# Patient Record
Sex: FEMALE | Race: WHITE | NOT HISPANIC OR LATINO | Employment: OTHER | URBAN - METROPOLITAN AREA
[De-identification: names, ages, dates, MRNs, and addresses within clinical notes are randomized per-mention and may not be internally consistent; named-entity substitution may affect disease eponyms.]

---

## 2017-02-09 ENCOUNTER — OFFICE VISIT (OUTPATIENT)
Dept: INTERNAL MEDICINE | Facility: CLINIC | Age: 62
End: 2017-02-09

## 2017-02-09 VITALS
WEIGHT: 123 LBS | HEART RATE: 92 BPM | HEIGHT: 61 IN | OXYGEN SATURATION: 98 % | BODY MASS INDEX: 23.22 KG/M2 | SYSTOLIC BLOOD PRESSURE: 112 MMHG | RESPIRATION RATE: 16 BRPM | DIASTOLIC BLOOD PRESSURE: 78 MMHG | TEMPERATURE: 98 F

## 2017-02-09 DIAGNOSIS — G43.909 MIGRAINE WITHOUT STATUS MIGRAINOSUS, NOT INTRACTABLE, UNSPECIFIED MIGRAINE TYPE: ICD-10-CM

## 2017-02-09 DIAGNOSIS — F51.01 PRIMARY INSOMNIA: ICD-10-CM

## 2017-02-09 DIAGNOSIS — K21.9 GASTROESOPHAGEAL REFLUX DISEASE WITHOUT ESOPHAGITIS: ICD-10-CM

## 2017-02-09 DIAGNOSIS — F41.8 DEPRESSION WITH ANXIETY: Primary | ICD-10-CM

## 2017-02-09 LAB
ALBUMIN SERPL-MCNC: 4.4 G/DL (ref 3.5–5.2)
ALBUMIN/GLOB SERPL: 1.7 G/DL
ALP SERPL-CCNC: 103 U/L (ref 40–129)
ALT SERPL-CCNC: 10 U/L (ref 5–33)
AST SERPL-CCNC: 19 U/L (ref 5–32)
BASOPHILS # BLD AUTO: 0.04 10*3/MM3 (ref 0–0.2)
BASOPHILS NFR BLD AUTO: 0.6 % (ref 0–2)
BILIRUB SERPL-MCNC: 0.3 MG/DL (ref 0.2–1.2)
BUN SERPL-MCNC: 12 MG/DL (ref 8–23)
BUN/CREAT SERPL: 11.2 (ref 7–25)
CALCIUM SERPL-MCNC: 9.4 MG/DL (ref 8.8–10.5)
CHLORIDE SERPL-SCNC: 100 MMOL/L (ref 98–107)
CO2 SERPL-SCNC: 26 MMOL/L (ref 22–29)
CREAT SERPL-MCNC: 1.07 MG/DL (ref 0.57–1)
EOSINOPHIL # BLD AUTO: 0.03 10*3/MM3 (ref 0.1–0.3)
EOSINOPHIL NFR BLD AUTO: 0.5 % (ref 0–4)
ERYTHROCYTE [DISTWIDTH] IN BLOOD BY AUTOMATED COUNT: 12.1 % (ref 11.5–14.5)
GLOBULIN SER CALC-MCNC: 2.6 GM/DL
GLUCOSE SERPL-MCNC: 89 MG/DL (ref 65–99)
HCT VFR BLD AUTO: 39.6 % (ref 37–47)
HGB BLD-MCNC: 12.9 G/DL (ref 12–16)
IMM GRANULOCYTES # BLD: 0.01 10*3/MM3 (ref 0–0.03)
IMM GRANULOCYTES NFR BLD: 0.2 % (ref 0–0.5)
LYMPHOCYTES # BLD AUTO: 1.29 10*3/MM3 (ref 0.6–4.8)
LYMPHOCYTES NFR BLD AUTO: 20.5 % (ref 20–45)
MCH RBC QN AUTO: 30.5 PG (ref 27–31)
MCHC RBC AUTO-ENTMCNC: 32.6 G/DL (ref 31–37)
MCV RBC AUTO: 93.6 FL (ref 81–99)
MONOCYTES # BLD AUTO: 0.36 10*3/MM3 (ref 0–1)
MONOCYTES NFR BLD AUTO: 5.7 % (ref 3–8)
NEUTROPHILS # BLD AUTO: 4.57 10*3/MM3 (ref 1.5–8.3)
NEUTROPHILS NFR BLD AUTO: 72.5 % (ref 45–70)
NRBC BLD AUTO-RTO: 0 /100 WBC (ref 0–0)
PLATELET # BLD AUTO: 263 10*3/MM3 (ref 140–500)
POTASSIUM SERPL-SCNC: 4 MMOL/L (ref 3.5–5.2)
PROT SERPL-MCNC: 7 G/DL (ref 6–8.5)
RBC # BLD AUTO: 4.23 10*6/MM3 (ref 4.2–5.4)
SODIUM SERPL-SCNC: 137 MMOL/L (ref 136–145)
TSH SERPL DL<=0.005 MIU/L-ACNC: 3.03 MIU/ML (ref 0.27–4.2)
WBC # BLD AUTO: 6.3 10*3/MM3 (ref 4.8–10.8)

## 2017-02-09 PROCEDURE — 99214 OFFICE O/P EST MOD 30 MIN: CPT | Performed by: FAMILY MEDICINE

## 2017-02-09 NOTE — PROGRESS NOTES
Subjective     Martina Hardwick is a 61 y.o. female, who presents with a chief complaint of   Chief Complaint   Patient presents with   • Anxiety   • Depression   • Headache   • Insomnia       HPI     1. Depression.  Pt reports her symptoms are controlled.  Denies SI.    2. Migraines.  Topiramate has decreased the frequency.  Sumatriptan effective for abortive treatment.    3. GERD.  Pt reports symptoms are controlled with ranitidine.    The following portions of the patient's history were reviewed and updated as appropriate: allergies, current medications, past family history, past medical history, past social history, past surgical history and problem list.    Allergies: Sulfa antibiotics    Review of Systems   Constitutional: Negative.    Eyes: Negative.    Respiratory: Negative.    Cardiovascular: Negative.    Gastrointestinal: Negative.    Endocrine: Negative.    Genitourinary: Negative.    Musculoskeletal: Negative.    Skin: Negative.    Allergic/Immunologic: Negative.    Neurological: Positive for headaches.   Hematological: Negative.    Psychiatric/Behavioral: Negative.        Objective     Wt Readings from Last 3 Encounters:   02/09/17 123 lb (55.8 kg)   11/07/16 117 lb 12.8 oz (53.4 kg)   09/26/16 120 lb 3.2 oz (54.5 kg)     Temp Readings from Last 3 Encounters:   02/09/17 98 °F (36.7 °C) (Oral)   05/13/16 97.8 °F (36.6 °C) (Oral)   03/30/16 98 °F (36.7 °C)     BP Readings from Last 3 Encounters:   02/09/17 112/78   11/07/16 127/90   09/26/16 120/78     Pulse Readings from Last 3 Encounters:   02/09/17 92   11/07/16 78   09/26/16 81     Body mass index is 23.24 kg/(m^2).  SpO2 Readings from Last 3 Encounters:   02/09/17 98%   11/07/16 99%   09/26/16 97%       Physical Exam   Constitutional: She is oriented to person, place, and time. She appears well-developed and well-nourished.   HENT:   Head: Normocephalic.   Mouth/Throat: Oropharynx is clear and moist.   Eyes: Conjunctivae and EOM are normal. Pupils are  equal, round, and reactive to light.   Neck: Normal range of motion. Neck supple. No thyromegaly present.   Cardiovascular: Normal rate, regular rhythm and normal heart sounds.    Pulmonary/Chest: Effort normal and breath sounds normal.   Abdominal: Soft. Bowel sounds are normal. There is no hepatosplenomegaly.   Musculoskeletal: Normal range of motion. She exhibits no edema.   Lymphadenopathy:     She has no cervical adenopathy.   Neurological: She is alert and oriented to person, place, and time.   Skin: Skin is warm and dry. No rash noted.   Psychiatric: She has a normal mood and affect. Her behavior is normal.   Vitals reviewed.      Results for orders placed or performed in visit on 09/26/16   Comprehensive Metabolic Panel   Result Value Ref Range    Glucose 71 65 - 99 mg/dL    BUN 9 8 - 23 mg/dL    Creatinine 0.88 0.57 - 1.00 mg/dL    Sodium 132 (L) 136 - 145 mmol/L    Potassium 4.7 3.5 - 5.2 mmol/L    Chloride 95 (L) 98 - 107 mmol/L    CO2 25.0 22.0 - 29.0 mmol/L    Calcium 9.5 8.8 - 10.5 mg/dL    Total Protein 7.1 6.0 - 8.5 g/dL    Albumin 4.20 3.50 - 5.20 g/dL    ALT (SGPT) 12 5 - 33 U/L    AST (SGOT) 17 5 - 32 U/L    Alkaline Phosphatase 111 40 - 129 U/L    Total Bilirubin 0.5 0.2 - 1.2 mg/dL    eGFR Non African Amer 66 >60 mL/min/1.73    Globulin 2.9 gm/dL    A/G Ratio 1.4 g/dL    BUN/Creatinine Ratio 10.2 7.0 - 25.0    Anion Gap 12.0 mmol/L   TSH   Result Value Ref Range    TSH 2.590 0.270 - 4.200 mIU/mL   Vitamin B12   Result Value Ref Range    Vitamin B-12 1046 (H) 211 - 946 pg/mL   T4, Free   Result Value Ref Range    Free T4 1.19 0.93 - 1.70 ng/dL   CBC Auto Differential   Result Value Ref Range    WBC 6.30 4.80 - 10.80 10*3/mm3    RBC 4.27 4.20 - 5.40 10*6/mm3    Hemoglobin 13.2 12.0 - 16.0 g/dL    Hematocrit 38.8 37.0 - 47.0 %    MCV 90.9 81.0 - 99.0 fL    MCH 30.9 27.0 - 31.0 pg    MCHC 34.0 31.0 - 37.0 g/dL    RDW 12.4 11.5 - 14.5 %    RDW-SD 41.0 37.0 - 54.0 fl    MPV 9.0 7.4 - 10.4 fL     Platelets 244 140 - 500 10*3/mm3    Neutrophil % 77.3 (H) 45.0 - 70.0 %    Lymphocyte % 15.4 (L) 20.0 - 45.0 %    Monocyte % 6.2 3.0 - 8.0 %    Eosinophil % 0.5 0.0 - 4.0 %    Basophil % 0.3 0.0 - 2.0 %    Immature Grans % 0.3 0.0 - 0.5 %    Neutrophils, Absolute 4.87 1.50 - 8.30 10*3/mm3    Lymphocytes, Absolute 0.97 0.60 - 4.80 10*3/mm3    Monocytes, Absolute 0.39 0.00 - 1.00 10*3/mm3    Eosinophils, Absolute 0.03 (L) 0.10 - 0.30 10*3/mm3    Basophils, Absolute 0.02 0.00 - 0.20 10*3/mm3    Immature Grans, Absolute 0.02 0.00 - 0.03 10*3/mm3    nRBC 0.0 0.0 - 0.0 /100 WBC       Assessment/Plan   Martina was seen today for anxiety, depression, headache and insomnia.    Diagnoses and all orders for this visit:    Depression with anxiety  -     CBC & Differential  -     TSH    Primary insomnia    Migraine without status migrainosus, not intractable, unspecified migraine type  -     Ambulatory Referral to Neurology  -     Comprehensive Metabolic Panel    Gastroesophageal reflux disease without esophagitis    1. Depression with anxiety.  Controlled.  Continue same.    2. Insomnia.  Zolpidem helping her get several hours of sleep.  Med management agreement and Joby UTD.    3. Migraines.  Check labs.  Continue topiramate and prn sumatriptan.  Neurology consult.    4. GERD.  Controlled with H2 blocker.      Outpatient Medications Prior to Visit   Medication Sig Dispense Refill   • ALPRAZolam (XANAX) 0.5 MG tablet Take 0.5 mg by mouth 3 (three) times a day as needed for anxiety.     • Calcium-Vitamin D (CALTRATE 600 PLUS-VIT D PO) Take  by mouth.     • fluticasone (FLONASE) 50 MCG/ACT nasal spray 2 sprays into each nostril daily.     • loratadine (CLARITIN) 10 MG tablet Take 10 mg by mouth daily.     • Multiple Vitamins-Minerals (EYE VITAMINS) capsule Take  by mouth.     • Multiple Vitamins-Minerals (MULTI ADULT GUMMIES PO) Take  by mouth.     • omeprazole (PriLOSEC) 40 MG capsule Take 40 mg by mouth Daily. Pt takes 1/3 per  day      • PARoxetine (PAXIL) 30 MG tablet Take 1 tablet by mouth every morning. 90 tablet 3   • polyethylene glycol (MIRALAX) packet Take 17 g by mouth daily.     • Probiotic Product (PROBIOTIC DAILY) capsule Once daily     • promethazine (PHENERGAN) 12.5 MG tablet Take 1 tablet by mouth every 6 (six) hours as needed for nausea or vomiting. 30 tablet 1   • ranitidine (ZANTAC) 300 MG tablet Take 300 mg by mouth Daily.     • SUMAtriptan (IMITREX) 100 MG tablet Take 1 tablet by mouth 1 (One) Time As Needed for migraine for up to 1 dose. 27 tablet 3   • topiramate (TOPAMAX) 50 MG tablet Take 1 tablet by mouth 2 (Two) Times a Day. 180 tablet 3   • zolpidem (AMBIEN) 10 MG tablet TAKE ONE TABLET BY MOUTH AT BEDTIME AS NEEDED 30 tablet 5     Facility-Administered Medications Prior to Visit   Medication Dose Route Frequency Provider Last Rate Last Dose   • promethazine (PHENERGAN) injection 12.5 mg  12.5 mg Intramuscular Once Juan Ramon Fishman MD         No orders of the defined types were placed in this encounter.    [unfilled]  There are no discontinued medications.      Return in about 6 months (around 8/9/2017).

## 2017-02-13 ENCOUNTER — TELEPHONE (OUTPATIENT)
Dept: INTERNAL MEDICINE | Facility: CLINIC | Age: 62
End: 2017-02-13

## 2017-02-13 DIAGNOSIS — G43.909 MIGRAINE WITHOUT STATUS MIGRAINOSUS, NOT INTRACTABLE, UNSPECIFIED MIGRAINE TYPE: Primary | ICD-10-CM

## 2017-02-13 NOTE — TELEPHONE ENCOUNTER
----- Message from Ildefonso Dubois MD sent at 2/13/2017  7:55 AM EST -----  Please call the patient regarding her result.  Labs look okay.  Needs to drink plenty of water and limit NSAIDs (ibuprofen, Aleve) to keep her kidneys filtering/functioning optimally.  Thanks.

## 2017-02-14 ENCOUNTER — TELEPHONE (OUTPATIENT)
Dept: INTERNAL MEDICINE | Facility: CLINIC | Age: 62
End: 2017-02-14

## 2017-02-14 NOTE — TELEPHONE ENCOUNTER
Advised patient as per below and scheduled repeat lab on 4/11/2016.  ----- Message from Stephanie Tee MA sent at 2/13/2017  6:52 PM EST -----  Regarding: FW: Non-Urgent Medical Question  Contact: [phone]      ----- Message -----     From: Ildefonso Dubois MD     Sent: 2/13/2017   6:01 PM       To: lavelle Garcia [facility] [place]  Subject: RE: Non-Urgent Medical Question                  I'm not too concerned about the lab after looking at her previous labs.  It's not uncommon to have an occasional drop in eGFR.  I have ordered another blood test for her to have in 2-3 months to monitor this.  Thanks.    ----- Message -----     From: Radha Mayorga MA     Sent: 2/13/2017   4:22 PM       To: Ildefonso Dubois MD  Subject: FW: Non-Urgent Medical Question                      ----- Message -----     From: Martina Hardwick     Sent: 2/13/2017   3:48 PM       To: lavelle Garcia [facility] [place]  Subject: Non-Urgent Medical Question                      Hi Dr. Dubois,    I am a little concerned about the high creatitine reading that your office called me about today.  I don't regularly take nsaids.  I took some back in Dec/Jan after having a root canal but that was about 1 month ago.  I drink about 8-10 glasses of water a day as it is so that is why I am concerned if there is another reason it might be climbing.  I don't recall what it was when I had my labs done in Nov.  I am not scheduled to come back till August.  Do I need to come back for labs before that time?  I have read that both Omeprazole and Rantidine can raise creatitine.  I would appreciate your feedback on this.  I know your really busy.  Thanks so much.    Martina Hardwick

## 2017-02-18 ENCOUNTER — RESULTS ENCOUNTER (OUTPATIENT)
Dept: INTERNAL MEDICINE | Facility: CLINIC | Age: 62
End: 2017-02-18

## 2017-02-18 DIAGNOSIS — G43.909 MIGRAINE WITHOUT STATUS MIGRAINOSUS, NOT INTRACTABLE, UNSPECIFIED MIGRAINE TYPE: ICD-10-CM

## 2017-02-20 ENCOUNTER — TELEPHONE (OUTPATIENT)
Dept: INTERNAL MEDICINE | Facility: CLINIC | Age: 62
End: 2017-02-20

## 2017-02-20 NOTE — TELEPHONE ENCOUNTER
----- Message from Stephanie Tee MA sent at 2/20/2017  3:49 PM EST -----  Rita Avalos, manager @ [facility], office to contact patient personally with neurology appt.

## 2017-03-20 RX ORDER — ZOLPIDEM TARTRATE 10 MG/1
TABLET ORAL
Qty: 30 TABLET | Refills: 2 | OUTPATIENT
Start: 2017-03-20 | End: 2017-09-17 | Stop reason: SDUPTHER

## 2017-04-10 DIAGNOSIS — Z00.00 HEALTH CARE MAINTENANCE: Primary | ICD-10-CM

## 2017-04-11 ENCOUNTER — LAB (OUTPATIENT)
Dept: INTERNAL MEDICINE | Facility: CLINIC | Age: 62
End: 2017-04-11

## 2017-04-11 DIAGNOSIS — Z00.00 HEALTH CARE MAINTENANCE: ICD-10-CM

## 2017-04-11 LAB
ALBUMIN SERPL-MCNC: 4.3 G/DL (ref 3.5–5.2)
ALBUMIN/GLOB SERPL: 1.7 G/DL
ALP SERPL-CCNC: 95 U/L (ref 40–129)
ALT SERPL-CCNC: 10 U/L (ref 5–33)
AST SERPL-CCNC: 17 U/L (ref 5–32)
BILIRUB SERPL-MCNC: 0.4 MG/DL (ref 0.2–1.2)
BUN SERPL-MCNC: 9 MG/DL (ref 8–23)
BUN/CREAT SERPL: 9.7 (ref 7–25)
CALCIUM SERPL-MCNC: 9.6 MG/DL (ref 8.8–10.5)
CHLORIDE SERPL-SCNC: 97 MMOL/L (ref 98–107)
CO2 SERPL-SCNC: 24.8 MMOL/L (ref 22–29)
CREAT SERPL-MCNC: 0.93 MG/DL (ref 0.57–1)
GLOBULIN SER CALC-MCNC: 2.6 GM/DL
GLUCOSE SERPL-MCNC: 85 MG/DL (ref 65–99)
POTASSIUM SERPL-SCNC: 4.6 MMOL/L (ref 3.5–5.2)
PROT SERPL-MCNC: 6.9 G/DL (ref 6–8.5)
SODIUM SERPL-SCNC: 135 MMOL/L (ref 136–145)

## 2017-04-18 ENCOUNTER — OFFICE VISIT (OUTPATIENT)
Dept: NEUROLOGY | Facility: CLINIC | Age: 62
End: 2017-04-18

## 2017-04-18 VITALS
HEIGHT: 61 IN | DIASTOLIC BLOOD PRESSURE: 74 MMHG | WEIGHT: 117 LBS | OXYGEN SATURATION: 98 % | HEART RATE: 90 BPM | SYSTOLIC BLOOD PRESSURE: 126 MMHG | BODY MASS INDEX: 22.09 KG/M2

## 2017-04-18 DIAGNOSIS — G44.86 CERVICOGENIC HEADACHE: ICD-10-CM

## 2017-04-18 DIAGNOSIS — G43.019 INTRACTABLE MIGRAINE WITHOUT AURA AND WITHOUT STATUS MIGRAINOSUS: ICD-10-CM

## 2017-04-18 PROCEDURE — 99204 OFFICE O/P NEW MOD 45 MIN: CPT | Performed by: PSYCHIATRY & NEUROLOGY

## 2017-04-18 RX ORDER — PROPRANOLOL HYDROCHLORIDE 40 MG/1
40 TABLET ORAL DAILY
Qty: 30 TABLET | Refills: 5 | Status: SHIPPED | OUTPATIENT
Start: 2017-04-18 | End: 2017-05-18

## 2017-04-18 NOTE — PROGRESS NOTES
Subjective   Martina Hardwick is a 61 y.o. female with history of depression and anxiety issues came for evaluation of migraine headaches.    History of Present Illness   She was accompanied by her  and according to them, she has migraine headaches for the past 20 years and worsening recently and headaches are mainly on the left side, throbbing nature and sometimes constant pain, severity 8-10/10, associated with nausea, photophobia, phonophobia and osmophobia and can last for 1-2 days and having 15-20 headaches a month and complaint with the medication Sumatriptan and topiramate and denies any side effects.  Denies any aura prior to the headaches.  Tried Excedrin and amitriptyline in the past with not much improvement in her symptoms.  Complaining of neck pain issues and sometimes pain on the back of the head but denies any nerve vision, double vision, weakness, tingling numbness, dizziness or balance problems while walking.  Denies any falls, passing out spells, seizures, strokes or mini strokes in the past.    The following portions of the patient's history were reviewed and updated as appropriate: allergies, current medications, past family history, past medical history, past social history, past surgical history and problem list.    Review of Systems   Constitutional: Negative for chills, fatigue and fever.   HENT: Positive for dental problem, postnasal drip, sinus pressure and tinnitus.    Eyes: Negative for pain, redness and itching.   Respiratory: Negative for cough, shortness of breath and wheezing.    Cardiovascular: Negative for chest pain, palpitations and leg swelling.   Gastrointestinal: Positive for constipation. Negative for diarrhea, nausea and vomiting.   Endocrine: Negative for cold intolerance and heat intolerance.   Genitourinary: Negative for decreased urine volume, difficulty urinating and urgency.   Musculoskeletal: Positive for neck pain. Negative for back pain and neck stiffness.    Skin: Negative for rash and wound.   Allergic/Immunologic: Positive for environmental allergies. Negative for food allergies.   Neurological: Positive for headaches. Negative for dizziness, weakness and numbness.   Hematological: Negative for adenopathy. Does not bruise/bleed easily.   Psychiatric/Behavioral: Negative for confusion and sleep disturbance. The patient is nervous/anxious.        Objective   Physical Exam   Vitals reviewed.    GENERAL EXAMINATION : Patient is well-developed, well-nourished, well-groomed, alert and approriate in no apparent distress.  HEENT: Normocephalic, normal funduscopic exam, no visible oral lesions.  NECK : Normal range of motion, no tenderness, no malalignment.  CARDIAC : Regular rate and rhythm, no murmurs.  CHEST : Clear to auscultation, bilateral.   No wheezing .  ABDOMEN : Soft, non tender and non distended. EXTREMITIES: No edema. SKIN : No rashes or lesions visible. MUSCULOSKELETAL : No muscle weakness or muscle pain. No joint pains. PSYCHIATRIC : Awake and follwoing verbal commands well.     NEUROLOGICAL EXAMINATION  :  Higher integrative functions : No aphasia or dysarthria.  CRANIAL NERVES : Cranial nerve II: Normal visual acuity and visual fields.  On funduscopic exam, discs are flat with sharp margins cranial nerves III, IV, VI: Extraocular movements are full without nystagmus; pupils are equal, round and reactive to light.  Cranial nerve V: Normal facial sensation and strength of muscles of mastication.  Cranial nerve: VII: Facial movements are symmetric.  No weakness.  Cranial nerve VIII: Auditory acuity is normal.  Cranial nerve IX, X: Symmetric palate movement.  Cranial nerve XI sternocleidomastoid and trapezius are normal.  Cranial nerve XII: Tongue in the midline with no atrophy or fasciculations.      MOTOR : Normal muscle strength and tone.  SENSATION : Normal to light touch, pinprick, vibration sensations intact and Romberg is negative.  MUSCLE STRETCH  REFLEXES : Normal and symmetric in the upper extremities and lower extremities and the plantar responses are flexor bilaterally. COORDINATION : Finger to nose test showed no dysmetria.  Rapid alternating movements are normal.   GAIT : Walks on heels, toes, except for mild difficulty with tandem walk.    Assessment/Plan   Martina was seen today for migraine.    Diagnoses and all orders for this visit:    Intractable migraine without aura and without status migrainosus  -     Nerve Block    Cervicogenic headache    Other orders  -     propranolol (INDERAL) 40 MG tablet; Take 1 tablet by mouth Daily for 30 days.    61-year-old right-handed white female with history of depression and anxiety issues and migraine headaches came for worsening of the migraine headaches.  On exam, no focal deficits were seen except for mild difficulty with tandem walk.  Reviewed PCPs office notes and also reviewed MRI brain dated September 2016 and showed mild small vessel ischemic changes but no acute abnormality seen and discussed with the patient and her  regarding her signs and symptoms and told her to continue Sumatriptan 100 mg one tablet by mouth at onset of headaches, can repeat one more tablet in 2 hours but not more than 2 per day and for per week and also gave samples of Zembrace injections as needed for migraine headaches and discussed about side effects.  Will wean topiramate as it start helping her migraine headaches much and will start propranolol 40 mg one tablet by mouth daily for migraine prophylaxis and discussed about side effects.  Will schedule the patient for supraorbital and supratrochlear nerve blocks in future to help with these headaches.  Discussed about migraine triggers and medication or use headaches.  Will follow-up in 2 weeks for nerve blocks.    Thank you for allowing me to participate in the care of the patient.  Please feel free to call for any questions at your convenience.    Yours  sincerely,    Fransisca Camargo M.D

## 2017-04-19 RX ORDER — PAROXETINE 30 MG/1
TABLET, FILM COATED ORAL
Qty: 90 TABLET | Refills: 2 | Status: SHIPPED | OUTPATIENT
Start: 2017-04-19 | End: 2017-08-10

## 2017-05-03 ENCOUNTER — PROCEDURE VISIT (OUTPATIENT)
Dept: NEUROLOGY | Facility: CLINIC | Age: 62
End: 2017-05-03

## 2017-05-03 DIAGNOSIS — G43.719 INTRACTABLE CHRONIC MIGRAINE WITHOUT AURA AND WITHOUT STATUS MIGRAINOSUS: ICD-10-CM

## 2017-05-03 PROCEDURE — S0020 INJECTION, BUPIVICAINE HYDRO: HCPCS | Performed by: PSYCHIATRY & NEUROLOGY

## 2017-05-03 PROCEDURE — 64400 NJX AA&/STRD TRIGEMINAL NRV: CPT | Performed by: PSYCHIATRY & NEUROLOGY

## 2017-05-03 PROCEDURE — 64450 NJX AA&/STRD OTHER PN/BRANCH: CPT | Performed by: PSYCHIATRY & NEUROLOGY

## 2017-05-03 RX ORDER — BUPIVACAINE HYDROCHLORIDE 5 MG/ML
5 INJECTION, SOLUTION PERINEURAL ONCE
Status: COMPLETED | OUTPATIENT
Start: 2017-05-03 | End: 2017-05-03

## 2017-05-03 RX ADMIN — BUPIVACAINE HYDROCHLORIDE 5 ML: 5 INJECTION, SOLUTION PERINEURAL at 13:35

## 2017-06-19 RX ORDER — ZOLPIDEM TARTRATE 5 MG/1
TABLET ORAL
Qty: 30 TABLET | Refills: 2 | OUTPATIENT
Start: 2017-06-19 | End: 2017-08-10

## 2017-07-03 ENCOUNTER — PROCEDURE VISIT (OUTPATIENT)
Dept: NEUROLOGY | Facility: CLINIC | Age: 62
End: 2017-07-03

## 2017-07-03 DIAGNOSIS — G43.719 INTRACTABLE CHRONIC MIGRAINE WITHOUT AURA AND WITHOUT STATUS MIGRAINOSUS: ICD-10-CM

## 2017-07-03 PROCEDURE — 99213 OFFICE O/P EST LOW 20 MIN: CPT | Performed by: PSYCHIATRY & NEUROLOGY

## 2017-07-03 RX ORDER — BUPIVACAINE HYDROCHLORIDE 5 MG/ML
5 INJECTION, SOLUTION PERINEURAL ONCE
Status: COMPLETED | OUTPATIENT
Start: 2017-07-03 | End: 2017-07-03

## 2017-07-03 RX ORDER — VERAPAMIL HYDROCHLORIDE 40 MG/1
40 TABLET ORAL 2 TIMES DAILY
Qty: 60 TABLET | Refills: 5 | Status: SHIPPED | OUTPATIENT
Start: 2017-07-03 | End: 2017-08-02

## 2017-07-03 RX ADMIN — BUPIVACAINE HYDROCHLORIDE 5 ML: 5 INJECTION, SOLUTION PERINEURAL at 13:38

## 2017-07-03 NOTE — PROGRESS NOTES
Subjective   Martina Hardwick is a 61 y.o. female with history of chronic migraines, intractable and depression and anxiety issues came for follow-up appointment.    History of Present Illness   She was accompanied by her  and according to them, she is still having 15-18 headaches a month, pain is mainly on the left side and also on the back of the head, throbbing nature, severity 7/10, associated with nausea, photophobia and phonophobia and complaint with the medication Sumatriptan, Zembrace injections and propranolol and denies any side effects except for low heart rate and also having high blood pressure issues.  Got occipital nerve blocks and also supratrochlear and supraorbital nerve blocks with not much improvement in her headaches.  Denies any blurred vision, double vision, weakness, tingling numbness, dizziness or balance problems when walking.  Denies any falls, passing out spells or recent hospitalizations since last visit.  Denies any worsening of depression or anxiety issues.     The following portions of the patient's history were reviewed and updated as appropriate: allergies, current medications, past family history, past medical history, past social history, past surgical history and problem list.    Review of Systems   Constitutional: Negative for chills, fatigue and fever.   HENT: Negative for hearing loss, mouth sores, tinnitus and trouble swallowing.    Eyes: Negative for pain, redness and itching.   Respiratory: Negative for cough, shortness of breath and wheezing.    Cardiovascular: Negative for chest pain, palpitations and leg swelling.   Gastrointestinal: Negative for constipation, diarrhea, nausea and vomiting.   Endocrine: Negative for cold intolerance and heat intolerance.   Genitourinary: Negative for decreased urine volume, difficulty urinating and urgency.   Musculoskeletal: Negative for back pain, neck pain and neck stiffness.   Skin: Negative for rash and wound.    Allergic/Immunologic: Negative for environmental allergies and food allergies.   Neurological: Positive for headaches. Negative for dizziness, weakness, light-headedness and numbness.   Hematological: Negative for adenopathy. Does not bruise/bleed easily.   Psychiatric/Behavioral: Negative for confusion and sleep disturbance. The patient is not nervous/anxious.        Objective   Physical Exam   Vitals reviewed.    GENERAL EXAMINATION : Patient is well-developed, well-nourished, well-groomed, alert and approriate in no apparent distress.  HEENT: Normocephalic, normal funduscopic exam, no visible oral lesions.  NECK : Normal range of motion, no tenderness, no malalignment.  CARDIAC : Regular rate and rhythm, no murmurs.  CHEST : Clear to auscultation, bilateral.   No wheezing .  ABDOMEN : Soft, non tender and non distended. EXTREMITIES: No edema. SKIN : No rashes or lesions visible. MUSCULOSKELETAL : No muscle weakness or muscle pain. No joint pains. PSYCHIATRIC : Awake and follwoing verbal commands well.     NEUROLOGICAL EXAMINATION  :  Higher integrative functions : No aphasia or dysarthria.  CRANIAL NERVES : Cranial nerve II: Normal visual acuity and visual fields.  On funduscopic exam, discs are flat with sharp margins cranial nerves III, IV, VI: Extraocular movements are full without nystagmus; pupils are equal, round and reactive to light.  Cranial nerve V: Normal facial sensation and strength of muscles of mastication.  Cranial nerve: VII: Facial movements are symmetric.  No weakness.  Cranial nerve VIII: Auditory acuity is normal.  Cranial nerve IX, X: Symmetric palate movement.  Cranial nerve XI sternocleidomastoid and trapezius are normal.  Cranial nerve XII: Tongue in the midline with no atrophy or fasciculations.      MOTOR : Normal muscle strength and tone.  SENSATION : Normal to light touch, pinprick, vibration sensations intact and Romberg is negative.  MUSCLE STRETCH REFLEXES : Normal and symmetric  in the upper extremities and lower extremities and the plantar responses are flexor bilaterally. COORDINATION : Finger to nose test showed no dysmetria.  Rapid alternating movements are normal.   GAIT : Walks on heels, toes, and tandem walk without difficulty.    Assessment/Plan   Diagnoses and all orders for this visit:    Intractable chronic migraine without aura and without status migrainosus  -     bupivacaine (MARCAINE) injection 5 mL; Inject 5 mL as directed 1 (One) Time.  -     OnabotulinumtoxinA 200 Units; Inject 200 Units into the shoulder, thigh, or buttocks 1 (One) Time.    Other orders  -     verapamil (CALAN) 40 MG tablet; Take 1 tablet by mouth 2 (Two) Times a Day for 30 days.    61-year-old right-handed white female with history of depression and anxiety issues and chronic migraines, intractable came for follow-up appointment.  On exam, no new focal deficits were seen.  Discussed with the patient and her  regarding her signs and symptoms and patient wants to hold on further occipital nerve blocks and the supratrochlear and supraorbital nerve blocks as it did not help her headaches much and ordered for Botox injections as she was having more than 15 headaches a month and tried multiple medications in the past.  Told her to continue Sumatriptan 100 mg one tablet by mouth at onset of headaches, can repeat one more tablet in 2 hours but not more than 2 per day and for per week and continue Zembrace injections as needed for severe migraines.  Will discontinue propranolol as having side effects and will start verapamil 40 mg by mouth twice a day for migraine prophylaxis and discussed about side effects.  Discussed about migraine triggers and medication or use headaches.  Will follow-up in 3 months or earlier if problems arise.      Thank you for allowing me to participate in the care of the patient.  Please feel free to call for any questions at your convenience.    Yours sincerely,    Fransisca  Mary Jo APONTE

## 2017-07-25 DIAGNOSIS — G43.719 INTRACTABLE CHRONIC MIGRAINE WITHOUT AURA AND WITHOUT STATUS MIGRAINOSUS: Primary | ICD-10-CM

## 2017-08-10 ENCOUNTER — OFFICE VISIT (OUTPATIENT)
Dept: INTERNAL MEDICINE | Facility: CLINIC | Age: 62
End: 2017-08-10

## 2017-08-10 VITALS
DIASTOLIC BLOOD PRESSURE: 106 MMHG | BODY MASS INDEX: 23.22 KG/M2 | SYSTOLIC BLOOD PRESSURE: 164 MMHG | HEIGHT: 61 IN | WEIGHT: 123 LBS | TEMPERATURE: 97.6 F | HEART RATE: 79 BPM | OXYGEN SATURATION: 99 %

## 2017-08-10 DIAGNOSIS — G43.019 INTRACTABLE MIGRAINE WITHOUT AURA AND WITHOUT STATUS MIGRAINOSUS: ICD-10-CM

## 2017-08-10 DIAGNOSIS — F41.8 DEPRESSION WITH ANXIETY: Primary | ICD-10-CM

## 2017-08-10 DIAGNOSIS — Z00.00 ROUTINE HEALTH MAINTENANCE: ICD-10-CM

## 2017-08-10 DIAGNOSIS — F51.01 PRIMARY INSOMNIA: ICD-10-CM

## 2017-08-10 DIAGNOSIS — K21.9 GASTROESOPHAGEAL REFLUX DISEASE WITHOUT ESOPHAGITIS: ICD-10-CM

## 2017-08-10 DIAGNOSIS — I10 ESSENTIAL HYPERTENSION: ICD-10-CM

## 2017-08-10 PROCEDURE — 99214 OFFICE O/P EST MOD 30 MIN: CPT | Performed by: FAMILY MEDICINE

## 2017-08-10 RX ORDER — LISINOPRIL 10 MG/1
10 TABLET ORAL DAILY
Qty: 30 TABLET | Refills: 5 | Status: SHIPPED | OUTPATIENT
Start: 2017-08-10 | End: 2018-02-05 | Stop reason: SDUPTHER

## 2017-08-10 RX ORDER — ALPRAZOLAM 0.5 MG/1
0.5 TABLET ORAL 3 TIMES DAILY PRN
Qty: 30 TABLET | Refills: 2 | Status: SHIPPED | OUTPATIENT
Start: 2017-08-10 | End: 2018-01-15 | Stop reason: SDUPTHER

## 2017-08-10 NOTE — PROGRESS NOTES
Subjective     Martina Hardwick is a 61 y.o. female, who presents with a chief complaint of   Chief Complaint   Patient presents with   • Depression   • Migraine   • Hypertension   • Anxiety       Depression   Migraine      Hypertension   Associated symptoms include anxiety and headaches.   Anxiety       Her past medical history is significant for depression.        1. Depression with anxiety.  Pt reports her symptoms are controlled.  Denies SI.    2. Migraines.  She is now seeing neurology and is taking verapamil and sumatriptan.  Looking at Botox but she is not sure if she will able to afford it due to her high deductive.    3. GERD.  Pt reports she is doing well and is decreasing her doses of omeprazole and ranitidine.  She sees Dr. Ruff.    4. Insomnia.  Pt reports zolpidem is helping her fall asleep and stay asleep for several hours.  Denies side effects.    5. Elevated blood pressure.  She has been monitoring home blood pressures for several months and home blood pressures are ranging 140s-160s/.  She has a strong family history of hypertension.  She feels hot when it gets high and her tinnitus increases.  She is taking verapamil for her migraines.    The following portions of the patient's history were reviewed and updated as appropriate: allergies, current medications, past family history, past medical history, past social history, past surgical history and problem list.    Allergies: Sulfa antibiotics    Review of Systems   Constitutional: Negative.    Eyes: Negative.    Respiratory: Negative.    Cardiovascular: Negative.    Gastrointestinal: Negative.    Endocrine: Negative.    Genitourinary: Negative.    Musculoskeletal: Negative.    Skin: Negative.    Allergic/Immunologic: Negative.    Neurological: Positive for headaches.   Hematological: Negative.    Psychiatric/Behavioral: Negative.        Objective     Wt Readings from Last 3 Encounters:   08/10/17 123 lb (55.8 kg)   04/18/17 117 lb (53.1  kg)   02/09/17 123 lb (55.8 kg)     Temp Readings from Last 3 Encounters:   08/10/17 97.6 °F (36.4 °C)   02/09/17 98 °F (36.7 °C) (Oral)   05/13/16 97.8 °F (36.6 °C) (Oral)     BP Readings from Last 3 Encounters:   08/10/17 (!) 164/106   04/18/17 126/74   02/09/17 112/78     Pulse Readings from Last 3 Encounters:   08/10/17 79   04/18/17 90   02/09/17 92     Body mass index is 23.24 kg/(m^2).  SpO2 Readings from Last 3 Encounters:   08/10/17 99%   04/18/17 98%   02/09/17 98%       Physical Exam   Constitutional: She is oriented to person, place, and time. She appears well-developed and well-nourished.   HENT:   Head: Normocephalic.   Mouth/Throat: Oropharynx is clear and moist.   Eyes: Conjunctivae and EOM are normal. Pupils are equal, round, and reactive to light.   Neck: Normal range of motion. Neck supple. No thyromegaly present.   Cardiovascular: Normal rate, regular rhythm and normal heart sounds.    Pulmonary/Chest: Effort normal and breath sounds normal.   Abdominal: Soft. Bowel sounds are normal. There is no hepatosplenomegaly.   Musculoskeletal: Normal range of motion. She exhibits no edema.   Lymphadenopathy:     She has no cervical adenopathy.   Neurological: She is alert and oriented to person, place, and time.   Skin: Skin is warm and dry. No rash noted.   Psychiatric: She has a normal mood and affect. Her behavior is normal.   Vitals reviewed.      Results for orders placed or performed in visit on 04/11/17   Comprehensive metabolic panel   Result Value Ref Range    Glucose 85 65 - 99 mg/dL    BUN 9 8 - 23 mg/dL    Creatinine 0.93 0.57 - 1.00 mg/dL    eGFR Non African Am 61 >60 mL/min/1.73    eGFR African Am 74 >60 mL/min/1.73    BUN/Creatinine Ratio 9.7 7.0 - 25.0    Sodium 135 (L) 136 - 145 mmol/L    Potassium 4.6 3.5 - 5.2 mmol/L    Chloride 97 (L) 98 - 107 mmol/L    Total CO2 24.8 22.0 - 29.0 mmol/L    Calcium 9.6 8.8 - 10.5 mg/dL    Total Protein 6.9 6.0 - 8.5 g/dL    Albumin 4.30 3.50 - 5.20 g/dL     Globulin 2.6 gm/dL    A/G Ratio 1.7 g/dL    Total Bilirubin 0.4 0.2 - 1.2 mg/dL    Alkaline Phosphatase 95 40 - 129 U/L    AST (SGOT) 17 5 - 32 U/L    ALT (SGPT) 10 5 - 33 U/L       Assessment/Plan   Martina was seen today for depression, migraine, hypertension and anxiety.    Diagnoses and all orders for this visit:    Depression with anxiety  -     TSH; Future  -     ALPRAZolam (XANAX) 0.5 MG tablet; Take 1 tablet by mouth 3 (Three) Times a Day As Needed for Anxiety.    Primary insomnia    Intractable migraine without aura and without status migrainosus  -     Comprehensive Metabolic Panel; Future    Gastroesophageal reflux disease without esophagitis  -     CBC & Differential; Future    Routine health maintenance  -     Lipid Panel With / Chol / HDL Ratio; Future  -     Vitamin D 25 Hydroxy; Future    Essential hypertension  -     lisinopril (PRINIVIL,ZESTRIL) 10 MG tablet; Take 1 tablet by mouth Daily.    1. Depression with anxiety.  Controlled.  Continue same.  Med management and Joby UTD.    2. Insomnia.  Zolpidem helping her get several hours of sleep.  Med management agreement and Joby UTD.    3. Migraines. Managed by neurology.    4. GERD.  Controlled.  Pt trying to wean off her antacids.    5. HTN.  New diagnosis.  Labs ordered.  Add lisinopril 10 mg daily.  She doesn't think she is tolerating the verapamil so will wean off this.  May need higher dose of lisinopril, therefore.      Outpatient Medications Prior to Visit   Medication Sig Dispense Refill   • Calcium-Vitamin D (CALTRATE 600 PLUS-VIT D PO) Take  by mouth.     • fluticasone (FLONASE) 50 MCG/ACT nasal spray 2 sprays into each nostril daily.     • loratadine (CLARITIN) 10 MG tablet Take 10 mg by mouth daily.     • Multiple Vitamins-Minerals (EYE VITAMINS) capsule Take  by mouth.     • Multiple Vitamins-Minerals (MULTI ADULT GUMMIES PO) Take  by mouth.     • omeprazole (PriLOSEC) 40 MG capsule Take 40 mg by mouth Daily. Pt takes 1/3 per day       • PARoxetine (PAXIL) 30 MG tablet Take 1 tablet by mouth every morning. 90 tablet 3   • polyethylene glycol (MIRALAX) packet Take 17 g by mouth daily.     • Probiotic Product (PROBIOTIC DAILY) capsule Once daily     • ranitidine (ZANTAC) 300 MG tablet Take 150 mg by mouth Daily.     • SUMAtriptan (IMITREX) 100 MG tablet Take 1 tablet by mouth 1 (One) Time As Needed for migraine for up to 1 dose. 27 tablet 3   • zolpidem (AMBIEN) 10 MG tablet TAKE ONE TABLET BY MOUTH AT BEDTIME AS NEEDED 30 tablet 2   • ALPRAZolam (XANAX) 0.5 MG tablet Take 0.5 mg by mouth 3 (three) times a day as needed for anxiety.     • PARoxetine (PAXIL) 30 MG tablet TAKE 1 TABLET EVERY MORNING 90 tablet 2   • zolpidem (AMBIEN) 5 MG tablet TAKE ONE TABLET BY MOUTH AT BEDTIME AS NEEDED 30 tablet 2     Facility-Administered Medications Prior to Visit   Medication Dose Route Frequency Provider Last Rate Last Dose   • OnabotulinumtoxinA 200 Units  200 Units Intramuscular Once Fransisca Camargo MD       • promethazine (PHENERGAN) injection 12.5 mg  12.5 mg Intramuscular Once Juan Ramon Fishman MD         New Medications Ordered This Visit   Medications   • lisinopril (PRINIVIL,ZESTRIL) 10 MG tablet     Sig: Take 1 tablet by mouth Daily.     Dispense:  30 tablet     Refill:  5   • ALPRAZolam (XANAX) 0.5 MG tablet     Sig: Take 1 tablet by mouth 3 (Three) Times a Day As Needed for Anxiety.     Dispense:  30 tablet     Refill:  2     [unfilled]  Medications Discontinued During This Encounter   Medication Reason   • PARoxetine (PAXIL) 30 MG tablet Therapy completed   • promethazine (PHENERGAN) injection 12.5 mg Therapy completed   • zolpidem (AMBIEN) 5 MG tablet Therapy completed   • ALPRAZolam (XANAX) 0.5 MG tablet Reorder         Return in about 2 months (around 10/10/2017).

## 2017-08-15 ENCOUNTER — RESULTS ENCOUNTER (OUTPATIENT)
Dept: INTERNAL MEDICINE | Facility: CLINIC | Age: 62
End: 2017-08-15

## 2017-08-15 DIAGNOSIS — Z00.00 ROUTINE HEALTH MAINTENANCE: ICD-10-CM

## 2017-08-15 DIAGNOSIS — G43.019 INTRACTABLE MIGRAINE WITHOUT AURA AND WITHOUT STATUS MIGRAINOSUS: ICD-10-CM

## 2017-08-15 DIAGNOSIS — F41.8 DEPRESSION WITH ANXIETY: ICD-10-CM

## 2017-08-15 DIAGNOSIS — K21.9 GASTROESOPHAGEAL REFLUX DISEASE WITHOUT ESOPHAGITIS: ICD-10-CM

## 2017-09-07 DIAGNOSIS — G43.719 INTRACTABLE CHRONIC MIGRAINE WITHOUT AURA AND WITHOUT STATUS MIGRAINOSUS: Primary | ICD-10-CM

## 2017-09-18 RX ORDER — ZOLPIDEM TARTRATE 10 MG/1
TABLET ORAL
Qty: 30 TABLET | Refills: 2 | OUTPATIENT
Start: 2017-09-18 | End: 2017-12-14 | Stop reason: SDUPTHER

## 2017-10-03 ENCOUNTER — TRANSCRIBE ORDERS (OUTPATIENT)
Dept: INTERNAL MEDICINE | Facility: CLINIC | Age: 62
End: 2017-10-03

## 2017-10-03 DIAGNOSIS — Z12.39 SCREENING BREAST EXAMINATION: Primary | ICD-10-CM

## 2017-10-05 LAB
25(OH)D3+25(OH)D2 SERPL-MCNC: 49.8 NG/ML
ALBUMIN SERPL-MCNC: 4 G/DL (ref 3.5–5.2)
ALBUMIN/GLOB SERPL: 1.5 G/DL
ALP SERPL-CCNC: 105 U/L (ref 40–129)
ALT SERPL-CCNC: 15 U/L (ref 5–33)
AST SERPL-CCNC: 23 U/L (ref 5–32)
BASOPHILS # BLD AUTO: 0.04 10*3/MM3 (ref 0–0.2)
BASOPHILS NFR BLD AUTO: 0.9 % (ref 0–2)
BILIRUB SERPL-MCNC: 0.5 MG/DL (ref 0.2–1.2)
BUN SERPL-MCNC: 12 MG/DL (ref 8–23)
BUN/CREAT SERPL: 13.8 (ref 7–25)
CALCIUM SERPL-MCNC: 9.5 MG/DL (ref 8.8–10.5)
CHLORIDE SERPL-SCNC: 96 MMOL/L (ref 98–107)
CHOLEST SERPL-MCNC: 189 MG/DL (ref 0–200)
CHOLEST/HDLC SERPL: 2.7 {RATIO}
CO2 SERPL-SCNC: 26.4 MMOL/L (ref 22–29)
CREAT SERPL-MCNC: 0.87 MG/DL (ref 0.57–1)
EOSINOPHIL # BLD AUTO: 0.03 10*3/MM3 (ref 0.1–0.3)
EOSINOPHIL NFR BLD AUTO: 0.7 % (ref 0–4)
ERYTHROCYTE [DISTWIDTH] IN BLOOD BY AUTOMATED COUNT: 12.2 % (ref 11.5–14.5)
GLOBULIN SER CALC-MCNC: 2.7 GM/DL
GLUCOSE SERPL-MCNC: 82 MG/DL (ref 65–99)
HCT VFR BLD AUTO: 39.1 % (ref 37–47)
HDLC SERPL-MCNC: 70 MG/DL (ref 40–60)
HGB BLD-MCNC: 13.1 G/DL (ref 12–16)
IMM GRANULOCYTES # BLD: 0.01 10*3/MM3 (ref 0–0.03)
IMM GRANULOCYTES NFR BLD: 0.2 % (ref 0–0.5)
LDLC SERPL CALC-MCNC: 106 MG/DL (ref 0–100)
LYMPHOCYTES # BLD AUTO: 0.91 10*3/MM3 (ref 0.6–4.8)
LYMPHOCYTES NFR BLD AUTO: 20.3 % (ref 20–45)
MCH RBC QN AUTO: 31.3 PG (ref 27–31)
MCHC RBC AUTO-ENTMCNC: 33.5 G/DL (ref 31–37)
MCV RBC AUTO: 93.3 FL (ref 81–99)
MONOCYTES # BLD AUTO: 0.34 10*3/MM3 (ref 0–1)
MONOCYTES NFR BLD AUTO: 7.6 % (ref 3–8)
NEUTROPHILS # BLD AUTO: 3.15 10*3/MM3 (ref 1.5–8.3)
NEUTROPHILS NFR BLD AUTO: 70.3 % (ref 45–70)
NRBC BLD AUTO-RTO: 0 /100 WBC (ref 0–0)
PLATELET # BLD AUTO: 232 10*3/MM3 (ref 140–500)
POTASSIUM SERPL-SCNC: 4.5 MMOL/L (ref 3.5–5.2)
PROT SERPL-MCNC: 6.7 G/DL (ref 6–8.5)
RBC # BLD AUTO: 4.19 10*6/MM3 (ref 4.2–5.4)
SODIUM SERPL-SCNC: 135 MMOL/L (ref 136–145)
TRIGL SERPL-MCNC: 65 MG/DL (ref 0–150)
TSH SERPL DL<=0.005 MIU/L-ACNC: 3.02 MIU/ML (ref 0.27–4.2)
VLDLC SERPL CALC-MCNC: 13 MG/DL (ref 7–27)
WBC # BLD AUTO: 4.48 10*3/MM3 (ref 4.8–10.8)

## 2017-10-09 RX ORDER — SUMATRIPTAN 100 MG/1
TABLET, FILM COATED ORAL
Qty: 27 TABLET | Refills: 3 | Status: SHIPPED | OUTPATIENT
Start: 2017-10-09 | End: 2017-10-12 | Stop reason: SDUPTHER

## 2017-10-12 ENCOUNTER — OFFICE VISIT (OUTPATIENT)
Dept: INTERNAL MEDICINE | Facility: CLINIC | Age: 62
End: 2017-10-12

## 2017-10-12 VITALS
HEART RATE: 84 BPM | BODY MASS INDEX: 23.45 KG/M2 | SYSTOLIC BLOOD PRESSURE: 124 MMHG | DIASTOLIC BLOOD PRESSURE: 80 MMHG | HEIGHT: 61 IN | OXYGEN SATURATION: 98 % | WEIGHT: 124.2 LBS | TEMPERATURE: 98.4 F

## 2017-10-12 DIAGNOSIS — I10 ESSENTIAL HYPERTENSION: Primary | ICD-10-CM

## 2017-10-12 DIAGNOSIS — Z00.00 ROUTINE HEALTH MAINTENANCE: ICD-10-CM

## 2017-10-12 DIAGNOSIS — G43.919 INTRACTABLE MIGRAINE WITHOUT STATUS MIGRAINOSUS, UNSPECIFIED MIGRAINE TYPE: ICD-10-CM

## 2017-10-12 PROCEDURE — 99213 OFFICE O/P EST LOW 20 MIN: CPT | Performed by: FAMILY MEDICINE

## 2017-10-12 RX ORDER — AMITRIPTYLINE HYDROCHLORIDE 25 MG/1
25 TABLET, FILM COATED ORAL NIGHTLY
Qty: 30 TABLET | Refills: 6 | Status: SHIPPED | OUTPATIENT
Start: 2017-10-12 | End: 2018-01-15

## 2017-10-12 RX ORDER — SUMATRIPTAN 100 MG/1
100 TABLET, FILM COATED ORAL ONCE AS NEEDED
Qty: 9 TABLET | Refills: 11 | Status: SHIPPED | OUTPATIENT
Start: 2017-10-12 | End: 2018-01-15 | Stop reason: SDUPTHER

## 2017-10-12 NOTE — PROGRESS NOTES
Subjective     Martina Hardwick is a 61 y.o. female, who presents with a chief complaint of   Chief Complaint   Patient presents with   • Hypertension       HPI     Pt here to f/u on hypertension.  This was diagnosed 2 months ago and pt started lisinopril.  She also weaned off the verapamil which she was taking for migraine prophylaxis and was ineffective.  She is tolerating the lisinopril well.  Home blood pressures running 104-150s/70s-100.  The higher numbers are typically when she has a headache.  Tolerating lisinopril.  Denies dizziness and chest pain.    The following portions of the patient's history were reviewed and updated as appropriate: allergies, current medications, past family history, past medical history, past social history, past surgical history and problem list.    Allergies: Sulfa antibiotics    Review of Systems   Constitutional: Negative.    Eyes: Negative.    Respiratory: Negative.    Cardiovascular: Negative.    Gastrointestinal: Negative.    Endocrine: Negative.    Genitourinary: Negative.    Musculoskeletal: Negative.    Skin: Negative.    Allergic/Immunologic: Negative.    Neurological: Positive for headaches.   Hematological: Negative.    Psychiatric/Behavioral: Negative.        Objective     Wt Readings from Last 3 Encounters:   10/12/17 124 lb 3.2 oz (56.3 kg)   08/10/17 123 lb (55.8 kg)   04/18/17 117 lb (53.1 kg)     Temp Readings from Last 3 Encounters:   10/12/17 98.4 °F (36.9 °C)   08/10/17 97.6 °F (36.4 °C)   02/09/17 98 °F (36.7 °C) (Oral)     BP Readings from Last 3 Encounters:   10/12/17 124/80   08/10/17 (!) 164/106   04/18/17 126/74     Pulse Readings from Last 3 Encounters:   10/12/17 84   08/10/17 79   04/18/17 90     Body mass index is 23.47 kg/(m^2).  SpO2 Readings from Last 3 Encounters:   10/12/17 98%   08/10/17 99%   04/18/17 98%       Physical Exam   Constitutional: She is oriented to person, place, and time. She appears well-developed and well-nourished.   HENT:    Head: Normocephalic.   Mouth/Throat: Oropharynx is clear and moist.   Eyes: Conjunctivae and EOM are normal. Pupils are equal, round, and reactive to light.   Neck: Normal range of motion. Neck supple. No thyromegaly present.   Cardiovascular: Normal rate, regular rhythm and normal heart sounds.    Pulmonary/Chest: Effort normal and breath sounds normal.   Abdominal: Soft. Bowel sounds are normal. There is no hepatosplenomegaly.   Musculoskeletal: Normal range of motion. She exhibits no edema.   Lymphadenopathy:     She has no cervical adenopathy.   Neurological: She is alert and oriented to person, place, and time.   Skin: Skin is warm and dry. No rash noted.   Psychiatric: She has a normal mood and affect. Her behavior is normal.   Vitals reviewed.      Results for orders placed or performed in visit on 08/15/17   Comprehensive Metabolic Panel   Result Value Ref Range    Glucose 82 65 - 99 mg/dL    BUN 12 8 - 23 mg/dL    Creatinine 0.87 0.57 - 1.00 mg/dL    eGFR Non African Am 66 >60 mL/min/1.73    eGFR African Am 80 >60 mL/min/1.73    BUN/Creatinine Ratio 13.8 7.0 - 25.0    Sodium 135 (L) 136 - 145 mmol/L    Potassium 4.5 3.5 - 5.2 mmol/L    Chloride 96 (L) 98 - 107 mmol/L    Total CO2 26.4 22.0 - 29.0 mmol/L    Calcium 9.5 8.8 - 10.5 mg/dL    Total Protein 6.7 6.0 - 8.5 g/dL    Albumin 4.00 3.50 - 5.20 g/dL    Globulin 2.7 gm/dL    A/G Ratio 1.5 g/dL    Total Bilirubin 0.5 0.2 - 1.2 mg/dL    Alkaline Phosphatase 105 40 - 129 U/L    AST (SGOT) 23 5 - 32 U/L    ALT (SGPT) 15 5 - 33 U/L   Lipid Panel With / Chol / HDL Ratio   Result Value Ref Range    Total Cholesterol 189 0 - 200 mg/dL    Triglycerides 65 0 - 150 mg/dL    HDL Cholesterol 70 (H) 40 - 60 mg/dL    VLDL Cholesterol 13 7 - 27 mg/dL    LDL Cholesterol  106 (H) 0 - 100 mg/dL    Chol/HDL Ratio 2.70    TSH   Result Value Ref Range    TSH 3.020 0.270 - 4.200 mIU/mL   Vitamin D 25 Hydroxy   Result Value Ref Range    25 Hydroxy, Vitamin D 49.8 ng/mL   CBC &  Differential   Result Value Ref Range    WBC 4.48 (L) 4.80 - 10.80 10*3/mm3    RBC 4.19 (L) 4.20 - 5.40 10*6/mm3    Hemoglobin 13.1 12.0 - 16.0 g/dL    Hematocrit 39.1 37.0 - 47.0 %    MCV 93.3 81.0 - 99.0 fL    MCH 31.3 (H) 27.0 - 31.0 pg    MCHC 33.5 31.0 - 37.0 g/dL    RDW 12.2 11.5 - 14.5 %    Platelets 232 140 - 500 10*3/mm3    Neutrophil Rel % 70.3 (H) 45.0 - 70.0 %    Lymphocyte Rel % 20.3 20.0 - 45.0 %    Monocyte Rel % 7.6 3.0 - 8.0 %    Eosinophil Rel % 0.7 0.0 - 4.0 %    Basophil Rel % 0.9 0.0 - 2.0 %    Neutrophils Absolute 3.15 1.50 - 8.30 10*3/mm3    Lymphocytes Absolute 0.91 0.60 - 4.80 10*3/mm3    Monocytes Absolute 0.34 0.00 - 1.00 10*3/mm3    Eosinophils Absolute 0.03 (L) 0.10 - 0.30 10*3/mm3    Basophils Absolute 0.04 0.00 - 0.20 10*3/mm3    Immature Granulocyte Rel % 0.2 0.0 - 0.5 %    Immature Grans Absolute 0.01 0.00 - 0.03 10*3/mm3    nRBC 0.0 0.0 - 0.0 /100 WBC       Assessment/Plan   Martina was seen today for hypertension.    Diagnoses and all orders for this visit:    Essential hypertension  -     Comprehensive Metabolic Panel; Future  -     TSH; Future    Routine health maintenance  -     CBC & Differential; Future  -     Vitamin B12; Future  -     Vitamin D 25 Hydroxy; Future    Intractable migraine without status migrainosus, unspecified migraine type  -     amitriptyline (ELAVIL) 25 MG tablet; Take 1 tablet by mouth Every Night.    Other orders  -     SUMAtriptan (IMITREX) 100 MG tablet; Take 1 tablet by mouth 1 (One) Time As Needed for Migraine for up to 1 dose.    1. HTN.  Controlled.  Labs reviewed.  Continue same.  F/U 3 months.    2. Routine health maint.  Mammogram due next month and she has this scheduled.  Colonoscopy due next year (Dr. Ruff).  Hysterectomy done for non-cancerous reasons.  Considering flu vaccine (her uncle  of Guillan Biggs after a flu vaccine.  She has not previously had a flu vaccine.    3. Migraines.  Her neurologist has left town.  She has tried  several migraine prophylactic medications.  Trial of amitriptyline.    Outpatient Medications Prior to Visit   Medication Sig Dispense Refill   • ALPRAZolam (XANAX) 0.5 MG tablet Take 1 tablet by mouth 3 (Three) Times a Day As Needed for Anxiety. 30 tablet 2   • Calcium-Vitamin D (CALTRATE 600 PLUS-VIT D PO) Take  by mouth.     • fluticasone (FLONASE) 50 MCG/ACT nasal spray 2 sprays into each nostril daily.     • lisinopril (PRINIVIL,ZESTRIL) 10 MG tablet Take 1 tablet by mouth Daily. 30 tablet 5   • loratadine (CLARITIN) 10 MG tablet Take 10 mg by mouth daily.     • Multiple Vitamins-Minerals (EYE VITAMINS) capsule Take  by mouth.     • Multiple Vitamins-Minerals (MULTI ADULT GUMMIES PO) Take  by mouth.     • omeprazole (PriLOSEC) 40 MG capsule Take 40 mg by mouth Daily. Pt takes 1/3 per day      • PARoxetine (PAXIL) 30 MG tablet Take 1 tablet by mouth every morning. 90 tablet 3   • polyethylene glycol (MIRALAX) packet Take 17 g by mouth daily.     • Probiotic Product (PROBIOTIC DAILY) capsule Once daily     • ranitidine (ZANTAC) 300 MG tablet Take 150 mg by mouth Daily.     • zolpidem (AMBIEN) 10 MG tablet TAKE ONE TABLET BY MOUTH AT BEDTIME AS NEEDED 30 tablet 2   • SUMAtriptan (IMITREX) 100 MG tablet Take 1 tablet by mouth 1 (One) Time As Needed for migraine for up to 1 dose. 27 tablet 3   • SUMAtriptan (IMITREX) 100 MG tablet TAKE 1 TABLET ONE TIME AS NEEDED FOR MIGRAINE FOR UP TO ONE DOSE (REFILL 11/19/2016) 27 tablet 3   • VERAPAMIL HCL PO Take 40 mg by mouth 2 (Two) Times a Day.       Facility-Administered Medications Prior to Visit   Medication Dose Route Frequency Provider Last Rate Last Dose   • OnabotulinumtoxinA 200 Units  200 Units Intramuscular Once Fransisca Camargo MD         New Medications Ordered This Visit   Medications   • SUMAtriptan (IMITREX) 100 MG tablet     Sig: Take 1 tablet by mouth 1 (One) Time As Needed for Migraine for up to 1 dose.     Dispense:  9 tablet     Refill:  11   •  amitriptyline (ELAVIL) 25 MG tablet     Sig: Take 1 tablet by mouth Every Night.     Dispense:  30 tablet     Refill:  6     [unfilled]  Medications Discontinued During This Encounter   Medication Reason   • SUMAtriptan (IMITREX) 100 MG tablet Therapy completed   • VERAPAMIL HCL PO Therapy completed   • SUMAtriptan (IMITREX) 100 MG tablet Reorder         Return in about 3 months (around 1/12/2018).

## 2017-10-17 ENCOUNTER — RESULTS ENCOUNTER (OUTPATIENT)
Dept: INTERNAL MEDICINE | Facility: CLINIC | Age: 62
End: 2017-10-17

## 2017-10-17 DIAGNOSIS — Z00.00 ROUTINE HEALTH MAINTENANCE: ICD-10-CM

## 2017-10-17 DIAGNOSIS — I10 ESSENTIAL HYPERTENSION: ICD-10-CM

## 2017-11-13 ENCOUNTER — HOSPITAL ENCOUNTER (OUTPATIENT)
Dept: MAMMOGRAPHY | Facility: HOSPITAL | Age: 62
Discharge: HOME | End: 2017-11-13
Attending: FAMILY MEDICINE | Admitting: FAMILY MEDICINE

## 2017-11-13 DIAGNOSIS — Z12.39 SCREENING BREAST EXAMINATION: ICD-10-CM

## 2017-11-13 PROCEDURE — G0202 SCR MAMMO BI INCL CAD: HCPCS

## 2017-11-13 PROCEDURE — 77063 BREAST TOMOSYNTHESIS BI: CPT

## 2017-12-14 RX ORDER — ZOLPIDEM TARTRATE 10 MG/1
TABLET ORAL
Qty: 30 TABLET | Refills: 2 | OUTPATIENT
Start: 2017-12-14 | End: 2018-01-15 | Stop reason: SDUPTHER

## 2018-01-15 ENCOUNTER — OFFICE VISIT (OUTPATIENT)
Dept: INTERNAL MEDICINE | Facility: CLINIC | Age: 63
End: 2018-01-15

## 2018-01-15 VITALS
BODY MASS INDEX: 23.22 KG/M2 | DIASTOLIC BLOOD PRESSURE: 82 MMHG | SYSTOLIC BLOOD PRESSURE: 130 MMHG | OXYGEN SATURATION: 96 % | WEIGHT: 123 LBS | HEART RATE: 66 BPM | TEMPERATURE: 98.4 F | HEIGHT: 61 IN

## 2018-01-15 DIAGNOSIS — I10 ESSENTIAL HYPERTENSION: Primary | ICD-10-CM

## 2018-01-15 DIAGNOSIS — Z00.00 HEALTHCARE MAINTENANCE: ICD-10-CM

## 2018-01-15 DIAGNOSIS — K21.9 GASTROESOPHAGEAL REFLUX DISEASE WITHOUT ESOPHAGITIS: ICD-10-CM

## 2018-01-15 DIAGNOSIS — G43.019 INTRACTABLE MIGRAINE WITHOUT AURA AND WITHOUT STATUS MIGRAINOSUS: ICD-10-CM

## 2018-01-15 DIAGNOSIS — F41.8 DEPRESSION WITH ANXIETY: ICD-10-CM

## 2018-01-15 DIAGNOSIS — G44.86 CERVICOGENIC HEADACHE: ICD-10-CM

## 2018-01-15 DIAGNOSIS — Z00.00 ROUTINE HEALTH MAINTENANCE: ICD-10-CM

## 2018-01-15 DIAGNOSIS — F51.01 PRIMARY INSOMNIA: ICD-10-CM

## 2018-01-15 PROCEDURE — 99214 OFFICE O/P EST MOD 30 MIN: CPT | Performed by: FAMILY MEDICINE

## 2018-01-15 RX ORDER — PAROXETINE 30 MG/1
30 TABLET, FILM COATED ORAL EVERY MORNING
Qty: 90 TABLET | Refills: 3 | Status: SHIPPED | OUTPATIENT
Start: 2018-01-15 | End: 2019-01-05 | Stop reason: SDUPTHER

## 2018-01-15 RX ORDER — PAROXETINE 30 MG/1
TABLET, FILM COATED ORAL
Qty: 90 TABLET | Refills: 2 | Status: SHIPPED | OUTPATIENT
Start: 2018-01-15 | End: 2018-01-15 | Stop reason: SDUPTHER

## 2018-01-15 RX ORDER — ZOLPIDEM TARTRATE 10 MG/1
10 TABLET ORAL NIGHTLY
Qty: 90 TABLET | Refills: 1 | Status: SHIPPED | OUTPATIENT
Start: 2018-01-15 | End: 2018-07-15 | Stop reason: SDUPTHER

## 2018-01-15 RX ORDER — ALPRAZOLAM 0.5 MG/1
TABLET ORAL
Qty: 30 TABLET | Refills: 2 | OUTPATIENT
Start: 2018-01-15 | End: 2018-04-16 | Stop reason: SDUPTHER

## 2018-01-15 RX ORDER — SUMATRIPTAN 100 MG/1
100 TABLET, FILM COATED ORAL ONCE AS NEEDED
Qty: 45 TABLET | Refills: 3 | Status: SHIPPED | OUTPATIENT
Start: 2018-01-15 | End: 2018-11-19

## 2018-01-15 NOTE — PROGRESS NOTES
Subjective     Martina Hardwick is a 62 y.o. female, who presents with a chief complaint of   Chief Complaint   Patient presents with   • Anxiety   • Hypertension     Anxiety         Hypertension   Associated symptoms include anxiety and headaches.      1. HTN.  Tolerating lisinopril.  Home blood pressures running 110s/60s-70s. She says it goes up if she has stress or a headache, sometimes up to 140 systolic. Denies dizziness and chest pain.    2. Depression and anxiety.  Pt reports her symptoms are controlled.  She has had some life stress.  Denies SI.    3. Headaches. The amitriptyline didn't help.  Still taking sumatriptan for abortive treatment, which helps.    The following portions of the patient's history were reviewed and updated as appropriate: allergies, current medications, past family history, past medical history, past social history, past surgical history and problem list.    Allergies: Sulfa antibiotics    Review of Systems   Constitutional: Negative.    Eyes: Negative.    Respiratory: Negative.    Cardiovascular: Negative.    Gastrointestinal: Negative.    Endocrine: Negative.    Genitourinary: Negative.    Musculoskeletal: Negative.    Skin: Negative.    Allergic/Immunologic: Negative.    Neurological: Positive for headaches.   Hematological: Negative.    Psychiatric/Behavioral: Negative.      Objective     Wt Readings from Last 3 Encounters:   01/15/18 55.8 kg (123 lb)   10/12/17 56.3 kg (124 lb 3.2 oz)   08/10/17 55.8 kg (123 lb)     Temp Readings from Last 3 Encounters:   01/15/18 98.4 °F (36.9 °C)   10/12/17 98.4 °F (36.9 °C)   08/10/17 97.6 °F (36.4 °C)     BP Readings from Last 3 Encounters:   01/15/18 130/82   10/12/17 124/80   08/10/17 (!) 164/106     Pulse Readings from Last 3 Encounters:   01/15/18 66   10/12/17 84   08/10/17 79     Body mass index is 23.25 kg/(m^2).  SpO2 Readings from Last 3 Encounters:   01/15/18 96%   10/12/17 98%   08/10/17 99%     Physical Exam   Constitutional: She is  oriented to person, place, and time. She appears well-developed and well-nourished.   HENT:   Head: Normocephalic.   Mouth/Throat: Oropharynx is clear and moist.   Eyes: Conjunctivae and EOM are normal. Pupils are equal, round, and reactive to light.   Neck: Normal range of motion. Neck supple. No thyromegaly present.   Cardiovascular: Normal rate, regular rhythm and normal heart sounds.    Pulmonary/Chest: Effort normal and breath sounds normal.   Abdominal: Soft. Bowel sounds are normal. There is no hepatosplenomegaly.   Musculoskeletal: Normal range of motion. She exhibits no edema.   Lymphadenopathy:     She has no cervical adenopathy.   Neurological: She is alert and oriented to person, place, and time.   Skin: Skin is warm and dry. No rash noted.   Psychiatric: She has a normal mood and affect. Her behavior is normal.   Vitals reviewed.      Results for orders placed or performed in visit on 08/15/17   Comprehensive Metabolic Panel   Result Value Ref Range    Glucose 82 65 - 99 mg/dL    BUN 12 8 - 23 mg/dL    Creatinine 0.87 0.57 - 1.00 mg/dL    eGFR Non African Am 66 >60 mL/min/1.73    eGFR African Am 80 >60 mL/min/1.73    BUN/Creatinine Ratio 13.8 7.0 - 25.0    Sodium 135 (L) 136 - 145 mmol/L    Potassium 4.5 3.5 - 5.2 mmol/L    Chloride 96 (L) 98 - 107 mmol/L    Total CO2 26.4 22.0 - 29.0 mmol/L    Calcium 9.5 8.8 - 10.5 mg/dL    Total Protein 6.7 6.0 - 8.5 g/dL    Albumin 4.00 3.50 - 5.20 g/dL    Globulin 2.7 gm/dL    A/G Ratio 1.5 g/dL    Total Bilirubin 0.5 0.2 - 1.2 mg/dL    Alkaline Phosphatase 105 40 - 129 U/L    AST (SGOT) 23 5 - 32 U/L    ALT (SGPT) 15 5 - 33 U/L   Lipid Panel With / Chol / HDL Ratio   Result Value Ref Range    Total Cholesterol 189 0 - 200 mg/dL    Triglycerides 65 0 - 150 mg/dL    HDL Cholesterol 70 (H) 40 - 60 mg/dL    VLDL Cholesterol 13 7 - 27 mg/dL    LDL Cholesterol  106 (H) 0 - 100 mg/dL    Chol/HDL Ratio 2.70    TSH   Result Value Ref Range    TSH 3.020 0.270 - 4.200 mIU/mL    Vitamin D 25 Hydroxy   Result Value Ref Range    25 Hydroxy, Vitamin D 49.8 ng/mL   CBC & Differential   Result Value Ref Range    WBC 4.48 (L) 4.80 - 10.80 10*3/mm3    RBC 4.19 (L) 4.20 - 5.40 10*6/mm3    Hemoglobin 13.1 12.0 - 16.0 g/dL    Hematocrit 39.1 37.0 - 47.0 %    MCV 93.3 81.0 - 99.0 fL    MCH 31.3 (H) 27.0 - 31.0 pg    MCHC 33.5 31.0 - 37.0 g/dL    RDW 12.2 11.5 - 14.5 %    Platelets 232 140 - 500 10*3/mm3    Neutrophil Rel % 70.3 (H) 45.0 - 70.0 %    Lymphocyte Rel % 20.3 20.0 - 45.0 %    Monocyte Rel % 7.6 3.0 - 8.0 %    Eosinophil Rel % 0.7 0.0 - 4.0 %    Basophil Rel % 0.9 0.0 - 2.0 %    Neutrophils Absolute 3.15 1.50 - 8.30 10*3/mm3    Lymphocytes Absolute 0.91 0.60 - 4.80 10*3/mm3    Monocytes Absolute 0.34 0.00 - 1.00 10*3/mm3    Eosinophils Absolute 0.03 (L) 0.10 - 0.30 10*3/mm3    Basophils Absolute 0.04 0.00 - 0.20 10*3/mm3    Immature Granulocyte Rel % 0.2 0.0 - 0.5 %    Immature Grans Absolute 0.01 0.00 - 0.03 10*3/mm3    nRBC 0.0 0.0 - 0.0 /100 WBC     Assessment/Plan   Martina was seen today for anxiety and hypertension.    Diagnoses and all orders for this visit:    Essential hypertension    Routine health maintenance    Intractable migraine without aura and without status migrainosus  -     Ambulatory Referral to Neurology  -     SUMAtriptan (IMITREX) 100 MG tablet; Take 1 tablet by mouth 1 (One) Time As Needed for Migraine for up to 1 dose.    Gastroesophageal reflux disease without esophagitis    Primary insomnia  -     zolpidem (AMBIEN) 10 MG tablet; Take 1 tablet by mouth Every Night.    Depression with anxiety  -     PARoxetine (PAXIL) 30 MG tablet; Take 1 tablet by mouth Every Morning.    1. HTN.  Controlled.  Labs next.  Continue same.  F/U 3 months.    2. Routine health maint.  Mammogram UTD.  Colonoscopy due this year in September (Dr. Ruff).  Hysterectomy done for non-cancerous reasons.     3. Migraines.  Her neurologist has left town.  She has tried several migraine  prophylactic medications.  Amitriptyline wasn't effective.  Neurologist referral.    4. GERD. Controlled. Continue same.    5. Depression with anxiety.  Controlled.  Continue same.  [facility]    6. Insomnia.  Continue same.  Med management agreement and [facility].    Outpatient Medications Prior to Visit   Medication Sig Dispense Refill   • ALPRAZolam (XANAX) 0.5 MG tablet TAKE ONE TABLET BY MOUTH THREE TIMES DAILY AS NEEDED FOR ANXIETY 30 tablet 2   • Calcium-Vitamin D (CALTRATE 600 PLUS-VIT D PO) Take  by mouth.     • fluticasone (FLONASE) 50 MCG/ACT nasal spray 2 sprays into each nostril daily.     • lisinopril (PRINIVIL,ZESTRIL) 10 MG tablet Take 1 tablet by mouth Daily. 30 tablet 5   • loratadine (CLARITIN) 10 MG tablet Take 10 mg by mouth daily.     • Multiple Vitamins-Minerals (EYE VITAMINS) capsule Take  by mouth.     • Multiple Vitamins-Minerals (MULTI ADULT GUMMIES PO) Take  by mouth.     • omeprazole (PriLOSEC) 40 MG capsule Take 40 mg by mouth Daily. Pt takes 1/3 per day      • Probiotic Product (PROBIOTIC DAILY) capsule Once daily     • ranitidine (ZANTAC) 300 MG tablet Take 150 mg by mouth Daily.     • amitriptyline (ELAVIL) 25 MG tablet Take 1 tablet by mouth Every Night. 30 tablet 6   • PARoxetine (PAXIL) 30 MG tablet TAKE 1 TABLET EVERY MORNING 90 tablet 2   • SUMAtriptan (IMITREX) 100 MG tablet Take 1 tablet by mouth 1 (One) Time As Needed for Migraine for up to 1 dose. 9 tablet 11   • zolpidem (AMBIEN) 10 MG tablet TAKE ONE TABLET BY MOUTH ONCE DAILY AT BEDTIME AS NEEDED 30 tablet 2   • polyethylene glycol (MIRALAX) packet Take 17 g by mouth daily.     • ALPRAZolam (XANAX) 0.5 MG tablet Take 1 tablet by mouth 3 (Three) Times a Day As Needed for Anxiety. 30 tablet 2     Facility-Administered Medications Prior to Visit   Medication Dose Route Frequency Provider Last Rate Last Dose   • OnabotulinumtoxinA 200 Units  200 Units Intramuscular Once Fransisca Camargo MD          New Medications Ordered This Visit   Medications   • zolpidem (AMBIEN) 10 MG tablet     Sig: Take 1 tablet by mouth Every Night.     Dispense:  90 tablet     Refill:  1   • PARoxetine (PAXIL) 30 MG tablet     Sig: Take 1 tablet by mouth Every Morning.     Dispense:  90 tablet     Refill:  3   • SUMAtriptan (IMITREX) 100 MG tablet     Sig: Take 1 tablet by mouth 1 (One) Time As Needed for Migraine for up to 1 dose.     Dispense:  45 tablet     Refill:  3     [unfilled]  Medications Discontinued During This Encounter   Medication Reason   • amitriptyline (ELAVIL) 25 MG tablet Therapy completed   • zolpidem (AMBIEN) 10 MG tablet Reorder   • PARoxetine (PAXIL) 30 MG tablet Reorder   • SUMAtriptan (IMITREX) 100 MG tablet Reorder     Return in about 3 months (around 4/15/2018).

## 2018-02-05 DIAGNOSIS — I10 ESSENTIAL HYPERTENSION: ICD-10-CM

## 2018-02-05 RX ORDER — LISINOPRIL 10 MG/1
TABLET ORAL
Qty: 30 TABLET | Refills: 5 | Status: SHIPPED | OUTPATIENT
Start: 2018-02-05 | End: 2018-02-09 | Stop reason: SDUPTHER

## 2018-02-09 DIAGNOSIS — I10 ESSENTIAL HYPERTENSION: ICD-10-CM

## 2018-02-09 RX ORDER — LISINOPRIL 10 MG/1
TABLET ORAL
Qty: 90 TABLET | Refills: 3 | Status: SHIPPED | OUTPATIENT
Start: 2018-02-09 | End: 2018-08-23 | Stop reason: SDUPTHER

## 2018-02-09 RX ORDER — LISINOPRIL 10 MG/1
TABLET ORAL
Qty: 90 TABLET | Refills: 1 | Status: SHIPPED | OUTPATIENT
Start: 2018-02-09 | End: 2018-02-09 | Stop reason: SDUPTHER

## 2018-04-16 ENCOUNTER — OFFICE VISIT (OUTPATIENT)
Dept: INTERNAL MEDICINE | Facility: CLINIC | Age: 63
End: 2018-04-16

## 2018-04-16 VITALS
WEIGHT: 123.2 LBS | TEMPERATURE: 98.2 F | DIASTOLIC BLOOD PRESSURE: 80 MMHG | SYSTOLIC BLOOD PRESSURE: 138 MMHG | BODY MASS INDEX: 23.26 KG/M2 | HEART RATE: 83 BPM | OXYGEN SATURATION: 98 % | HEIGHT: 61 IN | RESPIRATION RATE: 16 BRPM

## 2018-04-16 DIAGNOSIS — J30.89 CHRONIC NON-SEASONAL ALLERGIC RHINITIS, UNSPECIFIED TRIGGER: ICD-10-CM

## 2018-04-16 DIAGNOSIS — F51.01 PRIMARY INSOMNIA: ICD-10-CM

## 2018-04-16 DIAGNOSIS — G43.019 INTRACTABLE MIGRAINE WITHOUT AURA AND WITHOUT STATUS MIGRAINOSUS: ICD-10-CM

## 2018-04-16 DIAGNOSIS — F41.8 DEPRESSION WITH ANXIETY: ICD-10-CM

## 2018-04-16 DIAGNOSIS — Z00.00 ROUTINE HEALTH MAINTENANCE: ICD-10-CM

## 2018-04-16 DIAGNOSIS — K59.09 CHRONIC CONSTIPATION: ICD-10-CM

## 2018-04-16 DIAGNOSIS — K21.9 GASTROESOPHAGEAL REFLUX DISEASE WITHOUT ESOPHAGITIS: ICD-10-CM

## 2018-04-16 DIAGNOSIS — I10 ESSENTIAL HYPERTENSION: Primary | ICD-10-CM

## 2018-04-16 PROCEDURE — 99214 OFFICE O/P EST MOD 30 MIN: CPT | Performed by: FAMILY MEDICINE

## 2018-04-16 RX ORDER — ALPRAZOLAM 0.5 MG/1
TABLET ORAL
Qty: 30 TABLET | Refills: 2 | OUTPATIENT
Start: 2018-04-16 | End: 2018-04-16 | Stop reason: SDUPTHER

## 2018-04-16 RX ORDER — GABAPENTIN 400 MG/1
400 CAPSULE ORAL 3 TIMES DAILY
COMMUNITY
End: 2018-07-19

## 2018-04-16 RX ORDER — ALPRAZOLAM 0.5 MG/1
0.5 TABLET ORAL 2 TIMES DAILY
Qty: 30 TABLET | Refills: 2 | Status: SHIPPED | OUTPATIENT
Start: 2018-04-16 | End: 2018-08-16 | Stop reason: SDUPTHER

## 2018-04-16 NOTE — PROGRESS NOTES
Subjective     Martina Hardwick is a 62 y.o. female, who presents with a chief complaint of   Chief Complaint   Patient presents with   • Hypertension     Anxiety         Hypertension   Associated symptoms include anxiety and headaches.      1. HTN.  Tolerating lisinopril.  Home blood pressures running 110s-150/60s-80s; most < 140 systolic.  She says it goes up if she has stress or a headache, sometimes up to 140 systolic. Denies dizziness and chest pain.    2. Depression and anxiety.  Pt reports her symptoms are controlled.  Denies SI.    3. Headaches.  She is seeing Dr. Morales, neurologist, and is taking gabapentin.    The following portions of the patient's history were reviewed and updated as appropriate: allergies, current medications, past family history, past medical history, past social history, past surgical history and problem list.    Allergies: Sulfa antibiotics    Review of Systems   Constitutional: Negative.    Eyes: Negative.    Respiratory: Negative.    Cardiovascular: Negative.    Gastrointestinal: Negative.    Endocrine: Negative.    Genitourinary: Negative.    Musculoskeletal: Positive for arthralgias.   Skin: Negative.    Allergic/Immunologic: Negative.    Neurological: Positive for headaches.   Hematological: Negative.    Psychiatric/Behavioral: Negative.      Objective     Wt Readings from Last 3 Encounters:   04/16/18 55.9 kg (123 lb 3.2 oz)   01/15/18 55.8 kg (123 lb)   10/12/17 56.3 kg (124 lb 3.2 oz)     Temp Readings from Last 3 Encounters:   04/16/18 98.2 °F (36.8 °C)   01/15/18 98.4 °F (36.9 °C)   10/12/17 98.4 °F (36.9 °C)     BP Readings from Last 3 Encounters:   04/16/18 138/80   01/15/18 130/82   10/12/17 124/80     Pulse Readings from Last 3 Encounters:   04/16/18 83   01/15/18 66   10/12/17 84     Body mass index is 23.29 kg/m².  SpO2 Readings from Last 3 Encounters:   01/15/18 96%   10/12/17 98%   08/10/17 99%     Physical Exam   Constitutional: She is oriented to person, place, and  time. She appears well-developed and well-nourished.   HENT:   Head: Normocephalic and atraumatic.   Mouth/Throat: Oropharynx is clear and moist.   Eyes: Conjunctivae and EOM are normal.   Neck: Neck supple. No thyromegaly present.   Cardiovascular: Normal rate, regular rhythm and normal heart sounds.    Pulmonary/Chest: Effort normal and breath sounds normal.   Abdominal: Soft. Bowel sounds are normal. There is no hepatosplenomegaly.   Musculoskeletal: Normal range of motion. She exhibits no edema.   Lymphadenopathy:     She has cervical adenopathy.   Neurological: She is alert and oriented to person, place, and time.   Skin: Skin is warm and dry. No rash noted.   Psychiatric: She has a normal mood and affect. Her behavior is normal.   Nursing note and vitals reviewed.      Results for orders placed or performed in visit on 08/15/17   Comprehensive Metabolic Panel   Result Value Ref Range    Glucose 82 65 - 99 mg/dL    BUN 12 8 - 23 mg/dL    Creatinine 0.87 0.57 - 1.00 mg/dL    eGFR Non African Am 66 >60 mL/min/1.73    eGFR African Am 80 >60 mL/min/1.73    BUN/Creatinine Ratio 13.8 7.0 - 25.0    Sodium 135 (L) 136 - 145 mmol/L    Potassium 4.5 3.5 - 5.2 mmol/L    Chloride 96 (L) 98 - 107 mmol/L    Total CO2 26.4 22.0 - 29.0 mmol/L    Calcium 9.5 8.8 - 10.5 mg/dL    Total Protein 6.7 6.0 - 8.5 g/dL    Albumin 4.00 3.50 - 5.20 g/dL    Globulin 2.7 gm/dL    A/G Ratio 1.5 g/dL    Total Bilirubin 0.5 0.2 - 1.2 mg/dL    Alkaline Phosphatase 105 40 - 129 U/L    AST (SGOT) 23 5 - 32 U/L    ALT (SGPT) 15 5 - 33 U/L   Lipid Panel With / Chol / HDL Ratio   Result Value Ref Range    Total Cholesterol 189 0 - 200 mg/dL    Triglycerides 65 0 - 150 mg/dL    HDL Cholesterol 70 (H) 40 - 60 mg/dL    VLDL Cholesterol 13 7 - 27 mg/dL    LDL Cholesterol  106 (H) 0 - 100 mg/dL    Chol/HDL Ratio 2.70    TSH   Result Value Ref Range    TSH 3.020 0.270 - 4.200 mIU/mL   Vitamin D 25 Hydroxy   Result Value Ref Range    25 Hydroxy, Vitamin D  49.8 ng/mL   CBC & Differential   Result Value Ref Range    WBC 4.48 (L) 4.80 - 10.80 10*3/mm3    RBC 4.19 (L) 4.20 - 5.40 10*6/mm3    Hemoglobin 13.1 12.0 - 16.0 g/dL    Hematocrit 39.1 37.0 - 47.0 %    MCV 93.3 81.0 - 99.0 fL    MCH 31.3 (H) 27.0 - 31.0 pg    MCHC 33.5 31.0 - 37.0 g/dL    RDW 12.2 11.5 - 14.5 %    Platelets 232 140 - 500 10*3/mm3    Neutrophil Rel % 70.3 (H) 45.0 - 70.0 %    Lymphocyte Rel % 20.3 20.0 - 45.0 %    Monocyte Rel % 7.6 3.0 - 8.0 %    Eosinophil Rel % 0.7 0.0 - 4.0 %    Basophil Rel % 0.9 0.0 - 2.0 %    Neutrophils Absolute 3.15 1.50 - 8.30 10*3/mm3    Lymphocytes Absolute 0.91 0.60 - 4.80 10*3/mm3    Monocytes Absolute 0.34 0.00 - 1.00 10*3/mm3    Eosinophils Absolute 0.03 (L) 0.10 - 0.30 10*3/mm3    Basophils Absolute 0.04 0.00 - 0.20 10*3/mm3    Immature Granulocyte Rel % 0.2 0.0 - 0.5 %    Immature Grans Absolute 0.01 0.00 - 0.03 10*3/mm3    nRBC 0.0 0.0 - 0.0 /100 WBC     Assessment/Plan   Martina was seen today for hypertension.    Diagnoses and all orders for this visit:    Essential hypertension  -     Comprehensive Metabolic Panel; Future  -     TSH; Future    Routine health maintenance  -     Lipid Panel With / Chol / HDL Ratio; Future  -     Vitamin B12; Future  -     Vitamin D 25 Hydroxy; Future    Intractable migraine without aura and without status migrainosus    Gastroesophageal reflux disease without esophagitis  -     CBC & Differential; Future    Depression with anxiety  -     ALPRAZolam (XANAX) 0.5 MG tablet; Take 1 tablet by mouth 2 (Two) Times a Day.    Primary insomnia    Chronic constipation    Chronic non-seasonal allergic rhinitis, unspecified trigger    1. HTN.  Controlled.  Labs reviewed.  Continue same.  F/U 3 months.    2. Routine health maint.  Mammogram UTD.  Colonoscopy due this year in September (Dr. Ruff).  Hysterectomy done for non-cancerous reasons.     3. Migraines.  Continue per Dr. Morales.    4. GERD. Controlled. Continue same.    5. Depression  with anxiety.  Controlled.  Continue same.  [facility]    6. Insomnia.  Continue same.  [facility]    7. Chronic constipation.  Controlled with daily Miralax.    8. LORRIE. Controlled.  Continue same.    Outpatient Medications Prior to Visit   Medication Sig Dispense Refill   • Calcium-Vitamin D (CALTRATE 600 PLUS-VIT D PO) Take  by mouth.     • fluticasone (FLONASE) 50 MCG/ACT nasal spray 2 sprays into each nostril daily.     • lisinopril (PRINIVIL,ZESTRIL) 10 MG tablet TAKE ON PO  DAILY 90 tablet 3   • loratadine (CLARITIN) 10 MG tablet Take 10 mg by mouth daily.     • Multiple Vitamins-Minerals (EYE VITAMINS) capsule Take  by mouth.     • Multiple Vitamins-Minerals (MULTI ADULT GUMMIES PO) Take  by mouth.     • omeprazole (PriLOSEC) 40 MG capsule Take 20 mg by mouth Daily. Pt takes 1/3 per day     • PARoxetine (PAXIL) 30 MG tablet Take 1 tablet by mouth Every Morning. 90 tablet 3   • polyethylene glycol (MIRALAX) packet Take 17 g by mouth daily.     • Probiotic Product (PROBIOTIC DAILY) capsule Once daily     • ranitidine (ZANTAC) 300 MG tablet Take 150 mg by mouth Daily.     • SUMAtriptan (IMITREX) 100 MG tablet Take 1 tablet by mouth 1 (One) Time As Needed for Migraine for up to 1 dose. 45 tablet 3   • zolpidem (AMBIEN) 10 MG tablet Take 1 tablet by mouth Every Night. 90 tablet 1   • ALPRAZolam (XANAX) 0.5 MG tablet TAKE ONE TABLET BY MOUTH THREE TIMES DAILY AS NEEDED 30 tablet 2     Facility-Administered Medications Prior to Visit   Medication Dose Route Frequency Provider Last Rate Last Dose   • OnabotulinumtoxinA 200 Units  200 Units Intramuscular Once Fransisca Camargo MD         New Medications Ordered This Visit   Medications   • ALPRAZolam (XANAX) 0.5 MG tablet     Sig: Take 1 tablet by mouth 2 (Two) Times a Day.     Dispense:  30 tablet     Refill:  2     [unfilled]  Medications Discontinued During This Encounter   Medication Reason   •  ALPRAZolam (XANAX) 0.5 MG tablet Reorder     Return in about 3 months (around 7/16/2018).

## 2018-07-15 DIAGNOSIS — F51.01 PRIMARY INSOMNIA: ICD-10-CM

## 2018-07-16 RX ORDER — ZOLPIDEM TARTRATE 10 MG/1
TABLET ORAL
Qty: 90 TABLET | Refills: 0 | Status: SHIPPED | OUTPATIENT
Start: 2018-07-16 | End: 2018-10-17 | Stop reason: SDUPTHER

## 2018-07-19 ENCOUNTER — OFFICE VISIT (OUTPATIENT)
Dept: INTERNAL MEDICINE | Facility: CLINIC | Age: 63
End: 2018-07-19

## 2018-07-19 VITALS
HEIGHT: 61 IN | DIASTOLIC BLOOD PRESSURE: 94 MMHG | BODY MASS INDEX: 23.41 KG/M2 | WEIGHT: 124 LBS | SYSTOLIC BLOOD PRESSURE: 132 MMHG | OXYGEN SATURATION: 98 % | RESPIRATION RATE: 16 BRPM | HEART RATE: 84 BPM | TEMPERATURE: 98.3 F

## 2018-07-19 DIAGNOSIS — K59.09 CHRONIC CONSTIPATION: ICD-10-CM

## 2018-07-19 DIAGNOSIS — G43.019 INTRACTABLE MIGRAINE WITHOUT AURA AND WITHOUT STATUS MIGRAINOSUS: ICD-10-CM

## 2018-07-19 DIAGNOSIS — Z00.00 ROUTINE HEALTH MAINTENANCE: ICD-10-CM

## 2018-07-19 DIAGNOSIS — R10.13 EPIGASTRIC PAIN: ICD-10-CM

## 2018-07-19 DIAGNOSIS — K21.9 GASTROESOPHAGEAL REFLUX DISEASE WITHOUT ESOPHAGITIS: ICD-10-CM

## 2018-07-19 DIAGNOSIS — F51.01 PRIMARY INSOMNIA: ICD-10-CM

## 2018-07-19 DIAGNOSIS — I10 ESSENTIAL HYPERTENSION: Primary | ICD-10-CM

## 2018-07-19 DIAGNOSIS — F41.8 DEPRESSION WITH ANXIETY: ICD-10-CM

## 2018-07-19 DIAGNOSIS — J30.89 CHRONIC NON-SEASONAL ALLERGIC RHINITIS, UNSPECIFIED TRIGGER: ICD-10-CM

## 2018-07-19 PROCEDURE — 99214 OFFICE O/P EST MOD 30 MIN: CPT | Performed by: FAMILY MEDICINE

## 2018-07-19 NOTE — PROGRESS NOTES
Subjective     Martina Hardwick is a 62 y.o. female, who presents with a chief complaint of   Chief Complaint   Patient presents with   • Hypertension   • Constipation     or she has diarrhea. Can't find a middle ground     Hypertension   Associated symptoms include anxiety and headaches.   Anxiety         Constipation   Associated symptoms include abdominal pain.      1. HTN.  Still tolerating lisinopril.  Home blood pressures running 110s/70s in the morning and 150-160/90s in the evenings.  She takes the lisinopril in the afternoon.  She thinks anxiety and migraines or gas pains increase her blood pressure in the evenings.  Denies dizziness and chest pain.    2. Depression and anxiety.  Pt reports her symptoms are adequately controlled.  She takes paroxetine and prn alprazolam.  Denies SI.    3. Headaches.  She is seeing Dr. Morales, neurologist, and is taking gabapentin.    4. Chronic constipation.  She takes Miralax prn but then has loose stool.    5. Epigastric pain.  3 episodes X 20 minutes during the night.  Once radiated to right chest and neck/shoulder.    The following portions of the patient's history were reviewed and updated as appropriate: allergies, current medications, past family history, past medical history, past social history, past surgical history and problem list.    Allergies: Sulfa antibiotics    Review of Systems   Constitutional: Negative.    Eyes: Negative.    Respiratory: Negative.    Cardiovascular: Negative.    Gastrointestinal: Positive for abdominal pain and constipation.   Endocrine: Negative.    Genitourinary: Negative.    Musculoskeletal: Positive for arthralgias.   Skin: Negative.    Allergic/Immunologic: Positive for environmental allergies.   Neurological: Positive for headaches.   Hematological: Negative.    Psychiatric/Behavioral: Negative.      Objective     Wt Readings from Last 3 Encounters:   07/19/18 56.2 kg (124 lb)   04/16/18 55.9 kg (123 lb 3.2 oz)   01/15/18 55.8 kg (123  lb)     Temp Readings from Last 3 Encounters:   07/19/18 98.3 °F (36.8 °C)   04/16/18 98.2 °F (36.8 °C)   01/15/18 98.4 °F (36.9 °C)     BP Readings from Last 3 Encounters:   07/19/18 132/94   04/16/18 138/80   01/15/18 130/82     Pulse Readings from Last 3 Encounters:   07/19/18 84   04/16/18 83   01/15/18 66     Body mass index is 23.44 kg/m².  SpO2 Readings from Last 3 Encounters:   01/15/18 96%   10/12/17 98%   08/10/17 99%     Physical Exam   Constitutional: She is oriented to person, place, and time. She appears well-developed and well-nourished.   HENT:   Head: Normocephalic and atraumatic.   Mouth/Throat: Oropharynx is clear and moist.   Eyes: Conjunctivae and EOM are normal.   Neck: Neck supple. No thyromegaly present.   Cardiovascular: Normal rate, regular rhythm and normal heart sounds.    Pulmonary/Chest: Effort normal and breath sounds normal.   Abdominal: Soft. Bowel sounds are normal. There is no hepatosplenomegaly.   Musculoskeletal: Normal range of motion. She exhibits no edema.   Neurological: She is alert and oriented to person, place, and time.   Skin: Skin is warm and dry. No rash noted.   Psychiatric: She has a normal mood and affect. Her behavior is normal.   Nursing note and vitals reviewed.      Assessment/Plan   Martina was seen today for hypertension and constipation.    Diagnoses and all orders for this visit:    Essential hypertension  -     metoprolol tartrate (LOPRESSOR) 25 MG tablet; Take 1 tablet by mouth Daily.  -     Comprehensive Metabolic Panel; Future  -     TSH; Future    Intractable migraine without aura and without status migrainosus    Gastroesophageal reflux disease without esophagitis  -     CBC & Differential; Future    Depression with anxiety    Primary insomnia    Chronic constipation    Chronic non-seasonal allergic rhinitis, unspecified trigger    Routine health maintenance  -     Lipid Panel With / Chol / HDL Ratio; Future  -     Vitamin D 25 Hydroxy; Future  -      Vitamin B12; Future    Epigastric pain  -     US Gallbladder; Future    1. HTN.  Elevated in a.m.  Continue lisinopril in the a.m.  Add metoprolol 25 mg each afternoon.    2. Routine health maint.  Mammogram UTD.  Colonoscopy due this year in September (Dr. Ruff).  Hysterectomy done for non-cancerous reasons.     3. Migraines.  Continue per Dr. Morales.    4. GERD. Controlled. Continue omeprazole.  She has been taking this intermittently.    5. Depression with anxiety.  Controlled.  Continue paroxetine.  On prn alprazolam.  Med management agreement and [facility].    6. Insomnia.  Zolpidem is effective.  Med management agreement and [facility].    7. Chronic constipation.  Controlled with daily Miralax.    8. LORRIE. Controlled with fluticasone nasal spray and loratadine.    9. Epigastric pain.  Episodic.  Check gallbladder u/s.  Increase omeprazole to daily.    Outpatient Medications Prior to Visit   Medication Sig Dispense Refill   • ALPRAZolam (XANAX) 0.5 MG tablet Take 1 tablet by mouth 2 (Two) Times a Day. 30 tablet 2   • Calcium-Vitamin D (CALTRATE 600 PLUS-VIT D PO) Take  by mouth.     • fluticasone (FLONASE) 50 MCG/ACT nasal spray 2 sprays into each nostril daily.     • lisinopril (PRINIVIL,ZESTRIL) 10 MG tablet TAKE ON PO  DAILY 90 tablet 3   • loratadine (CLARITIN) 10 MG tablet Take 10 mg by mouth daily.     • Multiple Vitamins-Minerals (EYE VITAMINS) capsule Take  by mouth.     • Multiple Vitamins-Minerals (MULTI ADULT GUMMIES PO) Take  by mouth.     • omeprazole (PriLOSEC) 40 MG capsule Take 20 mg by mouth Daily. Pt takes 1/3 per day     • PARoxetine (PAXIL) 30 MG tablet Take 1 tablet by mouth Every Morning. 90 tablet 3   • polyethylene glycol (MIRALAX) packet Take 17 g by mouth daily.     • Probiotic Product (PROBIOTIC DAILY) capsule Once daily     • ranitidine (ZANTAC) 300 MG tablet Take 150 mg by mouth Daily.     • SUMAtriptan (IMITREX) 100 MG tablet Take 1 tablet by mouth 1 (One) Time As Needed for  Migraine for up to 1 dose. 45 tablet 3   • zolpidem (AMBIEN) 10 MG tablet TAKE ONE TABLET BY MOUTH ONCE NIGHTLY 90 tablet 0   • gabapentin (NEURONTIN) 400 MG capsule Take 400 mg by mouth 3 (Three) Times a Day.       Facility-Administered Medications Prior to Visit   Medication Dose Route Frequency Provider Last Rate Last Dose   • OnabotulinumtoxinA 200 Units  200 Units Intramuscular Once Fransisca Camargo MD         New Medications Ordered This Visit   Medications   • metoprolol tartrate (LOPRESSOR) 25 MG tablet     Sig: Take 1 tablet by mouth Daily.     Dispense:  30 tablet     Refill:  5     [unfilled]  Medications Discontinued During This Encounter   Medication Reason   • gabapentin (NEURONTIN) 400 MG capsule *Therapy completed     Return in about 4 months (around 11/19/2018).

## 2018-07-24 ENCOUNTER — APPOINTMENT (OUTPATIENT)
Dept: ULTRASOUND IMAGING | Facility: HOSPITAL | Age: 63
End: 2018-07-24
Attending: FAMILY MEDICINE

## 2018-07-26 ENCOUNTER — HOSPITAL ENCOUNTER (OUTPATIENT)
Dept: ULTRASOUND IMAGING | Facility: HOSPITAL | Age: 63
Discharge: HOME | End: 2018-07-26
Attending: FAMILY MEDICINE | Admitting: FAMILY MEDICINE

## 2018-07-26 DIAGNOSIS — K80.20 CALCULUS OF GALLBLADDER WITHOUT CHOLECYSTITIS WITHOUT OBSTRUCTION: Primary | ICD-10-CM

## 2018-07-26 PROCEDURE — 76705 ECHO EXAM OF ABDOMEN: CPT

## 2018-07-31 ENCOUNTER — OFFICE VISIT (OUTPATIENT)
Dept: SURGERY | Facility: CLINIC | Age: 63
End: 2018-07-31

## 2018-07-31 VITALS
DIASTOLIC BLOOD PRESSURE: 88 MMHG | SYSTOLIC BLOOD PRESSURE: 128 MMHG | BODY MASS INDEX: 23.41 KG/M2 | HEIGHT: 61 IN | WEIGHT: 124 LBS

## 2018-07-31 DIAGNOSIS — K80.20 CALCULUS OF GALLBLADDER WITHOUT CHOLECYSTITIS WITHOUT OBSTRUCTION: Primary | ICD-10-CM

## 2018-07-31 PROCEDURE — 99203 OFFICE O/P NEW LOW 30 MIN: CPT | Performed by: SURGERY

## 2018-07-31 RX ORDER — GABAPENTIN 100 MG/1
CAPSULE ORAL
Status: ON HOLD | COMMUNITY
Start: 2018-04-27 | End: 2018-08-17

## 2018-07-31 RX ORDER — LISINOPRIL 5 MG/1
10 TABLET ORAL
COMMUNITY
Start: 2018-07-25 | End: 2018-08-20 | Stop reason: HOSPADM

## 2018-07-31 NOTE — PROGRESS NOTES
PATIENT INFORMATION  Martina Hardwick  GB CONS, US DONE, EPIGASTRIC PAIN, NAUSEA     - 1955    CHIEF COMPLAINT  Chief Complaint   Patient presents with   • Cholelithiasis       HISTORY OF PRESENT ILLNESS  HPI she complains of epigastric right upper quadrant abdominal pain for several years.  She says sometimes this is made worse with eating and sometimes has the pain in between meals.  She says nothing really makes it better.  Some nausea.  She was originally diagnosed with gastritis via an EGD per Dr. Ruff but she says those medications are no longer controlling her symptoms eyes any jaundice symptoms.        REVIEW OF SYSTEMS  Review of Systems migraines, psoriasis otherwise negative      ACTIVE PROBLEMS  Patient Active Problem List    Diagnosis   • Routine health maintenance [Z00.00]   • Essential hypertension [I10]   • Cervicogenic headache [R51]   • GERD (gastroesophageal reflux disease) [K21.9]     Overview Note:     Dr. Ruff.     • Allergic rhinitis [J30.9]   • Primary insomnia [F51.01]   • Chronic constipation [K59.09]   • Intractable migraine without aura and without status migrainosus [G43.019]   • Depression with anxiety [F41.8]         PAST MEDICAL HISTORY  Past Medical History:   Diagnosis Date   • Abdominal pain    • Anxiety    • Anxiety    • Arthritis    • Constipation    • Depressed    • Depression    • Endometriosis    • Gastritis    • GERD (gastroesophageal reflux disease)    • Headache    • Headache, tension-type    • Insomnia    • Migraine    • OP (osteoporosis)    • Poor sleep    • Seasonal allergies          SURGICAL HISTORY  Past Surgical History:   Procedure Laterality Date   • APPENDECTOMY     • COLON SURGERY     • COLONOSCOPY     • DIAGNOSTIC LAPAROSCOPY     • DILATATION AND CURETTAGE     • ENDOSCOPY N/A 2016    Procedure: ESOPHAGOGASTRODUODENOSCOPY ;  Surgeon: Darien Ruff MD;  Location: [facility];  Service:    • HYSTERECTOMY     • SMALL  INTESTINE SURGERY           FAMILY HISTORY  Family History   Problem Relation Age of Onset   • Hyperlipidemia Mother    • Macular degeneration Mother    • Heart disease Father    • Hyperlipidemia Father    • Cancer Father    • Depression Father    • Depression Sister    • Hyperlipidemia Brother    • Depression Brother    • Breast cancer Maternal Aunt    • Breast cancer Cousin          SOCIAL HISTORY  Social History     Occupational History   • Not on file.     Social History Main Topics   • Smoking status: Never Smoker   • Smokeless tobacco: Not on file   • Alcohol use Yes      Comment: occ   • Drug use: No   • Sexual activity: Defer         CURRENT MEDICATIONS    Current Outpatient Prescriptions:   •  lisinopril (PRINIVIL,ZESTRIL) 5 MG tablet, , Disp: , Rfl:   •  ALPRAZolam (XANAX) 0.5 MG tablet, Take 1 tablet by mouth 2 (Two) Times a Day., Disp: 30 tablet, Rfl: 2  •  Calcium-Vitamin D (CALTRATE 600 PLUS-VIT D PO), Take  by mouth., Disp: , Rfl:   •  fluticasone (FLONASE) 50 MCG/ACT nasal spray, 2 sprays into each nostril daily., Disp: , Rfl:   •  gabapentin (NEURONTIN) 100 MG capsule, , Disp: , Rfl:   •  lisinopril (PRINIVIL,ZESTRIL) 10 MG tablet, TAKE ON PO  DAILY, Disp: 90 tablet, Rfl: 3  •  loratadine (CLARITIN) 10 MG tablet, Take 10 mg by mouth daily., Disp: , Rfl:   •  Multiple Vitamins-Minerals (EYE VITAMINS) capsule, Take  by mouth., Disp: , Rfl:   •  Multiple Vitamins-Minerals (MULTI ADULT GUMMIES PO), Take  by mouth., Disp: , Rfl:   •  omeprazole (PriLOSEC) 40 MG capsule, Take 20 mg by mouth Daily. Pt takes 1/3 per day, Disp: , Rfl:   •  PARoxetine (PAXIL) 30 MG tablet, Take 1 tablet by mouth Every Morning., Disp: 90 tablet, Rfl: 3  •  polyethylene glycol (MIRALAX) packet, Take 17 g by mouth daily., Disp: , Rfl:   •  Probiotic Product (PROBIOTIC DAILY) capsule, Once daily, Disp: , Rfl:   •  ranitidine (ZANTAC) 300 MG tablet, Take 150 mg by mouth Daily., Disp: , Rfl:   •  SUMAtriptan (IMITREX) 100 MG  tablet, Take 1 tablet by mouth 1 (One) Time As Needed for Migraine for up to 1 dose., Disp: 45 tablet, Rfl: 3  •  zolpidem (AMBIEN) 10 MG tablet, TAKE ONE TABLET BY MOUTH ONCE NIGHTLY, Disp: 90 tablet, Rfl: 0    Current Facility-Administered Medications:   •  OnabotulinumtoxinA 200 Units, 200 Units, Intramuscular, Once, Fransisca Camargo MD    ALLERGIES  Sulfa antibiotics    VITALS  There were no vitals filed for this visit.    LAST RESULTS   Results Encounter on 08/15/2017   Component Date Value Ref Range Status   • WBC 10/05/2017 4.48* 4.80 - 10.80 10*3/mm3 Final   • RBC 10/05/2017 4.19* 4.20 - 5.40 10*6/mm3 Final   • Hemoglobin 10/05/2017 13.1  12.0 - 16.0 g/dL Final   • Hematocrit 10/05/2017 39.1  37.0 - 47.0 % Final   • MCV 10/05/2017 93.3  81.0 - 99.0 fL Final   • MCH 10/05/2017 31.3* 27.0 - 31.0 pg Final   • MCHC 10/05/2017 33.5  31.0 - 37.0 g/dL Final   • RDW 10/05/2017 12.2  11.5 - 14.5 % Final   • Platelets 10/05/2017 232  140 - 500 10*3/mm3 Final   • Neutrophil Rel % 10/05/2017 70.3* 45.0 - 70.0 % Final   • Lymphocyte Rel % 10/05/2017 20.3  20.0 - 45.0 % Final   • Monocyte Rel % 10/05/2017 7.6  3.0 - 8.0 % Final   • Eosinophil Rel % 10/05/2017 0.7  0.0 - 4.0 % Final   • Basophil Rel % 10/05/2017 0.9  0.0 - 2.0 % Final   • Neutrophils Absolute 10/05/2017 3.15  1.50 - 8.30 10*3/mm3 Final   • Lymphocytes Absolute 10/05/2017 0.91  0.60 - 4.80 10*3/mm3 Final   • Monocytes Absolute 10/05/2017 0.34  0.00 - 1.00 10*3/mm3 Final   • Eosinophils Absolute 10/05/2017 0.03* 0.10 - 0.30 10*3/mm3 Final   • Basophils Absolute 10/05/2017 0.04  0.00 - 0.20 10*3/mm3 Final   • Immature Granulocyte Rel % 10/05/2017 0.2  0.0 - 0.5 % Final   • Immature Grans Absolute 10/05/2017 0.01  0.00 - 0.03 10*3/mm3 Final   • nRBC 10/05/2017 0.0  0.0 - 0.0 /100 WBC Final   • Glucose 10/05/2017 82  65 - 99 mg/dL Final   • BUN 10/05/2017 12  8 - 23 mg/dL Final   • Creatinine 10/05/2017 0.87  0.57 - 1.00 mg/dL Final   • eGFR Non African Am  10/05/2017 66  >60 mL/min/1.73 Final   • eGFR African Am 10/05/2017 80  >60 mL/min/1.73 Final   • BUN/Creatinine Ratio 10/05/2017 13.8  7.0 - 25.0 Final   • Sodium 10/05/2017 135* 136 - 145 mmol/L Final   • Potassium 10/05/2017 4.5  3.5 - 5.2 mmol/L Final   • Chloride 10/05/2017 96* 98 - 107 mmol/L Final   • Total CO2 10/05/2017 26.4  22.0 - 29.0 mmol/L Final   • Calcium 10/05/2017 9.5  8.8 - 10.5 mg/dL Final   • Total Protein 10/05/2017 6.7  6.0 - 8.5 g/dL Final   • Albumin 10/05/2017 4.00  3.50 - 5.20 g/dL Final   • Globulin 10/05/2017 2.7  gm/dL Final   • A/G Ratio 10/05/2017 1.5  g/dL Final   • Total Bilirubin 10/05/2017 0.5  0.2 - 1.2 mg/dL Final   • Alkaline Phosphatase 10/05/2017 105  40 - 129 U/L Final   • AST (SGOT) 10/05/2017 23  5 - 32 U/L Final   • ALT (SGPT) 10/05/2017 15  5 - 33 U/L Final   • Total Cholesterol 10/05/2017 189  0 - 200 mg/dL Final   • Triglycerides 10/05/2017 65  0 - 150 mg/dL Final   • HDL Cholesterol 10/05/2017 70* 40 - 60 mg/dL Final   • VLDL Cholesterol 10/05/2017 13  7 - 27 mg/dL Final   • LDL Cholesterol  10/05/2017 106* 0 - 100 mg/dL Final   • Chol/HDL Ratio 10/05/2017 2.70   Final   • TSH 10/05/2017 3.020  0.270 - 4.200 mIU/mL Final   • 25 Hydroxy, Vitamin D 10/05/2017 49.8  ng/mL Final    Comment: Reference Range for Total Vitamin D 25(OH)  Deficiency    <20.0 ng/mL  Insufficiency 21-29 ng/mL  Sufficiency   30-74 ng/mL       Us Gallbladder    Result Date: 7/26/2018  Narrative: ULTRASOUND ABDOMEN, LIMITED, 7/26/2018  HISTORY: 62-year-old female referred for history of epigastric pain. The patient describes two month history of intermittent pain.  TECHNIQUE: Grayscale ultrasound imaging of the right upper abdomen.  FINDINGS: There is a single large gallstone within the gallbladder measuring about 2.4 cm in diameter. The gallbladder is nondistended. There is no gallbladder wall thickening or gallbladder tenderness. No intrahepatic or extrahepatic bile duct dilatation (CBD 2 mm).   Liver and pancreas are normal in size and appearance. No free fluid in the right upper abdomen. Right kidney is negative with no hydronephrosis.      Impression: 1. Cholelithiasis. Single large gallstone within otherwise normal-appearing gallbladder. 2. No bile duct dilatation.  This report was finalized on 7/26/2018 7:53 AM by Dr. Steve Rodriguez MD.        PHYSICAL EXAM  Physical Exam alert white female in no active distress she has no clinical jaundice.  Her heart shows regular rate and rhythm her lungs are clear and equal.  She has a lower midline abdominal scar with subjective epigastric and right upper quadrant tenderness without mass.  There is no flank tenderness.  Ultrasound of her gallbladder was reviewed by me and shows a 2.4 cm gallstone.    ASSESSMENT  cholelithiasis      PLAN  The risks benefits and options were discussed with her in detail.  We'll proceed with a laparoscopic cholecystectomy at [facility] on August 17.

## 2018-07-31 NOTE — H&P
PATIENT INFORMATION  Martina Hardwick  GB CONS, US DONE, EPIGASTRIC PAIN, NAUSEA     - 1955    CHIEF COMPLAINT  Chief Complaint   Patient presents with   • Cholelithiasis       HISTORY OF PRESENT ILLNESS  HPI she complains of epigastric right upper quadrant abdominal pain for several years.  She says sometimes this is made worse with eating and sometimes has the pain in between meals.  She says nothing really makes it better.  Some nausea.  She was originally diagnosed with gastritis via an EGD per Dr. Ruff but she says those medications are no longer controlling her symptoms eyes any jaundice symptoms.        REVIEW OF SYSTEMS  Review of Systems migraines, psoriasis otherwise negative      ACTIVE PROBLEMS  Patient Active Problem List    Diagnosis   • Routine health maintenance [Z00.00]   • Essential hypertension [I10]   • Cervicogenic headache [R51]   • GERD (gastroesophageal reflux disease) [K21.9]     Overview Note:     Dr. Ruff.     • Allergic rhinitis [J30.9]   • Primary insomnia [F51.01]   • Chronic constipation [K59.09]   • Intractable migraine without aura and without status migrainosus [G43.019]   • Depression with anxiety [F41.8]         PAST MEDICAL HISTORY  Past Medical History:   Diagnosis Date   • Abdominal pain    • Anxiety    • Anxiety    • Arthritis    • Constipation    • Depressed    • Depression    • Endometriosis    • Gastritis    • GERD (gastroesophageal reflux disease)    • Headache    • Headache, tension-type    • Insomnia    • Migraine    • OP (osteoporosis)    • Poor sleep    • Seasonal allergies          SURGICAL HISTORY  Past Surgical History:   Procedure Laterality Date   • APPENDECTOMY     • COLON SURGERY     • COLONOSCOPY     • DIAGNOSTIC LAPAROSCOPY     • DILATATION AND CURETTAGE     • ENDOSCOPY N/A 2016    Procedure: ESOPHAGOGASTRODUODENOSCOPY ;  Surgeon: Darien Ruff MD;  Location: [facility];  Service:    • HYSTERECTOMY     • SMALL  INTESTINE SURGERY           FAMILY HISTORY  Family History   Problem Relation Age of Onset   • Hyperlipidemia Mother    • Macular degeneration Mother    • Heart disease Father    • Hyperlipidemia Father    • Cancer Father    • Depression Father    • Depression Sister    • Hyperlipidemia Brother    • Depression Brother    • Breast cancer Maternal Aunt    • Breast cancer Cousin          SOCIAL HISTORY  Social History     Occupational History   • Not on file.     Social History Main Topics   • Smoking status: Never Smoker   • Smokeless tobacco: Not on file   • Alcohol use Yes      Comment: occ   • Drug use: No   • Sexual activity: Defer         CURRENT MEDICATIONS    Current Outpatient Prescriptions:   •  lisinopril (PRINIVIL,ZESTRIL) 5 MG tablet, , Disp: , Rfl:   •  ALPRAZolam (XANAX) 0.5 MG tablet, Take 1 tablet by mouth 2 (Two) Times a Day., Disp: 30 tablet, Rfl: 2  •  Calcium-Vitamin D (CALTRATE 600 PLUS-VIT D PO), Take  by mouth., Disp: , Rfl:   •  fluticasone (FLONASE) 50 MCG/ACT nasal spray, 2 sprays into each nostril daily., Disp: , Rfl:   •  gabapentin (NEURONTIN) 100 MG capsule, , Disp: , Rfl:   •  lisinopril (PRINIVIL,ZESTRIL) 10 MG tablet, TAKE ON PO  DAILY, Disp: 90 tablet, Rfl: 3  •  loratadine (CLARITIN) 10 MG tablet, Take 10 mg by mouth daily., Disp: , Rfl:   •  Multiple Vitamins-Minerals (EYE VITAMINS) capsule, Take  by mouth., Disp: , Rfl:   •  Multiple Vitamins-Minerals (MULTI ADULT GUMMIES PO), Take  by mouth., Disp: , Rfl:   •  omeprazole (PriLOSEC) 40 MG capsule, Take 20 mg by mouth Daily. Pt takes 1/3 per day, Disp: , Rfl:   •  PARoxetine (PAXIL) 30 MG tablet, Take 1 tablet by mouth Every Morning., Disp: 90 tablet, Rfl: 3  •  polyethylene glycol (MIRALAX) packet, Take 17 g by mouth daily., Disp: , Rfl:   •  Probiotic Product (PROBIOTIC DAILY) capsule, Once daily, Disp: , Rfl:   •  ranitidine (ZANTAC) 300 MG tablet, Take 150 mg by mouth Daily., Disp: , Rfl:   •  SUMAtriptan (IMITREX) 100 MG  tablet, Take 1 tablet by mouth 1 (One) Time As Needed for Migraine for up to 1 dose., Disp: 45 tablet, Rfl: 3  •  zolpidem (AMBIEN) 10 MG tablet, TAKE ONE TABLET BY MOUTH ONCE NIGHTLY, Disp: 90 tablet, Rfl: 0    Current Facility-Administered Medications:   •  OnabotulinumtoxinA 200 Units, 200 Units, Intramuscular, Once, Fransisca Camargo MD    ALLERGIES  Sulfa antibiotics    VITALS  There were no vitals filed for this visit.    LAST RESULTS   Results Encounter on 08/15/2017   Component Date Value Ref Range Status   • WBC 10/05/2017 4.48* 4.80 - 10.80 10*3/mm3 Final   • RBC 10/05/2017 4.19* 4.20 - 5.40 10*6/mm3 Final   • Hemoglobin 10/05/2017 13.1  12.0 - 16.0 g/dL Final   • Hematocrit 10/05/2017 39.1  37.0 - 47.0 % Final   • MCV 10/05/2017 93.3  81.0 - 99.0 fL Final   • MCH 10/05/2017 31.3* 27.0 - 31.0 pg Final   • MCHC 10/05/2017 33.5  31.0 - 37.0 g/dL Final   • RDW 10/05/2017 12.2  11.5 - 14.5 % Final   • Platelets 10/05/2017 232  140 - 500 10*3/mm3 Final   • Neutrophil Rel % 10/05/2017 70.3* 45.0 - 70.0 % Final   • Lymphocyte Rel % 10/05/2017 20.3  20.0 - 45.0 % Final   • Monocyte Rel % 10/05/2017 7.6  3.0 - 8.0 % Final   • Eosinophil Rel % 10/05/2017 0.7  0.0 - 4.0 % Final   • Basophil Rel % 10/05/2017 0.9  0.0 - 2.0 % Final   • Neutrophils Absolute 10/05/2017 3.15  1.50 - 8.30 10*3/mm3 Final   • Lymphocytes Absolute 10/05/2017 0.91  0.60 - 4.80 10*3/mm3 Final   • Monocytes Absolute 10/05/2017 0.34  0.00 - 1.00 10*3/mm3 Final   • Eosinophils Absolute 10/05/2017 0.03* 0.10 - 0.30 10*3/mm3 Final   • Basophils Absolute 10/05/2017 0.04  0.00 - 0.20 10*3/mm3 Final   • Immature Granulocyte Rel % 10/05/2017 0.2  0.0 - 0.5 % Final   • Immature Grans Absolute 10/05/2017 0.01  0.00 - 0.03 10*3/mm3 Final   • nRBC 10/05/2017 0.0  0.0 - 0.0 /100 WBC Final   • Glucose 10/05/2017 82  65 - 99 mg/dL Final   • BUN 10/05/2017 12  8 - 23 mg/dL Final   • Creatinine 10/05/2017 0.87  0.57 - 1.00 mg/dL Final   • eGFR Non African Am  10/05/2017 66  >60 mL/min/1.73 Final   • eGFR African Am 10/05/2017 80  >60 mL/min/1.73 Final   • BUN/Creatinine Ratio 10/05/2017 13.8  7.0 - 25.0 Final   • Sodium 10/05/2017 135* 136 - 145 mmol/L Final   • Potassium 10/05/2017 4.5  3.5 - 5.2 mmol/L Final   • Chloride 10/05/2017 96* 98 - 107 mmol/L Final   • Total CO2 10/05/2017 26.4  22.0 - 29.0 mmol/L Final   • Calcium 10/05/2017 9.5  8.8 - 10.5 mg/dL Final   • Total Protein 10/05/2017 6.7  6.0 - 8.5 g/dL Final   • Albumin 10/05/2017 4.00  3.50 - 5.20 g/dL Final   • Globulin 10/05/2017 2.7  gm/dL Final   • A/G Ratio 10/05/2017 1.5  g/dL Final   • Total Bilirubin 10/05/2017 0.5  0.2 - 1.2 mg/dL Final   • Alkaline Phosphatase 10/05/2017 105  40 - 129 U/L Final   • AST (SGOT) 10/05/2017 23  5 - 32 U/L Final   • ALT (SGPT) 10/05/2017 15  5 - 33 U/L Final   • Total Cholesterol 10/05/2017 189  0 - 200 mg/dL Final   • Triglycerides 10/05/2017 65  0 - 150 mg/dL Final   • HDL Cholesterol 10/05/2017 70* 40 - 60 mg/dL Final   • VLDL Cholesterol 10/05/2017 13  7 - 27 mg/dL Final   • LDL Cholesterol  10/05/2017 106* 0 - 100 mg/dL Final   • Chol/HDL Ratio 10/05/2017 2.70   Final   • TSH 10/05/2017 3.020  0.270 - 4.200 mIU/mL Final   • 25 Hydroxy, Vitamin D 10/05/2017 49.8  ng/mL Final    Comment: Reference Range for Total Vitamin D 25(OH)  Deficiency    <20.0 ng/mL  Insufficiency 21-29 ng/mL  Sufficiency   30-74 ng/mL       Us Gallbladder    Result Date: 7/26/2018  Narrative: ULTRASOUND ABDOMEN, LIMITED, 7/26/2018  HISTORY: 62-year-old female referred for history of epigastric pain. The patient describes two month history of intermittent pain.  TECHNIQUE: Grayscale ultrasound imaging of the right upper abdomen.  FINDINGS: There is a single large gallstone within the gallbladder measuring about 2.4 cm in diameter. The gallbladder is nondistended. There is no gallbladder wall thickening or gallbladder tenderness. No intrahepatic or extrahepatic bile duct dilatation (CBD 2 mm).   Liver and pancreas are normal in size and appearance. No free fluid in the right upper abdomen. Right kidney is negative with no hydronephrosis.      Impression: 1. Cholelithiasis. Single large gallstone within otherwise normal-appearing gallbladder. 2. No bile duct dilatation.  This report was finalized on 7/26/2018 7:53 AM by Dr. Steve Rodriguez MD.        PHYSICAL EXAM  Physical Exam alert white female in no active distress she has no clinical jaundice.  Her heart shows regular rate and rhythm her lungs are clear and equal.  She has a lower midline abdominal scar with subjective epigastric and right upper quadrant tenderness without mass.  There is no flank tenderness.  Ultrasound of her gallbladder was reviewed by me and shows a 2.4 cm gallstone.    ASSESSMENT  cholelithiasis      PLAN  The risks benefits and options were discussed with her in detail.  We'll proceed with a laparoscopic cholecystectomy at [facility] on August 17.

## 2018-08-09 ENCOUNTER — APPOINTMENT (OUTPATIENT)
Dept: PREADMISSION TESTING | Facility: HOSPITAL | Age: 63
End: 2018-08-09

## 2018-08-09 VITALS
SYSTOLIC BLOOD PRESSURE: 132 MMHG | BODY MASS INDEX: 23.22 KG/M2 | OXYGEN SATURATION: 99 % | RESPIRATION RATE: 16 BRPM | HEIGHT: 61 IN | DIASTOLIC BLOOD PRESSURE: 80 MMHG | WEIGHT: 123 LBS

## 2018-08-09 DIAGNOSIS — K80.20 CALCULUS OF GALLBLADDER WITHOUT CHOLECYSTITIS WITHOUT OBSTRUCTION: ICD-10-CM

## 2018-08-09 LAB
ALBUMIN SERPL-MCNC: 4 G/DL (ref 3.5–5.2)
ALBUMIN/GLOB SERPL: 1.5 G/DL
ALP SERPL-CCNC: 108 U/L (ref 40–129)
ALT SERPL W P-5'-P-CCNC: 11 U/L (ref 5–33)
ANION GAP SERPL CALCULATED.3IONS-SCNC: 7.6 MMOL/L
AST SERPL-CCNC: 18 U/L (ref 5–32)
BILIRUB SERPL-MCNC: 0.5 MG/DL (ref 0.2–1.2)
BUN BLD-MCNC: 12 MG/DL (ref 8–23)
BUN/CREAT SERPL: 13.6 (ref 7–25)
CALCIUM SPEC-SCNC: 9.5 MG/DL (ref 8.8–10.5)
CHLORIDE SERPL-SCNC: 94 MMOL/L (ref 98–107)
CO2 SERPL-SCNC: 29.4 MMOL/L (ref 22–29)
CREAT BLD-MCNC: 0.88 MG/DL (ref 0.57–1)
DEPRECATED RDW RBC AUTO: 41.2 FL (ref 37–54)
ERYTHROCYTE [DISTWIDTH] IN BLOOD BY AUTOMATED COUNT: 11.9 % (ref 11.5–14.5)
GFR SERPL CREATININE-BSD FRML MDRD: 65 ML/MIN/1.73
GLOBULIN UR ELPH-MCNC: 2.7 GM/DL
GLUCOSE BLD-MCNC: 87 MG/DL (ref 65–99)
HCT VFR BLD AUTO: 38.6 % (ref 37–47)
HGB BLD-MCNC: 12.8 G/DL (ref 12–16)
MCH RBC QN AUTO: 31.2 PG (ref 27–31)
MCHC RBC AUTO-ENTMCNC: 33.2 G/DL (ref 31–37)
MCV RBC AUTO: 94.1 FL (ref 81–99)
PLATELET # BLD AUTO: 223 10*3/MM3 (ref 140–500)
PMV BLD AUTO: 9.4 FL (ref 7.4–10.4)
POTASSIUM BLD-SCNC: 4.6 MMOL/L (ref 3.5–5.2)
PROT SERPL-MCNC: 6.7 G/DL (ref 6–8.5)
RBC # BLD AUTO: 4.1 10*6/MM3 (ref 4.2–5.4)
SODIUM BLD-SCNC: 131 MMOL/L (ref 136–145)
WBC NRBC COR # BLD: 6.06 10*3/MM3 (ref 4.8–10.8)

## 2018-08-09 PROCEDURE — 36415 COLL VENOUS BLD VENIPUNCTURE: CPT

## 2018-08-09 PROCEDURE — 93010 ELECTROCARDIOGRAM REPORT: CPT | Performed by: INTERNAL MEDICINE

## 2018-08-09 PROCEDURE — 93005 ELECTROCARDIOGRAM TRACING: CPT

## 2018-08-09 PROCEDURE — 85027 COMPLETE CBC AUTOMATED: CPT | Performed by: SURGERY

## 2018-08-09 PROCEDURE — 80053 COMPREHEN METABOLIC PANEL: CPT | Performed by: SURGERY

## 2018-08-09 NOTE — PAT
Here for PAT. All labs and testing complete. Chg soap given and instructed on use. To stop clears at 0430 for 0630 arrival. All questions answered, pt voiced understanding

## 2018-08-09 NOTE — DISCHARGE INSTRUCTIONS
PRE-ADMISSION TESTING INSTRUCTIONS FOR ADULTS    Take these medications the morning of surgery with a small sip of water: alprazolam, Flonase, Loratadine, Omeprazole, Paroxetine, Zantac      No aspirin, advil, aleve, ibuprofen, naproxen, diet pills, decongestants, or herbal/vitamins for a week prior to surgery.    General Instructions:    • Do not eat solid food after midnight the night before surgery.  No gum, mints, or hard candy after midnight the night before surgery.  • You may drink clear liquids the day of surgery up until 2 hours before your arrival time.  • Clear liquids are liquids you can see through. Nothing RED in color.    Plain water    Sports drinks  Sodas     Gelatin (Jell-O)  Fruit juices without pulp such as white grape juice and apple juice  Popsicles that contain no fruit or yogurt  Tea or coffee (no cream or milk added)    • It is beneficial for you to have a clear drink that contains carbohydrates just before you leave your house and before your fasting time begins.  We suggest a 20 ounce bottle of Gatorade or Powerade for non-diabetic patients or a 20 ounce bottle of G2 or Powerade Zero for diabetic patients.     • Patients who avoid smoking, chewing tobacco and alcohol for 4 weeks prior to surgery have a reduced risk of post-operative complications.  If at all possible, quit smoking as many days before surgery as you can.    • Do not smoke, use chewing tobacco or drink alcohol the day of surgery    • Bring your C-PAP/ BI-PAP machine if you use one.  • Wear clean comfortable clothes and socks.  • Do not wear contact lenses, lotion, deodorant, or make-up.  Bring a case for your glasses if applicable. You may brush your teeth the morning of surgery.  • You may wear dentures/partials, do not put adhesive/glue on them.  • Bring crutches or walker if applicable.  • Leave all other jewelry and valuables at home.      Preventing a Surgical Site Infection:    • Shower the night before and on the  morning of surgery using the chlorhexidine soap you were given.  Use a clean washcloth with the soap.  Place clean sheets on your bed after showering the night before surgery. Do not use the CHG soap on your hair, face, or private areas. Wash your body gently for five (5) minutes. Do not scrub your skin.  Dry with a clean towel and dress in clean clothing.    • Do not shave the surgical area for 10 days-2 weeks prior to surgery  because the razor can irritate skin and make it easier to develop an infection.  • Make sure you, your family, and all healthcare providers clean their hands with soap and water or an alcohol based hand  before caring for you or your wound.      Day of surgery:    Your surgeon’s office will advise you of your arrival time for the day of surgery.    Upon arrival, a Pre-op nurse and Anesthesia provider will review your health history, obtain vital signs, and answer questions you may have.  The only belongings needed at this time will be your home medications and if applicable your C-PAP/BI-PAP machine.  If you are staying overnight your family can leave the rest of your belongings in the car and bring them to your room later.  A Pre-op nurse will start an IV and you may receive medication in preparation for surgery, including something to help you relax.  Your family will be able to see you in the Pre-op area.  While you are in surgery your family should notify the waiting room  if they leave the waiting room area and provide a contact phone number.    IF you have any questions, you can call the Pre-Admission Department at [phone] or your surgeon's office.  Notify your surgeon if  you become sick, have a fever, productive cough, or cannot be here the day of surgery    Please be aware that surgery does come with discomfort.  We want to make every effort to control your discomfort so please discuss any uncontrolled symptoms with your nurse.   Your doctor will most  likely have prescribed pain medications.      If you are going home after surgery, you will receive individualized written care instructions before being discharged.  A responsible adult (over the age of 18) must drive you to and from the hospital on the day of your surgery and stay with you for 24 hours after anesthesia.    If you are staying overnight following surgery, you will be transported to your hospital room following the recovery period.  [facility] has all private rooms.    Deductibles and co-payments are collected on the day of service. Please be prepared to pay the required co-pay, deductible or deposit on the day of service as defined by your plan.

## 2018-08-16 ENCOUNTER — ANESTHESIA EVENT (OUTPATIENT)
Dept: PERIOP | Facility: HOSPITAL | Age: 63
End: 2018-08-16

## 2018-08-16 DIAGNOSIS — F41.8 DEPRESSION WITH ANXIETY: ICD-10-CM

## 2018-08-16 RX ORDER — ALPRAZOLAM 0.5 MG/1
0.5 TABLET ORAL 2 TIMES DAILY
Qty: 30 TABLET | Refills: 2 | Status: SHIPPED | OUTPATIENT
Start: 2018-08-16 | End: 2018-11-25 | Stop reason: SDUPTHER

## 2018-08-17 ENCOUNTER — HOSPITAL ENCOUNTER (INPATIENT)
Facility: HOSPITAL | Age: 63
LOS: 3 days | Discharge: HOME | End: 2018-08-20
Attending: SURGERY | Admitting: SURGERY

## 2018-08-17 ENCOUNTER — ANESTHESIA (OUTPATIENT)
Dept: PERIOP | Facility: HOSPITAL | Age: 63
End: 2018-08-17

## 2018-08-17 DIAGNOSIS — K80.20 CALCULUS OF GALLBLADDER WITHOUT CHOLECYSTITIS WITHOUT OBSTRUCTION: ICD-10-CM

## 2018-08-17 PROCEDURE — 25010000002 FENTANYL CITRATE (PF) 100 MCG/2ML SOLUTION: Performed by: NURSE ANESTHETIST, CERTIFIED REGISTERED

## 2018-08-17 PROCEDURE — 25010000002 SUCCINYLCHOLINE PER 20 MG: Performed by: NURSE ANESTHETIST, CERTIFIED REGISTERED

## 2018-08-17 PROCEDURE — 0FT40ZZ RESECTION OF GALLBLADDER, OPEN APPROACH: ICD-10-PCS | Performed by: SURGERY

## 2018-08-17 PROCEDURE — 25010000002 NEOSTIGMINE PER 0.5 MG: Performed by: NURSE ANESTHETIST, CERTIFIED REGISTERED

## 2018-08-17 PROCEDURE — 25010000003 CEFAZOLIN PER 500 MG: Performed by: NURSE ANESTHETIST, CERTIFIED REGISTERED

## 2018-08-17 PROCEDURE — 25010000002 HYDROMORPHONE PER 4 MG: Performed by: NURSE ANESTHETIST, CERTIFIED REGISTERED

## 2018-08-17 PROCEDURE — 25010000002 HYDROMORPHONE PER 4 MG

## 2018-08-17 PROCEDURE — 0DNE0ZZ RELEASE LARGE INTESTINE, OPEN APPROACH: ICD-10-PCS | Performed by: SURGERY

## 2018-08-17 PROCEDURE — 25010000002 HYDROMORPHONE PER 4 MG: Performed by: SURGERY

## 2018-08-17 PROCEDURE — 25010000002 DEXAMETHASONE PER 1 MG: Performed by: NURSE ANESTHETIST, CERTIFIED REGISTERED

## 2018-08-17 PROCEDURE — 0DN80ZZ RELEASE SMALL INTESTINE, OPEN APPROACH: ICD-10-PCS | Performed by: SURGERY

## 2018-08-17 PROCEDURE — 0FJ44ZZ INSPECTION OF GALLBLADDER, PERCUTANEOUS ENDOSCOPIC APPROACH: ICD-10-PCS | Performed by: SURGERY

## 2018-08-17 PROCEDURE — 25010000002 ONDANSETRON PER 1 MG: Performed by: NURSE ANESTHETIST, CERTIFIED REGISTERED

## 2018-08-17 PROCEDURE — 47600 CHOLECYSTECTOMY: CPT | Performed by: SURGERY

## 2018-08-17 PROCEDURE — 25010000002 MIDAZOLAM PER 1 MG: Performed by: NURSE ANESTHETIST, CERTIFIED REGISTERED

## 2018-08-17 PROCEDURE — 25010000002 PROPOFOL 10 MG/ML EMULSION: Performed by: NURSE ANESTHETIST, CERTIFIED REGISTERED

## 2018-08-17 PROCEDURE — 25010000002 MORPHINE PER 10 MG: Performed by: SURGERY

## 2018-08-17 PROCEDURE — 99024 POSTOP FOLLOW-UP VISIT: CPT | Performed by: SURGERY

## 2018-08-17 PROCEDURE — 99252 IP/OBS CONSLTJ NEW/EST SF 35: CPT | Performed by: FAMILY MEDICINE

## 2018-08-17 PROCEDURE — 94799 UNLISTED PULMONARY SVC/PX: CPT

## 2018-08-17 RX ORDER — SODIUM CHLORIDE 9 MG/ML
40 INJECTION, SOLUTION INTRAVENOUS AS NEEDED
Status: DISCONTINUED | OUTPATIENT
Start: 2018-08-17 | End: 2018-08-17 | Stop reason: HOSPADM

## 2018-08-17 RX ORDER — HYDROMORPHONE HYDROCHLORIDE 1 MG/ML
0.5 INJECTION, SOLUTION INTRAMUSCULAR; INTRAVENOUS; SUBCUTANEOUS
Status: DISCONTINUED | OUTPATIENT
Start: 2018-08-17 | End: 2018-08-17 | Stop reason: HOSPADM

## 2018-08-17 RX ORDER — ONDANSETRON 2 MG/ML
4 INJECTION INTRAMUSCULAR; INTRAVENOUS ONCE AS NEEDED
Status: COMPLETED | OUTPATIENT
Start: 2018-08-17 | End: 2018-08-17

## 2018-08-17 RX ORDER — MORPHINE SULFATE 10 MG/ML
4 INJECTION INTRAMUSCULAR; INTRAVENOUS; SUBCUTANEOUS
Status: DISCONTINUED | OUTPATIENT
Start: 2018-08-17 | End: 2018-08-17

## 2018-08-17 RX ORDER — MORPHINE SULFATE 2 MG/ML
2 INJECTION, SOLUTION INTRAMUSCULAR; INTRAVENOUS
Status: DISCONTINUED | OUTPATIENT
Start: 2018-08-17 | End: 2018-08-17

## 2018-08-17 RX ORDER — MEPERIDINE HYDROCHLORIDE 25 MG/ML
12.5 INJECTION INTRAMUSCULAR; INTRAVENOUS; SUBCUTANEOUS
Status: DISCONTINUED | OUTPATIENT
Start: 2018-08-17 | End: 2018-08-17 | Stop reason: HOSPADM

## 2018-08-17 RX ORDER — SODIUM CHLORIDE, SODIUM LACTATE, POTASSIUM CHLORIDE, CALCIUM CHLORIDE 600; 310; 30; 20 MG/100ML; MG/100ML; MG/100ML; MG/100ML
9 INJECTION, SOLUTION INTRAVENOUS CONTINUOUS PRN
Status: DISCONTINUED | OUTPATIENT
Start: 2018-08-17 | End: 2018-08-17 | Stop reason: HOSPADM

## 2018-08-17 RX ORDER — HYDROMORPHONE HYDROCHLORIDE 2 MG/1
2 TABLET ORAL EVERY 4 HOURS PRN
Status: DISCONTINUED | OUTPATIENT
Start: 2018-08-17 | End: 2018-08-19

## 2018-08-17 RX ORDER — SODIUM CHLORIDE 0.9 % (FLUSH) 0.9 %
1-10 SYRINGE (ML) INJECTION AS NEEDED
Status: DISCONTINUED | OUTPATIENT
Start: 2018-08-17 | End: 2018-08-17 | Stop reason: HOSPADM

## 2018-08-17 RX ORDER — PANTOPRAZOLE SODIUM 20 MG/1
40 TABLET, DELAYED RELEASE ORAL EVERY MORNING
Status: DISCONTINUED | OUTPATIENT
Start: 2018-08-18 | End: 2018-08-20 | Stop reason: HOSPADM

## 2018-08-17 RX ORDER — FENTANYL CITRATE 50 UG/ML
INJECTION, SOLUTION INTRAMUSCULAR; INTRAVENOUS AS NEEDED
Status: DISCONTINUED | OUTPATIENT
Start: 2018-08-17 | End: 2018-08-17 | Stop reason: SURG

## 2018-08-17 RX ORDER — MIDAZOLAM HYDROCHLORIDE 1 MG/ML
2 INJECTION INTRAMUSCULAR; INTRAVENOUS
Status: DISCONTINUED | OUTPATIENT
Start: 2018-08-17 | End: 2018-08-17 | Stop reason: HOSPADM

## 2018-08-17 RX ORDER — ONDANSETRON 2 MG/ML
4 INJECTION INTRAMUSCULAR; INTRAVENOUS EVERY 4 HOURS PRN
Status: DISCONTINUED | OUTPATIENT
Start: 2018-08-17 | End: 2018-08-20 | Stop reason: HOSPADM

## 2018-08-17 RX ORDER — ONDANSETRON 2 MG/ML
4 INJECTION INTRAMUSCULAR; INTRAVENOUS ONCE AS NEEDED
Status: DISCONTINUED | OUTPATIENT
Start: 2018-08-17 | End: 2018-08-17 | Stop reason: HOSPADM

## 2018-08-17 RX ORDER — SUMATRIPTAN 25 MG/1
100 TABLET, FILM COATED ORAL ONCE AS NEEDED
Status: DISCONTINUED | OUTPATIENT
Start: 2018-08-17 | End: 2018-08-20 | Stop reason: HOSPADM

## 2018-08-17 RX ORDER — BUPIVACAINE HYDROCHLORIDE AND EPINEPHRINE 5; 5 MG/ML; UG/ML
INJECTION, SOLUTION EPIDURAL; INTRACAUDAL; PERINEURAL AS NEEDED
Status: DISCONTINUED | OUTPATIENT
Start: 2018-08-17 | End: 2018-08-17 | Stop reason: HOSPADM

## 2018-08-17 RX ORDER — ROCURONIUM BROMIDE 10 MG/ML
INJECTION, SOLUTION INTRAVENOUS AS NEEDED
Status: DISCONTINUED | OUTPATIENT
Start: 2018-08-17 | End: 2018-08-17 | Stop reason: SURG

## 2018-08-17 RX ORDER — SUCCINYLCHOLINE CHLORIDE 20 MG/ML
INJECTION INTRAMUSCULAR; INTRAVENOUS AS NEEDED
Status: DISCONTINUED | OUTPATIENT
Start: 2018-08-17 | End: 2018-08-17 | Stop reason: SURG

## 2018-08-17 RX ORDER — GLYCOPYRROLATE 0.2 MG/ML
0.1 INJECTION INTRAMUSCULAR; INTRAVENOUS
Status: DISCONTINUED | OUTPATIENT
Start: 2018-08-17 | End: 2018-08-17 | Stop reason: HOSPADM

## 2018-08-17 RX ORDER — ALPRAZOLAM 0.25 MG/1
0.5 TABLET ORAL 2 TIMES DAILY
Status: DISCONTINUED | OUTPATIENT
Start: 2018-08-17 | End: 2018-08-20 | Stop reason: HOSPADM

## 2018-08-17 RX ORDER — LIDOCAINE HYDROCHLORIDE 20 MG/ML
INJECTION, SOLUTION INFILTRATION; PERINEURAL AS NEEDED
Status: DISCONTINUED | OUTPATIENT
Start: 2018-08-17 | End: 2018-08-17 | Stop reason: SURG

## 2018-08-17 RX ORDER — FENTANYL CITRATE 50 UG/ML
25 INJECTION, SOLUTION INTRAMUSCULAR; INTRAVENOUS
Status: DISCONTINUED | OUTPATIENT
Start: 2018-08-17 | End: 2018-08-17 | Stop reason: HOSPADM

## 2018-08-17 RX ORDER — PROPOFOL 10 MG/ML
VIAL (ML) INTRAVENOUS AS NEEDED
Status: DISCONTINUED | OUTPATIENT
Start: 2018-08-17 | End: 2018-08-17 | Stop reason: SURG

## 2018-08-17 RX ORDER — DEXAMETHASONE SODIUM PHOSPHATE 4 MG/ML
8 INJECTION, SOLUTION INTRA-ARTICULAR; INTRALESIONAL; INTRAMUSCULAR; INTRAVENOUS; SOFT TISSUE ONCE AS NEEDED
Status: COMPLETED | OUTPATIENT
Start: 2018-08-17 | End: 2018-08-17

## 2018-08-17 RX ORDER — MIDAZOLAM HYDROCHLORIDE 1 MG/ML
1 INJECTION INTRAMUSCULAR; INTRAVENOUS
Status: DISCONTINUED | OUTPATIENT
Start: 2018-08-17 | End: 2018-08-17 | Stop reason: HOSPADM

## 2018-08-17 RX ORDER — LIDOCAINE HYDROCHLORIDE 10 MG/ML
0.5 INJECTION, SOLUTION EPIDURAL; INFILTRATION; INTRACAUDAL; PERINEURAL ONCE AS NEEDED
Status: COMPLETED | OUTPATIENT
Start: 2018-08-17 | End: 2018-08-17

## 2018-08-17 RX ORDER — LISINOPRIL 10 MG/1
10 TABLET ORAL
Status: DISCONTINUED | OUTPATIENT
Start: 2018-08-17 | End: 2018-08-20 | Stop reason: HOSPADM

## 2018-08-17 RX ORDER — SCOLOPAMINE TRANSDERMAL SYSTEM 1 MG/1
1 PATCH, EXTENDED RELEASE TRANSDERMAL ONCE
Status: DISCONTINUED | OUTPATIENT
Start: 2018-08-17 | End: 2018-08-18

## 2018-08-17 RX ORDER — SODIUM CHLORIDE, SODIUM LACTATE, POTASSIUM CHLORIDE, CALCIUM CHLORIDE 600; 310; 30; 20 MG/100ML; MG/100ML; MG/100ML; MG/100ML
100 INJECTION, SOLUTION INTRAVENOUS CONTINUOUS
Status: DISCONTINUED | OUTPATIENT
Start: 2018-08-17 | End: 2018-08-17 | Stop reason: SDUPTHER

## 2018-08-17 RX ORDER — SODIUM CHLORIDE, SODIUM LACTATE, POTASSIUM CHLORIDE, CALCIUM CHLORIDE 600; 310; 30; 20 MG/100ML; MG/100ML; MG/100ML; MG/100ML
75 INJECTION, SOLUTION INTRAVENOUS CONTINUOUS
Status: DISCONTINUED | OUTPATIENT
Start: 2018-08-17 | End: 2018-08-20 | Stop reason: HOSPADM

## 2018-08-17 RX ORDER — MAGNESIUM HYDROXIDE 1200 MG/15ML
LIQUID ORAL AS NEEDED
Status: DISCONTINUED | OUTPATIENT
Start: 2018-08-17 | End: 2018-08-17 | Stop reason: HOSPADM

## 2018-08-17 RX ORDER — FLUTICASONE PROPIONATE 50 MCG
2 SPRAY, SUSPENSION (ML) NASAL DAILY
Status: DISCONTINUED | OUTPATIENT
Start: 2018-08-17 | End: 2018-08-20 | Stop reason: HOSPADM

## 2018-08-17 RX ADMIN — MIDAZOLAM HYDROCHLORIDE 1 MG: 1 INJECTION, SOLUTION INTRAMUSCULAR; INTRAVENOUS at 07:49

## 2018-08-17 RX ADMIN — HYDROMORPHONE HYDROCHLORIDE 1 MG: 1 INJECTION, SOLUTION INTRAMUSCULAR; INTRAVENOUS; SUBCUTANEOUS at 10:13

## 2018-08-17 RX ADMIN — FENTANYL CITRATE 50 MCG: 50 INJECTION, SOLUTION INTRAMUSCULAR; INTRAVENOUS at 07:56

## 2018-08-17 RX ADMIN — CEFAZOLIN SODIUM 2 G: 2 SOLUTION INTRAVENOUS at 07:55

## 2018-08-17 RX ADMIN — HYDROMORPHONE HYDROCHLORIDE 1 MG: 1 INJECTION, SOLUTION INTRAMUSCULAR; INTRAVENOUS; SUBCUTANEOUS at 21:01

## 2018-08-17 RX ADMIN — MIDAZOLAM HYDROCHLORIDE 1 MG: 1 INJECTION, SOLUTION INTRAMUSCULAR; INTRAVENOUS at 07:42

## 2018-08-17 RX ADMIN — FENTANYL CITRATE 50 MCG: 50 INJECTION, SOLUTION INTRAMUSCULAR; INTRAVENOUS at 08:19

## 2018-08-17 RX ADMIN — SUCCINYLCHOLINE CHLORIDE 100 MG: 20 INJECTION, SOLUTION INTRAMUSCULAR; INTRAVENOUS at 07:59

## 2018-08-17 RX ADMIN — SODIUM CHLORIDE, POTASSIUM CHLORIDE, SODIUM LACTATE AND CALCIUM CHLORIDE 9 ML/HR: 600; 310; 30; 20 INJECTION, SOLUTION INTRAVENOUS at 07:10

## 2018-08-17 RX ADMIN — NEOSTIGMINE METHYLSULFATE 2 MG: 1 INJECTION, SOLUTION INTRAMUSCULAR; INTRAVENOUS; SUBCUTANEOUS at 09:07

## 2018-08-17 RX ADMIN — HYDROMORPHONE HYDROCHLORIDE 1 MG: 1 INJECTION, SOLUTION INTRAMUSCULAR; INTRAVENOUS; SUBCUTANEOUS at 10:03

## 2018-08-17 RX ADMIN — LIDOCAINE HYDROCHLORIDE 80 MG: 20 INJECTION, SOLUTION INFILTRATION; PERINEURAL at 07:58

## 2018-08-17 RX ADMIN — ROCURONIUM BROMIDE 20 MG: 10 INJECTION INTRAVENOUS at 08:08

## 2018-08-17 RX ADMIN — PROPOFOL 150 MG: 10 INJECTION, EMULSION INTRAVENOUS at 07:58

## 2018-08-17 RX ADMIN — SCOPALAMINE 1 PATCH: 1 PATCH, EXTENDED RELEASE TRANSDERMAL at 07:34

## 2018-08-17 RX ADMIN — FENTANYL CITRATE 50 MCG: 50 INJECTION, SOLUTION INTRAMUSCULAR; INTRAVENOUS at 08:40

## 2018-08-17 RX ADMIN — GLYCOPYRROLATE 0.4 MG: 0.2 INJECTION INTRAMUSCULAR; INTRAVENOUS at 09:07

## 2018-08-17 RX ADMIN — MORPHINE SULFATE 2 MG: 2 INJECTION, SOLUTION INTRAMUSCULAR; INTRAVENOUS at 14:00

## 2018-08-17 RX ADMIN — ROCURONIUM BROMIDE 5 MG: 10 INJECTION INTRAVENOUS at 07:56

## 2018-08-17 RX ADMIN — ROCURONIUM BROMIDE 15 MG: 10 INJECTION INTRAVENOUS at 08:25

## 2018-08-17 RX ADMIN — DEXAMETHASONE SODIUM PHOSPHATE 8 MG: 4 INJECTION, SOLUTION INTRAMUSCULAR; INTRAVENOUS at 07:36

## 2018-08-17 RX ADMIN — LIDOCAINE HYDROCHLORIDE 0.1 ML: 10 INJECTION, SOLUTION EPIDURAL; INFILTRATION; INTRACAUDAL; PERINEURAL at 07:10

## 2018-08-17 RX ADMIN — FENTANYL CITRATE 50 MCG: 50 INJECTION, SOLUTION INTRAMUSCULAR; INTRAVENOUS at 08:25

## 2018-08-17 RX ADMIN — HYDROMORPHONE HYDROCHLORIDE 0.5 MG: 1 INJECTION, SOLUTION INTRAMUSCULAR; INTRAVENOUS; SUBCUTANEOUS at 18:50

## 2018-08-17 RX ADMIN — GLYCOPYRROLATE 0.1 MG: 0.2 INJECTION INTRAMUSCULAR; INTRAVENOUS at 07:36

## 2018-08-17 RX ADMIN — HYDROMORPHONE HYDROCHLORIDE 0.5 MG: 1 INJECTION, SOLUTION INTRAMUSCULAR; INTRAVENOUS; SUBCUTANEOUS at 16:50

## 2018-08-17 RX ADMIN — ONDANSETRON 4 MG: 2 INJECTION INTRAMUSCULAR; INTRAVENOUS at 07:36

## 2018-08-17 NOTE — OP NOTE
pReoperative diagnosis cholelithiasis     Postoperative diagnosis cholelithiasis extensive adhesions  Procedure laparoscopic converted to an open cholecystectomy   Complications none  Drains none  Specimen gallbladder to pathology  Estimated blood loss 10 cc  Anesthesia Gen. via endotracheal tube  Surgeon Dr. Hinds  Assistant Odette Newman  Findings extensive adhesions, large gallstones  Procedure after satisfactory induction general anesthesia via endotracheal tube the patient's abdomen was prepped and draped sterile fashion.  Because of her long midline scar was elected to get in her abdomen via Visiport in the right upper quadrant.  Site was locally infiltrated with half percent Marcaine with epinephrine and Visiport was placed under direct vision.  Abdomen was insufflated to 14 mmHg with use CO2.  There was extensive adhesions in the midline and the right lower quadrant.  5 mm ports ×2 were additionally placed in the right upper quadrant and the epigastrium under direct vision.  Adhesions were taken down sharply and bluntly where appropriate hemoclips were applied where appropriate.  Colon and small bowel was plastered up to the anterior abdominal wall.  There was no evidence of an enterotomy.  After working for 25 minutes taking the adhesions down we still did not have access for appropriate port placement for a laparoscopic cholecystectomy.  It was elected to convert to an open procedure.  The ports were sequentially pulled back and a right subcostal skin incision was made carried down through the skin and subcutaneous tissue.  This incorporated 2 port site incisions.  Subcutaneous tissue was divided hemostasis was assured anterior sheath was divided rectus muscle was divided with cautery and peritoneum was elevated and divided medially and laterally.  There was no evidence of small bowel or large bowel injury from the prior adhesion takedown and hemostasis appeared to be good.  The bowel was packed off gallbladder  was grasped gallbladder sequentially taken out of the liver bed with use of  cautery and between hemoclips where appropriate.  Cystic artery was dissected out and clamped and divided and tied with use of 2-0 silk free tie.  Cystic duct was dissected out was milked back into the gallbladder was clamped divided and tied with use 2-0 silk free tie.  Specimen was passed off.  Hemostasis was assured in liver bed all counts WERE correct right upper quadrant was liberally irrigated 10 Pashto flat Randal-Gates drain was brought out a separate inferolateral stab wounds placed adjacent to the cystic duct stump.  All counts were correct posterior sheath was closed running simple fashion with use of #1 Prolene starting in each corner and tying in the center wound.  Hemostasis was assured in the muscle , anterior sheath was closed in likewise fashion.  Skin was closed with sterile skin clips.  Randal-Gates drain was hooked to self bulb suction was sutured to skin with use of 2-0 silk.  The patient tolerated the procedure well was transferred to the recovery room in stable condition she'll be admitted for further care.

## 2018-08-17 NOTE — PROGRESS NOTES
She complains of pain postoperatively.  She says her IV morphine has not been effective for her in the past.  Her vital signs are stable.  I will adjust her pain regimen.  Her surgery was discussed.

## 2018-08-17 NOTE — ANESTHESIA PROCEDURE NOTES
Airway  Urgency: elective    Date/Time: 8/17/2018 8:00 AM  Airway not difficult    General Information and Staff    Patient location during procedure: OR    Indications and Patient Condition  Indications for airway management: airway protection    Preoxygenated: yes  MILS maintained throughout  Mask difficulty assessment: 1 - vent by mask    Final Airway Details  Final airway type: endotracheal airway      Successful airway: ETT  Cuffed: yes   Successful intubation technique: direct laryngoscopy  Facilitating devices/methods: intubating stylet  Endotracheal tube insertion site: oral  Blade: Taveras  Blade size: #3  ETT size: 7.0 mm  Cormack-Lehane Classification: grade IIa - partial view of glottis  Placement verified by: chest auscultation and capnometry   Measured from: lips  ETT to lips (cm): 21  Number of attempts at approach: 1

## 2018-08-17 NOTE — CONSULTS
Patient Name: Martina Hardwick  :1955  62 y.o.    Date of Hospital Visit: 2018  6:38 AM  6:35 PM    Encounter Provider: Tuan Odom MD    Place of Service: [facility].  [facility] MEDICAL [facility] Hospitalist group.    Patient Care Team:  Ildefonso Dubois MD as PCP - General (Family Medicine)  Hernan Hinds MD as Surgeon (General Surgery)      Chief complaint:  Post op HTN , medicine management   Requested by DR PAULINA Hinds.    History of Present Illness:  63 yo WF s/p open GB removal and ROXI today  Had months of RUQ abdominal post prandial pain.  Has well controlled HTN, anxiety, headaches at home  No recent fever , CP, SOA  No h/o DVT      Past Medical History:   Diagnosis Date   • Abdominal pain    • Anxiety    • Anxiety    • Arthritis    • Constipation    • Depressed    • Depression    • Endometriosis    • Gastritis    • GERD (gastroesophageal reflux disease)    • Headache    • Headache, tension-type    • Hypertension    • Insomnia    • Migraine    • OP (osteoporosis)    • Poor sleep    • Psoriasis    • Seasonal allergies        Past Surgical History:   Procedure Laterality Date   • APPENDECTOMY     • COLON SURGERY     • COLONOSCOPY     • DIAGNOSTIC LAPAROSCOPY     • DILATATION AND CURETTAGE     • ENDOSCOPY N/A 2016    Procedure: ESOPHAGOGASTRODUODENOSCOPY ;  Surgeon: Darien Ruff MD;  Location: [facility];  Service:    • HYSTERECTOMY     • REVISION / TAKEDOWN COLOSTOMY           Prior to Admission medications    Medication Sig Start Date End Date Taking? Authorizing Provider   ALPRAZolam (XANAX) 0.5 MG tablet Take 1 tablet by mouth 2 (Two) Times a Day. 18   Ildefonso Dubois MD   Calcium-Vitamin D (CALTRATE 600 PLUS-VIT D PO) Take  by mouth.    Provider, MD Julius   fluticasone (FLONASE) 50 MCG/ACT nasal spray 2 sprays into each nostril daily.    ProviderJulius MD   lisinopril (PRINIVIL,ZESTRIL) 10 MG tablet TAKE  ON PO  DAILY 2/9/18   Ildefonso Dubois MD   lisinopril (PRINIVIL,ZESTRIL) 5 MG tablet 10 mg. 7/25/18   Julius Cortes MD   loratadine (CLARITIN) 10 MG tablet Take 10 mg by mouth daily.    Julius Cortes MD   Multiple Vitamins-Minerals (EYE VITAMINS) capsule Take  by mouth.    Julius Cortes MD   Multiple Vitamins-Minerals (MULTI ADULT GUMMIES PO) Take  by mouth.    Julius Cortes MD   omeprazole (PriLOSEC) 40 MG capsule Take 20 mg by mouth Daily. Pt takes 1/3 per day    Julius Cortes MD   PARoxetine (PAXIL) 30 MG tablet Take 1 tablet by mouth Every Morning. 1/15/18   Ildefonso Dubois MD   polyethylene glycol (MIRALAX) packet Take 17 g by mouth daily.    Julius Cortes MD   Probiotic Product (PROBIOTIC DAILY) capsule Once daily 9/28/16   Ildefonso Dubois MD   ranitidine (ZANTAC) 300 MG tablet Take 150 mg by mouth Daily. 9/14/16   Julius Cortes MD   SUMAtriptan (IMITREX) 100 MG tablet Take 1 tablet by mouth 1 (One) Time As Needed for Migraine for up to 1 dose. 1/15/18   Ildefonso Dubois MD   zolpidem (AMBIEN) 10 MG tablet TAKE ONE TABLET BY MOUTH ONCE NIGHTLY 7/16/18   Ildefonso Dubois MD   gabapentin (NEURONTIN) 100 MG capsule  4/27/18 8/17/18  Julius Cortes MD       Allergies   Allergen Reactions   • Hydrocodone Nausea And Vomiting   • Oxycodone Nausea And Vomiting   • Sulfa Antibiotics Nausea And Vomiting       Social History     Social History   • Marital status:      Social History Main Topics   • Smoking status: Never Smoker   • Smokeless tobacco: Never Used   • Alcohol use Yes      Comment: occ   • Drug use: No   • Sexual activity: Defer     Other Topics Concern   • Not on file       Family History   Problem Relation Age of Onset   • Hyperlipidemia Mother    • Macular degeneration Mother    • Heart disease Father    • Hyperlipidemia Father    • Cancer Father    • Depression Father    • Depression Sister    •  Hyperlipidemia Brother    • Depression Brother    • Breast cancer Maternal Aunt    • Breast cancer Cousin        REVIEW OF SYSTEMS:   All other systems reviewed and negative.        Objective:     Vitals:    08/17/18 1316 08/17/18 1345 08/17/18 1415 08/17/18 1515   BP:  109/55 109/59 130/79   BP Location:  Left arm Left arm Right arm   Patient Position:  Lying Lying Lying   Pulse: 80 79 76 73   Resp: 10 16 16 20   Temp:    97.3 °F (36.3 °C)   TempSrc:    Oral   SpO2: 100% 100% 99% 100%   Weight:         Body mass index is 23.28 kg/m².    Constitutional: Patient is oriented to person, place, and time. Patient appears well-developed. Does not appear in pain holding abdomen.   HENT:   Head: Normocephalic and atraumatic. Head is without contusion.   Right and Left Ear: Hearing normal to conversational tones. No visible drainage.    Nose: No epistaxis. External nose normal.   Eyes: Lids are normal. Pupils are equal, round, and reactive to light. Right and left eye exhibit no exudate. Conjunctiva has no hemorrhage.   Neck: No JVD present. No carotid bruit. No tracheal deviation present. No thyroid mass and no thyromegaly.  Cardiovascular: Normal rate, regular rhythm and normal heart sounds.    Pulses:present in radial and DP  Pulmonary/Chest: Effort normal and breath sounds normal.   Abdominal: Soft. . There is  tenderness. lg surgical bandage  Musculoskeletal: Normal range of motion.   Neurological: Patient is alert and oriented to person, place, and time.  Normal strength.   Skin: Skin is warm, dry and intact. No rash noted.   Psychiatric:Patient has a normal mood and affect. Patient behavior is normal. Thought content normal.       Lab Review:   Lab Results   Component Value Date    WBC 6.06 08/09/2018    HGB 12.8 08/09/2018    HCT 38.6 08/09/2018    MCV 94.1 08/09/2018     08/09/2018        No results found for: CKTOTAL, CKMB, CKMBINDEX, TROPONINI, TROPONINT     Lab Results   Component Value Date    GLUCOSE 87  08/09/2018    BUN 12 08/09/2018    CREATININE 0.88 08/09/2018    EGFRIFNONA 65 08/09/2018    EGFRIFAFRI 80 10/05/2017    BCR 13.6 08/09/2018    CO2 29.4 (H) 08/09/2018    CALCIUM 9.5 08/09/2018    PROTENTOTREF 6.7 10/05/2017    ALBUMIN 4.00 08/09/2018    LABIL2 1.5 10/05/2017    AST 18 08/09/2018    ALT 11 08/09/2018       I  reviewed all pertinent data in our EMR.     Imaging Results (last 24 hours)     ** No results found for the last 24 hours. **            Assessment and Plan:       1.s/p open cholecystomy for gallstone.  Extensive ROXI.  Plan, post op care, IVF, iv pain meds    2.  Well controlled HTN on lisinopril    3. Anxiety , resume home Zoloft.      Tuan Odom MD  08/17/18  6:35 PM

## 2018-08-17 NOTE — INTERVAL H&P NOTE
H&P updated. The patient was examined and the following changes are noted:  vs  /98 (BP Location: Left arm, Patient Position: Lying)   Pulse 66   Temp 98.1 °F (36.7 °C) (Oral)   Resp 12   Wt 55.9 kg (123 lb 3.2 oz)   SpO2 100%   BMI 23.28 kg/m²

## 2018-08-17 NOTE — ANESTHESIA PREPROCEDURE EVALUATION
Anesthesia Evaluation     Patient summary reviewed and Nursing notes reviewed   no history of anesthetic complications:  NPO Solid Status: > 8 hours  NPO Liquid Status: < 2 hours           Airway   Mallampati: II  TM distance: <3 FB  Neck ROM: full  Possible difficult intubation and Small opening  Dental - normal exam     Pulmonary - negative pulmonary ROS and normal exam    breath sounds clear to auscultation  Cardiovascular - normal exam  Exercise tolerance: good (4-7 METS)    ECG reviewed  Rhythm: regular  Rate: normal    (+) hypertension less than 2 medications,       Neuro/Psych  (+) headaches (migraines), psychiatric history Anxiety,     GI/Hepatic/Renal/Endo    (+)  GERD poorly controlled,      Musculoskeletal     (+) neck pain,   Abdominal  - normal exam   Substance History - negative use     OB/GYN          Other   (+) arthritis                     Anesthesia Plan    ASA 2     general     intravenous induction   Anesthetic plan and risks discussed with patient.  Use of blood products discussed with patient  Consented to blood products.

## 2018-08-17 NOTE — H&P (VIEW-ONLY)
PATIENT INFORMATION  Martina Hardwick  GB CONS, US DONE, EPIGASTRIC PAIN, NAUSEA     - 1955    CHIEF COMPLAINT  Chief Complaint   Patient presents with   • Cholelithiasis       HISTORY OF PRESENT ILLNESS  HPI she complains of epigastric right upper quadrant abdominal pain for several years.  She says sometimes this is made worse with eating and sometimes has the pain in between meals.  She says nothing really makes it better.  Some nausea.  She was originally diagnosed with gastritis via an EGD per Dr. Ruff but she says those medications are no longer controlling her symptoms eyes any jaundice symptoms.        REVIEW OF SYSTEMS  Review of Systems migraines, psoriasis otherwise negative      ACTIVE PROBLEMS  Patient Active Problem List    Diagnosis   • Routine health maintenance [Z00.00]   • Essential hypertension [I10]   • Cervicogenic headache [R51]   • GERD (gastroesophageal reflux disease) [K21.9]     Overview Note:     Dr. Ruff.     • Allergic rhinitis [J30.9]   • Primary insomnia [F51.01]   • Chronic constipation [K59.09]   • Intractable migraine without aura and without status migrainosus [G43.019]   • Depression with anxiety [F41.8]         PAST MEDICAL HISTORY  Past Medical History:   Diagnosis Date   • Abdominal pain    • Anxiety    • Anxiety    • Arthritis    • Constipation    • Depressed    • Depression    • Endometriosis    • Gastritis    • GERD (gastroesophageal reflux disease)    • Headache    • Headache, tension-type    • Insomnia    • Migraine    • OP (osteoporosis)    • Poor sleep    • Seasonal allergies          SURGICAL HISTORY  Past Surgical History:   Procedure Laterality Date   • APPENDECTOMY     • COLON SURGERY     • COLONOSCOPY     • DIAGNOSTIC LAPAROSCOPY     • DILATATION AND CURETTAGE     • ENDOSCOPY N/A 2016    Procedure: ESOPHAGOGASTRODUODENOSCOPY ;  Surgeon: Darien Ruff MD;  Location: [facility];  Service:    • HYSTERECTOMY     • SMALL  INTESTINE SURGERY           FAMILY HISTORY  Family History   Problem Relation Age of Onset   • Hyperlipidemia Mother    • Macular degeneration Mother    • Heart disease Father    • Hyperlipidemia Father    • Cancer Father    • Depression Father    • Depression Sister    • Hyperlipidemia Brother    • Depression Brother    • Breast cancer Maternal Aunt    • Breast cancer Cousin          SOCIAL HISTORY  Social History     Occupational History   • Not on file.     Social History Main Topics   • Smoking status: Never Smoker   • Smokeless tobacco: Not on file   • Alcohol use Yes      Comment: occ   • Drug use: No   • Sexual activity: Defer         CURRENT MEDICATIONS    Current Outpatient Prescriptions:   •  lisinopril (PRINIVIL,ZESTRIL) 5 MG tablet, , Disp: , Rfl:   •  ALPRAZolam (XANAX) 0.5 MG tablet, Take 1 tablet by mouth 2 (Two) Times a Day., Disp: 30 tablet, Rfl: 2  •  Calcium-Vitamin D (CALTRATE 600 PLUS-VIT D PO), Take  by mouth., Disp: , Rfl:   •  fluticasone (FLONASE) 50 MCG/ACT nasal spray, 2 sprays into each nostril daily., Disp: , Rfl:   •  gabapentin (NEURONTIN) 100 MG capsule, , Disp: , Rfl:   •  lisinopril (PRINIVIL,ZESTRIL) 10 MG tablet, TAKE ON PO  DAILY, Disp: 90 tablet, Rfl: 3  •  loratadine (CLARITIN) 10 MG tablet, Take 10 mg by mouth daily., Disp: , Rfl:   •  Multiple Vitamins-Minerals (EYE VITAMINS) capsule, Take  by mouth., Disp: , Rfl:   •  Multiple Vitamins-Minerals (MULTI ADULT GUMMIES PO), Take  by mouth., Disp: , Rfl:   •  omeprazole (PriLOSEC) 40 MG capsule, Take 20 mg by mouth Daily. Pt takes 1/3 per day, Disp: , Rfl:   •  PARoxetine (PAXIL) 30 MG tablet, Take 1 tablet by mouth Every Morning., Disp: 90 tablet, Rfl: 3  •  polyethylene glycol (MIRALAX) packet, Take 17 g by mouth daily., Disp: , Rfl:   •  Probiotic Product (PROBIOTIC DAILY) capsule, Once daily, Disp: , Rfl:   •  ranitidine (ZANTAC) 300 MG tablet, Take 150 mg by mouth Daily., Disp: , Rfl:   •  SUMAtriptan (IMITREX) 100 MG  tablet, Take 1 tablet by mouth 1 (One) Time As Needed for Migraine for up to 1 dose., Disp: 45 tablet, Rfl: 3  •  zolpidem (AMBIEN) 10 MG tablet, TAKE ONE TABLET BY MOUTH ONCE NIGHTLY, Disp: 90 tablet, Rfl: 0    Current Facility-Administered Medications:   •  OnabotulinumtoxinA 200 Units, 200 Units, Intramuscular, Once, Fransisca Camargo MD    ALLERGIES  Sulfa antibiotics    VITALS  There were no vitals filed for this visit.    LAST RESULTS   Results Encounter on 08/15/2017   Component Date Value Ref Range Status   • WBC 10/05/2017 4.48* 4.80 - 10.80 10*3/mm3 Final   • RBC 10/05/2017 4.19* 4.20 - 5.40 10*6/mm3 Final   • Hemoglobin 10/05/2017 13.1  12.0 - 16.0 g/dL Final   • Hematocrit 10/05/2017 39.1  37.0 - 47.0 % Final   • MCV 10/05/2017 93.3  81.0 - 99.0 fL Final   • MCH 10/05/2017 31.3* 27.0 - 31.0 pg Final   • MCHC 10/05/2017 33.5  31.0 - 37.0 g/dL Final   • RDW 10/05/2017 12.2  11.5 - 14.5 % Final   • Platelets 10/05/2017 232  140 - 500 10*3/mm3 Final   • Neutrophil Rel % 10/05/2017 70.3* 45.0 - 70.0 % Final   • Lymphocyte Rel % 10/05/2017 20.3  20.0 - 45.0 % Final   • Monocyte Rel % 10/05/2017 7.6  3.0 - 8.0 % Final   • Eosinophil Rel % 10/05/2017 0.7  0.0 - 4.0 % Final   • Basophil Rel % 10/05/2017 0.9  0.0 - 2.0 % Final   • Neutrophils Absolute 10/05/2017 3.15  1.50 - 8.30 10*3/mm3 Final   • Lymphocytes Absolute 10/05/2017 0.91  0.60 - 4.80 10*3/mm3 Final   • Monocytes Absolute 10/05/2017 0.34  0.00 - 1.00 10*3/mm3 Final   • Eosinophils Absolute 10/05/2017 0.03* 0.10 - 0.30 10*3/mm3 Final   • Basophils Absolute 10/05/2017 0.04  0.00 - 0.20 10*3/mm3 Final   • Immature Granulocyte Rel % 10/05/2017 0.2  0.0 - 0.5 % Final   • Immature Grans Absolute 10/05/2017 0.01  0.00 - 0.03 10*3/mm3 Final   • nRBC 10/05/2017 0.0  0.0 - 0.0 /100 WBC Final   • Glucose 10/05/2017 82  65 - 99 mg/dL Final   • BUN 10/05/2017 12  8 - 23 mg/dL Final   • Creatinine 10/05/2017 0.87  0.57 - 1.00 mg/dL Final   • eGFR Non African Am  10/05/2017 66  >60 mL/min/1.73 Final   • eGFR African Am 10/05/2017 80  >60 mL/min/1.73 Final   • BUN/Creatinine Ratio 10/05/2017 13.8  7.0 - 25.0 Final   • Sodium 10/05/2017 135* 136 - 145 mmol/L Final   • Potassium 10/05/2017 4.5  3.5 - 5.2 mmol/L Final   • Chloride 10/05/2017 96* 98 - 107 mmol/L Final   • Total CO2 10/05/2017 26.4  22.0 - 29.0 mmol/L Final   • Calcium 10/05/2017 9.5  8.8 - 10.5 mg/dL Final   • Total Protein 10/05/2017 6.7  6.0 - 8.5 g/dL Final   • Albumin 10/05/2017 4.00  3.50 - 5.20 g/dL Final   • Globulin 10/05/2017 2.7  gm/dL Final   • A/G Ratio 10/05/2017 1.5  g/dL Final   • Total Bilirubin 10/05/2017 0.5  0.2 - 1.2 mg/dL Final   • Alkaline Phosphatase 10/05/2017 105  40 - 129 U/L Final   • AST (SGOT) 10/05/2017 23  5 - 32 U/L Final   • ALT (SGPT) 10/05/2017 15  5 - 33 U/L Final   • Total Cholesterol 10/05/2017 189  0 - 200 mg/dL Final   • Triglycerides 10/05/2017 65  0 - 150 mg/dL Final   • HDL Cholesterol 10/05/2017 70* 40 - 60 mg/dL Final   • VLDL Cholesterol 10/05/2017 13  7 - 27 mg/dL Final   • LDL Cholesterol  10/05/2017 106* 0 - 100 mg/dL Final   • Chol/HDL Ratio 10/05/2017 2.70   Final   • TSH 10/05/2017 3.020  0.270 - 4.200 mIU/mL Final   • 25 Hydroxy, Vitamin D 10/05/2017 49.8  ng/mL Final    Comment: Reference Range for Total Vitamin D 25(OH)  Deficiency    <20.0 ng/mL  Insufficiency 21-29 ng/mL  Sufficiency   30-74 ng/mL       Us Gallbladder    Result Date: 7/26/2018  Narrative: ULTRASOUND ABDOMEN, LIMITED, 7/26/2018  HISTORY: 62-year-old female referred for history of epigastric pain. The patient describes two month history of intermittent pain.  TECHNIQUE: Grayscale ultrasound imaging of the right upper abdomen.  FINDINGS: There is a single large gallstone within the gallbladder measuring about 2.4 cm in diameter. The gallbladder is nondistended. There is no gallbladder wall thickening or gallbladder tenderness. No intrahepatic or extrahepatic bile duct dilatation (CBD 2 mm).   Liver and pancreas are normal in size and appearance. No free fluid in the right upper abdomen. Right kidney is negative with no hydronephrosis.      Impression: 1. Cholelithiasis. Single large gallstone within otherwise normal-appearing gallbladder. 2. No bile duct dilatation.  This report was finalized on 7/26/2018 7:53 AM by Dr. Steve Rodriguez MD.        PHYSICAL EXAM  Physical Exam alert white female in no active distress she has no clinical jaundice.  Her heart shows regular rate and rhythm her lungs are clear and equal.  She has a lower midline abdominal scar with subjective epigastric and right upper quadrant tenderness without mass.  There is no flank tenderness.  Ultrasound of her gallbladder was reviewed by me and shows a 2.4 cm gallstone.    ASSESSMENT  cholelithiasis      PLAN  The risks benefits and options were discussed with her in detail.  We'll proceed with a laparoscopic cholecystectomy at [facility] on August 17.

## 2018-08-18 LAB
ANION GAP SERPL CALCULATED.3IONS-SCNC: 11 MMOL/L
BASOPHILS # BLD AUTO: 0.02 10*3/MM3 (ref 0–0.2)
BASOPHILS NFR BLD AUTO: 0.2 % (ref 0–2)
BUN BLD-MCNC: 8 MG/DL (ref 8–23)
BUN/CREAT SERPL: 11.8 (ref 7–25)
CALCIUM SPEC-SCNC: 9 MG/DL (ref 8.8–10.5)
CHLORIDE SERPL-SCNC: 97 MMOL/L (ref 98–107)
CO2 SERPL-SCNC: 27 MMOL/L (ref 22–29)
CREAT BLD-MCNC: 0.68 MG/DL (ref 0.57–1)
DEPRECATED RDW RBC AUTO: 41.7 FL (ref 37–54)
EOSINOPHIL # BLD AUTO: 0 10*3/MM3 (ref 0.1–0.3)
EOSINOPHIL NFR BLD AUTO: 0 % (ref 0–4)
ERYTHROCYTE [DISTWIDTH] IN BLOOD BY AUTOMATED COUNT: 12.1 % (ref 11.5–14.5)
GFR SERPL CREATININE-BSD FRML MDRD: 88 ML/MIN/1.73
GLUCOSE BLD-MCNC: 124 MG/DL (ref 65–99)
HCT VFR BLD AUTO: 35.5 % (ref 37–47)
HGB BLD-MCNC: 11.5 G/DL (ref 12–16)
IMM GRANULOCYTES # BLD: 0.03 10*3/MM3 (ref 0–0.03)
IMM GRANULOCYTES NFR BLD: 0.3 % (ref 0–0.5)
LYMPHOCYTES # BLD AUTO: 0.67 10*3/MM3 (ref 0.6–4.8)
LYMPHOCYTES NFR BLD AUTO: 5.9 % (ref 20–45)
MCH RBC QN AUTO: 30.8 PG (ref 27–31)
MCHC RBC AUTO-ENTMCNC: 32.4 G/DL (ref 31–37)
MCV RBC AUTO: 95.2 FL (ref 81–99)
MONOCYTES # BLD AUTO: 0.58 10*3/MM3 (ref 0–1)
MONOCYTES NFR BLD AUTO: 5.1 % (ref 3–8)
NEUTROPHILS # BLD AUTO: 10.12 10*3/MM3 (ref 1.5–8.3)
NEUTROPHILS NFR BLD AUTO: 88.5 % (ref 45–70)
NRBC BLD MANUAL-RTO: 0 /100 WBC (ref 0–0)
PLATELET # BLD AUTO: 224 10*3/MM3 (ref 140–500)
PMV BLD AUTO: 10 FL (ref 7.4–10.4)
POTASSIUM BLD-SCNC: 4.1 MMOL/L (ref 3.5–5.2)
RBC # BLD AUTO: 3.73 10*6/MM3 (ref 4.2–5.4)
SODIUM BLD-SCNC: 135 MMOL/L (ref 136–145)
WBC NRBC COR # BLD: 11.42 10*3/MM3 (ref 4.8–10.8)

## 2018-08-18 PROCEDURE — 85025 COMPLETE CBC W/AUTO DIFF WBC: CPT | Performed by: SURGERY

## 2018-08-18 PROCEDURE — 25010000002 ENOXAPARIN PER 10 MG: Performed by: SURGERY

## 2018-08-18 PROCEDURE — 25010000002 HYDROMORPHONE PER 4 MG: Performed by: FAMILY MEDICINE

## 2018-08-18 PROCEDURE — 99232 SBSQ HOSP IP/OBS MODERATE 35: CPT | Performed by: INTERNAL MEDICINE

## 2018-08-18 PROCEDURE — 94799 UNLISTED PULMONARY SVC/PX: CPT

## 2018-08-18 PROCEDURE — 25010000002 PROMETHAZINE PER 50 MG: Performed by: HOSPITALIST

## 2018-08-18 PROCEDURE — 87070 CULTURE OTHR SPECIMN AEROBIC: CPT | Performed by: SURGERY

## 2018-08-18 PROCEDURE — 87205 SMEAR GRAM STAIN: CPT | Performed by: SURGERY

## 2018-08-18 PROCEDURE — 87075 CULTR BACTERIA EXCEPT BLOOD: CPT | Performed by: SURGERY

## 2018-08-18 PROCEDURE — 80048 BASIC METABOLIC PNL TOTAL CA: CPT | Performed by: SURGERY

## 2018-08-18 PROCEDURE — 99024 POSTOP FOLLOW-UP VISIT: CPT | Performed by: SURGERY

## 2018-08-18 PROCEDURE — 25010000002 ONDANSETRON PER 1 MG: Performed by: SURGERY

## 2018-08-18 RX ORDER — CHOLECALCIFEROL (VITAMIN D3) 125 MCG
5 CAPSULE ORAL NIGHTLY PRN
Status: DISCONTINUED | OUTPATIENT
Start: 2018-08-18 | End: 2018-08-20 | Stop reason: HOSPADM

## 2018-08-18 RX ORDER — PROMETHAZINE HYDROCHLORIDE 25 MG/ML
INJECTION, SOLUTION INTRAMUSCULAR; INTRAVENOUS
Status: DISPENSED
Start: 2018-08-18 | End: 2018-08-19

## 2018-08-18 RX ADMIN — ONDANSETRON 4 MG: 2 INJECTION INTRAMUSCULAR; INTRAVENOUS at 07:34

## 2018-08-18 RX ADMIN — SODIUM CHLORIDE, POTASSIUM CHLORIDE, SODIUM LACTATE AND CALCIUM CHLORIDE 75 ML/HR: 600; 310; 30; 20 INJECTION, SOLUTION INTRAVENOUS at 00:46

## 2018-08-18 RX ADMIN — SODIUM CHLORIDE, PRESERVATIVE FREE 12.5 MG: 5 INJECTION INTRAVENOUS at 23:54

## 2018-08-18 RX ADMIN — Medication 5 MG: at 23:38

## 2018-08-18 RX ADMIN — LISINOPRIL 10 MG: 10 TABLET ORAL at 09:28

## 2018-08-18 RX ADMIN — ALPRAZOLAM 0.5 MG: 0.25 TABLET ORAL at 09:28

## 2018-08-18 RX ADMIN — FLUTICASONE PROPIONATE 2 SPRAY: 50 SPRAY, METERED NASAL at 09:29

## 2018-08-18 RX ADMIN — ONDANSETRON 4 MG: 2 INJECTION INTRAMUSCULAR; INTRAVENOUS at 02:38

## 2018-08-18 RX ADMIN — HYDROMORPHONE HYDROCHLORIDE 1 MG: 1 INJECTION, SOLUTION INTRAMUSCULAR; INTRAVENOUS; SUBCUTANEOUS at 13:51

## 2018-08-18 RX ADMIN — HYDROMORPHONE HYDROCHLORIDE 1 MG: 1 INJECTION, SOLUTION INTRAMUSCULAR; INTRAVENOUS; SUBCUTANEOUS at 10:33

## 2018-08-18 RX ADMIN — PAROXETINE HYDROCHLORIDE 30 MG: 20 TABLET, FILM COATED ORAL at 06:49

## 2018-08-18 RX ADMIN — HYDROMORPHONE HYDROCHLORIDE 1 MG: 1 INJECTION, SOLUTION INTRAMUSCULAR; INTRAVENOUS; SUBCUTANEOUS at 07:34

## 2018-08-18 RX ADMIN — HYDROMORPHONE HYDROCHLORIDE 1 MG: 1 INJECTION, SOLUTION INTRAMUSCULAR; INTRAVENOUS; SUBCUTANEOUS at 18:26

## 2018-08-18 RX ADMIN — ONDANSETRON 4 MG: 2 INJECTION INTRAMUSCULAR; INTRAVENOUS at 18:26

## 2018-08-18 RX ADMIN — ENOXAPARIN SODIUM 40 MG: 40 INJECTION SUBCUTANEOUS at 09:28

## 2018-08-18 RX ADMIN — ALPRAZOLAM 0.5 MG: 0.25 TABLET ORAL at 23:38

## 2018-08-18 RX ADMIN — SODIUM CHLORIDE, POTASSIUM CHLORIDE, SODIUM LACTATE AND CALCIUM CHLORIDE 75 ML/HR: 600; 310; 30; 20 INJECTION, SOLUTION INTRAVENOUS at 16:13

## 2018-08-18 RX ADMIN — PANTOPRAZOLE SODIUM 40 MG: 20 TABLET, DELAYED RELEASE ORAL at 06:48

## 2018-08-18 RX ADMIN — ONDANSETRON 4 MG: 2 INJECTION INTRAMUSCULAR; INTRAVENOUS at 23:32

## 2018-08-18 RX ADMIN — HYDROMORPHONE HYDROCHLORIDE 1 MG: 1 INJECTION, SOLUTION INTRAMUSCULAR; INTRAVENOUS; SUBCUTANEOUS at 02:47

## 2018-08-18 NOTE — PROGRESS NOTES
SERVICE: [facility] HOSPITALIST    CONSULTANTS:  Gen Surgery - primary    CHIEF COMPLAINT: Post Op HTN    SUBJECTIVE:    Pain not well controlled, but past due for pain medication, reminded pt that pain medication needs to be asked for.    denies cp, soa    OBJECTIVE:    /53 (BP Location: Right arm, Patient Position: Sitting)   Pulse 84   Temp 98 °F (36.7 °C) (Oral)   Resp 18   Wt 55.9 kg (123 lb 3.2 oz)   SpO2 95%   BMI 23.28 kg/m²     MEDS/LABS REVIEWED AND ORDERED      ALPRAZolam 0.5 mg Oral BID   enoxaparin 40 mg Subcutaneous Daily   fluticasone 2 spray Nasal Daily   lisinopril 10 mg Oral Q24H   pantoprazole 40 mg Oral QAM   PARoxetine 30 mg Oral QAM       Physical Exam   Constitutional:   In acute pain   Eyes: Pupils are equal, round, and reactive to light.   Neck: No tracheal deviation present.   Cardiovascular: Normal rate, regular rhythm and normal heart sounds.    No murmur heard.  Pulmonary/Chest: Breath sounds normal. No stridor. No respiratory distress. She has no wheezes. She has no rales.   Swallow breathing   Abdominal:   Post surgical distension, and tenderness  Not an acute abdomen   Musculoskeletal: She exhibits no edema.   Neurological: She is alert.   Skin: Skin is warm and dry. No rash noted. She is not diaphoretic. No erythema.   Nursing note and vitals reviewed.      LAB/DIAGNOSTICS:    Lab Results (last 24 hours)     Procedure Component Value Units Date/Time    Wound Culture - Wound, Abdominal Wall [[id number]] Collected:  08/18/18 1027    Specimen:  Wound from Abdominal Wall Updated:  08/18/18 1030    Anaerobic Culture - Swab, Abdominal Wall [[id number]] Collected:  08/18/18 1027    Specimen:  Swab from Abdominal Wall Updated:  08/18/18 1030    Basic Metabolic Panel [[id number]]  (Abnormal) Collected:  08/18/18 0341    Specimen:  Blood Updated:  08/18/18 0504     Glucose 124 (H) mg/dL      BUN 8 mg/dL      Creatinine 0.68 mg/dL      Sodium 135 (L) mmol/L       Potassium 4.1 mmol/L      Chloride 97 (L) mmol/L      CO2 27.0 mmol/L      Calcium 9.0 mg/dL      eGFR Non African Amer 88 mL/min/1.73      BUN/Creatinine Ratio 11.8     Anion Gap 11.0 mmol/L     Narrative:       GFR Normal >60  Chronic Kidney Disease <60  Kidney Failure <15    CBC & Differential [[id number]] Collected:  08/18/18 0341    Specimen:  Blood Updated:  08/18/18 0442    Narrative:       The following orders were created for panel order CBC & Differential.  Procedure                               Abnormality         Status                     ---------                               -----------         ------                     CBC Auto Differential[[id number]]        Abnormal            Final result                 Please view results for these tests on the individual orders.    CBC Auto Differential [[id number]]  (Abnormal) Collected:  08/18/18 0341    Specimen:  Blood Updated:  08/18/18 0442     WBC 11.42 (H) 10*3/mm3      RBC 3.73 (L) 10*6/mm3      Hemoglobin 11.5 (L) g/dL      Hematocrit 35.5 (L) %      MCV 95.2 fL      MCH 30.8 pg      MCHC 32.4 g/dL      RDW 12.1 %      RDW-SD 41.7 fl      MPV 10.0 fL      Platelets 224 10*3/mm3      Neutrophil % 88.5 (H) %      Lymphocyte % 5.9 (L) %      Monocyte % 5.1 %      Eosinophil % 0.0 %      Basophil % 0.2 %      Immature Grans % 0.3 %      Neutrophils, Absolute 10.12 (H) 10*3/mm3      Lymphocytes, Absolute 0.67 10*3/mm3      Monocytes, Absolute 0.58 10*3/mm3      Eosinophils, Absolute 0.00 (L) 10*3/mm3      Basophils, Absolute 0.02 10*3/mm3      Immature Grans, Absolute 0.03 10*3/mm3      nRBC 0.0 /100 WBC            No radiology results for the last day      ASSESSMENT/PLAN:    Post Op hypertension  - 's-110's currently  - ON home 10mg lisinoprl  - Hold for SBP <=95    Hypoxemia  - Never smoker  - Continue to wean o2 as tolerated for stats 92 - 96%    Insomnia  - may be difficult for pt to sleep without ambien due to substance dependence effects  -  added 5mg melatonin

## 2018-08-18 NOTE — PROGRESS NOTES
Patient: Martina Hardwick  Procedure(s) with comments:  CHOLECYSTECTOMY LAPAROSCOPIC converted to open procedure - LAPAROSCOPIC CHOLECYSTECTOMY  Anesthesia type: [unfilled]    Patient location: [facility] Surgical Floor  Vitals:    08/17/18 1922 08/17/18 2343 08/18/18 0300 08/18/18 0654   BP: 97/56 90/53 108/65 133/74   BP Location: Left arm Left arm Left arm Right arm   Patient Position: Lying Lying Lying Lying   Pulse: 91 75 77 83   Resp: 18 18 18 18   Temp: 97.4 °F (36.3 °C) 97.6 °F (36.4 °C) 97 °F (36.1 °C) 97.4 °F (36.3 °C)   TempSrc: Oral Oral Oral Oral   SpO2: 96% 94% 96% 98%   Weight:         Level of consciousness: awake, alert and oriented    Post-anesthesia pain: adequate analgesia  Airway patency: patent  Respiratory: unassisted  Cardiovascular: stable and blood pressure at baseline  Hydration: euvolemic    Anesthetic complications: no

## 2018-08-18 NOTE — PROGRESS NOTES
POD 1 lap elena converted to open elena.  She had some nausea but that is now gone.  She has passed flatus.  Her dressings were saturated with serous fluid.  Her wounds looked good but she did have purulent drainage in her umbilicus.  She has psoriasis around the umbilicus as well.  Cultures were obtained.  Her abdomen is soft with good bowel sounds.  We will advance her diet.

## 2018-08-18 NOTE — PLAN OF CARE
Problem: Patient Care Overview  Goal: Plan of Care Review  Outcome: Ongoing (interventions implemented as appropriate)   08/18/18 7850   Coping/Psychosocial   Plan of Care Reviewed With patient   Plan of Care Review   Progress improving   OTHER   Outcome Summary VSS. Pain controlled with prn medications. On 1 L on O2 due to drop sats. IS encouraged. AMbulation encouraged. tolerating full liquids.        Problem: Skin Injury Risk (Adult)  Goal: Skin Health and Integrity  Outcome: Ongoing (interventions implemented as appropriate)

## 2018-08-18 NOTE — PLAN OF CARE
Problem: Patient Care Overview  Goal: Plan of Care Review  Outcome: Ongoing (interventions implemented as appropriate)   08/18/18 0527   Coping/Psychosocial   Plan of Care Reviewed With patient   Plan of Care Review   Progress improving      08/18/18 0527   Coping/Psychosocial   Plan of Care Reviewed With patient   Plan of Care Review   Progress improving   OTHER   Outcome Summary pt c/o of pain and N/V; relieved by medication; pt concerened that pain wouldnt go away; reassured pt and explained process of pain     Goal: Individualization and Mutuality  Outcome: Ongoing (interventions implemented as appropriate)    Goal: Discharge Needs Assessment  Outcome: Ongoing (interventions implemented as appropriate)      Problem: Skin Injury Risk (Adult)  Goal: Identify Related Risk Factors and Signs and Symptoms  Outcome: Ongoing (interventions implemented as appropriate)    Goal: Skin Health and Integrity  Outcome: Ongoing (interventions implemented as appropriate)      Problem: Cholecystectomy (Adult)  Goal: Signs and Symptoms of Listed Potential Problems Will be Absent, Minimized or Managed (Cholecystectomy)  Outcome: Ongoing (interventions implemented as appropriate)    Goal: Anesthesia/Sedation Recovery  Outcome: Ongoing (interventions implemented as appropriate)

## 2018-08-19 PROCEDURE — 94799 UNLISTED PULMONARY SVC/PX: CPT

## 2018-08-19 PROCEDURE — 99024 POSTOP FOLLOW-UP VISIT: CPT | Performed by: SURGERY

## 2018-08-19 PROCEDURE — 25010000002 HYDROMORPHONE PER 4 MG: Performed by: FAMILY MEDICINE

## 2018-08-19 PROCEDURE — 25010000002 ENOXAPARIN PER 10 MG: Performed by: SURGERY

## 2018-08-19 PROCEDURE — 25010000002 PROMETHAZINE PER 50 MG: Performed by: HOSPITALIST

## 2018-08-19 PROCEDURE — 25010000002 ONDANSETRON PER 1 MG: Performed by: SURGERY

## 2018-08-19 PROCEDURE — 25010000002 KETOROLAC TROMETHAMINE PER 15 MG: Performed by: SURGERY

## 2018-08-19 PROCEDURE — 99232 SBSQ HOSP IP/OBS MODERATE 35: CPT | Performed by: INTERNAL MEDICINE

## 2018-08-19 RX ORDER — KETOROLAC TROMETHAMINE 30 MG/ML
30 INJECTION, SOLUTION INTRAMUSCULAR; INTRAVENOUS EVERY 6 HOURS PRN
Status: DISCONTINUED | OUTPATIENT
Start: 2018-08-19 | End: 2018-08-20 | Stop reason: HOSPADM

## 2018-08-19 RX ORDER — ZOLPIDEM TARTRATE 5 MG/1
10 TABLET ORAL NIGHTLY PRN
Status: DISCONTINUED | OUTPATIENT
Start: 2018-08-19 | End: 2018-08-20 | Stop reason: HOSPADM

## 2018-08-19 RX ORDER — OXYCODONE HYDROCHLORIDE AND ACETAMINOPHEN 5; 325 MG/1; MG/1
1 TABLET ORAL EVERY 4 HOURS PRN
Status: DISCONTINUED | OUTPATIENT
Start: 2018-08-19 | End: 2018-08-20 | Stop reason: HOSPADM

## 2018-08-19 RX ORDER — PROMETHAZINE HYDROCHLORIDE 25 MG/ML
INJECTION, SOLUTION INTRAMUSCULAR; INTRAVENOUS
Status: DISPENSED
Start: 2018-08-19 | End: 2018-08-20

## 2018-08-19 RX ORDER — SODIUM CHLORIDE 9 MG/ML
INJECTION, SOLUTION INTRAVENOUS
Status: COMPLETED
Start: 2018-08-19 | End: 2018-08-19

## 2018-08-19 RX ADMIN — SODIUM CHLORIDE, PRESERVATIVE FREE 12.5 MG: 5 INJECTION INTRAVENOUS at 19:59

## 2018-08-19 RX ADMIN — ONDANSETRON 4 MG: 2 INJECTION INTRAMUSCULAR; INTRAVENOUS at 13:21

## 2018-08-19 RX ADMIN — POLYETHYLENE GLYCOL 3350 17 G: 17 POWDER, FOR SOLUTION ORAL at 11:58

## 2018-08-19 RX ADMIN — FLUTICASONE PROPIONATE 2 SPRAY: 50 SPRAY, METERED NASAL at 08:40

## 2018-08-19 RX ADMIN — ENOXAPARIN SODIUM 40 MG: 40 INJECTION SUBCUTANEOUS at 08:44

## 2018-08-19 RX ADMIN — ONDANSETRON 4 MG: 2 INJECTION INTRAMUSCULAR; INTRAVENOUS at 04:29

## 2018-08-19 RX ADMIN — ALPRAZOLAM 0.5 MG: 0.25 TABLET ORAL at 20:09

## 2018-08-19 RX ADMIN — HYDROMORPHONE HYDROCHLORIDE 1 MG: 1 INJECTION, SOLUTION INTRAMUSCULAR; INTRAVENOUS; SUBCUTANEOUS at 04:30

## 2018-08-19 RX ADMIN — LISINOPRIL 10 MG: 10 TABLET ORAL at 08:44

## 2018-08-19 RX ADMIN — SODIUM CHLORIDE 50 ML: 9 INJECTION, SOLUTION INTRAVENOUS at 20:00

## 2018-08-19 RX ADMIN — KETOROLAC TROMETHAMINE 30 MG: 30 INJECTION, SOLUTION INTRAMUSCULAR at 21:53

## 2018-08-19 RX ADMIN — SODIUM CHLORIDE, POTASSIUM CHLORIDE, SODIUM LACTATE AND CALCIUM CHLORIDE 75 ML/HR: 600; 310; 30; 20 INJECTION, SOLUTION INTRAVENOUS at 06:45

## 2018-08-19 RX ADMIN — PANTOPRAZOLE SODIUM 40 MG: 20 TABLET, DELAYED RELEASE ORAL at 08:43

## 2018-08-19 RX ADMIN — SODIUM CHLORIDE, POTASSIUM CHLORIDE, SODIUM LACTATE AND CALCIUM CHLORIDE 75 ML/HR: 600; 310; 30; 20 INJECTION, SOLUTION INTRAVENOUS at 20:37

## 2018-08-19 RX ADMIN — PAROXETINE HYDROCHLORIDE 30 MG: 20 TABLET, FILM COATED ORAL at 08:45

## 2018-08-19 RX ADMIN — ALPRAZOLAM 0.5 MG: 0.25 TABLET ORAL at 08:43

## 2018-08-19 NOTE — PLAN OF CARE
Problem: Patient Care Overview  Goal: Plan of Care Review  Outcome: Ongoing (interventions implemented as appropriate)   08/19/18 6422   Coping/Psychosocial   Plan of Care Reviewed With patient   OTHER   Outcome Summary Pt up to chair, ambulated in reyes with , on ra. tolerates fine. Intermittened nausea, zofran iv with positive results., cornelio drain with serous sangineous drainage to bulb suction. Abdominal dressing changed, pt tolerated well.     Goal: Individualization and Mutuality  Outcome: Ongoing (interventions implemented as appropriate)    Goal: Discharge Needs Assessment  Outcome: Ongoing (interventions implemented as appropriate)    Goal: Interprofessional Rounds/Family Conf  Outcome: Ongoing (interventions implemented as appropriate)      Problem: Skin Injury Risk (Adult)  Goal: Identify Related Risk Factors and Signs and Symptoms  Outcome: Outcome(s) achieved Date Met: 08/19/18    Goal: Skin Health and Integrity  Outcome: Ongoing (interventions implemented as appropriate)      Problem: Cholecystectomy (Adult)  Goal: Signs and Symptoms of Listed Potential Problems Will be Absent, Minimized or Managed (Cholecystectomy)  Outcome: Ongoing (interventions implemented as appropriate)    Goal: Anesthesia/Sedation Recovery  Outcome: Ongoing (interventions implemented as appropriate)

## 2018-08-19 NOTE — PROGRESS NOTES
POD 2.  Martina was sitting up in bed eating pudding but said she had been throwing up all night and was not hungry.  She said the vomit was dark.  The nursing staff said she's spitting up not throwing up.  Martina is worried she will have to have another colostomy.  She also said she is not getting her Miralax which she needs.  She says she has taken Percocet for pain in the past.  Her abdomen is soft with good bowel sounds.  Her drain sponge is saturated with serous fluid.  At this point we will add Miralax and stop the Dilaudid.  I have encourage ambulation.

## 2018-08-19 NOTE — PLAN OF CARE
Problem: Patient Care Overview  Goal: Plan of Care Review  Outcome: Ongoing (interventions implemented as appropriate)   08/19/18 0630   Coping/Psychosocial   Plan of Care Reviewed With patient   Plan of Care Review   Progress improving   OTHER   Outcome Summary Pf pain controlled with medication; continued on 1 L of O2 throough out night; ambulation encouraged but refused d/t nausea; ecieved order for phenergan 12.5 from Dr. Mendez which contolled nausea geater than zofran.     Goal: Individualization and Mutuality  Outcome: Ongoing (interventions implemented as appropriate)    Goal: Discharge Needs Assessment  Outcome: Ongoing (interventions implemented as appropriate)      Problem: Skin Injury Risk (Adult)  Goal: Identify Related Risk Factors and Signs and Symptoms  Outcome: Ongoing (interventions implemented as appropriate)    Goal: Skin Health and Integrity  Outcome: Ongoing (interventions implemented as appropriate)      Problem: Cholecystectomy (Adult)  Goal: Signs and Symptoms of Listed Potential Problems Will be Absent, Minimized or Managed (Cholecystectomy)  Outcome: Ongoing (interventions implemented as appropriate)    Goal: Anesthesia/Sedation Recovery  Outcome: Ongoing (interventions implemented as appropriate)

## 2018-08-19 NOTE — PROGRESS NOTES
SERVICE: [facility] HOSPITALIST    CONSULTANTS:  Gen Surg - Primary    CHIEF COMPLAINT: HTN, Hypoxia    SUBJECTIVE:    Pt complains of nausea, and overall feeling crummy. However was on room air, and denied soa, or orthopnea.    Later was up walking halls with  w/o o2.    OBJECTIVE:    /76 (BP Location: Right arm, Patient Position: Lying)   Pulse 90   Temp 97.7 °F (36.5 °C) (Oral)   Resp 18   Wt 55.9 kg (123 lb 3.2 oz)   SpO2 91%   BMI 23.28 kg/m²     MEDS/LABS REVIEWED AND ORDERED      ALPRAZolam 0.5 mg Oral BID   enoxaparin 40 mg Subcutaneous Daily   fluticasone 2 spray Nasal Daily   lisinopril 10 mg Oral Q24H   pantoprazole 40 mg Oral QAM   PARoxetine 30 mg Oral QAM   polyethylene glycol 17 g Oral Daily       Physical Exam   Constitutional: No distress.   Eyes: Pupils are equal, round, and reactive to light.   Cardiovascular: Normal rate, regular rhythm and normal heart sounds.    No murmur heard.  Pulmonary/Chest: Effort normal and breath sounds normal. No respiratory distress. She has no wheezes.   Abdominal: Soft. Bowel sounds are normal.   Musculoskeletal: She exhibits no edema.   Neurological: She is alert.   Skin: Skin is warm and dry. She is not diaphoretic.   Psychiatric:   Flat restricted affect, depressed anxious mood       LAB/DIAGNOSTICS:    Lab Results (last 24 hours)     Procedure Component Value Units Date/Time    Wound Culture - Wound, Abdominal Wall [[id number]]  (Normal) Collected:  08/18/18 1027    Specimen:  Wound from Abdominal Wall Updated:  08/19/18 0810     Wound Culture No growth           No radiology results for the last day      ASSESSMENT/PLAN:  Post Op hypertension  - 's-110's currently  - ON home 10mg lisinoprl  - Hold for SBP <=95    Hypoxia  - Had been requiring 2L to maintain sats 92-94% w/ no history of smoking or lung issues  - Sating 95% on Room air, ambulated without oxygen      Somatic Complaints w/ flat affect  - Already on Paxil,  will leave possible switch to duloxetine for somatic complaints to PCP  - Restarted Ambien, given chemical dependence, likely pt not sleeping, which can worsen somatic complaints

## 2018-08-20 VITALS
HEART RATE: 95 BPM | TEMPERATURE: 97.9 F | DIASTOLIC BLOOD PRESSURE: 100 MMHG | RESPIRATION RATE: 16 BRPM | BODY MASS INDEX: 23.28 KG/M2 | WEIGHT: 123.2 LBS | SYSTOLIC BLOOD PRESSURE: 153 MMHG | OXYGEN SATURATION: 96 %

## 2018-08-20 PROCEDURE — 99024 POSTOP FOLLOW-UP VISIT: CPT | Performed by: SURGERY

## 2018-08-20 PROCEDURE — 25010000002 ENOXAPARIN PER 10 MG: Performed by: SURGERY

## 2018-08-20 PROCEDURE — 94799 UNLISTED PULMONARY SVC/PX: CPT

## 2018-08-20 RX ORDER — OXYCODONE HYDROCHLORIDE AND ACETAMINOPHEN 5; 325 MG/1; MG/1
1 TABLET ORAL EVERY 4 HOURS PRN
Qty: 30 TABLET | Refills: 0 | Status: SHIPPED | OUTPATIENT
Start: 2018-08-20 | End: 2018-11-19

## 2018-08-20 RX ADMIN — LISINOPRIL 10 MG: 10 TABLET ORAL at 09:38

## 2018-08-20 RX ADMIN — ENOXAPARIN SODIUM 40 MG: 40 INJECTION SUBCUTANEOUS at 09:36

## 2018-08-20 RX ADMIN — POLYETHYLENE GLYCOL 3350 17 G: 17 POWDER, FOR SOLUTION ORAL at 09:37

## 2018-08-20 RX ADMIN — FLUTICASONE PROPIONATE 2 SPRAY: 50 SPRAY, METERED NASAL at 09:38

## 2018-08-20 RX ADMIN — PANTOPRAZOLE SODIUM 40 MG: 20 TABLET, DELAYED RELEASE ORAL at 06:14

## 2018-08-20 RX ADMIN — ALPRAZOLAM 0.5 MG: 0.25 TABLET ORAL at 09:40

## 2018-08-20 RX ADMIN — PAROXETINE HYDROCHLORIDE 30 MG: 20 TABLET, FILM COATED ORAL at 06:14

## 2018-08-20 NOTE — PROGRESS NOTES
Without complaints.  She is tolerating by mouth and is passing flatus.  Her Randal-Gates drain is putting out serous returns and was removed.  Her incision is without signs of infection.  Her pain is controlled.  She is off oxygen.  She will be discharged home today to follow-up in my office on Monday

## 2018-08-20 NOTE — PAYOR COMM NOTE
"Martina Smith (62 y.o. Female)     ATTN: MORALES SNEED  MEMBER ID:[id number]  FACILITY NPI:[id number]  FACILITY TAX ID:[id number]  FAXING CLINICALS FOR INPT REVIEW. PLEASE REPLY TO SUKI MCPHERSON AT FAX#[phone] OR PHONE#[phone]. THANK YOU.       Date of Birth Social Security Number Address Home Phone MRN    1955  [id number] [facility] [phone] [id number]    Hindu Marital Status          Yazdanism        Admission Date Admission Type Admitting Provider Attending Provider Department, Room/Bed    8/17/18 Elective Hernan Hinds MD  [facility] SURG, 1413/1    Discharge Date Discharge Disposition Discharge Destination        8/20/2018 Home or Self Care              Attending Provider:  (none)   Allergies:  Hydrocodone, Sulfa Antibiotics    Isolation:  None   Infection:  None   Code Status:  CPR    Ht:  154.9 cm (61\")   Wt:  55.9 kg (123 lb 3.2 oz)    Admission Cmt:  None   Principal Problem:  Calculus of gallbladder without cholecystitis without obstruction [K80.20] More...                 Active Insurance as of 8/17/2018     Primary Coverage     Payor Plan Insurance Group Employer/Plan Group    [employer] [facility] HIX[employer]     Payor Plan Address Payor Plan Phone Number Effective From Effective To    PO   1/15/2018     [facility] [id number]       Subscriber Name Subscriber Birth Date Member ID       MARTINA SMITH 1955 [id number]                 Emergency Contacts      (Rel.) Home Phone Work Phone Mobile Phone    López Smith (Spouse) -- -- [phone]    SirialavelleRuben (Brother) -- -- --               History & Physical      Hernan Hinds MD at 8/17/2018  7:46 AM          H&P updated. The patient was examined and the following changes are noted:  vs  /98 (BP Location: Left arm, Patient Position: Lying)   Pulse 66   Temp 98.1 °F (36.7 °C) (Oral)   Resp 12   Wt 55.9 kg (123 lb 3.2 oz)   SpO2 100%   BMI 23.28 kg/m²  "         Electronically signed by Hernan Hinds MD at 2018  7:47 AM   Source Note                 PATIENT INFORMATION  Martina Hardwick  GB CONS, US DONE, EPIGASTRIC PAIN, NAUSEA     - 1955    CHIEF COMPLAINT  Chief Complaint   Patient presents with   • Cholelithiasis       HISTORY OF PRESENT ILLNESS  HPI she complains of epigastric right upper quadrant abdominal pain for several years.  She says sometimes this is made worse with eating and sometimes has the pain in between meals.  She says nothing really makes it better.  Some nausea.  She was originally diagnosed with gastritis via an EGD per Dr. Ruff but she says those medications are no longer controlling her symptoms eyes any jaundice symptoms.        REVIEW OF SYSTEMS  Review of Systems migraines, psoriasis otherwise negative      ACTIVE PROBLEMS  Patient Active Problem List    Diagnosis   • Routine health maintenance [Z00.00]   • Essential hypertension [I10]   • Cervicogenic headache [R51]   • GERD (gastroesophageal reflux disease) [K21.9]     Overview Note:     Dr. Ruff.     • Allergic rhinitis [J30.9]   • Primary insomnia [F51.01]   • Chronic constipation [K59.09]   • Intractable migraine without aura and without status migrainosus [G43.019]   • Depression with anxiety [F41.8]         PAST MEDICAL HISTORY  Past Medical History:   Diagnosis Date   • Abdominal pain    • Anxiety    • Anxiety    • Arthritis    • Constipation    • Depressed    • Depression    • Endometriosis    • Gastritis    • GERD (gastroesophageal reflux disease)    • Headache    • Headache, tension-type    • Insomnia    • Migraine    • OP (osteoporosis)    • Poor sleep    • Seasonal allergies          SURGICAL HISTORY  Past Surgical History:   Procedure Laterality Date   • APPENDECTOMY     • COLON SURGERY     • COLONOSCOPY     • DIAGNOSTIC LAPAROSCOPY     • DILATATION AND CURETTAGE     • ENDOSCOPY N/A 2016    Procedure: ESOPHAGOGASTRODUODENOSCOPY ;   Surgeon: Darien Ruff MD;  Location: [facility];  Service:    • HYSTERECTOMY     • SMALL INTESTINE SURGERY           FAMILY HISTORY  Family History   Problem Relation Age of Onset   • Hyperlipidemia Mother    • Macular degeneration Mother    • Heart disease Father    • Hyperlipidemia Father    • Cancer Father    • Depression Father    • Depression Sister    • Hyperlipidemia Brother    • Depression Brother    • Breast cancer Maternal Aunt    • Breast cancer Cousin          SOCIAL HISTORY  Social History     Occupational History   • Not on file.     Social History Main Topics   • Smoking status: Never Smoker   • Smokeless tobacco: Not on file   • Alcohol use Yes      Comment: occ   • Drug use: No   • Sexual activity: Defer         CURRENT MEDICATIONS    Current Outpatient Prescriptions:   •  lisinopril (PRINIVIL,ZESTRIL) 5 MG tablet, , Disp: , Rfl:   •  ALPRAZolam (XANAX) 0.5 MG tablet, Take 1 tablet by mouth 2 (Two) Times a Day., Disp: 30 tablet, Rfl: 2  •  Calcium-Vitamin D (CALTRATE 600 PLUS-VIT D PO), Take  by mouth., Disp: , Rfl:   •  fluticasone (FLONASE) 50 MCG/ACT nasal spray, 2 sprays into each nostril daily., Disp: , Rfl:   •  gabapentin (NEURONTIN) 100 MG capsule, , Disp: , Rfl:   •  lisinopril (PRINIVIL,ZESTRIL) 10 MG tablet, TAKE ON PO  DAILY, Disp: 90 tablet, Rfl: 3  •  loratadine (CLARITIN) 10 MG tablet, Take 10 mg by mouth daily., Disp: , Rfl:   •  Multiple Vitamins-Minerals (EYE VITAMINS) capsule, Take  by mouth., Disp: , Rfl:   •  Multiple Vitamins-Minerals (MULTI ADULT GUMMIES PO), Take  by mouth., Disp: , Rfl:   •  omeprazole (PriLOSEC) 40 MG capsule, Take 20 mg by mouth Daily. Pt takes 1/3 per day, Disp: , Rfl:   •  PARoxetine (PAXIL) 30 MG tablet, Take 1 tablet by mouth Every Morning., Disp: 90 tablet, Rfl: 3  •  polyethylene glycol (MIRALAX) packet, Take 17 g by mouth daily., Disp: , Rfl:   •  Probiotic Product (PROBIOTIC DAILY) capsule, Once daily, Disp: , Rfl:   •  ranitidine  (ZANTAC) 300 MG tablet, Take 150 mg by mouth Daily., Disp: , Rfl:   •  SUMAtriptan (IMITREX) 100 MG tablet, Take 1 tablet by mouth 1 (One) Time As Needed for Migraine for up to 1 dose., Disp: 45 tablet, Rfl: 3  •  zolpidem (AMBIEN) 10 MG tablet, TAKE ONE TABLET BY MOUTH ONCE NIGHTLY, Disp: 90 tablet, Rfl: 0    Current Facility-Administered Medications:   •  OnabotulinumtoxinA 200 Units, 200 Units, Intramuscular, Once, Fransisca Camargo MD    ALLERGIES  Sulfa antibiotics    VITALS  There were no vitals filed for this visit.    LAST RESULTS   Results Encounter on 08/15/2017   Component Date Value Ref Range Status   • WBC 10/05/2017 4.48* 4.80 - 10.80 10*3/mm3 Final   • RBC 10/05/2017 4.19* 4.20 - 5.40 10*6/mm3 Final   • Hemoglobin 10/05/2017 13.1  12.0 - 16.0 g/dL Final   • Hematocrit 10/05/2017 39.1  37.0 - 47.0 % Final   • MCV 10/05/2017 93.3  81.0 - 99.0 fL Final   • MCH 10/05/2017 31.3* 27.0 - 31.0 pg Final   • MCHC 10/05/2017 33.5  31.0 - 37.0 g/dL Final   • RDW 10/05/2017 12.2  11.5 - 14.5 % Final   • Platelets 10/05/2017 232  140 - 500 10*3/mm3 Final   • Neutrophil Rel % 10/05/2017 70.3* 45.0 - 70.0 % Final   • Lymphocyte Rel % 10/05/2017 20.3  20.0 - 45.0 % Final   • Monocyte Rel % 10/05/2017 7.6  3.0 - 8.0 % Final   • Eosinophil Rel % 10/05/2017 0.7  0.0 - 4.0 % Final   • Basophil Rel % 10/05/2017 0.9  0.0 - 2.0 % Final   • Neutrophils Absolute 10/05/2017 3.15  1.50 - 8.30 10*3/mm3 Final   • Lymphocytes Absolute 10/05/2017 0.91  0.60 - 4.80 10*3/mm3 Final   • Monocytes Absolute 10/05/2017 0.34  0.00 - 1.00 10*3/mm3 Final   • Eosinophils Absolute 10/05/2017 0.03* 0.10 - 0.30 10*3/mm3 Final   • Basophils Absolute 10/05/2017 0.04  0.00 - 0.20 10*3/mm3 Final   • Immature Granulocyte Rel % 10/05/2017 0.2  0.0 - 0.5 % Final   • Immature Grans Absolute 10/05/2017 0.01  0.00 - 0.03 10*3/mm3 Final   • nRBC 10/05/2017 0.0  0.0 - 0.0 /100 WBC Final   • Glucose 10/05/2017 82  65 - 99 mg/dL Final   • BUN 10/05/2017 12   8 - 23 mg/dL Final   • Creatinine 10/05/2017 0.87  0.57 - 1.00 mg/dL Final   • eGFR Non  Am 10/05/2017 66  >60 mL/min/1.73 Final   • eGFR African Am 10/05/2017 80  >60 mL/min/1.73 Final   • BUN/Creatinine Ratio 10/05/2017 13.8  7.0 - 25.0 Final   • Sodium 10/05/2017 135* 136 - 145 mmol/L Final   • Potassium 10/05/2017 4.5  3.5 - 5.2 mmol/L Final   • Chloride 10/05/2017 96* 98 - 107 mmol/L Final   • Total CO2 10/05/2017 26.4  22.0 - 29.0 mmol/L Final   • Calcium 10/05/2017 9.5  8.8 - 10.5 mg/dL Final   • Total Protein 10/05/2017 6.7  6.0 - 8.5 g/dL Final   • Albumin 10/05/2017 4.00  3.50 - 5.20 g/dL Final   • Globulin 10/05/2017 2.7  gm/dL Final   • A/G Ratio 10/05/2017 1.5  g/dL Final   • Total Bilirubin 10/05/2017 0.5  0.2 - 1.2 mg/dL Final   • Alkaline Phosphatase 10/05/2017 105  40 - 129 U/L Final   • AST (SGOT) 10/05/2017 23  5 - 32 U/L Final   • ALT (SGPT) 10/05/2017 15  5 - 33 U/L Final   • Total Cholesterol 10/05/2017 189  0 - 200 mg/dL Final   • Triglycerides 10/05/2017 65  0 - 150 mg/dL Final   • HDL Cholesterol 10/05/2017 70* 40 - 60 mg/dL Final   • VLDL Cholesterol 10/05/2017 13  7 - 27 mg/dL Final   • LDL Cholesterol  10/05/2017 106* 0 - 100 mg/dL Final   • Chol/HDL Ratio 10/05/2017 2.70   Final   • TSH 10/05/2017 3.020  0.270 - 4.200 mIU/mL Final   • 25 Hydroxy, Vitamin D 10/05/2017 49.8  ng/mL Final    Comment: Reference Range for Total Vitamin D 25(OH)  Deficiency    <20.0 ng/mL  Insufficiency 21-29 ng/mL  Sufficiency   30-74 ng/mL       Us Gallbladder    Result Date: 7/26/2018  Narrative: ULTRASOUND ABDOMEN, LIMITED, 7/26/2018  HISTORY: 62-year-old female referred for history of epigastric pain. The patient describes two month history of intermittent pain.  TECHNIQUE: Grayscale ultrasound imaging of the right upper abdomen.  FINDINGS: There is a single large gallstone within the gallbladder measuring about 2.4 cm in diameter. The gallbladder is nondistended. There is no gallbladder wall  thickening or gallbladder tenderness. No intrahepatic or extrahepatic bile duct dilatation (CBD 2 mm).  Liver and pancreas are normal in size and appearance. No free fluid in the right upper abdomen. Right kidney is negative with no hydronephrosis.      Impression: 1. Cholelithiasis. Single large gallstone within otherwise normal-appearing gallbladder. 2. No bile duct dilatation.  This report was finalized on 2018 7:53 AM by Dr. Steve Rodriguez MD.        PHYSICAL EXAM  Physical Exam alert white female in no active distress she has no clinical jaundice.  Her heart shows regular rate and rhythm her lungs are clear and equal.  She has a lower midline abdominal scar with subjective epigastric and right upper quadrant tenderness without mass.  There is no flank tenderness.  Ultrasound of her gallbladder was reviewed by me and shows a 2.4 cm gallstone.    ASSESSMENT  cholelithiasis      PLAN  The risks benefits and options were discussed with her in detail.  We'll proceed with a laparoscopic cholecystectomy at [facility] on .                               Electronically signed by Hernan Hinds MD at 2018 12:03 PM             Hernan Hinds MD at 2018 11:10 AM              PATIENT INFORMATION  Martina GALO Ronen  GB CONS, US DONE, EPIGASTRIC PAIN, NAUSEA     - 1955    CHIEF COMPLAINT  Chief Complaint   Patient presents with   • Cholelithiasis       HISTORY OF PRESENT ILLNESS  HPI she complains of epigastric right upper quadrant abdominal pain for several years.  She says sometimes this is made worse with eating and sometimes has the pain in between meals.  She says nothing really makes it better.  Some nausea.  She was originally diagnosed with gastritis via an EGD per Dr. Ruff but she says those medications are no longer controlling her symptoms eyes any jaundice symptoms.        REVIEW OF SYSTEMS  Review of Systems migraines, psoriasis otherwise negative      ACTIVE  PROBLEMS  Patient Active Problem List    Diagnosis   • Routine health maintenance [Z00.00]   • Essential hypertension [I10]   • Cervicogenic headache [R51]   • GERD (gastroesophageal reflux disease) [K21.9]     Overview Note:     Dr. Ruff.     • Allergic rhinitis [J30.9]   • Primary insomnia [F51.01]   • Chronic constipation [K59.09]   • Intractable migraine without aura and without status migrainosus [G43.019]   • Depression with anxiety [F41.8]         PAST MEDICAL HISTORY  Past Medical History:   Diagnosis Date   • Abdominal pain    • Anxiety    • Anxiety    • Arthritis    • Constipation    • Depressed    • Depression    • Endometriosis    • Gastritis    • GERD (gastroesophageal reflux disease)    • Headache    • Headache, tension-type    • Insomnia    • Migraine    • OP (osteoporosis)    • Poor sleep    • Seasonal allergies          SURGICAL HISTORY  Past Surgical History:   Procedure Laterality Date   • APPENDECTOMY     • COLON SURGERY     • COLONOSCOPY     • DIAGNOSTIC LAPAROSCOPY  1990   • DILATATION AND CURETTAGE  1985   • ENDOSCOPY N/A 5/13/2016    Procedure: ESOPHAGOGASTRODUODENOSCOPY ;  Surgeon: Darien Ruff MD;  Location: [facility];  Service:    • HYSTERECTOMY     • SMALL INTESTINE SURGERY           FAMILY HISTORY  Family History   Problem Relation Age of Onset   • Hyperlipidemia Mother    • Macular degeneration Mother    • Heart disease Father    • Hyperlipidemia Father    • Cancer Father    • Depression Father    • Depression Sister    • Hyperlipidemia Brother    • Depression Brother    • Breast cancer Maternal Aunt    • Breast cancer Cousin          SOCIAL HISTORY  Social History     Occupational History   • Not on file.     Social History Main Topics   • Smoking status: Never Smoker   • Smokeless tobacco: Not on file   • Alcohol use Yes      Comment: occ   • Drug use: No   • Sexual activity: Defer         CURRENT MEDICATIONS    Current Outpatient Prescriptions:   •  lisinopril  (PRINIVIL,ZESTRIL) 5 MG tablet, , Disp: , Rfl:   •  ALPRAZolam (XANAX) 0.5 MG tablet, Take 1 tablet by mouth 2 (Two) Times a Day., Disp: 30 tablet, Rfl: 2  •  Calcium-Vitamin D (CALTRATE 600 PLUS-VIT D PO), Take  by mouth., Disp: , Rfl:   •  fluticasone (FLONASE) 50 MCG/ACT nasal spray, 2 sprays into each nostril daily., Disp: , Rfl:   •  gabapentin (NEURONTIN) 100 MG capsule, , Disp: , Rfl:   •  lisinopril (PRINIVIL,ZESTRIL) 10 MG tablet, TAKE ON PO  DAILY, Disp: 90 tablet, Rfl: 3  •  loratadine (CLARITIN) 10 MG tablet, Take 10 mg by mouth daily., Disp: , Rfl:   •  Multiple Vitamins-Minerals (EYE VITAMINS) capsule, Take  by mouth., Disp: , Rfl:   •  Multiple Vitamins-Minerals (MULTI ADULT GUMMIES PO), Take  by mouth., Disp: , Rfl:   •  omeprazole (PriLOSEC) 40 MG capsule, Take 20 mg by mouth Daily. Pt takes 1/3 per day, Disp: , Rfl:   •  PARoxetine (PAXIL) 30 MG tablet, Take 1 tablet by mouth Every Morning., Disp: 90 tablet, Rfl: 3  •  polyethylene glycol (MIRALAX) packet, Take 17 g by mouth daily., Disp: , Rfl:   •  Probiotic Product (PROBIOTIC DAILY) capsule, Once daily, Disp: , Rfl:   •  ranitidine (ZANTAC) 300 MG tablet, Take 150 mg by mouth Daily., Disp: , Rfl:   •  SUMAtriptan (IMITREX) 100 MG tablet, Take 1 tablet by mouth 1 (One) Time As Needed for Migraine for up to 1 dose., Disp: 45 tablet, Rfl: 3  •  zolpidem (AMBIEN) 10 MG tablet, TAKE ONE TABLET BY MOUTH ONCE NIGHTLY, Disp: 90 tablet, Rfl: 0    Current Facility-Administered Medications:   •  OnabotulinumtoxinA 200 Units, 200 Units, Intramuscular, Once, Fransisca Camargo MD    ALLERGIES  Sulfa antibiotics    VITALS  There were no vitals filed for this visit.    LAST RESULTS   Results Encounter on 08/15/2017   Component Date Value Ref Range Status   • WBC 10/05/2017 4.48* 4.80 - 10.80 10*3/mm3 Final   • RBC 10/05/2017 4.19* 4.20 - 5.40 10*6/mm3 Final   • Hemoglobin 10/05/2017 13.1  12.0 - 16.0 g/dL Final   • Hematocrit 10/05/2017 39.1  37.0 - 47.0 %  Final   • MCV 10/05/2017 93.3  81.0 - 99.0 fL Final   • MCH 10/05/2017 31.3* 27.0 - 31.0 pg Final   • MCHC 10/05/2017 33.5  31.0 - 37.0 g/dL Final   • RDW 10/05/2017 12.2  11.5 - 14.5 % Final   • Platelets 10/05/2017 232  140 - 500 10*3/mm3 Final   • Neutrophil Rel % 10/05/2017 70.3* 45.0 - 70.0 % Final   • Lymphocyte Rel % 10/05/2017 20.3  20.0 - 45.0 % Final   • Monocyte Rel % 10/05/2017 7.6  3.0 - 8.0 % Final   • Eosinophil Rel % 10/05/2017 0.7  0.0 - 4.0 % Final   • Basophil Rel % 10/05/2017 0.9  0.0 - 2.0 % Final   • Neutrophils Absolute 10/05/2017 3.15  1.50 - 8.30 10*3/mm3 Final   • Lymphocytes Absolute 10/05/2017 0.91  0.60 - 4.80 10*3/mm3 Final   • Monocytes Absolute 10/05/2017 0.34  0.00 - 1.00 10*3/mm3 Final   • Eosinophils Absolute 10/05/2017 0.03* 0.10 - 0.30 10*3/mm3 Final   • Basophils Absolute 10/05/2017 0.04  0.00 - 0.20 10*3/mm3 Final   • Immature Granulocyte Rel % 10/05/2017 0.2  0.0 - 0.5 % Final   • Immature Grans Absolute 10/05/2017 0.01  0.00 - 0.03 10*3/mm3 Final   • nRBC 10/05/2017 0.0  0.0 - 0.0 /100 WBC Final   • Glucose 10/05/2017 82  65 - 99 mg/dL Final   • BUN 10/05/2017 12  8 - 23 mg/dL Final   • Creatinine 10/05/2017 0.87  0.57 - 1.00 mg/dL Final   • eGFR Non  Am 10/05/2017 66  >60 mL/min/1.73 Final   • eGFR African Am 10/05/2017 80  >60 mL/min/1.73 Final   • BUN/Creatinine Ratio 10/05/2017 13.8  7.0 - 25.0 Final   • Sodium 10/05/2017 135* 136 - 145 mmol/L Final   • Potassium 10/05/2017 4.5  3.5 - 5.2 mmol/L Final   • Chloride 10/05/2017 96* 98 - 107 mmol/L Final   • Total CO2 10/05/2017 26.4  22.0 - 29.0 mmol/L Final   • Calcium 10/05/2017 9.5  8.8 - 10.5 mg/dL Final   • Total Protein 10/05/2017 6.7  6.0 - 8.5 g/dL Final   • Albumin 10/05/2017 4.00  3.50 - 5.20 g/dL Final   • Globulin 10/05/2017 2.7  gm/dL Final   • A/G Ratio 10/05/2017 1.5  g/dL Final   • Total Bilirubin 10/05/2017 0.5  0.2 - 1.2 mg/dL Final   • Alkaline Phosphatase 10/05/2017 105  40 - 129 U/L Final   • AST  (SGOT) 10/05/2017 23  5 - 32 U/L Final   • ALT (SGPT) 10/05/2017 15  5 - 33 U/L Final   • Total Cholesterol 10/05/2017 189  0 - 200 mg/dL Final   • Triglycerides 10/05/2017 65  0 - 150 mg/dL Final   • HDL Cholesterol 10/05/2017 70* 40 - 60 mg/dL Final   • VLDL Cholesterol 10/05/2017 13  7 - 27 mg/dL Final   • LDL Cholesterol  10/05/2017 106* 0 - 100 mg/dL Final   • Chol/HDL Ratio 10/05/2017 2.70   Final   • TSH 10/05/2017 3.020  0.270 - 4.200 mIU/mL Final   • 25 Hydroxy, Vitamin D 10/05/2017 49.8  ng/mL Final    Comment: Reference Range for Total Vitamin D 25(OH)  Deficiency    <20.0 ng/mL  Insufficiency 21-29 ng/mL  Sufficiency   30-74 ng/mL       Us Gallbladder    Result Date: 7/26/2018  Narrative: ULTRASOUND ABDOMEN, LIMITED, 7/26/2018  HISTORY: 62-year-old female referred for history of epigastric pain. The patient describes two month history of intermittent pain.  TECHNIQUE: Grayscale ultrasound imaging of the right upper abdomen.  FINDINGS: There is a single large gallstone within the gallbladder measuring about 2.4 cm in diameter. The gallbladder is nondistended. There is no gallbladder wall thickening or gallbladder tenderness. No intrahepatic or extrahepatic bile duct dilatation (CBD 2 mm).  Liver and pancreas are normal in size and appearance. No free fluid in the right upper abdomen. Right kidney is negative with no hydronephrosis.      Impression: 1. Cholelithiasis. Single large gallstone within otherwise normal-appearing gallbladder. 2. No bile duct dilatation.  This report was finalized on 7/26/2018 7:53 AM by Dr. Steve Rodriguez MD.        PHYSICAL EXAM  Physical Exam alert white female in no active distress she has no clinical jaundice.  Her heart shows regular rate and rhythm her lungs are clear and equal.  She has a lower midline abdominal scar with subjective epigastric and right upper quadrant tenderness without mass.  There is no flank tenderness.  Ultrasound of her gallbladder was reviewed by  me and shows a 2.4 cm gallstone.    ASSESSMENT  cholelithiasis      PLAN  The risks benefits and options were discussed with her in detail.  We'll proceed with a laparoscopic [facility]olecystectomy at [facility] on August 17.                              Electronically signed by Hernan Hinds MD at 7/31/2018 12:03 PM             ICU Vital Signs     Row Name 08/20/18 1116 08/20/18 1031 08/20/18 0610 08/20/18 0414 08/19/18 2359       Vitals    Temp 97.9 °F (36.6 °C)  -- 97.4 °F (36.3 °C) 97.5 °F (36.4 °C) 98 °F (36.7 °C)    Temp src Oral  -- Oral Oral Oral    Pulse 95 97 105 103 84    Heart Rate Source Monitor Monitor Monitor Monitor Monitor    Resp 16  -- 18 18 17    Resp Rate Source Visual  -- Visual Visual Visual    /100  -- 172/99 158/95 160/97    BP Location Right arm  --  --  --  --    BP Method Automatic  --  --  --  --    Patient Position Sitting  --  --  --  --       Oxygen Therapy    SpO2 96 % 97 % 98 % 96 % 98 %    Pulse Oximetry Type Intermittent Continuous  --  --  --    Device (Oxygen Therapy) room air room air  --  --  --    Row Name 08/19/18 2319 08/19/18 2153 08/19/18 2113 08/19/18 1940 08/19/18 1500       Vitals    Temp  --  --  -- 97.8 °F (36.6 °C) 97.9 °F (36.6 °C)    Temp src  --  --  -- Oral Oral    Pulse 88  --  -- 104 101    Heart Rate Source Monitor  --  -- Monitor Monitor    Resp  --  --  -- 17 18    Resp Rate Source  --  --  -- Visual Visual    /96  -- (!)  184/87   rn notified 161/98 180/93    BP Location Right arm  -- Right arm  -- Right arm    BP Method Automatic  -- Automatic  -- Automatic    Patient Position Lying  -- Lying  -- Lying       Oxygen Therapy    SpO2  --  --  -- 96 % 94 %    Device (Oxygen Therapy)  -- room air  --  --  --    Row Name 08/19/18 1100 08/19/18 0847 08/19/18 0623 08/19/18 0326 08/18/18 2345       Vitals    Temp 97.7 °F (36.5 °C)  -- 97.9 °F (36.6 °C) 97.8 °F (36.6 °C) 97 °F (36.1 °C)    Temp src Oral  -- Oral Oral Axillary    Pulse 90  -- 90  95 91    Heart Rate Source Monitor  -- Apical Monitor Monitor    Resp 18  -- 18 18 18    Resp Rate Source Visual  -- Monitor Visual Visual    /76  -- 103/66 168/92 150/86    BP Location Right arm  -- Right arm Right arm Right arm    BP Method Automatic  -- Automatic Automatic Automatic    Patient Position Lying  -- Lying Lying Lying       Oxygen Therapy    SpO2 91 %  -- 90 % 97 % 92 %    Pulse Oximetry Type  --  -- Continuous Continuous Continuous    Device (Oxygen Therapy)  -- nasal cannula nasal cannula nasal cannula nasal cannula    Flow (L/min)  -- 1 1 1 1    Row Name 08/18/18 2033 08/18/18 1918 08/18/18 1616 08/18/18 1613 08/18/18 1500       Vitals    Temp  -- 98 °F (36.7 °C)  --  -- 98.1 °F (36.7 °C)    Temp src  -- Oral  --  -- Oral    Pulse  -- 84  --  -- 87    Heart Rate Source  -- Monitor  --  -- Monitor    Resp  -- 18  --  -- 18    Resp Rate Source  -- Visual  --  -- Visual    BP  -- 101/53  --  -- 103/55    BP Location  -- Right arm  --  -- Right arm    BP Method  -- Automatic  --  -- Automatic    Patient Position  -- Sitting  --  -- Lying       Oxygen Therapy    SpO2  -- 95 % 94 % (!)  84 % 99 %    Pulse Oximetry Type  -- Continuous Continuous Continuous  --    Device (Oxygen Therapy) nasal cannula nasal cannula nasal cannula room air room air    Flow (L/min) 1 1 1  --  --    Row Name 08/18/18 1241 08/18/18 1118 08/18/18 1115 08/18/18 0734 08/18/18 0654       Vitals    Temp  -- 97.2 °F (36.2 °C)  --  -- 97.4 °F (36.3 °C)    Temp src  -- Oral  --  -- Oral    Pulse  -- 76  --  -- 83    Heart Rate Source  -- Monitor  --  -- Monitor    Resp  -- 18  --  -- 18    Resp Rate Source  -- Visual  --  -- Visual    BP  -- 95/55  --  -- 133/74    BP Location  -- Right arm  --  -- Right arm    BP Method  -- Automatic  --  -- Automatic    Patient Position  -- Lying  --  -- Lying       Oxygen Therapy    SpO2 96 % 94 % (!)  85 %  -- 98 %    Pulse Oximetry Type Continuous  -- Continuous  -- Continuous    Device  (Oxygen Therapy) room air nasal cannula room air   placed pt back on nasal cannula and RN notified room air room air    Flow (L/min)  -- 1  --  --  --    Row Name 08/18/18 0300 08/17/18 2343 08/17/18 2049 08/17/18 1922 08/17/18 1515       Vitals    Temp 97 °F (36.1 °C) 97.6 °F (36.4 °C)  -- 97.4 °F (36.3 °C) 97.3 °F (36.3 °C)    Temp src Oral Oral  -- Oral Oral    Pulse 77 75  -- 91 73    Heart Rate Source Monitor Monitor  -- Monitor Monitor    Resp 18 18  -- 18 20    Resp Rate Source Visual Visual  -- Visual Visual    /65 90/53  -- 97/56 130/79    BP Location Left arm Left arm  -- Left arm Right arm    BP Method Automatic Automatic  -- Automatic Automatic    Patient Position Lying Lying  -- Lying Lying       Oxygen Therapy    SpO2 96 % 94 %  -- 96 % 100 %    Pulse Oximetry Type Continuous Continuous  -- Continuous Continuous    Device (Oxygen Therapy) room air room air room air room air room air    Row Name 08/17/18 1434 08/17/18 1415 08/17/18 1345 08/17/18 1316 08/17/18 1315       Vitals    Pulse 75 76 79 80 79    Heart Rate Source Monitor Monitor Monitor Monitor  --    Resp 11 16 16 10 12    Resp Rate Source Monitor Visual Visual Monitor  --    BP  -- 109/59 109/55  -- 107/62    Noninvasive MAP (mmHg)  -- 79 91  -- 80    BP Location  -- Left arm Left arm  --  --    BP Method  -- Automatic Automatic  --  --    Patient Position  -- Lying Lying  --  --       Oxygen Therapy    SpO2 98 % 99 % 100 % 100 % 99 %    Pulse Oximetry Type Continuous Continuous Continuous Continuous  --    Device (Oxygen Therapy) room air room air room air nasal cannula  --    Flow (L/min)  --  --  -- 1.5   decreased to room air  --    ETCO2 (mmHg) 44 mmHg  --  -- 59 mmHg  --        Lines, Drains & Airways    Active LDAs     None         Inactive LDAs     Name:   Placement date:   Placement time:   Removal date:   Removal time:   Site:   Days:    [REMOVED] Peripheral IV 08/17/18 0710 Right Hand  08/17/18    0710    08/19/18    0432     Hand    1    [REMOVED] Peripheral IV 08/19/18 0433 Left Hand  08/19/18    0433    08/20/18    1114    Hand    1    [REMOVED] Closed/Suction Drain 1 Right Abdomen Bulb 10 Fr.  08/17/18    0904    08/20/18    1119    Abdomen    3    [REMOVED] NG/OG Tube Orogastric 18 Fr Center mouth  08/17/18    0802    08/17/18    0933    Center mouth    less than 1    [REMOVED] NG/OG Tube  08/17/18    0908    08/17/18    0908        less than 1    [REMOVED] ETT   08/17/18    0800 created via procedure documentation  08/17/18    0935      less than 1                Hospital Medications (all)       Dose Frequency Start End    ALPRAZolam (XANAX) tablet 0.5 mg 0.5 mg 2 Times Daily 8/17/2018     Sig - Route: Take 2 tablets by mouth 2 (Two) Times a Day. - Oral    dexamethasone (DECADRON) injection 8 mg 8 mg Once As Needed 8/17/2018 8/17/2018    Sig - Route: Infuse 2 mL into a venous catheter 1 (One) Time As Needed for Nausea or Vomiting. - Intravenous    enoxaparin (LOVENOX) syringe 40 mg 40 mg Daily 8/18/2018     Sig - Route: Inject 0.4 mL under the skin into the appropriate area as directed Daily. - Subcutaneous    fluticasone (FLONASE) 50 MCG/ACT nasal spray 2 spray 2 spray Daily 8/17/2018     Sig - Route: 2 sprays into the nostril(s) as directed by provider Daily. - Nasal    ketorolac (TORADOL) injection 30 mg 30 mg Every 6 Hours PRN 8/19/2018 8/24/2018    Sig - Route: Infuse 30 mg into a venous catheter Every 6 (Six) Hours As Needed for Moderate Pain . - Intravenous    lactated ringers infusion 75 mL/hr Continuous 8/17/2018     Sig - Route: Infuse 75 mL/hr into a venous catheter Continuous. - Intravenous    lidocaine PF 1% (XYLOCAINE) injection 0.5 mL 0.5 mL Once As Needed 8/17/2018 8/17/2018    Sig - Route: Inject 0.5 mL as directed 1 (One) Time As Needed (IV Start). - Injection    lisinopril (PRINIVIL,ZESTRIL) tablet 10 mg 10 mg Every 24 Hours Scheduled 8/17/2018     Sig - Route: Take 1 tablet by mouth Daily. - Oral    melatonin 5  MG sublingual tablet  - ADS Override Pull   8/18/2018 8/19/2018    Notes to Pharmacy: Created by cabinet override    melatonin tablet 5 mg 5 mg Nightly PRN 8/18/2018     Sig - Route: Take 1 tablet by mouth At Night As Needed (sleep). - Oral    ondansetron (ZOFRAN) injection 4 mg 4 mg Once As Needed 8/17/2018 8/17/2018    Sig - Route: Infuse 2 mL into a venous catheter 1 (One) Time As Needed for Nausea or Vomiting. - Intravenous    ondansetron (ZOFRAN) injection 4 mg 4 mg Every 4 Hours PRN 8/17/2018     Sig - Route: Infuse 2 mL into a venous catheter Every 4 (Four) Hours As Needed for Nausea or Vomiting. - Intravenous    oxyCODONE-acetaminophen (PERCOCET) 5-325 MG per tablet 1 tablet 1 tablet Every 4 Hours PRN 8/19/2018 8/29/2018    Sig - Route: Take 1 tablet by mouth Every 4 (Four) Hours As Needed for Moderate Pain  or Severe Pain . - Oral    pantoprazole (PROTONIX) EC tablet 40 mg 40 mg Every Morning 8/18/2018     Sig - Route: Take 2 tablets by mouth Every Morning. - Oral    PARoxetine (PAXIL) tablet 30 mg 30 mg Every Morning 8/18/2018     Sig - Route: Take 30 mg by mouth Every Morning. - Oral    polyethylene glycol 3350 powder (packet) 17 g Daily 8/19/2018     Sig - Route: Take 17 g by mouth Daily. - Oral    promethazine (PHENERGAN) 25 MG/ML injection  - ADS Override Pull   8/18/2018 8/19/2018    Notes to Pharmacy: Created by cabinet override    promethazine (PHENERGAN) 25 MG/ML injection  - ADS Override Pull   8/19/2018 8/20/2018    Notes to Pharmacy: Created by cabinet override    promethazine (PHENERGAN) IV syringe 12.5 mg 12.5 mg Every 6 Hours PRN 8/18/2018     Sig - Route: Infuse 12.5 mg into a venous catheter Every 6 (Six) Hours As Needed for Nausea or Vomiting. - Intravenous    sodium chloride 0.9 % infusion  - ADS Override Pull   8/19/2018 8/19/2018    Notes to Pharmacy: Created by cabinet override    SUMAtriptan (IMITREX) tablet 100 mg 100 mg Once As Needed 8/17/2018     Sig - Route: Take 4 tablets by  mouth 1 (One) Time As Needed for Migraine. - Oral    zolpidem (AMBIEN) tablet 10 mg 10 mg Nightly PRN 8/19/2018 8/29/2018    Sig - Route: Take 2 tablets by mouth At Night As Needed for Sleep. - Oral    bupivacaine-EPINEPHrine PF (MARCAINE w/EPI) 0.5% -1:993739 injection (Discontinued)  As Needed 8/17/2018 8/17/2018    Sig: As Needed.    Reason for Discontinue: Patient Discharge    ceFAZolin (ANCEF) IVPB (duplex) 2 g (Discontinued) 2 g Once 8/17/2018 8/17/2018    Sig - Route: Infuse 2,000 mg into a venous catheter 1 (One) Time. - Intravenous    Reason for Discontinue: Patient Transfer    fentaNYL citrate (PF) (SUBLIMAZE) injection 25 mcg (Discontinued) 25 mcg Every 5 Minutes PRN 8/17/2018 8/17/2018    Sig - Route: Infuse 0.5 mL into a venous catheter Every 5 (Five) Minutes As Needed for Moderate Pain  (Use for breakthrough pain, for 4 doses. Maximum total dose of fentanyl is 100 mcg.). - Intravenous    Reason for Discontinue: Patient Transfer    glycopyrrolate (ROBINUL) injection 0.1 mg (Discontinued) 0.1 mg 60 Minutes Pre-Op 8/17/2018 8/17/2018    Sig - Route: Infuse 0.5 mL into a venous catheter 60 Minutes Prior to Surgery. - Intravenous    Reason for Discontinue: Patient Transfer    HYDROmorphone (DILAUDID) injection 0.5 mg (Discontinued) 0.5 mg Every 10 Minutes PRN 8/17/2018 8/17/2018    Sig - Route: Infuse 0.5 mL into a venous catheter Every 10 (Ten) Minutes As Needed for Moderate Pain . - Intravenous    Reason for Discontinue: Patient Transfer    HYDROmorphone (DILAUDID) injection 0.5 mg (Discontinued) 0.5 mg Every 2 Hours PRN 8/17/2018 8/17/2018    Sig - Route: Infuse 0.5 mL into a venous catheter Every 2 (Two) Hours As Needed (pain). - Intravenous    HYDROmorphone (DILAUDID) injection 1 mg (Discontinued) 1 mg Every 10 Minutes PRN 8/17/2018 8/17/2018    Sig - Route: Infuse 1 mL into a venous catheter Every 10 (Ten) Minutes As Needed for Severe Pain . - Intravenous    Reason for Discontinue: Patient Transfer     "HYDROmorphone (DILAUDID) injection 1 mg (Discontinued) 1 mg Every 2 Hours PRN 8/17/2018 8/19/2018    Sig - Route: Infuse 1 mL into a venous catheter Every 2 (Two) Hours As Needed (pain). - Intravenous    HYDROmorphone (DILAUDID) tablet 2 mg (Discontinued) 2 mg Every 4 Hours PRN 8/17/2018 8/19/2018    Sig - Route: Take 1 tablet by mouth Every 4 (Four) Hours As Needed (Pain). - Oral    lactated ringers infusion (Discontinued) 9 mL/hr Continuous PRN 8/17/2018 8/17/2018    Sig - Route: Infuse 9 mL/hr into a venous catheter Continuous As Needed (Start Prior to Surgery). - Intravenous    Reason for Discontinue: Patient Transfer    lactated ringers infusion (Discontinued) 100 mL/hr Continuous 8/17/2018 8/17/2018    Sig - Route: Infuse 100 mL/hr into a venous catheter Continuous. - Intravenous    Reason for Discontinue: Duplicate order    meperidine (DEMEROL) injection 12.5 mg (Discontinued) 12.5 mg Every 5 Minutes PRN 8/17/2018 8/17/2018    Sig - Route: Infuse 0.5 mL into a venous catheter Every 5 (Five) Minutes As Needed for Shivering (May repeat x 1). - Intravenous    Reason for Discontinue: Patient Transfer    midazolam (VERSED) injection 1 mg (Discontinued) 1 mg Every 5 Minutes PRN 8/17/2018 8/17/2018    Sig - Route: Infuse 1 mL into a venous catheter Every 5 (Five) Minutes As Needed for Anxiety (Max 4mg midazolam TOTAL). - Intravenous    Reason for Discontinue: Patient Transfer    Linked Group 1:  \"Or\" Linked Group Details        midazolam (VERSED) injection 2 mg (Discontinued) 2 mg Every 5 Minutes PRN 8/17/2018 8/17/2018    Sig - Route: Infuse 2 mL into a venous catheter Every 5 (Five) Minutes As Needed for Anxiety (Max 4mg midazolam TOTAL). - Intravenous    Reason for Discontinue: Patient Transfer    Linked Group 1:  \"Or\" Linked Group Details        morphine injection 2 mg (Discontinued) 2 mg Every 2 Hours PRN 8/17/2018 8/17/2018    Sig - Route: Infuse 1 mL into a venous catheter Every 2 (Two) Hours As Needed for " "Moderate Pain . - Intravenous    Linked Group 2:  \"Or\" Linked Group Details        Morphine injection 4 mg (Discontinued) 4 mg Every 2 Hours PRN 8/17/2018 8/17/2018    Sig - Route: Infuse 0.4 mL into a venous catheter Every 2 (Two) Hours As Needed for Severe Pain . - Intravenous    Linked Group 2:  \"Or\" Linked Group Details        ondansetron (ZOFRAN) injection 4 mg (Discontinued) 4 mg Once As Needed 8/17/2018 8/17/2018    Sig - Route: Infuse 2 mL into a venous catheter 1 (One) Time As Needed for Nausea or Vomiting. - Intravenous    Reason for Discontinue: Patient Transfer    Scopolamine (TRANSDERM-SCOP) 1.5 MG/3DAYS patch 1 patch (Discontinued) 1 patch Once 8/17/2018 8/18/2018    Sig - Route: Place 1 patch on the skin as directed by provider 1 (One) Time. - Transdermal    sodium chloride 0.9 % flush 1-10 mL (Discontinued) 1-10 mL As Needed 8/17/2018 8/17/2018    Sig - Route: Infuse 1-10 mL into a venous catheter As Needed for Line Care. - Intravenous    Reason for Discontinue: Patient Transfer    sodium chloride 0.9 % flush 1-10 mL (Discontinued) 1-10 mL As Needed 8/17/2018 8/17/2018    Sig - Route: Infuse 1-10 mL into a venous catheter As Needed for Line Care. - Intravenous    Reason for Discontinue: Patient Transfer    sodium chloride 0.9 % infusion 40 mL (Discontinued) 40 mL As Needed 8/17/2018 8/17/2018    Sig - Route: Infuse 40 mL into a venous catheter As Needed for Line Care. - Intravenous    Reason for Discontinue: Patient Transfer    sodium chloride 0.9 % infusion 40 mL (Discontinued) 40 mL As Needed 8/17/2018 8/17/2018    Sig - Route: Infuse 40 mL into a venous catheter As Needed for Line Care. - Intravenous    Reason for Discontinue: Patient Transfer    sterile water irrigation solution (Discontinued)  As Needed 8/17/2018 8/17/2018    Sig: As Needed.    Reason for Discontinue: Patient Discharge            Lab Results (last 24 hours)     Procedure Component Value Units Date/Time    Wound Culture - Wound, " Abdominal Wall [[id number]]  (Normal) Collected:  08/18/18 1027    Specimen:  Wound from Abdominal Wall Updated:  08/20/18 0938     Wound Culture No growth at 2 days     Gram Stain Result No WBCs or organisms seen             Physician Progress Notes (last 72 hours) (Notes from 8/17/2018 12:47 PM through 8/20/2018 12:47 PM)      Hernan Hinds MD at 8/20/2018 10:47 AM        Without complaints.  She is tolerating by mouth and is passing flatus.  Her Randal-Gates drain is putting out serous returns and was removed.  Her incision is without signs of infection.  Her pain is controlled.  She is off oxygen.  She will be discharged home today to follow-up in my office on Monday    Electronically signed by Hernan Hinds MD at 8/20/2018 10:47 AM     Ana Lance MD at 8/19/2018  1:21 PM          SERVICE: [facility] HOSPITALIST    CONSULTANTS:  Gen Surg - Primary    CHIEF COMPLAINT: HTN, Hypoxia    SUBJECTIVE:    Pt complains of nausea, and overall feeling crummy. However was on room air, and denied soa, or orthopnea.    Later was up walking halls with  w/o o2.    OBJECTIVE:    /76 (BP Location: Right arm, Patient Position: Lying)   Pulse 90   Temp 97.7 °F (36.5 °C) (Oral)   Resp 18   Wt 55.9 kg (123 lb 3.2 oz)   SpO2 91%   BMI 23.28 kg/m²      MEDS/LABS REVIEWED AND ORDERED      ALPRAZolam 0.5 mg Oral BID   enoxaparin 40 mg Subcutaneous Daily   fluticasone 2 spray Nasal Daily   lisinopril 10 mg Oral Q24H   pantoprazole 40 mg Oral QAM   PARoxetine 30 mg Oral QAM   polyethylene glycol 17 g Oral Daily       Physical Exam   Constitutional: No distress.   Eyes: Pupils are equal, round, and reactive to light.   Cardiovascular: Normal rate, regular rhythm and normal heart sounds.    No murmur heard.  Pulmonary/Chest: Effort normal and breath sounds normal. No respiratory distress. She has no wheezes.   Abdominal: Soft. Bowel sounds are normal.   Musculoskeletal: She exhibits no  edema.   Neurological: She is alert.   Skin: Skin is warm and dry. She is not diaphoretic.   Psychiatric:   Flat restricted affect, depressed anxious mood       LAB/DIAGNOSTICS:    Lab Results (last 24 hours)     Procedure Component Value Units Date/Time    Wound Culture - Wound, Abdominal Wall [[id number]]  (Normal) Collected:  08/18/18 1027    Specimen:  Wound from Abdominal Wall Updated:  08/19/18 0810     Wound Culture No growth           No radiology results for the last day      ASSESSMENT/PLAN:  Post Op hypertension  - 's-110's currently  - ON home 10mg lisinoprl  - Hold for SBP <=95    Hypoxia  - Had been requiring 2L to maintain sats 92-94% w/ no history of smoking or lung issues  - Sating 95% on Room air, ambulated without oxygen      Somatic Complaints w/ flat affect  - Already on Paxil, will leave possible switch to duloxetine for somatic complaints to PCP  - Restarted Ambien, given chemical dependence, likely pt not sleeping, which can worsen somatic complaints    Electronically signed by Ana Lance MD at 8/19/2018  5:26 PM     Bebe Thomas DO at 8/19/2018  8:35 AM        POD 2.  Martina was sitting up in bed eating pudding but said she had been throwing up all night and was not hungry.  She said the vomit was dark.  The nursing staff said she's spitting up not throwing up.  Martina is worried she will have to have another colostomy.  She also said she is not getting her Miralax which she needs.  She says she has taken Percocet for pain in the past.  Her abdomen is soft with good bowel sounds.  Her drain sponge is saturated with serous fluid.  At this point we will add Miralax and stop the Dilaudid.  I have encourage ambulation.    Electronically signed by Bebe Thomas DO at 8/19/2018  8:37 AM     Ana Lance MD at 8/18/2018  7:27 PM          SERVICE: [facility] HOSPITALIST    CONSULTANTS:  Gen Surgery - primary    CHIEF COMPLAINT: Post Op  HTN    SUBJECTIVE:    Pain not well controlled, but past due for pain medication, reminded pt that pain medication needs to be asked for.    denies cp, soa    OBJECTIVE:    /53 (BP Location: Right arm, Patient Position: Sitting)   Pulse 84   Temp 98 °F (36.7 °C) (Oral)   Resp 18   Wt 55.9 kg (123 lb 3.2 oz)   SpO2 95%   BMI 23.28 kg/m²      MEDS/LABS REVIEWED AND ORDERED      ALPRAZolam 0.5 mg Oral BID   enoxaparin 40 mg Subcutaneous Daily   fluticasone 2 spray Nasal Daily   lisinopril 10 mg Oral Q24H   pantoprazole 40 mg Oral QAM   PARoxetine 30 mg Oral QAM       Physical Exam   Constitutional:   In acute pain   Eyes: Pupils are equal, round, and reactive to light.   Neck: No tracheal deviation present.   Cardiovascular: Normal rate, regular rhythm and normal heart sounds.    No murmur heard.  Pulmonary/Chest: Breath sounds normal. No stridor. No respiratory distress. She has no wheezes. She has no rales.   Swallow breathing   Abdominal:   Post surgical distension, and tenderness  Not an acute abdomen   Musculoskeletal: She exhibits no edema.   Neurological: She is alert.   Skin: Skin is warm and dry. No rash noted. She is not diaphoretic. No erythema.   Nursing note and vitals reviewed.      LAB/DIAGNOSTICS:    Lab Results (last 24 hours)     Procedure Component Value Units Date/Time    Wound Culture - Wound, Abdominal Wall [[id number]] Collected:  08/18/18 1027    Specimen:  Wound from Abdominal Wall Updated:  08/18/18 1030    Anaerobic Culture - Swab, Abdominal Wall [[id number]] Collected:  08/18/18 1027    Specimen:  Swab from Abdominal Wall Updated:  08/18/18 1030    Basic Metabolic Panel [[id number]]  (Abnormal) Collected:  08/18/18 0341    Specimen:  Blood Updated:  08/18/18 0504     Glucose 124 (H) mg/dL      BUN 8 mg/dL      Creatinine 0.68 mg/dL      Sodium 135 (L) mmol/L      Potassium 4.1 mmol/L      Chloride 97 (L) mmol/L      CO2 27.0 mmol/L      Calcium 9.0 mg/dL      eGFR Non African Amer  88 mL/min/1.73      BUN/Creatinine Ratio 11.8     Anion Gap 11.0 mmol/L     Narrative:       GFR Normal >60  Chronic Kidney Disease <60  Kidney Failure <15    CBC & Differential [[id number]] Collected:  08/18/18 0341    Specimen:  Blood Updated:  08/18/18 0442    Narrative:       The following orders were created for panel order CBC & Differential.  Procedure                               Abnormality         Status                     ---------                               -----------         ------                     CBC Auto Differential[[id number]]        Abnormal            Final result                 Please view results for these tests on the individual orders.    CBC Auto Differential [[id number]]  (Abnormal) Collected:  08/18/18 0341    Specimen:  Blood Updated:  08/18/18 0442     WBC 11.42 (H) 10*3/mm3      RBC 3.73 (L) 10*6/mm3      Hemoglobin 11.5 (L) g/dL      Hematocrit 35.5 (L) %      MCV 95.2 fL      MCH 30.8 pg      MCHC 32.4 g/dL      RDW 12.1 %      RDW-SD 41.7 fl      MPV 10.0 fL      Platelets 224 10*3/mm3      Neutrophil % 88.5 (H) %      Lymphocyte % 5.9 (L) %      Monocyte % 5.1 %      Eosinophil % 0.0 %      Basophil % 0.2 %      Immature Grans % 0.3 %      Neutrophils, Absolute 10.12 (H) 10*3/mm3      Lymphocytes, Absolute 0.67 10*3/mm3      Monocytes, Absolute 0.58 10*3/mm3      Eosinophils, Absolute 0.00 (L) 10*3/mm3      Basophils, Absolute 0.02 10*3/mm3      Immature Grans, Absolute 0.03 10*3/mm3      nRBC 0.0 /100 WBC            No radiology results for the last day      ASSESSMENT/PLAN:    Post Op hypertension  - 's-110's currently  - ON home 10mg lisinoprl  - Hold for SBP <=95    Hypoxemia  - Never smoker  - Continue to wean o2 as tolerated for stats 92 - 96%    Insomnia  - may be difficult for pt to sleep without ambien due to substance dependence effects  - added 5mg melatonin      Electronically signed by Ana Lance MD at 8/18/2018 10:39 PM     Bebe Thomas  DO at 2018 10:00 AM        POD 1 lap elena converted to open elena.  She had some nausea but that is now gone.  She has passed flatus.  Her dressings were saturated with serous fluid.  Her wounds looked good but she did have purulent drainage in her umbilicus.  She has psoriasis around the umbilicus as well.  Cultures were obtained.  Her abdomen is soft with good bowel sounds.  We will advance her diet.      Electronically signed by Bebe Thomas DO at 2018 10:03 AM     Hernan Hinds MD at 2018  4:13 PM        She complains of pain postoperatively.  She says her IV morphine has not been effective for her in the past.  Her vital signs are stable.  I will adjust her pain regimen.  Her surgery was discussed.    Electronically signed by Hernan Hinds MD at 2018  4:14 PM          Consult Notes (last 72 hours) (Notes from 2018 12:47 PM through 2018 12:47 PM)      Tuan Odom MD at 2018  6:35 PM      Consult Orders:    1. Inpatient Internal Medicine Consult [[id number]] ordered by Hernan Hinds MD at 18 0941                Patient Name: Martina Hardwick  :1955  62 y.o.    Date of Hospital Visit: 2018  6:38 AM  6:35 PM    Encounter Provider: Tuan Odom MD    Place of Service: [facility].  [facility] Hospitalist group.    Patient Care Team:  Ildefonso Dubois MD as PCP - General (Family Medicine)  Hernan Hinds MD as Surgeon (General Surgery)      Chief complaint:  Post op HTN , medicine management   Requested by DR PAULINA Hinds.    History of Present Illness:  63 yo WF s/p open GB removal and ROXI today  Had months of RUQ abdominal post prandial pain.  Has well controlled HTN, anxiety, headaches at home  No recent fever , CP, SOA  No h/o DVT      Past Medical History:   Diagnosis Date   • Abdominal pain    • Anxiety    • Anxiety    • Arthritis    • Constipation    • Depressed    • Depression     • Endometriosis    • Gastritis    • GERD (gastroesophageal reflux disease)    • Headache    • Headache, tension-type    • Hypertension    • Insomnia    • Migraine    • OP (osteoporosis)    • Poor sleep    • Psoriasis    • Seasonal allergies        Past Surgical History:   Procedure Laterality Date   • APPENDECTOMY     • COLON SURGERY     • COLONOSCOPY     • DIAGNOSTIC LAPAROSCOPY  1990   • DILATATION AND CURETTAGE  1985   • ENDOSCOPY N/A 5/13/2016    Procedure: ESOPHAGOGASTRODUODENOSCOPY ;  Surgeon: Darien uRff MD;  Location: [facility];  Service:    • HYSTERECTOMY     • REVISION / TAKEDOWN COLOSTOMY           Prior to Admission medications    Medication Sig Start Date End Date Taking? Authorizing Provider   ALPRAZolam (XANAX) 0.5 MG tablet Take 1 tablet by mouth 2 (Two) Times a Day. 8/16/18   Ildefonso Dubois MD   Calcium-Vitamin D (CALTRATE 600 PLUS-VIT D PO) Take  by mouth.    Julius Cortes MD   fluticasone (FLONASE) 50 MCG/ACT nasal spray 2 sprays into each nostril daily.    Julius Cortes MD   lisinopril (PRINIVIL,ZESTRIL) 10 MG tablet TAKE ON PO  DAILY 2/9/18   Ildefonso Dubois MD   lisinopril (PRINIVIL,ZESTRIL) 5 MG tablet 10 mg. 7/25/18   Julius Cortes MD   loratadine (CLARITIN) 10 MG tablet Take 10 mg by mouth daily.    Julius Cortes MD   Multiple Vitamins-Minerals (EYE VITAMINS) capsule Take  by mouth.    Julius Cortes MD   Multiple Vitamins-Minerals (MULTI ADULT GUMMIES PO) Take  by mouth.    Julius Cortes MD   omeprazole (PriLOSEC) 40 MG capsule Take 20 mg by mouth Daily. Pt takes 1/3 per day    Julius Cortes MD   PARoxetine (PAXIL) 30 MG tablet Take 1 tablet by mouth Every Morning. 1/15/18   Ildefonso Dubois MD   polyethylene glycol (MIRALAX) packet Take 17 g by mouth daily.    Julius Cortes MD   Probiotic Product (PROBIOTIC DAILY) capsule Once daily 9/28/16   Ildefonso Dubois MD   ranitidine (ZANTAC) 300  MG tablet Take 150 mg by mouth Daily. 9/14/16   ProviderJulius MD   SUMAtriptan (IMITREX) 100 MG tablet Take 1 tablet by mouth 1 (One) Time As Needed for Migraine for up to 1 dose. 1/15/18   Ildefonso Dubois MD   zolpidem (AMBIEN) 10 MG tablet TAKE ONE TABLET BY MOUTH ONCE NIGHTLY 7/16/18   Ildefonso Dubois MD   gabapentin (NEURONTIN) 100 MG capsule  4/27/18 8/17/18  ProviderJulius MD       Allergies   Allergen Reactions   • Hydrocodone Nausea And Vomiting   • Oxycodone Nausea And Vomiting   • Sulfa Antibiotics Nausea And Vomiting       Social History     Social History   • Marital status:      Social History Main Topics   • Smoking status: Never Smoker   • Smokeless tobacco: Never Used   • Alcohol use Yes      Comment: occ   • Drug use: No   • Sexual activity: Defer     Other Topics Concern   • Not on file       Family History   Problem Relation Age of Onset   • Hyperlipidemia Mother    • Macular degeneration Mother    • Heart disease Father    • Hyperlipidemia Father    • Cancer Father    • Depression Father    • Depression Sister    • Hyperlipidemia Brother    • Depression Brother    • Breast cancer Maternal Aunt    • Breast cancer Cousin        REVIEW OF SYSTEMS:   All other systems reviewed and negative.        Objective:     Vitals:    08/17/18 1316 08/17/18 1345 08/17/18 1415 08/17/18 1515   BP:  109/55 109/59 130/79   BP Location:  Left arm Left arm Right arm   Patient Position:  Lying Lying Lying   Pulse: 80 79 76 73   Resp: 10 16 16 20   Temp:    97.3 °F (36.3 °C)   TempSrc:    Oral   SpO2: 100% 100% 99% 100%   Weight:         Body mass index is 23.28 kg/m².    Constitutional: Patient is oriented to person, place, and time. Patient appears well-developed. Does not appear in pain holding abdomen.   HENT:   Head: Normocephalic and atraumatic. Head is without contusion.   Right and Left Ear: Hearing normal to conversational tones. No visible drainage.    Nose: No epistaxis.  External nose normal.   Eyes: Lids are normal. Pupils are equal, round, and reactive to light. Right and left eye exhibit no exudate. Conjunctiva has no hemorrhage.   Neck: No JVD present. No carotid bruit. No tracheal deviation present. No thyroid mass and no thyromegaly.  Cardiovascular: Normal rate, regular rhythm and normal heart sounds.    Pulses:present in radial and DP  Pulmonary/Chest: Effort normal and breath sounds normal.   Abdominal: Soft. . There is  tenderness. lg surgical bandage  Musculoskeletal: Normal range of motion.   Neurological: Patient is alert and oriented to person, place, and time.  Normal strength.   Skin: Skin is warm, dry and intact. No rash noted.   Psychiatric:Patient has a normal mood and affect. Patient behavior is normal. Thought content normal.       Lab Review:   Lab Results   Component Value Date    WBC 6.06 08/09/2018    HGB 12.8 08/09/2018    HCT 38.6 08/09/2018    MCV 94.1 08/09/2018     08/09/2018        No results found for: CKTOTAL, CKMB, CKMBINDEX, TROPONINI, TROPONINT     Lab Results   Component Value Date    GLUCOSE 87 08/09/2018    BUN 12 08/09/2018    CREATININE 0.88 08/09/2018    EGFRIFNONA 65 08/09/2018    EGFRIFAFRI 80 10/05/2017    BCR 13.6 08/09/2018    CO2 29.4 (H) 08/09/2018    CALCIUM 9.5 08/09/2018    PROTENTOTREF 6.7 10/05/2017    ALBUMIN 4.00 08/09/2018    LABIL2 1.5 10/05/2017    AST 18 08/09/2018    ALT 11 08/09/2018       I  reviewed all pertinent data in our EMR.     Imaging Results (last 24 hours)     ** No results found for the last 24 hours. **            Assessment and Plan:       1.s/p open cholecystomy for gallstone.  Extensive ROXI.  Plan, post op care, IVF, iv pain meds    2.  Well controlled HTN on lisinopril    3. Anxiety , resume home Zoloft.      Tuan Odom MD  08/17/18  6:35 PM              Electronically signed by Tuan Odom MD at 8/17/2018  6:47 PM

## 2018-08-20 NOTE — PLAN OF CARE
Problem: Skin Injury Risk (Adult)  Goal: Skin Health and Integrity  Outcome: Ongoing (interventions implemented as appropriate)   08/20/18 0538   Skin Injury Risk (Adult)   Skin Health and Integrity making progress toward outcome       Problem: Cholecystectomy (Adult)  Goal: Signs and Symptoms of Listed Potential Problems Will be Absent, Minimized or Managed (Cholecystectomy)  Outcome: Ongoing (interventions implemented as appropriate)   08/20/18 0538   Goal/Outcome Evaluation   Problems Assessed (Cholecystectomy) all   Problems Present (Cholecystectomy) none

## 2018-08-20 NOTE — DISCHARGE SUMMARY
Admission date 8/17/18  Discharge date 8/20/18  Admission diagnosis cholelithiasis status post laparoscopic converted to an open cholecystectomy, extensive adhesions  Discharge diagnosis is same pathology pending  Procedure laparoscopic converted to an open cholecystectomy 8/17/18  Prognosis fair  Disposition fair  Diet as tolerated  Activity she may shower in the a.m. no lifting more than 5 pounds ×6 weeks, she should change dry gauze on stressing as needed over her drain site  Discharge medications as per prehospitalization plus Percocet 5/325 one by mouth every 4 hours when necessary pain 30 these were dispensed without refills  Follow-up in my office in one week  Hospital course patient was admitted after a laparoscopic cholecystectomy was converted to an open cholecystectomy secondary to extensive intra-abdominal adhesions.  She was seen by the hospitalist in consultation.  Initially her O2 sats were low and supplemental oxygen was used.  Her oxygenation improved and her O2 was discontinued.  She had some somatic complaints and was felt it was possibly due to her not getting her Ambien so that was restarted.  Her Randal-Gates drain is putting out serous returns and was removed today.  She's tolerating by mouth passing flatus or incisions are without signs of infection and Percocet is controlling her pain.  Of note she listed oxycodone as an allergy but she's tolerating Percocet in the hospital.  She will be discharged home today to follow-up in my office in one week she should call for problems.

## 2018-08-20 NOTE — NURSING NOTE
Case Management Discharge Note    Final Note: dc home to self care    Destination     No service has been selected for the patient.      Durable Medical Equipment     No service has been selected for the patient.      Dialysis/Infusion     No service has been selected for the patient.      Home Medical Care     No service has been selected for the patient.      Social Care     No service has been selected for the patient.             Final Discharge Disposition Code: 01 - home or self-care

## 2018-08-20 NOTE — PLAN OF CARE
Problem: Patient Care Overview  Goal: Plan of Care Review  Outcome: Ongoing (interventions implemented as appropriate)   08/20/18 5413   Coping/Psychosocial   Plan of Care Reviewed With patient   Plan of Care Review   Progress improving

## 2018-08-21 ENCOUNTER — READMISSION MANAGEMENT (OUTPATIENT)
Dept: CALL CENTER | Facility: HOSPITAL | Age: 63
End: 2018-08-21

## 2018-08-21 LAB
BACTERIA SPEC AEROBE CULT: NORMAL
GRAM STN SPEC: NORMAL
LAB AP CASE REPORT: NORMAL
LAB AP CLINICAL INFORMATION: NORMAL
PATH REPORT.FINAL DX SPEC: NORMAL

## 2018-08-21 NOTE — OUTREACH NOTE
Prep Survey      Responses   Facility patient discharged from?  [facility]   Is LACE score < 7 ?  Yes   Is patient eligible?  Yes   Discharge diagnosis  sp lap elena   Does the patient have one of the following disease processes/diagnoses(primary or secondary)?  Other   Does the patient have Home health ordered?  No   Is there a DME ordered?  No   General alerts for this patient  Surgical pt, lace <7   Prep survey completed?  Yes          Irish Adair RN

## 2018-08-21 NOTE — PAYOR COMM NOTE
"Martina Smith (62 y.o. Female)     ATTN: MORALES SNEED  MEMBER ID:[id number]  FAXING DISCHARGE INFO ON PENDING INPT REVIEW. PLEASE REPLY TO SUKI MCPHERSON AT FAX#[phone] OR PHONE#[phone]. THANK YOU.       Date of Birth Social Security Number Address Home Phone MRN    1955  [address] [facility] [address] [phone] [id number]    [employer]eterian Marital Status          Rastafarian        Admission Date Admission Type Admitting Provider Attending Provider Department, Room/Bed    8/17/18 Elective Hernan Hinds MD  [facility] SURG, 1413/1    Discharge Date Discharge Disposition Discharge Destination        8/20/2018 Home or Self Care              Attending Provider:  (none)   Allergies:  Hydrocodone, Sulfa Antibiotics    Isolation:  None   Infection:  None   Code Status:  Prior    Ht:  154.9 cm (61\")   Wt:  55.9 kg (123 lb 3.2 oz)    Admission Cmt:  None   Principal Problem:  Calculus of gallbladder without cholecystitis without obstruction [K80.20] More...                 Active Insurance as of 8/17/2018     Primary Coverage     Payor Plan Insurance Group Employer/Plan Group    [employer] [facility] [id number]     Payor Plan Address Payor Plan Phone Number Effective From Effective To    PO   1/15/2018     [facility] [id number]       Subscriber Name Subscriber Birth Date Member ID       MARTINA SMITH 1955 [id number]                 Emergency Contacts      (Rel.) Home Phone Work Phone Mobile Phone    López Smith (Spouse) -- -- [phone]    Ruben Zhou (Brother) -- -- --               Discharge Summary      Hernan Hinds MD at 8/20/2018 10:52 AM        Admission date 8/17/18  Discharge date 8/20/18  Admission diagnosis cholelithiasis status post laparoscopic converted to an open cholecystectomy, extensive adhesions  Discharge diagnosis is same pathology pending  Procedure laparoscopic converted to an open cholecystectomy 8/17/18  Prognosis " fair  Disposition fair  Diet as tolerated  Activity she may shower in the a.m. no lifting more than 5 pounds ×6 weeks, she should change dry gauze on stressing as needed over her drain site  Discharge medications as per prehospitalization plus Percocet 5/325 one by mouth every 4 hours when necessary pain 30 these were dispensed without refills  Follow-up in my office in one week  Hospital course patient was admitted after a laparoscopic cholecystectomy was converted to an open cholecystectomy secondary to extensive intra-abdominal adhesions.  She was seen by the hospitalist in consultation.  Initially her O2 sats were low and supplemental oxygen was used.  Her oxygenation improved and her O2 was discontinued.  She had some somatic complaints and was felt it was possibly due to her not getting her Ambien so that was restarted.  Her Randal-Gates drain is putting out serous returns and was removed today.  She's tolerating by mouth passing flatus or incisions are without signs of infection and Percocet is controlling her pain.  Of note she listed oxycodone as an allergy but she's tolerating Percocet in the hospital.  She will be discharged home today to follow-up in my office in one week she should call for problems.    Electronically signed by Hernan Hinds MD at 8/20/2018 10:56 AM       Discharge Order     Start     Ordered    08/20/18 1051  Discharge patient  Once     Expected Discharge Date:  08/20/18    Discharge Disposition:  Home or Self Care    Physician of Record for Attribution - Please select from Treatment Team:  HERNAN HINDS [[id number]]    Review needed by CMO to determine Physician of Record:  No       Question Answer Comment   Physician of Record for Attribution - Please select from Treatment Team HERNAN HINDS    Review needed by CMO to determine Physician of Record No        08/20/18 1052

## 2018-08-23 ENCOUNTER — READMISSION MANAGEMENT (OUTPATIENT)
Dept: CALL CENTER | Facility: HOSPITAL | Age: 63
End: 2018-08-23

## 2018-08-23 DIAGNOSIS — I10 ESSENTIAL HYPERTENSION: ICD-10-CM

## 2018-08-23 LAB — BACTERIA SPEC ANAEROBE CULT: NORMAL

## 2018-08-23 RX ORDER — LISINOPRIL 10 MG/1
TABLET ORAL
Qty: 90 TABLET | Refills: 3 | Status: SHIPPED | OUTPATIENT
Start: 2018-08-23 | End: 2018-09-20 | Stop reason: DRUGHIGH

## 2018-08-23 NOTE — OUTREACH NOTE
LAG < 7 Survey      Responses   Facility patient discharged from?  [facility]   Does the patient have one of the following disease processes/diagnoses(primary or secondary)?  Other   Is there a successful TCM telephone encounter documented?  No   [facility] <7 Attempt successful?  No   Unsuccessful attempts  Attempt 1          Laurent Cooper RN

## 2018-08-27 ENCOUNTER — OFFICE VISIT (OUTPATIENT)
Dept: SURGERY | Facility: CLINIC | Age: 63
End: 2018-08-27

## 2018-08-27 ENCOUNTER — READMISSION MANAGEMENT (OUTPATIENT)
Dept: CALL CENTER | Facility: HOSPITAL | Age: 63
End: 2018-08-27

## 2018-08-27 DIAGNOSIS — Z09 SURGICAL FOLLOW-UP CARE: Primary | ICD-10-CM

## 2018-08-27 PROCEDURE — 99024 POSTOP FOLLOW-UP VISIT: CPT | Performed by: SURGERY

## 2018-08-27 NOTE — PROGRESS NOTES
10 days S/P Lap Adela.    She is without complaints.  Her incisions are healing well.  There is no impulse.  Staples were removed Steri-Strips were applied.  She does not have any clinical jaundice.  Wound care was discussed and I will see her back in 1 month

## 2018-08-27 NOTE — OUTREACH NOTE
LAG < 7 Survey      Responses   Facility patient discharged from?  [facility]   Does the patient have one of the following disease processes/diagnoses(primary or secondary)?  Other   Is there a successful TCM telephone encounter documented?  No   [facility] <7 Attempt successful?  Yes   Call start time  1001   Call end time  1006   General alerts for this patient  Surgical pt, lace <7   Discharge diagnosis  sp lap elena   Is patient permission given to speak with other caregiver?  Yes   List who call center can speak with  López -     Person spoke with today (if not patient) and relationship   - López   Meds reviewed with patient/caregiver?  Yes   Is the patient having any side effects they believe may be caused by any medication additions or changes?  No   Does the patient have all medications ordered at discharge?  Yes   Is the patient taking all medications as directed (includes completed medication regime)?  Yes   Does the patient have a primary care provider?   Yes   Does the patient have an appointment with their PCP within 7 days of discharge?  Yes   Comments regarding PCP  Post op with Dr. Hinds on 08/27/2018 at 0230pm   Has the patient kept scheduled appointments due by today?  Yes   What is the Home health agency?   n/a   Has home health visited the patient within 72 hours of discharge?  N/A   Has all DME been delivered?  No   Psychosocial issues?  No   Psychosocial comments  n/a    Did the patient receive a copy of their discharge instructions?  Yes   Nursing interventions  Reviewed instructions with patient, Educated on MyChart   What is the patient's perception of their health status since discharge?  Improving   Is the patient/caregiver able to teach back signs and symptoms related to disease process for when to call PCP?  Yes   Is the patient/caregiver able to teach back signs and symptoms related to disease process for when to call 911?  Yes   Is the patient/caregiver able to teach back the  hierarchy of who to call/visit for symptoms/problems? PCP, Specialist, Home health nurse, Urgent Care, ED, 911  Yes   Additional teach back comments  does not smoke   Graduated  Yes          Lottie Goff RN

## 2018-08-29 ENCOUNTER — TELEPHONE (OUTPATIENT)
Dept: GASTROENTEROLOGY | Facility: CLINIC | Age: 63
End: 2018-08-29

## 2018-09-10 ENCOUNTER — PREP FOR SURGERY (OUTPATIENT)
Dept: OTHER | Facility: HOSPITAL | Age: 63
End: 2018-09-10

## 2018-09-10 DIAGNOSIS — Z83.71 FAMILY HISTORY OF COLONIC POLYPS: Primary | ICD-10-CM

## 2018-09-11 NOTE — TELEPHONE ENCOUNTER
SHE HAD GALLBLADDER SURGERY 3 WEEKS AGO.  WAS TOLD NEED TO WAIT 6 WEEKS OUT.  SHE HAS APPT WITH DR. BENEDICT ON 09/24/2018 AND SHE WILL CHECK WITH HIM THEM.  SHE WILL CALL US BACK TO SCHEDULE.

## 2018-09-20 DIAGNOSIS — I10 ESSENTIAL HYPERTENSION: ICD-10-CM

## 2018-09-20 RX ORDER — LISINOPRIL 20 MG/1
20 TABLET ORAL DAILY
Qty: 90 TABLET | Refills: 3 | Status: SHIPPED | OUTPATIENT
Start: 2018-09-20 | End: 2018-11-19 | Stop reason: SDUPTHER

## 2018-09-24 ENCOUNTER — OFFICE VISIT (OUTPATIENT)
Dept: SURGERY | Facility: CLINIC | Age: 63
End: 2018-09-24

## 2018-09-24 DIAGNOSIS — Z09 SURGICAL FOLLOW-UP CARE: Primary | ICD-10-CM

## 2018-09-24 PROCEDURE — 99024 POSTOP FOLLOW-UP VISIT: CPT | Performed by: SURGERY

## 2018-09-24 NOTE — PROGRESS NOTES
Pain and swelling at incision x couple weeks.  She complains of mild discomfort and swelling in her right subcostal incision.  She is not having clinical jaundice or incisions are healing well.  There is no impulse what she's feeling is normal postoperative wound.  She can liberalize her activities after Friday if she continues to have problems she should call otherwise I will see her back when necessary.

## 2018-10-08 ENCOUNTER — TRANSCRIBE ORDERS (OUTPATIENT)
Dept: ADMINISTRATIVE | Facility: HOSPITAL | Age: 63
End: 2018-10-08

## 2018-10-08 DIAGNOSIS — Z12.39 SCREENING BREAST EXAMINATION: Primary | ICD-10-CM

## 2018-10-09 ENCOUNTER — PREP FOR SURGERY (OUTPATIENT)
Dept: OTHER | Facility: HOSPITAL | Age: 63
End: 2018-10-09

## 2018-10-09 DIAGNOSIS — Z83.71 FAMILY HISTORY OF COLONIC POLYPS: Primary | ICD-10-CM

## 2018-10-10 PROBLEM — Z83.719 FAMILY HISTORY OF COLONIC POLYPS: Status: ACTIVE | Noted: 2018-10-10

## 2018-10-10 PROBLEM — Z83.71 FAMILY HISTORY OF COLONIC POLYPS: Status: ACTIVE | Noted: 2018-10-10

## 2018-10-17 DIAGNOSIS — F51.01 PRIMARY INSOMNIA: ICD-10-CM

## 2018-10-18 RX ORDER — ZOLPIDEM TARTRATE 10 MG/1
TABLET ORAL
Qty: 90 TABLET | Refills: 0 | Status: SHIPPED | OUTPATIENT
Start: 2018-10-18 | End: 2019-01-16 | Stop reason: SDUPTHER

## 2018-11-07 RX ORDER — SUMATRIPTAN 100 MG/1
TABLET, FILM COATED ORAL
Qty: 9 TABLET | Refills: 10 | Status: SHIPPED | OUTPATIENT
Start: 2018-11-07 | End: 2019-01-14

## 2018-11-14 ENCOUNTER — HOSPITAL ENCOUNTER (OUTPATIENT)
Dept: MAMMOGRAPHY | Facility: HOSPITAL | Age: 63
Discharge: HOME | End: 2018-11-14
Attending: FAMILY MEDICINE | Admitting: FAMILY MEDICINE

## 2018-11-14 DIAGNOSIS — Z12.39 SCREENING BREAST EXAMINATION: ICD-10-CM

## 2018-11-14 PROCEDURE — 77067 SCR MAMMO BI INCL CAD: CPT

## 2018-11-14 PROCEDURE — 77063 BREAST TOMOSYNTHESIS BI: CPT

## 2018-11-19 ENCOUNTER — OFFICE VISIT (OUTPATIENT)
Dept: INTERNAL MEDICINE | Facility: CLINIC | Age: 63
End: 2018-11-19

## 2018-11-19 VITALS
SYSTOLIC BLOOD PRESSURE: 120 MMHG | TEMPERATURE: 98.7 F | DIASTOLIC BLOOD PRESSURE: 90 MMHG | HEIGHT: 61 IN | RESPIRATION RATE: 16 BRPM | HEART RATE: 79 BPM | BODY MASS INDEX: 22.09 KG/M2 | WEIGHT: 117 LBS | OXYGEN SATURATION: 99 %

## 2018-11-19 DIAGNOSIS — Z23 NEED FOR INFLUENZA VACCINATION: ICD-10-CM

## 2018-11-19 DIAGNOSIS — I10 ESSENTIAL HYPERTENSION: Primary | ICD-10-CM

## 2018-11-19 DIAGNOSIS — K59.09 CHRONIC CONSTIPATION: ICD-10-CM

## 2018-11-19 DIAGNOSIS — G43.019 INTRACTABLE MIGRAINE WITHOUT AURA AND WITHOUT STATUS MIGRAINOSUS: ICD-10-CM

## 2018-11-19 DIAGNOSIS — Z83.71 FAMILY HISTORY OF COLONIC POLYPS: ICD-10-CM

## 2018-11-19 DIAGNOSIS — J30.89 NON-SEASONAL ALLERGIC RHINITIS, UNSPECIFIED TRIGGER: ICD-10-CM

## 2018-11-19 DIAGNOSIS — K21.9 GASTROESOPHAGEAL REFLUX DISEASE WITHOUT ESOPHAGITIS: ICD-10-CM

## 2018-11-19 DIAGNOSIS — Z00.00 ROUTINE HEALTH MAINTENANCE: ICD-10-CM

## 2018-11-19 DIAGNOSIS — F41.8 DEPRESSION WITH ANXIETY: ICD-10-CM

## 2018-11-19 DIAGNOSIS — F51.01 PRIMARY INSOMNIA: ICD-10-CM

## 2018-11-19 PROCEDURE — 90471 IMMUNIZATION ADMIN: CPT | Performed by: FAMILY MEDICINE

## 2018-11-19 PROCEDURE — 99214 OFFICE O/P EST MOD 30 MIN: CPT | Performed by: FAMILY MEDICINE

## 2018-11-19 PROCEDURE — 90674 CCIIV4 VAC NO PRSV 0.5 ML IM: CPT | Performed by: FAMILY MEDICINE

## 2018-11-19 RX ORDER — LISINOPRIL 30 MG/1
30 TABLET ORAL DAILY
Qty: 90 TABLET | Refills: 1 | Status: SHIPPED | OUTPATIENT
Start: 2018-11-19 | End: 2019-02-11 | Stop reason: SDUPTHER

## 2018-11-19 NOTE — PROGRESS NOTES
Subjective     Martina Hardwick is a 62 y.o. female, who presents with a chief complaint of   Chief Complaint   Patient presents with   • Hypertension     Hypertension   Associated symptoms include anxiety and headaches.   Constipation     Anxiety            1. HTN.  Still tolerating lisinopril.  Home blood pressures running 130s-140s systolic.  Denies dizziness and chest pain.    2. Depression and anxiety.  Pt reports her symptoms are adequately controlled.  She takes paroxetine and prn alprazolam.  Denies SI.    3. Headaches.  She is seeing Dr. Morales, neurologist, and is taking gabapentin.  Dr. Morales is retiring and she is going to be getting a new neurologist.    4. Chronic constipation.  She takes Miralax.    5. Epigastric pain.  Resolved after recent cholecystectomy.    The following portions of the patient's history were reviewed and updated as appropriate: allergies, current medications, past family history, past medical history, past social history, past surgical history and problem list.    Allergies: Hydrocodone and Sulfa antibiotics    Review of Systems   Constitutional: Negative.    Eyes: Negative.    Respiratory: Negative.    Cardiovascular: Negative.    Gastrointestinal: Positive for constipation.   Endocrine: Negative.    Genitourinary: Negative.    Musculoskeletal: Positive for arthralgias.   Skin: Negative.    Allergic/Immunologic: Positive for environmental allergies.   Neurological: Positive for headaches.   Hematological: Negative.    Psychiatric/Behavioral: Negative.      Objective     Wt Readings from Last 3 Encounters:   11/19/18 53.1 kg (117 lb)   08/17/18 55.9 kg (123 lb 3.2 oz)   08/09/18 55.8 kg (123 lb)     Temp Readings from Last 3 Encounters:   11/19/18 98.7 °F (37.1 °C)   08/20/18 97.9 °F (36.6 °C) (Oral)   07/19/18 98.3 °F (36.8 °C)     BP Readings from Last 3 Encounters:   11/19/18 120/90   08/20/18 153/100   08/09/18 132/80     Pulse Readings from Last 3 Encounters:   11/19/18 79    08/20/18 95   07/19/18 84     Body mass index is 22.11 kg/m².  SpO2 Readings from Last 3 Encounters:   01/15/18 96%   10/12/17 98%   08/10/17 99%     Physical Exam   Constitutional: She is oriented to person, place, and time. She appears well-developed and well-nourished.   HENT:   Head: Normocephalic and atraumatic.   Mouth/Throat: Oropharynx is clear and moist.   Eyes: Conjunctivae and EOM are normal.   Neck: Neck supple. No thyromegaly present.   Cardiovascular: Normal rate, regular rhythm and normal heart sounds.   Pulmonary/Chest: Effort normal and breath sounds normal.   Abdominal: Soft. Bowel sounds are normal. There is no hepatosplenomegaly.   Musculoskeletal: Normal range of motion. She exhibits no edema.   Neurological: She is alert and oriented to person, place, and time.   Skin: Skin is warm and dry. No rash noted.   Psychiatric: She has a normal mood and affect. Her behavior is normal.   Nursing note and vitals reviewed.      Assessment/Plan   Martina was seen today for hypertension.    Diagnoses and all orders for this visit:    Essential hypertension  -     Comprehensive Metabolic Panel; Future  -     Lipid Panel With / Chol / HDL Ratio; Future  -     TSH; Future  -     lisinopril (PRINIVIL,ZESTRIL) 30 MG tablet; Take 1 tablet by mouth Daily.    Routine health maintenance  -     Vitamin D 25 Hydroxy; Future  -     Vitamin B12; Future    Intractable migraine without aura and without status migrainosus    Gastroesophageal reflux disease without esophagitis  -     CBC & Differential; Future    Depression with anxiety    Primary insomnia    Chronic constipation    Non-seasonal allergic rhinitis, unspecified trigger    Family history of colonic polyps    Other orders  -     SCANNED - LABS  -     SCANNED - LABS  -     SCANNED - LABS    1. HTN.  Not optimally controlled.  Increase lisinopril from 20 mg to 30 mg daily.    2. Routine health maint.  Mammogram UTD.  Colonoscopy pending next month (  [facility]).  Hysterectomy done for non-cancerous reasons.     3. Migraines.  Continue per Dr. Morales.    4. GERD. Controlled. Continue omeprazole and /or Zantac prn.  She has been taking this intermittently.    5. Depression with anxiety.  Controlled.  Continue paroxetine.  On prn alprazolam.  Med management agreement and Joby UTD.    6. Insomnia.  Zolpidem is effective.  Med management agreement and Joby UTD.    7. Chronic constipation.  Controlled with daily Miralax.    8. LORRIE. Controlled with fluticasone nasal spray and loratadine.    9. Epigastric pain.  Episodic.  Check gallbladder u/s.  Increase omeprazole to daily.    Outpatient Medications Prior to Visit   Medication Sig Dispense Refill   • ALPRAZolam (XANAX) 0.5 MG tablet Take 1 tablet by mouth 2 (Two) Times a Day. 30 tablet 2   • Calcium-Vitamin D (CALTRATE 600 PLUS-VIT D PO) Take  by mouth.     • fluticasone (FLONASE) 50 MCG/ACT nasal spray 2 sprays into each nostril daily.     • loratadine (CLARITIN) 10 MG tablet Take 10 mg by mouth daily.     • Multiple Vitamins-Minerals (EYE VITAMINS) capsule Take  by mouth.     • omeprazole (PriLOSEC) 40 MG capsule Take 20 mg by mouth Daily. Pt takes 1/3 per day     • PARoxetine (PAXIL) 30 MG tablet Take 1 tablet by mouth Every Morning. 90 tablet 3   • polyethylene glycol (MIRALAX) packet Take 17 g by mouth daily.     • Probiotic Product (PROBIOTIC DAILY) capsule Once daily     • ranitidine (ZANTAC) 300 MG tablet Take 150 mg by mouth Daily.     • SUMAtriptan (IMITREX) 100 MG tablet TAKE ONE TABLET BY MOUTH ONE TIME AS NEEDED FOR MIGRAINE FOR UP TO ONE DOSE 9 tablet 10   • zolpidem (AMBIEN) 10 MG tablet TAKE ONE TABLET BY MOUTH ONCE NIGHTLY 90 tablet 0   • lisinopril (PRINIVIL,ZESTRIL) 20 MG tablet Take 1 tablet by mouth Daily. 90 tablet 3   • SUMAtriptan (IMITREX) 100 MG tablet Take 1 tablet by mouth 1 (One) Time As Needed for Migraine for up to 1 dose. 45 tablet 3   • oxyCODONE-acetaminophen (PERCOCET) 5-325 MG per  tablet Take 1 tablet by mouth Every 4 (Four) Hours As Needed for Moderate Pain  or Severe Pain  for up to 30 doses. 30 tablet 0     No facility-administered medications prior to visit.      New Medications Ordered This Visit   Medications   • lisinopril (PRINIVIL,ZESTRIL) 30 MG tablet     Sig: Take 1 tablet by mouth Daily.     Dispense:  90 tablet     Refill:  1     [unfilled]  Medications Discontinued During This Encounter   Medication Reason   • oxyCODONE-acetaminophen (PERCOCET) 5-325 MG per tablet *Therapy completed   • SUMAtriptan (IMITREX) 100 MG tablet *Therapy completed   • lisinopril (PRINIVIL,ZESTRIL) 20 MG tablet Reorder     Return in about 6 months (around 5/19/2019).

## 2018-11-24 ENCOUNTER — RESULTS ENCOUNTER (OUTPATIENT)
Dept: INTERNAL MEDICINE | Facility: CLINIC | Age: 63
End: 2018-11-24

## 2018-11-24 DIAGNOSIS — Z00.00 ROUTINE HEALTH MAINTENANCE: ICD-10-CM

## 2018-11-24 DIAGNOSIS — I10 ESSENTIAL HYPERTENSION: ICD-10-CM

## 2018-11-24 DIAGNOSIS — K21.9 GASTROESOPHAGEAL REFLUX DISEASE WITHOUT ESOPHAGITIS: ICD-10-CM

## 2018-11-25 DIAGNOSIS — F41.8 DEPRESSION WITH ANXIETY: ICD-10-CM

## 2018-11-26 RX ORDER — ALPRAZOLAM 0.5 MG/1
TABLET ORAL
Qty: 30 TABLET | Refills: 2 | Status: SHIPPED | OUTPATIENT
Start: 2018-11-26 | End: 2019-05-23 | Stop reason: SDUPTHER

## 2018-12-11 ENCOUNTER — ANESTHESIA EVENT (OUTPATIENT)
Dept: PERIOP | Facility: HOSPITAL | Age: 63
End: 2018-12-11

## 2018-12-12 ENCOUNTER — HOSPITAL ENCOUNTER (OUTPATIENT)
Facility: HOSPITAL | Age: 63
Setting detail: HOSPITAL OUTPATIENT SURGERY
Discharge: HOME | End: 2018-12-12
Attending: INTERNAL MEDICINE | Admitting: INTERNAL MEDICINE

## 2018-12-12 ENCOUNTER — ANESTHESIA (OUTPATIENT)
Dept: PERIOP | Facility: HOSPITAL | Age: 63
End: 2018-12-12

## 2018-12-12 VITALS
SYSTOLIC BLOOD PRESSURE: 152 MMHG | DIASTOLIC BLOOD PRESSURE: 79 MMHG | BODY MASS INDEX: 21.05 KG/M2 | RESPIRATION RATE: 16 BRPM | OXYGEN SATURATION: 98 % | HEART RATE: 66 BPM | TEMPERATURE: 97.8 F | WEIGHT: 111.4 LBS

## 2018-12-12 PROCEDURE — 25010000002 PROPOFOL 10 MG/ML EMULSION: Performed by: NURSE ANESTHETIST, CERTIFIED REGISTERED

## 2018-12-12 PROCEDURE — 45378 DIAGNOSTIC COLONOSCOPY: CPT | Performed by: INTERNAL MEDICINE

## 2018-12-12 RX ORDER — ONDANSETRON 2 MG/ML
4 INJECTION INTRAMUSCULAR; INTRAVENOUS ONCE AS NEEDED
Status: DISCONTINUED | OUTPATIENT
Start: 2018-12-12 | End: 2018-12-12 | Stop reason: HOSPADM

## 2018-12-12 RX ORDER — MEPERIDINE HYDROCHLORIDE 25 MG/ML
12.5 INJECTION INTRAMUSCULAR; INTRAVENOUS; SUBCUTANEOUS
Status: DISCONTINUED | OUTPATIENT
Start: 2018-12-12 | End: 2018-12-12 | Stop reason: HOSPADM

## 2018-12-12 RX ORDER — SODIUM CHLORIDE 9 MG/ML
40 INJECTION, SOLUTION INTRAVENOUS AS NEEDED
Status: DISCONTINUED | OUTPATIENT
Start: 2018-12-12 | End: 2018-12-12 | Stop reason: HOSPADM

## 2018-12-12 RX ORDER — SODIUM CHLORIDE 0.9 % (FLUSH) 0.9 %
1-10 SYRINGE (ML) INJECTION AS NEEDED
Status: DISCONTINUED | OUTPATIENT
Start: 2018-12-12 | End: 2018-12-12 | Stop reason: HOSPADM

## 2018-12-12 RX ORDER — PROPOFOL 10 MG/ML
VIAL (ML) INTRAVENOUS AS NEEDED
Status: DISCONTINUED | OUTPATIENT
Start: 2018-12-12 | End: 2018-12-12 | Stop reason: SURG

## 2018-12-12 RX ORDER — LIDOCAINE HYDROCHLORIDE 10 MG/ML
0.5 INJECTION, SOLUTION EPIDURAL; INFILTRATION; INTRACAUDAL; PERINEURAL ONCE AS NEEDED
Status: DISCONTINUED | OUTPATIENT
Start: 2018-12-12 | End: 2018-12-12 | Stop reason: HOSPADM

## 2018-12-12 RX ORDER — SODIUM CHLORIDE 0.9 % (FLUSH) 0.9 %
3 SYRINGE (ML) INJECTION EVERY 12 HOURS SCHEDULED
Status: DISCONTINUED | OUTPATIENT
Start: 2018-12-12 | End: 2018-12-12 | Stop reason: HOSPADM

## 2018-12-12 RX ORDER — SODIUM CHLORIDE, SODIUM LACTATE, POTASSIUM CHLORIDE, CALCIUM CHLORIDE 600; 310; 30; 20 MG/100ML; MG/100ML; MG/100ML; MG/100ML
9 INJECTION, SOLUTION INTRAVENOUS CONTINUOUS
Status: DISCONTINUED | OUTPATIENT
Start: 2018-12-12 | End: 2018-12-12 | Stop reason: HOSPADM

## 2018-12-12 RX ORDER — GLYCOPYRROLATE 0.2 MG/ML
INJECTION INTRAMUSCULAR; INTRAVENOUS AS NEEDED
Status: DISCONTINUED | OUTPATIENT
Start: 2018-12-12 | End: 2018-12-12 | Stop reason: SURG

## 2018-12-12 RX ORDER — SODIUM CHLORIDE, SODIUM LACTATE, POTASSIUM CHLORIDE, CALCIUM CHLORIDE 600; 310; 30; 20 MG/100ML; MG/100ML; MG/100ML; MG/100ML
100 INJECTION, SOLUTION INTRAVENOUS CONTINUOUS
Status: DISCONTINUED | OUTPATIENT
Start: 2018-12-12 | End: 2018-12-12 | Stop reason: HOSPADM

## 2018-12-12 RX ORDER — LIDOCAINE HYDROCHLORIDE 20 MG/ML
INJECTION, SOLUTION INFILTRATION; PERINEURAL AS NEEDED
Status: DISCONTINUED | OUTPATIENT
Start: 2018-12-12 | End: 2018-12-12 | Stop reason: SURG

## 2018-12-12 RX ADMIN — PROPOFOL 50 MG: 10 INJECTION, EMULSION INTRAVENOUS at 11:59

## 2018-12-12 RX ADMIN — SODIUM CHLORIDE, POTASSIUM CHLORIDE, SODIUM LACTATE AND CALCIUM CHLORIDE: 600; 310; 30; 20 INJECTION, SOLUTION INTRAVENOUS at 10:01

## 2018-12-12 RX ADMIN — PROPOFOL 50 MG: 10 INJECTION, EMULSION INTRAVENOUS at 11:51

## 2018-12-12 RX ADMIN — LIDOCAINE HYDROCHLORIDE 100 MG: 20 INJECTION, SOLUTION INFILTRATION; PERINEURAL at 11:46

## 2018-12-12 RX ADMIN — GLYCOPYRROLATE 0.1 MG: 0.2 INJECTION INTRAMUSCULAR; INTRAVENOUS at 11:45

## 2018-12-12 RX ADMIN — PROPOFOL 50 MG: 10 INJECTION, EMULSION INTRAVENOUS at 11:55

## 2018-12-12 RX ADMIN — PROPOFOL 100 MG: 10 INJECTION, EMULSION INTRAVENOUS at 11:47

## 2018-12-12 RX ADMIN — PROPOFOL 50 MG: 10 INJECTION, EMULSION INTRAVENOUS at 12:03

## 2018-12-12 NOTE — BRIEF OP NOTE
COLONOSCOPY  Progress Note    Martina GALO Ronen  12/12/2018    Pre-op Diagnosis:   Family history of colonic polyps [Z83.71]       Post-Op Diagnosis Codes:     * Family history of colonic polyps [Z83.71]     * S/P left colectomy [Z90.49]    Procedure/CPT® Codes:      Procedure(s):  COLONOSCOPY    Surgeon(s):  Darien Ruff MD    Anesthesia: Monitor Anesthesia Care    Staff:   Circulator: Bonny Gauthier RN  Scrub Person: Soni Messer    Estimated Blood Loss: none    Urine Voided: * No values recorded between 12/12/2018 11:42 AM and 12/12/2018 12:06 PM *    Specimens:                None      Drains:      Findings: Colon to Cecum good Prep  Sigmoid Colectomy    Complications: None      Darien Ruff MD     Date: 12/12/2018  Time: 12:08 PM

## 2018-12-12 NOTE — OP NOTE
COLONOSCOPY  Procedure Report    Patient Name:  Martina Hardwick  YOB: 1955    Date of Surgery:  12/12/2018     Indications:  Family Histroy of Colon Polyps    Pre-op Diagnosis:   Family history of colonic polyps [Z83.71]    Post-Op Diagnosis Codes:     * Family history of colonic polyps [Z83.71]     * S/P left colectomy [Z90.49]         Procedure/CPT® Codes:      Procedure(s):  COLONOSCOPY    Staff:  Surgeon(s):  Darien Ruff MD         Anesthesia: Monitor Anesthesia Care    Estimated Blood Loss: none    Specimens: None    Implants:    Nothing was implanted during the procedure      Description of Procedure: After having signed informed consent, she was brought to the endoscopy suite, placed in the left lateral decubitus position and given her IV sedation. Rectal exam revealed no external lesions, normal anal tone and no rectal mass. The scope was introduced into the rectum and advanced under direct visualization with ease from the rectum to her ileocolonic anastomosis at near 20 cm. The blind pouch at the rectum was examined and normal. The scope was withdrawn back into the rectum and passed across the anastomosis to the afferent limb of an end-to-side anastomosis. The scope was advanced easily through the descending colon, to and around the splenic flexure, reduced, advanced to the transverse colon with ease, to and around the hepatic flexure, reduced in the ascending colon and then advanced into the cecum. The cecum was fairly well prepped, as was the remainder of the colon. There were no mucosal lesions noted within the cecum. The ileocecal valve was identified but not significantly intubated. The scope was withdrawn examining the ascending colon in circumferential fashion.  There were no mucosal lesions noted throughout the entire exam. No residual diverticular openings were encountered. The scope was withdrawn back into the distal rectum and retroflexed revealing an intact dentate  line and no mucosal lesions. The scope was deretroflexed and withdrawn. The patient tolerated the procedure very well.             Findings: Colon to Cecum good Prep  Sigmoid Colectomy      Complications: None    Recommendations: Repeat in 5 years      Darien Ruff MD     Date: 12/12/2018  Time: 12:09 PM

## 2018-12-12 NOTE — ANESTHESIA POSTPROCEDURE EVALUATION
Patient: Martina Hardwick    Procedure Summary     Date:  12/12/18 Room / Location:  [facility] ENDOSCOPY 1 /  [facility] OR    Anesthesia Start:  1142 Anesthesia Stop:  1211    Procedure:  COLONOSCOPY (N/A ) Diagnosis:       Family history of colonic polyps      S/P left colectomy      (Family history of colonic polyps [Z83.71])    Surgeon:  Darien Ruff MD Provider:  Luis Amanda CRNA    Anesthesia Type:  MAC ASA Status:  2          Anesthesia Type: MAC  Last vitals  BP   152/79 (12/12/18 1250)   Temp   97.8 °F (36.6 °C) (12/12/18 1213)   Pulse   66 (12/12/18 1250)   Resp   16 (12/12/18 1250)     SpO2   98 % (12/12/18 1250)     Post Anesthesia Care and Evaluation    Patient location during evaluation: PHASE II  Patient participation: complete - patient participated  Level of consciousness: awake and alert  Pain score: 0  Pain management: satisfactory to patient  Airway patency: patent  Anesthetic complications: No anesthetic complications  PONV Status: none  Cardiovascular status: acceptable  Respiratory status: acceptable  Hydration status: acceptable

## 2018-12-12 NOTE — ANESTHESIA PREPROCEDURE EVALUATION
Anesthesia Evaluation     Patient summary reviewed and Nursing notes reviewed   no history of anesthetic complications:  NPO Solid Status: > 8 hours  NPO Liquid Status: > 8 hours           Airway   Mallampati: I  TM distance: >3 FB  Neck ROM: full  Small opening  Dental      Comment: Caps in front    Pulmonary - negative pulmonary ROS and normal exam    breath sounds clear to auscultation  Cardiovascular - normal exam    ECG reviewed  Rhythm: regular  Rate: normal    (+) hypertension (med this am) poorly controlled less than 2 medications,     ROS comment: Narrative     RR Interval= 769 ms  AL Interval= 164 ms  QRSD Interval= 74 ms  QT Interval= 364 ms  QTc Interval= 415 ms  Heart Rate= 78 ms  P Axis= 78 deg  QRS Axis= 39 deg  T Wave Axis= 68 deg  I: 40 Axis= 55 deg  T: 40 Axis= -43 deg  ST Axis= 48 deg  SINUS RHYTHM  NO PRIOR TRACING AVAILABLE FOR COMPARISON  Electronically Signed by:  Mahin Hernandez ([facility]) 09-Aug-2018 13:07:51  Date and Time of Study: 2018-08-09 11:23:40        Neuro/Psych  (+) headaches (migraines, sumatripan this am), psychiatric history Anxiety,     GI/Hepatic/Renal/Endo    (+)  GERD well controlled,      Musculoskeletal     (+) neck pain,   Abdominal  - normal exam   Substance History   (-) drug useAlcohol use: rare.     OB/GYN negative ob/gyn ROS         Other        ROS/Med Hx Other: Sip H20 with meds last at 0830                Anesthesia Plan    ASA 2     MAC     intravenous induction   Anesthetic plan, all risks, benefits, and alternatives have been provided, discussed and informed consent has been obtained with: patient and spouse/significant other.

## 2018-12-12 NOTE — H&P
Patient Care Team:  Ildefonso Dubois MD as PCP - General (Family Medicine)  Hernan Hinds MD as Surgeon (General Surgery)    CHIEF COMPLAINT: Family history of colon polyps    HISTORY OF PRESENT ILLNESS:    Last exam was 2013      Past Medical History:   Diagnosis Date   • Abdominal pain    • Anxiety    • Anxiety    • Arthritis    • Constipation    • Depressed    • Depression    • Endometriosis    • Gastritis    • GERD (gastroesophageal reflux disease)    • Headache    • Headache, tension-type    • Hypertension    • Insomnia    • Migraine    • OP (osteoporosis)    • Poor sleep    • Psoriasis    • Seasonal allergies      Past Surgical History:   Procedure Laterality Date   • APPENDECTOMY     • COLON SURGERY     • COLONOSCOPY     • DIAGNOSTIC LAPAROSCOPY  1990   • DILATATION AND CURETTAGE  1985   • HYSTERECTOMY     • REVISION / TAKEDOWN COLOSTOMY       Family History   Problem Relation Age of Onset   • Hyperlipidemia Mother    • Macular degeneration Mother    • Heart disease Father    • Hyperlipidemia Father    • Cancer Father    • Depression Father    • Depression Sister    • Hyperlipidemia Brother    • Depression Brother    • Breast cancer Maternal Aunt    • Breast cancer Cousin      Social History     Tobacco Use   • Smoking status: Never Smoker   • Smokeless tobacco: Never Used   Substance Use Topics   • Alcohol use: Yes     Comment: rare   • Drug use: No     Medications Prior to Admission   Medication Sig Dispense Refill Last Dose   • ALPRAZolam (XANAX) 0.5 MG tablet TAKE 1 TABLET BY MOUTH TWICE DAILY 30 tablet 2 12/11/2018 at 2100   • lisinopril (PRINIVIL,ZESTRIL) 30 MG tablet Take 1 tablet by mouth Daily. 90 tablet 1 12/12/2018 at 0800   • PARoxetine (PAXIL) 30 MG tablet Take 1 tablet by mouth Every Morning. 90 tablet 3 12/12/2018 at 0800   • polyethylene glycol (MIRALAX) packet Take 17 g by mouth daily.   12/11/2018 at Unknown time   • ranitidine (ZANTAC) 300 MG tablet Take 150 mg by mouth Daily.    12/11/2018 at 2100   • SUMAtriptan (IMITREX) 100 MG tablet TAKE ONE TABLET BY MOUTH ONE TIME AS NEEDED FOR MIGRAINE FOR UP TO ONE DOSE 9 tablet 10 12/12/2018 at 0600   • zolpidem (AMBIEN) 10 MG tablet TAKE ONE TABLET BY MOUTH ONCE NIGHTLY 90 tablet 0 12/11/2018 at 2100   • Calcium-Vitamin D (CALTRATE 600 PLUS-VIT D PO) Take  by mouth.   12/10/2018   • fluticasone (FLONASE) 50 MCG/ACT nasal spray 2 sprays into each nostril daily.   12/10/2018   • loratadine (CLARITIN) 10 MG tablet Take 10 mg by mouth daily.   12/10/2018   • Multiple Vitamins-Minerals (EYE VITAMINS) capsule Take  by mouth.   12/10/2018   • omeprazole (PriLOSEC) 40 MG capsule Take 20 mg by mouth Daily. Pt takes 1/3 per day   12/11/2018 at 2100   • Probiotic Product (PROBIOTIC DAILY) capsule Once daily   12/10/2018     Allergies:  Hydrocodone and Sulfa antibiotics    REVIEW OF SYSTEMS:  Please see the above history of present illness for pertinent positives and negatives.  The remainder of the patient's systems have been reviewed and are negative.     Vital Signs  Temp:  [97.8 °F (36.6 °C)] 97.8 °F (36.6 °C)  Heart Rate:  [68] 68  Resp:  [16] 16  BP: (142)/(92) 142/92    Flowsheet Rows      First Filed Value   Admission Height  No data   Admission Weight  50.5 kg (111 lb 6.4 oz) Documented at 12/12/2018 1007           Physical Exam:  Physical Exam   Constitutional: Patient appears well-developed and well-nourished and in no acute distress   HEENT:   Head: Normocephalic and atraumatic.   Eyes:  Pupils are equal, round, and reactive to light. EOM are intact. Sclerae are anicteric and non-injected.  Mouth and Throat: Patient has moist mucous membranes. Oropharynx is clear of any erythema or exudate.     Neck: Neck supple. No JVD present. No thyromegaly present. No lymphadenopathy present.  Cardiovascular: Regular rate, regular rhythm, S1 normal and S2 normal.  Exam reveals no gallop and no friction rub.  No murmur heard.  Pulmonary/Chest: Lungs are clear  to auscultation bilaterally. No respiratory distress. No wheezes. No rhonchi. No rales.   Abdominal: Soft. Bowel sounds are normal. No distension and no mass. There is no hepatosplenomegaly. There is no tenderness.   Musculoskeletal: Normal Muscle tone  Extremities: No edema. Pulses are palpable in all 4 extremities.  Neurological: Patient is alert and oriented to person, place, and time. Cranial nerves II-XII are grossly intact with no focal deficits.  Skin: Skin is warm. No rash noted. Nails show no clubbing.  No cyanosis or erythema.    Debilities/Disabilities Identified: None  Emotional Behavior: Appropriate     Results Review:    I reviewed the patient's new clinical results.  Lab Results (most recent)     None          Imaging Results (most recent)     None        reviewed    ECG/EMG Results (most recent)     None        reviewed    Assessment/Plan     Family history of colon polyps/ Colnoscopy    I discussed the patients findings and my recommendations with patient.     Darien Ruff MD  12/12/18  11:30 AM    Time: 10 min prior to procedure.

## 2019-01-05 DIAGNOSIS — F41.8 DEPRESSION WITH ANXIETY: ICD-10-CM

## 2019-01-07 RX ORDER — PAROXETINE 30 MG/1
TABLET, FILM COATED ORAL
Qty: 90 TABLET | Refills: 3 | Status: SHIPPED | OUTPATIENT
Start: 2019-01-07 | End: 2019-12-23

## 2019-01-14 RX ORDER — SUMATRIPTAN 100 MG/1
TABLET, FILM COATED ORAL
Qty: 9 TABLET | Refills: 10 | OUTPATIENT
Start: 2019-01-14

## 2019-01-14 RX ORDER — SUMATRIPTAN 100 MG/1
TABLET, FILM COATED ORAL
Qty: 9 TABLET | Refills: 10 | Status: CANCELLED | OUTPATIENT
Start: 2019-01-14

## 2019-01-15 ENCOUNTER — RESULTS ENCOUNTER (OUTPATIENT)
Dept: INTERNAL MEDICINE | Facility: CLINIC | Age: 64
End: 2019-01-15

## 2019-01-15 DIAGNOSIS — G44.86 CERVICOGENIC HEADACHE: ICD-10-CM

## 2019-01-15 DIAGNOSIS — F41.8 DEPRESSION WITH ANXIETY: ICD-10-CM

## 2019-01-15 DIAGNOSIS — G43.019 INTRACTABLE MIGRAINE WITHOUT AURA AND WITHOUT STATUS MIGRAINOSUS: ICD-10-CM

## 2019-01-15 DIAGNOSIS — Z00.00 HEALTHCARE MAINTENANCE: ICD-10-CM

## 2019-01-15 DIAGNOSIS — I10 ESSENTIAL HYPERTENSION: ICD-10-CM

## 2019-01-15 DIAGNOSIS — K21.9 GASTROESOPHAGEAL REFLUX DISEASE WITHOUT ESOPHAGITIS: ICD-10-CM

## 2019-01-16 DIAGNOSIS — F51.01 PRIMARY INSOMNIA: ICD-10-CM

## 2019-01-16 RX ORDER — ZOLPIDEM TARTRATE 10 MG/1
TABLET ORAL
Qty: 90 TABLET | Refills: 0 | Status: SHIPPED | OUTPATIENT
Start: 2019-01-16 | End: 2019-04-14 | Stop reason: SDUPTHER

## 2019-01-18 RX ORDER — SUMATRIPTAN 100 MG/1
TABLET, FILM COATED ORAL
Status: CANCELLED | OUTPATIENT
Start: 2019-01-18

## 2019-02-10 DIAGNOSIS — I10 ESSENTIAL HYPERTENSION: ICD-10-CM

## 2019-02-11 RX ORDER — LISINOPRIL 10 MG/1
TABLET ORAL
Qty: 90 TABLET | Refills: 3 | Status: SHIPPED | OUTPATIENT
Start: 2019-02-11 | End: 2019-05-20

## 2019-04-12 ENCOUNTER — RESULTS ENCOUNTER (OUTPATIENT)
Dept: INTERNAL MEDICINE | Facility: CLINIC | Age: 64
End: 2019-04-12

## 2019-04-12 DIAGNOSIS — Z79.899 HIGH RISK MEDICATION USE: ICD-10-CM

## 2019-04-12 DIAGNOSIS — E78.5 HYPERLIPIDEMIA, UNSPECIFIED HYPERLIPIDEMIA TYPE: ICD-10-CM

## 2019-04-12 DIAGNOSIS — I10 HYPERTENSION, ESSENTIAL: ICD-10-CM

## 2019-04-14 DIAGNOSIS — F51.01 PRIMARY INSOMNIA: ICD-10-CM

## 2019-04-15 RX ORDER — ZOLPIDEM TARTRATE 10 MG/1
TABLET ORAL
Qty: 90 TABLET | Refills: 0 | Status: SHIPPED | OUTPATIENT
Start: 2019-04-15 | End: 2019-07-15 | Stop reason: SDUPTHER

## 2019-04-16 ENCOUNTER — RESULTS ENCOUNTER (OUTPATIENT)
Dept: INTERNAL MEDICINE | Facility: CLINIC | Age: 64
End: 2019-04-16

## 2019-04-16 DIAGNOSIS — Z00.00 ROUTINE HEALTH MAINTENANCE: ICD-10-CM

## 2019-05-07 DIAGNOSIS — I10 ESSENTIAL HYPERTENSION: ICD-10-CM

## 2019-05-07 RX ORDER — LISINOPRIL 30 MG/1
TABLET ORAL
Qty: 90 TABLET | Refills: 1 | Status: SHIPPED | OUTPATIENT
Start: 2019-05-07 | End: 2019-05-20 | Stop reason: SDUPTHER

## 2019-05-10 LAB
25(OH)D3+25(OH)D2 SERPL-MCNC: 52.3 NG/ML (ref 30–100)
ALBUMIN SERPL-MCNC: 4.7 G/DL (ref 3.5–5.2)
ALBUMIN/GLOB SERPL: 2.2 G/DL
ALP SERPL-CCNC: 109 U/L (ref 39–117)
ALT SERPL-CCNC: 15 U/L (ref 1–33)
AST SERPL-CCNC: 24 U/L (ref 1–32)
BASOPHILS # BLD AUTO: 0.04 10*3/MM3 (ref 0–0.2)
BASOPHILS NFR BLD AUTO: 0.8 % (ref 0–1.5)
BILIRUB SERPL-MCNC: 0.6 MG/DL (ref 0.2–1.2)
BUN SERPL-MCNC: 9 MG/DL (ref 8–23)
BUN/CREAT SERPL: 10.3 (ref 7–25)
CALCIUM SERPL-MCNC: 9.8 MG/DL (ref 8.6–10.5)
CHLORIDE SERPL-SCNC: 93 MMOL/L (ref 98–107)
CHOLEST SERPL-MCNC: 194 MG/DL (ref 0–200)
CHOLEST/HDLC SERPL: 3.13 {RATIO}
CO2 SERPL-SCNC: 27.7 MMOL/L (ref 22–29)
CREAT SERPL-MCNC: 0.87 MG/DL (ref 0.57–1)
EOSINOPHIL # BLD AUTO: 0.04 10*3/MM3 (ref 0–0.4)
EOSINOPHIL NFR BLD AUTO: 0.8 % (ref 0.3–6.2)
ERYTHROCYTE [DISTWIDTH] IN BLOOD BY AUTOMATED COUNT: 12.2 % (ref 12.3–15.4)
GLOBULIN SER CALC-MCNC: 2.1 GM/DL
GLUCOSE SERPL-MCNC: 82 MG/DL (ref 65–99)
HCT VFR BLD AUTO: 39.2 % (ref 34–46.6)
HDLC SERPL-MCNC: 62 MG/DL (ref 40–60)
HGB BLD-MCNC: 12.4 G/DL (ref 12–15.9)
IMM GRANULOCYTES # BLD AUTO: 0.02 10*3/MM3 (ref 0–0.05)
IMM GRANULOCYTES NFR BLD AUTO: 0.4 % (ref 0–0.5)
LDLC SERPL CALC-MCNC: 115 MG/DL (ref 0–100)
LYMPHOCYTES # BLD AUTO: 0.91 10*3/MM3 (ref 0.7–3.1)
LYMPHOCYTES NFR BLD AUTO: 17.1 % (ref 19.6–45.3)
MCH RBC QN AUTO: 29.5 PG (ref 26.6–33)
MCHC RBC AUTO-ENTMCNC: 31.6 G/DL (ref 31.5–35.7)
MCV RBC AUTO: 93.1 FL (ref 79–97)
MONOCYTES # BLD AUTO: 0.36 10*3/MM3 (ref 0.1–0.9)
MONOCYTES NFR BLD AUTO: 6.8 % (ref 5–12)
NEUTROPHILS # BLD AUTO: 3.96 10*3/MM3 (ref 1.7–7)
NEUTROPHILS NFR BLD AUTO: 74.1 % (ref 42.7–76)
NRBC BLD AUTO-RTO: 0 /100 WBC (ref 0–0.2)
PLATELET # BLD AUTO: 259 10*3/MM3 (ref 140–450)
POTASSIUM SERPL-SCNC: 4.4 MMOL/L (ref 3.5–5.2)
PROT SERPL-MCNC: 6.8 G/DL (ref 6–8.5)
RBC # BLD AUTO: 4.21 10*6/MM3 (ref 3.77–5.28)
SODIUM SERPL-SCNC: 132 MMOL/L (ref 136–145)
TRIGL SERPL-MCNC: 85 MG/DL (ref 0–150)
TSH SERPL DL<=0.005 MIU/L-ACNC: 3.48 MIU/ML (ref 0.27–4.2)
VIT B12 SERPL-MCNC: 407 PG/ML (ref 211–946)
VLDLC SERPL CALC-MCNC: 17 MG/DL
WBC # BLD AUTO: 5.33 10*3/MM3 (ref 3.4–10.8)

## 2019-05-20 ENCOUNTER — OFFICE VISIT (OUTPATIENT)
Dept: INTERNAL MEDICINE | Facility: CLINIC | Age: 64
End: 2019-05-20

## 2019-05-20 VITALS
OXYGEN SATURATION: 99 % | WEIGHT: 114 LBS | BODY MASS INDEX: 21.52 KG/M2 | SYSTOLIC BLOOD PRESSURE: 118 MMHG | HEART RATE: 92 BPM | HEIGHT: 61 IN | DIASTOLIC BLOOD PRESSURE: 88 MMHG | RESPIRATION RATE: 16 BRPM | TEMPERATURE: 97.9 F

## 2019-05-20 DIAGNOSIS — E78.5 HYPERLIPIDEMIA, UNSPECIFIED HYPERLIPIDEMIA TYPE: ICD-10-CM

## 2019-05-20 DIAGNOSIS — F41.8 DEPRESSION WITH ANXIETY: ICD-10-CM

## 2019-05-20 DIAGNOSIS — F51.01 PRIMARY INSOMNIA: ICD-10-CM

## 2019-05-20 DIAGNOSIS — Z79.899 HIGH RISK MEDICATION USE: Primary | ICD-10-CM

## 2019-05-20 DIAGNOSIS — Z83.71 FAMILY HISTORY OF COLONIC POLYPS: ICD-10-CM

## 2019-05-20 DIAGNOSIS — Z00.00 ROUTINE HEALTH MAINTENANCE: ICD-10-CM

## 2019-05-20 DIAGNOSIS — G43.909 MIGRAINE WITHOUT STATUS MIGRAINOSUS, NOT INTRACTABLE, UNSPECIFIED MIGRAINE TYPE: ICD-10-CM

## 2019-05-20 DIAGNOSIS — K59.09 CHRONIC CONSTIPATION: ICD-10-CM

## 2019-05-20 DIAGNOSIS — Z78.0 POSTMENOPAUSAL: ICD-10-CM

## 2019-05-20 DIAGNOSIS — I10 ESSENTIAL HYPERTENSION: ICD-10-CM

## 2019-05-20 DIAGNOSIS — J30.89 NON-SEASONAL ALLERGIC RHINITIS, UNSPECIFIED TRIGGER: ICD-10-CM

## 2019-05-20 DIAGNOSIS — I10 HYPERTENSION, ESSENTIAL: ICD-10-CM

## 2019-05-20 DIAGNOSIS — K21.9 GASTROESOPHAGEAL REFLUX DISEASE WITHOUT ESOPHAGITIS: ICD-10-CM

## 2019-05-20 PROBLEM — K80.20 CALCULUS OF GALLBLADDER WITHOUT CHOLECYSTITIS WITHOUT OBSTRUCTION: Status: RESOLVED | Noted: 2018-07-31 | Resolved: 2019-05-20

## 2019-05-20 PROCEDURE — 99214 OFFICE O/P EST MOD 30 MIN: CPT | Performed by: FAMILY MEDICINE

## 2019-05-20 RX ORDER — OMEPRAZOLE 20 MG/1
20 CAPSULE, DELAYED RELEASE ORAL DAILY
Start: 2019-05-20 | End: 2020-02-24 | Stop reason: SDUPTHER

## 2019-05-20 RX ORDER — LISINOPRIL 20 MG/1
20 TABLET ORAL 2 TIMES DAILY
Qty: 60 TABLET | Refills: 5 | Status: SHIPPED | OUTPATIENT
Start: 2019-05-20 | End: 2019-06-17 | Stop reason: SDUPTHER

## 2019-05-20 NOTE — PROGRESS NOTES
Subjective     Martina Hardwick is a 63 y.o. female, who presents with a chief complaint of   Chief Complaint   Patient presents with   • Hypertension   • Insomnia   • Heartburn     Hypertension   Associated symptoms include anxiety and headaches.   Constipation     Anxiety   Symptoms include insomnia.       Insomnia   Associated symptoms include arthralgias and headaches.   Heartburn        1. HTN.  Still tolerating lisinopril.  Sometimes takes an extra 10-20 mg in the afternoons if BP running high. Home blood pressures running 130s-150s systolic 60s-90s diastolic.  Denies dizziness and chest pain.    2. Depression and anxiety.  Pt reports her symptoms are adequately controlled.  She takes paroxetine and prn alprazolam.  Denies SI.    3. Headaches.  No longer seeing Dr. Morales, who retired.  Has been doing better the past month.    4. Chronic constipation.  She takes Miralax.    The following portions of the patient's history were reviewed and updated as appropriate: allergies, current medications, past family history, past medical history, past social history, past surgical history and problem list.    Allergies: Hydrocodone and Sulfa antibiotics    Review of Systems   Constitutional: Negative.    Eyes: Negative.    Respiratory: Negative.    Cardiovascular: Negative.    Gastrointestinal: Positive for constipation.   Endocrine: Negative.    Genitourinary: Negative.    Musculoskeletal: Positive for arthralgias.   Skin: Negative.    Allergic/Immunologic: Positive for environmental allergies.   Neurological: Positive for headaches.   Hematological: Negative.    Psychiatric/Behavioral: The patient has insomnia.      Objective     Wt Readings from Last 3 Encounters:   05/20/19 51.7 kg (114 lb)   12/12/18 50.5 kg (111 lb 6.4 oz)   11/19/18 53.1 kg (117 lb)     Temp Readings from Last 3 Encounters:   05/20/19 97.9 °F (36.6 °C) (Oral)   12/12/18 97.8 °F (36.6 °C) (Oral)   11/19/18 98.7 °F (37.1 °C)     BP Readings from Last 3  Encounters:   05/20/19 118/88   12/12/18 152/79   11/19/18 120/90     Pulse Readings from Last 3 Encounters:   05/20/19 92   12/12/18 66   11/19/18 79     Body mass index is 21.54 kg/m².  SpO2 Readings from Last 3 Encounters:   01/15/18 96%   10/12/17 98%   08/10/17 99%     Physical Exam   Constitutional: She is oriented to person, place, and time. She appears well-developed and well-nourished.   HENT:   Head: Normocephalic and atraumatic.   Mouth/Throat: Oropharynx is clear and moist.   Eyes: Conjunctivae and EOM are normal.   Neck: Neck supple. No thyromegaly present.   Cardiovascular: Normal rate, regular rhythm and normal heart sounds.   Pulmonary/Chest: Effort normal and breath sounds normal.   Abdominal: Soft. Bowel sounds are normal. There is no hepatosplenomegaly.   Musculoskeletal: Normal range of motion. She exhibits no edema.   Neurological: She is alert and oriented to person, place, and time.   Skin: Skin is warm and dry. No rash noted.   Psychiatric: She has a normal mood and affect. Her behavior is normal.   Nursing note and vitals reviewed.      Assessment/Plan   Martina was seen today for hypertension, insomnia and heartburn.    Diagnoses and all orders for this visit:      Essential hypertension  -     lisinopril (PRINIVIL,ZESTRIL) 20 MG tablet; Take 1 tablet by mouth 2 (Two) Times a Day.    Routine health maintenance    Migraine without status migrainosus, not intractable, unspecified migraine type    Gastroesophageal reflux disease without esophagitis    Depression with anxiety    Primary insomnia    Chronic constipation    Family history of colonic polyps    Non-seasonal allergic rhinitis, unspecified trigger    Postmenopausal  -     DEXA Bone Density Axial; Future    Other orders  -     omeprazole (PRILOSEC) 20 MG capsule; Take 1 capsule by mouth Daily.    1. HTN.  Not optimally controlled.  Change lisinopril from 30 mg daily to 20 mg BID.    2. Routine health maint.  Mammogram UTD.  Colonoscopy  UTD.  Hysterectomy done for non-cancerous reasons.     3. Migraines.  Continue same.    4. GERD. Controlled. Continue omeprazole and /or Zantac prn.  She has been taking this intermittently.    5. Depression with anxiety.  Controlled.  Continue paroxetine.  On prn alprazolam.  Med management agreement and [facility] UTD.    6. Insomnia.  Zolpidem is effective.  Med management agreement and [facility] UTD.    7. Chronic constipation.  Controlled with daily Miralax.    8. LORRIE. Controlled with fluticasone nasal spray and loratadine.    9. Postmenopausal.  Oophorectomy 1994.  Check dexa scan.    Outpatient Medications Prior to Visit   Medication Sig Dispense Refill   • ALPRAZolam (XANAX) 0.5 MG tablet TAKE 1 TABLET BY MOUTH TWICE DAILY 30 tablet 2   • Calcium-Vitamin D (CALTRATE 600 PLUS-VIT D PO) Take  by mouth.     • fluticasone (FLONASE) 50 MCG/ACT nasal spray 2 sprays into each nostril daily.     • loratadine (CLARITIN) 10 MG tablet Take 10 mg by mouth daily.     • Multiple Vitamins-Minerals (EYE VITAMINS) capsule Take  by mouth.     • PARoxetine (PAXIL) 30 MG tablet TAKE 1 TABLET EVERY MORNING 90 tablet 3   • polyethylene glycol (MIRALAX) packet Take 17 g by mouth daily.     • Probiotic Product (PROBIOTIC DAILY) capsule Once daily     • ranitidine (ZANTAC) 300 MG tablet Take 150 mg by mouth Daily.     • zolpidem (AMBIEN) 10 MG tablet TAKE ONE TABLET BY MOUTH ONCE NIGHTLY 90 tablet 0   • lisinopril (PRINIVIL,ZESTRIL) 10 MG tablet TAKE 1 TABLET DAILY 90 tablet 3   • lisinopril (PRINIVIL,ZESTRIL) 30 MG tablet TAKE 1 TABLET DAILY 90 tablet 1   • omeprazole (PriLOSEC) 40 MG capsule Take 20 mg by mouth Daily. Pt takes 1/3 per day       No facility-administered medications prior to visit.      New Medications Ordered This Visit   Medications   • omeprazole (PRILOSEC) 20 MG capsule     Sig: Take 1 capsule by mouth Daily.   • lisinopril (PRINIVIL,ZESTRIL) 20 MG tablet     Sig: Take 1 tablet by mouth 2 (Two) Times a Day.      Dispense:  60 tablet     Refill:  5     [unfilled]  Medications Discontinued During This Encounter   Medication Reason   • lisinopril (PRINIVIL,ZESTRIL) 10 MG tablet *Error   • omeprazole (PriLOSEC) 40 MG capsule *Error   • lisinopril (PRINIVIL,ZESTRIL) 30 MG tablet Reorder     Return in about 4 weeks (around 6/17/2019).

## 2019-05-23 DIAGNOSIS — F41.8 DEPRESSION WITH ANXIETY: ICD-10-CM

## 2019-05-23 RX ORDER — ALPRAZOLAM 0.5 MG/1
TABLET ORAL
Qty: 30 TABLET | Refills: 2 | Status: SHIPPED | OUTPATIENT
Start: 2019-05-23 | End: 2019-09-16 | Stop reason: SDUPTHER

## 2019-06-17 ENCOUNTER — OFFICE VISIT (OUTPATIENT)
Dept: INTERNAL MEDICINE | Facility: CLINIC | Age: 64
End: 2019-06-17

## 2019-06-17 VITALS
OXYGEN SATURATION: 98 % | HEART RATE: 78 BPM | SYSTOLIC BLOOD PRESSURE: 122 MMHG | BODY MASS INDEX: 21.34 KG/M2 | TEMPERATURE: 97.9 F | RESPIRATION RATE: 16 BRPM | WEIGHT: 113 LBS | HEIGHT: 61 IN | DIASTOLIC BLOOD PRESSURE: 84 MMHG

## 2019-06-17 DIAGNOSIS — L40.9 PSORIASIS: ICD-10-CM

## 2019-06-17 DIAGNOSIS — I10 ESSENTIAL HYPERTENSION: Primary | ICD-10-CM

## 2019-06-17 PROCEDURE — 99213 OFFICE O/P EST LOW 20 MIN: CPT | Performed by: FAMILY MEDICINE

## 2019-06-17 RX ORDER — LISINOPRIL 40 MG/1
20 TABLET ORAL 2 TIMES DAILY
Qty: 90 TABLET | Refills: 1 | Status: SHIPPED | OUTPATIENT
Start: 2019-06-17 | End: 2019-11-22 | Stop reason: DRUGHIGH

## 2019-06-17 NOTE — PROGRESS NOTES
Martina Hardwick is a 63 y.o. female, who presents with a chief complaint of   Chief Complaint   Patient presents with   • Hypertension   • Psoriasis     persistent-no help from OTC topicals       HPI     1. Pt here to f/u on hypertension.  Last month we changed from lisinopril 30 mg daily to 20 mg BID due to elevated BP in the evenings.  She brought in her home blood pressure log and most readings were 110s-120s/70s-80s.  A few evening readings were elevated.    2. Psoriasis.  She has plaques on her knees, elbow, and navel area.  OTC cortisone isn't effective for her.    The following portions of the patient's history were reviewed and updated as appropriate: allergies, current medications, past family history, past medical history, past social history, past surgical history and problem list.    Allergies: Hydrocodone and Sulfa antibiotics    Review of Systems   Constitutional: Negative.    Cardiovascular: Negative for chest pain and leg swelling.   Skin: Positive for rash.   Allergic/Immunologic: Positive for environmental allergies.   Neurological: Positive for headaches.             Wt Readings from Last 3 Encounters:   06/17/19 51.3 kg (113 lb)   05/20/19 51.7 kg (114 lb)   12/12/18 50.5 kg (111 lb 6.4 oz)     Temp Readings from Last 3 Encounters:   06/17/19 97.9 °F (36.6 °C) (Oral)   05/20/19 97.9 °F (36.6 °C) (Oral)   12/12/18 97.8 °F (36.6 °C) (Oral)     BP Readings from Last 3 Encounters:   06/17/19 122/84   05/20/19 118/88   12/12/18 152/79     Pulse Readings from Last 3 Encounters:   06/17/19 78   05/20/19 92   12/12/18 66     Body mass index is 21.35 kg/m².  @LASTSAO2(3)@    Physical Exam   Constitutional: She is oriented to person, place, and time. She appears well-developed and well-nourished.   HENT:   Head: Normocephalic and atraumatic.   Eyes: Conjunctivae and EOM are normal.   Neck: Neck supple. No thyromegaly present.   Cardiovascular: Normal rate, regular rhythm and normal heart sounds.    Pulmonary/Chest: Effort normal and breath sounds normal. No respiratory distress.   Musculoskeletal: Normal range of motion. She exhibits no edema.   Neurological: She is alert and oriented to person, place, and time.   Skin: Skin is warm and dry.   Psychiatric: She has a normal mood and affect. Her behavior is normal.   Nursing note and vitals reviewed.      Results for orders placed or performed in visit on 11/24/18   Comprehensive Metabolic Panel   Result Value Ref Range    Glucose 82 65 - 99 mg/dL    BUN 9 8 - 23 mg/dL    Creatinine 0.87 0.57 - 1.00 mg/dL    eGFR Non African Am 66 >60 mL/min/1.73    eGFR African Am 80 >60 mL/min/1.73    BUN/Creatinine Ratio 10.3 7.0 - 25.0    Sodium 132 (L) 136 - 145 mmol/L    Potassium 4.4 3.5 - 5.2 mmol/L    Chloride 93 (L) 98 - 107 mmol/L    Total CO2 27.7 22.0 - 29.0 mmol/L    Calcium 9.8 8.6 - 10.5 mg/dL    Total Protein 6.8 6.0 - 8.5 g/dL    Albumin 4.70 3.50 - 5.20 g/dL    Globulin 2.1 gm/dL    A/G Ratio 2.2 g/dL    Total Bilirubin 0.6 0.2 - 1.2 mg/dL    Alkaline Phosphatase 109 39 - 117 U/L    AST (SGOT) 24 1 - 32 U/L    ALT (SGPT) 15 1 - 33 U/L   Lipid Panel With / Chol / HDL Ratio   Result Value Ref Range    Total Cholesterol 194 0 - 200 mg/dL    Triglycerides 85 0 - 150 mg/dL    HDL Cholesterol 62 (H) 40 - 60 mg/dL    VLDL Cholesterol 17 mg/dL    LDL Cholesterol  115 (H) 0 - 100 mg/dL    Chol/HDL Ratio 3.13    TSH   Result Value Ref Range    TSH 3.480 0.270 - 4.200 mIU/mL   Vitamin D 25 Hydroxy   Result Value Ref Range    25 Hydroxy, Vitamin D 52.3 30.0 - 100.0 ng/ml   Vitamin B12   Result Value Ref Range    Vitamin B-12 407 211 - 946 pg/mL   CBC & Differential   Result Value Ref Range    WBC 5.33 3.40 - 10.80 10*3/mm3    RBC 4.21 3.77 - 5.28 10*6/mm3    Hemoglobin 12.4 12.0 - 15.9 g/dL    Hematocrit 39.2 34.0 - 46.6 %    MCV 93.1 79.0 - 97.0 fL    MCH 29.5 26.6 - 33.0 pg    MCHC 31.6 31.5 - 35.7 g/dL    RDW 12.2 (L) 12.3 - 15.4 %    Platelets 259 140 - 450 10*3/mm3     Neutrophil Rel % 74.1 42.7 - 76.0 %    Lymphocyte Rel % 17.1 (L) 19.6 - 45.3 %    Monocyte Rel % 6.8 5.0 - 12.0 %    Eosinophil Rel % 0.8 0.3 - 6.2 %    Basophil Rel % 0.8 0.0 - 1.5 %    Neutrophils Absolute 3.96 1.70 - 7.00 10*3/mm3    Lymphocytes Absolute 0.91 0.70 - 3.10 10*3/mm3    Monocytes Absolute 0.36 0.10 - 0.90 10*3/mm3    Eosinophils Absolute 0.04 0.00 - 0.40 10*3/mm3    Basophils Absolute 0.04 0.00 - 0.20 10*3/mm3    Immature Granulocyte Rel % 0.4 0.0 - 0.5 %    Immature Grans Absolute 0.02 0.00 - 0.05 10*3/mm3    nRBC 0.0 0.0 - 0.2 /100 WBC           Martina was seen today for hypertension and psoriasis.    Diagnoses and all orders for this visit:    Essential hypertension  -     lisinopril (PRINIVIL,ZESTRIL) 40 MG tablet; Take 0.5 tablets by mouth 2 (Two) Times a Day.  -     Comprehensive Metabolic Panel; Future  -     TSH; Future  -     Lipid Panel With / Chol / HDL Ratio; Future  -     Vitamin D 25 Hydroxy; Future    Psoriasis  -     Discontinue: betamethasone valerate (VALISONE) 0.1 % ointment; Apply  topically to the appropriate area as directed 2 (Two) Times a Day.  -     betamethasone valerate (VALISONE) 0.1 % ointment; Apply  topically to the appropriate area as directed 2 (Two) Times a Day.  -     CBC & Differential; Future      1. HTN.  Improved.  Continue lisinopril 20 mg BID.  Monitor home blood pressures.    2. Psoriasis.  Add higher potency topical steroid ointment for intermittent use.    Outpatient Medications Prior to Visit   Medication Sig Dispense Refill   • ALPRAZolam (XANAX) 0.5 MG tablet TAKE 1 TABLET BY MOUTH TWICE DAILY 30 tablet 2   • Calcium-Vitamin D (CALTRATE 600 PLUS-VIT D PO) Take  by mouth.     • fluticasone (FLONASE) 50 MCG/ACT nasal spray 2 sprays into each nostril daily.     • loratadine (CLARITIN) 10 MG tablet Take 10 mg by mouth daily.     • Multiple Vitamins-Minerals (EYE VITAMINS) capsule Take  by mouth.     • omeprazole (PRILOSEC) 20 MG capsule Take 1 capsule by  mouth Daily.     • PARoxetine (PAXIL) 30 MG tablet TAKE 1 TABLET EVERY MORNING 90 tablet 3   • polyethylene glycol (MIRALAX) packet Take 17 g by mouth daily.     • Probiotic Product (PROBIOTIC DAILY) capsule Once daily     • ranitidine (ZANTAC) 300 MG tablet Take 150 mg by mouth Daily.     • zolpidem (AMBIEN) 10 MG tablet TAKE ONE TABLET BY MOUTH ONCE NIGHTLY 90 tablet 0   • lisinopril (PRINIVIL,ZESTRIL) 20 MG tablet Take 1 tablet by mouth 2 (Two) Times a Day. 60 tablet 5     No facility-administered medications prior to visit.      New Medications Ordered This Visit   Medications   • lisinopril (PRINIVIL,ZESTRIL) 40 MG tablet     Sig: Take 0.5 tablets by mouth 2 (Two) Times a Day.     Dispense:  90 tablet     Refill:  1   • betamethasone valerate (VALISONE) 0.1 % ointment     Sig: Apply  topically to the appropriate area as directed 2 (Two) Times a Day.     Dispense:  30 g     Refill:  1     [unfilled]  Medications Discontinued During This Encounter   Medication Reason   • lisinopril (PRINIVIL,ZESTRIL) 20 MG tablet Reorder   • betamethasone valerate (VALISONE) 0.1 % ointment Reorder         Return in about 6 months (around 12/17/2019).

## 2019-07-08 ENCOUNTER — APPOINTMENT (OUTPATIENT)
Dept: BONE DENSITY | Facility: HOSPITAL | Age: 64
End: 2019-07-08

## 2019-07-08 DIAGNOSIS — Z78.0 POSTMENOPAUSAL: ICD-10-CM

## 2019-07-08 PROCEDURE — 77080 DXA BONE DENSITY AXIAL: CPT

## 2019-07-15 DIAGNOSIS — F51.01 PRIMARY INSOMNIA: ICD-10-CM

## 2019-07-15 RX ORDER — ZOLPIDEM TARTRATE 10 MG/1
TABLET ORAL
Qty: 90 TABLET | Refills: 1 | Status: SHIPPED | OUTPATIENT
Start: 2019-07-15 | End: 2020-01-13

## 2019-09-16 DIAGNOSIS — F41.8 DEPRESSION WITH ANXIETY: ICD-10-CM

## 2019-09-16 RX ORDER — ALPRAZOLAM 0.5 MG/1
TABLET ORAL
Qty: 30 TABLET | Refills: 2 | Status: SHIPPED | OUTPATIENT
Start: 2019-09-16 | End: 2020-03-25

## 2019-10-28 ENCOUNTER — TRANSCRIBE ORDERS (OUTPATIENT)
Dept: ADMINISTRATIVE | Facility: HOSPITAL | Age: 64
End: 2019-10-28

## 2019-10-28 DIAGNOSIS — Z12.31 VISIT FOR SCREENING MAMMOGRAM: Primary | ICD-10-CM

## 2019-11-11 ENCOUNTER — LAB (OUTPATIENT)
Dept: INTERNAL MEDICINE | Facility: CLINIC | Age: 64
End: 2019-11-11

## 2019-11-11 DIAGNOSIS — L40.9 PSORIASIS: ICD-10-CM

## 2019-11-11 DIAGNOSIS — Z00.00 ROUTINE HEALTH MAINTENANCE: ICD-10-CM

## 2019-11-11 DIAGNOSIS — I10 ESSENTIAL HYPERTENSION: ICD-10-CM

## 2019-11-11 DIAGNOSIS — E78.5 HYPERLIPIDEMIA, UNSPECIFIED HYPERLIPIDEMIA TYPE: ICD-10-CM

## 2019-11-11 DIAGNOSIS — I10 HYPERTENSION, ESSENTIAL: ICD-10-CM

## 2019-11-11 DIAGNOSIS — K21.9 GASTROESOPHAGEAL REFLUX DISEASE WITHOUT ESOPHAGITIS: ICD-10-CM

## 2019-11-11 LAB
25(OH)D3+25(OH)D2 SERPL-MCNC: 48.2 NG/ML (ref 30–100)
ALBUMIN SERPL-MCNC: 4.5 G/DL (ref 3.5–5.2)
ALBUMIN/GLOB SERPL: 1.7 G/DL
ALP SERPL-CCNC: 101 U/L (ref 39–117)
ALT SERPL-CCNC: 11 U/L (ref 1–33)
AST SERPL-CCNC: 21 U/L (ref 1–32)
BASOPHILS # BLD AUTO: 0.04 10*3/MM3 (ref 0–0.2)
BASOPHILS NFR BLD AUTO: 1 % (ref 0–1.5)
BILIRUB SERPL-MCNC: 0.6 MG/DL (ref 0.2–1.2)
BUN SERPL-MCNC: 12 MG/DL (ref 8–23)
BUN/CREAT SERPL: 12.8 (ref 7–25)
CALCIUM SERPL-MCNC: 9.6 MG/DL (ref 8.6–10.5)
CHLORIDE SERPL-SCNC: 94 MMOL/L (ref 98–107)
CHOLEST SERPL-MCNC: 193 MG/DL (ref 0–200)
CHOLEST/HDLC SERPL: 3.06 {RATIO}
CO2 SERPL-SCNC: 28.7 MMOL/L (ref 22–29)
CREAT SERPL-MCNC: 0.94 MG/DL (ref 0.57–1)
EOSINOPHIL # BLD AUTO: 0.01 10*3/MM3 (ref 0–0.4)
EOSINOPHIL NFR BLD AUTO: 0.2 % (ref 0.3–6.2)
ERYTHROCYTE [DISTWIDTH] IN BLOOD BY AUTOMATED COUNT: 12 % (ref 12.3–15.4)
GLOBULIN SER CALC-MCNC: 2.7 GM/DL
GLUCOSE SERPL-MCNC: 72 MG/DL (ref 65–99)
HBA1C MFR BLD: 5.2 % (ref 4.8–5.6)
HCT VFR BLD AUTO: 38 % (ref 34–46.6)
HDLC SERPL-MCNC: 63 MG/DL (ref 40–60)
HGB BLD-MCNC: 13.2 G/DL (ref 12–15.9)
IMM GRANULOCYTES # BLD AUTO: 0.01 10*3/MM3 (ref 0–0.05)
IMM GRANULOCYTES NFR BLD AUTO: 0.2 % (ref 0–0.5)
LDLC SERPL CALC-MCNC: 117 MG/DL (ref 0–100)
LYMPHOCYTES # BLD AUTO: 0.86 10*3/MM3 (ref 0.7–3.1)
LYMPHOCYTES NFR BLD AUTO: 21.4 % (ref 19.6–45.3)
MCH RBC QN AUTO: 31.3 PG (ref 26.6–33)
MCHC RBC AUTO-ENTMCNC: 34.7 G/DL (ref 31.5–35.7)
MCV RBC AUTO: 90 FL (ref 79–97)
MONOCYTES # BLD AUTO: 0.31 10*3/MM3 (ref 0.1–0.9)
MONOCYTES NFR BLD AUTO: 7.7 % (ref 5–12)
NEUTROPHILS # BLD AUTO: 2.79 10*3/MM3 (ref 1.7–7)
NEUTROPHILS NFR BLD AUTO: 69.5 % (ref 42.7–76)
NRBC BLD AUTO-RTO: 0 /100 WBC (ref 0–0.2)
PLATELET # BLD AUTO: 265 10*3/MM3 (ref 140–450)
POTASSIUM SERPL-SCNC: 4.6 MMOL/L (ref 3.5–5.2)
PROT SERPL-MCNC: 7.2 G/DL (ref 6–8.5)
RBC # BLD AUTO: 4.22 10*6/MM3 (ref 3.77–5.28)
SODIUM SERPL-SCNC: 134 MMOL/L (ref 136–145)
TRIGL SERPL-MCNC: 63 MG/DL (ref 0–150)
TSH SERPL DL<=0.005 MIU/L-ACNC: 2.64 UIU/ML (ref 0.27–4.2)
VIT B12 SERPL-MCNC: 349 PG/ML (ref 211–946)
VLDLC SERPL CALC-MCNC: 12.6 MG/DL
WBC # BLD AUTO: 4.02 10*3/MM3 (ref 3.4–10.8)

## 2019-11-18 ENCOUNTER — OFFICE VISIT (OUTPATIENT)
Dept: INTERNAL MEDICINE | Facility: CLINIC | Age: 64
End: 2019-11-18

## 2019-11-18 VITALS
OXYGEN SATURATION: 98 % | SYSTOLIC BLOOD PRESSURE: 148 MMHG | WEIGHT: 114 LBS | DIASTOLIC BLOOD PRESSURE: 90 MMHG | BODY MASS INDEX: 21.52 KG/M2 | HEIGHT: 61 IN | TEMPERATURE: 98 F | RESPIRATION RATE: 16 BRPM | HEART RATE: 96 BPM

## 2019-11-18 DIAGNOSIS — Z23 NEED FOR INFLUENZA VACCINATION: Primary | ICD-10-CM

## 2019-11-18 DIAGNOSIS — L40.9 PSORIASIS: ICD-10-CM

## 2019-11-18 DIAGNOSIS — G43.909 MIGRAINE WITHOUT STATUS MIGRAINOSUS, NOT INTRACTABLE, UNSPECIFIED MIGRAINE TYPE: ICD-10-CM

## 2019-11-18 DIAGNOSIS — I10 ESSENTIAL HYPERTENSION: ICD-10-CM

## 2019-11-18 DIAGNOSIS — K21.9 GASTROESOPHAGEAL REFLUX DISEASE WITHOUT ESOPHAGITIS: ICD-10-CM

## 2019-11-18 DIAGNOSIS — Z00.00 ROUTINE HEALTH MAINTENANCE: ICD-10-CM

## 2019-11-18 DIAGNOSIS — Z83.71 FAMILY HISTORY OF COLONIC POLYPS: ICD-10-CM

## 2019-11-18 DIAGNOSIS — J30.89 NON-SEASONAL ALLERGIC RHINITIS, UNSPECIFIED TRIGGER: ICD-10-CM

## 2019-11-18 DIAGNOSIS — M81.0 AGE-RELATED OSTEOPOROSIS WITHOUT CURRENT PATHOLOGICAL FRACTURE: Primary | ICD-10-CM

## 2019-11-18 DIAGNOSIS — F51.01 PRIMARY INSOMNIA: ICD-10-CM

## 2019-11-18 DIAGNOSIS — M81.0 AGE-RELATED OSTEOPOROSIS WITHOUT CURRENT PATHOLOGICAL FRACTURE: ICD-10-CM

## 2019-11-18 DIAGNOSIS — K59.09 CHRONIC CONSTIPATION: ICD-10-CM

## 2019-11-18 DIAGNOSIS — F41.8 DEPRESSION WITH ANXIETY: ICD-10-CM

## 2019-11-18 PROCEDURE — 99214 OFFICE O/P EST MOD 30 MIN: CPT | Performed by: FAMILY MEDICINE

## 2019-11-18 PROCEDURE — 90471 IMMUNIZATION ADMIN: CPT | Performed by: FAMILY MEDICINE

## 2019-11-18 PROCEDURE — 90674 CCIIV4 VAC NO PRSV 0.5 ML IM: CPT | Performed by: FAMILY MEDICINE

## 2019-11-18 RX ORDER — AMLODIPINE BESYLATE 2.5 MG/1
2.5 TABLET ORAL DAILY
Qty: 30 TABLET | Refills: 5 | Status: SHIPPED | OUTPATIENT
Start: 2019-11-18 | End: 2020-02-24 | Stop reason: SDUPTHER

## 2019-11-18 NOTE — PROGRESS NOTES
Subjective     Martina Hardwick is a 63 y.o. female, who presents with a chief complaint of   Chief Complaint   Patient presents with   • Hypertension   • Heartburn   • Anxiety   • Insomnia     Hypertension   Associated symptoms include anxiety and headaches.   Insomnia   Associated symptoms include arthralgias, headaches and a rash.   Heartburn     Constipation     Anxiety   Symptoms include insomnia.          1. HTN.  Still tolerating lisinopril; takes 30 mg BID.  Home blood pressures running 110s-130 in the morning and 140-160 in the evening.  Denies dizziness and chest pain.    2. Depression and anxiety.  Pt reports her symptoms are adequately controlled.  She takes paroxetine and prn alprazolam.  Denies SI.    3. Headaches.  No longer seeing Dr. Morales, who retired.  She takes sumatriptan for abortive treatment.    4. Chronic constipation.  She takes Miralax.    The following portions of the patient's history were reviewed and updated as appropriate: allergies, current medications, past family history, past medical history, past social history, past surgical history and problem list.    Allergies: Hydrocodone and Sulfa antibiotics    Review of Systems   Constitutional: Negative.    Eyes: Negative.    Respiratory: Negative.    Cardiovascular: Negative.    Gastrointestinal: Positive for constipation.   Endocrine: Negative.    Genitourinary: Negative.    Musculoskeletal: Positive for arthralgias.   Skin: Positive for rash.   Allergic/Immunologic: Positive for environmental allergies.   Neurological: Positive for headaches.   Hematological: Negative.    Psychiatric/Behavioral: The patient has insomnia.      Objective     Wt Readings from Last 3 Encounters:   11/18/19 51.7 kg (114 lb)   06/17/19 51.3 kg (113 lb)   05/20/19 51.7 kg (114 lb)     Temp Readings from Last 3 Encounters:   11/18/19 98 °F (36.7 °C) (Oral)   06/17/19 97.9 °F (36.6 °C) (Oral)   05/20/19 97.9 °F (36.6 °C) (Oral)     BP Readings from Last 3  Encounters:   11/18/19 148/90   06/17/19 122/84   05/20/19 118/88     Pulse Readings from Last 3 Encounters:   11/18/19 96   06/17/19 78   05/20/19 92     Body mass index is 21.54 kg/m².  SpO2 Readings from Last 3 Encounters:   01/15/18 96%   10/12/17 98%   08/10/17 99%     Physical Exam   Constitutional: She is oriented to person, place, and time. She appears well-developed and well-nourished.   HENT:   Head: Normocephalic and atraumatic.   Mouth/Throat: Oropharynx is clear and moist.   Eyes: Conjunctivae and EOM are normal.   Neck: Neck supple. No thyromegaly present.   Cardiovascular: Normal rate, regular rhythm and normal heart sounds.   Pulmonary/Chest: Effort normal and breath sounds normal.   Abdominal: Soft. Bowel sounds are normal. There is no hepatosplenomegaly.   Musculoskeletal: Normal range of motion. She exhibits no edema.   Neurological: She is alert and oriented to person, place, and time.   Skin: Skin is warm and dry. No rash noted.   Psychiatric: She has a normal mood and affect. Her behavior is normal.   Nursing note and vitals reviewed.      Assessment/Plan   Martina was seen today for hypertension, insomnia and heartburn.    Diagnoses and all orders for this visit:      Essential hypertension  -     lisinopril (PRINIVIL,ZESTRIL) 20 MG tablet; Take 1 tablet by mouth 2 (Two) Times a Day.    Routine health maintenance    Migraine without status migrainosus, not intractable, unspecified migraine type    Gastroesophageal reflux disease without esophagitis    Depression with anxiety    Primary insomnia    Chronic constipation    Family history of colonic polyps    Non-seasonal allergic rhinitis, unspecified trigger    Osteoporosis    Other orders  -     omeprazole (PRILOSEC) 20 MG capsule; Take 1 capsule by mouth Daily.    1. HTN.  Not optimally controlled in the evenings.  Continue lisinopril 30 mg BID.  Add amlodipine 2.5 mg daily.    2. Routine health maint.  Mammogram pending tomorrow.  Colonoscopy  UTD.  Hysterectomy done for non-cancerous reasons.  Flu vaccine today.  Shingrix at pharmacy.    3. Migraines.  Continue same.    4. GERD. Controlled. Continue omeprazole.  She had been taking ranitidine daily but stopped.    5. Depression with anxiety.  Controlled.  Continue paroxetine.  On prn alprazolam.  Med management agreement and [facility] UTD.    6. Insomnia.  Zolpidem is effective.  Med management agreement and [facility] UTD.    7. Chronic constipation.  Controlled with daily Miralax.    8. LORRIE. Controlled with fluticasone nasal spray and loratadine.    9. Osteoporosis.  Didn't tolerate bisphosphonates previously (Dr. Ghotra).  Start Prolia.  Recheck dexa scan in 2 years.    Outpatient Medications Prior to Visit   Medication Sig Dispense Refill   • ALPRAZolam (XANAX) 0.5 MG tablet TAKE 1 TABLET BY MOUTH TWICE DAILY 30 tablet 2   • betamethasone valerate (VALISONE) 0.1 % ointment Apply  topically to the appropriate area as directed 2 (Two) Times a Day. 30 g 1   • Calcium-Vitamin D (CALTRATE 600 PLUS-VIT D PO) Take  by mouth.     • fluticasone (FLONASE) 50 MCG/ACT nasal spray 2 sprays into each nostril daily.     • lisinopril (PRINIVIL,ZESTRIL) 40 MG tablet Take 0.5 tablets by mouth 2 (Two) Times a Day. (Patient taking differently: Take 30 mg by mouth 2 (Two) Times a Day.) 90 tablet 1   • loratadine (CLARITIN) 10 MG tablet Take 10 mg by mouth daily.     • Multiple Vitamins-Minerals (EYE VITAMINS) capsule Take  by mouth.     • omeprazole (PRILOSEC) 20 MG capsule Take 1 capsule by mouth Daily.     • PARoxetine (PAXIL) 30 MG tablet TAKE 1 TABLET EVERY MORNING 90 tablet 3   • polyethylene glycol (MIRALAX) packet Take 17 g by mouth daily.     • Probiotic Product (PROBIOTIC DAILY) capsule Once daily     • zolpidem (AMBIEN) 10 MG tablet TAKE ONE TABLET BY MOUTH ONCE NIGHTLY 90 tablet 1   • ranitidine (ZANTAC) 300 MG tablet Take 150 mg by mouth Daily.       No facility-administered medications prior to visit.      New  Medications Ordered This Visit   Medications   • amLODIPine (NORVASC) 2.5 MG tablet     Sig: Take 1 tablet by mouth Daily.     Dispense:  30 tablet     Refill:  5     [unfilled]  There are no discontinued medications.  Return in about 3 months (around 2/18/2020).

## 2019-11-19 ENCOUNTER — HOSPITAL ENCOUNTER (OUTPATIENT)
Dept: MAMMOGRAPHY | Facility: HOSPITAL | Age: 64
Discharge: HOME | End: 2019-11-19
Admitting: FAMILY MEDICINE

## 2019-11-19 DIAGNOSIS — Z12.31 VISIT FOR SCREENING MAMMOGRAM: ICD-10-CM

## 2019-11-19 PROCEDURE — 77063 BREAST TOMOSYNTHESIS BI: CPT

## 2019-11-19 PROCEDURE — 77067 SCR MAMMO BI INCL CAD: CPT

## 2019-11-22 RX ORDER — LISINOPRIL 30 MG/1
30 TABLET ORAL 2 TIMES DAILY
Qty: 60 TABLET | Refills: 0 | Status: SHIPPED | OUTPATIENT
Start: 2019-11-22 | End: 2019-12-23

## 2019-12-06 ENCOUNTER — HOSPITAL ENCOUNTER (OUTPATIENT)
Dept: INFUSION THERAPY | Facility: HOSPITAL | Age: 64
Discharge: HOME | End: 2019-12-06

## 2019-12-06 DIAGNOSIS — M81.8 OTHER OSTEOPOROSIS WITHOUT CURRENT PATHOLOGICAL FRACTURE: Primary | ICD-10-CM

## 2019-12-11 RX ORDER — SUMATRIPTAN 100 MG/1
TABLET, FILM COATED ORAL
Qty: 9 TABLET | Refills: 9 | Status: SHIPPED | OUTPATIENT
Start: 2019-12-11 | End: 2019-12-23

## 2019-12-18 DIAGNOSIS — M81.0 OSTEOPOROSIS WITHOUT CURRENT PATHOLOGICAL FRACTURE, UNSPECIFIED OSTEOPOROSIS TYPE: Primary | ICD-10-CM

## 2019-12-20 ENCOUNTER — TELEPHONE (OUTPATIENT)
Dept: INTERNAL MEDICINE | Facility: CLINIC | Age: 64
End: 2019-12-20

## 2019-12-20 NOTE — TELEPHONE ENCOUNTER
Spoke with patient today.  She states that her insurance absolutely will not reimburse Prolia.  I have sent a message to Dr. Dubois to inquire if Reclast infusion is an option for patient, since she does not tolerate oral therapy.  Patient advised I would be returning her call after the holidays.    ----- Message from Stacy Medley sent at 12/5/2019  3:30 PM EST -----  PT LM for me about her Prolia and her coverage. i'm not sure how this Prolia works. If you can call pt an advise pt. thanks

## 2019-12-20 NOTE — TELEPHONE ENCOUNTER
Patient requesting to speak with Medical Assistant regarding Prolia to be scheduled.  Her insurance will not cover.  She says she will have to wait until she turns 65.

## 2019-12-20 NOTE — TELEPHONE ENCOUNTER
Patient does not tolerate oral meds.  Is Reclast an option for her?  I can contact insurance company to try to get approval.  Patient did state that she has increased her Ca+ and Vit D daily.

## 2019-12-23 DIAGNOSIS — M81.0 AGE-RELATED OSTEOPOROSIS WITHOUT CURRENT PATHOLOGICAL FRACTURE: Primary | ICD-10-CM

## 2019-12-23 DIAGNOSIS — F41.8 DEPRESSION WITH ANXIETY: ICD-10-CM

## 2019-12-23 RX ORDER — SUMATRIPTAN 100 MG/1
TABLET, FILM COATED ORAL
Qty: 27 TABLET | Refills: 3 | Status: SHIPPED | OUTPATIENT
Start: 2019-12-23 | End: 2020-02-24 | Stop reason: SDUPTHER

## 2019-12-23 RX ORDER — PAROXETINE 30 MG/1
TABLET, FILM COATED ORAL
Qty: 90 TABLET | Refills: 3 | Status: SHIPPED | OUTPATIENT
Start: 2019-12-23 | End: 2020-02-24 | Stop reason: SDUPTHER

## 2019-12-23 RX ORDER — SODIUM CHLORIDE 9 MG/ML
250 INJECTION, SOLUTION INTRAVENOUS ONCE
OUTPATIENT
Start: 2019-12-23

## 2019-12-23 RX ORDER — LISINOPRIL 30 MG/1
TABLET ORAL
Qty: 60 TABLET | Refills: 0 | Status: SHIPPED | OUTPATIENT
Start: 2019-12-23 | End: 2020-01-21

## 2019-12-23 NOTE — TELEPHONE ENCOUNTER
Order entered for [facility].  [facility] will PA.  Patient advised.

## 2020-01-13 DIAGNOSIS — F51.01 PRIMARY INSOMNIA: ICD-10-CM

## 2020-01-13 RX ORDER — ZOLPIDEM TARTRATE 10 MG/1
TABLET ORAL
Qty: 90 TABLET | Refills: 0 | Status: SHIPPED | OUTPATIENT
Start: 2020-01-13 | End: 2020-04-13

## 2020-01-21 RX ORDER — LISINOPRIL 30 MG/1
TABLET ORAL
Qty: 60 TABLET | Refills: 0 | Status: SHIPPED | OUTPATIENT
Start: 2020-01-21 | End: 2020-02-24 | Stop reason: SDUPTHER

## 2020-02-10 ENCOUNTER — LAB (OUTPATIENT)
Dept: INTERNAL MEDICINE | Facility: CLINIC | Age: 65
End: 2020-02-10

## 2020-02-10 DIAGNOSIS — I10 ESSENTIAL HYPERTENSION: ICD-10-CM

## 2020-02-10 DIAGNOSIS — Z00.00 ROUTINE HEALTH MAINTENANCE: ICD-10-CM

## 2020-02-10 DIAGNOSIS — K21.9 GASTROESOPHAGEAL REFLUX DISEASE WITHOUT ESOPHAGITIS: ICD-10-CM

## 2020-02-10 LAB
25(OH)D3+25(OH)D2 SERPL-MCNC: 51 NG/ML (ref 30–100)
ALBUMIN SERPL-MCNC: 4.2 G/DL (ref 3.5–5.2)
ALBUMIN/GLOB SERPL: 1.7 G/DL
ALP SERPL-CCNC: 105 U/L (ref 39–117)
ALT SERPL-CCNC: 13 U/L (ref 1–33)
AST SERPL-CCNC: 22 U/L (ref 1–32)
BASOPHILS # BLD AUTO: 0.04 10*3/MM3 (ref 0–0.2)
BASOPHILS NFR BLD AUTO: 0.9 % (ref 0–1.5)
BILIRUB SERPL-MCNC: 0.5 MG/DL (ref 0.2–1.2)
BUN SERPL-MCNC: 10 MG/DL (ref 8–23)
BUN/CREAT SERPL: 10.8 (ref 7–25)
CALCIUM SERPL-MCNC: 9.5 MG/DL (ref 8.6–10.5)
CHLORIDE SERPL-SCNC: 94 MMOL/L (ref 98–107)
CHOLEST SERPL-MCNC: 201 MG/DL (ref 0–200)
CHOLEST/HDLC SERPL: 3.14 {RATIO}
CO2 SERPL-SCNC: 27.8 MMOL/L (ref 22–29)
CREAT SERPL-MCNC: 0.93 MG/DL (ref 0.57–1)
EOSINOPHIL # BLD AUTO: 0.03 10*3/MM3 (ref 0–0.4)
EOSINOPHIL NFR BLD AUTO: 0.7 % (ref 0.3–6.2)
ERYTHROCYTE [DISTWIDTH] IN BLOOD BY AUTOMATED COUNT: 12 % (ref 12.3–15.4)
GLOBULIN SER CALC-MCNC: 2.5 GM/DL
GLUCOSE SERPL-MCNC: 66 MG/DL (ref 65–99)
HCT VFR BLD AUTO: 36.5 % (ref 34–46.6)
HDLC SERPL-MCNC: 64 MG/DL (ref 40–60)
HGB BLD-MCNC: 12.2 G/DL (ref 12–15.9)
IMM GRANULOCYTES # BLD AUTO: 0.01 10*3/MM3 (ref 0–0.05)
IMM GRANULOCYTES NFR BLD AUTO: 0.2 % (ref 0–0.5)
LDLC SERPL CALC-MCNC: 115 MG/DL (ref 0–100)
LYMPHOCYTES # BLD AUTO: 0.88 10*3/MM3 (ref 0.7–3.1)
LYMPHOCYTES NFR BLD AUTO: 20.7 % (ref 19.6–45.3)
MCH RBC QN AUTO: 31 PG (ref 26.6–33)
MCHC RBC AUTO-ENTMCNC: 33.4 G/DL (ref 31.5–35.7)
MCV RBC AUTO: 92.6 FL (ref 79–97)
MONOCYTES # BLD AUTO: 0.32 10*3/MM3 (ref 0.1–0.9)
MONOCYTES NFR BLD AUTO: 7.5 % (ref 5–12)
NEUTROPHILS # BLD AUTO: 2.97 10*3/MM3 (ref 1.7–7)
NEUTROPHILS NFR BLD AUTO: 70 % (ref 42.7–76)
NRBC BLD AUTO-RTO: 0 /100 WBC (ref 0–0.2)
PLATELET # BLD AUTO: 250 10*3/MM3 (ref 140–450)
POTASSIUM SERPL-SCNC: 4.6 MMOL/L (ref 3.5–5.2)
PROT SERPL-MCNC: 6.7 G/DL (ref 6–8.5)
RBC # BLD AUTO: 3.94 10*6/MM3 (ref 3.77–5.28)
SODIUM SERPL-SCNC: 133 MMOL/L (ref 136–145)
TRIGL SERPL-MCNC: 111 MG/DL (ref 0–150)
TSH SERPL DL<=0.005 MIU/L-ACNC: 3.54 UIU/ML (ref 0.27–4.2)
VIT B12 SERPL-MCNC: 742 PG/ML (ref 211–946)
VLDLC SERPL CALC-MCNC: 22.2 MG/DL
WBC # BLD AUTO: 4.25 10*3/MM3 (ref 3.4–10.8)

## 2020-02-24 ENCOUNTER — OFFICE VISIT (OUTPATIENT)
Dept: INTERNAL MEDICINE | Facility: CLINIC | Age: 65
End: 2020-02-24

## 2020-02-24 VITALS
BODY MASS INDEX: 21.9 KG/M2 | OXYGEN SATURATION: 99 % | HEART RATE: 91 BPM | DIASTOLIC BLOOD PRESSURE: 80 MMHG | RESPIRATION RATE: 16 BRPM | HEIGHT: 61 IN | WEIGHT: 116 LBS | TEMPERATURE: 98 F | SYSTOLIC BLOOD PRESSURE: 120 MMHG

## 2020-02-24 DIAGNOSIS — J30.89 NON-SEASONAL ALLERGIC RHINITIS, UNSPECIFIED TRIGGER: ICD-10-CM

## 2020-02-24 DIAGNOSIS — F41.8 DEPRESSION WITH ANXIETY: ICD-10-CM

## 2020-02-24 DIAGNOSIS — K21.9 GASTROESOPHAGEAL REFLUX DISEASE WITHOUT ESOPHAGITIS: ICD-10-CM

## 2020-02-24 DIAGNOSIS — L40.9 PSORIASIS: ICD-10-CM

## 2020-02-24 DIAGNOSIS — K59.09 CHRONIC CONSTIPATION: ICD-10-CM

## 2020-02-24 DIAGNOSIS — M81.0 AGE-RELATED OSTEOPOROSIS WITHOUT CURRENT PATHOLOGICAL FRACTURE: ICD-10-CM

## 2020-02-24 DIAGNOSIS — I10 ESSENTIAL HYPERTENSION: Primary | ICD-10-CM

## 2020-02-24 DIAGNOSIS — Z00.00 ROUTINE HEALTH MAINTENANCE: ICD-10-CM

## 2020-02-24 DIAGNOSIS — F51.01 PRIMARY INSOMNIA: ICD-10-CM

## 2020-02-24 DIAGNOSIS — G43.909 MIGRAINE WITHOUT STATUS MIGRAINOSUS, NOT INTRACTABLE, UNSPECIFIED MIGRAINE TYPE: ICD-10-CM

## 2020-02-24 DIAGNOSIS — Z83.71 FAMILY HISTORY OF COLONIC POLYPS: ICD-10-CM

## 2020-02-24 PROCEDURE — 99214 OFFICE O/P EST MOD 30 MIN: CPT | Performed by: FAMILY MEDICINE

## 2020-02-24 RX ORDER — FAMOTIDINE 10 MG
10 TABLET ORAL 2 TIMES DAILY
COMMUNITY
End: 2021-03-15

## 2020-02-24 RX ORDER — PAROXETINE 30 MG/1
30 TABLET, FILM COATED ORAL EVERY MORNING
Qty: 90 TABLET | Refills: 3 | Status: SHIPPED | OUTPATIENT
Start: 2020-02-24 | End: 2021-02-26

## 2020-02-24 RX ORDER — THIAMINE HCL 100 MG
2500 TABLET ORAL DAILY
COMMUNITY

## 2020-02-24 RX ORDER — OMEPRAZOLE 20 MG/1
20 CAPSULE, DELAYED RELEASE ORAL DAILY
Qty: 90 CAPSULE | Refills: 3 | Status: SHIPPED | OUTPATIENT
Start: 2020-02-24 | End: 2020-12-29

## 2020-02-24 RX ORDER — LISINOPRIL 30 MG/1
30 TABLET ORAL 2 TIMES DAILY
Qty: 180 TABLET | Refills: 3 | Status: SHIPPED | OUTPATIENT
Start: 2020-02-24 | End: 2020-02-24 | Stop reason: SDUPTHER

## 2020-02-24 RX ORDER — AMLODIPINE BESYLATE 2.5 MG/1
2.5 TABLET ORAL DAILY
Qty: 90 TABLET | Refills: 3 | Status: SHIPPED | OUTPATIENT
Start: 2020-02-24 | End: 2021-02-16

## 2020-02-24 RX ORDER — LISINOPRIL 30 MG/1
30 TABLET ORAL 2 TIMES DAILY
Qty: 60 TABLET | Refills: 0 | Status: SHIPPED | OUTPATIENT
Start: 2020-02-24 | End: 2020-08-24 | Stop reason: SDUPTHER

## 2020-02-24 RX ORDER — SUMATRIPTAN 100 MG/1
100 TABLET, FILM COATED ORAL ONCE AS NEEDED
Qty: 27 TABLET | Refills: 3 | Status: SHIPPED | OUTPATIENT
Start: 2020-02-24

## 2020-02-24 NOTE — PROGRESS NOTES
Subjective     Martina Hardwick is a 64 y.o. female, who presents with a chief complaint of   Chief Complaint   Patient presents with   • Hypertension   • Headache   • Heartburn   • Depression   • Insomnia     Hypertension   Associated symptoms include anxiety and headaches.   Heartburn     Anxiety   Symptoms include insomnia.     Her past medical history is significant for depression.   Insomnia   Associated symptoms include arthralgias, headaches and a rash.   Constipation     Headache    Associated symptoms include insomnia.   Depression Patient presents with the following symptoms: insomnia.    1. HTN.  Tolerates lisinopril; takes 30 mg BID.  Added amlodipine 2.5 mg daily last time and she is tolerating.  Home blood pressures running 105-135/60s-70s.   Denies dizziness and chest pain.    2. Depression and anxiety.  Pt reports her symptoms are adequately controlled.  She takes paroxetine and prn alprazolam.  Denies SI.    3. Headaches. She takes sumatriptan for abortive treatment. Her migraine frequency has decreased since her last visit.    4. Chronic constipation.  She takes Miralax and this is effective for her.    The following portions of the patient's history were reviewed and updated as appropriate: allergies, current medications, past family history, past medical history, past social history, past surgical history and problem list.    Allergies: Hydrocodone and Sulfa antibiotics    Review of Systems   Constitutional: Negative.    Eyes: Negative.    Respiratory: Negative.    Cardiovascular: Negative.    Gastrointestinal: Positive for constipation.   Endocrine: Negative.    Genitourinary: Negative.    Musculoskeletal: Positive for arthralgias.   Skin: Positive for rash.   Allergic/Immunologic: Positive for environmental allergies.   Neurological: Positive for headaches.   Hematological: Negative.    Psychiatric/Behavioral: The patient has insomnia.      Objective     Wt Readings from Last 3 Encounters:    02/24/20 52.6 kg (116 lb)   11/18/19 51.7 kg (114 lb)   06/17/19 51.3 kg (113 lb)     Temp Readings from Last 3 Encounters:   02/24/20 98 °F (36.7 °C) (Oral)   11/18/19 98 °F (36.7 °C) (Oral)   06/17/19 97.9 °F (36.6 °C) (Oral)     BP Readings from Last 3 Encounters:   02/24/20 120/80   11/18/19 148/90   06/17/19 122/84     Pulse Readings from Last 3 Encounters:   02/24/20 91   11/18/19 96   06/17/19 78     Body mass index is 21.92 kg/m².  SpO2 Readings from Last 3 Encounters:   01/15/18 96%   10/12/17 98%   08/10/17 99%     Physical Exam   Constitutional: She is oriented to person, place, and time. She appears well-developed and well-nourished.   HENT:   Head: Normocephalic and atraumatic.   Mouth/Throat: Oropharynx is clear and moist.   Eyes: Conjunctivae and EOM are normal.   Neck: Neck supple. No thyromegaly present.   Cardiovascular: Normal rate, regular rhythm and normal heart sounds.   Pulmonary/Chest: Effort normal and breath sounds normal.   Abdominal: Soft. Bowel sounds are normal. There is no hepatosplenomegaly.   Musculoskeletal: Normal range of motion. She exhibits no edema.   Neurological: She is alert and oriented to person, place, and time.   Skin: Skin is warm and dry. No rash noted.   Psychiatric: She has a normal mood and affect. Her behavior is normal.   Nursing note and vitals reviewed.      Assessment/Plan   Martina was seen today for hypertension, insomnia and heartburn.    Diagnoses and all orders for this visit:      Essential hypertension  -     lisinopril (PRINIVIL,ZESTRIL) 20 MG tablet; Take 1 tablet by mouth 2 (Two) Times a Day.    Routine health maintenance    Migraine without status migrainosus, not intractable, unspecified migraine type    Gastroesophageal reflux disease without esophagitis    Depression with anxiety    Primary insomnia    Chronic constipation    Family history of colonic polyps    Non-seasonal allergic rhinitis, unspecified trigger    Osteoporosis    Other orders  -      omeprazole (PRILOSEC) 20 MG capsule; Take 1 capsule by mouth Daily.    1. HTN.  Controlled.  Continue lisinopril 30 mg BID and amlodipine 2.5 mg daily.  Labs reviewed.    2. Routine health maint.  Mammogram UTD.  Colonoscopy UTD.  Hysterectomy done for non-cancerous reasons.  Flu vaccine UTD.  Shingrix at pharmacy.    3. Migraines.  Continue same.    4. GERD. Controlled. Continue omeprazole and lifestyle measures.    5. Depression with anxiety.  Controlled.  Continue paroxetine.  On prn alprazolam.  Med management agreement and [facility] UTD.    6. Insomnia.  Zolpidem is effective.  Med management agreement and [facility] UTD.    7. Chronic constipation.  Controlled with daily Miralax.    8. LORRIE. Controlled with fluticasone nasal spray and loratadine.    9. Osteoporosis.  Didn't tolerate bisphosphonates previously (Dr. Ghotra).  Insurance didn't cover Prolia or Reclast.  She wants to wait until she turns 65 to further pursue medication.  Continue calcium, vitamin D and weight-bearing exercise.  Recheck dexa scan in 2 years.    Outpatient Medications Prior to Visit   Medication Sig Dispense Refill   • ALPRAZolam (XANAX) 0.5 MG tablet TAKE 1 TABLET BY MOUTH TWICE DAILY 30 tablet 2   • Calcium-Vitamin D (CALTRATE 600 PLUS-VIT D PO) Take  by mouth.     • famotidine (PEPCID) 10 MG tablet Take 10 mg by mouth 2 (Two) Times a Day.     • fluticasone (FLONASE) 50 MCG/ACT nasal spray 2 sprays into each nostril daily.     • loratadine (CLARITIN) 10 MG tablet Take 10 mg by mouth daily.     • Multiple Vitamins-Minerals (EYE VITAMINS) capsule Take  by mouth.     • polyethylene glycol (MIRALAX) packet Take 17 g by mouth daily.     • Probiotic Product (PROBIOTIC DAILY) capsule Once daily     • vitamin B-12 (CYANOCOBALAMIN) 2500 MCG sublingual tablet tablet Place 2,500 mcg under the tongue Daily.     • zolpidem (AMBIEN) 10 MG tablet TAKE ONE TABLET BY MOUTH ONCE NIGHTLY 90 tablet 0   • amLODIPine (NORVASC) 2.5 MG tablet Take 1 tablet by  mouth Daily. 30 tablet 5   • betamethasone valerate (VALISONE) 0.1 % ointment Apply  topically to the appropriate area as directed 2 (Two) Times a Day. 30 g 1   • lisinopril (PRINIVIL,ZESTRIL) 30 MG tablet TAKE 1 TABLET BY MOUTH TWICE DAILY 60 tablet 0   • omeprazole (PRILOSEC) 20 MG capsule Take 1 capsule by mouth Daily.     • PARoxetine (PAXIL) 30 MG tablet TAKE 1 TABLET EVERY MORNING 90 tablet 3   • SUMAtriptan (IMITREX) 100 MG tablet TAKE 1 TABLET AT ONSET OF  HEADACHE. MAY REPEAT 1 DOSEIN 2 HOURS 27 tablet 3   • ranitidine (ZANTAC) 300 MG tablet Take 150 mg by mouth Daily.       No facility-administered medications prior to visit.      New Medications Ordered This Visit   Medications   • PARoxetine (PAXIL) 30 MG tablet     Sig: Take 1 tablet by mouth Every Morning.     Dispense:  90 tablet     Refill:  3   • SUMAtriptan (IMITREX) 100 MG tablet     Sig: Take 1 tablet by mouth 1 (One) Time As Needed for Migraine for up to 1 dose. Take one tablet at onset of headache. May repeat dose one time in 2 hours.     Dispense:  27 tablet     Refill:  3   • amLODIPine (NORVASC) 2.5 MG tablet     Sig: Take 1 tablet by mouth Daily.     Dispense:  90 tablet     Refill:  3   • omeprazole (PrilOSEC) 20 MG capsule     Sig: Take 1 capsule by mouth Daily.     Dispense:  90 capsule     Refill:  3   • betamethasone valerate (VALISONE) 0.1 % ointment     Sig: Apply  topically to the appropriate area as directed 2 (Two) Times a Day.     Dispense:  30 g     Refill:  1   • lisinopril (PRINIVIL,ZESTRIL) 30 MG tablet     Sig: Take 1 tablet by mouth 2 (Two) Times a Day.     Dispense:  60 tablet     Refill:  0     [unfilled]  Medications Discontinued During This Encounter   Medication Reason   • ranitidine (ZANTAC) 300 MG tablet    • PARoxetine (PAXIL) 30 MG tablet Reorder   • SUMAtriptan (IMITREX) 100 MG tablet Reorder   • lisinopril (PRINIVIL,ZESTRIL) 30 MG tablet Reorder   • amLODIPine (NORVASC) 2.5 MG tablet Reorder   • omeprazole  (PRILOSEC) 20 MG capsule Reorder   • betamethasone valerate (VALISONE) 0.1 % ointment Reorder   • lisinopril (PRINIVIL,ZESTRIL) 30 MG tablet Reorder     Return in about 6 months (around 8/24/2020).

## 2020-02-25 RX ORDER — LISINOPRIL 30 MG/1
TABLET ORAL
Qty: 60 TABLET | Refills: 0 | Status: SHIPPED | OUTPATIENT
Start: 2020-02-25 | End: 2021-02-26

## 2020-03-23 DIAGNOSIS — F41.8 DEPRESSION WITH ANXIETY: ICD-10-CM

## 2020-03-25 DIAGNOSIS — F41.8 DEPRESSION WITH ANXIETY: ICD-10-CM

## 2020-03-25 RX ORDER — ALPRAZOLAM 0.5 MG/1
0.5 TABLET ORAL 2 TIMES DAILY
Qty: 30 TABLET | Refills: 2 | Status: CANCELLED | OUTPATIENT
Start: 2020-03-25

## 2020-03-25 RX ORDER — ALPRAZOLAM 0.5 MG/1
TABLET ORAL
Qty: 30 TABLET | Refills: 0 | Status: SHIPPED | OUTPATIENT
Start: 2020-03-25 | End: 2020-05-06

## 2020-03-25 NOTE — TELEPHONE ENCOUNTER
Advised [place] that our transmission of patient's Alprazolam refill had confirmed on our end, but patient had called stating that [facility] had not yet received it.  Laurent @ [facility] assures me that they, in fact, have received the transmission, and they are filling it now. Patient advised.

## 2020-04-13 DIAGNOSIS — F51.01 PRIMARY INSOMNIA: ICD-10-CM

## 2020-04-13 RX ORDER — ZOLPIDEM TARTRATE 10 MG/1
TABLET ORAL
Qty: 90 TABLET | Refills: 1 | Status: SHIPPED | OUTPATIENT
Start: 2020-04-13 | End: 2020-10-13 | Stop reason: SDUPTHER

## 2020-05-06 DIAGNOSIS — F41.8 DEPRESSION WITH ANXIETY: ICD-10-CM

## 2020-05-06 RX ORDER — ALPRAZOLAM 0.5 MG/1
TABLET ORAL
Qty: 30 TABLET | Refills: 2 | Status: SHIPPED | OUTPATIENT
Start: 2020-05-06 | End: 2020-11-10

## 2020-08-17 ENCOUNTER — LAB (OUTPATIENT)
Dept: INTERNAL MEDICINE | Facility: CLINIC | Age: 65
End: 2020-08-17

## 2020-08-17 DIAGNOSIS — K21.9 GASTROESOPHAGEAL REFLUX DISEASE WITHOUT ESOPHAGITIS: ICD-10-CM

## 2020-08-17 DIAGNOSIS — I10 ESSENTIAL HYPERTENSION: ICD-10-CM

## 2020-08-17 DIAGNOSIS — Z00.00 ROUTINE HEALTH MAINTENANCE: ICD-10-CM

## 2020-08-18 LAB
25(OH)D3+25(OH)D2 SERPL-MCNC: 49.9 NG/ML (ref 30–100)
ALBUMIN SERPL-MCNC: 4.5 G/DL (ref 3.5–5.2)
ALBUMIN/GLOB SERPL: 1.9 G/DL
ALP SERPL-CCNC: 96 U/L (ref 39–117)
ALT SERPL-CCNC: 10 U/L (ref 1–33)
AST SERPL-CCNC: 19 U/L (ref 1–32)
BASOPHILS # BLD AUTO: 0.04 10*3/MM3 (ref 0–0.2)
BASOPHILS NFR BLD AUTO: 0.9 % (ref 0–1.5)
BILIRUB SERPL-MCNC: 0.6 MG/DL (ref 0–1.2)
BUN SERPL-MCNC: 13 MG/DL (ref 8–23)
BUN/CREAT SERPL: 13 (ref 7–25)
CALCIUM SERPL-MCNC: 9.6 MG/DL (ref 8.6–10.5)
CHLORIDE SERPL-SCNC: 95 MMOL/L (ref 98–107)
CHOLEST SERPL-MCNC: 204 MG/DL (ref 0–200)
CHOLEST/HDLC SERPL: 3.24 {RATIO}
CO2 SERPL-SCNC: 28.5 MMOL/L (ref 22–29)
CREAT SERPL-MCNC: 1 MG/DL (ref 0.57–1)
EOSINOPHIL # BLD AUTO: 0.02 10*3/MM3 (ref 0–0.4)
EOSINOPHIL NFR BLD AUTO: 0.5 % (ref 0.3–6.2)
ERYTHROCYTE [DISTWIDTH] IN BLOOD BY AUTOMATED COUNT: 11.8 % (ref 12.3–15.4)
GLOBULIN SER CALC-MCNC: 2.4 GM/DL
GLUCOSE SERPL-MCNC: 84 MG/DL (ref 65–99)
HCT VFR BLD AUTO: 37.2 % (ref 34–46.6)
HDLC SERPL-MCNC: 63 MG/DL (ref 40–60)
HGB BLD-MCNC: 12.4 G/DL (ref 12–15.9)
IMM GRANULOCYTES # BLD AUTO: 0.01 10*3/MM3 (ref 0–0.05)
IMM GRANULOCYTES NFR BLD AUTO: 0.2 % (ref 0–0.5)
LDLC SERPL CALC-MCNC: 116 MG/DL (ref 0–100)
LYMPHOCYTES # BLD AUTO: 0.99 10*3/MM3 (ref 0.7–3.1)
LYMPHOCYTES NFR BLD AUTO: 22.4 % (ref 19.6–45.3)
MCH RBC QN AUTO: 30.9 PG (ref 26.6–33)
MCHC RBC AUTO-ENTMCNC: 33.3 G/DL (ref 31.5–35.7)
MCV RBC AUTO: 92.8 FL (ref 79–97)
MONOCYTES # BLD AUTO: 0.39 10*3/MM3 (ref 0.1–0.9)
MONOCYTES NFR BLD AUTO: 8.8 % (ref 5–12)
NEUTROPHILS # BLD AUTO: 2.97 10*3/MM3 (ref 1.7–7)
NEUTROPHILS NFR BLD AUTO: 67.2 % (ref 42.7–76)
NRBC BLD AUTO-RTO: 0 /100 WBC (ref 0–0.2)
PLATELET # BLD AUTO: 270 10*3/MM3 (ref 140–450)
POTASSIUM SERPL-SCNC: 4.7 MMOL/L (ref 3.5–5.2)
PROT SERPL-MCNC: 6.9 G/DL (ref 6–8.5)
RBC # BLD AUTO: 4.01 10*6/MM3 (ref 3.77–5.28)
SODIUM SERPL-SCNC: 133 MMOL/L (ref 136–145)
TRIGL SERPL-MCNC: 124 MG/DL (ref 0–150)
TSH SERPL DL<=0.005 MIU/L-ACNC: 2.47 UIU/ML (ref 0.27–4.2)
VIT B12 SERPL-MCNC: >2000 PG/ML (ref 211–946)
VLDLC SERPL CALC-MCNC: 24.8 MG/DL (ref 5–40)
WBC # BLD AUTO: 4.42 10*3/MM3 (ref 3.4–10.8)

## 2020-08-24 ENCOUNTER — OFFICE VISIT (OUTPATIENT)
Dept: INTERNAL MEDICINE | Facility: CLINIC | Age: 65
End: 2020-08-24

## 2020-08-24 VITALS
BODY MASS INDEX: 21.52 KG/M2 | HEIGHT: 61 IN | TEMPERATURE: 97.5 F | DIASTOLIC BLOOD PRESSURE: 78 MMHG | HEART RATE: 82 BPM | OXYGEN SATURATION: 99 % | SYSTOLIC BLOOD PRESSURE: 110 MMHG | WEIGHT: 114 LBS | RESPIRATION RATE: 16 BRPM

## 2020-08-24 DIAGNOSIS — K59.09 CHRONIC CONSTIPATION: ICD-10-CM

## 2020-08-24 DIAGNOSIS — J30.89 NON-SEASONAL ALLERGIC RHINITIS, UNSPECIFIED TRIGGER: ICD-10-CM

## 2020-08-24 DIAGNOSIS — F41.8 DEPRESSION WITH ANXIETY: ICD-10-CM

## 2020-08-24 DIAGNOSIS — F51.01 PRIMARY INSOMNIA: ICD-10-CM

## 2020-08-24 DIAGNOSIS — I10 ESSENTIAL HYPERTENSION: Primary | ICD-10-CM

## 2020-08-24 DIAGNOSIS — M81.0 AGE-RELATED OSTEOPOROSIS WITHOUT CURRENT PATHOLOGICAL FRACTURE: ICD-10-CM

## 2020-08-24 DIAGNOSIS — K21.9 GASTROESOPHAGEAL REFLUX DISEASE WITHOUT ESOPHAGITIS: ICD-10-CM

## 2020-08-24 DIAGNOSIS — G43.909 MIGRAINE WITHOUT STATUS MIGRAINOSUS, NOT INTRACTABLE, UNSPECIFIED MIGRAINE TYPE: ICD-10-CM

## 2020-08-24 DIAGNOSIS — Z00.00 ROUTINE HEALTH MAINTENANCE: ICD-10-CM

## 2020-08-24 PROCEDURE — 99214 OFFICE O/P EST MOD 30 MIN: CPT | Performed by: FAMILY MEDICINE

## 2020-08-24 NOTE — PROGRESS NOTES
Subjective     Martina Hardwick is a 64 y.o. female, who presents with a chief complaint of   Chief Complaint   Patient presents with   • Follow-up     6 months   • Hypertension     Hypertension   Associated symptoms include headaches.   Headache      Depression   Insomnia   Associated symptoms include arthralgias, headaches and a rash.     1. HTN.  Tolerates medication.  Takes lisinopril 30 mg BID and amlodipine 2.5 mg daily.  Home blood pressures running 105-135/60s-70s.   Denies dizziness and chest pain.    2. Depression and anxiety.  Pt reports an increase in her anxiety due to life stress.  She takes paroxetine and prn alprazolam.  Denies SI.    3. Headaches. She takes sumatriptan for abortive treatment and this is effective.    4. Chronic constipation.  She takes Miralax and this is effective for her.    The following portions of the patient's history were reviewed and updated as appropriate: allergies, current medications, past family history, past medical history, past social history, past surgical history and problem list.    Allergies: Hydrocodone and Sulfa antibiotics    Review of Systems   Constitutional: Negative.    Eyes: Negative.    Respiratory: Negative.    Cardiovascular: Negative.    Endocrine: Negative.    Genitourinary: Negative.    Musculoskeletal: Positive for arthralgias.   Skin: Positive for rash.   Allergic/Immunologic: Positive for environmental allergies.   Neurological: Positive for headaches.   Hematological: Negative.      Objective     Wt Readings from Last 3 Encounters:   08/24/20 51.7 kg (114 lb)   02/24/20 52.6 kg (116 lb)   11/18/19 51.7 kg (114 lb)     Temp Readings from Last 3 Encounters:   08/24/20 97.5 °F (36.4 °C)   02/24/20 98 °F (36.7 °C) (Oral)   11/18/19 98 °F (36.7 °C) (Oral)     BP Readings from Last 3 Encounters:   08/24/20 110/78   02/24/20 120/80   11/18/19 148/90     Pulse Readings from Last 3 Encounters:   08/24/20 82   02/24/20 91   11/18/19 96     Body mass index is  21.54 kg/m².  SpO2 Readings from Last 3 Encounters:   01/15/18 96%   10/12/17 98%   08/10/17 99%     Physical Exam   Constitutional: She is oriented to person, place, and time. She appears well-developed and well-nourished.   HENT:   Head: Normocephalic and atraumatic.   Mouth/Throat: Oropharynx is clear and moist.   Eyes: Conjunctivae and EOM are normal.   Neck: Neck supple. No thyromegaly present.   Cardiovascular: Normal rate, regular rhythm and normal heart sounds.   Pulmonary/Chest: Effort normal and breath sounds normal.   Abdominal: Soft. Bowel sounds are normal. There is no hepatosplenomegaly.   Musculoskeletal: Normal range of motion. She exhibits no edema.   Neurological: She is alert and oriented to person, place, and time.   Skin: Skin is warm and dry. No rash noted.   Psychiatric: She has a normal mood and affect. Her behavior is normal.   Nursing note and vitals reviewed.      Assessment/Plan   Martina was seen today for hypertension, insomnia and heartburn.    Diagnoses and all orders for this visit:      Essential hypertension  -     lisinopril (PRINIVIL,ZESTRIL) 20 MG tablet; Take 1 tablet by mouth 2 (Two) Times a Day.    Routine health maintenance    Migraine without status migrainosus, not intractable, unspecified migraine type    Gastroesophageal reflux disease without esophagitis    Depression with anxiety    Primary insomnia    Chronic constipation    Family history of colonic polyps    Non-seasonal allergic rhinitis, unspecified trigger    Osteoporosis    1. HTN.  Controlled.  Continue lisinopril 30 mg BID and amlodipine 2.5 mg daily.  Labs reviewed.    2. Routine health maint.  Mammogram UTD.  Colonoscopy UTD.  Hysterectomy done for non-cancerous reasons.  Shingrix: first dose done at pharmacy.    3. Migraines.  Continue sumatriptan for abortive treatment.    4. GERD. Controlled. Continue omeprazole and lifestyle measures.    5. Depression with anxiety. Continue paroxetine.  On prn alprazolam.   Med management agreement and [facility] UTD.    6. Insomnia.  Zolpidem is effective.  Med management agreement and [facility] UTD.    7. Chronic constipation.  Controlled with daily Miralax.    8. LORRIE.  Controlled with fluticasone nasal spray and loratadine.    9. Osteoporosis.  Didn't tolerate bisphosphonates previously (Dr. Ghotra).  Insurance didn't cover Prolia or Reclast.  She wants to wait until she turns 65 to further pursue medication.  Continue calcium, vitamin D and weight-bearing exercise.  Recheck dexa scan in 2 years.    Outpatient Medications Prior to Visit   Medication Sig Dispense Refill   • ALPRAZolam (XANAX) 0.5 MG tablet Take 1 tablet by mouth twice daily 30 tablet 2   • amLODIPine (NORVASC) 2.5 MG tablet Take 1 tablet by mouth Daily. 90 tablet 3   • betamethasone valerate (VALISONE) 0.1 % ointment Apply  topically to the appropriate area as directed 2 (Two) Times a Day. 30 g 1   • Calcium-Vitamin D (CALTRATE 600 PLUS-VIT D PO) Take  by mouth.     • Calcium-Vitamin D-Vitamin K (VIACTIV CALCIUM PLUS D PO) Take  by mouth.     • famotidine (PEPCID) 10 MG tablet Take 10 mg by mouth 2 (Two) Times a Day.     • fluticasone (FLONASE) 50 MCG/ACT nasal spray 2 sprays into each nostril daily.     • lisinopril (PRINIVIL,ZESTRIL) 30 MG tablet TAKE 1 TABLET BY MOUTH TWICE DAILY 60 tablet 0   • loratadine (CLARITIN) 10 MG tablet Take 10 mg by mouth daily.     • omeprazole (PrilOSEC) 20 MG capsule Take 1 capsule by mouth Daily. 90 capsule 3   • PARoxetine (PAXIL) 30 MG tablet Take 1 tablet by mouth Every Morning. 90 tablet 3   • polyethylene glycol (MIRALAX) packet Take 17 g by mouth daily.     • Probiotic Product (PROBIOTIC DAILY) capsule Once daily     • SUMAtriptan (IMITREX) 100 MG tablet Take 1 tablet by mouth 1 (One) Time As Needed for Migraine for up to 1 dose. Take one tablet at onset of headache. May repeat dose one time in 2 hours. 27 tablet 3   • vitamin B-12 (CYANOCOBALAMIN) 2500 MCG sublingual tablet tablet  Place 2,500 mcg under the tongue Daily.     • zolpidem (AMBIEN) 10 MG tablet TAKE ONE TABLET BY MOUTH ONCE NIGHTLY 90 tablet 1   • Multiple Vitamins-Minerals (EYE VITAMINS) capsule Take  by mouth.     • lisinopril (PRINIVIL,ZESTRIL) 30 MG tablet Take 1 tablet by mouth 2 (Two) Times a Day. 60 tablet 0     No facility-administered medications prior to visit.      No orders of the defined types were placed in this encounter.    [unfilled]  Medications Discontinued During This Encounter   Medication Reason   • lisinopril (PRINIVIL,ZESTRIL) 30 MG tablet Duplicate order     Return in about 6 months (around 2/24/2021).

## 2020-10-13 DIAGNOSIS — F51.01 PRIMARY INSOMNIA: ICD-10-CM

## 2020-10-13 NOTE — TELEPHONE ENCOUNTER
Caller: Martina Hardwick    Relationship: Self    Best call back number: [phone]      Medication needed:   zolpidem (AMBIEN) 10 MG tablet    When do you need the refill by: ASAP     What details did the patient provide when requesting the medication:PATIENT TOOK HER LAST TODAY. PATIENT WILL NEED IT SENT IN TODAY     Does the patient have less than a 3 day supply:  [x] Yes  [] No    What is the patient's preferred pharmacy:    [facility] [address] [facility] [address] - [phone]  - [phone]   [phone]

## 2020-10-13 NOTE — TELEPHONE ENCOUNTER
Rx sent to Dr. Dubois to transmit.  Patient advised this may be tomorrow, as Dr. Dubois is out of the office today.  Patient voiced understanding.

## 2020-10-14 RX ORDER — ZOLPIDEM TARTRATE 10 MG/1
10 TABLET ORAL NIGHTLY
Qty: 90 TABLET | Refills: 1 | Status: SHIPPED | OUTPATIENT
Start: 2020-10-14 | End: 2021-04-12

## 2020-10-14 NOTE — TELEPHONE ENCOUNTER
MS. LUIS SAYS THAT PHARMACY HAS NOT RECEIVED THE REFILL, SHE SAYS SHE TOOK HER LAST PILL YESTERDAY.     zolpidem (AMBIEN) 10 MG tablet   1 ordered  EditCancel Reorder       Summary: TAKE ONE TABLET BY MOUTH ONCE NIGHTLY, Normal            [phone] Martina Hardwick [address] [facility] [address] - [phone]  - [phone]   [phone]

## 2020-11-09 DIAGNOSIS — F41.8 DEPRESSION WITH ANXIETY: ICD-10-CM

## 2020-11-10 RX ORDER — ALPRAZOLAM 0.5 MG/1
TABLET ORAL
Qty: 30 TABLET | Refills: 0 | Status: SHIPPED | OUTPATIENT
Start: 2020-11-10 | End: 2020-12-16

## 2020-12-14 ENCOUNTER — OFFICE VISIT (OUTPATIENT)
Dept: GASTROENTEROLOGY | Facility: CLINIC | Age: 65
End: 2020-12-14

## 2020-12-14 ENCOUNTER — LAB (OUTPATIENT)
Dept: LAB | Facility: HOSPITAL | Age: 65
End: 2020-12-14

## 2020-12-14 VITALS — HEIGHT: 61 IN | WEIGHT: 114.2 LBS | BODY MASS INDEX: 21.56 KG/M2 | TEMPERATURE: 98.3 F

## 2020-12-14 DIAGNOSIS — K21.9 GASTROESOPHAGEAL REFLUX DISEASE WITHOUT ESOPHAGITIS: ICD-10-CM

## 2020-12-14 DIAGNOSIS — K59.09 CHRONIC CONSTIPATION: ICD-10-CM

## 2020-12-14 DIAGNOSIS — K63.89 BACTERIAL OVERGROWTH SYNDROME: ICD-10-CM

## 2020-12-14 DIAGNOSIS — I10 ESSENTIAL HYPERTENSION: ICD-10-CM

## 2020-12-14 DIAGNOSIS — Z00.00 ROUTINE HEALTH MAINTENANCE: ICD-10-CM

## 2020-12-14 DIAGNOSIS — K63.89 BACTERIAL OVERGROWTH SYNDROME: Primary | ICD-10-CM

## 2020-12-14 PROCEDURE — 86677 HELICOBACTER PYLORI ANTIBODY: CPT

## 2020-12-14 PROCEDURE — 36415 COLL VENOUS BLD VENIPUNCTURE: CPT

## 2020-12-14 PROCEDURE — 99214 OFFICE O/P EST MOD 30 MIN: CPT | Performed by: INTERNAL MEDICINE

## 2020-12-14 NOTE — PROGRESS NOTES
PATIENT INFORMATION  Martina Hardwick       - 1955    CHIEF COMPLAINT  Chief Complaint   Patient presents with   • Abdominal Pain     RUQ pain       HISTORY OF PRESENT ILLNESS  Earlier this year with RUQ pain and has adhesions an had a difficult elena in 2018 but prior ORs that lead to that.    Bloating and hard abd and unbelievably bad gas as well has been pushing simethicaon gasX and anything she can get her hands on.    No Abx for Years.     Is on miralax dailyu and mostly move 5/7 days a week      REVIEWED PERTINENT RESULTS/ LABS  Lab Results   Component Value Date    CASEREPORT  2018     Surgical Pathology Report                         Case: [id number]                                  Authorizing Provider:  Hernan Hinds MD        Collected:           2018 09:17 AM          Ordering Location:     [facility]   Received:            2018 10:06 AM                                 OR                                                                           Pathologist:           Marquez Pierre MD                                                     Specimen:    Gallbladder, GALLBLADDER                                                                   FINALDX  2018     1. Gallbladder, CHOLECYSTECTOMY LAPAROSCOPIC:  Testing performed at outside laboratory. See scanned report.             Lab Results   Component Value Date    HGB 12.4 2020    MCV 92.8 2020     2020    ALT 10 2020    AST 19 2020    HGBA1C 5.20 2019    TRIG 124 2020      No results found.    REVIEW OF SYSTEMS  Review of Systems   All other systems reviewed and are negative.        ACTIVE PROBLEMS  Patient Active Problem List    Diagnosis   • Age-related osteoporosis without current pathological fracture [M81.0]   • Psoriasis [L40.9]   • Family history of colonic polyps [Z83.71]   • Routine health maintenance [Z00.00]   • Essential hypertension [I10]    • GERD (gastroesophageal reflux disease) [K21.9]   • Allergic rhinitis [J30.9]   • Primary insomnia [F51.01]   • Chronic constipation [K59.09]   • Migraines [G43.909]   • Depression with anxiety [F41.8]         PAST MEDICAL HISTORY  Past Medical History:   Diagnosis Date   • Abdominal pain    • Anxiety    • Anxiety    • Arthritis    • Constipation    • Depressed    • Depression    • Endometriosis    • Gastritis    • GERD (gastroesophageal reflux disease)    • Headache    • Headache, tension-type    • Hypertension    • Insomnia    • Migraine    • OP (osteoporosis)    • Poor sleep    • Psoriasis    • Seasonal allergies          SURGICAL HISTORY  Past Surgical History:   Procedure Laterality Date   • APPENDECTOMY     • CHOLECYSTECTOMY N/A 8/17/2018    Procedure: CHOLECYSTECTOMY LAPAROSCOPIC converted to open procedure;  Surgeon: Hernan Hinds MD;  Location: [facility] OR;  Service: General   • COLON SURGERY     • COLONOSCOPY     • COLONOSCOPY N/A 12/12/2018    Procedure: COLONOSCOPY;  Surgeon: Darien Ruff MD;  Location: [facility] OR;  Service: Gastroenterology   • DIAGNOSTIC LAPAROSCOPY  1990   • DILATATION AND CURETTAGE  1985   • ENDOSCOPY N/A 5/13/2016    Procedure: ESOPHAGOGASTRODUODENOSCOPY ;  Surgeon: Darien Ruff MD;  Location: [facility] OR;  Service:    • HYSTERECTOMY     • REVISION / TAKEDOWN COLOSTOMY           FAMILY HISTORY  Family History   Problem Relation Age of Onset   • Hyperlipidemia Mother    • Macular degeneration Mother    • Heart disease Father    • Hyperlipidemia Father    • Cancer Father    • Depression Father    • Depression Sister    • Hyperlipidemia Brother    • Depression Brother    • Breast cancer Maternal Aunt    • Breast cancer Cousin    • Breast cancer Cousin    • Colon cancer Neg Hx    • Colon polyps Neg Hx          SOCIAL HISTORY  Social History     Occupational History   • Not on file   Tobacco Use   • Smoking status: Never Smoker   • Smokeless tobacco: Never  Used   Substance and Sexual Activity   • Alcohol use: Yes     Comment: rare   • Drug use: No   • Sexual activity: Defer         CURRENT MEDICATIONS    Current Outpatient Medications:   •  ALPRAZolam (XANAX) 0.5 MG tablet, Take 1 tablet by mouth twice daily, Disp: 30 tablet, Rfl: 0  •  amLODIPine (NORVASC) 2.5 MG tablet, Take 1 tablet by mouth Daily., Disp: 90 tablet, Rfl: 3  •  betamethasone valerate (VALISONE) 0.1 % ointment, Apply  topically to the appropriate area as directed 2 (Two) Times a Day., Disp: 30 g, Rfl: 1  •  Calcium-Vitamin D (CALTRATE 600 PLUS-VIT D PO), Take  by mouth., Disp: , Rfl:   •  Calcium-Vitamin D-Vitamin K (VIACTIV CALCIUM PLUS D PO), Take  by mouth., Disp: , Rfl:   •  famotidine (PEPCID) 10 MG tablet, Take 10 mg by mouth 2 (Two) Times a Day., Disp: , Rfl:   •  fluticasone (FLONASE) 50 MCG/ACT nasal spray, 2 sprays into each nostril daily., Disp: , Rfl:   •  lisinopril (PRINIVIL,ZESTRIL) 30 MG tablet, TAKE 1 TABLET BY MOUTH TWICE DAILY, Disp: 60 tablet, Rfl: 0  •  loratadine (CLARITIN) 10 MG tablet, Take 10 mg by mouth daily., Disp: , Rfl:   •  Multiple Vitamins-Minerals (EYE VITAMINS) capsule, Take  by mouth., Disp: , Rfl:   •  omeprazole (PrilOSEC) 20 MG capsule, Take 1 capsule by mouth Daily., Disp: 90 capsule, Rfl: 3  •  PARoxetine (PAXIL) 30 MG tablet, Take 1 tablet by mouth Every Morning., Disp: 90 tablet, Rfl: 3  •  polyethylene glycol (MIRALAX) packet, Take 17 g by mouth daily., Disp: , Rfl:   •  Probiotic Product (PROBIOTIC DAILY) capsule, Once daily, Disp: , Rfl:   •  riFAXIMin (XIFAXAN) 550 MG tablet, Take 1 tablet by mouth Every 8 (Eight) Hours., Disp: 42 tablet, Rfl: 3  •  SUMAtriptan (IMITREX) 100 MG tablet, Take 1 tablet by mouth 1 (One) Time As Needed for Migraine for up to 1 dose. Take one tablet at onset of headache. May repeat dose one time in 2 hours., Disp: 27 tablet, Rfl: 3  •  vitamin B-12 (CYANOCOBALAMIN) 2500 MCG sublingual tablet tablet, Place 2,500 mcg under the  "tongue Daily., Disp: , Rfl:   •  zolpidem (AMBIEN) 10 MG tablet, Take 1 tablet by mouth Every Night., Disp: 90 tablet, Rfl: 1    ALLERGIES  Hydrocodone and Sulfa antibiotics    VITALS  Vitals:    12/14/20 1520   Temp: 98.3 °F (36.8 °C)   TempSrc: Temporal   Weight: 51.8 kg (114 lb 3.2 oz)   Height: 154.9 cm (60.98\")       PHYSICAL EXAM  Debilities/Disabilities Identified: None  Emotional Behavior: Appropriate  Wt Readings from Last 3 Encounters:   12/14/20 51.8 kg (114 lb 3.2 oz)   08/24/20 51.7 kg (114 lb)   02/24/20 52.6 kg (116 lb)     Ht Readings from Last 1 Encounters:   12/14/20 154.9 cm (60.98\")     Body mass index is 21.59 kg/m².  Physical Exam  Constitutional:       Appearance: Normal appearance. She is well-developed and normal weight.   HENT:      Head: Normocephalic and atraumatic.   Eyes:      General: No scleral icterus.     Pupils: Pupils are equal, round, and reactive to light.   Neck:      Musculoskeletal: Normal range of motion.      Thyroid: No thyromegaly.   Cardiovascular:      Rate and Rhythm: Normal rate and regular rhythm.      Heart sounds: Normal heart sounds. No murmur. No gallop.    Pulmonary:      Effort: Pulmonary effort is normal.      Breath sounds: Normal breath sounds. No wheezing or rales.   Abdominal:      General: Bowel sounds are normal. There is no distension or abdominal bruit.      Palpations: Abdomen is soft. There is no shifting dullness, fluid wave, hepatomegaly or splenomegaly.      Tenderness: There is abdominal tenderness in the right upper quadrant, right lower quadrant, epigastric area, periumbilical area and left upper quadrant. There is no rebound. Negative signs include Collins's sign.      Hernia: No hernia is present. There is no hernia in the ventral area.   Musculoskeletal: Normal range of motion.   Lymphadenopathy:      Cervical: No cervical adenopathy.   Skin:     General: Skin is warm and dry.      Findings: No erythema.   Neurological:      Mental Status: " She is alert and oriented to person, place, and time.   Psychiatric:         Mood and Affect: Mood normal.         Behavior: Behavior normal.         CLINICAL DATA REVIEWED   reviewed previous lab results and integrated with today's visit, reviewed notes from other physicians and/or last GI encounter, reviewed previous endoscopy results and available photos, reviewed surgical pathology results from previous biopsies    ASSESSMENT  Diagnoses and all orders for this visit:    Bacterial overgrowth syndrome  -     Helicobacter Pylori, IgA IgG IgM; Future    Gastroesophageal reflux disease without esophagitis    Chronic constipation    Other orders  -     Discontinue: riFAXIMin (XIFAXAN) 550 MG tablet; Take 1 tablet by mouth Every 8 (Eight) Hours.  -     riFAXIMin (XIFAXAN) 550 MG tablet; Take 1 tablet by mouth Every 8 (Eight) Hours.          PLAN  Check HP, treat for SIBO    Return in about 2 months (around 2/14/2021).    I have discussed the above plan with the patient.  They verbalize understanding and are in agreement with the plan.  They have been advised to contact the office for any questions, concerns, or changes related to their health.

## 2020-12-15 ENCOUNTER — TELEPHONE (OUTPATIENT)
Dept: SURGERY | Facility: CLINIC | Age: 65
End: 2020-12-15

## 2020-12-15 DIAGNOSIS — F41.8 DEPRESSION WITH ANXIETY: ICD-10-CM

## 2020-12-15 NOTE — TELEPHONE ENCOUNTER
PT WENT TO [place] TO  XIFAXIN BUT IT WAS $755.  SHE CANNOT PAY THAT.  ALTERNATIVES?  CALL PT AT # [phone]

## 2020-12-16 RX ORDER — ALPRAZOLAM 0.5 MG/1
TABLET ORAL
Qty: 30 TABLET | Refills: 0 | Status: SHIPPED | OUTPATIENT
Start: 2020-12-16 | End: 2021-01-26 | Stop reason: SDUPTHER

## 2020-12-17 LAB
H PYLORI IGA SER-ACNC: <9 UNITS (ref 0–8.9)
H PYLORI IGG SER IA-ACNC: 0.56 INDEX VALUE (ref 0–0.79)
H PYLORI IGM SER-ACNC: <9 UNITS (ref 0–8.9)

## 2020-12-21 ENCOUNTER — TRANSCRIBE ORDERS (OUTPATIENT)
Dept: ADMINISTRATIVE | Facility: HOSPITAL | Age: 65
End: 2020-12-21

## 2020-12-21 DIAGNOSIS — Z12.31 SCREENING MAMMOGRAM, ENCOUNTER FOR: Primary | ICD-10-CM

## 2020-12-22 ENCOUNTER — TELEPHONE (OUTPATIENT)
Dept: GASTROENTEROLOGY | Facility: CLINIC | Age: 65
End: 2020-12-22

## 2020-12-22 NOTE — TELEPHONE ENCOUNTER
Saw Dr. Ruff last Monday, 12/14/2020.  Was given Xifaxan.  She takes Miralax daily.  Yesterday started with bloating, abdomen feels hard and very gassy.  She doubled up on Miralax  yesterday.  Did give her some relief this morning.  Should she continue with Xifaxan?  Or does she need to change course?  Please advise.

## 2020-12-29 ENCOUNTER — TELEMEDICINE (OUTPATIENT)
Dept: GASTROENTEROLOGY | Facility: CLINIC | Age: 65
End: 2020-12-29

## 2020-12-29 DIAGNOSIS — K21.00 GASTROESOPHAGEAL REFLUX DISEASE WITH ESOPHAGITIS WITHOUT HEMORRHAGE: Primary | ICD-10-CM

## 2020-12-29 DIAGNOSIS — K66.0 ADHESION OF ABDOMINAL WALL: ICD-10-CM

## 2020-12-29 DIAGNOSIS — K21.9 GASTROESOPHAGEAL REFLUX DISEASE WITHOUT ESOPHAGITIS: ICD-10-CM

## 2020-12-29 DIAGNOSIS — K59.09 CHRONIC CONSTIPATION: ICD-10-CM

## 2020-12-29 PROCEDURE — 99214 OFFICE O/P EST MOD 30 MIN: CPT | Performed by: NURSE PRACTITIONER

## 2020-12-29 RX ORDER — OMEPRAZOLE 40 MG/1
40 CAPSULE, DELAYED RELEASE ORAL DAILY
Qty: 90 CAPSULE | Refills: 3 | Status: SHIPPED | OUTPATIENT
Start: 2020-12-29 | End: 2021-04-21

## 2020-12-29 NOTE — PROGRESS NOTES
Tele visit:  Unable to complete visit using a video connection to the patient. A phone visit was used to complete this visits. You have chosen to receive care through a telephone visit. Do you consent to use a telephone visit for your medical care today? Yes    PATIENT INFORMATION  Martina Hardwick     - 1955    CHIEF COMPLAINT  Chief Complaint   Patient presents with   • Abdominal Pain       HISTORY OF PRESENT ILLNESS  Saw Dr. Ruff  with abd pain, constipation and bloating.  Taking miralax and started on xifaxin 2 weeks ago.   This made her more constipated and did not make her feel better, and upped her miralax to bid.  Yesterday took 2 enemas and at first had liquid results and then had some formed stool and took 2 doses of miralax.  This morning had a good bm.  Also taking a daily probiotic for the last four years.     10 in from colon removed with prior surgeries and elena.  EGD 2016 gastritis and esophagitis, hp neg.  hx severe endometriosis and early hyst partial and had intestinal blockage with oopheretctomy and appy and colon resection and renarrowed and did balloon inflation of that scarring constricting colon.       REVIEWED PERTINENT RESULTS/ LABS  Lab Results   Component Value Date    CASEREPORT  2018     Surgical Pathology Report                         Case: [id number]                                  Authorizing Provider:  Hernan Hinds MD        Collected:           2018 09:17 AM          Ordering Location[facility]     [facility]   Received:            2018 10:06 AM                                 OR                                                                           Pathologist:           Marquez Pierre MD                                                     Specimen:    Gallbladder, GALLBLADDER                                                                   FINALDX  2018     1. Gallbladder, CHOLECYSTECTOMY LAPAROSCOPIC:  Testing  performed at outside laboratory. See scanned report.             Lab Results   Component Value Date    HGB 12.4 08/17/2020    MCV 92.8 08/17/2020     08/17/2020    ALT 10 08/17/2020    AST 19 08/17/2020    HGBA1C 5.20 11/11/2019    TRIG 124 08/17/2020      No results found.    CURRENT MEDICATIONS    Current Outpatient Medications:   •  ALPRAZolam (XANAX) 0.5 MG tablet, Take 1 tablet by mouth twice daily, Disp: 30 tablet, Rfl: 0  •  amLODIPine (NORVASC) 2.5 MG tablet, Take 1 tablet by mouth Daily., Disp: 90 tablet, Rfl: 3  •  betamethasone valerate (VALISONE) 0.1 % ointment, Apply  topically to the appropriate area as directed 2 (Two) Times a Day., Disp: 30 g, Rfl: 1  •  Calcium-Vitamin D (CALTRATE 600 PLUS-VIT D PO), Take  by mouth., Disp: , Rfl:   •  Calcium-Vitamin D-Vitamin K (VIACTIV CALCIUM PLUS D PO), Take  by mouth., Disp: , Rfl:   •  famotidine (PEPCID) 10 MG tablet, Take 10 mg by mouth 2 (Two) Times a Day., Disp: , Rfl:   •  fluticasone (FLONASE) 50 MCG/ACT nasal spray, 2 sprays into each nostril daily., Disp: , Rfl:   •  lisinopril (PRINIVIL,ZESTRIL) 30 MG tablet, TAKE 1 TABLET BY MOUTH TWICE DAILY, Disp: 60 tablet, Rfl: 0  •  loratadine (CLARITIN) 10 MG tablet, Take 10 mg by mouth daily., Disp: , Rfl:   •  Multiple Vitamins-Minerals (EYE VITAMINS) capsule, Take  by mouth., Disp: , Rfl:   •  omeprazole (priLOSEC) 40 MG capsule, Take 1 capsule by mouth Daily., Disp: 90 capsule, Rfl: 3  •  PARoxetine (PAXIL) 30 MG tablet, Take 1 tablet by mouth Every Morning., Disp: 90 tablet, Rfl: 3  •  polyethylene glycol (MIRALAX) packet, Take 17 g by mouth daily., Disp: , Rfl:   •  Probiotic Product (PROBIOTIC DAILY) capsule, Once daily, Disp: , Rfl:   •  riFAXIMin (XIFAXAN) 550 MG tablet, Take 1 tablet by mouth Every 8 (Eight) Hours., Disp: 42 tablet, Rfl: 3  •  SUMAtriptan (IMITREX) 100 MG tablet, Take 1 tablet by mouth 1 (One) Time As Needed for Migraine for up to 1 dose. Take one tablet at onset of headache.  May repeat dose one time in 2 hours., Disp: 27 tablet, Rfl: 3  •  vitamin B-12 (CYANOCOBALAMIN) 2500 MCG sublingual tablet tablet, Place 2,500 mcg under the tongue Daily., Disp: , Rfl:   •  zolpidem (AMBIEN) 10 MG tablet, Take 1 tablet by mouth Every Night., Disp: 90 tablet, Rfl: 1    ALLERGIES  Hydrocodone and Sulfa antibiotics    REVIEW OF SYSTEMS  ROS: 14 point review of systems was performed and all other systems were reviewed and are negative except for documented findings in HPI and today's encounter.   Review of Systems      History     Last Reviewed by Michelle Law APRN on 12/29/2020 at 12:19 PM    Sections Reviewed    Tobacco, Family, Medical, Surgical      Problem list reviewed by Michelle Law APRN on 12/29/2020 at 12:19 PM  Medicines reviewed by Michelle Law APRN on 12/29/2020 at 12:19 PM  Allergies reviewed by Michelle Law APRN on 12/29/2020 at 12:19 PM      ACTIVE PROBLEMS  Patient Active Problem List    Diagnosis   • Adhesion of abdominal wall [K66.0]   • Age-related osteoporosis without current pathological fracture [M81.0]   • Psoriasis [L40.9]   • Family history of colonic polyps [Z83.71]   • Routine health maintenance [Z00.00]   • Essential hypertension [I10]   • GERD (gastroesophageal reflux disease) [K21.9]   • Allergic rhinitis [J30.9]   • Primary insomnia [F51.01]   • Chronic constipation [K59.09]   • Migraines [G43.909]   • Depression with anxiety [F41.8]         PAST MEDICAL HISTORY  Past Medical History:   Diagnosis Date   • Abdominal pain    • Anxiety    • Anxiety    • Arthritis    • Constipation    • Depressed    • Depression    • Endometriosis    • Gastritis    • GERD (gastroesophageal reflux disease)    • Headache    • Headache, tension-type    • Hypertension    • Insomnia    • Migraine    • OP (osteoporosis)    • Poor sleep    • Psoriasis    • Seasonal allergies          SURGICAL HISTORY  Past Surgical History:   Procedure Laterality Date   • APPENDECTOMY     •  "CHOLECYSTECTOMY N/A 8/17/2018    Procedure: CHOLECYSTECTOMY LAPAROSCOPIC converted to open procedure;  Surgeon: Hernan Hinds MD;  Location: [facility] OR;  Service: General   • COLON SURGERY     • COLONOSCOPY     • COLONOSCOPY N/A 12/12/2018    Procedure: COLONOSCOPY;  Surgeon: Darien Ruff MD;  Location: [facility] OR;  Service: Gastroenterology   • DIAGNOSTIC LAPAROSCOPY  1990   • DILATATION AND CURETTAGE  1985   • ENDOSCOPY N/A 5/13/2016    Procedure: ESOPHAGOGASTRODUODENOSCOPY ;  Surgeon: Darien Ruff MD;  Location: [facility] OR;  Service:    • HYSTERECTOMY     • REVISION / TAKEDOWN COLOSTOMY           FAMILY HISTORY  Family History   Problem Relation Age of Onset   • Hyperlipidemia Mother    • Macular degeneration Mother    • Heart disease Father    • Hyperlipidemia Father    • Cancer Father    • Depression Father    • Depression Sister    • Hyperlipidemia Brother    • Depression Brother    • Breast cancer Maternal Aunt    • Breast cancer Cousin    • Breast cancer Cousin    • Colon cancer Neg Hx    • Colon polyps Neg Hx          SOCIAL HISTORY  Social History     Occupational History   • Not on file   Tobacco Use   • Smoking status: Never Smoker   • Smokeless tobacco: Never Used   Substance and Sexual Activity   • Alcohol use: Yes     Comment: rare   • Drug use: No   • Sexual activity: Defer         PHYSICAL EXAM  Debilities/Disabilities Identified: None  Emotional Behavior: Appropriate  65 y.o. female  Wt Readings from Last 3 Encounters:   12/14/20 51.8 kg (114 lb 3.2 oz)   08/24/20 51.7 kg (114 lb)   02/24/20 52.6 kg (116 lb)     Ht Readings from Last 1 Encounters:   12/14/20 154.9 cm (60.98\")     There is no height or weight on file to calculate BMI.  There were no vitals filed for this visit.  Vital signs reviewed.          Physical Exam  As reported by patient above d/t telemedicine visit.    CLINICAL DATA REVIEWED   reviewed previous lab results and integrated with today's visit, " reviewed notes from other physicians and/or last GI encounter, reviewed previous endoscopy results and available photos, reviewed surgical pathology results from previous biopsies      ASSESSMENT  Diagnoses and all orders for this visit:    Gastroesophageal reflux disease with esophagitis without hemorrhage    Gastroesophageal reflux disease without esophagitis  -     omeprazole (priLOSEC) 40 MG capsule; Take 1 capsule by mouth Daily.    Chronic constipation    Adhesion of abdominal wall          PLAN  Return in about 4 weeks (around 1/26/2021).     Increase omeprazole to 40mg daily f/u in one month to see if we need to increase it more.   Increase the fiber in your diet to min 30gm of fiber daily.  Increase probiotic to 2 a day.  Adding magnesium supplement daily, if you tolerate you can go up to 2 magnesium pills a day.     Don't eat late, don't eat fried and don't eat too much at one time. Avoid tomato based products like pizza, lasagna, spagetti.  If you want to have these take an over the counter Pepcid immediately before you eat them.  Do not eat within 3-4 hrs of bedtime. Limit caffeine and carbonated beverages, if you must have them do not have later in the day.  If reflux is severe elevate the head of the bed. You may also use TUMS, maalox, or mylanta for breakthrough heartburn.    Take 2 daily probiotics (something with a billion and more different strains is better like probiotic 10 or probiotic gummies) for your gut as well.  AVOID taking NSAIDS (like ibuprofen, Aleve, Motrin, naproxen, meloxicam, etc) as much as possible and use acetaminophen (Tylenol) instead.    Drink lots of water, eat a high fiber diet with 25-30gm a day(check the fiber content in the foods you eat.  Protein bars with 15gm of fiber in each bar are a great supplement daily), and get regular exercise. Use over the counter simethicone xtra strength gas x (125-180mg) 2 twice a day as needed for gas.     High Fiber Food  suggestions:    Beans, nuts, vegetables, and whole grains are high in fiber.    Lopez Protein bars from [employer] = 15gm of fiber in each bar (also gluten free)  Quest Protein bars from grocery stores= 15gm of fiber each (also gluten free)  Fiber One bars from grocery stores = 5-6gm of fiber each  Kelloggs Bran Buds cereal 1/2 cup = 17gm of fiber  K[employer]ger brand low sugar Craisins = 13gm of fiber per serving         I have discussed the above plan with the patient.  They verbalize understanding and are in agreement with the plan.  They have been advised to contact the office for any questions, concerns, or changes related to their health.    45 min spent with patient today >50% spent counseling about natural history and expected course of assessed complaint and reviewed treatment options that have been tried and not tried and those currently available. Questions answered.

## 2020-12-29 NOTE — PATIENT INSTRUCTIONS
Increase omeprazole to 40mg daily f/u in one month to see if we need to increase it more.   Increase the fiber in your diet to min 30gm of fiber daily.  Increase probiotic to 2 a day.  Adding magnesium supplement daily, if you tolerate you can go up to 2 magnesium pills a day.     Don't eat late, don't eat fried and don't eat too much at one time. Avoid tomato based products like pizza, lasagna, spagetti.  If you want to have these take an over the counter Pepcid immediately before you eat them.  Do not eat within 3-4 hrs of bedtime. Limit caffeine and carbonated beverages, if you must have them do not have later in the day.  If reflux is severe elevate the head of the bed. You may also use TUMS, maalox, or mylanta for breakthrough heartburn.    Take 2 daily probiotics (something with a billion and more different strains is better like probiotic 10 or probiotic gummies) for your gut as well.  AVOID taking NSAIDS (like ibuprofen, Aleve, Motrin, naproxen, meloxicam, etc) as much as possible and use acetaminophen (Tylenol) instead.    Drink lots of water, eat a high fiber diet with 25-30gm a day(check the fiber content in the foods you eat.  Protein bars with 15gm of fiber in each bar are a great supplement daily), and get regular exercise. Use over the counter simethicone xtra strength gas x (125-180mg) 2 twice a day as needed for gas.     High Fiber Food suggestions:    Beans, nuts, vegetables, and whole grains are high in fiber.    John Protein bars from [employer] = 15gm of fiber in each bar (also gluten free)  Quest Protein bars from grocery stores= 15gm of fiber each (also gluten free)  Fiber One bars from grocery stores = 5-6gm of fiber each  Kelloggs Bran Buds cereal 1/2 cup = 17gm of fiber  Kroger brand low sugar Craisins = 13gm of fiber per serving

## 2021-01-11 ENCOUNTER — HOSPITAL ENCOUNTER (OUTPATIENT)
Dept: MAMMOGRAPHY | Facility: HOSPITAL | Age: 66
Discharge: HOME | End: 2021-01-11
Admitting: FAMILY MEDICINE

## 2021-01-11 DIAGNOSIS — Z12.31 SCREENING MAMMOGRAM, ENCOUNTER FOR: ICD-10-CM

## 2021-01-11 PROCEDURE — 77067 SCR MAMMO BI INCL CAD: CPT

## 2021-01-11 PROCEDURE — 77063 BREAST TOMOSYNTHESIS BI: CPT

## 2021-01-26 DIAGNOSIS — F41.8 DEPRESSION WITH ANXIETY: ICD-10-CM

## 2021-01-26 RX ORDER — ALPRAZOLAM 0.5 MG/1
0.5 TABLET ORAL 2 TIMES DAILY
Qty: 30 TABLET | Refills: 1 | Status: SHIPPED | OUTPATIENT
Start: 2021-01-26 | End: 2021-04-26

## 2021-02-01 RX ORDER — OMEPRAZOLE 20 MG/1
CAPSULE, DELAYED RELEASE ORAL
Qty: 90 CAPSULE | Refills: 3 | Status: SHIPPED | OUTPATIENT
Start: 2021-02-01 | End: 2021-03-15

## 2021-02-14 DIAGNOSIS — I10 ESSENTIAL HYPERTENSION: ICD-10-CM

## 2021-02-16 RX ORDER — AMLODIPINE BESYLATE 2.5 MG/1
TABLET ORAL
Qty: 90 TABLET | Refills: 3 | Status: SHIPPED | OUTPATIENT
Start: 2021-02-16 | End: 2021-02-23 | Stop reason: SDUPTHER

## 2021-02-23 DIAGNOSIS — I10 ESSENTIAL HYPERTENSION: ICD-10-CM

## 2021-02-24 RX ORDER — AMLODIPINE BESYLATE 2.5 MG/1
2.5 TABLET ORAL DAILY
Qty: 90 TABLET | Refills: 3 | Status: SHIPPED | OUTPATIENT
Start: 2021-02-24 | End: 2022-03-02

## 2021-02-26 DIAGNOSIS — F41.8 DEPRESSION WITH ANXIETY: ICD-10-CM

## 2021-02-26 RX ORDER — PAROXETINE 30 MG/1
TABLET, FILM COATED ORAL
Qty: 90 TABLET | Refills: 3 | Status: SHIPPED | OUTPATIENT
Start: 2021-02-26 | End: 2021-06-14 | Stop reason: SDUPTHER

## 2021-02-26 RX ORDER — LISINOPRIL 30 MG/1
TABLET ORAL
Qty: 180 TABLET | Refills: 3 | Status: SHIPPED | OUTPATIENT
Start: 2021-02-26 | End: 2021-03-17 | Stop reason: SDUPTHER

## 2021-03-08 ENCOUNTER — OFFICE VISIT (OUTPATIENT)
Dept: GASTROENTEROLOGY | Facility: CLINIC | Age: 66
End: 2021-03-08

## 2021-03-08 VITALS — BODY MASS INDEX: 21.71 KG/M2 | HEIGHT: 61 IN | WEIGHT: 115 LBS | TEMPERATURE: 98.4 F

## 2021-03-08 DIAGNOSIS — R10.30 LOWER ABDOMINAL PAIN: Primary | ICD-10-CM

## 2021-03-08 DIAGNOSIS — K59.09 CHRONIC CONSTIPATION: ICD-10-CM

## 2021-03-08 PROCEDURE — 99214 OFFICE O/P EST MOD 30 MIN: CPT | Performed by: INTERNAL MEDICINE

## 2021-03-08 RX ORDER — DICYCLOMINE HCL 20 MG
20 TABLET ORAL
Qty: 120 TABLET | Refills: 5 | Status: SHIPPED | OUTPATIENT
Start: 2021-03-08 | End: 2021-04-07

## 2021-03-08 NOTE — PROGRESS NOTES
PATIENT INFORMATION  Martina Hardwick       - 1955    CHIEF COMPLAINT  Chief Complaint   Patient presents with   • Follow-up     2 mo follow up on Gerd; Constipation       HISTORY OF PRESENT ILLNESS  Moving bowels daily but increased pain BLQ and can in the Am can move from 1-5 a day but still with pains and not clearly fluctuating with BMs    Multiple abd surgeries including endometriosis so will start with a CT and proceed from there.    Reviewed her constipation and not straining to help her abd pain and she is amenab le to Antispasmoticna d a CT and has Seen Dr Hinds in the past for both ehr Fely and her Endometrioma    Weight is unchanged for onver 6 onths      REVIEWED PERTINENT RESULTS/ LABS  Lab Results   Component Value Date    CASEREPORT  2018     Surgical Pathology Report                         Case: [id number]                                  Authorizing Provider:  Hernan Hinds MD        Collected:           2018 09:17 AM          Ordering Location:     [facility]   Received:            2018 10:06 AM                                 OR                                                                           Pathologist:           Marquez Pierre MD                                                     Specimen:    Gallbladder, GALLBLADDER                                                                   FINALDX  2018     1. Gallbladder, CHOLECYSTECTOMY LAPAROSCOPIC:  Testing performed at outside laboratory. See scanned report.             Lab Results   Component Value Date    HGB 12.7 2021    MCV 91.4 2021     2021    ALT 14 2021    AST 24 2021    HGBA1C 5.20 2019    TRIG 95 2021      CT Abdomen Pelvis With Contrast    Result Date: 3/12/2021  Narrative: ABDOMEN AND PELVIS CT WITH CONTRAST 2021  HISTORY: 65-year-old female with constipation and bilateral lower abdominal pain several months.  HISTORY of cholecystectomy hysterectomy and appendectomy.  TECHNIQUE: Contrast enhanced CT of the abdomen and pelvis was performed. No comparisons. Radiation dose reduction techniques included automated exposure control or exposure modulation based on body size. Radiation audit for CT and nuclear cardiology exams in the last 12 months: .  ABDOMEN FINDINGS: Included lung bases clear. Pectus excavatum deformity and mass effect upon the heart. Small amount of pericardial fluid at the cardiac apex. Normal caliber aorta. Spleen and adrenal glands are negative and the pancreas is unremarkable. Surgical absence of the gallbladder and the liver is negative. The kidneys are unremarkable. There is no adenopathy.  PELVIS FINDINGS: Probable mild prolapse of the bladder into the deep pelvis. No drainable fluid collection. Status post sigmoid colon surgery and reanastomosis. There is a moderate to moderately large spine of stool in the colon. No focal inflammatory change. There is redundancy of the ascending colon and cecum in the pelvis with a posterior insertion of the ileocecal valve, however, no complicating feature on CT identified at this time. No bowel obstruction or distinct evidence of volvulus or focal inflammatory change. Appendix surgically absent by history. The inguinal canals are negative. Subtle sclerotic appearance of T12 and to a lesser extent T11. Etiology unclear. Similar findings at L5, although this may relate to degenerative change.      Impression: 1. No clearly acute process in the abdomen or pelvis. Large stool volume in the colon indicative of constipation. 2. Surgical absence of the gallbladder uterus and appendix. 3. Probable prolapse of the bladder into the deep pelvis. 4. Subtle diffusely sclerotic appearance of the T11 and T12 vertebral bodies, nonspecific. No reported history of malignancy to suggest metastatic disease but this should be correlated clinically.  This report was finalized on  3/12/2021 4:37 PM by Dr. Ruperto Jacobson MD.        REVIEW OF SYSTEMS  Review of Systems   Gastrointestinal: Positive for abdominal pain.   All other systems reviewed and are negative.        ACTIVE PROBLEMS  Patient Active Problem List    Diagnosis    • Adhesion of abdominal wall [K66.0]    • Age-related osteoporosis without current pathological fracture [M81.0]    • Psoriasis [L40.9]    • Family history of colonic polyps [Z83.71]    • Routine health maintenance [Z00.00]    • Essential hypertension [I10]    • GERD (gastroesophageal reflux disease) [K21.9]    • Allergic rhinitis [J30.9]    • Primary insomnia [F51.01]    • Chronic constipation [K59.09]    • Migraines [G43.909]    • Depression with anxiety [F41.8]          PAST MEDICAL HISTORY  Past Medical History:   Diagnosis Date   • Abdominal pain    • Anxiety    • Anxiety    • Arthritis    • Constipation    • Depressed    • Depression    • Endometriosis    • Gastritis    • GERD (gastroesophageal reflux disease)    • Headache    • Headache, tension-type    • Hypertension    • Insomnia    • Migraine    • OP (osteoporosis)    • Poor sleep    • Psoriasis    • Seasonal allergies          SURGICAL HISTORY  Past Surgical History:   Procedure Laterality Date   • APPENDECTOMY     • CHOLECYSTECTOMY N/A 8/17/2018    Procedure: CHOLECYSTECTOMY LAPAROSCOPIC converted to open procedure;  Surgeon: Hernan Hinds MD;  Location: [facility] OR;  Service: General   • COLON SURGERY     • COLONOSCOPY     • COLONOSCOPY N/A 12/12/2018    Procedure: COLONOSCOPY;  Surgeon: Darien Ruff MD;  Location: [facility] OR;  Service: Gastroenterology   • DIAGNOSTIC LAPAROSCOPY  1990   • DILATATION AND CURETTAGE  1985   • ENDOSCOPY N/A 5/13/2016    Procedure: ESOPHAGOGASTRODUODENOSCOPY ;  Surgeon: Darien Ruff MD;  Location: [facility] OR;  Service:    • HYSTERECTOMY     • REVISION / TAKEDOWN COLOSTOMY           FAMILY HISTORY  Family History   Problem Relation Age of Onset   •  Hyperlipidemia Mother    • Macular degeneration Mother    • Heart disease Father    • Hyperlipidemia Father    • Cancer Father    • Depression Father    • Depression Sister    • Hyperlipidemia Brother    • Depression Brother    • Breast cancer Maternal Aunt    • Breast cancer Cousin    • Breast cancer Cousin    • Colon cancer Neg Hx    • Colon polyps Neg Hx          SOCIAL HISTORY  Social History     Occupational History   • Not on file   Tobacco Use   • Smoking status: Never Smoker   • Smokeless tobacco: Never Used   Substance and Sexual Activity   • Alcohol use: Yes     Comment: rare   • Drug use: No   • Sexual activity: Defer         CURRENT MEDICATIONS    Current Outpatient Medications:   •  ALPRAZolam (XANAX) 0.5 MG tablet, Take 1 tablet by mouth 2 (Two) Times a Day., Disp: 30 tablet, Rfl: 1  •  amLODIPine (NORVASC) 2.5 MG tablet, Take 1 tablet by mouth Daily., Disp: 90 tablet, Rfl: 3  •  betamethasone valerate (VALISONE) 0.1 % ointment, Apply  topically to the appropriate area as directed 2 (Two) Times a Day., Disp: 30 g, Rfl: 1  •  Calcium-Vitamin D (CALTRATE 600 PLUS-VIT D PO), Take  by mouth., Disp: , Rfl:   •  Calcium-Vitamin D-Vitamin K (VIACTIV CALCIUM PLUS D PO), Take  by mouth., Disp: , Rfl:   •  dicyclomine (BENTYL) 20 MG tablet, Take 1 tablet by mouth 4 (Four) Times a Day Before Meals & at Bedtime As Needed (cramping) for up to 30 days., Disp: 120 tablet, Rfl: 5  •  famotidine (PEPCID) 10 MG tablet, Take 10 mg by mouth 2 (Two) Times a Day., Disp: , Rfl:   •  fluticasone (FLONASE) 50 MCG/ACT nasal spray, 2 sprays into each nostril daily., Disp: , Rfl:   •  lisinopril (PRINIVIL,ZESTRIL) 30 MG tablet, TAKE 1 TABLET TWICE A DAY, Disp: 180 tablet, Rfl: 3  •  loratadine (CLARITIN) 10 MG tablet, Take 10 mg by mouth daily., Disp: , Rfl:   •  Multiple Vitamins-Minerals (EYE VITAMINS) capsule, Take  by mouth., Disp: , Rfl:   •  omeprazole (priLOSEC) 20 MG capsule, TAKE 1 CAPSULE DAILY, Disp: 90 capsule, Rfl:  "3  •  omeprazole (priLOSEC) 40 MG capsule, Take 1 capsule by mouth Daily., Disp: 90 capsule, Rfl: 3  •  PARoxetine (PAXIL) 30 MG tablet, TAKE 1 TABLET EVERY MORNING, Disp: 90 tablet, Rfl: 3  •  polyethylene glycol (MIRALAX) packet, Take 17 g by mouth daily., Disp: , Rfl:   •  Probiotic Product (PROBIOTIC DAILY) capsule, Once daily, Disp: , Rfl:   •  riFAXIMin (XIFAXAN) 550 MG tablet, Take 1 tablet by mouth Every 8 (Eight) Hours., Disp: 42 tablet, Rfl: 3  •  SUMAtriptan (IMITREX) 100 MG tablet, Take 1 tablet by mouth 1 (One) Time As Needed for Migraine for up to 1 dose. Take one tablet at onset of headache. May repeat dose one time in 2 hours., Disp: 27 tablet, Rfl: 3  •  vitamin B-12 (CYANOCOBALAMIN) 2500 MCG sublingual tablet tablet, Place 2,500 mcg under the tongue Daily., Disp: , Rfl:   •  zolpidem (AMBIEN) 10 MG tablet, Take 1 tablet by mouth Every Night., Disp: 90 tablet, Rfl: 1  No current facility-administered medications for this visit.    ALLERGIES  Hydrocodone and Sulfa antibiotics    VITALS  Vitals:    03/08/21 1306   Temp: 98.4 °F (36.9 °C)   TempSrc: Temporal   Weight: 52.2 kg (115 lb)   Height: 154.9 cm (60.98\")       PHYSICAL EXAM  Debilities/Disabilities Identified: None  Emotional Behavior: Appropriate  Wt Readings from Last 3 Encounters:   03/08/21 52.2 kg (115 lb)   12/14/20 51.8 kg (114 lb 3.2 oz)   08/24/20 51.7 kg (114 lb)     Ht Readings from Last 1 Encounters:   03/08/21 154.9 cm (60.98\")     Body mass index is 21.74 kg/m².  Physical Exam  Constitutional:       Appearance: She is well-developed. She is not diaphoretic.      Comments: Low weight    HENT:      Head: Normocephalic and atraumatic.   Eyes:      General: No scleral icterus.     Conjunctiva/sclera: Conjunctivae normal.      Pupils: Pupils are equal, round, and reactive to light.   Neck:      Thyroid: No thyromegaly.   Cardiovascular:      Rate and Rhythm: Normal rate and regular rhythm.      Heart sounds: Normal heart sounds. No " murmur. No gallop.    Pulmonary:      Effort: Pulmonary effort is normal.      Breath sounds: Normal breath sounds. No wheezing or rales.   Abdominal:      General: Bowel sounds are normal. There is no distension or abdominal bruit.      Palpations: Abdomen is soft. There is no shifting dullness, fluid wave or mass.      Tenderness: There is abdominal tenderness in the right lower quadrant, periumbilical area, suprapubic area and left lower quadrant. There is no guarding. Negative signs include Collins's sign.      Hernia: There is no hernia in the ventral area.   Musculoskeletal:         General: Normal range of motion.      Cervical back: Normal range of motion and neck supple.   Lymphadenopathy:      Cervical: No cervical adenopathy.   Skin:     General: Skin is warm and dry.      Findings: No erythema or rash.   Neurological:      Mental Status: She is alert and oriented to person, place, and time.   Psychiatric:         Mood and Affect: Mood normal.         Behavior: Behavior normal.         CLINICAL DATA REVIEWED   reviewed previous lab results and integrated with today's visit, reviewed notes from other physicians and/or last GI encounter, reviewed previous endoscopy results and available photos, reviewed surgical pathology results from previous biopsies    ASSESSMENT  Diagnoses and all orders for this visit:    Lower abdominal pain  -     CT Abdomen Pelvis With Contrast; Future    Chronic constipation    Other orders  -     dicyclomine (BENTYL) 20 MG tablet; Take 1 tablet by mouth 4 (Four) Times a Day Before Meals & at Bedtime As Needed (cramping) for up to 30 days.          PLAN  No follow-ups on file.    I have discussed the above plan with the patient.  They verbalize understanding and are in agreement with the plan.  They have been advised to contact the office for any questions, concerns, or changes related to their health.

## 2021-03-10 LAB
25(OH)D3+25(OH)D2 SERPL-MCNC: 57.2 NG/ML (ref 30–100)
ALBUMIN SERPL-MCNC: 4.2 G/DL (ref 3.5–5.2)
ALBUMIN/GLOB SERPL: 1.6 G/DL
ALP SERPL-CCNC: 124 U/L (ref 39–117)
ALT SERPL-CCNC: 14 U/L (ref 1–33)
AST SERPL-CCNC: 24 U/L (ref 1–32)
BASOPHILS # BLD AUTO: 0.03 10*3/MM3 (ref 0–0.2)
BASOPHILS NFR BLD AUTO: 0.8 % (ref 0–1.5)
BILIRUB SERPL-MCNC: 0.6 MG/DL (ref 0–1.2)
BUN SERPL-MCNC: 10 MG/DL (ref 8–23)
BUN/CREAT SERPL: 12 (ref 7–25)
CALCIUM SERPL-MCNC: 9.6 MG/DL (ref 8.6–10.5)
CHLORIDE SERPL-SCNC: 97 MMOL/L (ref 98–107)
CHOLEST SERPL-MCNC: 186 MG/DL (ref 0–200)
CHOLEST/HDLC SERPL: 2.86 {RATIO}
CO2 SERPL-SCNC: 28.6 MMOL/L (ref 22–29)
CREAT SERPL-MCNC: 0.83 MG/DL (ref 0.57–1)
EOSINOPHIL # BLD AUTO: 0.02 10*3/MM3 (ref 0–0.4)
EOSINOPHIL NFR BLD AUTO: 0.6 % (ref 0.3–6.2)
ERYTHROCYTE [DISTWIDTH] IN BLOOD BY AUTOMATED COUNT: 11.9 % (ref 12.3–15.4)
GLOBULIN SER CALC-MCNC: 2.6 GM/DL
GLUCOSE SERPL-MCNC: 75 MG/DL (ref 65–99)
HCT VFR BLD AUTO: 38.2 % (ref 34–46.6)
HDLC SERPL-MCNC: 65 MG/DL (ref 40–60)
HGB BLD-MCNC: 12.7 G/DL (ref 12–15.9)
IMM GRANULOCYTES # BLD AUTO: 0 10*3/MM3 (ref 0–0.05)
IMM GRANULOCYTES NFR BLD AUTO: 0 % (ref 0–0.5)
LDLC SERPL CALC-MCNC: 104 MG/DL (ref 0–100)
LYMPHOCYTES # BLD AUTO: 0.72 10*3/MM3 (ref 0.7–3.1)
LYMPHOCYTES NFR BLD AUTO: 20.2 % (ref 19.6–45.3)
MCH RBC QN AUTO: 30.4 PG (ref 26.6–33)
MCHC RBC AUTO-ENTMCNC: 33.2 G/DL (ref 31.5–35.7)
MCV RBC AUTO: 91.4 FL (ref 79–97)
MONOCYTES # BLD AUTO: 0.29 10*3/MM3 (ref 0.1–0.9)
MONOCYTES NFR BLD AUTO: 8.1 % (ref 5–12)
NEUTROPHILS # BLD AUTO: 2.51 10*3/MM3 (ref 1.7–7)
NEUTROPHILS NFR BLD AUTO: 70.3 % (ref 42.7–76)
NRBC BLD AUTO-RTO: 0 /100 WBC (ref 0–0.2)
PLATELET # BLD AUTO: 285 10*3/MM3 (ref 140–450)
POTASSIUM SERPL-SCNC: 4.9 MMOL/L (ref 3.5–5.2)
PROT SERPL-MCNC: 6.8 G/DL (ref 6–8.5)
RBC # BLD AUTO: 4.18 10*6/MM3 (ref 3.77–5.28)
SODIUM SERPL-SCNC: 135 MMOL/L (ref 136–145)
TRIGL SERPL-MCNC: 95 MG/DL (ref 0–150)
TSH SERPL DL<=0.005 MIU/L-ACNC: 2.77 UIU/ML (ref 0.27–4.2)
VIT B12 SERPL-MCNC: 898 PG/ML (ref 211–946)
VLDLC SERPL CALC-MCNC: 17 MG/DL (ref 5–40)
WBC # BLD AUTO: 3.57 10*3/MM3 (ref 3.4–10.8)

## 2021-03-12 ENCOUNTER — APPOINTMENT (OUTPATIENT)
Dept: CT IMAGING | Facility: HOSPITAL | Age: 66
End: 2021-03-12

## 2021-03-12 ENCOUNTER — HOSPITAL ENCOUNTER (OUTPATIENT)
Dept: CT IMAGING | Facility: HOSPITAL | Age: 66
Discharge: HOME | End: 2021-03-12
Admitting: INTERNAL MEDICINE

## 2021-03-12 DIAGNOSIS — R10.30 LOWER ABDOMINAL PAIN: ICD-10-CM

## 2021-03-12 PROCEDURE — 74177 CT ABD & PELVIS W/CONTRAST: CPT

## 2021-03-12 PROCEDURE — 25010000002 IOPAMIDOL 61 % SOLUTION: Performed by: INTERNAL MEDICINE

## 2021-03-12 RX ADMIN — IOPAMIDOL 100 ML: 612 INJECTION, SOLUTION INTRAVENOUS at 15:42

## 2021-03-15 ENCOUNTER — OFFICE VISIT (OUTPATIENT)
Dept: INTERNAL MEDICINE | Facility: CLINIC | Age: 66
End: 2021-03-15

## 2021-03-15 VITALS
TEMPERATURE: 96.5 F | HEIGHT: 61 IN | HEART RATE: 66 BPM | OXYGEN SATURATION: 99 % | WEIGHT: 113 LBS | BODY MASS INDEX: 21.34 KG/M2 | DIASTOLIC BLOOD PRESSURE: 82 MMHG | RESPIRATION RATE: 16 BRPM | SYSTOLIC BLOOD PRESSURE: 122 MMHG

## 2021-03-15 DIAGNOSIS — K59.09 CHRONIC CONSTIPATION: ICD-10-CM

## 2021-03-15 DIAGNOSIS — M81.0 AGE-RELATED OSTEOPOROSIS WITHOUT CURRENT PATHOLOGICAL FRACTURE: ICD-10-CM

## 2021-03-15 DIAGNOSIS — K21.9 GASTROESOPHAGEAL REFLUX DISEASE WITHOUT ESOPHAGITIS: ICD-10-CM

## 2021-03-15 DIAGNOSIS — I10 ESSENTIAL HYPERTENSION: Primary | ICD-10-CM

## 2021-03-15 DIAGNOSIS — Z00.00 ROUTINE HEALTH MAINTENANCE: ICD-10-CM

## 2021-03-15 DIAGNOSIS — F51.01 PRIMARY INSOMNIA: ICD-10-CM

## 2021-03-15 DIAGNOSIS — G43.909 MIGRAINE WITHOUT STATUS MIGRAINOSUS, NOT INTRACTABLE, UNSPECIFIED MIGRAINE TYPE: ICD-10-CM

## 2021-03-15 DIAGNOSIS — F41.8 DEPRESSION WITH ANXIETY: ICD-10-CM

## 2021-03-15 DIAGNOSIS — J30.89 NON-SEASONAL ALLERGIC RHINITIS, UNSPECIFIED TRIGGER: ICD-10-CM

## 2021-03-15 PROCEDURE — 99214 OFFICE O/P EST MOD 30 MIN: CPT | Performed by: FAMILY MEDICINE

## 2021-03-15 NOTE — PROGRESS NOTES
Subjective     Martina Hardwick is a 65 y.o. female, who presents with a chief complaint of   Chief Complaint   Patient presents with   • Hypertension   • Heartburn   • Anxiety   • Depression   • Osteoporosis   • Headache     Hypertension  Associated symptoms include anxiety and headaches.   Headache   Associated symptoms include abdominal pain and insomnia.   DepressionPatient presents with the following symptoms: insomnia.    Insomnia  Associated symptoms include abdominal pain, arthralgias and headaches.   Heartburn  She complains of abdominal pain.   Anxiety  Symptoms include insomnia.     Her past medical history is significant for depression.   Osteoporosis  Associated symptoms include abdominal pain, arthralgias and headaches.     1. HTN.  Tolerates medication.  Takes lisinopril 30 mg BID and amlodipine 2.5 mg daily.  Home blood pressures running 105-135/60s-70s.   Denies dizziness and chest pain.    2. Depression and anxiety.  Pt reports an increase in her anxiety due to life stress.  She takes paroxetine and prn alprazolam.  Denies SI.    3. Headaches.  She takes sumatriptan for abortive treatment and this is effective.    4. Chronic constipation.  She takes Miralax and fiber and is still constipated.  Dr. Ruff recently prescribed dicyclomine for pain and she hasn't tried this yet.    The following portions of the patient's history were reviewed and updated as appropriate: allergies, current medications, past family history, past medical history, past social history, past surgical history and problem list.    Allergies: Hydrocodone and Sulfa antibiotics    Review of Systems   Constitutional: Negative.    Eyes: Negative.    Respiratory: Negative.    Cardiovascular: Negative.    Gastrointestinal: Positive for abdominal pain and constipation.   Endocrine: Negative.    Genitourinary: Negative.    Musculoskeletal: Positive for arthralgias.   Allergic/Immunologic: Positive for environmental allergies.    Neurological: Positive for headaches.   Hematological: Negative.    Psychiatric/Behavioral: The patient has insomnia.      Objective     Wt Readings from Last 3 Encounters:   03/15/21 51.3 kg (113 lb)   03/08/21 52.2 kg (115 lb)   12/14/20 51.8 kg (114 lb 3.2 oz)     Temp Readings from Last 3 Encounters:   03/15/21 96.5 °F (35.8 °C) (Temporal)   03/08/21 98.4 °F (36.9 °C) (Temporal)   12/14/20 98.3 °F (36.8 °C) (Temporal)     BP Readings from Last 3 Encounters:   03/15/21 122/82   08/24/20 110/78   02/24/20 120/80     Pulse Readings from Last 3 Encounters:   03/15/21 66   08/24/20 82   02/24/20 91     Body mass index is 21.35 kg/m².  SpO2 Readings from Last 3 Encounters:   01/15/18 96%   10/12/17 98%   08/10/17 99%     Physical Exam   Constitutional: She is oriented to person, place, and time. She appears well-developed.   HENT:   Head: Normocephalic and atraumatic.   Mouth/Throat: Mucous membranes are moist.   Eyes: Conjunctivae are normal.   Neck: No thyromegaly present.   Cardiovascular: Normal rate, regular rhythm and normal heart sounds.   Pulmonary/Chest: Effort normal and breath sounds normal.   Abdominal: Soft. Normal appearance and bowel sounds are normal.   Musculoskeletal: Normal range of motion.      Right lower leg: No edema.      Left lower leg: No edema.   Neurological: She is alert and oriented to person, place, and time.   Skin: Skin is warm and dry. No rash noted.   Psychiatric: Her behavior is normal. Mood normal.   Nursing note and vitals reviewed.      Assessment/Plan   Martina was seen today for hypertension, insomnia and heartburn.    Diagnoses and all orders for this visit:      Essential hypertension    Routine health maintenance    Migraine without status migrainosus, not intractable, unspecified migraine type    Gastroesophageal reflux disease without esophagitis    Depression with anxiety    Primary insomnia    Chronic constipation    Family history of colonic polyps    Non-seasonal  allergic rhinitis, unspecified trigger    Osteoporosis    1. HTN.  Controlled.  Continue lisinopril 30 mg BID and amlodipine 2.5 mg daily.  Labs reviewed.    2. Routine health maint.  Mammogram UTD.  Colonoscopy UTD.  Hysterectomy done for non-cancerous reasons.  On waiting list for Covid-19 vaccine.  Pneumovax later this year.  Shingrix later this year at pharmacy.    3. Migraines.  Doing well with these.  Continue sumatriptan for abortive treatment.    4. GERD. Controlled. Continue omeprazole and lifestyle measures.    5. Depression with anxiety. Continue paroxetine.  On prn alprazolam.  Med management agreement and Joby UTD.    6. Insomnia.  Zolpidem is effective.  Med management agreement and Joby UTD.    7. Chronic constipation.  On daily Miralax and probiotic.  Start fiber supplement.  Working with Dr. Ruff on this.    8. LORRIE.  Controlled with fluticasone nasal spray and loratadine.    9. Osteoporosis.  Didn't tolerate bisphosphonates previously (Dr. Ghotra).  Insurance didn't cover Prolia or Reclast.  She wanted to wait until she turns 65 to further pursue medication.  Continue calcium, vitamin D and weight-bearing exercise.  Recheck dexa scan this July and consider Prolia at that time.    Outpatient Medications Prior to Visit   Medication Sig Dispense Refill   • ALPRAZolam (XANAX) 0.5 MG tablet Take 1 tablet by mouth 2 (Two) Times a Day. 30 tablet 1   • amLODIPine (NORVASC) 2.5 MG tablet Take 1 tablet by mouth Daily. 90 tablet 3   • betamethasone valerate (VALISONE) 0.1 % ointment Apply  topically to the appropriate area as directed 2 (Two) Times a Day. 30 g 1   • Calcium-Vitamin D-Vitamin K (VIACTIV CALCIUM PLUS D PO) Take  by mouth.     • dicyclomine (BENTYL) 20 MG tablet Take 1 tablet by mouth 4 (Four) Times a Day Before Meals & at Bedtime As Needed (cramping) for up to 30 days. 120 tablet 5   • fluticasone (FLONASE) 50 MCG/ACT nasal spray 2 sprays into each nostril daily.     • lisinopril  (PRINIVIL,ZESTRIL) 30 MG tablet TAKE 1 TABLET TWICE A  tablet 3   • loratadine (CLARITIN) 10 MG tablet Take 10 mg by mouth daily.     • Multiple Vitamins-Minerals (EYE VITAMINS) capsule Take  by mouth.     • omeprazole (priLOSEC) 40 MG capsule Take 1 capsule by mouth Daily. 90 capsule 3   • PARoxetine (PAXIL) 30 MG tablet TAKE 1 TABLET EVERY MORNING 90 tablet 3   • polyethylene glycol (MIRALAX) packet Take 17 g by mouth daily.     • Probiotic Product (PROBIOTIC DAILY) capsule Once daily     • SUMAtriptan (IMITREX) 100 MG tablet Take 1 tablet by mouth 1 (One) Time As Needed for Migraine for up to 1 dose. Take one tablet at onset of headache. May repeat dose one time in 2 hours. 27 tablet 3   • vitamin B-12 (CYANOCOBALAMIN) 2500 MCG sublingual tablet tablet Place 2,500 mcg under the tongue Daily.     • zolpidem (AMBIEN) 10 MG tablet Take 1 tablet by mouth Every Night. 90 tablet 1   • Calcium-Vitamin D (CALTRATE 600 PLUS-VIT D PO) Take  by mouth.     • famotidine (PEPCID) 10 MG tablet Take 10 mg by mouth 2 (Two) Times a Day.     • omeprazole (priLOSEC) 20 MG capsule TAKE 1 CAPSULE DAILY 90 capsule 3   • riFAXIMin (XIFAXAN) 550 MG tablet Take 1 tablet by mouth Every 8 (Eight) Hours. 42 tablet 3     No facility-administered medications prior to visit.     No orders of the defined types were placed in this encounter.    [unfilled]  Medications Discontinued During This Encounter   Medication Reason   • Calcium-Vitamin D (CALTRATE 600 PLUS-VIT D PO)    • famotidine (PEPCID) 10 MG tablet    • riFAXIMin (XIFAXAN) 550 MG tablet    • omeprazole (priLOSEC) 20 MG capsule      Return in about 3 months (around 6/15/2021) for Medicare Wellness.

## 2021-03-16 ENCOUNTER — TELEPHONE (OUTPATIENT)
Dept: GASTROENTEROLOGY | Facility: CLINIC | Age: 66
End: 2021-03-16

## 2021-03-17 ENCOUNTER — TELEPHONE (OUTPATIENT)
Dept: GASTROENTEROLOGY | Facility: CLINIC | Age: 66
End: 2021-03-17

## 2021-03-17 DIAGNOSIS — K59.09 CHRONIC CONSTIPATION: Primary | ICD-10-CM

## 2021-03-17 DIAGNOSIS — R93.89 ABNORMAL FINDING ON CT SCAN: ICD-10-CM

## 2021-03-17 RX ORDER — LISINOPRIL 30 MG/1
30 TABLET ORAL 2 TIMES DAILY
Qty: 180 TABLET | Refills: 3 | Status: SHIPPED | OUTPATIENT
Start: 2021-03-17 | End: 2022-03-14

## 2021-03-17 NOTE — TELEPHONE ENCOUNTER
I called her back  And spoke to her about constipation.     Yesterday a more solid bm yesterday ok amount.  But has only had liquid today.  Has done ducolax and one scoop of miralax a day.      Has done miralax with successs in the past but one scoop a day does not work.  Does not tolerate mag citrate.  Fleets enemas today 2-3 and then take 4 scoops of miralax in 32oz of gatorade.      Then increase your daily dose of miralax to 2 scoops a day.

## 2021-03-17 NOTE — TELEPHONE ENCOUNTER
Call from patient.  Had CT Abdomen//Pelvis on Friday, 03/12/2021 and no one has called and she upset.  She wants call back today about result and want to do.  Please call her ASAP.

## 2021-03-18 ENCOUNTER — HOSPITAL ENCOUNTER (OUTPATIENT)
Dept: GENERAL RADIOLOGY | Facility: HOSPITAL | Age: 66
Discharge: HOME | End: 2021-03-18
Admitting: NURSE PRACTITIONER

## 2021-03-18 DIAGNOSIS — R93.89 ABNORMAL FINDING ON CT SCAN: ICD-10-CM

## 2021-03-18 DIAGNOSIS — K59.09 CHRONIC CONSTIPATION: ICD-10-CM

## 2021-03-18 PROCEDURE — 74018 RADEX ABDOMEN 1 VIEW: CPT

## 2021-03-18 NOTE — TELEPHONE ENCOUNTER
Celi, Please see if you can fit her in with me next week for a f/u appt. I should have a spot tues morning.     Dr. Ruff I ordered an xray for her to check stool burden. Can you check her results tomorrow.

## 2021-03-18 NOTE — TELEPHONE ENCOUNTER
Patient called and left message on my voice mail.  Was told to call this morning to give results.  She did not explain on the phone.  Needs to speak with you.  Please call.

## 2021-03-19 ENCOUNTER — DOCUMENTATION (OUTPATIENT)
Dept: GASTROENTEROLOGY | Facility: CLINIC | Age: 66
End: 2021-03-19

## 2021-03-19 NOTE — PROGRESS NOTES
Is eating a high fiber diet.  Is doing fiber one, wheat bread, high fiber low sugar craisins.      She had nausea from magnesium in the past but will try this again.      Severe IBS-c with adhesions, gerd.     Discussed her xr result from yesterday a lot of gas in her colon but improved stool burden compared to CT so will take simethicone today.     Continue with high fiber diet. 2 probiotics a day.  Gerd medication.   Will plan on f/u appt with me in mid April instead of appt in May (with Dr. OTERO).

## 2021-03-22 ENCOUNTER — PATIENT MESSAGE (OUTPATIENT)
Dept: GASTROENTEROLOGY | Facility: CLINIC | Age: 66
End: 2021-03-22

## 2021-03-22 ENCOUNTER — TELEPHONE (OUTPATIENT)
Dept: GASTROENTEROLOGY | Facility: CLINIC | Age: 66
End: 2021-03-22

## 2021-03-22 NOTE — TELEPHONE ENCOUNTER
Return call from patient.  Cancelled 03/24/2021 with Michelle Law NP and 05/10/2021 with Dr. Ruff.  Made appointment for 04/21/2021 with Michelle Law NP.

## 2021-03-22 NOTE — TELEPHONE ENCOUNTER
----- Message from BROOKS Vazquez sent at 3/19/2021 11:37 AM EDT -----  Regarding: Please make her a fu appt in mid April with me   Will plan on f/u appt with me in mid April instead of appt in May (with Dr. OTERO).  Thanks.

## 2021-04-11 DIAGNOSIS — F51.01 PRIMARY INSOMNIA: ICD-10-CM

## 2021-04-12 RX ORDER — ZOLPIDEM TARTRATE 10 MG/1
TABLET ORAL
Qty: 90 TABLET | Refills: 1 | Status: SHIPPED | OUTPATIENT
Start: 2021-04-12 | End: 2021-10-08 | Stop reason: SDUPTHER

## 2021-04-12 NOTE — TELEPHONE ENCOUNTER
PATIENT STATED SHE TOOK HER LAST zolpidem (AMBIEN) 10 MG tablet LAST NIGHT AND JUST WANTS TO BE SURE IT WILL BE APPROVED TODAY BC SHE IS COMPLETELY OUT.

## 2021-04-13 NOTE — ANESTHESIA POSTPROCEDURE EVALUATION
Patient: Martina Hardwick    Procedure Summary     Date:  08/17/18 Room / Location:  [facility] OR 1 /  [facility] OR    Anesthesia Start:  0753 Anesthesia Stop:  0939    Procedure:  CHOLECYSTECTOMY LAPAROSCOPIC converted to open procedure (N/A Abdomen) Diagnosis:       Calculus of gallbladder without cholecystitis without obstruction      (Calculus of gallbladder without cholecystitis without obstruction [K80.20])    Surgeon:  Hernan Hinds MD Provider:  Luis Amanda CRNA    Anesthesia Type:  general ASA Status:  2          Anesthesia Type: general  Last vitals  BP   126/56 (08/17/18 1145)   Temp   97.1 °F (36.2 °C) (08/17/18 1145)   Pulse   71 (08/17/18 1145)   Resp   16 (08/17/18 1145)     SpO2   100 % (08/17/18 1145)     Post Anesthesia Care and Evaluation    Patient location during evaluation: PACU  Patient participation: complete - patient participated  Level of consciousness: awake  Pain management: adequate  Airway patency: patent  Anesthetic complications: No anesthetic complications  PONV Status: none  Cardiovascular status: acceptable  Respiratory status: acceptable  Hydration status: acceptable       Cardiac

## 2021-04-21 ENCOUNTER — OFFICE VISIT (OUTPATIENT)
Dept: GASTROENTEROLOGY | Facility: CLINIC | Age: 66
End: 2021-04-21

## 2021-04-21 VITALS — TEMPERATURE: 97.8 F | BODY MASS INDEX: 20.88 KG/M2 | WEIGHT: 110.6 LBS | HEIGHT: 61 IN

## 2021-04-21 DIAGNOSIS — K21.00 GASTROESOPHAGEAL REFLUX DISEASE WITH ESOPHAGITIS WITHOUT HEMORRHAGE: ICD-10-CM

## 2021-04-21 DIAGNOSIS — R14.0 ABDOMINAL BLOATING: Primary | ICD-10-CM

## 2021-04-21 DIAGNOSIS — K58.1 IRRITABLE BOWEL SYNDROME WITH CONSTIPATION: ICD-10-CM

## 2021-04-21 DIAGNOSIS — R14.1 GAS PAIN: ICD-10-CM

## 2021-04-21 PROCEDURE — 99214 OFFICE O/P EST MOD 30 MIN: CPT | Performed by: NURSE PRACTITIONER

## 2021-04-21 RX ORDER — AMITRIPTYLINE HYDROCHLORIDE 10 MG/1
10 TABLET, FILM COATED ORAL NIGHTLY
Qty: 30 TABLET | Refills: 3 | Status: SHIPPED | OUTPATIENT
Start: 2021-04-21 | End: 2021-05-24

## 2021-04-21 RX ORDER — OMEPRAZOLE 40 MG/1
40 CAPSULE, DELAYED RELEASE ORAL 2 TIMES DAILY
Qty: 180 CAPSULE | Refills: 3 | Status: SHIPPED | OUTPATIENT
Start: 2021-04-21 | End: 2022-10-03 | Stop reason: ALTCHOICE

## 2021-04-21 NOTE — PATIENT INSTRUCTIONS
"Abdominal bloating  -     Simethicone 250 MG capsule; Take 1 capsule by mouth 4 (Four) Times a Day As Needed (gas and bloating).  -     pancrelipase, Lip-Prot-Amyl, (CREON) 6000 units capsule delayed-release particles capsule; Take 1 capsule by mouth 3 (Three) Times a Day With Meals.     Irritable bowel syndrome with constipation  -     amitriptyline (ELAVIL) 10 MG tablet; Take 1 tablet by mouth Every Night. ( hold off for 2 weeks while trying the creon).     For your GERD:  omeprazole (priLOSEC) 40 MG capsule; Take 1 capsule by mouth 2 (two) times a day.        Don't eat late, don't eat fried or spicy, and don't eat too much at one time. Avoid tomato based products like pizza, lasagna, spaghetti.  If you want to have these take an over the counter Pepcid or TUMS immediately before you eat them.  Do not eat within 3-4 hrs of bedtime. Limit caffeine and carbonated beverages, if you must have them do not have later in the day.  If reflux is severe elevate the head of the bed. You may also use TUMS, maalox, or mylanta for breakthrough heartburn.     Take a daily probiotic (something with a billion cultures and more different strains is better like \"Probiotic 10\" or probiotic gummies) for your gut as well.  AVOID taking NSAIDS (like ibuprofen, Aleve, Motrin, naproxen, meloxicam, etc) as much as possible and use acetaminophen (Tylenol) instead.     Drink lots of water, eat a high fiber diet with 25-30gm a day(check the fiber content in the foods you eat.  Protein bars with 15gm of fiber in each bar are a great supplement daily), and get regular exercise. Use over the counter simethicone xtra strength gas x (125-180mg) 2 twice a day as needed for gas.      High Fiber Food suggestions:     Beans, nuts, vegetables, whole grains, flax seed and kip seeds (which can be stirred into other food) are high in fiber.     John Protein bars from [employer] = 10gm of fiber in each bar (also gluten free)  Quest Protein bars from " grocery stores [employer], [employer], [employer]= 15gm of fiber each (also gluten free)  Fiber One bars from grocery stores = 5-6gm of fiber each  Calvin Bran Buds cereal 1/2 cup = 17gm of fiber  P[employer] brand low sugar Craisins = 13gm of fiber per serving  Melalueca Fiberwise powder=15gm fiber per scoop

## 2021-04-21 NOTE — PROGRESS NOTES
PATIENT INFORMATION  Martina Hardwick       - 1955    CHIEF COMPLAINT  Chief Complaint   Patient presents with   • Follow-up     follow up on Constipation; Abd pain       HISTORY OF PRESENT ILLNESS  Severe constipation issues for life but things changed last year.      EGD 2016 gastritis and esophagitis, hp neg.  hx severe endometriosis and early hyst partial and had intestinal blockage with oopheretctomy and appy and colon resection and renarrowed and did balloon inflation of that scarring constricting colon.     3/19/21 phone note: Is eating a high fiber diet.  Is doing fiber one, wheat bread, high fiber low sugar craisins.       She had nausea from magnesium in the past but will try this again.       Severe IBS-c with adhesions, gerd.      Discussed her xr result from yesterday a lot of gas in her colon but improved stool burden compared to CT so will take simethicone today.      Continue with high fiber diet. 2 probiotics a day.  Gerd medication.   Will plan on f/u appt with me in mid April instead of appt in May (with Dr. OTERO).       Chronic constipation.  She takes Miralax and fiber and is still constipated.  Dr. Ruff recently prescribed dicyclomine for pain and she hasn't tried this yet was 1 mo rx is now  did she try this?     Increase omeprazole to 40mg daily f/u in one month to see if we need to increase it more.   Increase the fiber in your diet to min 30gm of fiber daily.  Increase probiotic to 2 a day.  Adding magnesium supplement daily, if you tolerate you can go up to 2 magnesium pills a day.     Miralax, omepr 40, probiotic 2 daily     Today:  some relief with simethicone 250mg qid, bentyl helped but made her constipated, fiber is helping and stools are small.  Occasional hb or globus but is infrequent and is on her omepr 40.        REVIEWED PERTINENT RESULTS/ LABS  Lab Results   Component Value Date    CASEREPORT  2018     Surgical Pathology Report                          Case: [id number]                                  Authorizing Provider:  Hernan Hinds MD        Collected:           08/17/2018 09:17 AM          Ordering Location:     [facility]   Received:            08/17/2018 10:06 AM                                 OR                                                                           Pathologist:           Marquez Pierre MD                                                     Specimen:    Gallbladder, GALLBLADDER                                                                   FINALDX  08/17/2018     1. Gallbladder, CHOLECYSTECTOMY LAPAROSCOPIC:  Testing performed at outside laboratory. See scanned report.             Lab Results   Component Value Date    HGB 12.7 03/09/2021    MCV 91.4 03/09/2021     03/09/2021    ALT 14 03/09/2021    AST 24 03/09/2021    HGBA1C 5.20 11/11/2019    TRIG 95 03/09/2021      No results found.    REVIEW OF SYSTEMS  Review of Systems   Gastrointestinal: Positive for abdominal pain.   All other systems reviewed and are negative.        ACTIVE PROBLEMS  Patient Active Problem List    Diagnosis    • Adhesion of abdominal wall [K66.0]    • Age-related osteoporosis without current pathological fracture [M81.0]    • Psoriasis [L40.9]    • Family history of colonic polyps [Z83.71]    • Routine health maintenance [Z00.00]    • Essential hypertension [I10]    • GERD (gastroesophageal reflux disease) [K21.9]    • Allergic rhinitis [J30.9]    • Primary insomnia [F51.01]    • Chronic constipation [K59.09]    • Migraines [G43.909]    • Depression with anxiety [F41.8]          PAST MEDICAL HISTORY  Past Medical History:   Diagnosis Date   • Abdominal pain    • Anxiety    • Anxiety    • Arthritis    • Constipation    • Depressed    • Depression    • Endometriosis    • Gastritis    • GERD (gastroesophageal reflux disease)    • Headache    • Headache, tension-type    • Hypertension    • Insomnia    • Migraine    • OP  (osteoporosis)    • Poor sleep    • Psoriasis    • Seasonal allergies          SURGICAL HISTORY  Past Surgical History:   Procedure Laterality Date   • APPENDECTOMY     • CHOLECYSTECTOMY N/A 8/17/2018    Procedure: CHOLECYSTECTOMY LAPAROSCOPIC converted to open procedure;  Surgeon: Hernan Hinds MD;  Location: [facility] OR;  Service: General   • COLON SURGERY     • COLONOSCOPY     • COLONOSCOPY N/A 12/12/2018    Procedure: COLONOSCOPY;  Surgeon: Darien Ruff MD;  Location: [facility] OR;  Service: Gastroenterology   • DIAGNOSTIC LAPAROSCOPY  1990   • DILATATION AND CURETTAGE  1985   • ENDOSCOPY N/A 5/13/2016    Procedure: ESOPHAGOGASTRODUODENOSCOPY ;  Surgeon: Darien Ruff MD;  Location: [facility] OR;  Service:    • HYSTERECTOMY     • REVISION / TAKEDOWN COLOSTOMY           FAMILY HISTORY  Family History   Problem Relation Age of Onset   • Hyperlipidemia Mother    • Macular degeneration Mother    • Heart disease Father    • Hyperlipidemia Father    • Cancer Father    • Depression Father    • Depression Sister    • Hyperlipidemia Brother    • Depression Brother    • Breast cancer Maternal Aunt    • Breast cancer Cousin    • Breast cancer Cousin    • Colon cancer Neg Hx    • Colon polyps Neg Hx          SOCIAL HISTORY  Social History     Occupational History   • Not on file   Tobacco Use   • Smoking status: Never Smoker   • Smokeless tobacco: Never Used   Vaping Use   • Vaping Use: Never used   Substance and Sexual Activity   • Alcohol use: Yes     Comment: rare   • Drug use: No   • Sexual activity: Defer         CURRENT MEDICATIONS    Current Outpatient Medications:   •  ALPRAZolam (XANAX) 0.5 MG tablet, Take 1 tablet by mouth 2 (Two) Times a Day., Disp: 30 tablet, Rfl: 1  •  amitriptyline (ELAVIL) 10 MG tablet, Take 1 tablet by mouth Every Night., Disp: 30 tablet, Rfl: 3  •  amLODIPine (NORVASC) 2.5 MG tablet, Take 1 tablet by mouth Daily., Disp: 90 tablet, Rfl: 3  •  betamethasone valerate  "(VALISONE) 0.1 % ointment, Apply  topically to the appropriate area as directed 2 (Two) Times a Day., Disp: 30 g, Rfl: 1  •  Calcium-Vitamin D-Vitamin K (VIACTIV CALCIUM PLUS D PO), Take  by mouth., Disp: , Rfl:   •  fluticasone (FLONASE) 50 MCG/ACT nasal spray, 2 sprays into each nostril daily., Disp: , Rfl:   •  lisinopril (PRINIVIL,ZESTRIL) 30 MG tablet, Take 1 tablet by mouth 2 (Two) Times a Day., Disp: 180 tablet, Rfl: 3  •  loratadine (CLARITIN) 10 MG tablet, Take 10 mg by mouth daily., Disp: , Rfl:   •  Multiple Vitamins-Minerals (EYE VITAMINS) capsule, Take  by mouth., Disp: , Rfl:   •  omeprazole (priLOSEC) 40 MG capsule, Take 1 capsule by mouth 2 (two) times a day., Disp: 180 capsule, Rfl: 3  •  pancrelipase, Lip-Prot-Amyl, (CREON) 6000 units capsule delayed-release particles capsule, Take 1 capsule by mouth 3 (Three) Times a Day With Meals., Disp: 90 capsule, Rfl: 3  •  PARoxetine (PAXIL) 30 MG tablet, TAKE 1 TABLET EVERY MORNING, Disp: 90 tablet, Rfl: 3  •  polyethylene glycol (MIRALAX) packet, Take 17 g by mouth daily., Disp: , Rfl:   •  Probiotic Product (PROBIOTIC DAILY) capsule, Once daily, Disp: , Rfl:   •  Simethicone 250 MG capsule, Take 1 capsule by mouth 4 (Four) Times a Day As Needed (gas and bloating)., Disp: 360 capsule, Rfl: 3  •  SUMAtriptan (IMITREX) 100 MG tablet, Take 1 tablet by mouth 1 (One) Time As Needed for Migraine for up to 1 dose. Take one tablet at onset of headache. May repeat dose one time in 2 hours., Disp: 27 tablet, Rfl: 3  •  vitamin B-12 (CYANOCOBALAMIN) 2500 MCG sublingual tablet tablet, Place 2,500 mcg under the tongue Daily., Disp: , Rfl:   •  zolpidem (AMBIEN) 10 MG tablet, TAKE ONE TABLET BY MOUTH ONCE NIGHTLY, Disp: 90 tablet, Rfl: 1    ALLERGIES  Hydrocodone and Sulfa antibiotics    VITALS  Vitals:    04/21/21 1433   Temp: 97.8 °F (36.6 °C)   TempSrc: Temporal   Weight: 50.2 kg (110 lb 9.6 oz)   Height: 154.9 cm (60.98\")       PHYSICAL EXAM  Debilities/Disabilities " "Identified: None  Emotional Behavior: Appropriate  Wt Readings from Last 3 Encounters:   04/21/21 50.2 kg (110 lb 9.6 oz)   03/15/21 51.3 kg (113 lb)   03/08/21 52.2 kg (115 lb)     Ht Readings from Last 1 Encounters:   04/21/21 154.9 cm (60.98\")     Body mass index is 20.91 kg/m².  Physical Exam    CLINICAL DATA REVIEWED   reviewed previous lab results and integrated with today's visit, reviewed notes from other physicians and/or last GI encounter, reviewed previous endoscopy results and available photos, reviewed surgical pathology results from previous biopsies    ASSESSMENT  Diagnoses and all orders for this visit:    Abdominal bloating  -     Simethicone 250 MG capsule; Take 1 capsule by mouth 4 (Four) Times a Day As Needed (gas and bloating).  -     pancrelipase, Lip-Prot-Amyl, (CREON) 6000 units capsule delayed-release particles capsule; Take 1 capsule by mouth 3 (Three) Times a Day With Meals.    Gas pain  -     Simethicone 250 MG capsule; Take 1 capsule by mouth 4 (Four) Times a Day As Needed (gas and bloating).  -     pancrelipase, Lip-Prot-Amyl, (CREON) 6000 units capsule delayed-release particles capsule; Take 1 capsule by mouth 3 (Three) Times a Day With Meals.    Irritable bowel syndrome with constipation  -     amitriptyline (ELAVIL) 10 MG tablet; Take 1 tablet by mouth Every Night.    Gastroesophageal reflux disease with esophagitis without hemorrhage  -     omeprazole (priLOSEC) 40 MG capsule; Take 1 capsule by mouth 2 (two) times a day.          PLAN  Return in about 4 weeks (around 5/19/2021).   Abdominal bloating  -     Simethicone 250 MG capsule; Take 1 capsule by mouth 4 (Four) Times a Day As Needed (gas and bloating).  -     pancrelipase, Lip-Prot-Amyl, (CREON) 6000 units capsule delayed-release particles capsule; Take 1 capsule by mouth 3 (Three) Times a Day With Meals.    Irritable bowel syndrome with constipation  -     amitriptyline (ELAVIL) 10 MG tablet; Take 1 tablet by mouth Every " "Night. ( hold off for 2 weeks while trying the creon).     For your GERD:  omeprazole (priLOSEC) 40 MG capsule; Take 1 capsule by mouth 2 (two) times a day.      Don't eat late, don't eat fried or spicy, and don't eat too much at one time. Avoid tomato based products like pizza, lasagna, spaghetti.  If you want to have these take an over the counter Pepcid or TUMS immediately before you eat them.  Do not eat within 3-4 hrs of bedtime. Limit caffeine and carbonated beverages, if you must have them do not have later in the day.  If reflux is severe elevate the head of the bed. You may also use TUMS, maalox, or mylanta for breakthrough heartburn.    Take a daily probiotic (something with a billion cultures and more different strains is better like \"Probiotic 10\" or probiotic gummies) for your gut as well.  AVOID taking NSAIDS (like ibuprofen, Aleve, Motrin, naproxen, meloxicam, etc) as much as possible and use acetaminophen (Tylenol) instead.    Drink lots of water, eat a high fiber diet with 25-30gm a day(check the fiber content in the foods you eat.  Protein bars with 15gm of fiber in each bar are a great supplement daily), and get regular exercise. Use over the counter simethicone xtra strength gas x (125-180mg) 2 twice a day as needed for gas.     High Fiber Food suggestions:    Beans, nuts, vegetables, whole grains, flax seed and kip seeds (which can be stirred into other food) are high in fiber.    Lopez Protein bars from [employer] = 10gm of fiber in each bar (also gluten free)  Quest Protein bars from grocery stores [employer], [employer], [employer]= 15gm of fiber each (also gluten free)  Fiber One bars from grocery stores = 5-6gm of fiber each  K[employer]gs Bran Buds cereal 1/2 cup = 17gm of fiber  [employer] brand low sugar Craisins = 13gm of fiber per serving  Melalueca Fiberwise powder=15gm fiber per scoop      I have discussed the above plan with the patient.  They verbalize understanding and are in agreement with the plan.  " They have been advised to contact the office for any questions, concerns, or changes related to their health.    52 min spent with patient today >50% spent counseling about natural history and expected course of assessed complaint and reviewed treatment options that have been tried and not tried and those currently available. Questions answered.

## 2021-04-23 ENCOUNTER — PATIENT MESSAGE (OUTPATIENT)
Dept: GASTROENTEROLOGY | Facility: CLINIC | Age: 66
End: 2021-04-23

## 2021-04-26 DIAGNOSIS — F41.8 DEPRESSION WITH ANXIETY: ICD-10-CM

## 2021-04-26 RX ORDER — ALPRAZOLAM 0.5 MG/1
TABLET ORAL
Qty: 30 TABLET | Refills: 0 | Status: SHIPPED | OUTPATIENT
Start: 2021-04-26 | End: 2021-05-24

## 2021-05-23 DIAGNOSIS — F41.8 DEPRESSION WITH ANXIETY: ICD-10-CM

## 2021-05-24 ENCOUNTER — OFFICE VISIT (OUTPATIENT)
Dept: GASTROENTEROLOGY | Facility: CLINIC | Age: 66
End: 2021-05-24

## 2021-05-24 VITALS
WEIGHT: 107.8 LBS | HEIGHT: 61 IN | SYSTOLIC BLOOD PRESSURE: 146 MMHG | DIASTOLIC BLOOD PRESSURE: 88 MMHG | BODY MASS INDEX: 20.35 KG/M2

## 2021-05-24 DIAGNOSIS — R11.2 NAUSEA AND VOMITING, INTRACTABILITY OF VOMITING NOT SPECIFIED, UNSPECIFIED VOMITING TYPE: ICD-10-CM

## 2021-05-24 DIAGNOSIS — R14.0 ABDOMINAL BLOATING: ICD-10-CM

## 2021-05-24 DIAGNOSIS — K66.0 ADHESION OF ABDOMINAL WALL: ICD-10-CM

## 2021-05-24 DIAGNOSIS — K21.00 GASTROESOPHAGEAL REFLUX DISEASE WITH ESOPHAGITIS WITHOUT HEMORRHAGE: Primary | ICD-10-CM

## 2021-05-24 DIAGNOSIS — R14.1 GAS PAIN: ICD-10-CM

## 2021-05-24 DIAGNOSIS — K58.1 IRRITABLE BOWEL SYNDROME WITH CONSTIPATION: ICD-10-CM

## 2021-05-24 PROCEDURE — 99214 OFFICE O/P EST MOD 30 MIN: CPT | Performed by: NURSE PRACTITIONER

## 2021-05-24 RX ORDER — FAMOTIDINE 40 MG/1
40 TABLET, FILM COATED ORAL 2 TIMES DAILY
Qty: 180 TABLET | Refills: 3 | Status: SHIPPED | OUTPATIENT
Start: 2021-05-24 | End: 2023-03-27 | Stop reason: SDUPTHER

## 2021-05-24 RX ORDER — ALPRAZOLAM 0.5 MG/1
TABLET ORAL
Qty: 30 TABLET | Refills: 0 | Status: SHIPPED | OUTPATIENT
Start: 2021-05-24 | End: 2021-06-28

## 2021-05-24 NOTE — PATIENT INSTRUCTIONS
"Use fiber and miralax 1-4 scoops a day to balance your constipation.     Discussed the importance of taking Omeprazole/Prilosec, 40mg twice daily before breakfast and dinner permanently due to known risk with long term acid reflux of Goodwin's esophagus/esophageal cancer., In addition, take famotidine (Pepcid) 40mg twice a day. would be glad to switch to zantac if it comes back out.     You may use mylanta for breakthrough HB.      Low Acid Diet Instructions:   Don't eat late, don't eat fried or spicy, and don't eat too much at one time. Avoid alcohol and  tomato based products like pizza, lasagna, spaghetti.  If you want to have these take an over the counter Pepcid or TUMS immediately before you eat them.  Do not eat within 3-4 hrs of bedtime. Limit caffeine and carbonated beverages, if you must have them do not have later in the day.  If reflux is severe elevate the head of the bed. You may also use TUMS, maalox, or mylanta for breakthrough heartburn.    Take a daily probiotic (something with a billion cultures and more different strains is better, examples like \"Probiotic 10\" or probiotic gummies) for your gut as well.  AVOID taking NSAIDS (like ibuprofen, Aleve, Motrin, naproxen, meloxicam, etc) as much as possible and use acetaminophen (Tylenol) instead.    Drink lots of water, eat a high fiber diet with 20gm a day(check the fiber content in the foods you eat.  Protein bars with 15gm of fiber in each bar are a great supplement daily), and get regular exercise. Use over the counter simethicone xtra strength gas x (125-180mg) 2 twice a day as needed for gas.     High Fiber Food suggestions:  Beans, nuts, vegetables, whole grains, flax seed and kip seeds (which can be stirred into other food) are high in fiber.    Lopez Protein bars from [employer] = 10gm of fiber in each bar (also gluten free)  Quest Protein bars from grocery stores [employer], [employer], [employer]= 15gm of fiber each (also gluten free)  Fiber One bars " from grocery stores = 5-6gm of fiber each  Kell[employer]gs Bran Buds cereal 1/2 cup = 17gm of fiber  [employer]ger brand low sugar Craisins = 13gm of fiber per serving  Melalueca Fiberwise powder=15gm fiber per serving (2 scoops)  Car[employer] tortillas= 15gm of fiber each  Natural Ovens Keto-Friendly Bread found at [facility]=12gm fiber per serving  No Jad vegan bar at [facility]=15gm fiber per serving

## 2021-05-24 NOTE — PROGRESS NOTES
"PATIENT INFORMATION  Martina Hardwick     - 1955    CHIEF COMPLAINT  Chief Complaint   Patient presents with   • Bloated       HISTORY OF PRESENT ILLNESS  HX:    Severe constipation issues for life but things changed last year.       EGD 2016 gastritis and esophagitis, hp neg.  hx severe endometriosis and early hyst partial and had intestinal blockage with oopheretctomy and appy and colon resection and renarrowed and did balloon inflation of that scarring constricting colon.      Severe IBS-c with adhesions, gerd.      Last visit:   some relief with simethicone 250mg qid, bentyl helped but made her constipated, fiber is helping and stools are small.  Occasional hb or globus but is infrequent and is on her omepr 40.      Today     Pt is unbalanced and stumbling to step on scale and room per MA.  bp is 140/80 and pt reports that she is well hydrated.  She is alert and oriented and not sedated.  She reports that she has been having dizzy spells lately and has a f/u appt with Dr. Dubois soon.  Dizziness started while lying on bed pulling her legs to her chest to try to relieve \"gas pain\" prior to coming in for her appt today.  Denies nausea just dizziness.     Wed night felt she came down with gastritis and sharp pain th evening and went to bed and woke up throwing up for 5 hrs Friday morning.  Hs lost 3 lbs and her stomach is sore.  Today was first solid bm.      Prior to this Gastroenteritis she tried some digestive enzymes through melaluca. And this has not helped.  Amitriptyline did not help makes her too sleepy.  Last colon was .  CT in March showed constipation.      REVIEWED PERTINENT RESULTS/ LABS  Lab Results   Component Value Date    CASEREPORT  2018     Surgical Pathology Report                         Case: [id number]                                  Authorizing Provider:  Hernan Hinds MD        Collected:           2018 09:17 AM          Ordering Location:     [facility]" [facility]   Received:            08/17/2018 10:06 AM                                 OR                                                                           Pathologist:           Marquez Pierre MD                                                     Specimen:    Gallbladder, GALLBLADDER                                                                   FINALDX  08/17/2018     1. Gallbladder, CHOLECYSTECTOMY LAPAROSCOPIC:  Testing performed at outside laboratory. See scanned report.             Lab Results   Component Value Date    HGB 12.7 03/09/2021    MCV 91.4 03/09/2021     03/09/2021    ALT 14 03/09/2021    AST 24 03/09/2021    HGBA1C 5.20 11/11/2019    TRIG 95 03/09/2021      No results found.    CURRENT MEDICATIONS    Current Outpatient Medications:   •  ALPRAZolam (XANAX) 0.5 MG tablet, Take 1 tablet by mouth twice daily, Disp: 30 tablet, Rfl: 0  •  amLODIPine (NORVASC) 2.5 MG tablet, Take 1 tablet by mouth Daily., Disp: 90 tablet, Rfl: 3  •  betamethasone valerate (VALISONE) 0.1 % ointment, Apply  topically to the appropriate area as directed 2 (Two) Times a Day., Disp: 30 g, Rfl: 1  •  Calcium-Vitamin D-Vitamin K (VIACTIV CALCIUM PLUS D PO), Take  by mouth., Disp: , Rfl:   •  fluticasone (FLONASE) 50 MCG/ACT nasal spray, 2 sprays into each nostril daily., Disp: , Rfl:   •  lisinopril (PRINIVIL,ZESTRIL) 30 MG tablet, Take 1 tablet by mouth 2 (Two) Times a Day., Disp: 180 tablet, Rfl: 3  •  loratadine (CLARITIN) 10 MG tablet, Take 10 mg by mouth daily., Disp: , Rfl:   •  Multiple Vitamins-Minerals (EYE VITAMINS) capsule, Take  by mouth., Disp: , Rfl:   •  omeprazole (priLOSEC) 40 MG capsule, Take 1 capsule by mouth 2 (two) times a day., Disp: 180 capsule, Rfl: 3  •  pancrelipase, Lip-Prot-Amyl, (CREON) 6000 units capsule delayed-release particles capsule, Take 1 capsule by mouth 3 (Three) Times a Day With Meals., Disp: 90 capsule, Rfl: 3  •  PARoxetine (PAXIL) 30 MG tablet, TAKE 1  TABLET EVERY MORNING, Disp: 90 tablet, Rfl: 3  •  polyethylene glycol (MIRALAX) packet, Take 17 g by mouth daily., Disp: , Rfl:   •  Probiotic Product (PROBIOTIC DAILY) capsule, Once daily, Disp: , Rfl:   •  Simethicone 250 MG capsule, Take 1 capsule by mouth 4 (Four) Times a Day As Needed (gas and bloating)., Disp: 360 capsule, Rfl: 3  •  SUMAtriptan (IMITREX) 100 MG tablet, Take 1 tablet by mouth 1 (One) Time As Needed for Migraine for up to 1 dose. Take one tablet at onset of headache. May repeat dose one time in 2 hours., Disp: 27 tablet, Rfl: 3  •  vitamin B-12 (CYANOCOBALAMIN) 2500 MCG sublingual tablet tablet, Place 2,500 mcg under the tongue Daily., Disp: , Rfl:   •  zolpidem (AMBIEN) 10 MG tablet, TAKE ONE TABLET BY MOUTH ONCE NIGHTLY, Disp: 90 tablet, Rfl: 1  •  famotidine (PEPCID) 40 MG tablet, Take 1 tablet by mouth 2 (Two) Times a Day. With lunch and at bedtime, Disp: 180 tablet, Rfl: 3  •  Phenergan 25 MG suppository, Insert 1 suppository into the rectum Every 6 (Six) Hours As Needed for Nausea or Vomiting., Disp: 6 suppository, Rfl: 3    ALLERGIES  Hydrocodone and Sulfa antibiotics    REVIEW OF SYSTEMS  ROS: 14 point review of systems was performed and all other systems were reviewed and are negative except for documented findings in HPI and today's encounter.   Review of Systems      History     Last Reviewed by Michelle Law APRN on 5/24/2021 at  3:41 PM    Sections Reviewed    Tobacco, Family, Medical, Surgical      Problem list reviewed by Michelle Law APRN on 5/24/2021 at  3:41 PM  Medicines reviewed by Michelle Law APRN on 5/24/2021 at  3:41 PM  Allergies reviewed by Michelle Law APRN on 5/24/2021 at  3:41 PM      ACTIVE PROBLEMS  Patient Active Problem List    Diagnosis    • Adhesion of abdominal wall [K66.0]    • Age-related osteoporosis without current pathological fracture [M81.0]    • Psoriasis [L40.9]    • Family history of colonic polyps [Z83.71]    • Routine health maintenance  [Z00.00]    • Essential hypertension [I10]    • GERD (gastroesophageal reflux disease) [K21.9]    • Allergic rhinitis [J30.9]    • Primary insomnia [F51.01]    • Chronic constipation [K59.09]    • Migraines [G43.909]    • Depression with anxiety [F41.8]          PAST MEDICAL HISTORY  Past Medical History:   Diagnosis Date   • Abdominal pain    • Anxiety    • Anxiety    • Arthritis    • Constipation    • Depressed    • Depression    • Endometriosis    • Gastritis    • GERD (gastroesophageal reflux disease)    • Headache    • Headache, tension-type    • Hypertension    • Insomnia    • Migraine    • OP (osteoporosis)    • Poor sleep    • Psoriasis    • Seasonal allergies          SURGICAL HISTORY  Past Surgical History:   Procedure Laterality Date   • APPENDECTOMY     • CHOLECYSTECTOMY N/A 8/17/2018    Procedure: CHOLECYSTECTOMY LAPAROSCOPIC converted to open procedure;  Surgeon: Hernan Hinds MD;  Location: [facility] OR;  Service: General   • COLON SURGERY     • COLONOSCOPY     • COLONOSCOPY N/A 12/12/2018    Procedure: COLONOSCOPY;  Surgeon: Darien Ruff MD;  Location: [facility] OR;  Service: Gastroenterology   • DIAGNOSTIC LAPAROSCOPY  1990   • DILATATION AND CURETTAGE  1985   • ENDOSCOPY N/A 5/13/2016    Procedure: ESOPHAGOGASTRODUODENOSCOPY ;  Surgeon: Darien Ruff MD;  Location: [facility] OR;  Service:    • HYSTERECTOMY     • REVISION / TAKEDOWN COLOSTOMY           FAMILY HISTORY  Family History   Problem Relation Age of Onset   • Hyperlipidemia Mother    • Macular degeneration Mother    • Heart disease Father    • Hyperlipidemia Father    • Cancer Father    • Depression Father    • Depression Sister    • Hyperlipidemia Brother    • Depression Brother    • Breast cancer Maternal Aunt    • Breast cancer Cousin    • Breast cancer Cousin    • Colon cancer Neg Hx    • Colon polyps Neg Hx          SOCIAL HISTORY  Social History     Occupational History   • Not on file   Tobacco Use   • Smoking  status: Never Smoker   • Smokeless tobacco: Never Used   Vaping Use   • Vaping Use: Never used   Substance and Sexual Activity   • Alcohol use: Yes     Comment: rare   • Drug use: No   • Sexual activity: Defer         LAST RESULTS  See also labs reviewed above.   Lab on 12/14/2020   Component Date Value Ref Range Status   • H. pylori IgG 12/14/2020 0.56  0.00 - 0.79 Index Value Final                                 Negative           <0.80                               Equivocal    0.80 - 0.89                               Positive           >0.89   • H. pylori, IgA ABS 12/14/2020 <9.0  0.0 - 8.9 units Final                                    Negative          <9.0                                  Equivocal   9.0 - 11.0                                  Positive         >11.0   • H. Pylori, IgM 12/14/2020 <9.0  0.0 - 8.9 units Final                                    Negative          <9.0                                  Equivocal   9.0 - 11.0                                  Positive         >11.0  This test was developed and its performance characteristics  determined by [facility]. It has not been cleared or approved  by the Food and Drug Administration.   • WBC 03/09/2021 3.57  3.40 - 10.80 10*3/mm3 Final   • RBC 03/09/2021 4.18  3.77 - 5.28 10*6/mm3 Final   • Hemoglobin 03/09/2021 12.7  12.0 - 15.9 g/dL Final   • Hematocrit 03/09/2021 38.2  34.0 - 46.6 % Final   • MCV 03/09/2021 91.4  79.0 - 97.0 fL Final   • MCH 03/09/2021 30.4  26.6 - 33.0 pg Final   • MCHC 03/09/2021 33.2  31.5 - 35.7 g/dL Final   • RDW 03/09/2021 11.9* 12.3 - 15.4 % Final   • Platelets 03/09/2021 285  140 - 450 10*3/mm3 Final   • Neutrophil Rel % 03/09/2021 70.3  42.7 - 76.0 % Final   • Lymphocyte Rel % 03/09/2021 20.2  19.6 - 45.3 % Final   • Monocyte Rel % 03/09/2021 8.1  5.0 - 12.0 % Final   • Eosinophil Rel % 03/09/2021 0.6  0.3 - 6.2 % Final   • Basophil Rel % 03/09/2021 0.8  0.0 - 1.5 % Final   • Neutrophils Absolute 03/09/2021 2.51   1.70 - 7.00 10*3/mm3 Final   • Lymphocytes Absolute 03/09/2021 0.72  0.70 - 3.10 10*3/mm3 Final   • Monocytes Absolute 03/09/2021 0.29  0.10 - 0.90 10*3/mm3 Final   • Eosinophils Absolute 03/09/2021 0.02  0.00 - 0.40 10*3/mm3 Final   • Basophils Absolute 03/09/2021 0.03  0.00 - 0.20 10*3/mm3 Final   • Immature Granulocyte Rel % 03/09/2021 0.0  0.0 - 0.5 % Final   • Immature Grans Absolute 03/09/2021 0.00  0.00 - 0.05 10*3/mm3 Final   • nRBC 03/09/2021 0.0  0.0 - 0.2 /100 WBC Final   • Glucose 03/09/2021 75  65 - 99 mg/dL Final   • BUN 03/09/2021 10  8 - 23 mg/dL Final   • Creatinine 03/09/2021 0.83  0.57 - 1.00 mg/dL Final   • eGFR Non  Am 03/09/2021 69  >60 mL/min/1.73 Final    Comment: GFR Normal >60  Chronic Kidney Disease <60  Kidney Failure <15     • eGFR  Am 03/09/2021 84  >60 mL/min/1.73 Final   • BUN/Creatinine Ratio 03/09/2021 12.0  7.0 - 25.0 Final   • Sodium 03/09/2021 135* 136 - 145 mmol/L Final   • Potassium 03/09/2021 4.9  3.5 - 5.2 mmol/L Final   • Chloride 03/09/2021 97* 98 - 107 mmol/L Final   • Total CO2 03/09/2021 28.6  22.0 - 29.0 mmol/L Final   • Calcium 03/09/2021 9.6  8.6 - 10.5 mg/dL Final   • Total Protein 03/09/2021 6.8  6.0 - 8.5 g/dL Final   • Albumin 03/09/2021 4.20  3.50 - 5.20 g/dL Final   • Globulin 03/09/2021 2.6  gm/dL Final   • A/G Ratio 03/09/2021 1.6  g/dL Final   • Total Bilirubin 03/09/2021 0.6  0.0 - 1.2 mg/dL Final   • Alkaline Phosphatase 03/09/2021 124* 39 - 117 U/L Final   • AST (SGOT) 03/09/2021 24  1 - 32 U/L Final   • ALT (SGPT) 03/09/2021 14  1 - 33 U/L Final   • Total Cholesterol 03/09/2021 186  0 - 200 mg/dL Final    Comment: Cholesterol Reference Ranges  (U.S. Department of Health and Human Services ATP III  Classifications)  Desirable          <200 mg/dL  Borderline High    200-239 mg/dL  High Risk          >240 mg/dL  Triglyceride Reference Ranges  (U.S. Department of Health and Human Services ATP III  Classifications)  Normal           <150  "mg/dL  Borderline High  150-199 mg/dL  High             200-499 mg/dL  Very High        >500 mg/dL  HDL Reference Ranges  (U.S. Department of Health and Human Services ATP III  Classifcations)  Low     <40 mg/dl (major risk factor for CHD)  High    >60 mg/dl ('negative' risk factor for CHD)  LDL Reference Ranges  (U.S. Department of Health and Human Services ATP III  Classifcations)  Optimal          <100 mg/dL  Near Optimal     100-129 mg/dL  Borderline High  130-159 mg/dL  High             160-189 mg/dL  Very High        >189 mg/dL     • Triglycerides 03/09/2021 95  0 - 150 mg/dL Final   • HDL Cholesterol 03/09/2021 65* 40 - 60 mg/dL Final   • VLDL Cholesterol Jad 03/09/2021 17  5 - 40 mg/dL Final   • LDL Chol Calc ([facility]) 03/09/2021 104* 0 - 100 mg/dL Final   • Chol/HDL Ratio 03/09/2021 2.86   Final   • TSH 03/09/2021 2.770  0.270 - 4.200 uIU/mL Final   • 25 Hydroxy, Vitamin D 03/09/2021 57.2  30.0 - 100.0 ng/ml Final    Comment: Results may be falsely increased if patient taking Biotin.  Reference Range for Total Vitamin D 25(OH)  Deficiency <20.0 ng/mL  Insufficiency 21-29 ng/mL  Sufficiency  ng/mL  Toxicity >100 ng/ml     • Vitamin B-12 03/09/2021 898  211 - 946 pg/mL Final    Results may be falsely increased if patient taking Biotin.     No results found.    PHYSICAL EXAM  Debilities/Disabilities Identified: None  Emotional Behavior: Appropriate  65 y.o. female  Wt Readings from Last 3 Encounters:   05/24/21 48.9 kg (107 lb 12.8 oz)   04/21/21 50.2 kg (110 lb 9.6 oz)   03/15/21 51.3 kg (113 lb)     Ht Readings from Last 1 Encounters:   05/24/21 154.9 cm (60.98\")     Body mass index is 20.38 kg/m².  Vitals:    05/24/21 1435   BP: 146/88   Weight: 48.9 kg (107 lb 12.8 oz)   Height: 154.9 cm (60.98\")     Vital signs reviewed.          Physical Exam  Vitals and nursing note reviewed.   Constitutional:       Appearance: She is well-developed.   HENT:      Head: Normocephalic and atraumatic.   Eyes:      " Conjunctiva/sclera: Conjunctivae normal.      Pupils: Pupils are equal, round, and reactive to light.   Cardiovascular:      Rate and Rhythm: Normal rate and regular rhythm.   Pulmonary:      Effort: Pulmonary effort is normal.      Breath sounds: Normal breath sounds.   Abdominal:      General: Bowel sounds are normal. There is no distension.      Palpations: Abdomen is soft.      Tenderness: There is abdominal tenderness in the right upper quadrant, right lower quadrant, epigastric area, suprapubic area, left upper quadrant and left lower quadrant.   Musculoskeletal:         General: Normal range of motion.      Cervical back: Normal range of motion and neck supple.   Lymphadenopathy:      Cervical: No cervical adenopathy.   Skin:     General: Skin is warm and dry.   Neurological:      Mental Status: She is alert and oriented to person, place, and time.   Psychiatric:         Behavior: Behavior normal.           CLINICAL DATA REVIEWED   reviewed previous lab results and integrated with today's visit, reviewed notes from other physicians and/or last GI encounter, reviewed previous endoscopy results and available photos, reviewed surgical pathology results from previous biopsies      ASSESSMENT  Diagnoses and all orders for this visit:    Gastroesophageal reflux disease with esophagitis without hemorrhage    Gas pain    Abdominal bloating    Irritable bowel syndrome with constipation    Adhesion of abdominal wall    Nausea and vomiting, intractability of vomiting not specified, unspecified vomiting type  -     Phenergan 25 MG suppository; Insert 1 suppository into the rectum Every 6 (Six) Hours As Needed for Nausea or Vomiting.    Other orders  -     famotidine (PEPCID) 40 MG tablet; Take 1 tablet by mouth 2 (Two) Times a Day. With lunch and at bedtime          PLAN  Return in about 2 months (around 7/24/2021).     Use fiber and miralax 1-4 scoops a day to balance your constipation.     Discussed the importance of  "taking Omeprazole/Prilosec, 40mg twice daily before breakfast and dinner permanently due to known risk with long term acid reflux of Goodwin's esophagus/esophageal cancer., In addition, take famotidine (Pepcid) 40mg twice a day. would be glad to switch to zantac if it comes back out.     You may use mylanta for breakthrough HB.      Low Acid Diet Instructions:   Don't eat late, don't eat fried or spicy, and don't eat too much at one time. Avoid alcohol and  tomato based products like pizza, lasagna, spaghetti.  If you want to have these take an over the counter Pepcid or TUMS immediately before you eat them.  Do not eat within 3-4 hrs of bedtime. Limit caffeine and carbonated beverages, if you must have them do not have later in the day.  If reflux is severe elevate the head of the bed. You may also use TUMS, maalox, or mylanta for breakthrough heartburn.    Take a daily probiotic (something with a billion cultures and more different strains is better, examples like \"Probiotic 10\" or probiotic gummies) for your gut as well.  AVOID taking NSAIDS (like ibuprofen, Aleve, Motrin, naproxen, meloxicam, etc) as much as possible and use acetaminophen (Tylenol) instead.    Drink lots of water, eat a high fiber diet with 20gm a day(check the fiber content in the foods you eat.  Protein bars with 15gm of fiber in each bar are a great supplement daily), and get regular exercise. Use over the counter simethicone xtra strength gas x (125-180mg) 2 twice a day as needed for gas.     High Fiber Food suggestions:  Beans, nuts, vegetables, whole grains, flax seed and kip seeds (which can be stirred into other food) are high in fiber.    John Protein bars from [employer] = 10gm of fiber in each bar (also gluten free)  Quest Protein bars from grocery stores [employer], [employer], Azeb= 15gm of fiber each (also gluten free)  Fiber One bars from grocery stores = 5-6gm of fiber each  Kelloggs Bran Buds cereal 1/2 cup = 17gm of fiber  Azeb " brand low sugar Craisins = 13gm of fiber per serving  Melalueca Fiberwise powder=15gm fiber per serving (2 scoops)  CarbBalance tortillas= 15gm of fiber each  Natural Ovens Keto-Friendly Bread found at [employer]=12gm fiber per serving  No Jad vegan bar at [facility]=15gm fiber per serving      I have discussed the above plan with the patient.  They verbalize understanding and are in agreement with the plan.  They have been advised to contact the office for any questions, concerns, or changes related to their health.    30 min spent with patient today >50% spent counseling about natural history and expected course of assessed complaint and reviewed treatment options that have been tried and not tried and those currently available. Questions answered.

## 2021-06-14 ENCOUNTER — PATIENT MESSAGE (OUTPATIENT)
Dept: GASTROENTEROLOGY | Facility: CLINIC | Age: 66
End: 2021-06-14

## 2021-06-14 DIAGNOSIS — K66.0 ADHESION OF ABDOMINAL WALL: Primary | ICD-10-CM

## 2021-06-14 DIAGNOSIS — F41.8 DEPRESSION WITH ANXIETY: ICD-10-CM

## 2021-06-14 NOTE — TELEPHONE ENCOUNTER
Might also recommend seeing Dr. Hannah Rizzo urogynecology for your prolapsed bladder.  You do have an extensive history of abdominal adhesions(scar tissue) from prior surgeries that you can discuss with her as this might take care of both issues at once.  Dr. Ruff recommended this based on your CT scan from March.

## 2021-06-14 NOTE — TELEPHONE ENCOUNTER
From: Martinamarnie Hardwick  To: Michelle LawBROOKS  Sent: 6/14/2021 10:15 AM EDT  Subject: Referral Request    Hi Miss Martinez, this is Martina Ronen. I have had quite a bit of abdominal pain since I saw you on May 24th. My  and I have decided to go ahead and request a surgical consultation with Dr. Hernan Hinds, who has done my past 4 surgeries. I have a appt with Dr. Dubois this Thursday for my annual Medicare check up but other than that whatever time he can see us is fine. We just want to see what Dr. Hinds may suggest for me. Please let me know if there is anything else I need to do. Thank you for all your help and support for me.

## 2021-06-15 RX ORDER — PAROXETINE 30 MG/1
30 TABLET, FILM COATED ORAL EVERY MORNING
Qty: 90 TABLET | Refills: 3 | Status: SHIPPED | OUTPATIENT
Start: 2021-06-15 | End: 2021-09-22 | Stop reason: SDUPTHER

## 2021-06-16 NOTE — TELEPHONE ENCOUNTER
I have placed a referral to Dr. Hannah Rizzo.  Her office is off of [address] in [place] but shes the closest for you.  You can call her office to get scheduled sooner if you like, otherwise [facility] will call you to get the appointment set up.

## 2021-06-17 ENCOUNTER — OFFICE VISIT (OUTPATIENT)
Dept: INTERNAL MEDICINE | Facility: CLINIC | Age: 66
End: 2021-06-17

## 2021-06-17 VITALS
DIASTOLIC BLOOD PRESSURE: 70 MMHG | BODY MASS INDEX: 20.01 KG/M2 | HEART RATE: 90 BPM | OXYGEN SATURATION: 96 % | WEIGHT: 106 LBS | RESPIRATION RATE: 18 BRPM | SYSTOLIC BLOOD PRESSURE: 110 MMHG | TEMPERATURE: 96.6 F | HEIGHT: 61 IN

## 2021-06-17 DIAGNOSIS — I10 ESSENTIAL HYPERTENSION: ICD-10-CM

## 2021-06-17 DIAGNOSIS — G43.909 MIGRAINE WITHOUT STATUS MIGRAINOSUS, NOT INTRACTABLE, UNSPECIFIED MIGRAINE TYPE: ICD-10-CM

## 2021-06-17 DIAGNOSIS — Z00.00 ROUTINE HEALTH MAINTENANCE: ICD-10-CM

## 2021-06-17 DIAGNOSIS — Z23 NEED FOR VACCINATION: ICD-10-CM

## 2021-06-17 DIAGNOSIS — K21.9 GASTROESOPHAGEAL REFLUX DISEASE WITHOUT ESOPHAGITIS: ICD-10-CM

## 2021-06-17 DIAGNOSIS — J30.89 NON-SEASONAL ALLERGIC RHINITIS, UNSPECIFIED TRIGGER: ICD-10-CM

## 2021-06-17 DIAGNOSIS — Z00.00 MEDICARE ANNUAL WELLNESS VISIT, SUBSEQUENT: Primary | ICD-10-CM

## 2021-06-17 DIAGNOSIS — F41.8 DEPRESSION WITH ANXIETY: ICD-10-CM

## 2021-06-17 DIAGNOSIS — M81.0 AGE-RELATED OSTEOPOROSIS WITHOUT CURRENT PATHOLOGICAL FRACTURE: ICD-10-CM

## 2021-06-17 DIAGNOSIS — K59.09 CHRONIC CONSTIPATION: ICD-10-CM

## 2021-06-17 DIAGNOSIS — Z23 NEED FOR 23-POLYVALENT PNEUMOCOCCAL POLYSACCHARIDE VACCINE: ICD-10-CM

## 2021-06-17 DIAGNOSIS — F51.01 PRIMARY INSOMNIA: ICD-10-CM

## 2021-06-17 PROCEDURE — 90732 PPSV23 VACC 2 YRS+ SUBQ/IM: CPT | Performed by: FAMILY MEDICINE

## 2021-06-17 PROCEDURE — G0439 PPPS, SUBSEQ VISIT: HCPCS | Performed by: FAMILY MEDICINE

## 2021-06-17 PROCEDURE — G0009 ADMIN PNEUMOCOCCAL VACCINE: HCPCS | Performed by: FAMILY MEDICINE

## 2021-06-17 PROCEDURE — 99214 OFFICE O/P EST MOD 30 MIN: CPT | Performed by: FAMILY MEDICINE

## 2021-06-17 NOTE — PROGRESS NOTES
The ABCs of the Annual Wellness Visit  Subsequent Medicare Wellness Visit    Chief Complaint   Patient presents with   • Medicare Wellness-subsequent       Subjective   History of Present Illness:  Martina Hardwick is a 65 y.o. female who presents for a Subsequent Medicare Wellness Visit.    HEALTH RISK ASSESSMENT    Recent Hospitalizations:  No hospitalization(s) within the last year.    Current Medical Providers:  Patient Care Team:  Ildefonso Dubois MD as PCP - General (Family Medicine)  Hernan Hinds MD as Surgeon (General Surgery)    Smoking Status:  Social History     Tobacco Use   Smoking Status Never Smoker   Smokeless Tobacco Never Used       Alcohol Consumption:  Social History     Substance and Sexual Activity   Alcohol Use Yes    Comment: rare       Depression Screen:   PHQ-2/PHQ-9 Depression Screening 6/17/2021   Little interest or pleasure in doing things 2   Feeling down, depressed, or hopeless 2   Trouble falling or staying asleep, or sleeping too much 0   Feeling tired or having little energy 0   Poor appetite or overeating 0   Feeling bad about yourself - or that you are a failure or have let yourself or your family down 0   Trouble concentrating on things, such as reading the newspaper or watching television 0   Moving or speaking so slowly that other people could have noticed. Or the opposite - being so fidgety or restless that you have been moving around a lot more than usual 0   Thoughts that you would be better off dead, or of hurting yourself in some way 0   Total Score 4   If you checked off any problems, how difficult have these problems made it for you to do your work, take care of things at home, or get along with other people? Somewhat difficult       Fall Risk Screen:  STEADI Fall Risk Assessment was completed, and patient is at LOW risk for falls.Assessment completed on:6/17/2021    Health Habits and Functional and Cognitive Screening:  Functional & Cognitive Status 6/17/2021   Do  you have difficulty preparing food and eating? No   Do you have difficulty bathing yourself, getting dressed or grooming yourself? No   Do you have difficulty using the toilet? No   Do you have difficulty moving around from place to place? No   Do you have trouble with steps or getting out of a bed or a chair? No   Current Diet Low Fat Diet   Dental Exam Up to date   Eye Exam Not up to date   Exercise (times per week) 5 times per week   Current Exercises Include Walking   Do you need help using the phone?  No   Are you deaf or do you have serious difficulty hearing?  No   Do you need help with transportation? No   Do you need help shopping? No   Do you need help preparing meals?  No   Do you need help with housework?  No   Do you need help with laundry? No   Do you need help taking your medications? No   Do you need help managing money? No   Do you ever drive or ride in a car without wearing a seat belt? No   Have you felt unusual stress, anger or loneliness in the last month? Yes   Who do you live with? Spouse   If you need help, do you have trouble finding someone available to you? No   Have you been bothered in the last four weeks by sexual problems? No   Do you have difficulty concentrating, remembering or making decisions? Yes         Does the patient have evidence of cognitive impairment? No    Asprin use counseling:Does not need ASA (and currently is not on it)    Age-appropriate Screening Schedule:  Refer to the list below for future screening recommendations based on patient's age, sex and/or medical conditions. Orders for these recommended tests are listed in the plan section. The patient has been provided with a written plan.    Health Maintenance   Topic Date Due   • PAP SMEAR  Never done   • ZOSTER VACCINE (2 of 3) 03/15/2022 (Originally 11/29/2016)   • DXA SCAN  07/08/2021   • INFLUENZA VACCINE  08/01/2021   • LIPID PANEL  03/09/2022   • MAMMOGRAM  01/11/2023   • TDAP/TD VACCINES (3 - Td or Tdap)  12/02/2024          The following portions of the patient's history were reviewed and updated as appropriate: allergies, current medications, past family history, past medical history, past social history, past surgical history and problem list.    Outpatient Medications Prior to Visit   Medication Sig Dispense Refill   • ALPRAZolam (XANAX) 0.5 MG tablet Take 1 tablet by mouth twice daily 30 tablet 0   • amLODIPine (NORVASC) 2.5 MG tablet Take 1 tablet by mouth Daily. 90 tablet 3   • betamethasone valerate (VALISONE) 0.1 % ointment Apply  topically to the appropriate area as directed 2 (Two) Times a Day. 30 g 1   • Calcium-Vitamin D-Vitamin K (VIACTIV CALCIUM PLUS D PO) Take  by mouth.     • famotidine (PEPCID) 40 MG tablet Take 1 tablet by mouth 2 (Two) Times a Day. With lunch and at bedtime 180 tablet 3   • fluticasone (FLONASE) 50 MCG/ACT nasal spray 2 sprays into each nostril daily.     • lisinopril (PRINIVIL,ZESTRIL) 30 MG tablet Take 1 tablet by mouth 2 (Two) Times a Day. 180 tablet 3   • loratadine (CLARITIN) 10 MG tablet Take 10 mg by mouth daily.     • omeprazole (priLOSEC) 40 MG capsule Take 1 capsule by mouth 2 (two) times a day. 180 capsule 3   • PARoxetine (PAXIL) 30 MG tablet Take 1 tablet by mouth Every Morning. 90 tablet 3   • Phenergan 25 MG suppository Insert 1 suppository into the rectum Every 6 (Six) Hours As Needed for Nausea or Vomiting. 6 suppository 3   • polyethylene glycol (MIRALAX) packet Take 17 g by mouth daily.     • Probiotic Product (PROBIOTIC DAILY) capsule Once daily     • Simethicone 250 MG capsule Take 1 capsule by mouth 4 (Four) Times a Day As Needed (gas and bloating). 360 capsule 3   • SUMAtriptan (IMITREX) 100 MG tablet Take 1 tablet by mouth 1 (One) Time As Needed for Migraine for up to 1 dose. Take one tablet at onset of headache. May repeat dose one time in 2 hours. 27 tablet 3   • vitamin B-12 (CYANOCOBALAMIN) 2500 MCG sublingual tablet tablet Place 2,500 mcg under the  "tongue Daily.     • zolpidem (AMBIEN) 10 MG tablet TAKE ONE TABLET BY MOUTH ONCE NIGHTLY 90 tablet 1   • Multiple Vitamins-Minerals (EYE VITAMINS) capsule Take  by mouth.     • pancrelipase, Lip-Prot-Amyl, (CREON) 6000 units capsule delayed-release particles capsule Take 1 capsule by mouth 3 (Three) Times a Day With Meals. 90 capsule 3     No facility-administered medications prior to visit.       Patient Active Problem List   Diagnosis   • Migraines   • Depression with anxiety   • Allergic rhinitis   • Primary insomnia   • Chronic constipation   • GERD (gastroesophageal reflux disease)   • Essential hypertension   • Routine health maintenance   • Family history of colonic polyps   • Psoriasis   • Age-related osteoporosis without current pathological fracture   • Adhesion of abdominal wall       Advanced Care Planning:  ACP discussion was held with the patient during this visit. Patient does not have an advance directive, information provided.    Review of Systems    Compared to one year ago, the patient feels her physical health is worse.  Compared to one year ago, the patient feels her mental health is worse.    Reviewed chart for potential of high risk medication in the elderly: yes  Reviewed chart for potential of harmful drug interactions in the elderly:yes    Objective         Vitals:    06/17/21 1332   BP: 110/70   Pulse: 90   Resp: 18   Temp: 96.6 °F (35.9 °C)   TempSrc: Temporal   SpO2: 96%   Weight: 48.1 kg (106 lb)   Height: 154.9 cm (60.98\")   PainSc:   4       Body mass index is 20.04 kg/m².  Discussed the patient's BMI with her. The BMI is below average; no BMI management plan is appropriate.    Physical Exam          Assessment/Plan   Medicare Risks and Personalized Health Plan  CMS Preventative Services Quick Reference  Immunizations Discussed/Encouraged (specific immunizations; COVID19 )    The above risks/problems have been discussed with the patient.  Pertinent information has been shared with the " patient in the After Visit Summary.  Follow up plans and orders are seen below in the Assessment/Plan Section.    Diagnoses and all orders for this visit:    1. Medicare annual wellness visit, subsequent (Primary)      Follow Up:  No follow-ups on file.     An After Visit Summary and PPPS were given to the patient.

## 2021-06-17 NOTE — PROGRESS NOTES
Subjective     Martina Hardwick is a 65 y.o. female, who presents with a chief complaint of   Chief Complaint   Patient presents with   • Medicare Wellness-subsequent   • Hypertension     Hypertension  Associated symptoms include anxiety and headaches.   Heartburn  She complains of abdominal pain.   Anxiety  Symptoms include insomnia.     Her past medical history is significant for depression.   DepressionPatient presents with the following symptoms: insomnia.    Osteoporosis  Associated symptoms include abdominal pain, arthralgias and headaches.   Headache   Associated symptoms include abdominal pain and insomnia.   Insomnia  Associated symptoms include abdominal pain, arthralgias and headaches.     1. HTN.  Tolerates medication.  Takes lisinopril 30 mg BID and amlodipine 2.5 mg daily.  Home blood pressures running 105-135/60s-70s.   Denies dizziness and chest pain.    2. Depression and anxiety.  Pt reports an increase in her anxiety due to life stress.  She takes paroxetine and prn alprazolam.  Denies SI.    3. Headaches.  She takes sumatriptan for abortive treatment and this is effective.    4. Chronic constipation, chronic abdominal pain.  She takes Miralax and fiber.  Dr. Ruff prescribed dicyclomine for pain and she has only taken this once.  She agrees to try this again.  Dr. Ruff has referred her to Dr. Rizzo for pelvic floor prolapse to see if this is playing a role in her pain.  She also has a history of multiple abdominal surgeries and likely has adhesions.    The following portions of the patient's history were reviewed and updated as appropriate: allergies, current medications, past family history, past medical history, past social history, past surgical history and problem list.    Allergies: Hydrocodone and Sulfa antibiotics    Review of Systems   Constitutional: Negative.    Eyes: Negative.    Respiratory: Negative.    Cardiovascular: Negative.    Gastrointestinal: Positive for abdominal pain  and constipation.   Endocrine: Negative.    Genitourinary: Negative.    Musculoskeletal: Positive for arthralgias.   Allergic/Immunologic: Positive for environmental allergies.   Neurological: Positive for headaches.   Hematological: Negative.    Psychiatric/Behavioral: The patient has insomnia.      Objective     Wt Readings from Last 3 Encounters:   06/17/21 48.1 kg (106 lb)   05/24/21 48.9 kg (107 lb 12.8 oz)   04/21/21 50.2 kg (110 lb 9.6 oz)     Temp Readings from Last 3 Encounters:   06/17/21 96.6 °F (35.9 °C) (Temporal)   04/21/21 97.8 °F (36.6 °C) (Temporal)   03/15/21 96.5 °F (35.8 °C) (Temporal)     BP Readings from Last 3 Encounters:   06/17/21 110/70   05/24/21 146/88   03/15/21 122/82     Pulse Readings from Last 3 Encounters:   06/17/21 90   03/15/21 66   08/24/20 82     Body mass index is 20.04 kg/m².  SpO2 Readings from Last 3 Encounters:   01/15/18 96%   10/12/17 98%   08/10/17 99%     Physical Exam   Constitutional: She is oriented to person, place, and time. She appears well-developed.   HENT:   Head: Normocephalic and atraumatic.   Mouth/Throat: Mucous membranes are moist.   Eyes: Conjunctivae are normal.   Neck: No thyromegaly present.   Cardiovascular: Normal rate, regular rhythm and normal heart sounds.   Pulmonary/Chest: Effort normal and breath sounds normal.   Abdominal: Soft. Normal appearance and bowel sounds are normal.   Musculoskeletal: Normal range of motion.      Right lower leg: No edema.      Left lower leg: No edema.   Neurological: She is alert and oriented to person, place, and time.   Skin: Skin is warm and dry. No rash noted.   Psychiatric: Her behavior is normal. Mood normal.   Nursing note and vitals reviewed.      Assessment/Plan   Martina was seen today for hypertension, insomnia and heartburn.    Diagnoses and all orders for this visit:      Essential hypertension    Routine health maintenance    Migraine without status migrainosus, not intractable, unspecified migraine  type    Gastroesophageal reflux disease without esophagitis    Depression with anxiety    Primary insomnia    Chronic constipation    Family history of colonic polyps    Non-seasonal allergic rhinitis, unspecified trigger    Osteoporosis    1. HTN.  Controlled.  Continue lisinopril 30 mg BID and amlodipine 2.5 mg daily.  Labs reviewed.    2. Routine health maint.  Mammogram UTD.  Colonoscopy UTD.  Hysterectomy done for non-cancerous reasons.  Covid-19 vaccines.  Pneumovax today.  Shingrix at pharmacy.    3. Migraines.  Doing well with these.  Continue sumatriptan for abortive treatment.    4. GERD.  Continue omeprazole, famotidine, and lifestyle measures.    5. Depression with anxiety.  Continue paroxetine.  On prn alprazolam.  Med management agreement and Joby UTD.    6. Insomnia.  Zolpidem is effective.  Med management agreement and Joby UTD.    7. Chronic constipation.  On daily Miralax and probiotic.  Start fiber supplement.  Working with Dr. Ruff on this.    8. LORRIE.  Controlled with fluticasone nasal spray and loratadine.    9. Osteoporosis.  Didn't tolerate bisphosphonates previously (Dr. Ghotra).  Insurance didn't cover Prolia or Reclast.  She wanted to wait until she turns 65 to further pursue medication.  Continue calcium, vitamin D and weight-bearing exercise.  Recheck dexa scan next month and consider Prolia at that time.    Outpatient Medications Prior to Visit   Medication Sig Dispense Refill   • ALPRAZolam (XANAX) 0.5 MG tablet Take 1 tablet by mouth twice daily 30 tablet 0   • amLODIPine (NORVASC) 2.5 MG tablet Take 1 tablet by mouth Daily. 90 tablet 3   • betamethasone valerate (VALISONE) 0.1 % ointment Apply  topically to the appropriate area as directed 2 (Two) Times a Day. 30 g 1   • Calcium-Vitamin D-Vitamin K (VIACTIV CALCIUM PLUS D PO) Take  by mouth.     • famotidine (PEPCID) 40 MG tablet Take 1 tablet by mouth 2 (Two) Times a Day. With lunch and at bedtime 180 tablet 3   •  fluticasone (FLONASE) 50 MCG/ACT nasal spray 2 sprays into each nostril daily.     • lisinopril (PRINIVIL,ZESTRIL) 30 MG tablet Take 1 tablet by mouth 2 (Two) Times a Day. 180 tablet 3   • loratadine (CLARITIN) 10 MG tablet Take 10 mg by mouth daily.     • omeprazole (priLOSEC) 40 MG capsule Take 1 capsule by mouth 2 (two) times a day. 180 capsule 3   • PARoxetine (PAXIL) 30 MG tablet Take 1 tablet by mouth Every Morning. 90 tablet 3   • Phenergan 25 MG suppository Insert 1 suppository into the rectum Every 6 (Six) Hours As Needed for Nausea or Vomiting. 6 suppository 3   • polyethylene glycol (MIRALAX) packet Take 17 g by mouth daily.     • Probiotic Product (PROBIOTIC DAILY) capsule Once daily     • Simethicone 250 MG capsule Take 1 capsule by mouth 4 (Four) Times a Day As Needed (gas and bloating). 360 capsule 3   • SUMAtriptan (IMITREX) 100 MG tablet Take 1 tablet by mouth 1 (One) Time As Needed for Migraine for up to 1 dose. Take one tablet at onset of headache. May repeat dose one time in 2 hours. 27 tablet 3   • vitamin B-12 (CYANOCOBALAMIN) 2500 MCG sublingual tablet tablet Place 2,500 mcg under the tongue Daily.     • zolpidem (AMBIEN) 10 MG tablet TAKE ONE TABLET BY MOUTH ONCE NIGHTLY 90 tablet 1   • Multiple Vitamins-Minerals (EYE VITAMINS) capsule Take  by mouth.     • pancrelipase, Lip-Prot-Amyl, (CREON) 6000 units capsule delayed-release particles capsule Take 1 capsule by mouth 3 (Three) Times a Day With Meals. 90 capsule 3     No facility-administered medications prior to visit.     No orders of the defined types were placed in this encounter.    [unfilled]  Medications Discontinued During This Encounter   Medication Reason   • Multiple Vitamins-Minerals (EYE VITAMINS) capsule *Therapy completed   • pancrelipase, Lip-Prot-Amyl, (CREON) 6000 units capsule delayed-release particles capsule *Therapy completed     Return in about 6 months (around 12/17/2021).

## 2021-06-28 ENCOUNTER — PATIENT MESSAGE (OUTPATIENT)
Dept: GASTROENTEROLOGY | Facility: CLINIC | Age: 66
End: 2021-06-28

## 2021-06-28 DIAGNOSIS — F41.8 DEPRESSION WITH ANXIETY: ICD-10-CM

## 2021-06-28 RX ORDER — ALPRAZOLAM 0.5 MG/1
TABLET ORAL
Qty: 30 TABLET | Refills: 0 | Status: SHIPPED | OUTPATIENT
Start: 2021-06-28 | End: 2021-07-26

## 2021-06-28 NOTE — TELEPHONE ENCOUNTER
From: Martina Hardwick  To: BROOKS Herrera  Sent: 6/28/2021 10:04 AM EDT  Subject: Referral Request    Rishabh Martinez, I still have not received a call from Faith or Dr. Rizzo's office to set up an appt. for the referral you made to her, regarding my prolapsed bladder and pain. What do you want me to do next? Thanks very much. I look forward to hearing from you.    Martina Hardwick

## 2021-07-06 ENCOUNTER — TELEPHONE (OUTPATIENT)
Dept: GASTROENTEROLOGY | Facility: CLINIC | Age: 66
End: 2021-07-06

## 2021-07-12 ENCOUNTER — APPOINTMENT (OUTPATIENT)
Dept: BONE DENSITY | Facility: HOSPITAL | Age: 66
End: 2021-07-12

## 2021-07-12 DIAGNOSIS — M81.0 AGE-RELATED OSTEOPOROSIS WITHOUT CURRENT PATHOLOGICAL FRACTURE: ICD-10-CM

## 2021-07-12 PROCEDURE — 77080 DXA BONE DENSITY AXIAL: CPT

## 2021-07-13 ENCOUNTER — TELEPHONE (OUTPATIENT)
Dept: INTERNAL MEDICINE | Facility: CLINIC | Age: 66
End: 2021-07-13

## 2021-07-13 NOTE — TELEPHONE ENCOUNTER
Caller: Martina Hardwick    Relationship to patient: Self    Best call back number: [phone]    Patient is needing: RETURNED CALL TO [facility]. PATIENT IS NOT INTERESTED IN STARTING ANOTHER MEDICATION AT THIS TIME, DOES NOT WANT TO HAVE THE PROLIA INJECTIONS.     PATIENT STATES SHE WILL DISCUSS THIS LATER WHEN SHE HAS ANOTHER VISIT WITH DR CLARK.    PLEASE CALL WITH ANY QUESTIONS.

## 2021-07-21 ENCOUNTER — TELEPHONE (OUTPATIENT)
Dept: INTERNAL MEDICINE | Facility: CLINIC | Age: 66
End: 2021-07-21

## 2021-07-26 DIAGNOSIS — F41.8 DEPRESSION WITH ANXIETY: ICD-10-CM

## 2021-07-26 RX ORDER — ALPRAZOLAM 0.5 MG/1
TABLET ORAL
Qty: 30 TABLET | Refills: 0 | Status: SHIPPED | OUTPATIENT
Start: 2021-07-26 | End: 2021-08-23

## 2021-07-28 ENCOUNTER — OFFICE VISIT (OUTPATIENT)
Dept: GASTROENTEROLOGY | Facility: CLINIC | Age: 66
End: 2021-07-28

## 2021-07-28 VITALS
BODY MASS INDEX: 20.12 KG/M2 | SYSTOLIC BLOOD PRESSURE: 108 MMHG | WEIGHT: 106.6 LBS | DIASTOLIC BLOOD PRESSURE: 64 MMHG | HEIGHT: 61 IN

## 2021-07-28 DIAGNOSIS — K58.1 IRRITABLE BOWEL SYNDROME WITH CONSTIPATION: ICD-10-CM

## 2021-07-28 DIAGNOSIS — K66.0 ADHESION OF ABDOMINAL WALL: Primary | ICD-10-CM

## 2021-07-28 DIAGNOSIS — K21.00 GASTROESOPHAGEAL REFLUX DISEASE WITH ESOPHAGITIS WITHOUT HEMORRHAGE: ICD-10-CM

## 2021-07-28 PROCEDURE — 99214 OFFICE O/P EST MOD 30 MIN: CPT | Performed by: NURSE PRACTITIONER

## 2021-07-28 RX ORDER — DICYCLOMINE HCL 20 MG
TABLET ORAL
COMMUNITY
Start: 2021-06-18 | End: 2022-01-31

## 2021-07-28 NOTE — PROGRESS NOTES
"PATIENT INFORMATION  Martina Hardwick     - 1955    CHIEF COMPLAINT  Chief Complaint   Patient presents with   • Heartburn   • Bloated       HISTORY OF PRESENT ILLNESS  Pt is unbalanced and stumbling to step on scale and room per MA.  bp is 140/80 and pt reports that she is well hydrated.  She is alert and oriented and not sedated.  She reports that she has been having dizzy spells lately and has a f/u appt with Dr. Dubois soon.  Dizziness started while lying on bed pulling her legs to her chest to try to relieve \"gas pain\" prior to coming in for her appt today.  Denies nausea just dizziness.      Wed night felt she came down with gastritis and sharp pain thurs evening and went to bed and woke up throwing up for 5 hrs Friday morning.  Hs lost 3 lbs and her stomach is sore.  Today was first solid bm.       Prior to this Gastroenteritis she tried some digestive enzymes through melaluca. And this has not helped.  Amitriptyline did not help makes her too sleepy.  Last colon was .  CT in March showed constipation.      Last visit gave referral to Dr. Hannah vaz and Use fiber and miralax 1-4 scoops a day to balance your constipation.      Discussed the importance of taking Omeprazole/Prilosec, 40mg twice daily before breakfast and dinner permanently due to known risk with long term acid reflux of Godowin's esophagus/esophageal cancer., In addition, take famotidine (Pepcid) 40mg twice a day. would be glad to switch to zantac if it comes back out.      You may use mylanta for breakthrough HB.      Today:  bms are doing well daily and soft with full dose of miralax, has only been good for the last 10 days had a big change in a positive direction after doing a lot of good, no acid reflux symptoms at all.  She will get a second BM with little pieces     Omeprazole 40 bid and famotidine 40 bid and full cap of miralax every day.  >20gm of fiber a day.  Simethicone prn for gas.  abd pain has improved " significantly with walking 30 min after she eats and then does       REVIEWED PERTINENT RESULTS/ LABS  Lab Results   Component Value Date    CASEREPORT  08/17/2018     Surgical Pathology Report                         Case: [id number]                                  Authorizing Provider:  Hernan Hinds MD        Collected:           08/17/2018 09:17 AM          Ordering Location:     [facility]   Received:            08/17/2018 10:06 AM                                 OR                                                                           Pathologist:           Marquze Pierre MD                                                     Specimen:    Gallbladder, GALLBLADDER                                                                   FINALDX  08/17/2018     1. Gallbladder, CHOLECYSTECTOMY LAPAROSCOPIC:  Testing performed at outside laboratory. See scanned report.             Lab Results   Component Value Date    HGB 12.7 03/09/2021    MCV 91.4 03/09/2021     03/09/2021    ALT 14 03/09/2021    AST 24 03/09/2021    HGBA1C 5.20 11/11/2019    TRIG 95 03/09/2021      DEXA Bone Density Axial    Result Date: 7/12/2021  Narrative: DXA BONE MINERAL DENSITY MEASUREMENT, 07/12/2021  INDICATION: 65-year-old postmenopausal female presenting for screening. Previous diagnosis of osteoporosis. She is on daily calcium supplement and multivitamin with vitamin D. HISTORY of oophorectomy.  TECHNIQUE: DXA bone mineral density measurements were obtained at the lumbar spine and left hip.  FINDINGS: Bone mineral density in the lumbar spine measures 0.586 g/sq cm corresponding with a T score of -4.2 and Z score of -2.4. This is in the osteoporotic range and represents a change of -22.5% from the baseline study and -6% from the prior study.  Bone mineral density in the left femur measures a total of 0.631 g/sq cm corresponding with a T score of -2.6 and Z score of -1.3. This is in the osteoporotic range and  represents a change of -10.2% from the baseline study and -1.5% from the prior study.      Impression: 1. Bone mineral density is in the osteoporotic range, placing the patient at highly increased risk for fracture. 2. FRAX: *  Not reported. Some T-scores at or below -2.5.  This report was finalized on 7/12/2021 1:30 PM by Dr. Ruperto Jacobson MD.        CURRENT MEDICATIONS    Current Outpatient Medications:   •  ALPRAZolam (XANAX) 0.5 MG tablet, Take 1 tablet by mouth twice daily, Disp: 30 tablet, Rfl: 0  •  amLODIPine (NORVASC) 2.5 MG tablet, Take 1 tablet by mouth Daily., Disp: 90 tablet, Rfl: 3  •  betamethasone valerate (VALISONE) 0.1 % ointment, Apply  topically to the appropriate area as directed 2 (Two) Times a Day., Disp: 30 g, Rfl: 1  •  Calcium-Vitamin D-Vitamin K (VIACTIV CALCIUM PLUS D PO), Take 1 tablet by mouth Daily., Disp: , Rfl:   •  dicyclomine (BENTYL) 20 MG tablet, TAKE 1 TABLET BY MOUTH 4 TIMES DAILY BEFORE MEALS AND AT BEDTIME AS NEEDED FOR CRAMPING, Disp: , Rfl:   •  famotidine (PEPCID) 40 MG tablet, Take 1 tablet by mouth 2 (Two) Times a Day. With lunch and at bedtime, Disp: 180 tablet, Rfl: 3  •  fluticasone (FLONASE) 50 MCG/ACT nasal spray, 2 sprays into each nostril daily., Disp: , Rfl:   •  lisinopril (PRINIVIL,ZESTRIL) 30 MG tablet, Take 1 tablet by mouth 2 (Two) Times a Day., Disp: 180 tablet, Rfl: 3  •  loratadine (CLARITIN) 10 MG tablet, Take 10 mg by mouth daily., Disp: , Rfl:   •  omeprazole (priLOSEC) 40 MG capsule, Take 1 capsule by mouth 2 (two) times a day., Disp: 180 capsule, Rfl: 3  •  PARoxetine (PAXIL) 30 MG tablet, Take 1 tablet by mouth Every Morning., Disp: 90 tablet, Rfl: 3  •  Phenergan 25 MG suppository, Insert 1 suppository into the rectum Every 6 (Six) Hours As Needed for Nausea or Vomiting., Disp: 6 suppository, Rfl: 3  •  polyethylene glycol (MIRALAX) packet, Take 17 g by mouth daily., Disp: , Rfl:   •  Probiotic Product (PROBIOTIC DAILY) capsule, Once daily (Patient  taking differently: Take 2 capsules by mouth Daily. Once daily ), Disp: , Rfl:   •  Simethicone 250 MG capsule, Take 1 capsule by mouth 4 (Four) Times a Day As Needed (gas and bloating)., Disp: 360 capsule, Rfl: 3  •  SUMAtriptan (IMITREX) 100 MG tablet, Take 1 tablet by mouth 1 (One) Time As Needed for Migraine for up to 1 dose. Take one tablet at onset of headache. May repeat dose one time in 2 hours., Disp: 27 tablet, Rfl: 3  •  vitamin B-12 (CYANOCOBALAMIN) 2500 MCG sublingual tablet tablet, Place 2,500 mcg under the tongue Daily., Disp: , Rfl:   •  zolpidem (AMBIEN) 10 MG tablet, TAKE ONE TABLET BY MOUTH ONCE NIGHTLY, Disp: 90 tablet, Rfl: 1    ALLERGIES  Hydrocodone and Sulfa antibiotics    REVIEW OF SYSTEMS  ROS: 14 point review of systems was performed and all other systems were reviewed and are negative except for documented findings in HPI and today's encounter.   Review of Systems   Constitutional: Negative.    HENT: Positive for postnasal drip.         Seasonal allergies    Eyes: Negative.    Respiratory: Positive for cough.         Due to sinus drainage    Cardiovascular: Negative.    Gastrointestinal: Positive for abdominal distention, abdominal pain and constipation.   Endocrine: Negative.    Genitourinary: Negative.    Musculoskeletal:        OA and RA   Skin: Negative.    Allergic/Immunologic: Positive for environmental allergies.   Neurological: Negative.    Hematological: Negative.    Psychiatric/Behavioral: Negative.           Medicines reviewed by Brittny Pierson RN on 7/28/2021 at  1:39 PM  Medicines reviewed by Brittny Pierson RN on 7/28/2021 at  1:40 PM  Allergies reviewed by Brittny Pierson RN on 7/28/2021 at  1:37 PM      ACTIVE PROBLEMS  Patient Active Problem List    Diagnosis    • Adhesion of abdominal wall [K66.0]    • Age-related osteoporosis without current pathological fracture [M81.0]    • Psoriasis [L40.9]    • Family history of colonic polyps [Z83.71]    • Routine health maintenance [Z00.00]     • Essential hypertension [I10]    • GERD (gastroesophageal reflux disease) [K21.9]    • Allergic rhinitis [J30.9]    • Primary insomnia [F51.01]    • Chronic constipation [K59.09]    • Migraines [G43.909]    • Depression with anxiety [F41.8]          PAST MEDICAL HISTORY  Past Medical History:   Diagnosis Date   • Abdominal pain    • Anxiety    • Anxiety    • Arthritis    • Constipation    • Depressed    • Depression    • Endometriosis    • Gastritis    • GERD (gastroesophageal reflux disease)    • Headache    • Headache, tension-type    • Hypertension    • Insomnia    • Migraine    • OP (osteoporosis)    • Poor sleep    • Psoriasis    • Seasonal allergies          SURGICAL HISTORY  Past Surgical History:   Procedure Laterality Date   • APPENDECTOMY     • CHOLECYSTECTOMY N/A 8/17/2018    Procedure: CHOLECYSTECTOMY LAPAROSCOPIC converted to open procedure;  Surgeon: Hernan Hinds MD;  Location: [facility] OR;  Service: General   • COLON SURGERY     • COLONOSCOPY     • COLONOSCOPY N/A 12/12/2018    Procedure: COLONOSCOPY;  Surgeon: Darien Ruff MD;  Location: [facility] OR;  Service: Gastroenterology   • DIAGNOSTIC LAPAROSCOPY  1990   • DILATATION AND CURETTAGE  1985   • ENDOSCOPY N/A 5/13/2016    Procedure: ESOPHAGOGASTRODUODENOSCOPY ;  Surgeon: Darien Ruff MD;  Location: [facility] OR;  Service:    • HYSTERECTOMY     • REVISION / TAKEDOWN COLOSTOMY           FAMILY HISTORY  Family History   Problem Relation Age of Onset   • Hyperlipidemia Mother    • Macular degeneration Mother    • Heart disease Father    • Hyperlipidemia Father    • Cancer Father    • Depression Father    • Depression Sister    • Hyperlipidemia Brother    • Depression Brother    • Breast cancer Maternal Aunt    • Breast cancer Cousin    • Breast cancer Cousin    • Colon cancer Neg Hx    • Colon polyps Neg Hx          SOCIAL HISTORY  Social History     Occupational History   • Not on file   Tobacco Use   • Smoking status:  Never Smoker   • Smokeless tobacco: Never Used   Vaping Use   • Vaping Use: Never used   Substance and Sexual Activity   • Alcohol use: Yes     Comment: rare   • Drug use: No   • Sexual activity: Defer         LAST RESULTS  See also labs reviewed above.   Lab on 12/14/2020   Component Date Value Ref Range Status   • H. pylori IgG 12/14/2020 0.56  0.00 - 0.79 Index Value Final                                 Negative           <0.80                               Equivocal    0.80 - 0.89                               Positive           >0.89   • H. pylori, IgA ABS 12/14/2020 <9.0  0.0 - 8.9 units Final                                    Negative          <9.0                                  Equivocal   9.0 - 11.0                                  Positive         >11.0   • H. Pylori, IgM 12/14/2020 <9.0  0.0 - 8.9 units Final                                    Negative          <9.0                                  Equivocal   9.0 - 11.0                                  Positive         >11.0  This test was developed and its performance characteristics  determined by [facility]. It has not been cleared or approved  by the Food and Drug Administration.   • WBC 03/09/2021 3.57  3.40 - 10.80 10*3/mm3 Final   • RBC 03/09/2021 4.18  3.77 - 5.28 10*6/mm3 Final   • Hemoglobin 03/09/2021 12.7  12.0 - 15.9 g/dL Final   • Hematocrit 03/09/2021 38.2  34.0 - 46.6 % Final   • MCV 03/09/2021 91.4  79.0 - 97.0 fL Final   • MCH 03/09/2021 30.4  26.6 - 33.0 pg Final   • MCHC 03/09/2021 33.2  31.5 - 35.7 g/dL Final   • RDW 03/09/2021 11.9* 12.3 - 15.4 % Final   • Platelets 03/09/2021 285  140 - 450 10*3/mm3 Final   • Neutrophil Rel % 03/09/2021 70.3  42.7 - 76.0 % Final   • Lymphocyte Rel % 03/09/2021 20.2  19.6 - 45.3 % Final   • Monocyte Rel % 03/09/2021 8.1  5.0 - 12.0 % Final   • Eosinophil Rel % 03/09/2021 0.6  0.3 - 6.2 % Final   • Basophil Rel % 03/09/2021 0.8  0.0 - 1.5 % Final   • Neutrophils Absolute 03/09/2021 2.51  1.70 - 7.00  10*3/mm3 Final   • Lymphocytes Absolute 03/09/2021 0.72  0.70 - 3.10 10*3/mm3 Final   • Monocytes Absolute 03/09/2021 0.29  0.10 - 0.90 10*3/mm3 Final   • Eosinophils Absolute 03/09/2021 0.02  0.00 - 0.40 10*3/mm3 Final   • Basophils Absolute 03/09/2021 0.03  0.00 - 0.20 10*3/mm3 Final   • Immature Granulocyte Rel % 03/09/2021 0.0  0.0 - 0.5 % Final   • Immature Grans Absolute 03/09/2021 0.00  0.00 - 0.05 10*3/mm3 Final   • nRBC 03/09/2021 0.0  0.0 - 0.2 /100 WBC Final   • Glucose 03/09/2021 75  65 - 99 mg/dL Final   • BUN 03/09/2021 10  8 - 23 mg/dL Final   • Creatinine 03/09/2021 0.83  0.57 - 1.00 mg/dL Final   • eGFR Non  Am 03/09/2021 69  >60 mL/min/1.73 Final    Comment: GFR Normal >60  Chronic Kidney Disease <60  Kidney Failure <15     • eGFR  Am 03/09/2021 84  >60 mL/min/1.73 Final   • BUN/Creatinine Ratio 03/09/2021 12.0  7.0 - 25.0 Final   • Sodium 03/09/2021 135* 136 - 145 mmol/L Final   • Potassium 03/09/2021 4.9  3.5 - 5.2 mmol/L Final   • Chloride 03/09/2021 97* 98 - 107 mmol/L Final   • Total CO2 03/09/2021 28.6  22.0 - 29.0 mmol/L Final   • Calcium 03/09/2021 9.6  8.6 - 10.5 mg/dL Final   • Total Protein 03/09/2021 6.8  6.0 - 8.5 g/dL Final   • Albumin 03/09/2021 4.20  3.50 - 5.20 g/dL Final   • Globulin 03/09/2021 2.6  gm/dL Final   • A/G Ratio 03/09/2021 1.6  g/dL Final   • Total Bilirubin 03/09/2021 0.6  0.0 - 1.2 mg/dL Final   • Alkaline Phosphatase 03/09/2021 124* 39 - 117 U/L Final   • AST (SGOT) 03/09/2021 24  1 - 32 U/L Final   • ALT (SGPT) 03/09/2021 14  1 - 33 U/L Final   • Total Cholesterol 03/09/2021 186  0 - 200 mg/dL Final    Comment: Cholesterol Reference Ranges  (U.S. Department of Health and Human Services ATP III  Classifications)  Desirable          <200 mg/dL  Borderline High    200-239 mg/dL  High Risk          >240 mg/dL  Triglyceride Reference Ranges  (U.S. Department of Health and Human Services ATP III  Classifications)  Normal           <150 mg/dL  Borderline  High  150-199 mg/dL  High             200-499 mg/dL  Very High        >500 mg/dL  HDL Reference Ranges  (U.S. Department of Health and Human Services ATP III  Classifcations)  Low     <40 mg/dl (major risk factor for CHD)  High    >60 mg/dl ('negative' risk factor for CHD)  LDL Reference Ranges  (U.S. Department of Health and Human Services ATP III  Classifcations)  Optimal          <100 mg/dL  Near Optimal     100-129 mg/dL  Borderline High  130-159 mg/dL  High             160-189 mg/dL  Very High        >189 mg/dL     • Triglycerides 03/09/2021 95  0 - 150 mg/dL Final   • HDL Cholesterol 03/09/2021 65* 40 - 60 mg/dL Final   • VLDL Cholesterol Jad 03/09/2021 17  5 - 40 mg/dL Final   • LDL Chol Calc ([facility]) 03/09/2021 104* 0 - 100 mg/dL Final   • Chol/HDL Ratio 03/09/2021 2.86   Final   • TSH 03/09/2021 2.770  0.270 - 4.200 uIU/mL Final   • 25 Hydroxy, Vitamin D 03/09/2021 57.2  30.0 - 100.0 ng/ml Final    Comment: Results may be falsely increased if patient taking Biotin.  Reference Range for Total Vitamin D 25(OH)  Deficiency <20.0 ng/mL  Insufficiency 21-29 ng/mL  Sufficiency  ng/mL  Toxicity >100 ng/ml     • Vitamin B-12 03/09/2021 898  211 - 946 pg/mL Final    Results may be falsely increased if patient taking Biotin.     DEXA Bone Density Axial    Result Date: 7/12/2021  Narrative: DXA BONE MINERAL DENSITY MEASUREMENT, 07/12/2021  INDICATION: 65-year-old postmenopausal female presenting for screening. Previous diagnosis of osteoporosis. She is on daily calcium supplement and multivitamin with vitamin D. HISTORY of oophorectomy.  TECHNIQUE: DXA bone mineral density measurements were obtained at the lumbar spine and left hip.  FINDINGS: Bone mineral density in the lumbar spine measures 0.586 g/sq cm corresponding with a T score of -4.2 and Z score of -2.4. This is in the osteoporotic range and represents a change of -22.5% from the baseline study and -6% from the prior study.  Bone mineral density in the  "left femur measures a total of 0.631 g/sq cm corresponding with a T score of -2.6 and Z score of -1.3. This is in the osteoporotic range and represents a change of -10.2% from the baseline study and -1.5% from the prior study.      Impression: 1. Bone mineral density is in the osteoporotic range, placing the patient at highly increased risk for fracture. 2. FRAX: *  Not reported. Some T-scores at or below -2.5.  This report was finalized on 7/12/2021 1:30 PM by Dr. Ruperto Jacobson MD.        PHYSICAL EXAM  Debilities/Disabilities Identified: None  Emotional Behavior: Appropriate  65 y.o. female  Wt Readings from Last 3 Encounters:   07/28/21 48.4 kg (106 lb 9.6 oz)   06/17/21 48.1 kg (106 lb)   05/24/21 48.9 kg (107 lb 12.8 oz)     Ht Readings from Last 1 Encounters:   07/28/21 154.9 cm (60.98\")     Body mass index is 20.16 kg/m².  Vitals:    07/28/21 1335   BP: 108/64   BP Location: Right arm   Patient Position: Sitting   Cuff Size: Adult   Weight: 48.4 kg (106 lb 9.6 oz)   Height: 154.9 cm (60.98\")     Vital signs reviewed.          Physical Exam  Vitals and nursing note reviewed.   Constitutional:       Appearance: She is well-developed.   HENT:      Head: Normocephalic and atraumatic.   Eyes:      Conjunctiva/sclera: Conjunctivae normal.      Pupils: Pupils are equal, round, and reactive to light.   Cardiovascular:      Rate and Rhythm: Normal rate and regular rhythm.   Pulmonary:      Effort: Pulmonary effort is normal.      Breath sounds: Normal breath sounds.   Abdominal:      General: Bowel sounds are normal. There is no distension.      Palpations: Abdomen is soft.      Tenderness: There is abdominal tenderness in the right upper quadrant, right lower quadrant, epigastric area, periumbilical area, suprapubic area and left lower quadrant.      Comments: abd tenderness is much improved throughout today   Musculoskeletal:         General: Normal range of motion.      Cervical back: Normal range of motion and " "neck supple.   Lymphadenopathy:      Cervical: No cervical adenopathy.   Skin:     General: Skin is warm and dry.   Neurological:      Mental Status: She is alert and oriented to person, place, and time.   Psychiatric:         Behavior: Behavior normal.           CLINICAL DATA REVIEWED   reviewed previous lab results and integrated with today's visit, reviewed notes from other physicians and/or last GI encounter, reviewed previous endoscopy results and available photos, reviewed surgical pathology results from previous biopsies      ASSESSMENT  There are no diagnoses linked to this encounter.      PLAN  No follow-ups on file.     Use fiber aim for >20gm a day  Miralax 1 heaping capful to balance your constipation.      Discussed the importance of taking Omeprazole/Prilosec, 40mg twice daily before breakfast and dinner permanently due to known risk with long term acid reflux of Goodwin's esophagus/esophageal cancer., In addition, take famotidine (Pepcid) 40mg twice a day. would be glad to switch to zantac if it comes back out.      You may use mylanta for breakthrough HB.    You may still use the Bentyl as needed for severe abd cramping.  Try daily kegel exercises.       Low Acid Diet Instructions:   Don't eat late, don't eat fried or spicy, and don't eat too much at one time. Avoid alcohol and  tomato based products like pizza, lasagna, spaghetti.  If you want to have these take an over the counter Pepcid or TUMS immediately before you eat them.  Do not eat within 3-4 hrs of bedtime. Limit caffeine and carbonated beverages, if you must have them do not have later in the day.  If reflux is severe elevate the head of the bed. You may also use TUMS, maalox, or mylanta for breakthrough heartburn.     Take a daily probiotic (something with a billion cultures and more different strains is better, examples like \"Probiotic 10\" or probiotic gummies) for your gut as well.  AVOID taking NSAIDS (like ibuprofen, Aleve, Motrin, " naproxen, meloxicam, etc) as much as possible and use acetaminophen (Tylenol) instead.     Drink lots of water, eat a high fiber diet with 20gm a day(check the fiber content in the foods you eat.  Protein bars with 15gm of fiber in each bar are a great supplement daily), and get regular exercise. Use over the counter simethicone xtra strength gas x (125-180mg) 2 twice a day as needed for gas.      High Fiber Food suggestions:  Beans, nuts, vegetables, whole grains, flax seed and kip seeds (which can be stirred into other food) are high in fiber.     Lopez Protein bars from [employer] = 10gm of fiber in each bar (also gluten free)  Quest Protein bars from grocery stores [employer], [employer], [employer]= 15gm of fiber each (also gluten free)  Fiber One bars from grocery stores = 5-6gm of fiber each  Kelloggs Bran Buds cereal 1/2 cup = 17gm of fiber  [employer] brand low sugar Craisins = 13gm of fiber per serving  [employer] Fiberwise powder=15gm fiber per serving (2 scoops)  CarbBalance tortillas= 15gm of fiber each  Natural Ovens Keto-Friendly Bread found at [employer]=12gm fiber per serving  No Jad vegan bar at [employer]=15gm fiber per serving    I have discussed the above plan with the patient.  They verbalize understanding and are in agreement with the plan.  They have been advised to contact the office for any questions, concerns, or changes related to their health.    31 min spent with patient today >50% spent counseling about natural history and expected course of assessed complaint and reviewed treatment options that have been tried and not tried and those currently available. Questions answered.

## 2021-07-28 NOTE — PATIENT INSTRUCTIONS
"Use fiber aim for >20gm a day  Miralax 1 heaping capful to balance your constipation.      Discussed the importance of taking Omeprazole/Prilosec, 40mg twice daily before breakfast and dinner permanently due to known risk with long term acid reflux of Goodwin's esophagus/esophageal cancer., In addition, take famotidine (Pepcid) 40mg twice a day. would be glad to switch to zantac if it comes back out.      You may use mylanta for breakthrough HB.    You may still use the Bentyl as needed for severe abd cramping.  Try daily kegel exercises.       Low Acid Diet Instructions:   Don't eat late, don't eat fried or spicy, and don't eat too much at one time. Avoid alcohol and  tomato based products like pizza, lasagna, spaghetti.  If you want to have these take an over the counter Pepcid or TUMS immediately before you eat them.  Do not eat within 3-4 hrs of bedtime. Limit caffeine and carbonated beverages, if you must have them do not have later in the day.  If reflux is severe elevate the head of the bed. You may also use TUMS, maalox, or mylanta for breakthrough heartburn.     Take a daily probiotic (something with a billion cultures and more different strains is better, examples like \"Probiotic 10\" or probiotic gummies) for your gut as well.  AVOID taking NSAIDS (like ibuprofen, Aleve, Motrin, naproxen, meloxicam, etc) as much as possible and use acetaminophen (Tylenol) instead.     Drink lots of water, eat a high fiber diet with 20gm a day(check the fiber content in the foods you eat.  Protein bars with 15gm of fiber in each bar are a great supplement daily), and get regular exercise. Use over the counter simethicone xtra strength gas x (125-180mg) 2 twice a day as needed for gas.      High Fiber Food suggestions:  Beans, nuts, vegetables, whole grains, flax seed and kip seeds (which can be stirred into other food) are high in fiber.     Lopez Protein bars from [employer] = 10gm of fiber in each bar (also gluten " free)  Quest Protein bars from grocery stores [employer], [employer], [employer]= 15gm of fiber each (also gluten free)  Fiber One bars from grocery stores = 5-6gm of fiber each  Kelloggs Bran Buds cereal 1/2 cup = 17gm of fiber  Kroger brand low sugar Craisins = 13gm of fiber per serving  Melalueca Fiberwise powder=15gm fiber per serving (2 scoops)  CarbBalance tortillas= 15gm of fiber each  Natural Ovens Keto-Friendly Bread found at [employer]=12gm fiber per serving  No Jad vegan bar at [facility]=15gm fiber per serving      Kegel Exercises    Kegel exercises can help strengthen your pelvic floor muscles. The pelvic floor is a group of muscles that support your rectum, small intestine, and bladder. In females, pelvic floor muscles also help support the womb (uterus). These muscles help you control the flow of urine and stool.  Kegel exercises are painless and simple, and they do not require any equipment. Your provider may suggest Kegel exercises to:  · Improve bladder and bowel control.  · Improve sexual response.  · Improve weak pelvic floor muscles after surgery to remove the uterus (hysterectomy) or pregnancy (females).  · Improve weak pelvic floor muscles after prostate gland removal or surgery (males).  Kegel exercises involve squeezing your pelvic floor muscles, which are the same muscles you squeeze when you try to stop the flow of urine or keep from passing gas. The exercises can be done while sitting, standing, or lying down, but it is best to vary your position.  Exercises  How to do Kegel exercises:  1. Squeeze your pelvic floor muscles tight. You should feel a tight lift in your rectal area. If you are a female, you should also feel a tightness in your vaginal area. Keep your stomach, buttocks, and legs relaxed.  2. Hold the muscles tight for up to 10 seconds.  3. Breathe normally.  4. Relax your muscles.  5. Repeat as told by your health care provider.  Repeat this exercise daily as told by your health care provider.  Continue to do this exercise for at least 4-6 weeks, or for as long as told by your health care provider.  You may be referred to a physical therapist who can help you learn more about how to do Kegel exercises.  Depending on your condition, your health care provider may recommend:  · Varying how long you squeeze your muscles.  · Doing several sets of exercises every day.  · Doing exercises for several weeks.  · Making Kegel exercises a part of your regular exercise routine.  This information is not intended to replace advice given to you by your health care provider. Make sure you discuss any questions you have with your health care provider.  Document Revised: 04/23/2021 Document Reviewed: 08/07/2019  Elsevier Patient Education © 2021 [employer] Inc.

## 2021-08-23 DIAGNOSIS — F41.8 DEPRESSION WITH ANXIETY: ICD-10-CM

## 2021-08-23 RX ORDER — ALPRAZOLAM 0.5 MG/1
0.5 TABLET ORAL 2 TIMES DAILY
Qty: 30 TABLET | Refills: 0 | Status: SHIPPED | OUTPATIENT
Start: 2021-08-23 | End: 2021-09-22

## 2021-09-22 ENCOUNTER — OFFICE VISIT (OUTPATIENT)
Dept: INTERNAL MEDICINE | Facility: CLINIC | Age: 66
End: 2021-09-22

## 2021-09-22 VITALS
BODY MASS INDEX: 19.98 KG/M2 | DIASTOLIC BLOOD PRESSURE: 62 MMHG | TEMPERATURE: 98 F | WEIGHT: 105.8 LBS | OXYGEN SATURATION: 98 % | SYSTOLIC BLOOD PRESSURE: 100 MMHG | HEIGHT: 61 IN | RESPIRATION RATE: 16 BRPM | HEART RATE: 73 BPM

## 2021-09-22 DIAGNOSIS — G89.29 CHRONIC ABDOMINAL PAIN: Primary | ICD-10-CM

## 2021-09-22 DIAGNOSIS — J30.89 NON-SEASONAL ALLERGIC RHINITIS, UNSPECIFIED TRIGGER: ICD-10-CM

## 2021-09-22 DIAGNOSIS — E87.1 HYPONATREMIA: ICD-10-CM

## 2021-09-22 DIAGNOSIS — K21.9 GASTROESOPHAGEAL REFLUX DISEASE WITHOUT ESOPHAGITIS: ICD-10-CM

## 2021-09-22 DIAGNOSIS — F51.01 PRIMARY INSOMNIA: ICD-10-CM

## 2021-09-22 DIAGNOSIS — Z00.00 ROUTINE HEALTH MAINTENANCE: ICD-10-CM

## 2021-09-22 DIAGNOSIS — R10.9 CHRONIC ABDOMINAL PAIN: Primary | ICD-10-CM

## 2021-09-22 DIAGNOSIS — M81.0 AGE-RELATED OSTEOPOROSIS WITHOUT CURRENT PATHOLOGICAL FRACTURE: ICD-10-CM

## 2021-09-22 DIAGNOSIS — I10 ESSENTIAL HYPERTENSION: ICD-10-CM

## 2021-09-22 DIAGNOSIS — K59.09 CHRONIC CONSTIPATION: ICD-10-CM

## 2021-09-22 DIAGNOSIS — F41.8 DEPRESSION WITH ANXIETY: ICD-10-CM

## 2021-09-22 DIAGNOSIS — Z83.71 FAMILY HISTORY OF COLONIC POLYPS: ICD-10-CM

## 2021-09-22 DIAGNOSIS — K66.0 ADHESION OF ABDOMINAL WALL: ICD-10-CM

## 2021-09-22 DIAGNOSIS — Z23 NEED FOR INFLUENZA VACCINATION: ICD-10-CM

## 2021-09-22 DIAGNOSIS — G43.909 MIGRAINE WITHOUT STATUS MIGRAINOSUS, NOT INTRACTABLE, UNSPECIFIED MIGRAINE TYPE: ICD-10-CM

## 2021-09-22 PROCEDURE — 99214 OFFICE O/P EST MOD 30 MIN: CPT | Performed by: FAMILY MEDICINE

## 2021-09-22 PROCEDURE — 90686 IIV4 VACC NO PRSV 0.5 ML IM: CPT | Performed by: FAMILY MEDICINE

## 2021-09-22 PROCEDURE — G0008 ADMIN INFLUENZA VIRUS VAC: HCPCS | Performed by: FAMILY MEDICINE

## 2021-09-22 RX ORDER — PAROXETINE HYDROCHLORIDE 40 MG/1
40 TABLET, FILM COATED ORAL EVERY MORNING
Qty: 30 TABLET | Refills: 5 | Status: SHIPPED | OUTPATIENT
Start: 2021-09-22 | End: 2022-03-23

## 2021-09-22 RX ORDER — ALPRAZOLAM 0.5 MG/1
TABLET ORAL
Qty: 45 TABLET | Refills: 2 | Status: SHIPPED | OUTPATIENT
Start: 2021-09-22 | End: 2022-02-23

## 2021-09-22 NOTE — TELEPHONE ENCOUNTER
Rx Refill Note  Requested Prescriptions     Signed Prescriptions Disp Refills    ALPRAZolam (XANAX) 0.5 MG tablet 45 tablet 2     Sig: Take 1 tablet by mouth twice daily     Authorizing Provider: CHANEL CLARK      Last office visit with prescribing clinician: 9/22/2021      Next office visit with prescribing clinician: 12/13/2021            Daniela Zhu MA  09/22/21, 13:30 EDT

## 2021-09-22 NOTE — PROGRESS NOTES
Subjective   Subjective     Martina Hardwick is a 65 y.o. female, who presents with a chief complaint of   Chief Complaint   Patient presents with   • Labs Only     follow up on labs    • Abdominal Pain   • Hypertension   • Anxiety     Hypertension  Associated symptoms include anxiety and headaches.   Heartburn  She complains of abdominal pain.   Anxiety  Symptoms include insomnia.     Her past medical history is significant for depression.   DepressionPatient presents with the following symptoms: insomnia.    Osteoporosis  Associated symptoms include abdominal pain, arthralgias and headaches.   Headache   Associated symptoms include abdominal pain and insomnia.   Insomnia  Associated symptoms include abdominal pain, arthralgias and headaches.     1. HTN.  Tolerates medication.  Takes lisinopril 30 mg BID and amlodipine 2.5 mg daily.  Home blood pressures running 105-135/60s-70s.   Denies dizziness and chest pain.    2. Depression and anxiety.  Pt reports an increase in her anxiety due to life stress.  She takes paroxetine and prn alprazolam.  Denies SI.    3. Headaches.  She takes sumatriptan for abortive treatment and this is effective.    4. Chronic constipation, chronic abdominal pain.  She takes Miralax and fiber.  Dr. Ruff prescribed dicyclomine for pain and she doesn't think this is effective.  Dr. Ruff has referred her to Dr. Rizzo for pelvic floor prolapse to see if this is playing a role in her pain.  She also has a history of multiple abdominal surgeries and likely has adhesions.  She would like to see her surgeon, Dr. Hinds.    The following portions of the patient's history were reviewed and updated as appropriate: allergies, current medications, past family history, past medical history, past social history, past surgical history and problem list.    Allergies: Hydrocodone and Sulfa antibiotics    Review of Systems   Constitutional: Negative.    Eyes: Negative.    Respiratory: Negative.     Cardiovascular: Negative.    Gastrointestinal: Positive for abdominal pain and constipation.   Endocrine: Negative.    Genitourinary: Negative.    Musculoskeletal: Positive for arthralgias.   Allergic/Immunologic: Positive for environmental allergies.   Neurological: Positive for headaches.   Hematological: Negative.    Psychiatric/Behavioral: The patient has insomnia.      Objective     Wt Readings from Last 3 Encounters:   09/22/21 48 kg (105 lb 12.8 oz)   07/28/21 48.4 kg (106 lb 9.6 oz)   06/17/21 48.1 kg (106 lb)     Temp Readings from Last 3 Encounters:   09/22/21 98 °F (36.7 °C) (Temporal)   06/17/21 96.6 °F (35.9 °C) (Temporal)   04/21/21 97.8 °F (36.6 °C) (Temporal)     BP Readings from Last 3 Encounters:   09/22/21 100/62   07/28/21 108/64   06/17/21 110/70     Pulse Readings from Last 3 Encounters:   09/22/21 73   06/17/21 90   03/15/21 66     Body mass index is 20 kg/m².  SpO2 Readings from Last 3 Encounters:   01/15/18 96%   10/12/17 98%   08/10/17 99%     Physical Exam   Constitutional: She is oriented to person, place, and time. She appears well-developed.   HENT:   Head: Normocephalic and atraumatic.   Mouth/Throat: Mucous membranes are moist.   Eyes: Conjunctivae are normal.   Neck: No thyromegaly present.   Cardiovascular: Normal rate, regular rhythm and normal heart sounds.   Pulmonary/Chest: Effort normal and breath sounds normal.   Abdominal: Soft. Normal appearance and bowel sounds are normal.   Musculoskeletal: Normal range of motion.      Right lower leg: No edema.      Left lower leg: No edema.   Neurological: She is alert and oriented to person, place, and time.   Skin: Skin is warm and dry. No rash noted.   Psychiatric: Her behavior is normal. Mood normal.   Nursing note and vitals reviewed.      Assessment/Plan   Martina was seen today for hypertension, insomnia and heartburn.    Diagnoses and all orders for this visit:        Essential hypertension    Routine health  maintenance    Migraine without status migrainosus, not intractable, unspecified migraine type    Gastroesophageal reflux disease without esophagitis    Depression with anxiety    Primary insomnia    Chronic constipation    Chronic abdominal pain    Adhesions    Family history of colonic polyps    Non-seasonal allergic rhinitis, unspecified trigger    Osteoporosis    Hyponatremia    1. HTN.  Controlled.  Continue lisinopril 30 mg BID and amlodipine 2.5 mg daily.  Labs reviewed.    2. Routine health maint.  Mammogram UTD.  Colonoscopy UTD.  Hysterectomy done for non-cancerous reasons.  Covid-19 vaccines done.  Pneumovax UTD.  Shingrix at pharmacy.  Flu vaccine today.    3. Migraines.  Doing well with these.  Continue sumatriptan for abortive treatment.    4. GERD.  Continue omeprazole, famotidine, and lifestyle measures.  She sees GI.    5. Depression with anxiety.  Not optimally controlled.  Increase paroxetine from 30 mg to 40 mg daily.  On prn alprazolam.  Med management agreement and Joby UTD.    6. Insomnia.  Zolpidem is effective.  Med management agreement and Joby UTD.    7. Chronic constipation.  On daily Miralax and probiotic.  Having daily bowel movements.  Working with Dr. Ruff/Michelle Law on this.    8. Chronic abdominal pain/history of adhesions.  She has an appointment pending in Nov with urogyn re bladder prolapse seen on CT.  We'll have her see Dr. Hinds, who has performed abdominal surgeries on her, to weigh in on whether adhesions could be contributing.    8. LORRIE.  Controlled with fluticasone nasal spray and loratadine.    9. Osteoporosis.  Didn't tolerate bisphosphonates previously (Dr. Ghotra).  She wanted to wait until she turns 65 to further pursue medication.  Continue calcium, vitamin D and weight-bearing exercise.  Discuss ordering Prolia next time.  We need to get a handle on her abdominal pain before adding more medication into the mix.    10. Hyponatremia.  Chronic, mild.   Monitor.    Outpatient Medications Prior to Visit   Medication Sig Dispense Refill   • amLODIPine (NORVASC) 2.5 MG tablet Take 1 tablet by mouth Daily. 90 tablet 3   • Calcium-Vitamin D-Vitamin K (VIACTIV CALCIUM PLUS D PO) Take 1 tablet by mouth Daily.     • famotidine (PEPCID) 40 MG tablet Take 1 tablet by mouth 2 (Two) Times a Day. With lunch and at bedtime 180 tablet 3   • fluticasone (FLONASE) 50 MCG/ACT nasal spray 2 sprays into each nostril daily.     • lisinopril (PRINIVIL,ZESTRIL) 30 MG tablet Take 1 tablet by mouth 2 (Two) Times a Day. 180 tablet 3   • loratadine (CLARITIN) 10 MG tablet Take 10 mg by mouth daily.     • omeprazole (priLOSEC) 40 MG capsule Take 1 capsule by mouth 2 (two) times a day. 180 capsule 3   • polyethylene glycol (MIRALAX) packet Take 17 g by mouth daily.     • Probiotic Product (PROBIOTIC DAILY) capsule Once daily (Patient taking differently: Take 2 capsules by mouth Daily. Once daily )     • Simethicone 250 MG capsule Take 1 capsule by mouth 4 (Four) Times a Day As Needed (gas and bloating). 360 capsule 3   • SUMAtriptan (IMITREX) 100 MG tablet Take 1 tablet by mouth 1 (One) Time As Needed for Migraine for up to 1 dose. Take one tablet at onset of headache. May repeat dose one time in 2 hours. 27 tablet 3   • vitamin B-12 (CYANOCOBALAMIN) 2500 MCG sublingual tablet tablet Place 2,500 mcg under the tongue Daily.     • zolpidem (AMBIEN) 10 MG tablet TAKE ONE TABLET BY MOUTH ONCE NIGHTLY 90 tablet 1   • ALPRAZolam (XANAX) 0.5 MG tablet Take 1 tablet by mouth 2 (Two) Times a Day. 30 tablet 0   • PARoxetine (PAXIL) 30 MG tablet Take 1 tablet by mouth Every Morning. 90 tablet 3   • betamethasone valerate (VALISONE) 0.1 % ointment Apply  topically to the appropriate area as directed 2 (Two) Times a Day. 30 g 1   • dicyclomine (BENTYL) 20 MG tablet TAKE 1 TABLET BY MOUTH 4 TIMES DAILY BEFORE MEALS AND AT BEDTIME AS NEEDED FOR CRAMPING     • Phenergan 25 MG suppository Insert 1 suppository  into the rectum Every 6 (Six) Hours As Needed for Nausea or Vomiting. 6 suppository 3     No facility-administered medications prior to visit.     New Medications Ordered This Visit   Medications   • PARoxetine (PAXIL) 40 MG tablet     Sig: Take 1 tablet by mouth Every Morning.     Dispense:  30 tablet     Refill:  5     [unfilled]  Medications Discontinued During This Encounter   Medication Reason   • PARoxetine (PAXIL) 30 MG tablet Reorder     Return in about 3 months (around 12/22/2021).

## 2021-09-29 ENCOUNTER — OFFICE VISIT (OUTPATIENT)
Dept: SURGERY | Facility: CLINIC | Age: 66
End: 2021-09-29

## 2021-09-29 VITALS
OXYGEN SATURATION: 100 % | HEART RATE: 86 BPM | HEIGHT: 61 IN | TEMPERATURE: 97.4 F | SYSTOLIC BLOOD PRESSURE: 110 MMHG | DIASTOLIC BLOOD PRESSURE: 72 MMHG | BODY MASS INDEX: 19.98 KG/M2 | RESPIRATION RATE: 18 BRPM | WEIGHT: 105.8 LBS

## 2021-09-29 DIAGNOSIS — R10.84 GENERALIZED ABDOMINAL PAIN: Primary | ICD-10-CM

## 2021-09-29 PROCEDURE — 99203 OFFICE O/P NEW LOW 30 MIN: CPT | Performed by: SURGERY

## 2021-09-29 NOTE — PROGRESS NOTES
PATIENT INFORMATION  Martina Hardwick       - 1955    CHIEF COMPLAINT  No chief complaint on file.      HISTORY OF PRESENT ILLNESS  HPI she complains of diffuse abdominal pain.  She says it is severe she denies any nausea.  She has been seeing Dr. Ruff for this and she is on multiple medications including MiraLAX for it she says she has hard stools.  She has had prior colonic obstruction due to endometriosis had a colostomy subsequent colostomy closure oophorectomy appendectomy lysis of adhesions she subsequently developed stenosis at the anastomosis and that was dilated.  She had a colonoscopy in 2018 that showed no recurrent stenosis.  She has had a CT scan of her abdomen that showed possible bladder prolapse.  She has been referred uro-GYN.  She says she has lost weight.        REVIEW OF SYSTEMS  Review of Systems all other organ systems reviewed and are negative other than above specifically she does not have any bladder leakage.      ACTIVE PROBLEMS  Patient Active Problem List    Diagnosis    • Chronic abdominal pain [R10.9, G89.29]    • Hyponatremia [E87.1]    • Adhesion of abdominal wall [K66.0]    • Age-related osteoporosis without current pathological fracture [M81.0]    • Psoriasis [L40.9]    • Family history of colonic polyps [Z83.71]    • Routine health maintenance [Z00.00]    • Essential hypertension [I10]    • GERD (gastroesophageal reflux disease) [K21.9]    • Allergic rhinitis [J30.9]    • Primary insomnia [F51.01]    • Chronic constipation [K59.09]    • Migraines [G43.909]    • Depression with anxiety [F41.8]          PAST MEDICAL HISTORY  Past Medical History:   Diagnosis Date   • Abdominal pain    • Anxiety    • Anxiety    • Arthritis    • Constipation    • Depressed    • Depression    • Endometriosis    • Gastritis    • GERD (gastroesophageal reflux disease)    • Headache    • Headache, tension-type    • Hypertension    • Insomnia    • Migraine    • OP (osteoporosis)    • Poor  sleep    • Psoriasis    • Seasonal allergies          SURGICAL HISTORY  Past Surgical History:   Procedure Laterality Date   • APPENDECTOMY     • CHOLECYSTECTOMY N/A 8/17/2018    Procedure: CHOLECYSTECTOMY LAPAROSCOPIC converted to open procedure;  Surgeon: Hernan Hinds MD;  Location: [facility] OR;  Service: General   • COLON SURGERY     • COLONOSCOPY     • COLONOSCOPY N/A 12/12/2018    Procedure: COLONOSCOPY;  Surgeon: Darien Ruff MD;  Location: [facility] OR;  Service: Gastroenterology   • DIAGNOSTIC LAPAROSCOPY  1990   • DILATATION AND CURETTAGE  1985   • ENDOSCOPY N/A 5/13/2016    Procedure: ESOPHAGOGASTRODUODENOSCOPY ;  Surgeon: Darien Ruff MD;  Location: [facility] OR;  Service:    • HYSTERECTOMY     • REVISION / TAKEDOWN COLOSTOMY           FAMILY HISTORY  Family History   Problem Relation Age of Onset   • Hyperlipidemia Mother    • Macular degeneration Mother    • Heart disease Father    • Hyperlipidemia Father    • Cancer Father    • Depression Father    • Depression Sister    • Hyperlipidemia Brother    • Depression Brother    • Breast cancer Maternal Aunt    • Breast cancer Cousin    • Breast cancer Cousin    • Colon cancer Neg Hx    • Colon polyps Neg Hx          SOCIAL HISTORY  Social History     Occupational History   • Not on file   Tobacco Use   • Smoking status: Never Smoker   • Smokeless tobacco: Never Used   Vaping Use   • Vaping Use: Never used   Substance and Sexual Activity   • Alcohol use: Yes     Comment: rare   • Drug use: No   • Sexual activity: Defer         CURRENT MEDICATIONS    Current Outpatient Medications:   •  ALPRAZolam (XANAX) 0.5 MG tablet, Take 1 tablet by mouth twice daily, Disp: 45 tablet, Rfl: 2  •  amLODIPine (NORVASC) 2.5 MG tablet, Take 1 tablet by mouth Daily., Disp: 90 tablet, Rfl: 3  •  betamethasone valerate (VALISONE) 0.1 % ointment, Apply  topically to the appropriate area as directed 2 (Two) Times a Day., Disp: 30 g, Rfl: 1  •   Calcium-Vitamin D-Vitamin K (VIACTIV CALCIUM PLUS D PO), Take 1 tablet by mouth Daily., Disp: , Rfl:   •  dicyclomine (BENTYL) 20 MG tablet, TAKE 1 TABLET BY MOUTH 4 TIMES DAILY BEFORE MEALS AND AT BEDTIME AS NEEDED FOR CRAMPING, Disp: , Rfl:   •  famotidine (PEPCID) 40 MG tablet, Take 1 tablet by mouth 2 (Two) Times a Day. With lunch and at bedtime, Disp: 180 tablet, Rfl: 3  •  fluticasone (FLONASE) 50 MCG/ACT nasal spray, 2 sprays into each nostril daily., Disp: , Rfl:   •  lisinopril (PRINIVIL,ZESTRIL) 30 MG tablet, Take 1 tablet by mouth 2 (Two) Times a Day., Disp: 180 tablet, Rfl: 3  •  loratadine (CLARITIN) 10 MG tablet, Take 10 mg by mouth daily., Disp: , Rfl:   •  omeprazole (priLOSEC) 40 MG capsule, Take 1 capsule by mouth 2 (two) times a day., Disp: 180 capsule, Rfl: 3  •  PARoxetine (PAXIL) 40 MG tablet, Take 1 tablet by mouth Every Morning., Disp: 30 tablet, Rfl: 5  •  Phenergan 25 MG suppository, Insert 1 suppository into the rectum Every 6 (Six) Hours As Needed for Nausea or Vomiting., Disp: 6 suppository, Rfl: 3  •  polyethylene glycol (MIRALAX) packet, Take 17 g by mouth daily., Disp: , Rfl:   •  Probiotic Product (PROBIOTIC DAILY) capsule, Once daily (Patient taking differently: Take 2 capsules by mouth Daily. Once daily ), Disp: , Rfl:   •  Simethicone 250 MG capsule, Take 1 capsule by mouth 4 (Four) Times a Day As Needed (gas and bloating)., Disp: 360 capsule, Rfl: 3  •  SUMAtriptan (IMITREX) 100 MG tablet, Take 1 tablet by mouth 1 (One) Time As Needed for Migraine for up to 1 dose. Take one tablet at onset of headache. May repeat dose one time in 2 hours., Disp: 27 tablet, Rfl: 3  •  vitamin B-12 (CYANOCOBALAMIN) 2500 MCG sublingual tablet tablet, Place 2,500 mcg under the tongue Daily., Disp: , Rfl:   •  zolpidem (AMBIEN) 10 MG tablet, TAKE ONE TABLET BY MOUTH ONCE NIGHTLY, Disp: 90 tablet, Rfl: 1    ALLERGIES  Hydrocodone and Sulfa antibiotics    VITALS  There were no vitals filed for this  visit.    LAST RESULTS   Results Encounter on 06/22/2021   Component Date Value Ref Range Status   • WBC 09/15/2021 4.44  3.40 - 10.80 10*3/mm3 Final   • RBC 09/15/2021 4.09  3.77 - 5.28 10*6/mm3 Final   • Hemoglobin 09/15/2021 12.5  12.0 - 15.9 g/dL Final   • Hematocrit 09/15/2021 36.7  34.0 - 46.6 % Final   • MCV 09/15/2021 89.7  79.0 - 97.0 fL Final   • MCH 09/15/2021 30.6  26.6 - 33.0 pg Final   • MCHC 09/15/2021 34.1  31.5 - 35.7 g/dL Final   • RDW 09/15/2021 11.7* 12.3 - 15.4 % Final   • Platelets 09/15/2021 276  140 - 450 10*3/mm3 Final   • Neutrophil Rel % 09/15/2021 69.9  42.7 - 76.0 % Final   • Lymphocyte Rel % 09/15/2021 18.5* 19.6 - 45.3 % Final   • Monocyte Rel % 09/15/2021 9.7  5.0 - 12.0 % Final   • Eosinophil Rel % 09/15/2021 0.5  0.3 - 6.2 % Final   • Basophil Rel % 09/15/2021 0.9  0.0 - 1.5 % Final   • Neutrophils Absolute 09/15/2021 3.11  1.70 - 7.00 10*3/mm3 Final   • Lymphocytes Absolute 09/15/2021 0.82  0.70 - 3.10 10*3/mm3 Final   • Monocytes Absolute 09/15/2021 0.43  0.10 - 0.90 10*3/mm3 Final   • Eosinophils Absolute 09/15/2021 0.02  0.00 - 0.40 10*3/mm3 Final   • Basophils Absolute 09/15/2021 0.04  0.00 - 0.20 10*3/mm3 Final   • Immature Granulocyte Rel % 09/15/2021 0.5  0.0 - 0.5 % Final   • Immature Grans Absolute 09/15/2021 0.02  0.00 - 0.05 10*3/mm3 Final   • nRBC 09/15/2021 0.0  0.0 - 0.2 /100 WBC Final   • Glucose 09/15/2021 73  65 - 99 mg/dL Final   • BUN 09/15/2021 10  8 - 23 mg/dL Final   • Creatinine 09/15/2021 0.94  0.57 - 1.00 mg/dL Final   • eGFR Non  Am 09/15/2021 60* >60 mL/min/1.73 Final    Comment: GFR Normal >60  Chronic Kidney Disease <60  Kidney Failure <15     • eGFR  Am 09/15/2021 72  >60 mL/min/1.73 Final   • BUN/Creatinine Ratio 09/15/2021 10.6  7.0 - 25.0 Final   • Sodium 09/15/2021 128* 136 - 145 mmol/L Final   • Potassium 09/15/2021 4.8  3.5 - 5.2 mmol/L Final   • Chloride 09/15/2021 92* 98 - 107 mmol/L Final   • Total CO2 09/15/2021 27.9  22.0 -  29.0 mmol/L Final   • Calcium 09/15/2021 9.6  8.6 - 10.5 mg/dL Final   • Total Protein 09/15/2021 6.8  6.0 - 8.5 g/dL Final   • Albumin 09/15/2021 4.50  3.50 - 5.20 g/dL Final   • Globulin 09/15/2021 2.3  gm/dL Final   • A/G Ratio 09/15/2021 2.0  g/dL Final   • Total Bilirubin 09/15/2021 0.6  0.0 - 1.2 mg/dL Final   • Alkaline Phosphatase 09/15/2021 114  39 - 117 U/L Final   • AST (SGOT) 09/15/2021 19  1 - 32 U/L Final   • ALT (SGPT) 09/15/2021 11  1 - 33 U/L Final   • Total Cholesterol 09/15/2021 190  0 - 200 mg/dL Final    Comment: Cholesterol Reference Ranges  (U.S. Department of Health and Human Services ATP III  Classifications)  Desirable          <200 mg/dL  Borderline High    200-239 mg/dL  High Risk          >240 mg/dL  Triglyceride Reference Ranges  (U.S. Department of Health and Human Services ATP III  Classifications)  Normal           <150 mg/dL  Borderline High  150-199 mg/dL  High             200-499 mg/dL  Very High        >500 mg/dL  HDL Reference Ranges  (U.S. Department of Health and Human Services ATP III  Classifcations)  Low     <40 mg/dl (major risk factor for CHD)  High    >60 mg/dl ('negative' risk factor for CHD)  LDL Reference Ranges  (U.S. Department of Health and Human Services ATP III  Classifcations)  Optimal          <100 mg/dL  Near Optimal     100-129 mg/dL  Borderline High  130-159 mg/dL  High             160-189 mg/dL  Very High        >189 mg/dL     • Triglycerides 09/15/2021 78  0 - 150 mg/dL Final   • HDL Cholesterol 09/15/2021 67* 40 - 60 mg/dL Final   • VLDL Cholesterol Jad 09/15/2021 14  5 - 40 mg/dL Final   • LDL Chol Calc ([facility]) 09/15/2021 109* 0 - 100 mg/dL Final   • Chol/HDL Ratio 09/15/2021 2.84   Final   • TSH 09/15/2021 2.440  0.270 - 4.200 uIU/mL Final   • Vitamin B-12 09/15/2021 672  211 - 946 pg/mL Final    Results may be falsely increased if patient taking Biotin.     No results found.    PHYSICAL EXAM  Debilities/Disabilities Identified: None  Emotional Behavior:  Appropriate  Physical Exam alert thin white female in no active distress.  Her abdomen is soft with mild subjective diffuse tenderness.  She has a midline scar without evidence of hernia.  There is no abdominal mass.  I reviewed my last operative note a laparoscopic converted to an open cholecystectomy and she had diffuse extensive intra-abdominal adhesions.  I reviewed the most recent GI note and the primary care note from Dr. Graay.  Both allude to the abdominal pain.  I reviewed her most recent CT scan of her abdomen myself and showed a large stool burden without any acute findings.    ASSESSMENT  Abdominal pain      PLAN  We will check a small bowel follow-through and a transit study.  I will see her back after the studies are complete

## 2021-09-30 ENCOUNTER — HOSPITAL ENCOUNTER (OUTPATIENT)
Dept: GENERAL RADIOLOGY | Facility: HOSPITAL | Age: 66
Discharge: HOME | End: 2021-09-30
Admitting: SURGERY

## 2021-09-30 DIAGNOSIS — R10.84 GENERALIZED ABDOMINAL PAIN: ICD-10-CM

## 2021-09-30 PROCEDURE — 74018 RADEX ABDOMEN 1 VIEW: CPT

## 2021-10-02 ENCOUNTER — HOSPITAL ENCOUNTER (OUTPATIENT)
Dept: GENERAL RADIOLOGY | Facility: HOSPITAL | Age: 66
Discharge: HOME | End: 2021-10-02

## 2021-10-02 DIAGNOSIS — R10.84 GENERALIZED ABDOMINAL PAIN: ICD-10-CM

## 2021-10-02 PROCEDURE — 74018 RADEX ABDOMEN 1 VIEW: CPT

## 2021-10-04 ENCOUNTER — HOSPITAL ENCOUNTER (OUTPATIENT)
Dept: GENERAL RADIOLOGY | Facility: HOSPITAL | Age: 66
Discharge: HOME | End: 2021-10-04
Admitting: SURGERY

## 2021-10-04 PROCEDURE — 74018 RADEX ABDOMEN 1 VIEW: CPT

## 2021-10-05 ENCOUNTER — TELEPHONE (OUTPATIENT)
Dept: SURGERY | Facility: CLINIC | Age: 66
End: 2021-10-05

## 2021-10-05 NOTE — TELEPHONE ENCOUNTER
Patient called and stated that she took the capsule and had 3 x-ray & is scheduled on 10/7 for the small bowel follow through. She states that she is now constipated and very uncomfortable, and wants to know if she can start taking her Miralax again or does she still have to wait? She sees you next week on Monday.

## 2021-10-07 ENCOUNTER — HOSPITAL ENCOUNTER (OUTPATIENT)
Dept: GENERAL RADIOLOGY | Facility: HOSPITAL | Age: 66
Discharge: HOME | End: 2021-10-07
Admitting: SURGERY

## 2021-10-07 DIAGNOSIS — R10.84 GENERALIZED ABDOMINAL PAIN: ICD-10-CM

## 2021-10-07 PROCEDURE — 74250 X-RAY XM SM INT 1CNTRST STD: CPT

## 2021-10-07 RX ADMIN — BARIUM SULFATE 240 ML: 960 POWDER, FOR SUSPENSION ORAL at 10:38

## 2021-10-08 ENCOUNTER — PATIENT ROUNDING (BHMG ONLY) (OUTPATIENT)
Dept: SURGERY | Facility: CLINIC | Age: 66
End: 2021-10-08

## 2021-10-08 DIAGNOSIS — F51.01 PRIMARY INSOMNIA: ICD-10-CM

## 2021-10-08 NOTE — PROGRESS NOTES
October 8, 2021    Hello, may I speak with Martina Hardwick?    My name is Kyle      I am  with [facility]AL [facility]  [address]  [place] [address] [phone].    Before we get started may I verify your date of birth? 1955    I am calling to officially welcome you to our practice and ask about your recent visit. Is this a good time to talk? yes    Tell me about your visit with us. What things went well?  Everything went well       We're always looking for ways to make our patients' experiences even better. Do you have recommendations on ways we may improve?  no    Overall were you satisfied with your first visit to our practice? yes       I appreciate you taking the time to speak with me today. Is there anything else I can do for you? no      Thank you, and have a great day.

## 2021-10-11 ENCOUNTER — OFFICE VISIT (OUTPATIENT)
Dept: SURGERY | Facility: CLINIC | Age: 66
End: 2021-10-11

## 2021-10-11 VITALS
DIASTOLIC BLOOD PRESSURE: 80 MMHG | SYSTOLIC BLOOD PRESSURE: 120 MMHG | WEIGHT: 105 LBS | HEIGHT: 61 IN | BODY MASS INDEX: 19.83 KG/M2

## 2021-10-11 DIAGNOSIS — K31.89: Primary | ICD-10-CM

## 2021-10-11 PROCEDURE — 99213 OFFICE O/P EST LOW 20 MIN: CPT | Performed by: SURGERY

## 2021-10-11 RX ORDER — ZOLPIDEM TARTRATE 10 MG/1
10 TABLET ORAL NIGHTLY
Qty: 90 TABLET | Refills: 1 | Status: SHIPPED | OUTPATIENT
Start: 2021-10-11 | End: 2022-04-07 | Stop reason: SDUPTHER

## 2021-10-11 NOTE — PROGRESS NOTES
PATIENT INFORMATION  Martina Hardwick       - 1955    CHIEF COMPLAINT  Chief Complaint   Patient presents with   • Follow-up     testing       HISTORY OF PRESENT ILLNESS  HPI she is here today for follow-up on her testing.  Since she is back on her MiraLAX she still has the diffuse abdominal pain.        REVIEW OF SYSTEMS  Review of Systems   Constitutional: Negative for activity change, chills, fever and unexpected weight change.   HENT: Negative for congestion.    Eyes: Negative for visual disturbance.   Respiratory: Negative for shortness of breath.    Cardiovascular: Negative for chest pain and palpitations.   Gastrointestinal: Negative for abdominal pain and blood in stool.   Endocrine: Negative for cold intolerance and heat intolerance.   Genitourinary: Negative for hematuria.   Musculoskeletal: Negative for gait problem.   Skin: Negative for color change.   Allergic/Immunologic: Negative for immunocompromised state.   Neurological: Negative for weakness and light-headedness.   Hematological: Negative for adenopathy.   Psychiatric/Behavioral: Negative for sleep disturbance. The patient is not nervous/anxious.          ACTIVE PROBLEMS  Patient Active Problem List    Diagnosis    • Chronic abdominal pain [R10.9, G89.29]    • Hyponatremia [E87.1]    • Adhesion of abdominal wall [K66.0]    • Age-related osteoporosis without current pathological fracture [M81.0]    • Psoriasis [L40.9]    • Family history of colonic polyps [Z83.71]    • Routine health maintenance [Z00.00]    • Essential hypertension [I10]    • GERD (gastroesophageal reflux disease) [K21.9]    • Allergic rhinitis [J30.9]    • Primary insomnia [F51.01]    • Chronic constipation [K59.09]    • Migraines [G43.909]    • Depression with anxiety [F41.8]          PAST MEDICAL HISTORY  Past Medical History:   Diagnosis Date   • Abdominal pain    • Anxiety    • Anxiety    • Arthritis    • Constipation    • Depressed    • Depression    • Endometriosis     • Gastritis    • GERD (gastroesophageal reflux disease)    • Headache    • Headache, tension-type    • Hypertension    • Insomnia    • Migraine    • OP (osteoporosis)    • Poor sleep    • Psoriasis    • Seasonal allergies          SURGICAL HISTORY  Past Surgical History:   Procedure Laterality Date   • APPENDECTOMY     • CHOLECYSTECTOMY N/A 8/17/2018    Procedure: CHOLECYSTECTOMY LAPAROSCOPIC converted to open procedure;  Surgeon: Hernan Hinds MD;  Location: [facility] OR;  Service: General   • COLON SURGERY     • COLONOSCOPY     • COLONOSCOPY N/A 12/12/2018    Procedure: COLONOSCOPY;  Surgeon: Darien Ruff MD;  Location: [facility] OR;  Service: Gastroenterology   • DIAGNOSTIC LAPAROSCOPY  1990   • DILATATION AND CURETTAGE  1985   • ENDOSCOPY N/A 5/13/2016    Procedure: ESOPHAGOGASTRODUODENOSCOPY ;  Surgeon: Darien Ruff MD;  Location: [facility] OR;  Service:    • HYSTERECTOMY     • REVISION / TAKEDOWN COLOSTOMY           FAMILY HISTORY  Family History   Problem Relation Age of Onset   • Hyperlipidemia Mother    • Macular degeneration Mother    • Heart disease Father    • Hyperlipidemia Father    • Cancer Father    • Depression Father    • Depression Sister    • Hyperlipidemia Brother    • Depression Brother    • Breast cancer Maternal Aunt    • Breast cancer Cousin    • Breast cancer Cousin    • Colon cancer Neg Hx    • Colon polyps Neg Hx          SOCIAL HISTORY  Social History     Occupational History   • Not on file   Tobacco Use   • Smoking status: Never Smoker   • Smokeless tobacco: Never Used   Vaping Use   • Vaping Use: Never used   Substance and Sexual Activity   • Alcohol use: Yes     Comment: rare   • Drug use: No   • Sexual activity: Defer         CURRENT MEDICATIONS    Current Outpatient Medications:   •  ALPRAZolam (XANAX) 0.5 MG tablet, Take 1 tablet by mouth twice daily, Disp: 45 tablet, Rfl: 2  •  amLODIPine (NORVASC) 2.5 MG tablet, Take 1 tablet by mouth Daily., Disp: 90  tablet, Rfl: 3  •  Calcium-Vitamin D-Vitamin K (VIACTIV CALCIUM PLUS D PO), Take 1 tablet by mouth Daily., Disp: , Rfl:   •  dicyclomine (BENTYL) 20 MG tablet, TAKE 1 TABLET BY MOUTH 4 TIMES DAILY BEFORE MEALS AND AT BEDTIME AS NEEDED FOR CRAMPING, Disp: , Rfl:   •  famotidine (PEPCID) 40 MG tablet, Take 1 tablet by mouth 2 (Two) Times a Day. With lunch and at bedtime, Disp: 180 tablet, Rfl: 3  •  fluticasone (FLONASE) 50 MCG/ACT nasal spray, 2 sprays into each nostril daily., Disp: , Rfl:   •  lisinopril (PRINIVIL,ZESTRIL) 30 MG tablet, Take 1 tablet by mouth 2 (Two) Times a Day., Disp: 180 tablet, Rfl: 3  •  loratadine (CLARITIN) 10 MG tablet, Take 10 mg by mouth daily., Disp: , Rfl:   •  omeprazole (priLOSEC) 40 MG capsule, Take 1 capsule by mouth 2 (two) times a day., Disp: 180 capsule, Rfl: 3  •  PARoxetine (PAXIL) 40 MG tablet, Take 1 tablet by mouth Every Morning., Disp: 30 tablet, Rfl: 5  •  Phenergan 25 MG suppository, Insert 1 suppository into the rectum Every 6 (Six) Hours As Needed for Nausea or Vomiting., Disp: 6 suppository, Rfl: 3  •  polyethylene glycol (MIRALAX) packet, Take 17 g by mouth daily., Disp: , Rfl:   •  Probiotic Product (PROBIOTIC DAILY) capsule, Once daily (Patient taking differently: Take 2 capsules by mouth Daily. Once daily ), Disp: , Rfl:   •  Simethicone 250 MG capsule, Take 1 capsule by mouth 4 (Four) Times a Day As Needed (gas and bloating)., Disp: 360 capsule, Rfl: 3  •  SUMAtriptan (IMITREX) 100 MG tablet, Take 1 tablet by mouth 1 (One) Time As Needed for Migraine for up to 1 dose. Take one tablet at onset of headache. May repeat dose one time in 2 hours., Disp: 27 tablet, Rfl: 3  •  vitamin B-12 (CYANOCOBALAMIN) 2500 MCG sublingual tablet tablet, Place 2,500 mcg under the tongue Daily., Disp: , Rfl:   •  zolpidem (AMBIEN) 10 MG tablet, TAKE ONE TABLET BY MOUTH ONCE NIGHTLY, Disp: 90 tablet, Rfl: 1    ALLERGIES  Hydrocodone and Sulfa antibiotics    VITALS  Vitals:    10/11/21  "1312   BP: 120/80   BP Location: Left arm   Patient Position: Sitting   Cuff Size: Adult   Weight: 47.6 kg (105 lb)   Height: 154.9 cm (61\")       LAST RESULTS   Results Encounter on 06/22/2021   Component Date Value Ref Range Status   • WBC 09/15/2021 4.44  3.40 - 10.80 10*3/mm3 Final   • RBC 09/15/2021 4.09  3.77 - 5.28 10*6/mm3 Final   • Hemoglobin 09/15/2021 12.5  12.0 - 15.9 g/dL Final   • Hematocrit 09/15/2021 36.7  34.0 - 46.6 % Final   • MCV 09/15/2021 89.7  79.0 - 97.0 fL Final   • MCH 09/15/2021 30.6  26.6 - 33.0 pg Final   • MCHC 09/15/2021 34.1  31.5 - 35.7 g/dL Final   • RDW 09/15/2021 11.7* 12.3 - 15.4 % Final   • Platelets 09/15/2021 276  140 - 450 10*3/mm3 Final   • Neutrophil Rel % 09/15/2021 69.9  42.7 - 76.0 % Final   • Lymphocyte Rel % 09/15/2021 18.5* 19.6 - 45.3 % Final   • Monocyte Rel % 09/15/2021 9.7  5.0 - 12.0 % Final   • Eosinophil Rel % 09/15/2021 0.5  0.3 - 6.2 % Final   • Basophil Rel % 09/15/2021 0.9  0.0 - 1.5 % Final   • Neutrophils Absolute 09/15/2021 3.11  1.70 - 7.00 10*3/mm3 Final   • Lymphocytes Absolute 09/15/2021 0.82  0.70 - 3.10 10*3/mm3 Final   • Monocytes Absolute 09/15/2021 0.43  0.10 - 0.90 10*3/mm3 Final   • Eosinophils Absolute 09/15/2021 0.02  0.00 - 0.40 10*3/mm3 Final   • Basophils Absolute 09/15/2021 0.04  0.00 - 0.20 10*3/mm3 Final   • Immature Granulocyte Rel % 09/15/2021 0.5  0.0 - 0.5 % Final   • Immature Grans Absolute 09/15/2021 0.02  0.00 - 0.05 10*3/mm3 Final   • nRBC 09/15/2021 0.0  0.0 - 0.2 /100 WBC Final   • Glucose 09/15/2021 73  65 - 99 mg/dL Final   • BUN 09/15/2021 10  8 - 23 mg/dL Final   • Creatinine 09/15/2021 0.94  0.57 - 1.00 mg/dL Final   • eGFR Non  Am 09/15/2021 60* >60 mL/min/1.73 Final    Comment: GFR Normal >60  Chronic Kidney Disease <60  Kidney Failure <15     • eGFR  Am 09/15/2021 72  >60 mL/min/1.73 Final   • BUN/Creatinine Ratio 09/15/2021 10.6  7.0 - 25.0 Final   • Sodium 09/15/2021 128* 136 - 145 mmol/L Final   • " Potassium 09/15/2021 4.8  3.5 - 5.2 mmol/L Final   • Chloride 09/15/2021 92* 98 - 107 mmol/L Final   • Total CO2 09/15/2021 27.9  22.0 - 29.0 mmol/L Final   • Calcium 09/15/2021 9.6  8.6 - 10.5 mg/dL Final   • Total Protein 09/15/2021 6.8  6.0 - 8.5 g/dL Final   • Albumin 09/15/2021 4.50  3.50 - 5.20 g/dL Final   • Globulin 09/15/2021 2.3  gm/dL Final   • A/G Ratio 09/15/2021 2.0  g/dL Final   • Total Bilirubin 09/15/2021 0.6  0.0 - 1.2 mg/dL Final   • Alkaline Phosphatase 09/15/2021 114  39 - 117 U/L Final   • AST (SGOT) 09/15/2021 19  1 - 32 U/L Final   • ALT (SGPT) 09/15/2021 11  1 - 33 U/L Final   • Total Cholesterol 09/15/2021 190  0 - 200 mg/dL Final    Comment: Cholesterol Reference Ranges  (U.S. Department of Health and Human Services ATP III  Classifications)  Desirable          <200 mg/dL  Borderline High    200-239 mg/dL  High Risk          >240 mg/dL  Triglyceride Reference Ranges  (U.S. Department of Health and Human Services ATP III  Classifications)  Normal           <150 mg/dL  Borderline High  150-199 mg/dL  High             200-499 mg/dL  Very High        >500 mg/dL  HDL Reference Ranges  (U.S. Department of Health and Human Services ATP III  Classifcations)  Low     <40 mg/dl (major risk factor for CHD)  High    >60 mg/dl ('negative' risk factor for CHD)  LDL Reference Ranges  (U.S. Department of Health and Human Services ATP III  Classifcations)  Optimal          <100 mg/dL  Near Optimal     100-129 mg/dL  Borderline High  130-159 mg/dL  High             160-189 mg/dL  Very High        >189 mg/dL     • Triglycerides 09/15/2021 78  0 - 150 mg/dL Final   • HDL Cholesterol 09/15/2021 67* 40 - 60 mg/dL Final   • VLDL Cholesterol Jad 09/15/2021 14  5 - 40 mg/dL Final   • LDL Chol Calc (NIH) 09/15/2021 109* 0 - 100 mg/dL Final   • Chol/HDL Ratio 09/15/2021 2.84   Final   • TSH 09/15/2021 2.440  0.270 - 4.200 uIU/mL Final   • Vitamin B-12 09/15/2021 672  211 - 946 pg/mL Final    Results may be falsely  increased if patient taking Biotin.     FL Small Bowel Follow Through Single-Contrast    Result Date: 10/7/2021  Narrative: BARIUM SMALL BOWEL EXAMINATION, 10/07/2021  INDICATION: 65-year-old female with one year history chronic abdominal pain and constipation. Previous history of lower colectomy for complications of endometriosis with additional surgery for lysis of adhesions.  TECHNIQUE: Barium small bowel examination was performed using single contrast technique, including directed compression imaging under fluoroscopy. *  Fluoroscopy time, 1.5 minutes *  12 fluoroscopic images were recorded.  FINDINGS:  film: The patient had a recent colonic marker transit test with final day 5 image obtained on 10/04/2021. Today, 6 markers remain present in the colon including 1 in the mid transverse colon, 3 in the descending colon and 2 in the rectosigmoid colon.  Barium small bowel exam: The jejunum and ileum are normal in caliber and appearance. No evidence of inflammatory bowel disease. No visible small bowel stricture, mass or diverticulum. The terminal ileum is normal in appearance. Barium reached the colon in just under 30 minutes.      Impression: Negative barium small bowel examination.  This report was finalized on 10/7/2021 11:35 AM by Dr. Steve Rodriguez MD.      XR Abdomen KUB    Result Date: 10/4/2021  Narrative: JOEL WOLF COLON TRANSIT, DAY 5, 10/02/2021 , HISTORY: 65-year-old female with history of chronic constipation, abdominal pain. Remote history of lower colectomy for complications of endometriosis with additional surgery for lysis of adhesions and with subsequent endoscopic dilatation of anastomosis stricture.  TECHNIQUE: AP supine radiograph of the abdomen and pelvis.  FINDINGS: 13 of 24 colon transit markers remain in place scattered throughout the colon on day 5. *  2 markers in the low ascending colon. *  4 markers scattered in the descending colon colon. *  7 markers scattered within the  rectosigmoid colon.  More than 5 markers remaining on day 5 indicates delayed colon transit. Scattered pattern of retained markers is more typical for hypomotility or colonic inertia.      Impression: Abnormally prolonged colon transit time. 13 retained markers are retained on day 5 scattered throughout the colon. The pattern suggests colonic hypomotility or colonic inertia.  This report was finalized on 10/4/2021 4:29 PM by Dr. Steve Rodriguez MD.      XR Abdomen KUB    Result Date: 10/4/2021  Narrative: KUKY, LAVELLEMARK COLON TRANSIT, DAY 3, 10/02/2021 , HISTORY: 65-year-old female with history of chronic constipation, abdominal pain. Remote history of lower colectomy for complications of endometriosis with additional surgery for lysis of adhesions and with subsequent endoscopic dilatation of anastomosis stricture.  TECHNIQUE: AP supine radiograph of the abdomen and pelvis.  FINDINGS: 19 of 24: Transit markers remain in place scattered throughout the colon, the majority of which are currently in the rectosigmoid. *  2 markers in the low ascending colon. *  4 markers in the upper descending colon. *  13 markers scattered within the rectosigmoid colon.  Normal bowel gas pattern. Small to moderate volume stool scattered throughout normal caliber colon. Surgical anastomosis clips in the lower pelvis. Scattered surgical clips in the right upper abdomen.      Impression: 1.  Colon transit study day 3. 19 of 24 markers remain present scattered within the colon as detailed above. 2.  Day 5 image is pending.  This report was finalized on 10/4/2021 7:01 AM by Dr. Steve Rodriguez MD.      XR Abdomen KUB    Result Date: 9/30/2021  Narrative: XR ABDOMEN KUB-: 09/30/2021  INDICATION: Abdominal pain 1 year. Chronic constipation symptoms are worsening. HISTORY of cholecystectomy appendectomy hysterectomy and colostomy. Sitz marker study.  COMPARISON: 03/18/2021.  FINDINGS: AP radiographs of the abdomen. Renal shadows obscured by  air and fecal material. Postop changes in the right midabdomen and low abdomen/pelvis. Since marker capsule at the level of the stomach.  The bowel gas pattern is nonobstructive. No acute osseous abnormalities. No radiopaque foreign body. Vascular calcification in the pelvis, stable      Impression:  1. Sitz marker capsule at the level of the stomach on Day One. Postop changes.  This report was finalized on 9/30/2021 2:30 PM by Dr. Ruperto Jacobson MD.        PHYSICAL EXAM  Debilities/Disabilities Identified: None  Emotional Behavior: Appropriate  Physical Exam overweight female in no active distress her abdomen is soft with subjective diffuse tenderness.  Upper GI small bowel follow-through showed fairly prompt contrast in her colon at 30 minutes.  The sits marker test was a different story and that showed retained markers throughout the colon in 5 days.  I discussed the case with Dr. Ruff.    ASSESSMENT  Colonic hypomotility      PLAN  The options were discussed with the patient and her .  We will refer her back to Dr. Ruff for medical management.  Surgery would be an option if medical management would fail but would entail a near total colectomy.

## 2021-12-13 ENCOUNTER — OFFICE VISIT (OUTPATIENT)
Dept: INTERNAL MEDICINE | Facility: CLINIC | Age: 66
End: 2021-12-13

## 2021-12-13 VITALS
DIASTOLIC BLOOD PRESSURE: 70 MMHG | RESPIRATION RATE: 16 BRPM | SYSTOLIC BLOOD PRESSURE: 115 MMHG | TEMPERATURE: 97.5 F | HEART RATE: 74 BPM | WEIGHT: 107.2 LBS | BODY MASS INDEX: 20.24 KG/M2 | OXYGEN SATURATION: 99 % | HEIGHT: 61 IN

## 2021-12-13 DIAGNOSIS — R21 RASH: ICD-10-CM

## 2021-12-13 DIAGNOSIS — Z00.00 ROUTINE HEALTH MAINTENANCE: ICD-10-CM

## 2021-12-13 DIAGNOSIS — K59.09 CHRONIC CONSTIPATION: ICD-10-CM

## 2021-12-13 DIAGNOSIS — K21.9 GASTROESOPHAGEAL REFLUX DISEASE WITHOUT ESOPHAGITIS: ICD-10-CM

## 2021-12-13 DIAGNOSIS — F51.01 PRIMARY INSOMNIA: ICD-10-CM

## 2021-12-13 DIAGNOSIS — I10 ESSENTIAL HYPERTENSION: Primary | ICD-10-CM

## 2021-12-13 DIAGNOSIS — G43.909 MIGRAINE WITHOUT STATUS MIGRAINOSUS, NOT INTRACTABLE, UNSPECIFIED MIGRAINE TYPE: ICD-10-CM

## 2021-12-13 DIAGNOSIS — J30.89 NON-SEASONAL ALLERGIC RHINITIS, UNSPECIFIED TRIGGER: ICD-10-CM

## 2021-12-13 DIAGNOSIS — G89.29 CHRONIC ABDOMINAL PAIN: ICD-10-CM

## 2021-12-13 DIAGNOSIS — F41.8 DEPRESSION WITH ANXIETY: ICD-10-CM

## 2021-12-13 DIAGNOSIS — R10.9 CHRONIC ABDOMINAL PAIN: ICD-10-CM

## 2021-12-13 DIAGNOSIS — M81.0 AGE-RELATED OSTEOPOROSIS WITHOUT CURRENT PATHOLOGICAL FRACTURE: ICD-10-CM

## 2021-12-13 DIAGNOSIS — E87.1 HYPONATREMIA: ICD-10-CM

## 2021-12-13 PROCEDURE — 99214 OFFICE O/P EST MOD 30 MIN: CPT | Performed by: FAMILY MEDICINE

## 2021-12-13 RX ORDER — CLOTRIMAZOLE AND BETAMETHASONE DIPROPIONATE 10; .64 MG/G; MG/G
1 CREAM TOPICAL 2 TIMES DAILY
Qty: 15 G | Refills: 0 | Status: SHIPPED | OUTPATIENT
Start: 2021-12-13 | End: 2022-01-31

## 2021-12-13 RX ORDER — CLOBETASOL PROPIONATE 0.5 MG/G
1 CREAM TOPICAL 2 TIMES DAILY
COMMUNITY

## 2021-12-13 NOTE — PROGRESS NOTES
Subjective   Subjective     Martina Hardwick is a 66 y.o. female, who presents with a chief complaint of   Chief Complaint   Patient presents with   • Hypertension     Hypertension  Associated symptoms include anxiety and headaches.   Heartburn  She complains of abdominal pain.   Anxiety  Symptoms include insomnia.       1. HTN.  Tolerates medication.  Takes lisinopril 30 mg BID and amlodipine 2.5 mg daily.  Home blood pressures running 110s-130s/60s-70s.   Denies dizziness and chest pain.    2. Depression and anxiety.  Pt reports an increase in her anxiety due to life stress.  She takes paroxetine and prn alprazolam.  Denies SI.    3. Headaches.  She takes sumatriptan for abortive treatment and this is effective.    4. Chronic constipation, chronic abdominal pain.  She has been diagnosed by colonic hypomotility by Dr. Hinds.  She plans to f/u with Dr. Ruff next month.    The following portions of the patient's history were reviewed and updated as appropriate: allergies, current medications, past family history, past medical history, past social history, past surgical history and problem list.    Allergies: Hydrocodone and Sulfa antibiotics    Review of Systems   Constitutional: Negative.    Eyes: Negative.    Respiratory: Negative.    Cardiovascular: Negative.    Gastrointestinal: Positive for abdominal pain and constipation.   Endocrine: Negative.    Genitourinary: Negative.    Musculoskeletal: Positive for arthralgias.   Allergic/Immunologic: Positive for environmental allergies.   Neurological: Positive for headaches.   Hematological: Negative.    Psychiatric/Behavioral: The patient has insomnia.      Objective     Wt Readings from Last 3 Encounters:   12/13/21 48.6 kg (107 lb 3.2 oz)   10/11/21 47.6 kg (105 lb)   09/29/21 48 kg (105 lb 12.8 oz)     Temp Readings from Last 3 Encounters:   12/13/21 97.5 °F (36.4 °C)   09/29/21 97.4 °F (36.3 °C) (Temporal)   09/22/21 98 °F (36.7 °C) (Temporal)     BP Readings  from Last 3 Encounters:   12/13/21 115/70   10/11/21 120/80   09/29/21 110/72     Pulse Readings from Last 3 Encounters:   12/13/21 74   09/29/21 86   09/22/21 73     Body mass index is 20.27 kg/m².  SpO2 Readings from Last 3 Encounters:   01/15/18 96%   10/12/17 98%   08/10/17 99%     Physical Exam   Constitutional: She is oriented to person, place, and time. She appears well-developed.   HENT:   Head: Normocephalic and atraumatic.   Mouth/Throat: Mucous membranes are moist.   Eyes: Conjunctivae are normal.   Neck: No thyromegaly present.   Cardiovascular: Normal rate, regular rhythm and normal heart sounds.   Pulmonary/Chest: Effort normal and breath sounds normal.   Abdominal: Soft. Normal appearance and bowel sounds are normal.   Musculoskeletal: Normal range of motion.      Right lower leg: No edema.      Left lower leg: No edema.   Neurological: She is alert and oriented to person, place, and time.   Skin: Skin is warm and dry. No rash noted.   Psychiatric: Her behavior is normal. Mood normal.   Nursing note and vitals reviewed.      Assessment/Plan   Martina was seen today for hypertension, insomnia and heartburn.    Diagnoses and all orders for this visit:        Essential hypertension    Routine health maintenance    Migraine without status migrainosus, not intractable, unspecified migraine type    Gastroesophageal reflux disease without esophagitis    Depression with anxiety    Primary insomnia    Chronic constipation    Chronic abdominal pain    Adhesions    Family history of colonic polyps    Non-seasonal allergic rhinitis, unspecified trigger    Osteoporosis    Hyponatremia    1. HTN.  Controlled.  Continue lisinopril 30 mg BID and amlodipine 2.5 mg daily.  Labs reviewed.    2. Routine health maint.  Mammogram UTD.  Colonoscopy UTD.  Hysterectomy done for non-cancerous reasons.  Covid-19 vaccines done.  Pneumovax UTD.  Shingrix at pharmacy.  Flu vaccine today.    3. Migraines.  Doing well with these.   Continue sumatriptan for abortive treatment.    4. GERD.  Continue omeprazole, famotidine, and lifestyle measures.  She sees GI.    5. Depression with anxiety.  Controlled with paroxetine from 30 mg to 40 mg daily.  On prn alprazolam.  Med management agreement and Joby UTD.    6. Insomnia.  Zolpidem is effective.  Med management agreement and Joby UTD.    7. Chronic constipation/colonic hypomotility.  On daily Miralax and probiotic.    8. LORRIE.  Controlled with fluticasone nasal spray and loratadine.    9. Osteoporosis.  Didn't tolerate bisphosphonates previously (Dr. Ghotra).  She wanted to wait until she turns 65 to further pursue medication.  Continue calcium, vitamin D and weight-bearing exercise.  Discuss ordering Prolia next time.  We need to get a handle on her abdominal pain before adding more medication into the mix.    10. Hyponatremia.  Chronic.  Recheck today.    Outpatient Medications Prior to Visit   Medication Sig Dispense Refill   • ALPRAZolam (XANAX) 0.5 MG tablet Take 1 tablet by mouth twice daily 45 tablet 2   • amLODIPine (NORVASC) 2.5 MG tablet Take 1 tablet by mouth Daily. 90 tablet 3   • Calcium-Vitamin D-Vitamin K (VIACTIV CALCIUM PLUS D PO) Take 1 tablet by mouth Daily.     • clobetasol (TEMOVATE) 0.05 % cream Apply 1 application topically to the appropriate area as directed 2 (Two) Times a Day.     • dicyclomine (BENTYL) 20 MG tablet TAKE 1 TABLET BY MOUTH 4 TIMES DAILY BEFORE MEALS AND AT BEDTIME AS NEEDED FOR CRAMPING     • famotidine (PEPCID) 40 MG tablet Take 1 tablet by mouth 2 (Two) Times a Day. With lunch and at bedtime 180 tablet 3   • fluticasone (FLONASE) 50 MCG/ACT nasal spray 2 sprays into each nostril daily.     • lisinopril (PRINIVIL,ZESTRIL) 30 MG tablet Take 1 tablet by mouth 2 (Two) Times a Day. 180 tablet 3   • loratadine (CLARITIN) 10 MG tablet Take 10 mg by mouth daily.     • omeprazole (priLOSEC) 40 MG capsule Take 1 capsule by mouth 2 (two) times a day. 180 capsule 3    • PARoxetine (PAXIL) 40 MG tablet Take 1 tablet by mouth Every Morning. 30 tablet 5   • Phenergan 25 MG suppository Insert 1 suppository into the rectum Every 6 (Six) Hours As Needed for Nausea or Vomiting. 6 suppository 3   • polyethylene glycol (MIRALAX) packet Take 17 g by mouth daily.     • Probiotic Product (PROBIOTIC DAILY) capsule Once daily (Patient taking differently: Take 2 capsules by mouth Daily. Once daily )     • Simethicone 250 MG capsule Take 1 capsule by mouth 4 (Four) Times a Day As Needed (gas and bloating). 360 capsule 3   • SUMAtriptan (IMITREX) 100 MG tablet Take 1 tablet by mouth 1 (One) Time As Needed for Migraine for up to 1 dose. Take one tablet at onset of headache. May repeat dose one time in 2 hours. 27 tablet 3   • vitamin B-12 (CYANOCOBALAMIN) 2500 MCG sublingual tablet tablet Place 2,500 mcg under the tongue Daily.     • zolpidem (AMBIEN) 10 MG tablet Take 1 tablet by mouth Every Night. 90 tablet 1     No facility-administered medications prior to visit.     New Medications Ordered This Visit   Medications   • clotrimazole-betamethasone (Lotrisone) 1-0.05 % cream     Sig: Apply 1 application topically to the appropriate area as directed 2 (Two) Times a Day.     Dispense:  15 g     Refill:  0     [unfilled]  There are no discontinued medications.  Return in about 3 months (around 3/13/2022).

## 2021-12-14 LAB
ALBUMIN SERPL-MCNC: 4.2 G/DL (ref 3.8–4.8)
ALBUMIN/GLOB SERPL: 1.6 {RATIO} (ref 1.2–2.2)
ALP SERPL-CCNC: 107 IU/L (ref 44–121)
ALT SERPL-CCNC: 11 IU/L (ref 0–32)
AST SERPL-CCNC: 19 IU/L (ref 0–40)
BILIRUB SERPL-MCNC: 0.3 MG/DL (ref 0–1.2)
BUN SERPL-MCNC: 12 MG/DL (ref 8–27)
BUN/CREAT SERPL: 15 (ref 12–28)
CALCIUM SERPL-MCNC: 9.3 MG/DL (ref 8.7–10.3)
CHLORIDE SERPL-SCNC: 97 MMOL/L (ref 96–106)
CO2 SERPL-SCNC: 27 MMOL/L (ref 20–29)
CREAT SERPL-MCNC: 0.79 MG/DL (ref 0.57–1)
GLOBULIN SER CALC-MCNC: 2.6 G/DL (ref 1.5–4.5)
GLUCOSE SERPL-MCNC: 78 MG/DL (ref 65–99)
POTASSIUM SERPL-SCNC: 4.6 MMOL/L (ref 3.5–5.2)
PROT SERPL-MCNC: 6.8 G/DL (ref 6–8.5)
SODIUM SERPL-SCNC: 132 MMOL/L (ref 134–144)

## 2022-01-04 ENCOUNTER — TRANSCRIBE ORDERS (OUTPATIENT)
Dept: ADMINISTRATIVE | Facility: HOSPITAL | Age: 67
End: 2022-01-04

## 2022-01-04 DIAGNOSIS — Z12.31 SCREENING MAMMOGRAM FOR BREAST CANCER: Primary | ICD-10-CM

## 2022-01-20 ENCOUNTER — HOSPITAL ENCOUNTER (OUTPATIENT)
Dept: MAMMOGRAPHY | Facility: HOSPITAL | Age: 67
Discharge: HOME | End: 2022-01-20
Admitting: FAMILY MEDICINE

## 2022-01-20 DIAGNOSIS — Z12.31 SCREENING MAMMOGRAM FOR BREAST CANCER: ICD-10-CM

## 2022-01-20 PROCEDURE — 77067 SCR MAMMO BI INCL CAD: CPT

## 2022-01-20 PROCEDURE — 77063 BREAST TOMOSYNTHESIS BI: CPT

## 2022-01-31 ENCOUNTER — OFFICE VISIT (OUTPATIENT)
Dept: GASTROENTEROLOGY | Facility: CLINIC | Age: 67
End: 2022-01-31

## 2022-01-31 VITALS
SYSTOLIC BLOOD PRESSURE: 118 MMHG | DIASTOLIC BLOOD PRESSURE: 82 MMHG | HEIGHT: 61 IN | BODY MASS INDEX: 20.28 KG/M2 | WEIGHT: 107.4 LBS

## 2022-01-31 DIAGNOSIS — N73.6 PELVIC PERITONEAL ADHESIONS, FEMALE: ICD-10-CM

## 2022-01-31 DIAGNOSIS — K59.04 CHRONIC IDIOPATHIC CONSTIPATION: Primary | ICD-10-CM

## 2022-01-31 DIAGNOSIS — K21.9 GASTROESOPHAGEAL REFLUX DISEASE WITHOUT ESOPHAGITIS: ICD-10-CM

## 2022-01-31 PROCEDURE — 99213 OFFICE O/P EST LOW 20 MIN: CPT | Performed by: INTERNAL MEDICINE

## 2022-01-31 RX ORDER — ESTRADIOL 0.1 MG/G
2 CREAM VAGINAL NIGHTLY
COMMUNITY
End: 2023-02-27

## 2022-01-31 NOTE — PROGRESS NOTES
PATIENT INFORMATION  Martina Hardwick       - 1955    CHIEF COMPLAINT  Chief Complaint   Patient presents with   • Abdominal Pain       HISTORY OF PRESENT ILLNESS  Here for persistent constipation and has been managed by water and miralax but hasnt been given an RX yet and is moving a little something daily so will increased.    Transit study was the residual markers were mostly o th eleft so will proceed to Linzes with her present Hydration and fiber and Mirlasx          REVIEWED PERTINENT RESULTS/ LABS  Lab Results   Component Value Date    CASEREPORT  2018     Surgical Pathology Report                         Case: [id number]                                  Authorizing Provider:  Hernan Hinds MD        Collected:           2018 09:17 AM          Ordering Location:     [facility]   Received:            2018 10:06 AM                                 OR                                                                           Pathologist:           Marquez Pierre MD                                                     Specimen:    Gallbladder, GALLBLADDER                                                                   FINALDX  2018     1. Gallbladder, CHOLECYSTECTOMY LAPAROSCOPIC:  Testing performed at outside laboratory. See scanned report.             Lab Results   Component Value Date    HGB 12.5 09/15/2021    MCV 89.7 09/15/2021     09/15/2021    ALT 11 2021    AST 19 2021    HGBA1C 5.20 2019    TRIG 78 09/15/2021      Mammo Screening Digital Tomosynthesis Bilateral With CAD    Result Date: 2022  Narrative: EXAM, 2022: 1. Bilateral digital screening mammogram with CAD. 2. Bilateral digital screening breast tomosynthesis.  INDICATION: Routine screening. Family history of breast cancer (aunt).  TECHNIQUE: Bilateral digital screening mammography including breast tomosynthesis and CAD review. Exaggerated CC images were  added to better include axillary breast tissue.  COMPARISON: *  Screening mammogram, 1/11/2021 and 11/19/2019.  FINDINGS: There are scattered areas of fibroglandular density. No significant change when compared with prior images. No mammographic evidence of malignancy. Recommend repeat screening mammogram in one year.      Impression: Negative screening mammogram.  BI-RADS CATEGORY 1: Negative   Women over the age of 40 undergoing screening mammography are entered into a reminder system with target due date for the next mammogram.  This report was finalized on 1/20/2022 12:25 PM by Dr. Steve Rodriguez MD.        REVIEW OF SYSTEMS  Review of Systems   Constitutional: Negative for activity change, chills, fever and unexpected weight change.   HENT: Negative for congestion.    Eyes: Negative for visual disturbance.   Respiratory: Negative for shortness of breath.    Cardiovascular: Negative for chest pain and palpitations.   Gastrointestinal: Positive for abdominal distention, abdominal pain, constipation and nausea. Negative for blood in stool.   Endocrine: Negative for cold intolerance and heat intolerance.   Genitourinary: Negative for hematuria.   Musculoskeletal: Negative for gait problem.   Skin: Negative for color change.   Allergic/Immunologic: Negative for immunocompromised state.   Neurological: Negative for weakness and light-headedness.   Hematological: Negative for adenopathy.   Psychiatric/Behavioral: Negative for sleep disturbance. The patient is not nervous/anxious.          ACTIVE PROBLEMS  Patient Active Problem List    Diagnosis    • Chronic abdominal pain [R10.9, G89.29]    • Hyponatremia [E87.1]    • Adhesion of abdominal wall [K66.0]    • Age-related osteoporosis without current pathological fracture [M81.0]    • Psoriasis [L40.9]    • Family history of colonic polyps [Z83.71]    • Routine health maintenance [Z00.00]    • Essential hypertension [I10]    • GERD (gastroesophageal reflux disease)  [K21.9]    • Allergic rhinitis [J30.9]    • Primary insomnia [F51.01]    • Chronic constipation [K59.09]    • Migraines [G43.909]    • Depression with anxiety [F41.8]          PAST MEDICAL HISTORY  Past Medical History:   Diagnosis Date   • Abdominal pain    • Anxiety    • Anxiety    • Arthritis    • Constipation    • Depressed    • Depression    • Endometriosis    • Gastritis    • GERD (gastroesophageal reflux disease)    • Headache    • Headache, tension-type    • Hypertension    • Insomnia    • Migraine    • OP (osteoporosis)    • Poor sleep    • Psoriasis    • Seasonal allergies          SURGICAL HISTORY  Past Surgical History:   Procedure Laterality Date   • APPENDECTOMY     • CHOLECYSTECTOMY N/A 8/17/2018    Procedure: CHOLECYSTECTOMY LAPAROSCOPIC converted to open procedure;  Surgeon: Hernan Hinds MD;  Location: [facility] OR;  Service: General   • COLON SURGERY     • COLONOSCOPY     • COLONOSCOPY N/A 12/12/2018    Procedure: COLONOSCOPY;  Surgeon: Darien Ruff MD;  Location: [facility] OR;  Service: Gastroenterology   • DIAGNOSTIC LAPAROSCOPY  1990   • DILATATION AND CURETTAGE  1985   • ENDOSCOPY N/A 5/13/2016    Procedure: ESOPHAGOGASTRODUODENOSCOPY ;  Surgeon: Darien Ruff MD;  Location: [facility] OR;  Service:    • HYSTERECTOMY     • REVISION / TAKEDOWN COLOSTOMY           FAMILY HISTORY  Family History   Problem Relation Age of Onset   • Hyperlipidemia Mother    • Macular degeneration Mother    • Heart disease Father    • Hyperlipidemia Father    • Cancer Father    • Depression Father    • Depression Sister    • Hyperlipidemia Brother    • Depression Brother    • Breast cancer Maternal Aunt    • Breast cancer Cousin    • Breast cancer Cousin    • Colon cancer Neg Hx    • Colon polyps Neg Hx          SOCIAL HISTORY  Social History     Occupational History   • Not on file   Tobacco Use   • Smoking status: Never Smoker   • Smokeless tobacco: Never Used   Vaping Use   • Vaping Use:  Never used   Substance and Sexual Activity   • Alcohol use: Yes     Comment: rare   • Drug use: No   • Sexual activity: Defer         CURRENT MEDICATIONS    Current Outpatient Medications:   •  ALPRAZolam (XANAX) 0.5 MG tablet, Take 1 tablet by mouth twice daily, Disp: 45 tablet, Rfl: 2  •  amLODIPine (NORVASC) 2.5 MG tablet, Take 1 tablet by mouth Daily., Disp: 90 tablet, Rfl: 3  •  Calcium-Vitamin D-Vitamin K (VIACTIV CALCIUM PLUS D PO), Take 1 tablet by mouth Daily., Disp: , Rfl:   •  clobetasol (TEMOVATE) 0.05 % cream, Apply 1 application topically to the appropriate area as directed 2 (Two) Times a Day., Disp: , Rfl:   •  estradiol (ESTRACE) 0.1 MG/GM vaginal cream, Insert 2 g into the vagina Every Night., Disp: , Rfl:   •  famotidine (PEPCID) 40 MG tablet, Take 1 tablet by mouth 2 (Two) Times a Day. With lunch and at bedtime, Disp: 180 tablet, Rfl: 3  •  fluticasone (FLONASE) 50 MCG/ACT nasal spray, 2 sprays into each nostril daily., Disp: , Rfl:   •  lisinopril (PRINIVIL,ZESTRIL) 30 MG tablet, Take 1 tablet by mouth 2 (Two) Times a Day., Disp: 180 tablet, Rfl: 3  •  loratadine (CLARITIN) 10 MG tablet, Take 10 mg by mouth daily., Disp: , Rfl:   •  omeprazole (priLOSEC) 40 MG capsule, Take 1 capsule by mouth 2 (two) times a day., Disp: 180 capsule, Rfl: 3  •  PARoxetine (PAXIL) 40 MG tablet, Take 1 tablet by mouth Every Morning., Disp: 30 tablet, Rfl: 5  •  Phenergan 25 MG suppository, Insert 1 suppository into the rectum Every 6 (Six) Hours As Needed for Nausea or Vomiting., Disp: 6 suppository, Rfl: 3  •  polyethylene glycol (MIRALAX) packet, Take 17 g by mouth daily., Disp: , Rfl:   •  Probiotic Product (PROBIOTIC DAILY) capsule, Once daily (Patient taking differently: Take 2 capsules by mouth Daily. Once daily ), Disp: , Rfl:   •  Simethicone 250 MG capsule, Take 1 capsule by mouth 4 (Four) Times a Day As Needed (gas and bloating)., Disp: 360 capsule, Rfl: 3  •  SUMAtriptan (IMITREX) 100 MG tablet, Take 1  "tablet by mouth 1 (One) Time As Needed for Migraine for up to 1 dose. Take one tablet at onset of headache. May repeat dose one time in 2 hours., Disp: 27 tablet, Rfl: 3  •  vitamin B-12 (CYANOCOBALAMIN) 2500 MCG sublingual tablet tablet, Place 2,500 mcg under the tongue Daily., Disp: , Rfl:   •  zolpidem (AMBIEN) 10 MG tablet, Take 1 tablet by mouth Every Night., Disp: 90 tablet, Rfl: 1  •  linaclotide (LINZESS) 72 MCG capsule capsule, Take 1 capsule by mouth Every Morning Before Breakfast., Disp: 30 capsule, Rfl: 11    ALLERGIES  Hydrocodone and Sulfa antibiotics    VITALS  Vitals:    01/31/22 1408   BP: 118/82   BP Location: Left arm   Patient Position: Sitting   Cuff Size: Adult   Weight: 48.7 kg (107 lb 6.4 oz)   Height: 154.9 cm (61\")       PHYSICAL EXAM  Debilities/Disabilities Identified: None  Emotional Behavior: Appropriate  Wt Readings from Last 3 Encounters:   01/31/22 48.7 kg (107 lb 6.4 oz)   12/13/21 48.6 kg (107 lb 3.2 oz)   10/11/21 47.6 kg (105 lb)     Ht Readings from Last 1 Encounters:   01/31/22 154.9 cm (61\")     Body mass index is 20.29 kg/m².  Physical Exam    CLINICAL DATA REVIEWED   reviewed previous lab results and integrated with today's visit, reviewed notes from other physicians and/or last GI encounter, reviewed previous endoscopy results and available photos, reviewed surgical pathology results from previous biopsies    ASSESSMENT  Diagnoses and all orders for this visit:    Chronic idiopathic constipation    Gastroesophageal reflux disease without esophagitis    Pelvic peritoneal adhesions, female    Other orders  -     estradiol (ESTRACE) 0.1 MG/GM vaginal cream; Insert 2 g into the vagina Every Night.  -     linaclotide (LINZESS) 72 MCG capsule capsule; Take 1 capsule by mouth Every Morning Before Breakfast.          PLAN  Return in about 3 months (around 4/30/2022).    I have discussed the above plan with the patient.  They verbalize understanding and are in agreement with the " plan.  They have been advised to contact the office for any questions, concerns, or changes related to their health.

## 2022-02-01 ENCOUNTER — TELEPHONE (OUTPATIENT)
Dept: GASTROENTEROLOGY | Facility: CLINIC | Age: 67
End: 2022-02-01

## 2022-02-01 NOTE — TELEPHONE ENCOUNTER
"Patient calls stating that Linzess 72 mcg too expensive, but states that \"all meds are always too expensive and usually don't work\".  I provided her with samples to use for now (Dr. Ruff out of town).  Patient to call me back to let me know how these work for her in a few weeks.  Advised her I would check with Dr. Ruff @ that time regarding an alternative vs more samples.  "

## 2022-02-01 NOTE — TELEPHONE ENCOUNTER
Patient would like a call back regarding a rx that was written for her yesterday. She stated that it is going to cost too much out of pocket since she is on medicare. She was wanting to know if there is something else that can be sent in to [facility] [place].

## 2022-02-02 RX ORDER — CLOTRIMAZOLE AND BETAMETHASONE DIPROPIONATE 10; .64 MG/G; MG/G
1 CREAM TOPICAL 2 TIMES DAILY
Qty: 30 G | Refills: 1 | Status: SHIPPED | OUTPATIENT
Start: 2022-02-02

## 2022-02-16 ENCOUNTER — TELEPHONE (OUTPATIENT)
Dept: GASTROENTEROLOGY | Facility: CLINIC | Age: 67
End: 2022-02-16

## 2022-02-17 DIAGNOSIS — F41.8 DEPRESSION WITH ANXIETY: ICD-10-CM

## 2022-02-17 NOTE — TELEPHONE ENCOUNTER
Advised patient as per Dr. Ruff note below.  Patient voiced understanding.  She will call me on Wednesday 2/23/22 in the afternoon to assure we received Dr. Rizzo's results.

## 2022-02-19 DIAGNOSIS — F41.8 DEPRESSION WITH ANXIETY: ICD-10-CM

## 2022-02-21 RX ORDER — ALPRAZOLAM 0.5 MG/1
0.5 TABLET ORAL 2 TIMES DAILY
Qty: 45 TABLET | Refills: 2 | OUTPATIENT
Start: 2022-02-21

## 2022-02-23 RX ORDER — ALPRAZOLAM 0.5 MG/1
TABLET ORAL
Qty: 45 TABLET | Refills: 0 | Status: SHIPPED | OUTPATIENT
Start: 2022-02-23 | End: 2022-04-13

## 2022-02-23 NOTE — TELEPHONE ENCOUNTER
Rx Refill Note  Requested Prescriptions     Pending Prescriptions Disp Refills    ALPRAZolam (XANAX) 0.5 MG tablet [Pharmacy Med Name: ALPRAZolam 0.5 MG Oral Tablet] 45 tablet 0     Sig: Take 1 tablet by mouth twice daily      Last office visit with prescribing clinician: 12/13/2021      Next office visit with prescribing clinician: 2/19/2022            Cristina Ulloa MA  02/23/22, 11:40 EST

## 2022-02-23 NOTE — TELEPHONE ENCOUNTER
PATIENT IS CALLING BACK IN ON THIS REFILL REQUEST, SHE STATES SHE HAS BEEN TRYING TO GET THIS FILLED SINCE LAST WEEK AND STILL NOTHING AT THE PHARMACY.      SHE HAS A FEW DAYS LEFT BUT IS ALMOST OUT AND REALLY NEEDS TO GET THIS SENT THROUGH.        CONFIRMED PHARMACY    [id number] - [place], [place] - [address] [facility] [phone] [facility] [phone] FX

## 2022-02-28 ENCOUNTER — TELEPHONE (OUTPATIENT)
Dept: INTERNAL MEDICINE | Facility: CLINIC | Age: 67
End: 2022-02-28

## 2022-02-28 DIAGNOSIS — Z00.00 ROUTINE HEALTH MAINTENANCE: Primary | ICD-10-CM

## 2022-02-28 DIAGNOSIS — E55.9 HYPOVITAMINOSIS D: ICD-10-CM

## 2022-03-02 DIAGNOSIS — I10 ESSENTIAL HYPERTENSION: ICD-10-CM

## 2022-03-02 RX ORDER — AMLODIPINE BESYLATE 2.5 MG/1
TABLET ORAL
Qty: 90 TABLET | Refills: 0 | Status: SHIPPED | OUTPATIENT
Start: 2022-03-02 | End: 2022-06-01

## 2022-03-14 ENCOUNTER — OFFICE VISIT (OUTPATIENT)
Dept: INTERNAL MEDICINE | Facility: CLINIC | Age: 67
End: 2022-03-14

## 2022-03-14 VITALS
HEIGHT: 61 IN | DIASTOLIC BLOOD PRESSURE: 85 MMHG | WEIGHT: 105.2 LBS | SYSTOLIC BLOOD PRESSURE: 145 MMHG | BODY MASS INDEX: 19.86 KG/M2 | OXYGEN SATURATION: 98 % | HEART RATE: 85 BPM

## 2022-03-14 DIAGNOSIS — M81.0 AGE-RELATED OSTEOPOROSIS WITHOUT CURRENT PATHOLOGICAL FRACTURE: ICD-10-CM

## 2022-03-14 DIAGNOSIS — F51.01 PRIMARY INSOMNIA: ICD-10-CM

## 2022-03-14 DIAGNOSIS — I10 ESSENTIAL HYPERTENSION: ICD-10-CM

## 2022-03-14 DIAGNOSIS — I31.39 PERICARDIAL EFFUSION: ICD-10-CM

## 2022-03-14 DIAGNOSIS — Z00.00 ROUTINE HEALTH MAINTENANCE: Primary | ICD-10-CM

## 2022-03-14 DIAGNOSIS — K59.09 CHRONIC CONSTIPATION: ICD-10-CM

## 2022-03-14 DIAGNOSIS — K21.9 GASTROESOPHAGEAL REFLUX DISEASE WITHOUT ESOPHAGITIS: ICD-10-CM

## 2022-03-14 DIAGNOSIS — G43.909 MIGRAINE WITHOUT STATUS MIGRAINOSUS, NOT INTRACTABLE, UNSPECIFIED MIGRAINE TYPE: ICD-10-CM

## 2022-03-14 DIAGNOSIS — J30.89 NON-SEASONAL ALLERGIC RHINITIS, UNSPECIFIED TRIGGER: ICD-10-CM

## 2022-03-14 DIAGNOSIS — G89.29 CHRONIC ABDOMINAL PAIN: ICD-10-CM

## 2022-03-14 DIAGNOSIS — E87.1 HYPONATREMIA: ICD-10-CM

## 2022-03-14 DIAGNOSIS — E87.5 HYPERKALEMIA: ICD-10-CM

## 2022-03-14 DIAGNOSIS — R10.9 CHRONIC ABDOMINAL PAIN: ICD-10-CM

## 2022-03-14 DIAGNOSIS — F41.8 DEPRESSION WITH ANXIETY: ICD-10-CM

## 2022-03-14 PROCEDURE — 99214 OFFICE O/P EST MOD 30 MIN: CPT | Performed by: FAMILY MEDICINE

## 2022-03-14 RX ORDER — LISINOPRIL 30 MG/1
TABLET ORAL
Qty: 180 TABLET | Refills: 0 | Status: SHIPPED | OUTPATIENT
Start: 2022-03-14 | End: 2022-06-09

## 2022-03-14 NOTE — TELEPHONE ENCOUNTER
Rx Refill Note  Requested Prescriptions     Pending Prescriptions Disp Refills    lisinopril (PRINIVIL,ZESTRIL) 30 MG tablet [Pharmacy Med Name: Lisinopril 30 MG Oral Tablet] 180 tablet 0     Sig: Take 1 tablet by mouth twice daily      Last office visit with prescribing clinician: 12/13/2021      Next office visit with prescribing clinician: 3/14/2022            Cristina Ulloa MA  03/14/22, 11:38 EDT

## 2022-03-14 NOTE — PROGRESS NOTES
Subjective   Subjective     Martina Hardwick is a 66 y.o. female, who presents with a chief complaint of   Chief Complaint   Patient presents with   • Labs Only     F/U   • Anxiety   • Hypertension     Hypertension  Associated symptoms include anxiety and headaches.   Heartburn  She complains of abdominal pain.   Anxiety  Symptoms include insomnia.     1. HTN.  Tolerates medication.  Takes lisinopril 30 mg BID and amlodipine 2.5 mg daily.  Home blood pressures running 110s-130s/60s-70s.   Denies dizziness and chest pain.    2. Depression and anxiety.  Pt reports an increase in her anxiety due to life stress.  She takes paroxetine and prn alprazolam.  Denies SI.    3. Headaches.  She takes sumatriptan for abortive treatment and this is effective.  She hasn't been having too many migraines recently.    4. Chronic constipation, chronic abdominal pain.  She has been diagnosed by colonic hypomotility by Dr. Hinds.  She sees Dr. Ruff and failed a trial of Linzess.    The following portions of the patient's history were reviewed and updated as appropriate: allergies, current medications, past family history, past medical history, past social history, past surgical history and problem list.    Allergies: Hydrocodone and Sulfa antibiotics    Review of Systems   Constitutional: Negative.    Eyes: Negative.    Respiratory: Negative.    Cardiovascular: Negative.    Gastrointestinal: Positive for abdominal pain and constipation.   Endocrine: Negative.    Genitourinary: Negative.    Musculoskeletal: Positive for arthralgias.   Allergic/Immunologic: Positive for environmental allergies.   Neurological: Positive for headaches.   Hematological: Negative.    Psychiatric/Behavioral: The patient has insomnia.      Objective     Wt Readings from Last 3 Encounters:   03/14/22 47.7 kg (105 lb 3.2 oz)   01/31/22 48.7 kg (107 lb 6.4 oz)   12/13/21 48.6 kg (107 lb 3.2 oz)     Temp Readings from Last 3 Encounters:   12/13/21 97.5 °F (36.4  °C)   09/29/21 97.4 °F (36.3 °C) (Temporal)   09/22/21 98 °F (36.7 °C) (Temporal)     BP Readings from Last 3 Encounters:   03/14/22 145/85   01/31/22 118/82   12/13/21 115/70     Pulse Readings from Last 3 Encounters:   03/14/22 85   12/13/21 74   09/29/21 86     Body mass index is 19.88 kg/m².  SpO2 Readings from Last 3 Encounters:   01/15/18 96%   10/12/17 98%   08/10/17 99%     Physical Exam   Constitutional: She is oriented to person, place, and time. She appears well-developed.   HENT:   Head: Normocephalic and atraumatic.   Mouth/Throat: Mucous membranes are moist.   Eyes: Conjunctivae are normal.   Neck: No thyromegaly present.   Cardiovascular: Normal rate, regular rhythm and normal heart sounds.   Pulmonary/Chest: Effort normal and breath sounds normal.   Abdominal: Soft. Normal appearance and bowel sounds are normal.   Musculoskeletal: Normal range of motion.      Right lower leg: No edema.      Left lower leg: No edema.   Neurological: She is alert and oriented to person, place, and time.   Skin: Skin is warm and dry. No rash noted.   Psychiatric: Her behavior is normal. Mood normal.   Nursing note and vitals reviewed.      Assessment/Plan   Martina was seen today for hypertension, insomnia and heartburn.    Diagnoses and all orders for this visit:        Essential hypertension    Routine health maintenance    Migraine without status migrainosus, not intractable, unspecified migraine type    Gastroesophageal reflux disease without esophagitis    Depression with anxiety    Primary insomnia    Chronic constipation    Chronic abdominal pain    Adhesions    Family history of colonic polyps    Non-seasonal allergic rhinitis, unspecified trigger    Osteoporosis    Hyponatremia    Pericardial effusion    1. Routine health maint.  Mammogram UTD.  Colonoscopy UTD.  Hysterectomy done for non-cancerous reasons.  Covid-19 vaccines done.  Pneumovax UTD.  Shingrix at pharmacy.      2. HTN.  Controlled.  Continue  lisinopril 30 mg BID and amlodipine 2.5 mg daily.  Labs reviewed.    3. Migraines.  Doing well with these.  Continue sumatriptan for abortive treatment.    4. GERD.  Continue omeprazole, famotidine, and lifestyle measures.  She sees GI.    5. Depression with anxiety.  Controlled with paroxetine 40 mg daily.  On prn alprazolam.  Med management agreement and Joby UTD.    6. Insomnia.  Zolpidem is effective.  Med management agreement and Joby UTD.    7. Chronic constipation/colonic hypomotility.  On daily Miralax and probiotic.  She is still having a lot of abdominal pain after she eats.    8. LORRIE.  Controlled with fluticasone nasal spray and loratadine.    9. Osteoporosis.  Didn't tolerate bisphosphonates previously (Dr. Ghotra).  She wanted to wait until she turns 65 to further pursue medication.  Continue calcium, vitamin D and weight-bearing exercise.  Discuss ordering Prolia next time.  We need to get a handle on her abdominal pain before adding more medication into the mix.    10. Chronic hyponatremia.  Hyperkalemia.  BMP today.    11. Pericardial effusion seen on abd/CT.  Check echocardiogram.      Outpatient Medications Prior to Visit   Medication Sig Dispense Refill   • ALPRAZolam (XANAX) 0.5 MG tablet Take 1 tablet by mouth twice daily 45 tablet 0   • amLODIPine (NORVASC) 2.5 MG tablet Take 1 tablet by mouth once daily 90 tablet 0   • Calcium-Vitamin D-Vitamin K (VIACTIV CALCIUM PLUS D PO) Take 1 tablet by mouth Daily.     • clobetasol (TEMOVATE) 0.05 % cream Apply 1 application topically to the appropriate area as directed 2 (Two) Times a Day.     • clotrimazole-betamethasone (Lotrisone) 1-0.05 % cream Apply 1 application topically to the appropriate area as directed 2 (Two) Times a Day. 30 g 1   • estradiol (ESTRACE) 0.1 MG/GM vaginal cream Insert 2 g into the vagina Every Night.     • famotidine (PEPCID) 40 MG tablet Take 1 tablet by mouth 2 (Two) Times a Day. With lunch and at bedtime 180 tablet 3   •  fluticasone (FLONASE) 50 MCG/ACT nasal spray 2 sprays into each nostril daily.     • lisinopril (PRINIVIL,ZESTRIL) 30 MG tablet Take 1 tablet by mouth twice daily 180 tablet 0   • loratadine (CLARITIN) 10 MG tablet Take 10 mg by mouth daily.     • omeprazole (priLOSEC) 40 MG capsule Take 1 capsule by mouth 2 (two) times a day. 180 capsule 3   • PARoxetine (PAXIL) 40 MG tablet Take 1 tablet by mouth Every Morning. 30 tablet 5   • Phenergan 25 MG suppository Insert 1 suppository into the rectum Every 6 (Six) Hours As Needed for Nausea or Vomiting. 6 suppository 3   • polyethylene glycol (MIRALAX) packet Take 17 g by mouth daily.     • Probiotic Product (PROBIOTIC DAILY) capsule Once daily (Patient taking differently: Take 2 capsules by mouth Daily. Once daily)     • Simethicone 250 MG capsule Take 1 capsule by mouth 4 (Four) Times a Day As Needed (gas and bloating). 360 capsule 3   • SUMAtriptan (IMITREX) 100 MG tablet Take 1 tablet by mouth 1 (One) Time As Needed for Migraine for up to 1 dose. Take one tablet at onset of headache. May repeat dose one time in 2 hours. 27 tablet 3   • vitamin B-12 (CYANOCOBALAMIN) 2500 MCG sublingual tablet tablet Place 2,500 mcg under the tongue Daily.     • zolpidem (AMBIEN) 10 MG tablet Take 1 tablet by mouth Every Night. 90 tablet 1   • linaclotide (LINZESS) 72 MCG capsule capsule Take 1 capsule by mouth Every Morning Before Breakfast. 30 capsule 11     No facility-administered medications prior to visit.     No orders of the defined types were placed in this encounter.    [unfilled]  Medications Discontinued During This Encounter   Medication Reason   • linaclotide (LINZESS) 72 MCG capsule capsule      Return in about 3 months (around 6/14/2022).

## 2022-03-15 LAB
BUN SERPL-MCNC: 11 MG/DL (ref 8–27)
BUN/CREAT SERPL: 14 (ref 12–28)
CALCIUM SERPL-MCNC: 9.5 MG/DL (ref 8.7–10.3)
CHLORIDE SERPL-SCNC: 91 MMOL/L (ref 96–106)
CO2 SERPL-SCNC: 24 MMOL/L (ref 20–29)
CREAT SERPL-MCNC: 0.81 MG/DL (ref 0.57–1)
EGFR GENE MUT ANL BLD/T: 80 ML/MIN/1.73
GLUCOSE SERPL-MCNC: 81 MG/DL (ref 65–99)
POTASSIUM SERPL-SCNC: 4.7 MMOL/L (ref 3.5–5.2)
SODIUM SERPL-SCNC: 128 MMOL/L (ref 134–144)

## 2022-03-22 ENCOUNTER — HOSPITAL ENCOUNTER (OUTPATIENT)
Dept: CARDIOLOGY | Facility: HOSPITAL | Age: 67
Discharge: HOME | End: 2022-03-22
Admitting: FAMILY MEDICINE

## 2022-03-22 VITALS
HEART RATE: 87 BPM | HEIGHT: 61 IN | BODY MASS INDEX: 19.56 KG/M2 | DIASTOLIC BLOOD PRESSURE: 79 MMHG | WEIGHT: 103.62 LBS | SYSTOLIC BLOOD PRESSURE: 159 MMHG

## 2022-03-22 DIAGNOSIS — I31.39 PERICARDIAL EFFUSION: ICD-10-CM

## 2022-03-22 LAB
AORTIC DIMENSIONLESS INDEX: 1.02 (DI)
BH CV ECHO MEAS - AO MAX PG: 5 MMHG
BH CV ECHO MEAS - AO MEAN PG: 2.8 MMHG
BH CV ECHO MEAS - AO V2 MAX: 111.3 CM/SEC
BH CV ECHO MEAS - AO V2 VTI: 25 CM
BH CV ECHO MEAS - AVA(I,D): 1.99 CM2
BH CV ECHO MEAS - EDV(CUBED): 36 ML
BH CV ECHO MEAS - EDV(MOD-SP2): 49.6 ML
BH CV ECHO MEAS - EDV(MOD-SP4): 43.4 ML
BH CV ECHO MEAS - EF(MOD-BP): 65.6 %
BH CV ECHO MEAS - EF(MOD-SP2): 63.9 %
BH CV ECHO MEAS - EF(MOD-SP4): 67.5 %
BH CV ECHO MEAS - ESV(CUBED): 12.7 ML
BH CV ECHO MEAS - ESV(MOD-SP2): 17.9 ML
BH CV ECHO MEAS - ESV(MOD-SP4): 14.1 ML
BH CV ECHO MEAS - FS: 29.3 %
BH CV ECHO MEAS - IVS/LVPW: 1.19 CM
BH CV ECHO MEAS - IVSD: 0.81 CM
BH CV ECHO MEAS - LAT PEAK E' VEL: 11 CM/SEC
BH CV ECHO MEAS - LV MASS(C)D: 62.3 GRAMS
BH CV ECHO MEAS - LV MAX PG: 5.6 MMHG
BH CV ECHO MEAS - LV MEAN PG: 2.8 MMHG
BH CV ECHO MEAS - LV V1 MAX: 118.7 CM/SEC
BH CV ECHO MEAS - LV V1 VTI: 25.6 CM
BH CV ECHO MEAS - LVIDD: 3.3 CM
BH CV ECHO MEAS - LVIDS: 2.34 CM
BH CV ECHO MEAS - LVOT AREA: 1.94 CM2
BH CV ECHO MEAS - LVOT DIAM: 1.57 CM
BH CV ECHO MEAS - LVPWD: 0.68 CM
BH CV ECHO MEAS - MED PEAK E' VEL: 12 CM/SEC
BH CV ECHO MEAS - MV A MAX VEL: 77.6 CM/SEC
BH CV ECHO MEAS - MV DEC SLOPE: 756.5 CM/SEC2
BH CV ECHO MEAS - MV DEC TIME: 0.16 MSEC
BH CV ECHO MEAS - MV E MAX VEL: 89.1 CM/SEC
BH CV ECHO MEAS - MV E/A: 1.15
BH CV ECHO MEAS - MV MEAN PG: 1.61 MMHG
BH CV ECHO MEAS - MV V2 VTI: 20.1 CM
BH CV ECHO MEAS - MVA(VTI): 2.48 CM2
BH CV ECHO MEAS - RAP SYSTOLE: 3 MMHG
BH CV ECHO MEAS - RVSP: 20 MMHG
BH CV ECHO MEAS - SV(LVOT): 49.7 ML
BH CV ECHO MEAS - SV(MOD-SP2): 31.7 ML
BH CV ECHO MEAS - SV(MOD-SP4): 29.3 ML
BH CV ECHO MEAS - TAPSE (>1.6): 2.7 CM
BH CV ECHO MEAS - TR MAX PG: 17 MMHG
BH CV ECHO MEAS - TR MAX VEL: 206.3 CM/SEC
BH CV ECHO MEASUREMENTS AVERAGE E/E' RATIO: 7.75
BH CV XLRA - RV BASE: 2.9 CM
BH CV XLRA - RV LENGTH: 5.6 CM
BH CV XLRA - RV MID: 2.05 CM
BH CV XLRA - TDI S': 13.5 CM/SEC
LEFT ATRIUM VOLUME INDEX: 15.5 ML/M2
LV EF 2D ECHO EST: 65 %
MAXIMAL PREDICTED HEART RATE: 154 BPM
STRESS TARGET HR: 131 BPM

## 2022-03-22 PROCEDURE — 93306 TTE W/DOPPLER COMPLETE: CPT

## 2022-03-22 PROCEDURE — 93306 TTE W/DOPPLER COMPLETE: CPT | Performed by: INTERNAL MEDICINE

## 2022-03-23 DIAGNOSIS — F41.8 DEPRESSION WITH ANXIETY: ICD-10-CM

## 2022-03-23 RX ORDER — PAROXETINE HYDROCHLORIDE 40 MG/1
TABLET, FILM COATED ORAL
Qty: 30 TABLET | Refills: 0 | Status: SHIPPED | OUTPATIENT
Start: 2022-03-23 | End: 2022-04-21

## 2022-04-07 DIAGNOSIS — F51.01 PRIMARY INSOMNIA: ICD-10-CM

## 2022-04-07 RX ORDER — ZOLPIDEM TARTRATE 10 MG/1
10 TABLET ORAL NIGHTLY
Qty: 90 TABLET | Refills: 1 | Status: SHIPPED | OUTPATIENT
Start: 2022-04-07 | End: 2022-04-08 | Stop reason: SDUPTHER

## 2022-04-07 NOTE — TELEPHONE ENCOUNTER
Rx Refill Note  Requested Prescriptions     Pending Prescriptions Disp Refills   • zolpidem (AMBIEN) 10 MG tablet 90 tablet 1     Sig: Take 1 tablet by mouth Every Night.      Last office visit with prescribing clinician: 3/14/2022      Next office visit with prescribing clinician: 6/27/2022            Cristina Ulloa MA  04/07/22, 11:44 EDT

## 2022-04-08 DIAGNOSIS — F41.8 DEPRESSION WITH ANXIETY: ICD-10-CM

## 2022-04-08 DIAGNOSIS — F51.01 PRIMARY INSOMNIA: ICD-10-CM

## 2022-04-08 RX ORDER — ZOLPIDEM TARTRATE 10 MG/1
10 TABLET ORAL NIGHTLY
Qty: 90 TABLET | Refills: 1 | Status: SHIPPED | OUTPATIENT
Start: 2022-04-08 | End: 2022-10-03 | Stop reason: SDUPTHER

## 2022-04-08 NOTE — TELEPHONE ENCOUNTER
Rx Refill Note  Requested Prescriptions     Pending Prescriptions Disp Refills   • zolpidem (AMBIEN) 10 MG tablet 90 tablet 1     Sig: Take 1 tablet by mouth Every Night.      Last office visit with prescribing clinician: 3/14/2022      Next office visit with prescribing clinician: 6/27/2022            Cristina Ulloa MA   Patient is wanting this sent to [facility] and not [facility] where the medication was sent. She had it cancelled. Can we sent to correct pharmacy?  04/08/22, 08:05 EDT

## 2022-04-11 DIAGNOSIS — F41.8 DEPRESSION WITH ANXIETY: ICD-10-CM

## 2022-04-11 NOTE — TELEPHONE ENCOUNTER
Rx Refill Note  Requested Prescriptions     Pending Prescriptions Disp Refills    ALPRAZolam (XANAX) 0.5 MG tablet [Pharmacy Med Name: ALPRAZolam 0.5 MG Oral Tablet] 45 tablet 0     Sig: Take 1 tablet by mouth twice daily      Last office visit with prescribing clinician: 3/14/2022      Next office visit with prescribing clinician: 6/27/2022            Cristina Ulloa MA  04/11/22, 13:54 EDT

## 2022-04-13 RX ORDER — ALPRAZOLAM 0.5 MG/1
TABLET ORAL
Qty: 45 TABLET | Refills: 0 | Status: SHIPPED | OUTPATIENT
Start: 2022-04-13 | End: 2022-06-01

## 2022-04-21 DIAGNOSIS — F41.8 DEPRESSION WITH ANXIETY: ICD-10-CM

## 2022-04-21 RX ORDER — PAROXETINE HYDROCHLORIDE 40 MG/1
TABLET, FILM COATED ORAL
Qty: 90 TABLET | Refills: 0 | Status: SHIPPED | OUTPATIENT
Start: 2022-04-21 | End: 2022-07-21

## 2022-05-23 ENCOUNTER — OFFICE VISIT (OUTPATIENT)
Dept: GASTROENTEROLOGY | Facility: CLINIC | Age: 67
End: 2022-05-23

## 2022-05-23 VITALS
HEIGHT: 61 IN | SYSTOLIC BLOOD PRESSURE: 122 MMHG | DIASTOLIC BLOOD PRESSURE: 84 MMHG | WEIGHT: 104.4 LBS | BODY MASS INDEX: 19.71 KG/M2

## 2022-05-23 DIAGNOSIS — K59.09 CHRONIC CONSTIPATION: ICD-10-CM

## 2022-05-23 DIAGNOSIS — R10.84 GENERALIZED ABDOMINAL PAIN: ICD-10-CM

## 2022-05-23 DIAGNOSIS — K66.0 ADHESION OF ABDOMINAL WALL: Primary | ICD-10-CM

## 2022-05-23 PROCEDURE — 99213 OFFICE O/P EST LOW 20 MIN: CPT | Performed by: INTERNAL MEDICINE

## 2022-05-23 RX ORDER — ALUMINA, MAGNESIA, AND SIMETHICONE 2400; 2400; 240 MG/30ML; MG/30ML; MG/30ML
SUSPENSION ORAL AS NEEDED
COMMUNITY

## 2022-05-23 NOTE — PROGRESS NOTES
PATIENT INFORMATION  Martina Hardwick       - 1955    CHIEF COMPLAINT  Chief Complaint   Patient presents with   • Chronic Idiopathic Constipation   • Heartburn       HISTORY OF PRESENT ILLNESS  Here on routine follow up of her constipation but complains of recurrent abd pain thatt is disabling even though she says she is moving her bowels daily on Miralax daily and fiber and water.    Persistent daily paon and has seen [facility] and no mechanical issue to fix but is treating her incidental Lichens    Reviewed her CT from 3/2021 and no vascular calcification      REVIEWED PERTINENT RESULTS/ LABS  Lab Results   Component Value Date    CASEREPORT  2018     Surgical Pathology Report                         Case: [id number]                                  Authorizing Provider:  Hernan Hinds MD        Collected:           2018 09:17 AM          Ordering Location:     [facility]   Received:            2018 10:06 AM                                 OR                                                                           Pathologist:           Marquez Pierre MD                                                     Specimen:    Gallbladder, GALLBLADDER                                                                   FINALDX  2018     1. Gallbladder, CHOLECYSTECTOMY LAPAROSCOPIC:  Testing performed at outside laboratory. See scanned report.             Lab Results   Component Value Date    HGB 12.6 2022    MCV 90 2022     2022    ALT 11 2022    AST 19 2022    HGBA1C 5.3 2022    TRIG 67 2022      No results found.    REVIEW OF SYSTEMS  Review of Systems   Constitutional: Negative for activity change, chills, fever and unexpected weight change.   HENT: Negative for congestion.    Eyes: Negative for visual disturbance.   Respiratory: Negative for shortness of breath.    Cardiovascular: Negative for chest pain and  palpitations.   Gastrointestinal: Positive for abdominal distention, abdominal pain, constipation and nausea. Negative for blood in stool.   Endocrine: Negative for cold intolerance and heat intolerance.   Genitourinary: Negative for hematuria.   Musculoskeletal: Negative for gait problem.   Skin: Negative for color change.   Allergic/Immunologic: Negative for immunocompromised state.   Neurological: Negative for weakness and light-headedness.   Hematological: Negative for adenopathy.   Psychiatric/Behavioral: Negative for sleep disturbance. The patient is not nervous/anxious.          ACTIVE PROBLEMS  Patient Active Problem List    Diagnosis    • Generalized abdominal pain [R10.84]    • Chronic abdominal pain [R10.9, G89.29]    • Hyponatremia [E87.1]    • Adhesion of abdominal wall [K66.0]    • Age-related osteoporosis without current pathological fracture [M81.0]    • Psoriasis [L40.9]    • Family history of colonic polyps [Z83.71]    • Routine health maintenance [Z00.00]    • Essential hypertension [I10]    • GERD (gastroesophageal reflux disease) [K21.9]    • Allergic rhinitis [J30.9]    • Primary insomnia [F51.01]    • Chronic constipation [K59.09]    • Migraines [G43.909]    • Depression with anxiety [F41.8]          PAST MEDICAL HISTORY  Past Medical History:   Diagnosis Date   • Abdominal pain    • Anxiety    • Anxiety    • Arthritis    • Constipation    • Depressed    • Depression    • Endometriosis    • Gastritis    • GERD (gastroesophageal reflux disease)    • Headache    • Headache, tension-type    • Hypertension    • Insomnia    • Migraine    • OP (osteoporosis)    • Poor sleep    • Psoriasis    • Seasonal allergies          SURGICAL HISTORY  Past Surgical History:   Procedure Laterality Date   • APPENDECTOMY     • CHOLECYSTECTOMY N/A 8/17/2018    Procedure: CHOLECYSTECTOMY LAPAROSCOPIC converted to open procedure;  Surgeon: Hernan Hinds MD;  Location: [facility];  Service: General   • COLON SURGERY      • COLONOSCOPY     • COLONOSCOPY N/A 12/12/2018    Procedure: COLONOSCOPY;  Surgeon: Darien Ruff MD;  Location:  [facility] OR;  Service: Gastroenterology   • DIAGNOSTIC LAPAROSCOPY  1990   • DILATATION AND CURETTAGE  1985   • ENDOSCOPY N/A 5/13/2016    Procedure: ESOPHAGOGASTRODUODENOSCOPY ;  Surgeon: Darien Ruff MD;  Location:  [facility] OR;  Service:    • HYSTERECTOMY     • REVISION / TAKEDOWN COLOSTOMY           FAMILY HISTORY  Family History   Problem Relation Age of Onset   • Hyperlipidemia Mother    • Macular degeneration Mother    • Heart disease Father    • Hyperlipidemia Father    • Cancer Father    • Depression Father    • Depression Sister    • Hyperlipidemia Brother    • Depression Brother    • Breast cancer Maternal Aunt    • Breast cancer Cousin    • Breast cancer Cousin    • Colon cancer Neg Hx    • Colon polyps Neg Hx          SOCIAL HISTORY  Social History     Occupational History   • Not on file   Tobacco Use   • Smoking status: Never Smoker   • Smokeless tobacco: Never Used   Vaping Use   • Vaping Use: Never used   Substance and Sexual Activity   • Alcohol use: Yes     Comment: rare   • Drug use: No   • Sexual activity: Defer         CURRENT MEDICATIONS    Current Outpatient Medications:   •  ALPRAZolam (XANAX) 0.5 MG tablet, Take 1 tablet by mouth twice daily, Disp: 45 tablet, Rfl: 0  •  aluminum-magnesium hydroxide-simethicone (MAALOX MAX) 400-400-40 MG/5ML suspension, Take  by mouth Every 6 (Six) Hours As Needed for Indigestion or Heartburn., Disp: , Rfl:   •  amLODIPine (NORVASC) 2.5 MG tablet, Take 1 tablet by mouth once daily, Disp: 90 tablet, Rfl: 0  •  Calcium-Vitamin D-Vitamin K (VIACTIV CALCIUM PLUS D PO), Take 1 tablet by mouth Daily., Disp: , Rfl:   •  clobetasol (TEMOVATE) 0.05 % cream, Apply 1 application topically to the appropriate area as directed 2 (Two) Times a Day., Disp: , Rfl:   •  clotrimazole-betamethasone (Lotrisone) 1-0.05 % cream, Apply 1  application topically to the appropriate area as directed 2 (Two) Times a Day., Disp: 30 g, Rfl: 1  •  estradiol (ESTRACE) 0.1 MG/GM vaginal cream, Insert 2 g into the vagina Every Night., Disp: , Rfl:   •  famotidine (PEPCID) 40 MG tablet, Take 1 tablet by mouth 2 (Two) Times a Day. With lunch and at bedtime (Patient taking differently: Take 40 mg by mouth Daily. With lunch and at bedtime), Disp: 180 tablet, Rfl: 3  •  fluticasone (FLONASE) 50 MCG/ACT nasal spray, 2 sprays into each nostril daily., Disp: , Rfl:   •  lisinopril (PRINIVIL,ZESTRIL) 30 MG tablet, Take 1 tablet by mouth twice daily, Disp: 180 tablet, Rfl: 0  •  loratadine (CLARITIN) 10 MG tablet, Take 10 mg by mouth daily., Disp: , Rfl:   •  omeprazole (priLOSEC) 40 MG capsule, Take 1 capsule by mouth 2 (two) times a day. (Patient taking differently: Take 40 mg by mouth Daily.), Disp: 180 capsule, Rfl: 3  •  PARoxetine (PAXIL) 40 MG tablet, TAKE 1 TABLET BY MOUTH ONCE DAILY IN THE MORNING, Disp: 90 tablet, Rfl: 0  •  Phenergan 25 MG suppository, Insert 1 suppository into the rectum Every 6 (Six) Hours As Needed for Nausea or Vomiting., Disp: 6 suppository, Rfl: 3  •  polyethylene glycol (MIRALAX) packet, Take 17 g by mouth daily., Disp: , Rfl:   •  Probiotic Product (PROBIOTIC DAILY) capsule, Once daily (Patient taking differently: Take 2 capsules by mouth Daily. Once daily), Disp: , Rfl:   •  Simethicone 250 MG capsule, Take 1 capsule by mouth 4 (Four) Times a Day As Needed (gas and bloating)., Disp: 360 capsule, Rfl: 3  •  SUMAtriptan (IMITREX) 100 MG tablet, Take 1 tablet by mouth 1 (One) Time As Needed for Migraine for up to 1 dose. Take one tablet at onset of headache. May repeat dose one time in 2 hours., Disp: 27 tablet, Rfl: 3  •  vitamin B-12 (CYANOCOBALAMIN) 2500 MCG sublingual tablet tablet, Place 2,500 mcg under the tongue Daily., Disp: , Rfl:   •  zolpidem (AMBIEN) 10 MG tablet, Take 1 tablet by mouth Every Night., Disp: 90 tablet, Rfl:  "1    ALLERGIES  Hydrocodone and Sulfa antibiotics    VITALS  Vitals:    05/23/22 1315   BP: 122/84   BP Location: Left arm   Patient Position: Sitting   Cuff Size: Adult   Weight: 47.4 kg (104 lb 6.4 oz)   Height: 153.9 cm (60.6\")       PHYSICAL EXAM  Debilities/Disabilities Identified: None  Emotional Behavior: Appropriate  Wt Readings from Last 3 Encounters:   05/23/22 47.4 kg (104 lb 6.4 oz)   03/22/22 47 kg (103 lb 9.9 oz)   03/14/22 47.7 kg (105 lb 3.2 oz)     Ht Readings from Last 1 Encounters:   05/23/22 153.9 cm (60.6\")     Body mass index is 19.99 kg/m².  Physical Exam  Constitutional:       Appearance: She is well-developed. She is not diaphoretic.   HENT:      Head: Normocephalic and atraumatic.   Eyes:      General: No scleral icterus.     Conjunctiva/sclera: Conjunctivae normal.      Pupils: Pupils are equal, round, and reactive to light.   Neck:      Thyroid: No thyromegaly.   Cardiovascular:      Rate and Rhythm: Normal rate and regular rhythm.      Heart sounds: Normal heart sounds. No murmur heard.    No gallop.   Pulmonary:      Effort: Pulmonary effort is normal.      Breath sounds: Normal breath sounds. No wheezing or rales.   Abdominal:      General: Bowel sounds are normal. There is no distension or abdominal bruit.      Palpations: Abdomen is soft. There is no shifting dullness, fluid wave or mass.      Tenderness: There is generalized abdominal tenderness. There is no guarding. Negative signs include Collins's sign.      Hernia: There is no hernia in the ventral area.   Musculoskeletal:         General: Normal range of motion.      Cervical back: Normal range of motion and neck supple.   Lymphadenopathy:      Cervical: No cervical adenopathy.   Skin:     General: Skin is warm and dry.      Findings: No erythema or rash.   Neurological:      Mental Status: She is alert and oriented to person, place, and time.   Psychiatric:         Mood and Affect: Mood normal.         Behavior: Behavior " normal.         CLINICAL DATA REVIEWED   reviewed previous lab results and integrated with today's visit, reviewed notes from other physicians and/or last GI encounter, reviewed previous endoscopy results and available photos, reviewed surgical pathology results from previous biopsies    ASSESSMENT  Diagnoses and all orders for this visit:    Adhesion of abdominal wall    Generalized abdominal pain    Chronic constipation    Other orders  -     aluminum-magnesium hydroxide-simethicone (MAALOX MAX) 400-400-40 MG/5ML suspension; Take  by mouth Every 6 (Six) Hours As Needed for Indigestion or Heartburn.          PLAN  Return in about 6 months (around 11/23/2022).    I have discussed the above plan with the patient.  They verbalize understanding and are in agreement with the plan.  They have been advised to contact the office for any questions, concerns, or changes related to their health.

## 2022-06-01 ENCOUNTER — OFFICE VISIT (OUTPATIENT)
Dept: SURGERY | Facility: CLINIC | Age: 67
End: 2022-06-01

## 2022-06-01 VITALS
DIASTOLIC BLOOD PRESSURE: 80 MMHG | WEIGHT: 104 LBS | BODY MASS INDEX: 19.63 KG/M2 | SYSTOLIC BLOOD PRESSURE: 138 MMHG | HEIGHT: 61 IN

## 2022-06-01 DIAGNOSIS — F41.8 DEPRESSION WITH ANXIETY: ICD-10-CM

## 2022-06-01 DIAGNOSIS — R10.84 GENERALIZED ABDOMINAL PAIN: Primary | ICD-10-CM

## 2022-06-01 DIAGNOSIS — I10 ESSENTIAL HYPERTENSION: ICD-10-CM

## 2022-06-01 PROCEDURE — 99212 OFFICE O/P EST SF 10 MIN: CPT | Performed by: SURGERY

## 2022-06-01 RX ORDER — ALPRAZOLAM 0.5 MG/1
TABLET ORAL
Qty: 45 TABLET | Refills: 0 | Status: SHIPPED | OUTPATIENT
Start: 2022-06-01 | End: 2022-07-01 | Stop reason: SDUPTHER

## 2022-06-01 RX ORDER — AMLODIPINE BESYLATE 2.5 MG/1
TABLET ORAL
Qty: 90 TABLET | Refills: 0 | Status: SHIPPED | OUTPATIENT
Start: 2022-06-01 | End: 2022-08-29

## 2022-06-01 NOTE — TELEPHONE ENCOUNTER
Rx Refill Note  Requested Prescriptions     Pending Prescriptions Disp Refills    ALPRAZolam (XANAX) 0.5 MG tablet [Pharmacy Med Name: ALPRAZolam 0.5 MG Oral Tablet] 45 tablet 0     Sig: Take 1 tablet by mouth twice daily    amLODIPine (NORVASC) 2.5 MG tablet [Pharmacy Med Name: amLODIPine Besylate 2.5 MG Oral Tablet] 90 tablet 0     Sig: Take 1 tablet by mouth once daily      Last office visit with prescribing clinician: 3/14/2022      Next office visit with prescribing clinician: 6/27/2022            MADONNA NGUYEN MA  06/01/22, 15:03 EDT

## 2022-06-01 NOTE — PROGRESS NOTES
PATIENT INFORMATION  Martina Hardwick       - 1955    CHIEF COMPLAINT  Chief Complaint   Patient presents with   • Abdominal Pain       HISTORY OF PRESENT ILLNESS  HPI she complains of ongoing disabling type abdominal pain.  She says it is diffuse through her abdomen but she points to the area along her transverse and right colon.  She saw Dr. Ruff who said he had maximized medical therapy.        REVIEW OF SYSTEMS  Review of Systems   Constitutional: Negative for activity change, chills, fever and unexpected weight change.   HENT: Negative for congestion.    Eyes: Negative for visual disturbance.   Respiratory: Negative for shortness of breath.    Cardiovascular: Negative for chest pain and palpitations.   Gastrointestinal: Positive for abdominal pain and nausea. Negative for blood in stool.   Endocrine: Negative for cold intolerance and heat intolerance.   Genitourinary: Negative for hematuria.   Musculoskeletal: Negative for gait problem.   Skin: Negative for color change.   Allergic/Immunologic: Negative for immunocompromised state.   Neurological: Negative for weakness and light-headedness.   Hematological: Negative for adenopathy.   Psychiatric/Behavioral: Negative for sleep disturbance. The patient is not nervous/anxious.          ACTIVE PROBLEMS  Patient Active Problem List    Diagnosis    • Generalized abdominal pain [R10.84]    • Chronic abdominal pain [R10.9, G89.29]    • Hyponatremia [E87.1]    • Adhesion of abdominal wall [K66.0]    • Age-related osteoporosis without current pathological fracture [M81.0]    • Psoriasis [L40.9]    • Family history of colonic polyps [Z83.71]    • Routine health maintenance [Z00.00]    • Essential hypertension [I10]    • GERD (gastroesophageal reflux disease) [K21.9]    • Allergic rhinitis [J30.9]    • Primary insomnia [F51.01]    • Chronic constipation [K59.09]    • Migraines [G43.909]    • Depression with anxiety [F41.8]          PAST MEDICAL HISTORY  Past  Medical History:   Diagnosis Date   • Abdominal pain    • Anxiety    • Anxiety    • Arthritis    • Constipation    • Depressed    • Depression    • Endometriosis    • Gastritis    • GERD (gastroesophageal reflux disease)    • Headache    • Headache, tension-type    • Hypertension    • Insomnia    • Migraine    • OP (osteoporosis)    • Poor sleep    • Psoriasis    • Seasonal allergies          SURGICAL HISTORY  Past Surgical History:   Procedure Laterality Date   • APPENDECTOMY     • CHOLECYSTECTOMY N/A 8/17/2018    Procedure: CHOLECYSTECTOMY LAPAROSCOPIC converted to open procedure;  Surgeon: Hernan Hinds MD;  Location: [facility] OR;  Service: General   • COLON SURGERY     • COLONOSCOPY     • COLONOSCOPY N/A 12/12/2018    Procedure: COLONOSCOPY;  Surgeon: Darien Ruff MD;  Location: [facility] OR;  Service: Gastroenterology   • DIAGNOSTIC LAPAROSCOPY  1990   • DILATATION AND CURETTAGE  1985   • ENDOSCOPY N/A 5/13/2016    Procedure: ESOPHAGOGASTRODUODENOSCOPY ;  Surgeon: Darien Ruff MD;  Location: [facility] OR;  Service:    • HYSTERECTOMY     • REVISION / TAKEDOWN COLOSTOMY           FAMILY HISTORY  Family History   Problem Relation Age of Onset   • Hyperlipidemia Mother    • Macular degeneration Mother    • Heart disease Father    • Hyperlipidemia Father    • Cancer Father    • Depression Father    • Depression Sister    • Hyperlipidemia Brother    • Depression Brother    • Breast cancer Maternal Aunt    • Breast cancer Cousin    • Breast cancer Cousin    • Colon cancer Neg Hx    • Colon polyps Neg Hx          SOCIAL HISTORY  Social History     Occupational History   • Not on file   Tobacco Use   • Smoking status: Never Smoker   • Smokeless tobacco: Never Used   Vaping Use   • Vaping Use: Never used   Substance and Sexual Activity   • Alcohol use: Yes     Comment: rare   • Drug use: No   • Sexual activity: Defer         CURRENT MEDICATIONS    Current Outpatient Medications:   •  ALPRAZolam  (XANAX) 0.5 MG tablet, Take 1 tablet by mouth twice daily, Disp: 45 tablet, Rfl: 0  •  aluminum-magnesium hydroxide-simethicone (MAALOX MAX) 400-400-40 MG/5ML suspension, Take  by mouth Every 6 (Six) Hours As Needed for Indigestion or Heartburn., Disp: , Rfl:   •  amLODIPine (NORVASC) 2.5 MG tablet, Take 1 tablet by mouth once daily, Disp: 90 tablet, Rfl: 0  •  Calcium-Vitamin D-Vitamin K (VIACTIV CALCIUM PLUS D PO), Take 1 tablet by mouth Daily., Disp: , Rfl:   •  clobetasol (TEMOVATE) 0.05 % cream, Apply 1 application topically to the appropriate area as directed 2 (Two) Times a Day., Disp: , Rfl:   •  clotrimazole-betamethasone (Lotrisone) 1-0.05 % cream, Apply 1 application topically to the appropriate area as directed 2 (Two) Times a Day., Disp: 30 g, Rfl: 1  •  estradiol (ESTRACE) 0.1 MG/GM vaginal cream, Insert 2 g into the vagina Every Night., Disp: , Rfl:   •  famotidine (PEPCID) 40 MG tablet, Take 1 tablet by mouth 2 (Two) Times a Day. With lunch and at bedtime (Patient taking differently: Take 40 mg by mouth Daily. With lunch and at bedtime), Disp: 180 tablet, Rfl: 3  •  fluticasone (FLONASE) 50 MCG/ACT nasal spray, 2 sprays into each nostril daily., Disp: , Rfl:   •  lisinopril (PRINIVIL,ZESTRIL) 30 MG tablet, Take 1 tablet by mouth twice daily, Disp: 180 tablet, Rfl: 0  •  loratadine (CLARITIN) 10 MG tablet, Take 10 mg by mouth daily., Disp: , Rfl:   •  omeprazole (priLOSEC) 40 MG capsule, Take 1 capsule by mouth 2 (two) times a day. (Patient taking differently: Take 40 mg by mouth Daily.), Disp: 180 capsule, Rfl: 3  •  PARoxetine (PAXIL) 40 MG tablet, TAKE 1 TABLET BY MOUTH ONCE DAILY IN THE MORNING, Disp: 90 tablet, Rfl: 0  •  Phenergan 25 MG suppository, Insert 1 suppository into the rectum Every 6 (Six) Hours As Needed for Nausea or Vomiting., Disp: 6 suppository, Rfl: 3  •  polyethylene glycol (MIRALAX) packet, Take 17 g by mouth daily., Disp: , Rfl:   •  Probiotic Product (PROBIOTIC DAILY)  "capsule, Once daily (Patient taking differently: Take 2 capsules by mouth Daily. Once daily), Disp: , Rfl:   •  SUMAtriptan (IMITREX) 100 MG tablet, Take 1 tablet by mouth 1 (One) Time As Needed for Migraine for up to 1 dose. Take one tablet at onset of headache. May repeat dose one time in 2 hours., Disp: 27 tablet, Rfl: 3  •  vitamin B-12 (CYANOCOBALAMIN) 2500 MCG sublingual tablet tablet, Place 2,500 mcg under the tongue Daily., Disp: , Rfl:   •  zolpidem (AMBIEN) 10 MG tablet, Take 1 tablet by mouth Every Night., Disp: 90 tablet, Rfl: 1    ALLERGIES  Hydrocodone and Sulfa antibiotics    VITALS  Vitals:    06/01/22 1425   BP: 138/80   BP Location: Left arm   Patient Position: Sitting   Cuff Size: Adult   Weight: 47.2 kg (104 lb)   Height: 153.9 cm (60.6\")     PHYSICAL EXAM  Debilities/Disabilities Identified: None  Emotional Behavior: Appropriate  Physical Exam alert thin white female in no active distress today.  She has subjective tenderness along her transverse and right colon.  Her's sits markers showed slow transit in her colon.    ASSESSMENT  Nominal pain colonic hypomotility versus other etiology.      PLAN  The risk benefits and options were discussed with her in detail including observation only versus referral to the [facility] for evaluation versus referral to the [facility] versus exploratory laparotomy lysis of adhesions versus subtotal colectomy.  I have recommended she see colorectal surgery at [facility] to get their opinion regarding further treatment.  "

## 2022-06-06 ENCOUNTER — TELEPHONE (OUTPATIENT)
Dept: INTERNAL MEDICINE | Facility: CLINIC | Age: 67
End: 2022-06-06

## 2022-06-06 NOTE — TELEPHONE ENCOUNTER
Patient is coming in for labs on 06/20/2022 and we are in need of lab orders please.    Thank you!

## 2022-06-08 DIAGNOSIS — Z00.00 ROUTINE HEALTH MAINTENANCE: Primary | ICD-10-CM

## 2022-06-08 DIAGNOSIS — E55.9 HYPOVITAMINOSIS D: ICD-10-CM

## 2022-06-09 RX ORDER — LISINOPRIL 30 MG/1
TABLET ORAL
Qty: 180 TABLET | Refills: 0 | Status: SHIPPED | OUTPATIENT
Start: 2022-06-09 | End: 2022-09-07

## 2022-06-10 ENCOUNTER — TELEPHONE (OUTPATIENT)
Dept: SURGERY | Facility: CLINIC | Age: 67
End: 2022-06-10

## 2022-06-10 NOTE — TELEPHONE ENCOUNTER
Caller: NOE SMITH    Relationship to patient: SELF     Best call back number: [phone]    Patient is needing: DR BENEDICT REFERRED PT TO [facility] AND SHE STILL HAS NOT BEEN SCHEDULED.  SHE WAS TOLD TO CALL BACK IF SHE WAS NOT SCHEDULED BY Friday.

## 2022-06-20 ENCOUNTER — TELEPHONE (OUTPATIENT)
Dept: INTERNAL MEDICINE | Facility: CLINIC | Age: 67
End: 2022-06-20

## 2022-06-20 NOTE — TELEPHONE ENCOUNTER
Caller: Martina Hardwick    Relationship: Self    Best call back number: [phone]     What is the best time to reach you: ANY     Who are you requesting to speak with (clinical staff, provider,  specific staff member): CLINICAL     Do you know the name of the person who called: PATIENT     What was the call regarding: PATIENT STATED HER APPOINTMENT WAS RESCHEDULED FOR  7/1/2022  DID NOT SEE ANYTHING REFLECTING THIS . PLEASE CALL PATIENT AND ADVISE .     Do you require a callback: YES

## 2022-07-01 ENCOUNTER — OFFICE VISIT (OUTPATIENT)
Dept: INTERNAL MEDICINE | Facility: CLINIC | Age: 67
End: 2022-07-01

## 2022-07-01 VITALS
DIASTOLIC BLOOD PRESSURE: 82 MMHG | WEIGHT: 103.6 LBS | HEART RATE: 83 BPM | OXYGEN SATURATION: 99 % | BODY MASS INDEX: 19.56 KG/M2 | TEMPERATURE: 98 F | HEIGHT: 61 IN | SYSTOLIC BLOOD PRESSURE: 126 MMHG

## 2022-07-01 DIAGNOSIS — E87.1 HYPONATREMIA: ICD-10-CM

## 2022-07-01 DIAGNOSIS — M81.0 AGE-RELATED OSTEOPOROSIS WITHOUT CURRENT PATHOLOGICAL FRACTURE: ICD-10-CM

## 2022-07-01 DIAGNOSIS — Z00.00 ROUTINE HEALTH MAINTENANCE: Primary | ICD-10-CM

## 2022-07-01 DIAGNOSIS — F41.8 DEPRESSION WITH ANXIETY: ICD-10-CM

## 2022-07-01 DIAGNOSIS — J30.89 NON-SEASONAL ALLERGIC RHINITIS, UNSPECIFIED TRIGGER: ICD-10-CM

## 2022-07-01 DIAGNOSIS — I10 ESSENTIAL HYPERTENSION: ICD-10-CM

## 2022-07-01 DIAGNOSIS — K21.9 GASTROESOPHAGEAL REFLUX DISEASE WITHOUT ESOPHAGITIS: ICD-10-CM

## 2022-07-01 DIAGNOSIS — F51.01 PRIMARY INSOMNIA: ICD-10-CM

## 2022-07-01 DIAGNOSIS — K31.89: ICD-10-CM

## 2022-07-01 DIAGNOSIS — G43.909 MIGRAINE WITHOUT STATUS MIGRAINOSUS, NOT INTRACTABLE, UNSPECIFIED MIGRAINE TYPE: ICD-10-CM

## 2022-07-01 DIAGNOSIS — K59.09 CHRONIC CONSTIPATION: ICD-10-CM

## 2022-07-01 PROCEDURE — 99214 OFFICE O/P EST MOD 30 MIN: CPT | Performed by: FAMILY MEDICINE

## 2022-07-01 RX ORDER — ALPRAZOLAM 0.5 MG/1
0.5 TABLET ORAL 2 TIMES DAILY
Qty: 60 TABLET | Refills: 5 | Status: SHIPPED | OUTPATIENT
Start: 2022-07-01 | End: 2022-07-01 | Stop reason: SDUPTHER

## 2022-07-01 RX ORDER — ALPRAZOLAM 0.5 MG/1
0.5 TABLET ORAL 2 TIMES DAILY
Qty: 60 TABLET | Refills: 5 | Status: SHIPPED | OUTPATIENT
Start: 2022-07-01 | End: 2023-01-05 | Stop reason: SDUPTHER

## 2022-07-01 NOTE — PROGRESS NOTES
Subjective   Subjective     Martina Hardwick is a 66 y.o. female, who presents with a chief complaint of   Chief Complaint   Patient presents with   • Hypertension   • Abdominal Pain   • Insomnia     Hypertension  Associated symptoms include anxiety and headaches.   Heartburn  She complains of abdominal pain.   Anxiety  Symptoms include insomnia.     1. HTN.  Tolerates medication.  Takes lisinopril 30 mg BID and amlodipine 2.5 mg daily.  Home blood pressures running 110s-130s/60s-70s.   Denies dizziness and chest pain.    2. Depression and anxiety.  Pt reports an increase in her anxiety due to life stress.  She takes paroxetine and prn alprazolam.  Denies SI.    3. Headaches.  She takes sumatriptan for abortive treatment and this is effective.  She hasn't been having too many migraines recently.    4. Chronic constipation, chronic abdominal pain.  She still has debilitating pain.  She has been referred to [facility] motility clinic by Dr. Hinds, appointment pending next month.  She has seen Dr. Hinds.  She sees Dr. Ruff and failed a trial of Linzess.    The following portions of the patient's history were reviewed and updated as appropriate: allergies, current medications, past family history, past medical history, past social history, past surgical history and problem list.    Allergies: Hydrocodone and Sulfa antibiotics    Review of Systems   Constitutional: Negative.    Eyes: Negative.    Respiratory: Negative.    Cardiovascular: Negative.    Gastrointestinal: Positive for abdominal pain and constipation.   Endocrine: Negative.    Genitourinary: Negative.    Musculoskeletal: Positive for arthralgias.   Allergic/Immunologic: Positive for environmental allergies.   Neurological: Positive for headaches.   Hematological: Negative.    Psychiatric/Behavioral: The patient has insomnia.      Objective     Wt Readings from Last 3 Encounters:   07/01/22 47 kg (103 lb 9.6 oz)   06/01/22 47.2 kg (104 lb)   05/23/22 47.4 kg  (104 lb 6.4 oz)     Temp Readings from Last 3 Encounters:   07/01/22 98 °F (36.7 °C)   12/13/21 97.5 °F (36.4 °C)   09/29/21 97.4 °F (36.3 °C) (Temporal)     BP Readings from Last 3 Encounters:   07/01/22 126/82   06/01/22 138/80   05/23/22 122/84     Pulse Readings from Last 3 Encounters:   07/01/22 83   03/22/22 87   03/14/22 85     Body mass index is 19.83 kg/m².  SpO2 Readings from Last 3 Encounters:   01/15/18 96%   10/12/17 98%   08/10/17 99%     Physical Exam   Constitutional: She is oriented to person, place, and time. She appears well-developed.   HENT:   Head: Normocephalic and atraumatic.   Mouth/Throat: Mucous membranes are moist.   Eyes: Conjunctivae are normal.   Neck: No thyromegaly present.   Cardiovascular: Normal rate, regular rhythm and normal heart sounds.   Pulmonary/Chest: Effort normal and breath sounds normal.   Abdominal: Soft. Normal appearance and bowel sounds are normal.   Musculoskeletal: Normal range of motion.      Right lower leg: No edema.      Left lower leg: No edema.   Neurological: She is alert and oriented to person, place, and time.   Skin: Skin is warm and dry. No rash noted.   Psychiatric: Her behavior is normal. Mood normal.   Nursing note and vitals reviewed.      Assessment/Plan   Martina was seen today for hypertension, insomnia and heartburn.    Diagnoses and all orders for this visit:      Essential hypertension    Routine health maintenance    Migraine without status migrainosus, not intractable, unspecified migraine type    Gastroesophageal reflux disease without esophagitis    Depression with anxiety    Primary insomnia    Chronic constipation    Chronic abdominal pain    Adhesions    Family history of colonic polyps    Non-seasonal allergic rhinitis, unspecified trigger    Osteoporosis    Hyponatremia    Pericardial effusion    1. Routine health maint.  Mammogram UTD.  Colonoscopy UTD.  Hysterectomy done for non-cancerous reasons.  Covid-19 vaccines done.  Pneumovax  HILDA.  John at pharmacy.      2. HTN.  Controlled.  Continue lisinopril 30 mg BID and amlodipine 2.5 mg daily.  Labs reviewed.    3. Migraines.  Doing well with these.  Continue sumatriptan for abortive treatment.    4. GERD.  Continue omeprazole, famotidine, and lifestyle measures.  She sees GI.    5. Depression with anxiety.  Controlled with paroxetine 40 mg daily.  On prn alprazolam.  Med management agreement and Joby UTD.    6. Insomnia.  Zolpidem is effective.  Med management agreement and Joby UTD.    7. Chronic constipation/colonic hypomotility.  On daily Miralax and probiotic.  She is still having significant abdominal pain after she eats.  She has seen Dr. Ruff, Dr. Rizzo, and Dr. Hinds.  Appointment with [facility] motility clinic pending next month.    8. LORRIE.  Controlled with fluticasone nasal spray and loratadine.    9. Osteoporosis.  Didn't tolerate bisphosphonates previously (Dr. Ghotra).  She wanted to wait until she turns 65 to further pursue medication.  Continue calcium, vitamin D and weight-bearing exercise.  Discuss ordering Prolia next time.  We need to get a handle on her abdominal pain before adding more medication into the mix.    10. Chronic hyponatremia.  Hyperkalemia in the past.  Stable  She admits to over-hydrating with water and consuming little sodium.  Trial of adding electrolytes for hydration.    11. Pericardial effusion seen on abd/CT.  Echocardiogram showed no effusion.      Outpatient Medications Prior to Visit   Medication Sig Dispense Refill   • ALPRAZolam (XANAX) 0.5 MG tablet Take 1 tablet by mouth twice daily 45 tablet 0   • aluminum-magnesium hydroxide-simethicone (MAALOX MAX) 400-400-40 MG/5ML suspension Take  by mouth As Needed for Indigestion or Heartburn.     • amLODIPine (NORVASC) 2.5 MG tablet Take 1 tablet by mouth once daily 90 tablet 0   • Calcium-Vitamin D-Vitamin K (VIACTIV CALCIUM PLUS D PO) Take 1 tablet by mouth Daily.     • clobetasol (TEMOVATE) 0.05 %  cream Apply 1 application topically to the appropriate area as directed 2 (Two) Times a Day.     • clotrimazole-betamethasone (Lotrisone) 1-0.05 % cream Apply 1 application topically to the appropriate area as directed 2 (Two) Times a Day. 30 g 1   • estradiol (ESTRACE) 0.1 MG/GM vaginal cream Insert 2 g into the vagina Every Night.     • famotidine (PEPCID) 40 MG tablet Take 1 tablet by mouth 2 (Two) Times a Day. With lunch and at bedtime (Patient taking differently: Take 40 mg by mouth Daily. With lunch and at bedtime) 180 tablet 3   • fluticasone (FLONASE) 50 MCG/ACT nasal spray 2 sprays into each nostril daily.     • lisinopril (PRINIVIL,ZESTRIL) 30 MG tablet Take 1 tablet by mouth twice daily 180 tablet 0   • loratadine (CLARITIN) 10 MG tablet Take 10 mg by mouth daily.     • omeprazole (priLOSEC) 40 MG capsule Take 1 capsule by mouth 2 (two) times a day. (Patient taking differently: Take 40 mg by mouth Daily.) 180 capsule 3   • PARoxetine (PAXIL) 40 MG tablet TAKE 1 TABLET BY MOUTH ONCE DAILY IN THE MORNING 90 tablet 0   • Phenergan 25 MG suppository Insert 1 suppository into the rectum Every 6 (Six) Hours As Needed for Nausea or Vomiting. (Patient taking differently: Insert 25 mg into the rectum As Needed for Nausea or Vomiting.) 6 suppository 3   • polyethylene glycol (MIRALAX) packet Take 17 g by mouth daily.     • Probiotic Product (PROBIOTIC DAILY) capsule Once daily (Patient taking differently: Take 2 capsules by mouth Daily. Once daily)     • SUMAtriptan (IMITREX) 100 MG tablet Take 1 tablet by mouth 1 (One) Time As Needed for Migraine for up to 1 dose. Take one tablet at onset of headache. May repeat dose one time in 2 hours. (Patient taking differently: Take 100 mg by mouth As Needed for Migraine. Take one tablet at onset of headache. May repeat dose one time in 2 hours.) 27 tablet 3   • vitamin B-12 (CYANOCOBALAMIN) 2500 MCG sublingual tablet tablet Place 2,500 mcg under the tongue Daily.     •  zolpidem (AMBIEN) 10 MG tablet Take 1 tablet by mouth Every Night. 90 tablet 1     No facility-administered medications prior to visit.     No orders of the defined types were placed in this encounter.    [unfilled]  There are no discontinued medications.  Return in about 3 months (around 10/1/2022).

## 2022-07-21 DIAGNOSIS — F41.8 DEPRESSION WITH ANXIETY: ICD-10-CM

## 2022-07-21 RX ORDER — PAROXETINE HYDROCHLORIDE 40 MG/1
TABLET, FILM COATED ORAL
Qty: 90 TABLET | Refills: 0 | Status: SHIPPED | OUTPATIENT
Start: 2022-07-21 | End: 2022-10-03

## 2022-07-21 NOTE — TELEPHONE ENCOUNTER
Rx Refill Note  Requested Prescriptions     Pending Prescriptions Disp Refills   • PARoxetine (PAXIL) 40 MG tablet [Pharmacy Med Name: PARoxetine HCl 40 MG Oral Tablet] 90 tablet 0     Sig: TAKE 1 TABLET BY MOUTH ONCE DAILY IN THE MORNING      Last office visit with prescribing clinician: 7/1/2022      Next office visit with prescribing clinician: 10/3/2022            Yanni Messer MA  07/21/22, 10:57 EDT

## 2022-08-29 DIAGNOSIS — I10 ESSENTIAL HYPERTENSION: ICD-10-CM

## 2022-08-29 RX ORDER — AMLODIPINE BESYLATE 2.5 MG/1
TABLET ORAL
Qty: 90 TABLET | Refills: 0 | Status: SHIPPED | OUTPATIENT
Start: 2022-08-29 | End: 2022-11-27

## 2022-08-29 NOTE — TELEPHONE ENCOUNTER
Rx Refill Note  Requested Prescriptions     Pending Prescriptions Disp Refills   • amLODIPine (NORVASC) 2.5 MG tablet [Pharmacy Med Name: amLODIPine Besylate 2.5 MG Oral Tablet] 90 tablet 0     Sig: Take 1 tablet by mouth once daily      Last office visit with prescribing clinician: 7/1/2022      Next office visit with prescribing clinician: 10/3/2022            Yanni Messer MA  08/29/22, 11:43 EDT

## 2022-09-07 DIAGNOSIS — G89.29 CHRONIC ABDOMINAL PAIN: Primary | ICD-10-CM

## 2022-09-07 DIAGNOSIS — K66.0 INTRA-ABDOMINAL ADHESIONS: ICD-10-CM

## 2022-09-07 DIAGNOSIS — R10.9 CHRONIC ABDOMINAL PAIN: Primary | ICD-10-CM

## 2022-09-07 RX ORDER — LISINOPRIL 30 MG/1
TABLET ORAL
Qty: 180 TABLET | Refills: 0 | Status: SHIPPED | OUTPATIENT
Start: 2022-09-07 | End: 2022-12-06

## 2022-09-07 NOTE — TELEPHONE ENCOUNTER
Rx Refill Note  Requested Prescriptions     Pending Prescriptions Disp Refills   • lisinopril (PRINIVIL,ZESTRIL) 30 MG tablet [Pharmacy Med Name: Lisinopril 30 MG Oral Tablet] 180 tablet 0     Sig: Take 1 tablet by mouth twice daily      Last office visit with prescribing clinician: 7/1/2022      Next office visit with prescribing clinician: 10/3/2022            Yanni Messer MA  09/07/22, 09:27 EDT

## 2022-10-03 ENCOUNTER — OFFICE VISIT (OUTPATIENT)
Dept: INTERNAL MEDICINE | Facility: CLINIC | Age: 67
End: 2022-10-03

## 2022-10-03 VITALS
HEIGHT: 61 IN | SYSTOLIC BLOOD PRESSURE: 118 MMHG | BODY MASS INDEX: 19.23 KG/M2 | HEART RATE: 82 BPM | DIASTOLIC BLOOD PRESSURE: 60 MMHG | WEIGHT: 101.88 LBS | TEMPERATURE: 97.3 F | OXYGEN SATURATION: 99 %

## 2022-10-03 DIAGNOSIS — J30.89 NON-SEASONAL ALLERGIC RHINITIS, UNSPECIFIED TRIGGER: ICD-10-CM

## 2022-10-03 DIAGNOSIS — F51.01 PRIMARY INSOMNIA: ICD-10-CM

## 2022-10-03 DIAGNOSIS — M81.0 AGE-RELATED OSTEOPOROSIS WITHOUT CURRENT PATHOLOGICAL FRACTURE: ICD-10-CM

## 2022-10-03 DIAGNOSIS — I10 ESSENTIAL HYPERTENSION: ICD-10-CM

## 2022-10-03 DIAGNOSIS — G43.909 MIGRAINE WITHOUT STATUS MIGRAINOSUS, NOT INTRACTABLE, UNSPECIFIED MIGRAINE TYPE: ICD-10-CM

## 2022-10-03 DIAGNOSIS — K21.9 GASTROESOPHAGEAL REFLUX DISEASE WITHOUT ESOPHAGITIS: ICD-10-CM

## 2022-10-03 DIAGNOSIS — F41.8 DEPRESSION WITH ANXIETY: ICD-10-CM

## 2022-10-03 DIAGNOSIS — K59.09 CHRONIC CONSTIPATION: ICD-10-CM

## 2022-10-03 DIAGNOSIS — G89.29 CHRONIC ABDOMINAL PAIN: ICD-10-CM

## 2022-10-03 DIAGNOSIS — Z00.00 ROUTINE HEALTH MAINTENANCE: Primary | ICD-10-CM

## 2022-10-03 DIAGNOSIS — R10.9 CHRONIC ABDOMINAL PAIN: ICD-10-CM

## 2022-10-03 DIAGNOSIS — E87.1 HYPONATREMIA: ICD-10-CM

## 2022-10-03 PROCEDURE — 99214 OFFICE O/P EST MOD 30 MIN: CPT | Performed by: FAMILY MEDICINE

## 2022-10-03 RX ORDER — ZOLPIDEM TARTRATE 10 MG/1
10 TABLET ORAL NIGHTLY
Qty: 90 TABLET | Refills: 1 | Status: SHIPPED | OUTPATIENT
Start: 2022-10-03 | End: 2023-04-03 | Stop reason: SDUPTHER

## 2022-10-03 RX ORDER — DULOXETIN HYDROCHLORIDE 60 MG/1
60 CAPSULE, DELAYED RELEASE ORAL DAILY
Qty: 30 CAPSULE | Refills: 5 | Status: SHIPPED | OUTPATIENT
Start: 2022-10-03 | End: 2023-02-06

## 2022-10-03 NOTE — PROGRESS NOTES
Subjective   Subjective     Martina Hardwick is a 66 y.o. female, who presents with a chief complaint of   Chief Complaint   Patient presents with   • Hypertension     F/u     Hypertension  Associated symptoms include anxiety and headaches.   Heartburn  She complains of abdominal pain.   Anxiety  Symptoms include insomnia.     1. HTN.  Tolerates medication.  Takes lisinopril 30 mg BID and amlodipine 2.5 mg daily.  Home blood pressures running 110s-130s/60s-70s.   Denies dizziness and chest pain.    2. Depression and anxiety.  Pt reports an increase in her anxiety due to life stress.  She takes paroxetine and prn alprazolam.  Denies SI.    3. Headaches.  She takes sumatriptan for abortive treatment and this is effective.  She hasn't been having too many migraines recently.    4. Chronic constipation, chronic abdominal pain.  She still has debilitating pain.  She has been referred to [facility] motility clinic by Dr. Hinds.  She has seen Dr. Hinds.  She sees Dr. Ruff and failed a trial of Linzess.  She has tried multiple medications such as antispasmotics.  She thinks her pain is due to adhesions.  She has an appointment with Dr. Coombs later this week.    The following portions of the patient's history were reviewed and updated as appropriate: allergies, current medications, past family history, past medical history, past social history, past surgical history and problem list.    Allergies: Hydrocodone and Sulfa antibiotics    Review of Systems   Constitutional: Negative.    Eyes: Negative.    Respiratory: Negative.    Cardiovascular: Negative.    Gastrointestinal: Positive for abdominal pain and constipation.   Endocrine: Negative.    Genitourinary: Negative.    Musculoskeletal: Positive for arthralgias.   Allergic/Immunologic: Positive for environmental allergies.   Neurological: Positive for headaches.   Hematological: Negative.    Psychiatric/Behavioral: The patient has insomnia.      Objective     Wt Readings  from Last 3 Encounters:   10/03/22 46.2 kg (101 lb 14.1 oz)   07/01/22 47 kg (103 lb 9.6 oz)   06/01/22 47.2 kg (104 lb)     Temp Readings from Last 3 Encounters:   10/03/22 97.3 °F (36.3 °C)   07/01/22 98 °F (36.7 °C)   12/13/21 97.5 °F (36.4 °C)     BP Readings from Last 3 Encounters:   10/03/22 118/60   07/01/22 126/82   06/01/22 138/80     Pulse Readings from Last 3 Encounters:   10/03/22 82   07/01/22 83   03/22/22 87     Body mass index is 19.51 kg/m².  SpO2 Readings from Last 3 Encounters:   01/15/18 96%   10/12/17 98%   08/10/17 99%     Physical Exam   Constitutional: She is oriented to person, place, and time. She appears well-developed.   HENT:   Head: Normocephalic and atraumatic.   Mouth/Throat: Mucous membranes are moist.   Eyes: Conjunctivae are normal.   Neck: No thyromegaly present.   Cardiovascular: Normal rate, regular rhythm and normal heart sounds.   Pulmonary/Chest: Effort normal and breath sounds normal.   Abdominal: Soft. Normal appearance and bowel sounds are normal.   Musculoskeletal: Normal range of motion.      Right lower leg: No edema.      Left lower leg: No edema.   Neurological: She is alert and oriented to person, place, and time.   Skin: Skin is warm and dry. No rash noted.   Psychiatric: Her behavior is normal. Mood normal.   Nursing note and vitals reviewed.      Assessment/Plan   Martina was seen today for hypertension, insomnia and heartburn.    Diagnoses and all orders for this visit:      Essential hypertension    Routine health maintenance    Migraine without status migrainosus, not intractable, unspecified migraine type    Gastroesophageal reflux disease without esophagitis    Depression with anxiety    Primary insomnia    Chronic constipation    Chronic abdominal pain    Adhesions    Family history of colonic polyps    Non-seasonal allergic rhinitis, unspecified trigger    Osteoporosis    Hyponatremia    Pericardial effusion    1. Routine health maint.  Mammogram UTD.   Colonoscopy UTD.  Hysterectomy done for non-cancerous reasons.  Covid-19 vaccines done.  Pneumovax UTD.  Shingrix at pharmacy.      2. HTN.  Controlled.  Continue lisinopril 30 mg BID and amlodipine 2.5 mg daily.  Labs reviewed.    3. Migraines.  Doing well with these.  Continue sumatriptan for abortive treatment.    4. GERD.  Continue omeprazole, famotidine, and lifestyle measures.  She sees GI.    5. Depression with anxiety.  Not controlled with paroxetine 40 mg daily.  On prn alprazolam.  Wean off paroxetine.  Start duloxetine.  Med management agreement and Joby UTD.    6. Insomnia.  Zolpidem is effective.  Med management agreement and Joby UTD.    7. Chronic constipation/colonic hypomotility.  On daily Miralax and probiotic.  She is still having significant abdominal pain after she eats.  She has seen Dr. Ruff, Dr. Rizzo, and Dr. Hinds.  Saw [facility] motility clinic.  She has appt with Dr. Coombs this week.  She is quite worried about adhesions.    8. LORRIE.  Controlled with fluticasone nasal spray and loratadine.    9. Osteoporosis.  Didn't tolerate bisphosphonates previously (Dr. Ghotra).  She wanted to wait until she turns 65 to further pursue medication.  Continue calcium, vitamin D and weight-bearing exercise.  Discuss ordering Prolia next time.  We need to get a handle on her abdominal pain before adding more medication into the mix.    10. Chronic hyponatremia.  Hyperkalemia in the past.  Stable  She admits to over-hydrating with water and consuming little sodium.        Outpatient Medications Prior to Visit   Medication Sig Dispense Refill   • ALPRAZolam (XANAX) 0.5 MG tablet Take 1 tablet by mouth 2 (Two) Times a Day. 60 tablet 5   • aluminum-magnesium hydroxide-simethicone (MAALOX MAX) 400-400-40 MG/5ML suspension Take  by mouth As Needed for Indigestion or Heartburn.     • amLODIPine (NORVASC) 2.5 MG tablet Take 1 tablet by mouth once daily 90 tablet 0   • Calcium-Vitamin D-Vitamin K (VIACTIV  CALCIUM PLUS D PO) Take 1 tablet by mouth Daily.     • clobetasol (TEMOVATE) 0.05 % cream Apply 1 application topically to the appropriate area as directed 2 (Two) Times a Day.     • clotrimazole-betamethasone (Lotrisone) 1-0.05 % cream Apply 1 application topically to the appropriate area as directed 2 (Two) Times a Day. 30 g 1   • estradiol (ESTRACE) 0.1 MG/GM vaginal cream Insert 2 g into the vagina Every Night.     • famotidine (PEPCID) 40 MG tablet Take 1 tablet by mouth 2 (Two) Times a Day. With lunch and at bedtime (Patient taking differently: Take 40 mg by mouth Daily. With lunch and at bedtime) 180 tablet 3   • fluticasone (FLONASE) 50 MCG/ACT nasal spray 2 sprays into each nostril daily.     • lisinopril (PRINIVIL,ZESTRIL) 30 MG tablet Take 1 tablet by mouth twice daily 180 tablet 0   • loratadine (CLARITIN) 10 MG tablet Take 10 mg by mouth daily.     • Phenergan 25 MG suppository Insert 1 suppository into the rectum Every 6 (Six) Hours As Needed for Nausea or Vomiting. (Patient taking differently: Insert 25 mg into the rectum As Needed for Nausea or Vomiting.) 6 suppository 3   • polyethylene glycol (MIRALAX) packet Take 17 g by mouth daily.     • Probiotic Product (PROBIOTIC DAILY) capsule Once daily (Patient taking differently: Take 2 capsules by mouth Daily. Once daily)     • SUMAtriptan (IMITREX) 100 MG tablet Take 1 tablet by mouth 1 (One) Time As Needed for Migraine for up to 1 dose. Take one tablet at onset of headache. May repeat dose one time in 2 hours. (Patient taking differently: Take 100 mg by mouth As Needed for Migraine. Take one tablet at onset of headache. May repeat dose one time in 2 hours.) 27 tablet 3   • vitamin B-12 (CYANOCOBALAMIN) 2500 MCG sublingual tablet tablet Place 2,500 mcg under the tongue Daily.     • omeprazole (priLOSEC) 40 MG capsule Take 1 capsule by mouth 2 (two) times a day. (Patient taking differently: Take 40 mg by mouth Daily.) 180 capsule 3   • PARoxetine (PAXIL)  40 MG tablet TAKE 1 TABLET BY MOUTH ONCE DAILY IN THE MORNING 90 tablet 0   • zolpidem (AMBIEN) 10 MG tablet Take 1 tablet by mouth Every Night. 90 tablet 1     No facility-administered medications prior to visit.     New Medications Ordered This Visit   Medications   • DULoxetine (CYMBALTA) 60 MG capsule     Sig: Take 1 capsule by mouth Daily.     Dispense:  30 capsule     Refill:  5   • zolpidem (AMBIEN) 10 MG tablet     Sig: Take 1 tablet by mouth Every Night.     Dispense:  90 tablet     Refill:  1     [unfilled]  Medications Discontinued During This Encounter   Medication Reason   • omeprazole (priLOSEC) 40 MG capsule Alternate therapy   • PARoxetine (PAXIL) 40 MG tablet    • zolpidem (AMBIEN) 10 MG tablet Reorder     Return in about 4 months (around 2/3/2023).

## 2022-10-06 ENCOUNTER — TELEPHONE (OUTPATIENT)
Dept: INTERNAL MEDICINE | Facility: CLINIC | Age: 67
End: 2022-10-06

## 2022-10-06 NOTE — TELEPHONE ENCOUNTER
Call transferred from [facility], patient very upset.  Dr. Coombs's office canceled appointment scheduled for 10/7.  Stating patient needs to go to [place] or [facility].  Patient would like to speak with Dr. Dubois to see if she can find out why Dr. Coombs would cancel the day before her appointment.

## 2022-10-12 DIAGNOSIS — R10.9 CHRONIC ABDOMINAL PAIN: Primary | ICD-10-CM

## 2022-10-12 DIAGNOSIS — G89.29 CHRONIC ABDOMINAL PAIN: Primary | ICD-10-CM

## 2022-10-27 ENCOUNTER — TELEPHONE (OUTPATIENT)
Dept: INTERNAL MEDICINE | Facility: CLINIC | Age: 67
End: 2022-10-27

## 2022-10-27 NOTE — TELEPHONE ENCOUNTER
Patient wanted Dr. Sherman to be aware of your current medical treatments.    ** Being referred to the [facility]  ** Receiving Pelvic Floor PT through [facility]  ** Being seen by [facility]

## 2022-11-27 DIAGNOSIS — I10 ESSENTIAL HYPERTENSION: ICD-10-CM

## 2022-11-27 RX ORDER — AMLODIPINE BESYLATE 2.5 MG/1
TABLET ORAL
Qty: 90 TABLET | Refills: 0 | Status: SHIPPED | OUTPATIENT
Start: 2022-11-27 | End: 2023-03-02

## 2022-11-27 NOTE — TELEPHONE ENCOUNTER
Rx Refill Note  Requested Prescriptions     Pending Prescriptions Disp Refills    amLODIPine (NORVASC) 2.5 MG tablet [Pharmacy Med Name: amLODIPine Besylate 2.5 MG Oral Tablet] 90 tablet 0     Sig: Take 1 tablet by mouth once daily      Last office visit with prescribing clinician: 10/3/2022      Next office visit with prescribing clinician: 2/6/2023            Cristina Ulloa MA  11/27/22, 16:07 EST

## 2022-12-06 RX ORDER — LISINOPRIL 30 MG/1
TABLET ORAL
Qty: 180 TABLET | Refills: 0 | Status: SHIPPED | OUTPATIENT
Start: 2022-12-06 | End: 2023-02-06

## 2022-12-06 NOTE — TELEPHONE ENCOUNTER
Rx Refill Note  Requested Prescriptions     Pending Prescriptions Disp Refills    lisinopril (PRINIVIL,ZESTRIL) 30 MG tablet [Pharmacy Med Name: Lisinopril 30 MG Oral Tablet] 180 tablet 0     Sig: Take 1 tablet by mouth twice daily      Last office visit with prescribing clinician: 10/3/2022   Last telemedicine visit with prescribing clinician: 2/6/2023   Next office visit with prescribing clinician: 2/6/2023                         Would you like a call back once the refill request has been completed: [] Yes [] No    If the office needs to give you a call back, can they leave a voicemail: [] Yes [] No    Cristina Ulloa MA  12/06/22, 11:16 EST

## 2022-12-19 ENCOUNTER — OFFICE VISIT (OUTPATIENT)
Dept: GASTROENTEROLOGY | Facility: CLINIC | Age: 67
End: 2022-12-19

## 2022-12-19 VITALS
DIASTOLIC BLOOD PRESSURE: 68 MMHG | BODY MASS INDEX: 18.84 KG/M2 | WEIGHT: 99.8 LBS | SYSTOLIC BLOOD PRESSURE: 112 MMHG | HEIGHT: 61 IN

## 2022-12-19 DIAGNOSIS — K21.9 GASTROESOPHAGEAL REFLUX DISEASE WITHOUT ESOPHAGITIS: ICD-10-CM

## 2022-12-19 DIAGNOSIS — R10.9 CHRONIC ABDOMINAL PAIN: ICD-10-CM

## 2022-12-19 DIAGNOSIS — G89.29 CHRONIC ABDOMINAL PAIN: ICD-10-CM

## 2022-12-19 DIAGNOSIS — K59.09 CHRONIC CONSTIPATION: Primary | ICD-10-CM

## 2022-12-19 DIAGNOSIS — K66.0 ADHESION OF ABDOMINAL WALL: ICD-10-CM

## 2022-12-19 PROCEDURE — 99213 OFFICE O/P EST LOW 20 MIN: CPT | Performed by: INTERNAL MEDICINE

## 2022-12-19 RX ORDER — MIRTAZAPINE 15 MG/1
15 TABLET, FILM COATED ORAL NIGHTLY
COMMUNITY
End: 2023-01-05

## 2022-12-19 NOTE — PROGRESS NOTES
PATIENT INFORMATION  Martina Hardwick       - 1955    CHIEF COMPLAINT  Chief Complaint   Patient presents with   • Abdominal Pain   • Constipation     CIC       HISTORY OF PRESENT ILLNESS  Here after seeing ANASTASIA Hinds and then Hope and then Regina Coombs said she couldn't help and then went to [facility] and doesn't want to travel for pain management.       Her question then is can she be treated here but that would be pain management so encouraed to see PCP and get referral to Pain to review the recommnedations        REVIEWED PERTINENT RESULTS/ LABS  Lab Results   Component Value Date    CASEREPORT  2018     Surgical Pathology Report                         Case: [id number]                                  Authorizing Provider:  Hernan Hinds MD        Collected:           2018 09:17 AM          Ordering Location:     [facility]   Received:            2018 10:06 AM                                 OR                                                                           Pathologist:           Marquez Pierre MD                                                     Specimen:    Gallbladder, GALLBLADDER                                                                   FINALDX  2018     1. Gallbladder, CHOLECYSTECTOMY LAPAROSCOPIC:  Testing performed at outside laboratory. See scanned report.             Lab Results   Component Value Date    HGB 12.6 2022    MCV 92 2022     2022    ALT 11 2022    AST 19 2022    HGBA1C 5.3 2022    TRIG 65 2022      No results found.    REVIEW OF SYSTEMS  Review of Systems   Constitutional: Positive for appetite change, fatigue and unexpected weight change.   Eyes: Negative.    Respiratory: Positive for cough.    Cardiovascular: Negative.    Gastrointestinal: Positive for abdominal distention, abdominal pain, constipation and nausea.        Reflux   Endocrine: Negative.     Genitourinary: Negative.    Musculoskeletal: Negative.    Skin: Negative.    Allergic/Immunologic: Negative.    Neurological: Positive for dizziness, light-headedness and headaches.   Hematological: Bruises/bleeds easily.   Psychiatric/Behavioral: The patient is nervous/anxious.          ACTIVE PROBLEMS  Patient Active Problem List    Diagnosis    • Gastrointestinal hypomotility [K31.89]    • Generalized abdominal pain [R10.84]    • Chronic abdominal pain [R10.9, G89.29]    • Hyponatremia [E87.1]    • Adhesion of abdominal wall [K66.0]    • Age-related osteoporosis without current pathological fracture [M81.0]    • Psoriasis [L40.9]    • Family history of colonic polyps [Z83.71]    • Routine health maintenance [Z00.00]    • Essential hypertension [I10]    • GERD (gastroesophageal reflux disease) [K21.9]    • Allergic rhinitis [J30.9]    • Primary insomnia [F51.01]    • Chronic constipation [K59.09]    • Migraines [G43.909]    • Depression with anxiety [F41.8]          PAST MEDICAL HISTORY  Past Medical History:   Diagnosis Date   • Arthritis    • Autoantibody titer positive    • Bloating    • Chronic abdominal pain    • Chronic constipation    • Encounter for preventive health examination    • Endometriosis    • Gastritis    • Gastrointestinal hypomotility    • GERD (gastroesophageal reflux disease)    • Headache, tension-type    • Hypertension    • Hyponatremia    • Insomnia    • Intestinal autonomic neuropathy    • Migraine    • Mixed anxiety and depressive disorder    • Moderate malnutrition (HCC)    • Noninfectious gastroenteritis    • OP (osteoporosis)    • Osteoporosis    • Poor sleep    • Psoriasis    • Seasonal allergies    • Visceral hyperalgesia          SURGICAL HISTORY  Past Surgical History:   Procedure Laterality Date   • APPENDECTOMY     • CHOLECYSTECTOMY N/A 8/17/2018    Procedure: CHOLECYSTECTOMY LAPAROSCOPIC converted to open procedure;  Surgeon: Hernan Hinds MD;  Location: [facility] OR;   Service: General   • COLON SURGERY     • COLONOSCOPY     • COLONOSCOPY N/A 12/12/2018    Procedure: COLONOSCOPY;  Surgeon: Darien Ruff MD;  Location: [facility] OR;  Service: Gastroenterology   • DIAGNOSTIC LAPAROSCOPY  1990   • DILATATION AND CURETTAGE  1985   • ENDOSCOPY N/A 5/13/2016    Procedure: ESOPHAGOGASTRODUODENOSCOPY ;  Surgeon: Dairen Ruff MD;  Location: [facility] OR;  Service:    • HYSTERECTOMY     • REVISION / TAKEDOWN COLOSTOMY           FAMILY HISTORY  Family History   Problem Relation Age of Onset   • Hyperlipidemia Mother    • Macular degeneration Mother    • Heart disease Father    • Hyperlipidemia Father    • Cancer Father    • Depression Father    • Depression Sister    • Hyperlipidemia Brother    • Depression Brother    • Breast cancer Maternal Aunt    • Breast cancer Cousin    • Breast cancer Cousin    • Colon cancer Neg Hx    • Colon polyps Neg Hx          SOCIAL HISTORY  Social History     Occupational History   • Not on file   Tobacco Use   • Smoking status: Never   • Smokeless tobacco: Never   Vaping Use   • Vaping Use: Never used   Substance and Sexual Activity   • Alcohol use: Yes     Comment: rare   • Drug use: No   • Sexual activity: Defer         CURRENT MEDICATIONS    Current Outpatient Medications:   •  ALPRAZolam (XANAX) 0.5 MG tablet, Take 1 tablet by mouth 2 (Two) Times a Day., Disp: 60 tablet, Rfl: 5  •  aluminum-magnesium hydroxide-simethicone (MAALOX MAX) 400-400-40 MG/5ML suspension, Take  by mouth As Needed for Indigestion or Heartburn., Disp: , Rfl:   •  amLODIPine (NORVASC) 2.5 MG tablet, Take 1 tablet by mouth once daily, Disp: 90 tablet, Rfl: 0  •  Calcium-Vitamin D-Vitamin K (VIACTIV CALCIUM PLUS D PO), Take 1 tablet by mouth Daily., Disp: , Rfl:   •  clobetasol (TEMOVATE) 0.05 % cream, Apply 1 application topically to the appropriate area as directed 2 (Two) Times a Day., Disp: , Rfl:   •  clotrimazole-betamethasone (Lotrisone) 1-0.05 % cream,  Apply 1 application topically to the appropriate area as directed 2 (Two) Times a Day., Disp: 30 g, Rfl: 1  •  DULoxetine (CYMBALTA) 60 MG capsule, Take 1 capsule by mouth Daily., Disp: 30 capsule, Rfl: 5  •  estradiol (ESTRACE) 0.1 MG/GM vaginal cream, Insert 2 g into the vagina Every Night., Disp: , Rfl:   •  famotidine (PEPCID) 40 MG tablet, Take 1 tablet by mouth 2 (Two) Times a Day. With lunch and at bedtime (Patient taking differently: Take 40 mg by mouth Daily. With lunch and at bedtime), Disp: 180 tablet, Rfl: 3  •  fluticasone (FLONASE) 50 MCG/ACT nasal spray, 2 sprays into each nostril daily., Disp: , Rfl:   •  lisinopril (PRINIVIL,ZESTRIL) 30 MG tablet, Take 1 tablet by mouth twice daily, Disp: 180 tablet, Rfl: 0  •  loratadine (CLARITIN) 10 MG tablet, Take 10 mg by mouth daily., Disp: , Rfl:   •  mirtazapine (REMERON) 15 MG tablet, Take 15 mg by mouth Every Night., Disp: , Rfl:   •  Phenergan 25 MG suppository, Insert 1 suppository into the rectum Every 6 (Six) Hours As Needed for Nausea or Vomiting. (Patient taking differently: Insert 25 mg into the rectum As Needed for Nausea or Vomiting.), Disp: 6 suppository, Rfl: 3  •  polyethylene glycol (MIRALAX) packet, Take 17 g by mouth daily., Disp: , Rfl:   •  Probiotic Product (PROBIOTIC DAILY) capsule, Once daily (Patient taking differently: Take 2 capsules by mouth Daily. Once daily), Disp: , Rfl:   •  SUMAtriptan (IMITREX) 100 MG tablet, Take 1 tablet by mouth 1 (One) Time As Needed for Migraine for up to 1 dose. Take one tablet at onset of headache. May repeat dose one time in 2 hours. (Patient taking differently: Take 100 mg by mouth As Needed for Migraine. Take one tablet at onset of headache. May repeat dose one time in 2 hours.), Disp: 27 tablet, Rfl: 3  •  vitamin B-12 (CYANOCOBALAMIN) 2500 MCG sublingual tablet tablet, Place 2,500 mcg under the tongue Daily., Disp: , Rfl:   •  zolpidem (AMBIEN) 10 MG tablet, Take 1 tablet by mouth Every Night.,  "Disp: 90 tablet, Rfl: 1    ALLERGIES  Hydrocodone and Sulfa antibiotics    VITALS  Vitals:    12/19/22 1403   BP: 112/68   BP Location: Left arm   Patient Position: Sitting   Cuff Size: Adult   Weight: 45.3 kg (99 lb 12.8 oz)   Height: 153.9 cm (60.59\")       PHYSICAL EXAM  Debilities/Disabilities Identified: None  Emotional Behavior: Appropriate  Wt Readings from Last 3 Encounters:   12/19/22 45.3 kg (99 lb 12.8 oz)   10/03/22 46.2 kg (101 lb 14.1 oz)   07/01/22 47 kg (103 lb 9.6 oz)     Ht Readings from Last 1 Encounters:   12/19/22 153.9 cm (60.59\")     Body mass index is 19.11 kg/m².  Physical Exam    CLINICAL DATA REVIEWED   reviewed previous lab results and integrated with today's visit, reviewed notes from other physicians and/or last GI encounter, reviewed previous endoscopy results and available photos, reviewed surgical pathology results from previous biopsies    ASSESSMENT  Diagnoses and all orders for this visit:    Chronic constipation    Chronic abdominal pain    Adhesion of abdominal wall    Gastroesophageal reflux disease without esophagitis    Other orders  -     mirtazapine (REMERON) 15 MG tablet; Take 15 mg by mouth Every Night.          PLAN  Will see Pain( Celiac axis block) and consider gen surgery opinion now that Dr Hinds is retiring    Return if symptoms worsen or fail to improve.    I have discussed the above plan with the patient.  They verbalize understanding and are in agreement with the plan.  They have been advised to contact the office for any questions, concerns, or changes related to their health.                      "

## 2023-01-02 ENCOUNTER — HOSPITAL ENCOUNTER (EMERGENCY)
Facility: HOSPITAL | Age: 68
Discharge: HOME | End: 2023-01-02
Attending: EMERGENCY MEDICINE | Admitting: EMERGENCY MEDICINE
Payer: MEDICARE

## 2023-01-02 VITALS
DIASTOLIC BLOOD PRESSURE: 82 MMHG | RESPIRATION RATE: 16 BRPM | WEIGHT: 97 LBS | OXYGEN SATURATION: 100 % | HEIGHT: 61 IN | TEMPERATURE: 98 F | BODY MASS INDEX: 18.31 KG/M2 | SYSTOLIC BLOOD PRESSURE: 138 MMHG | HEART RATE: 94 BPM

## 2023-01-02 DIAGNOSIS — R10.9 CHRONIC ABDOMINAL PAIN: Primary | ICD-10-CM

## 2023-01-02 DIAGNOSIS — G89.29 CHRONIC ABDOMINAL PAIN: Primary | ICD-10-CM

## 2023-01-02 LAB
ALBUMIN SERPL-MCNC: 4 G/DL (ref 3.5–5.2)
ALBUMIN/GLOB SERPL: 1.3 G/DL
ALP SERPL-CCNC: 92 U/L (ref 39–117)
ALT SERPL W P-5'-P-CCNC: 16 U/L (ref 1–33)
ANION GAP SERPL CALCULATED.3IONS-SCNC: 7.7 MMOL/L (ref 5–15)
AST SERPL-CCNC: 20 U/L (ref 1–32)
BACTERIA UR QL AUTO: ABNORMAL /HPF
BASOPHILS # BLD AUTO: 0.03 10*3/MM3 (ref 0–0.2)
BASOPHILS NFR BLD AUTO: 0.4 % (ref 0–1.5)
BILIRUB SERPL-MCNC: 0.5 MG/DL (ref 0–1.2)
BILIRUB UR QL STRIP: NEGATIVE
BUN SERPL-MCNC: 11 MG/DL (ref 8–23)
BUN/CREAT SERPL: 15.9 (ref 7–25)
CALCIUM SPEC-SCNC: 9.4 MG/DL (ref 8.6–10.5)
CHLORIDE SERPL-SCNC: 89 MMOL/L (ref 98–107)
CLARITY UR: CLEAR
CO2 SERPL-SCNC: 24.3 MMOL/L (ref 22–29)
COLOR UR: YELLOW
CREAT SERPL-MCNC: 0.69 MG/DL (ref 0.57–1)
D-LACTATE SERPL-SCNC: 0.9 MMOL/L (ref 0.5–2)
DEPRECATED RDW RBC AUTO: 40.3 FL (ref 37–54)
EGFRCR SERPLBLD CKD-EPI 2021: 95.3 ML/MIN/1.73
EOSINOPHIL # BLD AUTO: 0.01 10*3/MM3 (ref 0–0.4)
EOSINOPHIL NFR BLD AUTO: 0.1 % (ref 0.3–6.2)
ERYTHROCYTE [DISTWIDTH] IN BLOOD BY AUTOMATED COUNT: 12.1 % (ref 12.3–15.4)
GLOBULIN UR ELPH-MCNC: 3.2 GM/DL
GLUCOSE SERPL-MCNC: 108 MG/DL (ref 65–99)
GLUCOSE UR STRIP-MCNC: NEGATIVE MG/DL
HCT VFR BLD AUTO: 39.9 % (ref 34–46.6)
HGB BLD-MCNC: 13.7 G/DL (ref 12–15.9)
HGB UR QL STRIP.AUTO: ABNORMAL
HYALINE CASTS UR QL AUTO: ABNORMAL /LPF
IMM GRANULOCYTES # BLD AUTO: 0.03 10*3/MM3 (ref 0–0.05)
IMM GRANULOCYTES NFR BLD AUTO: 0.4 % (ref 0–0.5)
KETONES UR QL STRIP: NEGATIVE
LEUKOCYTE ESTERASE UR QL STRIP.AUTO: NEGATIVE
LIPASE SERPL-CCNC: 59 U/L (ref 13–60)
LYMPHOCYTES # BLD AUTO: 0.89 10*3/MM3 (ref 0.7–3.1)
LYMPHOCYTES NFR BLD AUTO: 10.6 % (ref 19.6–45.3)
MCH RBC QN AUTO: 31.4 PG (ref 26.6–33)
MCHC RBC AUTO-ENTMCNC: 34.3 G/DL (ref 31.5–35.7)
MCV RBC AUTO: 91.3 FL (ref 79–97)
MONOCYTES # BLD AUTO: 0.59 10*3/MM3 (ref 0.1–0.9)
MONOCYTES NFR BLD AUTO: 7.1 % (ref 5–12)
NEUTROPHILS NFR BLD AUTO: 6.81 10*3/MM3 (ref 1.7–7)
NEUTROPHILS NFR BLD AUTO: 81.4 % (ref 42.7–76)
NITRITE UR QL STRIP: NEGATIVE
NRBC BLD AUTO-RTO: 0 /100 WBC (ref 0–0.2)
PH UR STRIP.AUTO: 7 [PH] (ref 4.5–8)
PLATELET # BLD AUTO: 294 10*3/MM3 (ref 140–450)
PMV BLD AUTO: 8.9 FL (ref 6–12)
POTASSIUM SERPL-SCNC: 4.9 MMOL/L (ref 3.5–5.2)
PROT SERPL-MCNC: 7.2 G/DL (ref 6–8.5)
PROT UR QL STRIP: NEGATIVE
RBC # BLD AUTO: 4.37 10*6/MM3 (ref 3.77–5.28)
RBC # UR STRIP: ABNORMAL /HPF
REF LAB TEST METHOD: ABNORMAL
SODIUM SERPL-SCNC: 121 MMOL/L (ref 136–145)
SP GR UR STRIP: 1.01 (ref 1–1.03)
SQUAMOUS #/AREA URNS HPF: ABNORMAL /HPF
UROBILINOGEN UR QL STRIP: ABNORMAL
WBC # UR STRIP: ABNORMAL /HPF
WBC NRBC COR # BLD: 8.36 10*3/MM3 (ref 3.4–10.8)

## 2023-01-02 PROCEDURE — 63710000001 PROMETHAZINE PER 25 MG: Performed by: EMERGENCY MEDICINE

## 2023-01-02 PROCEDURE — 99283 EMERGENCY DEPT VISIT LOW MDM: CPT

## 2023-01-02 PROCEDURE — 80053 COMPREHEN METABOLIC PANEL: CPT | Performed by: EMERGENCY MEDICINE

## 2023-01-02 PROCEDURE — 81001 URINALYSIS AUTO W/SCOPE: CPT | Performed by: EMERGENCY MEDICINE

## 2023-01-02 PROCEDURE — 96365 THER/PROPH/DIAG IV INF INIT: CPT

## 2023-01-02 PROCEDURE — 85025 COMPLETE CBC W/AUTO DIFF WBC: CPT | Performed by: EMERGENCY MEDICINE

## 2023-01-02 PROCEDURE — 25010000002 THIAMINE PER 100 MG: Performed by: EMERGENCY MEDICINE

## 2023-01-02 PROCEDURE — 25010000002 KETOROLAC TROMETHAMINE PER 15 MG: Performed by: EMERGENCY MEDICINE

## 2023-01-02 PROCEDURE — 36415 COLL VENOUS BLD VENIPUNCTURE: CPT

## 2023-01-02 PROCEDURE — 83605 ASSAY OF LACTIC ACID: CPT | Performed by: EMERGENCY MEDICINE

## 2023-01-02 PROCEDURE — 83690 ASSAY OF LIPASE: CPT | Performed by: EMERGENCY MEDICINE

## 2023-01-02 PROCEDURE — 96375 TX/PRO/DX INJ NEW DRUG ADDON: CPT

## 2023-01-02 RX ORDER — THIAMINE HYDROCHLORIDE 100 MG/ML
INJECTION, SOLUTION INTRAMUSCULAR; INTRAVENOUS
Status: DISCONTINUED
Start: 2023-01-02 | End: 2023-01-02 | Stop reason: WASHOUT

## 2023-01-02 RX ORDER — KETOROLAC TROMETHAMINE 30 MG/ML
15 INJECTION, SOLUTION INTRAMUSCULAR; INTRAVENOUS ONCE
Status: COMPLETED | OUTPATIENT
Start: 2023-01-02 | End: 2023-01-02

## 2023-01-02 RX ORDER — PROMETHAZINE HYDROCHLORIDE 25 MG/1
25 TABLET ORAL ONCE
Status: COMPLETED | OUTPATIENT
Start: 2023-01-02 | End: 2023-01-02

## 2023-01-02 RX ORDER — PROMETHAZINE HYDROCHLORIDE 25 MG/1
25 SUPPOSITORY RECTAL EVERY 6 HOURS PRN
Qty: 30 SUPPOSITORY | Refills: 0 | Status: SHIPPED | OUTPATIENT
Start: 2023-01-02

## 2023-01-02 RX ORDER — SODIUM CHLORIDE 0.9 % (FLUSH) 0.9 %
10 SYRINGE (ML) INJECTION AS NEEDED
Status: DISCONTINUED | OUTPATIENT
Start: 2023-01-02 | End: 2023-01-02 | Stop reason: HOSPADM

## 2023-01-02 RX ORDER — FOLIC ACID 5 MG/ML
INJECTION, SOLUTION INTRAMUSCULAR; INTRAVENOUS; SUBCUTANEOUS
Status: DISCONTINUED
Start: 2023-01-02 | End: 2023-01-02 | Stop reason: WASHOUT

## 2023-01-02 RX ORDER — PROMETHAZINE HYDROCHLORIDE 25 MG/1
25 TABLET ORAL EVERY 8 HOURS PRN
Qty: 30 TABLET | Refills: 0 | Status: SHIPPED | OUTPATIENT
Start: 2023-01-02 | End: 2023-02-13 | Stop reason: SDUPTHER

## 2023-01-02 RX ADMIN — KETOROLAC TROMETHAMINE 15 MG: 30 INJECTION, SOLUTION INTRAMUSCULAR; INTRAVENOUS at 17:34

## 2023-01-02 RX ADMIN — THIAMINE HYDROCHLORIDE 1000 ML/HR: 100 INJECTION, SOLUTION INTRAMUSCULAR; INTRAVENOUS at 17:40

## 2023-01-02 RX ADMIN — PROMETHAZINE HYDROCHLORIDE 25 MG: 25 TABLET ORAL at 17:34

## 2023-01-04 ENCOUNTER — TELEPHONE (OUTPATIENT)
Dept: INTERNAL MEDICINE | Facility: CLINIC | Age: 68
End: 2023-01-04

## 2023-01-04 NOTE — TELEPHONE ENCOUNTER
PATIENT CALLED FOR HOSPITAL FOLLOW UP . SHE AS SEEN AT [facility] FOR ABDOMINAL ISSUES ON 1/2/23.    NO APPOINTMENTS WITH DR. CLARK UNTIL 1/19/23  PLEASE CALL [phone]

## 2023-01-04 NOTE — TELEPHONE ENCOUNTER
Called and s/w pt to schedule a hospital follow-up appoint due to ED visit for abd pain/n/v. HUB to share.

## 2023-01-05 ENCOUNTER — LAB (OUTPATIENT)
Dept: LAB | Facility: HOSPITAL | Age: 68
End: 2023-01-05
Payer: MEDICARE

## 2023-01-05 ENCOUNTER — OFFICE VISIT (OUTPATIENT)
Dept: INTERNAL MEDICINE | Facility: CLINIC | Age: 68
End: 2023-01-05
Payer: MEDICARE

## 2023-01-05 VITALS
HEIGHT: 61 IN | HEART RATE: 96 BPM | BODY MASS INDEX: 18.73 KG/M2 | TEMPERATURE: 97.5 F | OXYGEN SATURATION: 99 % | DIASTOLIC BLOOD PRESSURE: 72 MMHG | SYSTOLIC BLOOD PRESSURE: 150 MMHG | WEIGHT: 99.2 LBS

## 2023-01-05 DIAGNOSIS — F41.8 DEPRESSION WITH ANXIETY: ICD-10-CM

## 2023-01-05 DIAGNOSIS — E87.1 HYPONATREMIA: ICD-10-CM

## 2023-01-05 DIAGNOSIS — G89.29 CHRONIC ABDOMINAL PAIN: Primary | ICD-10-CM

## 2023-01-05 DIAGNOSIS — R10.9 CHRONIC ABDOMINAL PAIN: Primary | ICD-10-CM

## 2023-01-05 LAB
ANION GAP SERPL CALCULATED.3IONS-SCNC: 9.6 MMOL/L (ref 5–15)
BUN SERPL-MCNC: 17 MG/DL (ref 8–23)
BUN/CREAT SERPL: 22.7 (ref 7–25)
CALCIUM SPEC-SCNC: 9.5 MG/DL (ref 8.6–10.5)
CHLORIDE SERPL-SCNC: 88 MMOL/L (ref 98–107)
CO2 SERPL-SCNC: 26.4 MMOL/L (ref 22–29)
CREAT SERPL-MCNC: 0.75 MG/DL (ref 0.57–1)
EGFRCR SERPLBLD CKD-EPI 2021: 87.4 ML/MIN/1.73
GLUCOSE SERPL-MCNC: 104 MG/DL (ref 65–99)
POTASSIUM SERPL-SCNC: 4.4 MMOL/L (ref 3.5–5.2)
SODIUM SERPL-SCNC: 124 MMOL/L (ref 136–145)

## 2023-01-05 PROCEDURE — 36415 COLL VENOUS BLD VENIPUNCTURE: CPT | Performed by: FAMILY MEDICINE

## 2023-01-05 PROCEDURE — 99214 OFFICE O/P EST MOD 30 MIN: CPT | Performed by: FAMILY MEDICINE

## 2023-01-05 PROCEDURE — 85025 COMPLETE CBC W/AUTO DIFF WBC: CPT | Performed by: FAMILY MEDICINE

## 2023-01-05 PROCEDURE — 80048 BASIC METABOLIC PNL TOTAL CA: CPT | Performed by: FAMILY MEDICINE

## 2023-01-05 RX ORDER — ALPRAZOLAM 0.5 MG/1
0.5 TABLET ORAL 2 TIMES DAILY
Qty: 60 TABLET | Refills: 5 | Status: SHIPPED | OUTPATIENT
Start: 2023-01-05 | End: 2023-01-19

## 2023-01-05 RX ORDER — DULOXETIN HYDROCHLORIDE 30 MG/1
30 CAPSULE, DELAYED RELEASE ORAL DAILY
Qty: 30 CAPSULE | Refills: 5 | Status: SHIPPED | OUTPATIENT
Start: 2023-01-05 | End: 2023-01-19

## 2023-01-05 NOTE — PROGRESS NOTES
Subjective   Martina Hardwick is a 67 y.o. female presenting today for follow up of   Chief Complaint   Patient presents with   • Hospital Follow Up Visit   • Abdominal Pain     chronic       History of Present Illness     Pt presents for ER follow-up and to discuss her chronic abdominal pain.  Since her last visit with me, she has established with [facility], where pelvic floor PT and pain psychologist were prescribed.  Mirtazapine was tried and stopped.  Pain management referral was also recommended for Celiac Plexus Block - Bilateral L1.  She hasn't had this done yet and says going back to [place] is too difficult since she doesn't feel well.  She has also seen Dr. Ruff in follow-up, who agreed with pain management consult and possible surgeon consult.  She requests a referral to Colorectal surgery as she is concerned about adhesions.  She reports that her abdominal pain is no better and she has some difficulty eating.  She went to the ER 3 days ago where she was hyponatremic and was given an infusion of normal saline.      Patient Active Problem List   Diagnosis   • Migraines   • Depression with anxiety   • Allergic rhinitis   • Primary insomnia   • Chronic constipation   • GERD (gastroesophageal reflux disease)   • Essential hypertension   • Routine health maintenance   • Family history of colonic polyps   • Psoriasis   • Age-related osteoporosis without current pathological fracture   • Adhesion of abdominal wall   • Chronic abdominal pain   • Hyponatremia   • Generalized abdominal pain   • Gastrointestinal hypomotility       Current Outpatient Medications on File Prior to Visit   Medication Sig   • aluminum-magnesium hydroxide-simethicone (MAALOX MAX) 400-400-40 MG/5ML suspension Take  by mouth As Needed for Indigestion or Heartburn.   • amLODIPine (NORVASC) 2.5 MG tablet Take 1 tablet by mouth once daily   • Calcium-Vitamin D-Vitamin K (VIACTIV CALCIUM PLUS D PO) Take 1 tablet by mouth Daily.   •  clobetasol (TEMOVATE) 0.05 % cream Apply 1 application topically to the appropriate area as directed 2 (Two) Times a Day.   • clotrimazole-betamethasone (Lotrisone) 1-0.05 % cream Apply 1 application topically to the appropriate area as directed 2 (Two) Times a Day.   • DULoxetine (CYMBALTA) 60 MG capsule Take 1 capsule by mouth Daily.   • estradiol (ESTRACE) 0.1 MG/GM vaginal cream Insert 2 g into the vagina Every Night.   • famotidine (PEPCID) 40 MG tablet Take 1 tablet by mouth 2 (Two) Times a Day. With lunch and at bedtime (Patient taking differently: Take 40 mg by mouth Daily. With lunch and at bedtime)   • fluticasone (FLONASE) 50 MCG/ACT nasal spray 2 sprays into each nostril daily.   • lisinopril (PRINIVIL,ZESTRIL) 30 MG tablet Take 1 tablet by mouth twice daily   • loratadine (CLARITIN) 10 MG tablet Take 10 mg by mouth daily.   • polyethylene glycol (MIRALAX) packet Take 17 g by mouth daily.   • Probiotic Product (PROBIOTIC DAILY) capsule Once daily (Patient taking differently: Take 2 capsules by mouth Daily. Once daily)   • promethazine (PHENERGAN) 25 MG suppository Insert 1 suppository into the rectum Every 6 (Six) Hours As Needed for Nausea or Vomiting.   • promethazine (PHENERGAN) 25 MG tablet Take 1 tablet by mouth Every 8 (Eight) Hours As Needed for Nausea or Vomiting (Do not use at the same time as Phenergan suppositories).   • SUMAtriptan (IMITREX) 100 MG tablet Take 1 tablet by mouth 1 (One) Time As Needed for Migraine for up to 1 dose. Take one tablet at onset of headache. May repeat dose one time in 2 hours. (Patient taking differently: Take 100 mg by mouth As Needed for Migraine. Take one tablet at onset of headache. May repeat dose one time in 2 hours.)   • vitamin B-12 (CYANOCOBALAMIN) 2500 MCG sublingual tablet tablet Place 2,500 mcg under the tongue Daily.   • zolpidem (AMBIEN) 10 MG tablet Take 1 tablet by mouth Every Night.     No current facility-administered medications on file prior to  visit.          The following portions of the patient's history were reviewed and updated as appropriate: allergies, current medications, past family history, past medical history, past social history, past surgical history and problem list.    Review of Systems   Constitutional: Positive for appetite change.   Gastrointestinal: Positive for abdominal pain, constipation and nausea. Negative for blood in stool and vomiting.       Objective   Vitals:    01/05/23 1526   BP: 150/72   Pulse: 96   Temp: 97.5 °F (36.4 °C)   SpO2: 99%   Weight: 45 kg (99 lb 3.2 oz)   Height: 154.9 cm (60.98\")       BP Readings from Last 3 Encounters:   01/05/23 150/72   01/02/23 138/82   12/19/22 112/68        Wt Readings from Last 3 Encounters:   01/05/23 45 kg (99 lb 3.2 oz)   01/02/23 44 kg (97 lb)   12/19/22 45.3 kg (99 lb 12.8 oz)        Body mass index is 18.75 kg/m².  Nursing notes and vitals reviewed.    Physical Exam  Vitals and nursing note reviewed.   Constitutional:       General: She is not in acute distress.     Appearance: She is not toxic-appearing.   HENT:      Head: Normocephalic and atraumatic.      Mouth/Throat:      Mouth: Mucous membranes are moist.   Eyes:      Extraocular Movements: Extraocular movements intact.      Conjunctiva/sclera: Conjunctivae normal.   Pulmonary:      Effort: Pulmonary effort is normal. No respiratory distress.   Musculoskeletal:      Cervical back: Neck supple. No rigidity.   Skin:     General: Skin is warm and dry.   Neurological:      General: No focal deficit present.      Mental Status: She is alert and oriented to person, place, and time.   Psychiatric:         Mood and Affect: Mood normal.         Behavior: Behavior normal.         Recent Results (from the past 672 hour(s))   Comprehensive Metabolic Panel    Collection Time: 01/02/23  4:36 PM    Specimen: Blood   Result Value Ref Range    Glucose 108 (H) 65 - 99 mg/dL    BUN 11 8 - 23 mg/dL    Creatinine 0.69 0.57 - 1.00 mg/dL    Sodium  121 (L) 136 - 145 mmol/L    Potassium 4.9 3.5 - 5.2 mmol/L    Chloride 89 (L) 98 - 107 mmol/L    CO2 24.3 22.0 - 29.0 mmol/L    Calcium 9.4 8.6 - 10.5 mg/dL    Total Protein 7.2 6.0 - 8.5 g/dL    Albumin 4.0 3.5 - 5.2 g/dL    ALT (SGPT) 16 1 - 33 U/L    AST (SGOT) 20 1 - 32 U/L    Alkaline Phosphatase 92 39 - 117 U/L    Total Bilirubin 0.5 0.0 - 1.2 mg/dL    Globulin 3.2 gm/dL    A/G Ratio 1.3 g/dL    BUN/Creatinine Ratio 15.9 7.0 - 25.0    Anion Gap 7.7 5.0 - 15.0 mmol/L    eGFR 95.3 >60.0 mL/min/1.73   Lipase    Collection Time: 01/02/23  4:36 PM    Specimen: Blood   Result Value Ref Range    Lipase 59 13 - 60 U/L   Lactic Acid, Plasma    Collection Time: 01/02/23  4:36 PM    Specimen: Blood   Result Value Ref Range    Lactate 0.9 0.5 - 2.0 mmol/L   CBC Auto Differential    Collection Time: 01/02/23  4:36 PM    Specimen: Blood   Result Value Ref Range    WBC 8.36 3.40 - 10.80 10*3/mm3    RBC 4.37 3.77 - 5.28 10*6/mm3    Hemoglobin 13.7 12.0 - 15.9 g/dL    Hematocrit 39.9 34.0 - 46.6 %    MCV 91.3 79.0 - 97.0 fL    MCH 31.4 26.6 - 33.0 pg    MCHC 34.3 31.5 - 35.7 g/dL    RDW 12.1 (L) 12.3 - 15.4 %    RDW-SD 40.3 37.0 - 54.0 fl    MPV 8.9 6.0 - 12.0 fL    Platelets 294 140 - 450 10*3/mm3    Neutrophil % 81.4 (H) 42.7 - 76.0 %    Lymphocyte % 10.6 (L) 19.6 - 45.3 %    Monocyte % 7.1 5.0 - 12.0 %    Eosinophil % 0.1 (L) 0.3 - 6.2 %    Basophil % 0.4 0.0 - 1.5 %    Immature Grans % 0.4 0.0 - 0.5 %    Neutrophils, Absolute 6.81 1.70 - 7.00 10*3/mm3    Lymphocytes, Absolute 0.89 0.70 - 3.10 10*3/mm3    Monocytes, Absolute 0.59 0.10 - 0.90 10*3/mm3    Eosinophils, Absolute 0.01 0.00 - 0.40 10*3/mm3    Basophils, Absolute 0.03 0.00 - 0.20 10*3/mm3    Immature Grans, Absolute 0.03 0.00 - 0.05 10*3/mm3    nRBC 0.0 0.0 - 0.2 /100 WBC   Urinalysis With Microscopic If Indicated (No Culture) - Urine, Clean Catch    Collection Time: 01/02/23  5:35 PM    Specimen: Urine, Clean Catch   Result Value Ref Range    Color, UA Yellow  Yellow, Straw    Appearance, UA Clear Clear    pH, UA 7.0 4.5 - 8.0    Specific Gravity, UA 1.010 1.003 - 1.030    Glucose, UA Negative Negative    Ketones, UA Negative Negative    Bilirubin, UA Negative Negative    Blood, UA Trace (A) Negative    Protein, UA Negative Negative    Leuk Esterase, UA Negative Negative    Nitrite, UA Negative Negative    Urobilinogen, UA 0.2 E.U./dL 0.2 - 1.0 E.U./dL   Urinalysis, Microscopic Only - Urine, Clean Catch    Collection Time: 01/02/23  5:35 PM    Specimen: Urine, Clean Catch   Result Value Ref Range    RBC, UA 6-12 (A) None Seen /HPF    WBC, UA None Seen None Seen /HPF    Bacteria, UA None Seen None Seen /HPF    Squamous Epithelial Cells, UA 0-2 None Seen, 0-2 /HPF    Hyaline Casts, UA None Seen None Seen /LPF    Methodology Manual Light Microscopy    Basic metabolic panel    Collection Time: 01/05/23  4:54 PM    Specimen: Blood   Result Value Ref Range    Glucose 104 (H) 65 - 99 mg/dL    BUN 17 8 - 23 mg/dL    Creatinine 0.75 0.57 - 1.00 mg/dL    Sodium 124 (L) 136 - 145 mmol/L    Potassium 4.4 3.5 - 5.2 mmol/L    Chloride 88 (L) 98 - 107 mmol/L    CO2 26.4 22.0 - 29.0 mmol/L    Calcium 9.5 8.6 - 10.5 mg/dL    BUN/Creatinine Ratio 22.7 7.0 - 25.0    Anion Gap 9.6 5.0 - 15.0 mmol/L    eGFR 87.4 >60.0 mL/min/1.73   CBC Auto Differential    Collection Time: 01/05/23  4:54 PM    Specimen: Blood   Result Value Ref Range    WBC 8.04 3.40 - 10.80 10*3/mm3    RBC 4.15 3.77 - 5.28 10*6/mm3    Hemoglobin 12.9 12.0 - 15.9 g/dL    Hematocrit 38.2 34.0 - 46.6 %    MCV 92.0 79.0 - 97.0 fL    MCH 31.1 26.6 - 33.0 pg    MCHC 33.8 31.5 - 35.7 g/dL    RDW 11.8 (L) 12.3 - 15.4 %    RDW-SD 39.4 37.0 - 54.0 fl    MPV 9.4 6.0 - 12.0 fL    Platelets 295 140 - 450 10*3/mm3    Neutrophil % 81.0 (H) 42.7 - 76.0 %    Lymphocyte % 11.6 (L) 19.6 - 45.3 %    Monocyte % 6.3 5.0 - 12.0 %    Eosinophil % 0.1 (L) 0.3 - 6.2 %    Basophil % 0.5 0.0 - 1.5 %    Immature Grans % 0.5 0.0 - 0.5 %    Neutrophils,  Absolute 6.51 1.70 - 7.00 10*3/mm3    Lymphocytes, Absolute 0.93 0.70 - 3.10 10*3/mm3    Monocytes, Absolute 0.51 0.10 - 0.90 10*3/mm3    Eosinophils, Absolute 0.01 0.00 - 0.40 10*3/mm3    Basophils, Absolute 0.04 0.00 - 0.20 10*3/mm3    Immature Grans, Absolute 0.04 0.00 - 0.05 10*3/mm3    nRBC 0.0 0.0 - 0.2 /100 WBC         Assessment & Plan   Diagnoses and all orders for this visit:    1. Chronic abdominal pain (Primary)  -     Ambulatory Referral to Pain Management  -     Ambulatory Referral to Colorectal Surgery  -     DULoxetine (CYMBALTA) 30 MG capsule; Take 1 capsule by mouth Daily.  Dispense: 30 capsule; Refill: 5    2. Depression with anxiety  -     DULoxetine (CYMBALTA) 30 MG capsule; Take 1 capsule by mouth Daily.  Dispense: 30 capsule; Refill: 5  -     ALPRAZolam (XANAX) 0.5 MG tablet; Take 1 tablet by mouth 2 (Two) Times a Day.  Dispense: 60 tablet; Refill: 5    3. Hyponatremia  -     Basic metabolic panel      Chronic abdominal pain.  Her multiple consultations with various specialists are reviewed.  She no longer wishes to travel to [place].  Will refer to local pain management clinic and Colorectal surgeon.      Hyponatrenia.  Recheck BMP today.  Decrease duloxetine from 60 to 30 mg daily.        Medications, including side effects, were discussed with the patient. Patient verbalized understanding.  The plan of care was discussed. All questions were answered. Patient verbalized understanding.      Return in about 6 weeks (around 2/16/2023).

## 2023-01-06 ENCOUNTER — TELEPHONE (OUTPATIENT)
Dept: INTERNAL MEDICINE | Facility: CLINIC | Age: 68
End: 2023-01-06
Payer: MEDICARE

## 2023-01-06 DIAGNOSIS — E87.1 HYPONATREMIA: Primary | ICD-10-CM

## 2023-01-06 NOTE — TELEPHONE ENCOUNTER
Discussed with pt over the phone re hyponatremia.  Pt instructed to wean duloxetine down to 30 mg daily.  To lab in a.m. for morning cortisol level.

## 2023-01-07 ENCOUNTER — LAB (OUTPATIENT)
Dept: LAB | Facility: HOSPITAL | Age: 68
End: 2023-01-07
Payer: MEDICARE

## 2023-01-07 DIAGNOSIS — E87.1 HYPONATREMIA: ICD-10-CM

## 2023-01-07 LAB — CORTIS SERPL-MCNC: 15.9 MCG/DL

## 2023-01-07 PROCEDURE — 80053 COMPREHEN METABOLIC PANEL: CPT | Performed by: FAMILY MEDICINE

## 2023-01-07 PROCEDURE — 82533 TOTAL CORTISOL: CPT

## 2023-01-09 DIAGNOSIS — E87.1 CHRONIC HYPONATREMIA: Primary | ICD-10-CM

## 2023-01-10 ENCOUNTER — TELEPHONE (OUTPATIENT)
Dept: INTERNAL MEDICINE | Facility: CLINIC | Age: 68
End: 2023-01-10

## 2023-01-10 NOTE — TELEPHONE ENCOUNTER
Caller: Martina Hardwick    Relationship: Self    Best call back number: [phone]    What is the best time to reach you: ANY     Who are you requesting to speak with (clinical staff, provider,  specific staff member): CLINICAL STAFF     What was the call regarding: COLORECTAL REFERRAL WAS SUPPOSED TO BE TO A [facility] PROVIDER NOT [facility]-REFERRAL IS FOR LONNIE PENDLETON WHO WORKS FOR [facility].     Do you require a callback: YES

## 2023-01-10 NOTE — TELEPHONE ENCOUNTER
Pt calls office about referral to  Colorectal Surg not getting info. I sent the info via epic system and lm that info was coming. I called Annalisa at Office to find out about the referral status. She had pt info/ referral but no records. I informed Annalisa I would refax info to [phone]-- while discussing pt Annalisa wanted to know what Dr's Ms. Hardwick had seen? Annalisa was able to pull her records from , [facility] and [facility]. After,  colorectal office reviewed pt's chart they DENIED pt. They suggested pt keep her appointment at [facility] and make any suggestions appropriate. I will contact pt and advise her to keep appt with [facility]. Thanks

## 2023-01-12 ENCOUNTER — TELEPHONE (OUTPATIENT)
Dept: INTERNAL MEDICINE | Facility: CLINIC | Age: 68
End: 2023-01-12
Payer: MEDICARE

## 2023-01-12 DIAGNOSIS — G89.29 CHRONIC ABDOMINAL PAIN: Primary | ICD-10-CM

## 2023-01-12 DIAGNOSIS — E87.1 HYPONATREMIA: ICD-10-CM

## 2023-01-12 DIAGNOSIS — R10.9 CHRONIC ABDOMINAL PAIN: Primary | ICD-10-CM

## 2023-01-12 NOTE — PROGRESS NOTES
Left message with pt to return for repeat BMP to check sodium level.  Also, CT angiogram abd ordered to evaluate for mesenteric ischemia, etc.

## 2023-01-12 NOTE — TELEPHONE ENCOUNTER
PATIENT WAS RETURNING CALL TO DR. CLARK AND WOULD LIKE TO TALK DIRECTLY TO HER. PLEASE GIVE HER CALL

## 2023-01-14 ENCOUNTER — HOSPITAL ENCOUNTER (EMERGENCY)
Facility: HOSPITAL | Age: 68
Discharge: HOME | End: 2023-01-14
Attending: EMERGENCY MEDICINE | Admitting: EMERGENCY MEDICINE
Payer: MEDICARE

## 2023-01-14 ENCOUNTER — APPOINTMENT (OUTPATIENT)
Dept: CT IMAGING | Facility: HOSPITAL | Age: 68
End: 2023-01-14
Payer: MEDICARE

## 2023-01-14 VITALS
BODY MASS INDEX: 17.48 KG/M2 | HEIGHT: 62 IN | RESPIRATION RATE: 18 BRPM | WEIGHT: 95 LBS | HEART RATE: 98 BPM | TEMPERATURE: 97.8 F | OXYGEN SATURATION: 99 % | DIASTOLIC BLOOD PRESSURE: 81 MMHG | SYSTOLIC BLOOD PRESSURE: 151 MMHG

## 2023-01-14 DIAGNOSIS — G89.29 CHRONIC ABDOMINAL PAIN: Primary | ICD-10-CM

## 2023-01-14 DIAGNOSIS — R10.9 CHRONIC ABDOMINAL PAIN: Primary | ICD-10-CM

## 2023-01-14 LAB
ALBUMIN SERPL-MCNC: 4.7 G/DL (ref 3.5–5.2)
ALBUMIN/GLOB SERPL: 1.3 G/DL
ALP SERPL-CCNC: 109 U/L (ref 39–117)
ALT SERPL W P-5'-P-CCNC: 17 U/L (ref 1–33)
ANION GAP SERPL CALCULATED.3IONS-SCNC: 14.4 MMOL/L (ref 5–15)
AST SERPL-CCNC: 23 U/L (ref 1–32)
BACTERIA UR QL AUTO: ABNORMAL /HPF
BASOPHILS # BLD AUTO: 0.05 10*3/MM3 (ref 0–0.2)
BASOPHILS NFR BLD AUTO: 0.6 % (ref 0–1.5)
BILIRUB SERPL-MCNC: 0.7 MG/DL (ref 0–1.2)
BILIRUB UR QL STRIP: NEGATIVE
BUN SERPL-MCNC: 13 MG/DL (ref 8–23)
BUN/CREAT SERPL: 17.1 (ref 7–25)
CALCIUM SPEC-SCNC: 9.9 MG/DL (ref 8.6–10.5)
CHLORIDE SERPL-SCNC: 88 MMOL/L (ref 98–107)
CLARITY UR: CLEAR
CO2 SERPL-SCNC: 24.6 MMOL/L (ref 22–29)
COLOR UR: YELLOW
CREAT SERPL-MCNC: 0.76 MG/DL (ref 0.57–1)
D-LACTATE SERPL-SCNC: 1.1 MMOL/L (ref 0.5–2)
DEPRECATED RDW RBC AUTO: 41.1 FL (ref 37–54)
EGFRCR SERPLBLD CKD-EPI 2021: 86 ML/MIN/1.73
EOSINOPHIL # BLD AUTO: 0.02 10*3/MM3 (ref 0–0.4)
EOSINOPHIL NFR BLD AUTO: 0.3 % (ref 0.3–6.2)
ERYTHROCYTE [DISTWIDTH] IN BLOOD BY AUTOMATED COUNT: 12.2 % (ref 12.3–15.4)
GLOBULIN UR ELPH-MCNC: 3.5 GM/DL
GLUCOSE SERPL-MCNC: 119 MG/DL (ref 65–99)
GLUCOSE UR STRIP-MCNC: NEGATIVE MG/DL
HCT VFR BLD AUTO: 42.8 % (ref 34–46.6)
HGB BLD-MCNC: 14.6 G/DL (ref 12–15.9)
HGB UR QL STRIP.AUTO: ABNORMAL
HYALINE CASTS UR QL AUTO: ABNORMAL /LPF
IMM GRANULOCYTES # BLD AUTO: 0.03 10*3/MM3 (ref 0–0.05)
IMM GRANULOCYTES NFR BLD AUTO: 0.4 % (ref 0–0.5)
KETONES UR QL STRIP: NEGATIVE
LEUKOCYTE ESTERASE UR QL STRIP.AUTO: NEGATIVE
LIPASE SERPL-CCNC: 79 U/L (ref 13–60)
LYMPHOCYTES # BLD AUTO: 0.78 10*3/MM3 (ref 0.7–3.1)
LYMPHOCYTES NFR BLD AUTO: 9.9 % (ref 19.6–45.3)
MCH RBC QN AUTO: 31.4 PG (ref 26.6–33)
MCHC RBC AUTO-ENTMCNC: 34.1 G/DL (ref 31.5–35.7)
MCV RBC AUTO: 92 FL (ref 79–97)
MONOCYTES # BLD AUTO: 0.51 10*3/MM3 (ref 0.1–0.9)
MONOCYTES NFR BLD AUTO: 6.5 % (ref 5–12)
NEUTROPHILS NFR BLD AUTO: 6.47 10*3/MM3 (ref 1.7–7)
NEUTROPHILS NFR BLD AUTO: 82.3 % (ref 42.7–76)
NITRITE UR QL STRIP: NEGATIVE
NRBC BLD AUTO-RTO: 0 /100 WBC (ref 0–0.2)
PH UR STRIP.AUTO: 7 [PH] (ref 4.5–8)
PLATELET # BLD AUTO: 297 10*3/MM3 (ref 140–450)
PMV BLD AUTO: 9.7 FL (ref 6–12)
POTASSIUM SERPL-SCNC: 4.7 MMOL/L (ref 3.5–5.2)
PROT SERPL-MCNC: 8.2 G/DL (ref 6–8.5)
PROT UR QL STRIP: NEGATIVE
QT INTERVAL: 339 MS
RBC # BLD AUTO: 4.65 10*6/MM3 (ref 3.77–5.28)
RBC # UR STRIP: ABNORMAL /HPF
REF LAB TEST METHOD: ABNORMAL
SODIUM SERPL-SCNC: 127 MMOL/L (ref 136–145)
SP GR UR STRIP: 1.01 (ref 1–1.03)
SQUAMOUS #/AREA URNS HPF: ABNORMAL /HPF
TROPONIN T SERPL-MCNC: <0.01 NG/ML (ref 0–0.03)
UROBILINOGEN UR QL STRIP: ABNORMAL
WBC # UR STRIP: ABNORMAL /HPF
WBC NRBC COR # BLD: 7.86 10*3/MM3 (ref 3.4–10.8)

## 2023-01-14 PROCEDURE — 87040 BLOOD CULTURE FOR BACTERIA: CPT | Performed by: EMERGENCY MEDICINE

## 2023-01-14 PROCEDURE — 81001 URINALYSIS AUTO W/SCOPE: CPT | Performed by: EMERGENCY MEDICINE

## 2023-01-14 PROCEDURE — 93010 ELECTROCARDIOGRAM REPORT: CPT | Performed by: STUDENT IN AN ORGANIZED HEALTH CARE EDUCATION/TRAINING PROGRAM

## 2023-01-14 PROCEDURE — 80053 COMPREHEN METABOLIC PANEL: CPT | Performed by: EMERGENCY MEDICINE

## 2023-01-14 PROCEDURE — 93005 ELECTROCARDIOGRAM TRACING: CPT

## 2023-01-14 PROCEDURE — 93005 ELECTROCARDIOGRAM TRACING: CPT | Performed by: EMERGENCY MEDICINE

## 2023-01-14 PROCEDURE — 0 IOPAMIDOL PER 1 ML: Performed by: EMERGENCY MEDICINE

## 2023-01-14 PROCEDURE — 74177 CT ABD & PELVIS W/CONTRAST: CPT

## 2023-01-14 PROCEDURE — 99284 EMERGENCY DEPT VISIT MOD MDM: CPT

## 2023-01-14 PROCEDURE — 96374 THER/PROPH/DIAG INJ IV PUSH: CPT

## 2023-01-14 PROCEDURE — 85025 COMPLETE CBC W/AUTO DIFF WBC: CPT

## 2023-01-14 PROCEDURE — 25010000002 FENTANYL CITRATE (PF) 50 MCG/ML SOLUTION: Performed by: EMERGENCY MEDICINE

## 2023-01-14 PROCEDURE — 83690 ASSAY OF LIPASE: CPT | Performed by: EMERGENCY MEDICINE

## 2023-01-14 PROCEDURE — 84484 ASSAY OF TROPONIN QUANT: CPT | Performed by: EMERGENCY MEDICINE

## 2023-01-14 PROCEDURE — 83605 ASSAY OF LACTIC ACID: CPT

## 2023-01-14 RX ORDER — FENTANYL CITRATE 50 UG/ML
1 INJECTION, SOLUTION INTRAMUSCULAR; INTRAVENOUS ONCE
Status: COMPLETED | OUTPATIENT
Start: 2023-01-14 | End: 2023-01-14

## 2023-01-14 RX ORDER — DICYCLOMINE HCL 20 MG
20 TABLET ORAL EVERY 6 HOURS PRN
Qty: 30 TABLET | Refills: 0 | Status: SHIPPED | OUTPATIENT
Start: 2023-01-14 | End: 2023-01-19

## 2023-01-14 RX ORDER — DICYCLOMINE HYDROCHLORIDE 10 MG/1
20 CAPSULE ORAL ONCE
Status: COMPLETED | OUTPATIENT
Start: 2023-01-14 | End: 2023-01-14

## 2023-01-14 RX ORDER — DICYCLOMINE HYDROCHLORIDE 10 MG/1
20 CAPSULE ORAL ONCE
Status: DISCONTINUED | OUTPATIENT
Start: 2023-01-14 | End: 2023-01-14

## 2023-01-14 RX ADMIN — FENTANYL CITRATE 43 MCG: 50 INJECTION, SOLUTION INTRAMUSCULAR; INTRAVENOUS at 15:55

## 2023-01-14 RX ADMIN — SODIUM CHLORIDE 1000 ML: 9 INJECTION, SOLUTION INTRAVENOUS at 14:37

## 2023-01-14 RX ADMIN — IOPAMIDOL 100 ML: 755 INJECTION, SOLUTION INTRAVENOUS at 16:23

## 2023-01-14 RX ADMIN — DICYCLOMINE HYDROCHLORIDE 20 MG: 10 CAPSULE ORAL at 17:53

## 2023-01-14 NOTE — ED PROVIDER NOTES
Subjective   History of Present Illness  History of Present Illness    Chief complaint: Abdominal pain    Location: Generalized, worse on the right side    Quality/Severity: Moderate    Timing/Onset/Duration: Worse since this morning    Modifying Factors: Nothing makes it better    Associated Symptoms: No headache.  No fever.  The patient said chills.  No cough sore throat earache or nasal congestion.  No chest pain or shortness of breath.  No diarrhea or burning on urination.    Narrative: This 67-year-old white female with a history of endometriosis, GERD, chronic abdominal pain and constipation, migraine headache, hypertension, GI hypomotility syndrome, anxiety and depressive disorder, intestinal neuropathy, noninfectious gastroenteritis in the past, malnutrition, bloating, hyponatremia and visceral hyper algesia,, who is had a hysterectomy, laparoscopic and colon surgery and appendectomy, and EGD, takedown colostomy, presents with abdominal pain and generalized weakness.  The patient's GI doctor is Dr. Ruff, he has been treating the patient and thinks she may have abdominal adhesions from prior surgery and she does have a history of motility disorder.    PCP:Ildefonso Dubois MD    GI: [facility]  Review of Systems   Constitutional: Positive for chills. Negative for fever.   HENT: Negative for congestion, ear pain and sore throat.    Respiratory: Negative for cough and shortness of breath.    Cardiovascular: Negative for chest pain.   Gastrointestinal: Positive for abdominal pain. Negative for nausea and vomiting.   Genitourinary: Negative for dysuria.   Musculoskeletal: Negative for back pain.   Skin: Negative for rash.   Neurological: Positive for weakness (Generalized). Negative for numbness and headaches.        Medication List      ASK your doctor about these medications    ALPRAZolam 0.5 MG tablet  Commonly known as: XANAX  Take 1 tablet by mouth 2 (Two) Times a Day.     aluminum-magnesium  hydroxide-simethicone 400-400-40 MG/5ML suspension  Commonly known as: MAALOX MAX     amLODIPine 2.5 MG tablet  Commonly known as: NORVASC  Take 1 tablet by mouth once daily     clobetasol 0.05 % cream  Commonly known as: TEMOVATE     clotrimazole-betamethasone 1-0.05 % cream  Commonly known as: Lotrisone  Apply 1 application topically to the appropriate area as directed 2 (Two) Times a Day.     * DULoxetine 60 MG capsule  Commonly known as: CYMBALTA  Take 1 capsule by mouth Daily.     * DULoxetine 30 MG capsule  Commonly known as: CYMBALTA  Take 1 capsule by mouth Daily.     estradiol 0.1 MG/GM vaginal cream  Commonly known as: ESTRACE     famotidine 40 MG tablet  Commonly known as: PEPCID  Take 1 tablet by mouth 2 (Two) Times a Day. With lunch and at bedtime     fluticasone 50 MCG/ACT nasal spray  Commonly known as: FLONASE     lisinopril 30 MG tablet  Commonly known as: PRINIVIL,ZESTRIL  Take 1 tablet by mouth twice daily     loratadine 10 MG tablet  Commonly known as: CLARITIN     polyethylene glycol packet  Commonly known as: MIRALAX     Probiotic Daily capsule  Once daily     * promethazine 25 MG suppository  Commonly known as: PHENERGAN  Insert 1 suppository into the rectum Every 6 (Six) Hours As Needed for Nausea or Vomiting.     * promethazine 25 MG tablet  Commonly known as: PHENERGAN  Take 1 tablet by mouth Every 8 (Eight) Hours As Needed for Nausea or Vomiting (Do not use at the same time as Phenergan suppositories).     SUMAtriptan 100 MG tablet  Commonly known as: IMITREX  Take 1 tablet by mouth 1 (One) Time As Needed for Migraine for up to 1 dose. Take one tablet at onset of headache. May repeat dose one time in 2 hours.     VIACTIV CALCIUM PLUS D PO     vitamin B-12 2500 MCG sublingual tablet tablet  Commonly known as: CYANOCOBALAMIN     zolpidem 10 MG tablet  Commonly known as: AMBIEN  Take 1 tablet by mouth Every Night.         * This list has 4 medication(s) that are the same as other medications  prescribed for you. Read the directions carefully, and ask your doctor or other care provider to review them with you.                Past Medical History:   Diagnosis Date   • Arthritis    • Autoantibody titer positive    • Bloating    • Chronic abdominal pain    • Chronic constipation    • Encounter for preventive health examination    • Endometriosis    • Gastritis    • Gastrointestinal hypomotility    • GERD (gastroesophageal reflux disease)    • Headache, tension-type    • Hypertension    • Hyponatremia    • Insomnia    • Intestinal autonomic neuropathy    • Migraine    • Mixed anxiety and depressive disorder    • Moderate malnutrition (HCC)    • Noninfectious gastroenteritis    • OP (osteoporosis)    • Osteoporosis    • Poor sleep    • Psoriasis    • Seasonal allergies    • Visceral hyperalgesia        Allergies   Allergen Reactions   • Hydrocodone Nausea And Vomiting   • Sulfa Antibiotics Nausea And Vomiting       Past Surgical History:   Procedure Laterality Date   • APPENDECTOMY     • CHOLECYSTECTOMY N/A 8/17/2018    Procedure: CHOLECYSTECTOMY LAPAROSCOPIC converted to open procedure;  Surgeon: Hernan Hinds MD;  Location: [facility] OR;  Service: General   • COLON SURGERY     • COLONOSCOPY     • COLONOSCOPY N/A 12/12/2018    Procedure: COLONOSCOPY;  Surgeon: Darien Ruff MD;  Location: [facility] OR;  Service: Gastroenterology   • DIAGNOSTIC LAPAROSCOPY  1990   • DILATATION AND CURETTAGE  1985   • ENDOSCOPY N/A 5/13/2016    Procedure: ESOPHAGOGASTRODUODENOSCOPY ;  Surgeon: Darien Ruff MD;  Location: [facility] OR;  Service:    • HYSTERECTOMY     • REVISION / TAKEDOWN COLOSTOMY         Family History   Problem Relation Age of Onset   • Hyperlipidemia Mother    • Macular degeneration Mother    • Heart disease Father    • Hyperlipidemia Father    • Cancer Father    • Depression Father    • Depression Sister    • Hyperlipidemia Brother    • Depression Brother    • Breast cancer Maternal  Aunt    • Breast cancer Cousin    • Breast cancer Cousin    • Colon cancer Neg Hx    • Colon polyps Neg Hx        Social History     Socioeconomic History   • Marital status:    Tobacco Use   • Smoking status: Never   • Smokeless tobacco: Never   Vaping Use   • Vaping Use: Never used   Substance and Sexual Activity   • Alcohol use: Yes     Comment: rare   • Drug use: No   • Sexual activity: Defer           Objective   Physical Exam  Vitals (The temperature is 97.8 °F, pulse 90, respirations 18, room air pulse ox 99%) and nursing note reviewed.   Constitutional:       Appearance: She is well-developed.   HENT:      Head: Normocephalic and atraumatic.   Cardiovascular:      Rate and Rhythm: Normal rate and regular rhythm.      Heart sounds: No murmur heard.    No friction rub. No gallop.   Pulmonary:      Effort: Pulmonary effort is normal.      Breath sounds: Normal breath sounds.   Abdominal:      General: Abdomen is flat. Bowel sounds are normal.      Tenderness: There is abdominal tenderness (Mild diffuse, moderate right-sided).      Hernia: No hernia is present.   Skin:     General: Skin is warm.      Findings: No rash.   Neurological:      General: No focal deficit present.      Mental Status: She is alert and oriented to person, place, and time.         Procedures           ED Course  ED Course as of 01/14/23 1715   Sat Jan 14, 2023   1713 The urine microscopic shows 3-5 red blood cells, no white blood cells, no bacteria, nitrite and leukocyte esterase negative.  The urinalysis is otherwise unremarkable [RC]   1713 The glucose is 119, sodium 127, chloride 88, CMP otherwise unremarkable.    The lipase is elevated at 79.    The lactate is 1.1 and normal    The percentage neutrophils is 82%.  The absolute neutrophil count is 6.4.  The CBC is otherwise unremarkable [RC]   1714 The troponin is normal [RC]      ED Course User Index  [RC] Agustín Mott MD      14:58 EST, 01/14/23:  The EKG was obtained at  1433 and read by me at 1435.  EKG shows normal sinus rhythm with rate of 91.  There is a normal axis with no hypertrophy.  The MD, QRS, and QT intervals are unremarkable.  There is no ectopy.  There is no acute ST elevation or depression     17:19 EST, 01/14/23:  The patient was reassessed.  She states her pain is improved.  Her vital signs reviewed and are stable.  Abdominal exam: Soft, mild right sided abdominal tenderness, no rebound, no guarding, no masses, positive bowel sounds.  The patient's diagnosis of chronic abdominal pain was discussed with her.  The family would like a referral to a local surgeon here.  We will give her a referral to Dr. Schulte.  The patient will be given a prescription for Bentyl.  The patient should call Dr. Schulte on Monday to be seen within 1 week.  Patient should return to the emergency department there is worsening pain, vomiting, worse in any way at all.  All the patient's question were answered she will be discharged in good condition.  The patient will be given a dose of Bentyl here in the emergency department                                MDM    Final diagnoses:   None       ED Disposition  ED Disposition     None          No follow-up provider specified.       Medication List      No changes were made to your prescriptions during this visit.          Agustín Mott MD  01/14/23 [id number]

## 2023-01-14 NOTE — DISCHARGE INSTRUCTIONS
Take the Bentyl as needed as prescribed.  Call Dr. Schulte Monday for a follow-up appointment this week.  Return to the emergency department if there is worsening pain, vomiting, vomiting blood, bloody diarrhea, worsening way at all

## 2023-01-16 LAB
BASOPHILS # BLD AUTO: 0.04 10*3/MM3 (ref 0–0.2)
BASOPHILS NFR BLD AUTO: 0.6 % (ref 0–1.5)
BUN SERPL-MCNC: 16 MG/DL (ref 8–23)
BUN/CREAT SERPL: 19.5 (ref 7–25)
CALCIUM SERPL-MCNC: 9.9 MG/DL (ref 8.6–10.5)
CHLORIDE SERPL-SCNC: 87 MMOL/L (ref 98–107)
CO2 SERPL-SCNC: 28.2 MMOL/L (ref 22–29)
CORTIS AM PEAK SERPL-MCNC: 21.8 UG/DL (ref 6.2–19.4)
CREAT SERPL-MCNC: 0.82 MG/DL (ref 0.57–1)
EGFRCR SERPLBLD CKD-EPI 2021: 78.5 ML/MIN/1.73
EOSINOPHIL # BLD AUTO: 0.01 10*3/MM3 (ref 0–0.4)
EOSINOPHIL NFR BLD AUTO: 0.2 % (ref 0.3–6.2)
ERYTHROCYTE [DISTWIDTH] IN BLOOD BY AUTOMATED COUNT: 12 % (ref 12.3–15.4)
GLUCOSE SERPL-MCNC: 89 MG/DL (ref 65–99)
HCT VFR BLD AUTO: 37.8 % (ref 34–46.6)
HGB BLD-MCNC: 12.7 G/DL (ref 12–15.9)
IMM GRANULOCYTES # BLD AUTO: 0.05 10*3/MM3 (ref 0–0.05)
IMM GRANULOCYTES NFR BLD AUTO: 0.8 % (ref 0–0.5)
LYMPHOCYTES # BLD AUTO: 0.86 10*3/MM3 (ref 0.7–3.1)
LYMPHOCYTES NFR BLD AUTO: 13.5 % (ref 19.6–45.3)
MCH RBC QN AUTO: 30.8 PG (ref 26.6–33)
MCHC RBC AUTO-ENTMCNC: 33.6 G/DL (ref 31.5–35.7)
MCV RBC AUTO: 91.7 FL (ref 79–97)
MONOCYTES # BLD AUTO: 0.58 10*3/MM3 (ref 0.1–0.9)
MONOCYTES NFR BLD AUTO: 9.1 % (ref 5–12)
NEUTROPHILS # BLD AUTO: 4.81 10*3/MM3 (ref 1.7–7)
NEUTROPHILS NFR BLD AUTO: 75.8 % (ref 42.7–76)
NRBC BLD AUTO-RTO: 0 /100 WBC (ref 0–0.2)
PLATELET # BLD AUTO: 282 10*3/MM3 (ref 140–450)
POTASSIUM SERPL-SCNC: 4.6 MMOL/L (ref 3.5–5.2)
RBC # BLD AUTO: 4.12 10*6/MM3 (ref 3.77–5.28)
SODIUM SERPL-SCNC: 127 MMOL/L (ref 136–145)
WBC # BLD AUTO: 6.35 10*3/MM3 (ref 3.4–10.8)

## 2023-01-19 ENCOUNTER — OFFICE VISIT (OUTPATIENT)
Dept: INTERNAL MEDICINE | Facility: CLINIC | Age: 68
End: 2023-01-19
Payer: MEDICARE

## 2023-01-19 VITALS
HEIGHT: 62 IN | RESPIRATION RATE: 18 BRPM | TEMPERATURE: 97.8 F | WEIGHT: 99 LBS | OXYGEN SATURATION: 100 % | HEART RATE: 84 BPM | SYSTOLIC BLOOD PRESSURE: 128 MMHG | DIASTOLIC BLOOD PRESSURE: 84 MMHG | BODY MASS INDEX: 18.22 KG/M2

## 2023-01-19 DIAGNOSIS — F41.8 DEPRESSION WITH ANXIETY: ICD-10-CM

## 2023-01-19 DIAGNOSIS — R10.9 CHRONIC ABDOMINAL PAIN: Primary | ICD-10-CM

## 2023-01-19 DIAGNOSIS — G89.29 CHRONIC ABDOMINAL PAIN: Primary | ICD-10-CM

## 2023-01-19 DIAGNOSIS — E87.1 HYPONATREMIA: ICD-10-CM

## 2023-01-19 LAB — BACTERIA SPEC AEROBE CULT: NORMAL

## 2023-01-19 PROCEDURE — 99214 OFFICE O/P EST MOD 30 MIN: CPT | Performed by: FAMILY MEDICINE

## 2023-01-19 RX ORDER — CLONAZEPAM 1 MG/1
1 TABLET ORAL 2 TIMES DAILY
Qty: 60 TABLET | Refills: 1 | Status: SHIPPED | OUTPATIENT
Start: 2023-01-19 | End: 2023-01-30

## 2023-01-19 NOTE — PROGRESS NOTES
Subjective   Martina Hardwick is a 67 y.o. female presenting today for follow up of   Chief Complaint   Patient presents with   • Hospital Follow Up Visit     Has been having some high blood pressure, high heart rate, having abdominal pain, has been very weak and shaky. Has been ongoing for the last couple of years        History of Present Illness     Pt went to ER five days ago on 1/14/2023 due to a worsening of her chronic abdominal pain.  Workup was unchanged.  She was again referred to general surgery as Dr. Hinds has left; appt pending next week.  She expresses frustration after two years of pain.  She doesn't want to keep going to [facility] as it is too far and the trip wears her out.  She has seen GI, general surgery, colorectal surgery at [facility], motility clinic at [facility].  [facility] recommended pain psychology and pain management for possible nerve block.  She believes she has adhesions from prior abdominal surgery and is hoping there is a surgical option for her.  She says she has pain all day and recently it's starting early in the morning before she gets out of bed.  Eating exacerbates the pain which is worse on the right.  She says she is mostly eating Ensure nutritional shakes at this point.  She has been referred to pain management but hasn't been contacted for an appointment yet.    Anxiety.  She is being weaned off duloxetine due to hyponatremia.  She is feeling quite anxious and shaky.  She has been taking alprazolam more frequently.    Hyponatremia.  A.m. cortisol was 15.9.  She is down to duloxetine 30 mg daily.  Nephrology appointment pending.  Sodium in the ER was improved from 120 to 127.    Patient Active Problem List   Diagnosis   • Migraines   • Depression with anxiety   • Allergic rhinitis   • Primary insomnia   • Chronic constipation   • GERD (gastroesophageal reflux disease)   • Essential hypertension   • Routine health maintenance   • Family history of colonic polyps   •  Psoriasis   • Age-related osteoporosis without current pathological fracture   • Adhesion of abdominal wall   • Chronic abdominal pain   • Hyponatremia   • Generalized abdominal pain   • Gastrointestinal hypomotility       Current Outpatient Medications on File Prior to Visit   Medication Sig   • aluminum-magnesium hydroxide-simethicone (MAALOX MAX) 400-400-40 MG/5ML suspension Take  by mouth As Needed for Indigestion or Heartburn.   • amLODIPine (NORVASC) 2.5 MG tablet Take 1 tablet by mouth once daily   • Calcium-Vitamin D-Vitamin K (VIACTIV CALCIUM PLUS D PO) Take 1 tablet by mouth Daily.   • clobetasol (TEMOVATE) 0.05 % cream Apply 1 application topically to the appropriate area as directed 2 (Two) Times a Day.   • clotrimazole-betamethasone (Lotrisone) 1-0.05 % cream Apply 1 application topically to the appropriate area as directed 2 (Two) Times a Day.   • estradiol (ESTRACE) 0.1 MG/GM vaginal cream Insert 2 g into the vagina Every Night.   • famotidine (PEPCID) 40 MG tablet Take 1 tablet by mouth 2 (Two) Times a Day. With lunch and at bedtime   • fluticasone (FLONASE) 50 MCG/ACT nasal spray 2 sprays into each nostril daily.   • lisinopril (PRINIVIL,ZESTRIL) 30 MG tablet Take 1 tablet by mouth twice daily   • loratadine (CLARITIN) 10 MG tablet Take 10 mg by mouth daily.   • polyethylene glycol (MIRALAX) packet Take 17 g by mouth daily.   • Probiotic Product (PROBIOTIC DAILY) capsule Once daily (Patient taking differently: Take 2 capsules by mouth Daily. Once daily)   • promethazine (PHENERGAN) 25 MG suppository Insert 1 suppository into the rectum Every 6 (Six) Hours As Needed for Nausea or Vomiting.   • promethazine (PHENERGAN) 25 MG tablet Take 1 tablet by mouth Every 8 (Eight) Hours As Needed for Nausea or Vomiting (Do not use at the same time as Phenergan suppositories).   • SUMAtriptan (IMITREX) 100 MG tablet Take 1 tablet by mouth 1 (One) Time As Needed for Migraine for up to 1 dose. Take one tablet at  "onset of headache. May repeat dose one time in 2 hours. (Patient taking differently: Take 100 mg by mouth As Needed for Migraine. Take one tablet at onset of headache. May repeat dose one time in 2 hours.)   • vitamin B-12 (CYANOCOBALAMIN) 2500 MCG sublingual tablet tablet Place 2,500 mcg under the tongue Daily.   • zolpidem (AMBIEN) 10 MG tablet Take 1 tablet by mouth Every Night.   • [DISCONTINUED] ALPRAZolam (XANAX) 0.5 MG tablet Take 1 tablet by mouth 2 (Two) Times a Day.   • [DISCONTINUED] DULoxetine (CYMBALTA) 30 MG capsule Take 1 capsule by mouth Daily.   • DULoxetine (CYMBALTA) 60 MG capsule Take 1 capsule by mouth Daily.   • [DISCONTINUED] dicyclomine (BENTYL) 20 MG tablet Take 1 tablet by mouth Every 6 (Six) Hours As Needed (pain).     No current facility-administered medications on file prior to visit.          The following portions of the patient's history were reviewed and updated as appropriate: allergies, current medications, past family history, past medical history, past social history, past surgical history and problem list.    Review of Systems   Constitutional: Positive for appetite change. Negative for fever.   Gastrointestinal: Positive for abdominal pain and constipation. Negative for anal bleeding, blood in stool, diarrhea, nausea and vomiting.   Genitourinary: Negative.    Psychiatric/Behavioral: Positive for sleep disturbance, depressed mood and stress. Negative for self-injury and suicidal ideas. The patient is nervous/anxious.        Objective   Vitals:    01/19/23 1431   BP: 128/84   BP Location: Left arm   Patient Position: Sitting   Cuff Size: Pediatric   Pulse: 84   Resp: 18   Temp: 97.8 °F (36.6 °C)   TempSrc: Infrared   SpO2: 100%   Weight: 44.9 kg (99 lb)   Height: 157.5 cm (62\")       BP Readings from Last 3 Encounters:   01/19/23 128/84   01/14/23 151/81   01/05/23 150/72        Wt Readings from Last 3 Encounters:   01/19/23 44.9 kg (99 lb)   01/14/23 43.1 kg (95 lb)   01/05/23 " 45 kg (99 lb 3.2 oz)        Body mass index is 18.11 kg/m².  Nursing notes and vitals reviewed.    Physical Exam  Vitals and nursing note reviewed.   Constitutional:       General: She is not in acute distress.     Appearance: Normal appearance. She is not toxic-appearing.   HENT:      Head: Normocephalic and atraumatic.      Mouth/Throat:      Mouth: Mucous membranes are moist.   Eyes:      Extraocular Movements: Extraocular movements intact.      Conjunctiva/sclera: Conjunctivae normal.   Cardiovascular:      Rate and Rhythm: Normal rate and regular rhythm.      Heart sounds: Normal heart sounds.   Pulmonary:      Effort: Pulmonary effort is normal.      Breath sounds: Normal breath sounds.   Abdominal:      General: Bowel sounds are normal. There is no distension.      Palpations: Abdomen is soft. There is no mass.      Tenderness: There is abdominal tenderness (right-sided). There is no guarding or rebound.      Hernia: No hernia is present.   Musculoskeletal:      Cervical back: Neck supple. No rigidity.      Right lower leg: No edema.      Left lower leg: No edema.   Skin:     General: Skin is warm and dry.   Neurological:      General: No focal deficit present.      Mental Status: She is alert and oriented to person, place, and time.   Psychiatric:         Mood and Affect: Affect is tearful.         Behavior: Behavior normal.         Recent Results (from the past 672 hour(s))   Comprehensive Metabolic Panel    Collection Time: 01/02/23  4:36 PM    Specimen: Blood   Result Value Ref Range    Glucose 108 (H) 65 - 99 mg/dL    BUN 11 8 - 23 mg/dL    Creatinine 0.69 0.57 - 1.00 mg/dL    Sodium 121 (L) 136 - 145 mmol/L    Potassium 4.9 3.5 - 5.2 mmol/L    Chloride 89 (L) 98 - 107 mmol/L    CO2 24.3 22.0 - 29.0 mmol/L    Calcium 9.4 8.6 - 10.5 mg/dL    Total Protein 7.2 6.0 - 8.5 g/dL    Albumin 4.0 3.5 - 5.2 g/dL    ALT (SGPT) 16 1 - 33 U/L    AST (SGOT) 20 1 - 32 U/L    Alkaline Phosphatase 92 39 - 117 U/L     Total Bilirubin 0.5 0.0 - 1.2 mg/dL    Globulin 3.2 gm/dL    A/G Ratio 1.3 g/dL    BUN/Creatinine Ratio 15.9 7.0 - 25.0    Anion Gap 7.7 5.0 - 15.0 mmol/L    eGFR 95.3 >60.0 mL/min/1.73   Lipase    Collection Time: 01/02/23  4:36 PM    Specimen: Blood   Result Value Ref Range    Lipase 59 13 - 60 U/L   Lactic Acid, Plasma    Collection Time: 01/02/23  4:36 PM    Specimen: Blood   Result Value Ref Range    Lactate 0.9 0.5 - 2.0 mmol/L   CBC Auto Differential    Collection Time: 01/02/23  4:36 PM    Specimen: Blood   Result Value Ref Range    WBC 8.36 3.40 - 10.80 10*3/mm3    RBC 4.37 3.77 - 5.28 10*6/mm3    Hemoglobin 13.7 12.0 - 15.9 g/dL    Hematocrit 39.9 34.0 - 46.6 %    MCV 91.3 79.0 - 97.0 fL    MCH 31.4 26.6 - 33.0 pg    MCHC 34.3 31.5 - 35.7 g/dL    RDW 12.1 (L) 12.3 - 15.4 %    RDW-SD 40.3 37.0 - 54.0 fl    MPV 8.9 6.0 - 12.0 fL    Platelets 294 140 - 450 10*3/mm3    Neutrophil % 81.4 (H) 42.7 - 76.0 %    Lymphocyte % 10.6 (L) 19.6 - 45.3 %    Monocyte % 7.1 5.0 - 12.0 %    Eosinophil % 0.1 (L) 0.3 - 6.2 %    Basophil % 0.4 0.0 - 1.5 %    Immature Grans % 0.4 0.0 - 0.5 %    Neutrophils, Absolute 6.81 1.70 - 7.00 10*3/mm3    Lymphocytes, Absolute 0.89 0.70 - 3.10 10*3/mm3    Monocytes, Absolute 0.59 0.10 - 0.90 10*3/mm3    Eosinophils, Absolute 0.01 0.00 - 0.40 10*3/mm3    Basophils, Absolute 0.03 0.00 - 0.20 10*3/mm3    Immature Grans, Absolute 0.03 0.00 - 0.05 10*3/mm3    nRBC 0.0 0.0 - 0.2 /100 WBC   Urinalysis With Microscopic If Indicated (No Culture) - Urine, Clean Catch    Collection Time: 01/02/23  5:35 PM    Specimen: Urine, Clean Catch   Result Value Ref Range    Color, UA Yellow Yellow, Straw    Appearance, UA Clear Clear    pH, UA 7.0 4.5 - 8.0    Specific Gravity, UA 1.010 1.003 - 1.030    Glucose, UA Negative Negative    Ketones, UA Negative Negative    Bilirubin, UA Negative Negative    Blood, UA Trace (A) Negative    Protein, UA Negative Negative    Leuk Esterase, UA Negative Negative     Nitrite, UA Negative Negative    Urobilinogen, UA 0.2 E.U./dL 0.2 - 1.0 E.U./dL   Urinalysis, Microscopic Only - Urine, Clean Catch    Collection Time: 01/02/23  5:35 PM    Specimen: Urine, Clean Catch   Result Value Ref Range    RBC, UA 6-12 (A) None Seen /HPF    WBC, UA None Seen None Seen /HPF    Bacteria, UA None Seen None Seen /HPF    Squamous Epithelial Cells, UA 0-2 None Seen, 0-2 /HPF    Hyaline Casts, UA None Seen None Seen /LPF    Methodology Manual Light Microscopy    Basic metabolic panel    Collection Time: 01/05/23  4:54 PM    Specimen: Blood   Result Value Ref Range    Glucose 104 (H) 65 - 99 mg/dL    BUN 17 8 - 23 mg/dL    Creatinine 0.75 0.57 - 1.00 mg/dL    Sodium 124 (L) 136 - 145 mmol/L    Potassium 4.4 3.5 - 5.2 mmol/L    Chloride 88 (L) 98 - 107 mmol/L    CO2 26.4 22.0 - 29.0 mmol/L    Calcium 9.5 8.6 - 10.5 mg/dL    BUN/Creatinine Ratio 22.7 7.0 - 25.0    Anion Gap 9.6 5.0 - 15.0 mmol/L    eGFR 87.4 >60.0 mL/min/1.73   CBC Auto Differential    Collection Time: 01/05/23  4:54 PM    Specimen: Blood   Result Value Ref Range    WBC 8.04 3.40 - 10.80 10*3/mm3    RBC 4.15 3.77 - 5.28 10*6/mm3    Hemoglobin 12.9 12.0 - 15.9 g/dL    Hematocrit 38.2 34.0 - 46.6 %    MCV 92.0 79.0 - 97.0 fL    MCH 31.1 26.6 - 33.0 pg    MCHC 33.8 31.5 - 35.7 g/dL    RDW 11.8 (L) 12.3 - 15.4 %    RDW-SD 39.4 37.0 - 54.0 fl    MPV 9.4 6.0 - 12.0 fL    Platelets 295 140 - 450 10*3/mm3    Neutrophil % 81.0 (H) 42.7 - 76.0 %    Lymphocyte % 11.6 (L) 19.6 - 45.3 %    Monocyte % 6.3 5.0 - 12.0 %    Eosinophil % 0.1 (L) 0.3 - 6.2 %    Basophil % 0.5 0.0 - 1.5 %    Immature Grans % 0.5 0.0 - 0.5 %    Neutrophils, Absolute 6.51 1.70 - 7.00 10*3/mm3    Lymphocytes, Absolute 0.93 0.70 - 3.10 10*3/mm3    Monocytes, Absolute 0.51 0.10 - 0.90 10*3/mm3    Eosinophils, Absolute 0.01 0.00 - 0.40 10*3/mm3    Basophils, Absolute 0.04 0.00 - 0.20 10*3/mm3    Immature Grans, Absolute 0.04 0.00 - 0.05 10*3/mm3    nRBC 0.0 0.0 - 0.2 /100 WBC    Comprehensive Metabolic Panel    Collection Time: 01/07/23  8:27 AM    Specimen: Blood   Result Value Ref Range    Glucose 109 (H) 65 - 99 mg/dL    BUN 13 8 - 23 mg/dL    Creatinine 0.75 0.57 - 1.00 mg/dL    Sodium 120 (L) 136 - 145 mmol/L    Potassium 4.0 3.5 - 5.2 mmol/L    Chloride 83 (L) 98 - 107 mmol/L    CO2 25.0 22.0 - 29.0 mmol/L    Calcium 9.8 8.6 - 10.5 mg/dL    Total Protein 7.4 6.0 - 8.5 g/dL    Albumin 4.6 3.5 - 5.2 g/dL    ALT (SGPT) 18 1 - 33 U/L    AST (SGOT) 20 1 - 32 U/L    Alkaline Phosphatase 94 39 - 117 U/L    Total Bilirubin 0.7 0.0 - 1.2 mg/dL    Globulin 2.8 gm/dL    A/G Ratio 1.6 g/dL    BUN/Creatinine Ratio 17.3 7.0 - 25.0    Anion Gap 12.0 5.0 - 15.0 mmol/L    eGFR 87.4 >60.0 mL/min/1.73   Cortisol - AM    Collection Time: 01/07/23  8:27 AM    Specimen: Blood   Result Value Ref Range    Cortisol 15.90   mcg/dL   CBC & Differential    Collection Time: 01/13/23  9:32 AM   Result Value Ref Range    WBC 6.35 3.40 - 10.80 10*3/mm3    RBC 4.12 3.77 - 5.28 10*6/mm3    Hemoglobin 12.7 12.0 - 15.9 g/dL    Hematocrit 37.8 34.0 - 46.6 %    MCV 91.7 79.0 - 97.0 fL    MCH 30.8 26.6 - 33.0 pg    MCHC 33.6 31.5 - 35.7 g/dL    RDW 12.0 (L) 12.3 - 15.4 %    Platelets 282 140 - 450 10*3/mm3    Neutrophil Rel % 75.8 42.7 - 76.0 %    Lymphocyte Rel % 13.5 (L) 19.6 - 45.3 %    Monocyte Rel % 9.1 5.0 - 12.0 %    Eosinophil Rel % 0.2 (L) 0.3 - 6.2 %    Basophil Rel % 0.6 0.0 - 1.5 %    Neutrophils Absolute 4.81 1.70 - 7.00 10*3/mm3    Lymphocytes Absolute 0.86 0.70 - 3.10 10*3/mm3    Monocytes Absolute 0.58 0.10 - 0.90 10*3/mm3    Eosinophils Absolute 0.01 0.00 - 0.40 10*3/mm3    Basophils Absolute 0.04 0.00 - 0.20 10*3/mm3    Immature Granulocyte Rel % 0.8 (H) 0.0 - 0.5 %    Immature Grans Absolute 0.05 0.00 - 0.05 10*3/mm3    nRBC 0.0 0.0 - 0.2 /100 WBC   Basic Metabolic Panel    Collection Time: 01/13/23  9:32 AM   Result Value Ref Range    Glucose 89 65 - 99 mg/dL    BUN 16 8 - 23 mg/dL    Creatinine 0.82  0.57 - 1.00 mg/dL    EGFR Result 78.5 >60.0 mL/min/1.73    BUN/Creatinine Ratio 19.5 7.0 - 25.0    Sodium 127 (L) 136 - 145 mmol/L    Potassium 4.6 3.5 - 5.2 mmol/L    Chloride 87 (L) 98 - 107 mmol/L    Total CO2 28.2 22.0 - 29.0 mmol/L    Calcium 9.9 8.6 - 10.5 mg/dL   Cortisol - AM    Collection Time: 01/13/23  9:32 AM   Result Value Ref Range    Cortisol - AM 21.8 (H) 6.2 - 19.4 ug/dL   Comprehensive Metabolic Panel    Collection Time: 01/14/23  2:31 PM    Specimen: Arm, Left; Blood   Result Value Ref Range    Glucose 119 (H) 65 - 99 mg/dL    BUN 13 8 - 23 mg/dL    Creatinine 0.76 0.57 - 1.00 mg/dL    Sodium 127 (L) 136 - 145 mmol/L    Potassium 4.7 3.5 - 5.2 mmol/L    Chloride 88 (L) 98 - 107 mmol/L    CO2 24.6 22.0 - 29.0 mmol/L    Calcium 9.9 8.6 - 10.5 mg/dL    Total Protein 8.2 6.0 - 8.5 g/dL    Albumin 4.7 3.5 - 5.2 g/dL    ALT (SGPT) 17 1 - 33 U/L    AST (SGOT) 23 1 - 32 U/L    Alkaline Phosphatase 109 39 - 117 U/L    Total Bilirubin 0.7 0.0 - 1.2 mg/dL    Globulin 3.5 gm/dL    A/G Ratio 1.3 g/dL    BUN/Creatinine Ratio 17.1 7.0 - 25.0    Anion Gap 14.4 5.0 - 15.0 mmol/L    eGFR 86.0 >60.0 mL/min/1.73   Lactic Acid, Plasma    Collection Time: 01/14/23  2:31 PM    Specimen: Blood   Result Value Ref Range    Lactate 1.1 0.5 - 2.0 mmol/L   CBC Auto Differential    Collection Time: 01/14/23  2:31 PM    Specimen: Arm, Left; Blood   Result Value Ref Range    WBC 7.86 3.40 - 10.80 10*3/mm3    RBC 4.65 3.77 - 5.28 10*6/mm3    Hemoglobin 14.6 12.0 - 15.9 g/dL    Hematocrit 42.8 34.0 - 46.6 %    MCV 92.0 79.0 - 97.0 fL    MCH 31.4 26.6 - 33.0 pg    MCHC 34.1 31.5 - 35.7 g/dL    RDW 12.2 (L) 12.3 - 15.4 %    RDW-SD 41.1 37.0 - 54.0 fl    MPV 9.7 6.0 - 12.0 fL    Platelets 297 140 - 450 10*3/mm3    Neutrophil % 82.3 (H) 42.7 - 76.0 %    Lymphocyte % 9.9 (L) 19.6 - 45.3 %    Monocyte % 6.5 5.0 - 12.0 %    Eosinophil % 0.3 0.3 - 6.2 %    Basophil % 0.6 0.0 - 1.5 %    Immature Grans % 0.4 0.0 - 0.5 %    Neutrophils,  Absolute 6.47 1.70 - 7.00 10*3/mm3    Lymphocytes, Absolute 0.78 0.70 - 3.10 10*3/mm3    Monocytes, Absolute 0.51 0.10 - 0.90 10*3/mm3    Eosinophils, Absolute 0.02 0.00 - 0.40 10*3/mm3    Basophils, Absolute 0.05 0.00 - 0.20 10*3/mm3    Immature Grans, Absolute 0.03 0.00 - 0.05 10*3/mm3    nRBC 0.0 0.0 - 0.2 /100 WBC   Lipase    Collection Time: 01/14/23  2:31 PM    Specimen: Arm, Left; Blood   Result Value Ref Range    Lipase 79 (H) 13 - 60 U/L   Troponin    Collection Time: 01/14/23  2:31 PM    Specimen: Arm, Left; Blood   Result Value Ref Range    Troponin T <0.010 0.000 - 0.030 ng/mL   Blood Culture - Blood, Arm, Left    Collection Time: 01/14/23  2:32 PM    Specimen: Arm, Left; Blood   Result Value Ref Range    Blood Culture No growth at 5 days    ECG 12 Lead Tachycardia    Collection Time: 01/14/23  2:33 PM   Result Value Ref Range    QT Interval 339 ms   Urinalysis With Microscopic If Indicated (No Culture) - Urine, Clean Catch    Collection Time: 01/14/23  4:02 PM    Specimen: Urine, Clean Catch   Result Value Ref Range    Color, UA Yellow Yellow, Straw    Appearance, UA Clear Clear    pH, UA 7.0 4.5 - 8.0    Specific Gravity, UA 1.010 1.003 - 1.030    Glucose, UA Negative Negative    Ketones, UA Negative Negative    Bilirubin, UA Negative Negative    Blood, UA Trace (A) Negative    Protein, UA Negative Negative    Leuk Esterase, UA Negative Negative    Nitrite, UA Negative Negative    Urobilinogen, UA 0.2 E.U./dL 0.2 - 1.0 E.U./dL   Urinalysis, Microscopic Only - Urine, Clean Catch    Collection Time: 01/14/23  4:02 PM    Specimen: Urine, Clean Catch   Result Value Ref Range    RBC, UA 3-5 (A) None Seen /HPF    WBC, UA None Seen None Seen /HPF    Bacteria, UA None Seen None Seen /HPF    Squamous Epithelial Cells, UA None Seen None Seen, 0-2 /HPF    Hyaline Casts, UA None Seen None Seen /LPF    Methodology Manual Light Microscopy          Assessment & Plan   Diagnoses and all orders for this visit:    1.  Chronic abdominal pain (Primary)  -     Ambulatory Referral to Behavioral Health    2. Depression with anxiety  -     clonazePAM (KlonoPIN) 1 MG tablet; Take 1 tablet by mouth 2 (Two) Times a Day.  Dispense: 60 tablet; Refill: 1  -     Ambulatory Referral to Behavioral Health    3. Hyponatremia  -     Comprehensive Metabolic Panel; Future      Chronic abdominal pain.  We reviewed the work up and consultations she has had thus far.  CT angio abd pending tomorrow.  General surgery consult and pain management consults pending.  Her weight is holding steady.    Depression with anxiety.  Weaning off duloxetine due to hyponatremia.  We need to hold off on SSRI for now.  Stop alprazolam and start scheduled clonazepam BID for now.  Referral to Behavioral Health for therapy for chronic pain/ anxiety.    Hyponatremia.  Pt will be off duloxetine this week.  Return for labs in 1-2 weeks after stopping duloxetine.  Nephrology consult pending.      Medications, including side effects, were discussed with the patient. Patient verbalized understanding.  The plan of care was discussed. All questions were answered. Patient verbalized understanding.      Return in about 4 weeks (around 2/16/2023).

## 2023-01-20 ENCOUNTER — HOSPITAL ENCOUNTER (OUTPATIENT)
Dept: CT IMAGING | Facility: HOSPITAL | Age: 68
Discharge: HOME | End: 2023-01-20
Admitting: FAMILY MEDICINE
Payer: MEDICARE

## 2023-01-20 DIAGNOSIS — R10.9 CHRONIC ABDOMINAL PAIN: ICD-10-CM

## 2023-01-20 DIAGNOSIS — G89.29 CHRONIC ABDOMINAL PAIN: ICD-10-CM

## 2023-01-20 PROCEDURE — 74174 CTA ABD&PLVS W/CONTRAST: CPT

## 2023-01-20 PROCEDURE — 0 IOPAMIDOL PER 1 ML: Performed by: FAMILY MEDICINE

## 2023-01-20 RX ADMIN — IOPAMIDOL 100 ML: 755 INJECTION, SOLUTION INTRAVENOUS at 15:56

## 2023-01-24 ENCOUNTER — OFFICE VISIT (OUTPATIENT)
Dept: SURGERY | Facility: CLINIC | Age: 68
End: 2023-01-24
Payer: MEDICARE

## 2023-01-24 VITALS — BODY MASS INDEX: 18.43 KG/M2 | HEIGHT: 61 IN | WEIGHT: 97.6 LBS

## 2023-01-24 DIAGNOSIS — R10.9 CHRONIC ABDOMINAL PAIN: Primary | ICD-10-CM

## 2023-01-24 DIAGNOSIS — G89.29 CHRONIC ABDOMINAL PAIN: Primary | ICD-10-CM

## 2023-01-24 PROCEDURE — 99214 OFFICE O/P EST MOD 30 MIN: CPT | Performed by: SURGERY

## 2023-01-24 NOTE — PROGRESS NOTES
Subjective   Martina Hardwick is a 67 y.o. female who presents to the office in surgical consultation for Ildefonso Dubois MD for chronic abdominal pain.    History of Present Illness     The patient has had chronic abdominal pain that started as a spasm in her right mid and upper abdomen and radiates toward the right lower quadrant.  She has a history of a colon obstruction related to endometriosis that required a sigmoid colon resection and a colostomy.  She then had surgery a week or 2 later for a small bowel obstruction and recovered.  She then underwent a colostomy takedown.  She developed a stricture of her anastomosis that required a colonoscopic dilatation.  She had an attempted laparoscopic cholecystectomy that was converted to an open cholecystectomy in 2018.  About a year and a half later she began having her chronic abdominal pain and she attributes it to adhesions.  Her chronic abdominal pain has been persistent for more than 2 years and she has had extensive evaluation here as well as at the [facility].  She has been recommended to see pain management.    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  She has not followed up with that evaluation.    She had a sits marker study on 10/4/2021 that showed colonic inertia.  She had a small bowel follow-through on 10/7/2021 that was essentially normal.  She had a CT scan of the abdomen and pelvis on 1/14/2023 that was normal.    She presents to my office requesting an exploratory laparotomy with lysis of adhesions as she believes this is the source of her pain    Review of Systems   Constitutional: Negative for fatigue and fever.   HENT: Negative for trouble swallowing and voice change.    Respiratory: Negative for chest tightness and shortness of breath.    Cardiovascular:  Negative for chest pain and palpitations.   Gastrointestinal: Positive for abdominal pain and constipation. Negative for blood in stool, diarrhea, nausea and vomiting.   Psychiatric/Behavioral: Negative for agitation and confusion.     Past Medical History:   Diagnosis Date   • Arthritis    • Autoantibody titer positive    • Bloating    • Cholelithiasis     Removed 8/17/2018   • Chronic abdominal pain    • Chronic constipation    • Encounter for preventive health examination    • Endometriosis    • Gastritis    • Gastrointestinal hypomotility    • GERD (gastroesophageal reflux disease)    • Headache, tension-type    • Hypertension    • Hyponatremia    • Insomnia    • Intestinal autonomic neuropathy    • Migraine    • Mixed anxiety and depressive disorder    • Moderate malnutrition (HCC)    • Noninfectious gastroenteritis    • OP (osteoporosis)    • Osteoporosis    • PONV (postoperative nausea and vomiting) 08/17/2018   • Poor sleep    • Psoriasis    • Seasonal allergies    • Visceral hyperalgesia      Past Surgical History:   Procedure Laterality Date   • APPENDECTOMY     • CHOLECYSTECTOMY N/A 8/17/2018    Procedure: CHOLECYSTECTOMY LAPAROSCOPIC converted to open procedure;  Surgeon: Hernan Hinds MD;  Location: [facility];  Service: General   • COLON SURGERY     • COLONOSCOPY     • COLONOSCOPY N/A 12/12/2018    Procedure: COLONOSCOPY;  Surgeon: Darien Ruff MD;  Location: [facility] OR;  Service: Gastroenterology   • DIAGNOSTIC LAPAROSCOPY  1990   • DILATATION AND CURETTAGE  1985   • ENDOSCOPY N/A 5/13/2016    Procedure: ESOPHAGOGASTRODUODENOSCOPY ;  Surgeon: Darien Ruff MD;  Location: [facility] OR;  Service:    • HYSTERECTOMY     • REVISION / TAKEDOWN COLOSTOMY       Family History   Problem Relation Age of Onset   • Hyperlipidemia Mother    • Macular degeneration Mother    • Hearing loss Mother    • Heart disease Father         Had 5 bypass surgery   • Hyperlipidemia Father    • Cancer  Father         Prostate and bladder   • Depression Father    • Depression Sister    • COPD Sister    • Hyperlipidemia Sister    • Hyperlipidemia Brother    • Depression Brother    • Kidney disease Brother    • Breast cancer Maternal Aunt    • Breast cancer Cousin    • Breast cancer Cousin    • Colon cancer Neg Hx    • Colon polyps Neg Hx      Social History     Socioeconomic History   • Marital status:    Tobacco Use   • Smoking status: Never   • Smokeless tobacco: Never   Vaping Use   • Vaping Use: Never used   Substance and Sexual Activity   • Alcohol use: Not Currently     Comment: rare   • Drug use: No   • Sexual activity: Not Currently     Partners: Male       Objective   Physical Exam  Constitutional:       Appearance: Normal appearance. She is well-developed. She is not toxic-appearing.   Eyes:      General: No scleral icterus.  Pulmonary:      Effort: Pulmonary effort is normal. No respiratory distress.   Abdominal:      General: Bowel sounds are normal. There is no distension.      Palpations: Abdomen is soft.      Tenderness: There is generalized abdominal tenderness. There is no guarding or rebound.      Comments: The abdomen is soft and mildly tender diffusely.  There is no guarding and no rebound.  It is not distended.  She has positive bowel sounds.   Skin:     General: Skin is warm and dry.   Neurological:      Mental Status: She is alert and oriented to person, place, and time.   Psychiatric:         Behavior: Behavior normal.         Thought Content: Thought content normal.         Judgment: Judgment normal.         Assessment & Plan     1.  Chronic abdominal pain: The patient has chronic abdominal pain with extensive evaluation and no clear diagnosis made.  There is concern for colonic inertia in this evaluation is in progress through the [facility].  Pain management has been recommended for her but she has not pursued this as of yet.  She is requesting an exploratory laparotomy with  lysis of adhesions for me because she believes that her pain is related to adhesions.  Based on her history that her most recent surgical procedure was an open cholecystectomy and she did not have the development of her abdominal pain until more than a year later, it seems adhesions are not the likely complete explanation.  She was advised that exploratory laparotomy with lysis of adhesions may not help her pain at all and, in fact, may cause a worsening of her symptoms.  A referral was made to pain management in order to attempt to control her pain nonoperatively.  If she has no improvement with pain management, she will return to the office.

## 2023-01-24 NOTE — LETTER
January 26, 2023     Ildefonso Dubois MD  [address]  [place] [address]    Patient: Martina Hardwick   YOB: 1955   Date of Visit: 1/24/2023       Dear Dr. Sherly MD:    Thank you for referring Martina Hardwick to me for evaluation. Below are the relevant portions of my assessment and plan of care.    If you have questions, please do not hesitate to call me. I look forward to following Martina along with you.         Sincerely,        Shahzad Child Jr., MD        CC: No Recipients  Shahzad Child Jr., MD  01/26/23 1737  Signed  Subjective   Martina Hardwick is a 67 y.o. female who presents to the office in surgical consultation for Ildefonso Dubois MD for chronic abdominal pain.    History of Present Illness     The patient has had chronic abdominal pain that started as a spasm in her right mid and upper abdomen and radiates toward the right lower quadrant.  She has a history of a colon obstruction related to endometriosis that required a sigmoid colon resection and a colostomy.  She then had surgery a week or 2 later for a small bowel obstruction and recovered.  She then underwent a colostomy takedown.  She developed a stricture of her anastomosis that required a colonoscopic dilatation.  She had an attempted laparoscopic cholecystectomy that was converted to an open cholecystectomy in 2018.  About a year and a half later she began having her chronic abdominal pain and she attributes it to adhesions.  Her chronic abdominal pain has been persistent for more than 2 years and she has had extensive evaluation here as well as at the [facility].  She has been recommended to see pain management.    The work-up has suggested the presence of colonic inertia and colorectal surgery through the [facility] was considering an ileostomy as a diagnostic measure before considering a total abdominal colectomy for inertia.  She has not followed up with that evaluation.    She had a sits  marker study on 10/4/2021 that showed colonic inertia.  She had a small bowel follow-through on 10/7/2021 that was essentially normal.  She had a CT scan of the abdomen and pelvis on 1/14/2023 that was normal.    She presents to my office requesting an exploratory laparotomy with lysis of adhesions as she believes this is the source of her pain    Review of Systems   Constitutional: Negative for fatigue and fever.   HENT: Negative for trouble swallowing and voice change.    Respiratory: Negative for chest tightness and shortness of breath.    Cardiovascular: Negative for chest pain and palpitations.   Gastrointestinal: Positive for abdominal pain and constipation. Negative for blood in stool, diarrhea, nausea and vomiting.   Psychiatric/Behavioral: Negative for agitation and confusion.     Past Medical History:   Diagnosis Date   • Arthritis    • Autoantibody titer positive    • Bloating    • Cholelithiasis     Removed 8/17/2018   • Chronic abdominal pain    • Chronic constipation    • Encounter for preventive health examination    • Endometriosis    • Gastritis    • Gastrointestinal hypomotility    • GERD (gastroesophageal reflux disease)    • Headache, tension-type    • Hypertension    • Hyponatremia    • Insomnia    • Intestinal autonomic neuropathy    • Migraine    • Mixed anxiety and depressive disorder    • Moderate malnutrition (HCC)    • Noninfectious gastroenteritis    • OP (osteoporosis)    • Osteoporosis    • PONV (postoperative nausea and vomiting) 08/17/2018   • Poor sleep    • Psoriasis    • Seasonal allergies    • Visceral hyperalgesia      Past Surgical History:   Procedure Laterality Date   • APPENDECTOMY     • CHOLECYSTECTOMY N/A 8/17/2018    Procedure: CHOLECYSTECTOMY LAPAROSCOPIC converted to open procedure;  Surgeon: Hernan Hinds MD;  Location: [facility];  Service: General   • COLON SURGERY     • COLONOSCOPY     • COLONOSCOPY N/A 12/12/2018    Procedure: COLONOSCOPY;  Surgeon: Speedy  Darien Wolf MD;  Location: [facility] OR;  Service: Gastroenterology   • DIAGNOSTIC LAPAROSCOPY  1990   • DILATATION AND CURETTAGE  1985   • ENDOSCOPY N/A 5/13/2016    Procedure: ESOPHAGOGASTRODUODENOSCOPY ;  Surgeon: Darien Ruff MD;  Location: [facility] OR;  Service:    • HYSTERECTOMY     • REVISION / TAKEDOWN COLOSTOMY       Family History   Problem Relation Age of Onset   • Hyperlipidemia Mother    • Macular degeneration Mother    • Hearing loss Mother    • Heart disease Father         Had 5 bypass surgery   • Hyperlipidemia Father    • Cancer Father         Prostate and bladder   • Depression Father    • Depression Sister    • COPD Sister    • Hyperlipidemia Sister    • Hyperlipidemia Brother    • Depression Brother    • Kidney disease Brother    • Breast cancer Maternal Aunt    • Breast cancer Cousin    • Breast cancer Cousin    • Colon cancer Neg Hx    • Colon polyps Neg Hx      Social History     Socioeconomic History   • Marital status:    Tobacco Use   • Smoking status: Never   • Smokeless tobacco: Never   Vaping Use   • Vaping Use: Never used   Substance and Sexual Activity   • Alcohol use: Not Currently     Comment: rare   • Drug use: No   • Sexual activity: Not Currently     Partners: Male       Objective   Physical Exam  Constitutional:       Appearance: Normal appearance. She is well-developed. She is not toxic-appearing.   Eyes:      General: No scleral icterus.  Pulmonary:      Effort: Pulmonary effort is normal. No respiratory distress.   Abdominal:      General: Bowel sounds are normal. There is no distension.      Palpations: Abdomen is soft.      Tenderness: There is generalized abdominal tenderness. There is no guarding or rebound.      Comments: The abdomen is soft and mildly tender diffusely.  There is no guarding and no rebound.  It is not distended.  She has positive bowel sounds.   Skin:     General: Skin is warm and dry.   Neurological:      Mental Status: She is alert  and oriented to person, place, and time.   Psychiatric:         Behavior: Behavior normal.         Thought Content: Thought content normal.         Judgment: Judgment normal.         Assessment & Plan     1.  Chronic abdominal pain: The patient has chronic abdominal pain with extensive evaluation and no clear diagnosis made.  There is concern for colonic inertia in this evaluation is in progress through the [facility].  Pain management has been recommended for her but she has not pursued this as of yet.  She is requesting an exploratory laparotomy with lysis of adhesions for me because she believes that her pain is related to adhesions.  Based on her history that her most recent surgical procedure was an open cholecystectomy and she did not have the development of her abdominal pain until more than a year later, it seems adhesions are not the likely complete explanation.  She was advised that exploratory laparotomy with lysis of adhesions may not help her pain at all and, in fact, may cause a worsening of her symptoms.  A referral was made to pain management in order to attempt to control her pain nonoperatively.  If she has no improvement with pain management, she will return to the office.

## 2023-01-26 ENCOUNTER — TELEPHONE (OUTPATIENT)
Dept: INTERNAL MEDICINE | Facility: CLINIC | Age: 68
End: 2023-01-26

## 2023-01-26 NOTE — TELEPHONE ENCOUNTER
Caller: Martina Hardwick    Relationship: Self    Best call back number: [phone]     What is the best time to reach you: ANY    Who are you requesting to speak with (clinical staff, provider,  specific staff member): DR. CLARK OR MA    What was the call regarding: ANXIETY MEDICATION IS NOT HELPING - DISCUSS GOING BACK ON XANAX // DEPRESSION MEDICATION    Do you require a callback: YES

## 2023-01-26 NOTE — TELEPHONE ENCOUNTER
I would like her to increase the clonazepam for a few days to see if that helps.  She can go as high as 2 mg twice daily for a total of 4 mg daily.  (She is currently prescribed 1 mg tablets so she can take two.)  I'll discuss adding an antidepressant at her upcoming visit depending on her sodium.

## 2023-01-30 DIAGNOSIS — F41.0 PANIC DISORDER: Primary | ICD-10-CM

## 2023-01-30 RX ORDER — ALPRAZOLAM 0.5 MG/1
.5-1 TABLET ORAL 2 TIMES DAILY PRN
Qty: 90 TABLET | Refills: 1 | Status: SHIPPED | OUTPATIENT
Start: 2023-01-30

## 2023-01-30 NOTE — TELEPHONE ENCOUNTER
I switched her back to Xanax.  Her sodium level is still low at 126.  Please confirm that she has her appt set up with nephrology.  Thanks.

## 2023-01-31 NOTE — TELEPHONE ENCOUNTER
I called and spoke with the patient and she informed me that she seen Nephrology yesterday. He cut her Lisinopril down from 2 tablets a day to only one tablet a day. He had her to continue to take her Amlodipine later in the day and if her blood pressure is above 140 to take a second Amlodipine.Goes back in 2 weeks for another blood draw and in 3 weeks she has an appointment with him again.

## 2023-02-06 ENCOUNTER — OFFICE VISIT (OUTPATIENT)
Dept: INTERNAL MEDICINE | Facility: CLINIC | Age: 68
End: 2023-02-06
Payer: MEDICARE

## 2023-02-06 VITALS
WEIGHT: 90 LBS | DIASTOLIC BLOOD PRESSURE: 58 MMHG | SYSTOLIC BLOOD PRESSURE: 100 MMHG | OXYGEN SATURATION: 99 % | BODY MASS INDEX: 16.99 KG/M2 | HEIGHT: 61 IN | TEMPERATURE: 98.4 F | HEART RATE: 96 BPM

## 2023-02-06 DIAGNOSIS — F41.8 DEPRESSION WITH ANXIETY: ICD-10-CM

## 2023-02-06 DIAGNOSIS — R63.4 WEIGHT LOSS: ICD-10-CM

## 2023-02-06 DIAGNOSIS — F41.0 PANIC DISORDER: ICD-10-CM

## 2023-02-06 DIAGNOSIS — E87.1 HYPONATREMIA: ICD-10-CM

## 2023-02-06 DIAGNOSIS — G89.29 CHRONIC ABDOMINAL PAIN: ICD-10-CM

## 2023-02-06 DIAGNOSIS — K21.9 GASTROESOPHAGEAL REFLUX DISEASE WITHOUT ESOPHAGITIS: Primary | ICD-10-CM

## 2023-02-06 DIAGNOSIS — R11.0 NAUSEA: ICD-10-CM

## 2023-02-06 DIAGNOSIS — R10.9 CHRONIC ABDOMINAL PAIN: ICD-10-CM

## 2023-02-06 PROCEDURE — 99214 OFFICE O/P EST MOD 30 MIN: CPT | Performed by: FAMILY MEDICINE

## 2023-02-06 RX ORDER — LISINOPRIL 30 MG/1
1 TABLET ORAL
COMMUNITY
Start: 2022-12-06

## 2023-02-06 RX ORDER — PANTOPRAZOLE SODIUM 40 MG/1
40 TABLET, DELAYED RELEASE ORAL DAILY
Qty: 30 TABLET | Refills: 2 | Status: SHIPPED | OUTPATIENT
Start: 2023-02-06 | End: 2023-02-23 | Stop reason: SDUPTHER

## 2023-02-06 RX ORDER — DESVENLAFAXINE SUCCINATE 50 MG/1
50 TABLET, EXTENDED RELEASE ORAL DAILY
Qty: 30 TABLET | Refills: 1 | Status: SHIPPED | OUTPATIENT
Start: 2023-02-06 | End: 2023-02-13

## 2023-02-06 RX ORDER — ONDANSETRON 4 MG/1
4 TABLET, ORALLY DISINTEGRATING ORAL EVERY 8 HOURS PRN
Qty: 30 TABLET | Refills: 0 | Status: SHIPPED | OUTPATIENT
Start: 2023-02-06 | End: 2023-02-27

## 2023-02-06 RX ORDER — VITAMIN B COMPLEX
2500 TABLET ORAL
COMMUNITY
End: 2023-02-13 | Stop reason: SDUPTHER

## 2023-02-06 NOTE — PROGRESS NOTES
Subjective   Martina Hardwick is a 67 y.o. female presenting today for follow up of   Chief Complaint   Patient presents with   • Anxiety     4 month f/u       History of Present Illness     Pt reports that she is feeling poorly.  Since stopping the duloxetine (due to hyponatremia) her anxiety is severe.  She feels like she is panicking all the time.  We switched her back to alprazolam last week and this helps a little.  She feels nauseated and has reflux as well.  She is only really tolerating Ensure nutritional shakes.  She denies SI but feels hopeless about her chronic abdominal pain for which she has seen many specialists; she has had an evaluation at [facility] but doesn't feel that she is able to travel there.    Patient Active Problem List   Diagnosis   • Migraines   • Depression with anxiety   • Allergic rhinitis   • Primary insomnia   • Chronic constipation   • GERD (gastroesophageal reflux disease)   • Essential hypertension   • Routine health maintenance   • Family history of colonic polyps   • Psoriasis   • Age-related osteoporosis without current pathological fracture   • Adhesion of abdominal wall   • Chronic abdominal pain   • Hyponatremia   • Generalized abdominal pain   • Gastrointestinal hypomotility       Current Outpatient Medications on File Prior to Visit   Medication Sig   • ALPRAZolam (XANAX) 0.5 MG tablet Take 1-2 tablets by mouth 2 (Two) Times a Day As Needed for Anxiety.   • aluminum-magnesium hydroxide-simethicone (MAALOX MAX) 400-400-40 MG/5ML suspension Take  by mouth As Needed for Indigestion or Heartburn.   • amLODIPine (NORVASC) 2.5 MG tablet Take 1 tablet by mouth once daily   • Calcium-Vitamin D-Vitamin K (VIACTIV CALCIUM PLUS D PO) Take 1 tablet by mouth Daily.   • clobetasol (TEMOVATE) 0.05 % cream Apply 1 application topically to the appropriate area as directed 2 (Two) Times a Day.   • clotrimazole-betamethasone (Lotrisone) 1-0.05 % cream Apply 1 application topically to the  appropriate area as directed 2 (Two) Times a Day.   • Cyanocobalamin 2500 MCG sublingual tablet Place 2,500 mcg under the tongue.   • estradiol (ESTRACE) 0.1 MG/GM vaginal cream Insert 2 g into the vagina Every Night.   • famotidine (PEPCID) 40 MG tablet Take 1 tablet by mouth 2 (Two) Times a Day. With lunch and at bedtime   • fluticasone (FLONASE) 50 MCG/ACT nasal spray 2 sprays into each nostril daily.   • lisinopril (PRINIVIL,ZESTRIL) 30 MG tablet Take 1 tablet by mouth.   • loratadine (CLARITIN) 10 MG tablet Take 10 mg by mouth daily.   • polyethylene glycol (MIRALAX) packet Take 17 g by mouth daily.   • Probiotic Product (PROBIOTIC DAILY) capsule Once daily (Patient taking differently: Take 2 capsules by mouth Daily. Once daily)   • promethazine (PHENERGAN) 25 MG suppository Insert 1 suppository into the rectum Every 6 (Six) Hours As Needed for Nausea or Vomiting.   • promethazine (PHENERGAN) 25 MG tablet Take 1 tablet by mouth Every 8 (Eight) Hours As Needed for Nausea or Vomiting (Do not use at the same time as Phenergan suppositories).   • SUMAtriptan (IMITREX) 100 MG tablet Take 1 tablet by mouth 1 (One) Time As Needed for Migraine for up to 1 dose. Take one tablet at onset of headache. May repeat dose one time in 2 hours. (Patient taking differently: Take 100 mg by mouth As Needed for Migraine. Take one tablet at onset of headache. May repeat dose one time in 2 hours.)   • vitamin B-12 (CYANOCOBALAMIN) 2500 MCG sublingual tablet tablet Place 2,500 mcg under the tongue Daily.   • zolpidem (AMBIEN) 10 MG tablet Take 1 tablet by mouth Every Night.   • [DISCONTINUED] DULoxetine (CYMBALTA) 60 MG capsule Take 1 capsule by mouth Daily.   • [DISCONTINUED] lisinopril (PRINIVIL,ZESTRIL) 30 MG tablet Take 1 tablet by mouth twice daily     No current facility-administered medications on file prior to visit.          The following portions of the patient's history were reviewed and updated as appropriate: allergies,  "current medications, past family history, past medical history, past social history, past surgical history and problem list.    Review of Systems   Constitutional: Positive for appetite change and unexpected weight loss.   Gastrointestinal: Positive for abdominal pain, constipation and nausea.   Psychiatric/Behavioral: Positive for depressed mood and stress. Negative for suicidal ideas. The patient is nervous/anxious.        Objective   Vitals:    02/06/23 1302   BP: 100/58   Pulse: 96   Temp: 98.4 °F (36.9 °C)   SpO2: 99%   Weight: 40.8 kg (90 lb)   Height: 154.9 cm (60.98\")       BP Readings from Last 3 Encounters:   02/06/23 100/58   01/19/23 128/84   01/14/23 151/81        Wt Readings from Last 3 Encounters:   02/06/23 40.8 kg (90 lb)   01/24/23 44.3 kg (97 lb 9.6 oz)   01/19/23 44.9 kg (99 lb)        Body mass index is 17.01 kg/m².  Nursing notes and vitals reviewed.    Physical Exam  Vitals and nursing note reviewed.   Constitutional:       Appearance: She is ill-appearing. She is not toxic-appearing.      Comments: Frail, thin.   HENT:      Head: Normocephalic and atraumatic.      Mouth/Throat:      Mouth: Mucous membranes are moist.   Eyes:      Extraocular Movements: Extraocular movements intact.      Conjunctiva/sclera: Conjunctivae normal.   Cardiovascular:      Rate and Rhythm: Normal rate and regular rhythm.   Pulmonary:      Effort: Pulmonary effort is normal.      Breath sounds: Normal breath sounds.   Abdominal:      General: There is no distension.      Palpations: Abdomen is soft. There is no mass.      Tenderness: There is abdominal tenderness (epigastric region). There is no guarding or rebound.   Musculoskeletal:      Cervical back: Neck supple. No rigidity.   Skin:     General: Skin is warm and dry.   Neurological:      General: No focal deficit present.      Mental Status: She is alert and oriented to person, place, and time.         Recent Results (from the past 672 hour(s))   CBC & " Differential    Collection Time: 01/13/23  9:32 AM   Result Value Ref Range    WBC 6.35 3.40 - 10.80 10*3/mm3    RBC 4.12 3.77 - 5.28 10*6/mm3    Hemoglobin 12.7 12.0 - 15.9 g/dL    Hematocrit 37.8 34.0 - 46.6 %    MCV 91.7 79.0 - 97.0 fL    MCH 30.8 26.6 - 33.0 pg    MCHC 33.6 31.5 - 35.7 g/dL    RDW 12.0 (L) 12.3 - 15.4 %    Platelets 282 140 - 450 10*3/mm3    Neutrophil Rel % 75.8 42.7 - 76.0 %    Lymphocyte Rel % 13.5 (L) 19.6 - 45.3 %    Monocyte Rel % 9.1 5.0 - 12.0 %    Eosinophil Rel % 0.2 (L) 0.3 - 6.2 %    Basophil Rel % 0.6 0.0 - 1.5 %    Neutrophils Absolute 4.81 1.70 - 7.00 10*3/mm3    Lymphocytes Absolute 0.86 0.70 - 3.10 10*3/mm3    Monocytes Absolute 0.58 0.10 - 0.90 10*3/mm3    Eosinophils Absolute 0.01 0.00 - 0.40 10*3/mm3    Basophils Absolute 0.04 0.00 - 0.20 10*3/mm3    Immature Granulocyte Rel % 0.8 (H) 0.0 - 0.5 %    Immature Grans Absolute 0.05 0.00 - 0.05 10*3/mm3    nRBC 0.0 0.0 - 0.2 /100 WBC   Basic Metabolic Panel    Collection Time: 01/13/23  9:32 AM   Result Value Ref Range    Glucose 89 65 - 99 mg/dL    BUN 16 8 - 23 mg/dL    Creatinine 0.82 0.57 - 1.00 mg/dL    EGFR Result 78.5 >60.0 mL/min/1.73    BUN/Creatinine Ratio 19.5 7.0 - 25.0    Sodium 127 (L) 136 - 145 mmol/L    Potassium 4.6 3.5 - 5.2 mmol/L    Chloride 87 (L) 98 - 107 mmol/L    Total CO2 28.2 22.0 - 29.0 mmol/L    Calcium 9.9 8.6 - 10.5 mg/dL   Cortisol - AM    Collection Time: 01/13/23  9:32 AM   Result Value Ref Range    Cortisol - AM 21.8 (H) 6.2 - 19.4 ug/dL   Comprehensive Metabolic Panel    Collection Time: 01/14/23  2:31 PM    Specimen: Arm, Left; Blood   Result Value Ref Range    Glucose 119 (H) 65 - 99 mg/dL    BUN 13 8 - 23 mg/dL    Creatinine 0.76 0.57 - 1.00 mg/dL    Sodium 127 (L) 136 - 145 mmol/L    Potassium 4.7 3.5 - 5.2 mmol/L    Chloride 88 (L) 98 - 107 mmol/L    CO2 24.6 22.0 - 29.0 mmol/L    Calcium 9.9 8.6 - 10.5 mg/dL    Total Protein 8.2 6.0 - 8.5 g/dL    Albumin 4.7 3.5 - 5.2 g/dL    ALT (SGPT) 17  1 - 33 U/L    AST (SGOT) 23 1 - 32 U/L    Alkaline Phosphatase 109 39 - 117 U/L    Total Bilirubin 0.7 0.0 - 1.2 mg/dL    Globulin 3.5 gm/dL    A/G Ratio 1.3 g/dL    BUN/Creatinine Ratio 17.1 7.0 - 25.0    Anion Gap 14.4 5.0 - 15.0 mmol/L    eGFR 86.0 >60.0 mL/min/1.73   Lactic Acid, Plasma    Collection Time: 01/14/23  2:31 PM    Specimen: Blood   Result Value Ref Range    Lactate 1.1 0.5 - 2.0 mmol/L   CBC Auto Differential    Collection Time: 01/14/23  2:31 PM    Specimen: Arm, Left; Blood   Result Value Ref Range    WBC 7.86 3.40 - 10.80 10*3/mm3    RBC 4.65 3.77 - 5.28 10*6/mm3    Hemoglobin 14.6 12.0 - 15.9 g/dL    Hematocrit 42.8 34.0 - 46.6 %    MCV 92.0 79.0 - 97.0 fL    MCH 31.4 26.6 - 33.0 pg    MCHC 34.1 31.5 - 35.7 g/dL    RDW 12.2 (L) 12.3 - 15.4 %    RDW-SD 41.1 37.0 - 54.0 fl    MPV 9.7 6.0 - 12.0 fL    Platelets 297 140 - 450 10*3/mm3    Neutrophil % 82.3 (H) 42.7 - 76.0 %    Lymphocyte % 9.9 (L) 19.6 - 45.3 %    Monocyte % 6.5 5.0 - 12.0 %    Eosinophil % 0.3 0.3 - 6.2 %    Basophil % 0.6 0.0 - 1.5 %    Immature Grans % 0.4 0.0 - 0.5 %    Neutrophils, Absolute 6.47 1.70 - 7.00 10*3/mm3    Lymphocytes, Absolute 0.78 0.70 - 3.10 10*3/mm3    Monocytes, Absolute 0.51 0.10 - 0.90 10*3/mm3    Eosinophils, Absolute 0.02 0.00 - 0.40 10*3/mm3    Basophils, Absolute 0.05 0.00 - 0.20 10*3/mm3    Immature Grans, Absolute 0.03 0.00 - 0.05 10*3/mm3    nRBC 0.0 0.0 - 0.2 /100 WBC   Lipase    Collection Time: 01/14/23  2:31 PM    Specimen: Arm, Left; Blood   Result Value Ref Range    Lipase 79 (H) 13 - 60 U/L   Troponin    Collection Time: 01/14/23  2:31 PM    Specimen: Arm, Left; Blood   Result Value Ref Range    Troponin T <0.010 0.000 - 0.030 ng/mL   Blood Culture - Blood, Arm, Left    Collection Time: 01/14/23  2:32 PM    Specimen: Arm, Left; Blood   Result Value Ref Range    Blood Culture No growth at 5 days    ECG 12 Lead Tachycardia    Collection Time: 01/14/23  2:33 PM   Result Value Ref Range    QT  Interval 339 ms   Urinalysis With Microscopic If Indicated (No Culture) - Urine, Clean Catch    Collection Time: 01/14/23  4:02 PM    Specimen: Urine, Clean Catch   Result Value Ref Range    Color, UA Yellow Yellow, Straw    Appearance, UA Clear Clear    pH, UA 7.0 4.5 - 8.0    Specific Gravity, UA 1.010 1.003 - 1.030    Glucose, UA Negative Negative    Ketones, UA Negative Negative    Bilirubin, UA Negative Negative    Blood, UA Trace (A) Negative    Protein, UA Negative Negative    Leuk Esterase, UA Negative Negative    Nitrite, UA Negative Negative    Urobilinogen, UA 0.2 E.U./dL 0.2 - 1.0 E.U./dL   Urinalysis, Microscopic Only - Urine, Clean Catch    Collection Time: 01/14/23  4:02 PM    Specimen: Urine, Clean Catch   Result Value Ref Range    RBC, UA 3-5 (A) None Seen /HPF    WBC, UA None Seen None Seen /HPF    Bacteria, UA None Seen None Seen /HPF    Squamous Epithelial Cells, UA None Seen None Seen, 0-2 /HPF    Hyaline Casts, UA None Seen None Seen /LPF    Methodology Manual Light Microscopy    Comprehensive Metabolic Panel    Collection Time: 01/27/23  3:54 PM    Specimen: Blood   Result Value Ref Range    Glucose 91 65 - 99 mg/dL    BUN 16 8 - 23 mg/dL    Creatinine 0.78 0.57 - 1.00 mg/dL    EGFR Result 83.4 >60.0 mL/min/1.73    BUN/Creatinine Ratio 20.5 7.0 - 25.0    Sodium 126 (L) 136 - 145 mmol/L    Potassium 4.7 3.5 - 5.2 mmol/L    Chloride 91 (L) 98 - 107 mmol/L    Total CO2 28.0 22.0 - 29.0 mmol/L    Calcium 9.8 8.6 - 10.5 mg/dL    Total Protein 6.6 6.0 - 8.5 g/dL    Albumin 4.3 3.5 - 5.2 g/dL    Globulin 2.3 gm/dL    A/G Ratio 1.9 g/dL    Total Bilirubin 0.5 0.0 - 1.2 mg/dL    Alkaline Phosphatase 98 39 - 117 U/L    AST (SGOT) 25 1 - 32 U/L    ALT (SGPT) 22 1 - 33 U/L         Assessment & Plan   Diagnoses and all orders for this visit:    1. Gastroesophageal reflux disease without esophagitis (Primary)  -     pantoprazole (Protonix) 40 MG EC tablet; Take 1 tablet by mouth Daily.  Dispense: 30 tablet;  Refill: 2    2. Depression with anxiety  -     desvenlafaxine (Pristiq) 50 MG 24 hr tablet; Take 1 tablet by mouth Daily.  Dispense: 30 tablet; Refill: 1    3. Panic disorder    4. Hyponatremia  -     Basic metabolic panel    5. Nausea  -     ondansetron ODT (ZOFRAN-ODT) 4 MG disintegrating tablet; Place 1 tablet on the tongue Every 8 (Eight) Hours As Needed for Nausea or Vomiting.  Dispense: 30 tablet; Refill: 0    6. Weight loss  -     Ambulatory Referral to Nutrition Services    7. Chronic abdominal pain  -     Ambulatory Referral to Nutrition Services      She is down 9 pounds since her last visit 2.5 weeks ago.  Her anxiety is significantly contributing to her nausea and decreased appetite (which are multifactorial).  Her weight had been holding fairly steady until we stopped the duloxetine due to hyponatremia.  She has since seen Dr. Stephen Jalloh, nephrology, and is undergoing hyponatremia workup.  She has f/u with him in 2 weeks.  I think we can add desvenlafaxine to the regimen as there should be a lower risk of hyponatremia.  Check BMP today to assess sodium level before starting the desvenlafaxine.  She has previously tried amitriptyline, gabapentin, mirtazapine.  We'll also temporarily increase the alprazolam to 1 mg BID.  Add ondansetron for the nausea and we'll restart PPI (pantoprazole this time) for her flare in GERD symptoms.  Referral to nutrition services.  F/U in 1 week.  We discussed our goal of increasing weight by 1-2 pounds over the next week with the expectation that the above treatments should help with the anxiety, nausea, and appetite and she is agreeable.        Medications, including side effects, were discussed with the patient. Patient verbalized understanding.  The plan of care was discussed. All questions were answered. Patient verbalized understanding.      Return in about 1 week (around 2/13/2023).

## 2023-02-07 LAB
BUN SERPL-MCNC: 17 MG/DL (ref 8–23)
BUN/CREAT SERPL: 20.7 (ref 7–25)
CALCIUM SERPL-MCNC: 10.4 MG/DL (ref 8.6–10.5)
CHLORIDE SERPL-SCNC: 84 MMOL/L (ref 98–107)
CO2 SERPL-SCNC: 29.8 MMOL/L (ref 22–29)
CREAT SERPL-MCNC: 0.82 MG/DL (ref 0.57–1)
EGFRCR SERPLBLD CKD-EPI 2021: 78.5 ML/MIN/1.73
GLUCOSE SERPL-MCNC: 120 MG/DL (ref 65–99)
POTASSIUM SERPL-SCNC: 4.8 MMOL/L (ref 3.5–5.2)
SODIUM SERPL-SCNC: 127 MMOL/L (ref 136–145)

## 2023-02-11 ENCOUNTER — HOSPITAL ENCOUNTER (EMERGENCY)
Facility: HOSPITAL | Age: 68
Discharge: HOME | End: 2023-02-11
Attending: EMERGENCY MEDICINE | Admitting: EMERGENCY MEDICINE
Payer: MEDICARE

## 2023-02-11 ENCOUNTER — DOCUMENTATION (OUTPATIENT)
Dept: INTERNAL MEDICINE | Facility: CLINIC | Age: 68
End: 2023-02-11
Payer: MEDICARE

## 2023-02-11 VITALS
RESPIRATION RATE: 16 BRPM | DIASTOLIC BLOOD PRESSURE: 83 MMHG | HEART RATE: 79 BPM | TEMPERATURE: 98.1 F | SYSTOLIC BLOOD PRESSURE: 143 MMHG | WEIGHT: 90.1 LBS | BODY MASS INDEX: 17.01 KG/M2 | OXYGEN SATURATION: 100 % | HEIGHT: 61 IN

## 2023-02-11 DIAGNOSIS — G89.29 CHRONIC ABDOMINAL PAIN: Primary | ICD-10-CM

## 2023-02-11 DIAGNOSIS — R10.9 CHRONIC ABDOMINAL PAIN: Primary | ICD-10-CM

## 2023-02-11 DIAGNOSIS — R63.4 WEIGHT LOSS: ICD-10-CM

## 2023-02-11 DIAGNOSIS — R11.0 CHRONIC NAUSEA: ICD-10-CM

## 2023-02-11 LAB
ALBUMIN SERPL-MCNC: 4.2 G/DL (ref 3.5–5.2)
ALBUMIN/GLOB SERPL: 1.4 G/DL
ALP SERPL-CCNC: 114 U/L (ref 39–117)
ALT SERPL W P-5'-P-CCNC: 14 U/L (ref 1–33)
ANION GAP SERPL CALCULATED.3IONS-SCNC: 9.5 MMOL/L (ref 5–15)
AST SERPL-CCNC: 17 U/L (ref 1–32)
BACTERIA UR QL AUTO: ABNORMAL /HPF
BASOPHILS # BLD AUTO: 0.03 10*3/MM3 (ref 0–0.2)
BASOPHILS NFR BLD AUTO: 0.5 % (ref 0–1.5)
BILIRUB SERPL-MCNC: 0.6 MG/DL (ref 0–1.2)
BILIRUB UR QL STRIP: NEGATIVE
BUN SERPL-MCNC: 10 MG/DL (ref 8–23)
BUN/CREAT SERPL: 13.5 (ref 7–25)
CALCIUM SPEC-SCNC: 9.7 MG/DL (ref 8.6–10.5)
CHLORIDE SERPL-SCNC: 85 MMOL/L (ref 98–107)
CK SERPL-CCNC: 56 U/L (ref 20–180)
CLARITY UR: CLEAR
CO2 SERPL-SCNC: 26.5 MMOL/L (ref 22–29)
COLOR UR: YELLOW
CREAT SERPL-MCNC: 0.74 MG/DL (ref 0.57–1)
DEPRECATED RDW RBC AUTO: 40.2 FL (ref 37–54)
EGFRCR SERPLBLD CKD-EPI 2021: 88.8 ML/MIN/1.73
EOSINOPHIL # BLD AUTO: 0 10*3/MM3 (ref 0–0.4)
EOSINOPHIL NFR BLD AUTO: 0 % (ref 0.3–6.2)
ERYTHROCYTE [DISTWIDTH] IN BLOOD BY AUTOMATED COUNT: 12.1 % (ref 12.3–15.4)
GLOBULIN UR ELPH-MCNC: 2.9 GM/DL
GLUCOSE SERPL-MCNC: 107 MG/DL (ref 65–99)
GLUCOSE UR STRIP-MCNC: NEGATIVE MG/DL
HCT VFR BLD AUTO: 37.5 % (ref 34–46.6)
HGB BLD-MCNC: 13.1 G/DL (ref 12–15.9)
HGB UR QL STRIP.AUTO: ABNORMAL
HYALINE CASTS UR QL AUTO: ABNORMAL /LPF
IMM GRANULOCYTES # BLD AUTO: 0.02 10*3/MM3 (ref 0–0.05)
IMM GRANULOCYTES NFR BLD AUTO: 0.3 % (ref 0–0.5)
KETONES UR QL STRIP: NEGATIVE
LEUKOCYTE ESTERASE UR QL STRIP.AUTO: NEGATIVE
LIPASE SERPL-CCNC: 65 U/L (ref 13–60)
LYMPHOCYTES # BLD AUTO: 0.58 10*3/MM3 (ref 0.7–3.1)
LYMPHOCYTES NFR BLD AUTO: 8.8 % (ref 19.6–45.3)
MCH RBC QN AUTO: 31.9 PG (ref 26.6–33)
MCHC RBC AUTO-ENTMCNC: 34.9 G/DL (ref 31.5–35.7)
MCV RBC AUTO: 91.2 FL (ref 79–97)
MONOCYTES # BLD AUTO: 0.53 10*3/MM3 (ref 0.1–0.9)
MONOCYTES NFR BLD AUTO: 8 % (ref 5–12)
NEUTROPHILS NFR BLD AUTO: 5.44 10*3/MM3 (ref 1.7–7)
NEUTROPHILS NFR BLD AUTO: 82.4 % (ref 42.7–76)
NITRITE UR QL STRIP: NEGATIVE
NRBC BLD AUTO-RTO: 0 /100 WBC (ref 0–0.2)
PH UR STRIP.AUTO: 7 [PH] (ref 4.5–8)
PLATELET # BLD AUTO: 235 10*3/MM3 (ref 140–450)
PMV BLD AUTO: 9.7 FL (ref 6–12)
POTASSIUM SERPL-SCNC: 4.3 MMOL/L (ref 3.5–5.2)
PROT SERPL-MCNC: 7.1 G/DL (ref 6–8.5)
PROT UR QL STRIP: NEGATIVE
RBC # BLD AUTO: 4.11 10*6/MM3 (ref 3.77–5.28)
RBC # UR STRIP: ABNORMAL /HPF
REF LAB TEST METHOD: ABNORMAL
SODIUM SERPL-SCNC: 121 MMOL/L (ref 136–145)
SP GR UR STRIP: 1.01 (ref 1–1.03)
SQUAMOUS #/AREA URNS HPF: ABNORMAL /HPF
UROBILINOGEN UR QL STRIP: ABNORMAL
WBC # UR STRIP: ABNORMAL /HPF
WBC NRBC COR # BLD: 6.6 10*3/MM3 (ref 3.4–10.8)

## 2023-02-11 PROCEDURE — 82550 ASSAY OF CK (CPK): CPT | Performed by: EMERGENCY MEDICINE

## 2023-02-11 PROCEDURE — 85025 COMPLETE CBC W/AUTO DIFF WBC: CPT | Performed by: EMERGENCY MEDICINE

## 2023-02-11 PROCEDURE — 80053 COMPREHEN METABOLIC PANEL: CPT | Performed by: EMERGENCY MEDICINE

## 2023-02-11 PROCEDURE — 81001 URINALYSIS AUTO W/SCOPE: CPT | Performed by: EMERGENCY MEDICINE

## 2023-02-11 PROCEDURE — 99283 EMERGENCY DEPT VISIT LOW MDM: CPT

## 2023-02-11 PROCEDURE — 83690 ASSAY OF LIPASE: CPT | Performed by: EMERGENCY MEDICINE

## 2023-02-11 PROCEDURE — 96365 THER/PROPH/DIAG IV INF INIT: CPT

## 2023-02-11 RX ORDER — DEXTROSE AND SODIUM CHLORIDE 5; .45 G/100ML; G/100ML
125 INJECTION, SOLUTION INTRAVENOUS CONTINUOUS
Status: DISCONTINUED | OUTPATIENT
Start: 2023-02-11 | End: 2023-02-11 | Stop reason: HOSPADM

## 2023-02-11 RX ORDER — SODIUM CHLORIDE 0.9 % (FLUSH) 0.9 %
10 SYRINGE (ML) INJECTION AS NEEDED
Status: DISCONTINUED | OUTPATIENT
Start: 2023-02-11 | End: 2023-02-11 | Stop reason: HOSPADM

## 2023-02-11 RX ADMIN — DEXTROSE AND SODIUM CHLORIDE 125 ML/HR: 5; 450 INJECTION, SOLUTION INTRAVENOUS at 17:30

## 2023-02-11 RX ADMIN — DEXTROSE AND SODIUM CHLORIDE 125 ML/HR: 5; 450 INJECTION, SOLUTION INTRAVENOUS at 18:05

## 2023-02-11 NOTE — ED NOTES
Pt states that she has constant all over generalized abdominal pain. That gets worse anytime that she has any oral intake. She states she has a history of degreased colon motility, and multiple abdominal surgeries that have left her with adhesions. She states that she has lost 26 lbs over the last 2 years with 15 of those since November of last year. She has an appointment with pain management this upcoming Thursday. Pt was sent by her PCP for follow up for the weight loss. Pt is resting supine on the stretcher with head elevated. Pt does not appear to be in any acute distress at this time. Will continue to monitor.

## 2023-02-11 NOTE — ED PROVIDER NOTES
Subjective     History provided by:  Patient, medical records and spouse (Dr. Garay.)    History of Present Illness    · Chief complaint: Weight loss    · Location: Abdominal pain across the upper abdomen bilaterally.    · Quality/Severity: Patient has had a 26 pound weight loss over 2 years.  She has lost 6 to 7 pounds since October.    · Timing/Onset: Started 2 years ago, but has been worse since October.    · Modifying Factors: She has had nausea and abdominal pain and not eaten solid food in 2 weeks.  She has been subsiding on 2 ensures per day.    · Associated symptoms: She has nausea that only vomited once last week.  She does have dry heaves.    · Narrative: The patient is a 67-year-old white female with a 2-year chronic history of nausea and abdominal pain and weight loss.  She has been seen by numerous consultants including Dr. Ruff and the [facility], Dr. Hinds, [facility] Nicole Allen.  She was sent in by her PCP Dr. Garay for evaluation for nutritional support and possible placement of a feeding tube.  The patient is scheduled for pain management evaluation next Thursday.  She has never smoked.    Review of Systems  Past Medical History:   Diagnosis Date   • Arthritis    • Autoantibody titer positive    • Bloating    • Cholelithiasis     Removed 8/17/2018   • Chronic abdominal pain    • Chronic constipation    • Encounter for preventive health examination    • Endometriosis    • Gastritis    • Gastrointestinal hypomotility    • GERD (gastroesophageal reflux disease)    • Headache, tension-type    • Hypertension    • Hyponatremia    • Insomnia    • Intestinal autonomic neuropathy    • Migraine    • Mixed anxiety and depressive disorder    • Moderate malnutrition (HCC)    • Noninfectious gastroenteritis    • OP (osteoporosis)    • Osteoporosis    • PONV (postoperative nausea and vomiting) 08/17/2018   • Poor sleep    • Psoriasis    • Seasonal allergies    • Visceral  "hyperalgesia      /83 (BP Location: Right arm, Patient Position: Lying)   Pulse 79   Temp 98.1 °F (36.7 °C)   Resp 16   Ht 154.9 cm (61\")   Wt 40.9 kg (90 lb 1.6 oz)   LMP  (LMP Unknown)   SpO2 100%   BMI 17.02 kg/m²     Past Medical History:   Diagnosis Date   • Arthritis    • Autoantibody titer positive    • Bloating    • Cholelithiasis     Removed 8/17/2018   • Chronic abdominal pain    • Chronic constipation    • Encounter for preventive health examination    • Endometriosis    • Gastritis    • Gastrointestinal hypomotility    • GERD (gastroesophageal reflux disease)    • Headache, tension-type    • Hypertension    • Hyponatremia    • Insomnia    • Intestinal autonomic neuropathy    • Migraine    • Mixed anxiety and depressive disorder    • Moderate malnutrition (HCC)    • Noninfectious gastroenteritis    • OP (osteoporosis)    • Osteoporosis    • PONV (postoperative nausea and vomiting) 08/17/2018   • Poor sleep    • Psoriasis    • Seasonal allergies    • Visceral hyperalgesia        Allergies   Allergen Reactions   • Hydrocodone Nausea And Vomiting   • Sulfa Antibiotics Nausea And Vomiting       Past Surgical History:   Procedure Laterality Date   • APPENDECTOMY     • CHOLECYSTECTOMY N/A 8/17/2018    Procedure: CHOLECYSTECTOMY LAPAROSCOPIC converted to open procedure;  Surgeon: Hernan Hinds MD;  Location: [facility] OR;  Service: General   • COLON SURGERY     • COLONOSCOPY     • COLONOSCOPY N/A 12/12/2018    Procedure: COLONOSCOPY;  Surgeon: Darien Ruff MD;  Location: [facility] OR;  Service: Gastroenterology   • DIAGNOSTIC LAPAROSCOPY  1990   • DILATATION AND CURETTAGE  1985   • ENDOSCOPY N/A 5/13/2016    Procedure: ESOPHAGOGASTRODUODENOSCOPY ;  Surgeon: Darien Ruff MD;  Location: [facility] OR;  Service:    • HYSTERECTOMY     • REVISION / TAKEDOWN COLOSTOMY         Family History   Problem Relation Age of Onset   • Hyperlipidemia Mother    • Macular degeneration Mother  "   • Hearing loss Mother    • Heart disease Father         Had 5 bypass surgery   • Hyperlipidemia Father    • Cancer Father         Prostate and bladder   • Depression Father    • Depression Sister    • COPD Sister    • Hyperlipidemia Sister    • Hyperlipidemia Brother    • Depression Brother    • Kidney disease Brother    • Breast cancer Maternal Aunt    • Breast cancer Cousin    • Breast cancer Cousin    • Colon cancer Neg Hx    • Colon polyps Neg Hx        Social History     Socioeconomic History   • Marital status:    Tobacco Use   • Smoking status: Never   • Smokeless tobacco: Never   Vaping Use   • Vaping Use: Never used   Substance and Sexual Activity   • Alcohol use: Not Currently     Comment: rare   • Drug use: No   • Sexual activity: Not Currently     Partners: Male           Objective   Physical Exam  Vitals and nursing note reviewed.   Constitutional:       General: She is not in acute distress.     Appearance: She is not toxic-appearing.      Comments: The patient is a thin cachectic build.  She does not appear toxic.  She does not appear in acute distress.  Review of her vital signs: Her blood pressure slightly elevated 143/83, remainder vital signs within normal limits.   HENT:      Head: Normocephalic and atraumatic.      Mouth/Throat:      Mouth: Mucous membranes are moist.   Eyes:      General: No scleral icterus.     Extraocular Movements: Extraocular movements intact.      Pupils: Pupils are equal, round, and reactive to light.   Cardiovascular:      Rate and Rhythm: Normal rate and regular rhythm.      Heart sounds: No murmur heard.  Pulmonary:      Effort: Pulmonary effort is normal.      Breath sounds: Normal breath sounds.   Abdominal:      General: Abdomen is flat. Bowel sounds are normal.      Palpations: Abdomen is soft.      Tenderness: There is no abdominal tenderness. There is no guarding.   Skin:     General: Skin is warm and dry.      Capillary Refill: Capillary refill takes  less than 2 seconds.   Neurological:      General: No focal deficit present.      Mental Status: She is alert and oriented to person, place, and time.      Cranial Nerves: No cranial nerve deficit.      Motor: No weakness.         Procedures           ED Course  ED Course as of 02/11/23 2032   Sat Feb 11, 2023 2027 16:37 patient discussed with Dr. Garay, who stated she was concerned that the patient might need to be admitted for nutritional support.  She thought the patient might need a feeding tube for such. [TP]   2027 Review of the patient's laboratory studies: The patient's CMP has a low sodium of 121 which is baseline and chronic for the patient.  Her potassium and glucose are normal.  Renal and liver function test within normal limits.  CK is normal at 56 which does not suggest acute muscle breakdown.  Lipase is nonspecifically slightly elevated at 65.  Urinalysis has microscopic hematuria with 0-2 red cells, but is negative for infection.  CBC is normal white count of 6.6 with a left shift.  Hemoglobin, hematocrit and platelets within normal limits. [TP]   2029 The patient is experience chronic weight loss over the last 2 years.  She does not appear in acute distress or in need of acute intervention.  Dr. Ruff is not available this weekend, so placing a G-tube is not an option at this facility.  The patient appears stable to follow-up with him to be evaluated for this as an outpatient. [TP]   2029 17:50 the patient was discussed with Dr. Flores, hospitalist, who stated the patient does not meet admission criteria.  He concurred the patient could follow-up with Dr. Ruff as an outpatient concerning possible feeding tube placement. [TP]      ED Course User Index  [TP] Hernan Hernandez MD                                           Medical Decision Making  Chronic abdominal pain: chronic illness or injury  Chronic nausea: chronic illness or injury  Weight loss: chronic illness or injury  Amount  and/or Complexity of Data Reviewed  Labs: ordered. Decision-making details documented in ED Course.  Discussion of management or test interpretation with external provider(s): Details documented in the ED course.        Final diagnoses:   Chronic abdominal pain   Chronic nausea   Weight loss       ED Disposition  ED Disposition     ED Disposition   Discharge    Condition   Stable    Comment   --             Ildefonso Dubois MD  [address]  [place] [address]  [phone]    Schedule an appointment as soon as possible for a visit in 2 days      Darien Ruff MD  [address]  [address]  [place] [address]  [phone]    Schedule an appointment as soon as possible for a visit   next available discerning evaluation for a feeding tube.         Medication List      Changed    Probiotic Daily capsule  Once daily  What changed:   · how much to take  · how to take this  · when to take this     SUMAtriptan 100 MG tablet  Commonly known as: IMITREX  Take 1 tablet by mouth 1 (One) Time As Needed for Migraine for up to 1 dose. Take one tablet at onset of headache. May repeat dose one time in 2 hours.  What changed: when to take this            Labs Reviewed   COMPREHENSIVE METABOLIC PANEL - Abnormal; Notable for the following components:       Result Value    Glucose 107 (*)     Sodium 121 (*)     Chloride 85 (*)     All other components within normal limits    Narrative:     GFR Normal >60  Chronic Kidney Disease <60  Kidney Failure <15     LIPASE - Abnormal; Notable for the following components:    Lipase 65 (*)     All other components within normal limits   URINALYSIS W/ MICROSCOPIC IF INDICATED (NO CULTURE) - Abnormal; Notable for the following components:    Blood, UA Small (1+) (*)     All other components within normal limits   CBC WITH AUTO DIFFERENTIAL - Abnormal; Notable for the following components:    RDW 12.1 (*)     Neutrophil % 82.4 (*)     Lymphocyte % 8.8 (*)      Eosinophil % 0.0 (*)     Lymphocytes, Absolute 0.58 (*)     All other components within normal limits   URINALYSIS, MICROSCOPIC ONLY - Abnormal; Notable for the following components:    RBC, UA 0-2 (*)     All other components within normal limits   CK - Normal   CBC AND DIFFERENTIAL    Narrative:     The following orders were created for panel order CBC & Differential.  Procedure                               Abnormality         Status                     ---------                               -----------         ------                     CBC Auto Differential[[id number]]        Abnormal            Final result                 Please view results for these tests on the individual orders.     No orders to display          Medication List      Changed    Probiotic Daily capsule  Once daily  What changed:   · how much to take  · how to take this  · when to take this     SUMAtriptan 100 MG tablet  Commonly known as: IMITREX  Take 1 tablet by mouth 1 (One) Time As Needed for Migraine for up to 1 dose. Take one tablet at onset of headache. May repeat dose one time in 2 hours.  What changed: when to take this                 Hernan Hernandez MD  02/11/23 2032

## 2023-02-11 NOTE — PROGRESS NOTES
I spoke with pt over the phone.  She says she is still doing poorly with severe nausea and abdominal pain.  She is only getting in 2 Ensure daily and continuing to lose weight.  Her anxiety is a little better with the medication changes.  She says she is becoming weaker and sicker and is requiring her 's assistance with ADLs such as bathing.  I instructed her to proceed to ER.  May need nutritional support with enteral feeds.  I let the ER know she is coming.

## 2023-02-13 ENCOUNTER — OFFICE VISIT (OUTPATIENT)
Dept: INTERNAL MEDICINE | Facility: CLINIC | Age: 68
End: 2023-02-13
Payer: MEDICARE

## 2023-02-13 ENCOUNTER — LAB (OUTPATIENT)
Dept: LAB | Facility: HOSPITAL | Age: 68
End: 2023-02-13
Payer: MEDICARE

## 2023-02-13 ENCOUNTER — TRANSCRIBE ORDERS (OUTPATIENT)
Dept: ADMINISTRATIVE | Facility: HOSPITAL | Age: 68
End: 2023-02-13
Payer: MEDICARE

## 2023-02-13 VITALS
HEART RATE: 82 BPM | WEIGHT: 96 LBS | SYSTOLIC BLOOD PRESSURE: 121 MMHG | TEMPERATURE: 97.5 F | OXYGEN SATURATION: 99 % | BODY MASS INDEX: 18.12 KG/M2 | HEIGHT: 61 IN | DIASTOLIC BLOOD PRESSURE: 92 MMHG

## 2023-02-13 DIAGNOSIS — E87.1 HYPOSMOLALITY SYNDROME: ICD-10-CM

## 2023-02-13 DIAGNOSIS — E87.1 HYPOSMOLALITY SYNDROME: Primary | ICD-10-CM

## 2023-02-13 DIAGNOSIS — R10.9 CHRONIC ABDOMINAL PAIN: Primary | ICD-10-CM

## 2023-02-13 DIAGNOSIS — G89.29 CHRONIC ABDOMINAL PAIN: Primary | ICD-10-CM

## 2023-02-13 DIAGNOSIS — R11.0 NAUSEA: ICD-10-CM

## 2023-02-13 DIAGNOSIS — E87.1 HYPONATREMIA: ICD-10-CM

## 2023-02-13 DIAGNOSIS — I10 ESSENTIAL HYPERTENSION, MALIGNANT: ICD-10-CM

## 2023-02-13 DIAGNOSIS — F41.9 ANXIETY: ICD-10-CM

## 2023-02-13 LAB
ALBUMIN SERPL-MCNC: 4.9 G/DL (ref 3.5–5.2)
ANION GAP SERPL CALCULATED.3IONS-SCNC: 14.1 MMOL/L (ref 5–15)
BUN SERPL-MCNC: 9 MG/DL (ref 8–23)
BUN/CREAT SERPL: 11.1 (ref 7–25)
CALCIUM SPEC-SCNC: 10.5 MG/DL (ref 8.6–10.5)
CHLORIDE SERPL-SCNC: 82 MMOL/L (ref 98–107)
CO2 SERPL-SCNC: 26.9 MMOL/L (ref 22–29)
CREAT SERPL-MCNC: 0.81 MG/DL (ref 0.57–1)
EGFRCR SERPLBLD CKD-EPI 2021: 79.7 ML/MIN/1.73
GLUCOSE SERPL-MCNC: 112 MG/DL (ref 65–99)
PHOSPHATE SERPL-MCNC: 3.5 MG/DL (ref 2.5–4.5)
POTASSIUM SERPL-SCNC: 4.8 MMOL/L (ref 3.5–5.2)
SODIUM SERPL-SCNC: 123 MMOL/L (ref 136–145)
TSH SERPL DL<=0.05 MIU/L-ACNC: 1.9 UIU/ML (ref 0.27–4.2)

## 2023-02-13 PROCEDURE — 82530 CORTISOL FREE: CPT

## 2023-02-13 PROCEDURE — 99214 OFFICE O/P EST MOD 30 MIN: CPT | Performed by: FAMILY MEDICINE

## 2023-02-13 PROCEDURE — 36415 COLL VENOUS BLD VENIPUNCTURE: CPT

## 2023-02-13 PROCEDURE — 84443 ASSAY THYROID STIM HORMONE: CPT

## 2023-02-13 PROCEDURE — 80069 RENAL FUNCTION PANEL: CPT

## 2023-02-13 RX ORDER — PROMETHAZINE HYDROCHLORIDE 25 MG/1
25 TABLET ORAL EVERY 8 HOURS PRN
Qty: 30 TABLET | Refills: 0 | Status: SHIPPED | OUTPATIENT
Start: 2023-02-13

## 2023-02-13 RX ORDER — QUETIAPINE FUMARATE 25 MG/1
50 TABLET, FILM COATED ORAL NIGHTLY
Qty: 60 TABLET | Refills: 1 | Status: SHIPPED | OUTPATIENT
Start: 2023-02-13 | End: 2023-02-27

## 2023-02-13 NOTE — PROGRESS NOTES
Subjective   Martina Hardwick is a 67 y.o. female presenting today for follow up of   Chief Complaint   Patient presents with   • Follow-up     GERD/ abdominal pain  Declining.. symptoms worsening       History of Present Illness     Pt presents for 1 week f/u chronic abdominal pain and nausea and ER follow-up, where she went over the weekend for the same.  She has been dealing with the abdominal pain for a couple of years but the nausea has become much more problematic over the past few weeks.  She states she is tolerating very little PO intake other than 2-3 Ensure daily.  She is dry-heaving but not vomiting.  She says she feels acid refluxing.  She has been using promethazine suppositories; the ondansetron isn't helping much.  She start taking pantoprazole last week.  She also takes famotidine.  She has an appointment to establish with pain management later this week.  She takes Miralax and is having daily loose BMs.    Anxiety.  She feels better with the alprazolam.  She doesn't think the Pristiq is helping yet and her sodium was down again in the ER to 121.  She hasn't heard from Behavioral Health yet.  She denies SI but is beginning to feel hopeless about her health.    Patient Active Problem List   Diagnosis   • Migraines   • Depression with anxiety   • Allergic rhinitis   • Primary insomnia   • Chronic constipation   • GERD (gastroesophageal reflux disease)   • Essential hypertension   • Routine health maintenance   • Family history of colonic polyps   • Psoriasis   • Age-related osteoporosis without current pathological fracture   • Adhesion of abdominal wall   • Chronic abdominal pain   • Hyponatremia   • Generalized abdominal pain   • Gastrointestinal hypomotility       Current Outpatient Medications on File Prior to Visit   Medication Sig   • ALPRAZolam (XANAX) 0.5 MG tablet Take 1-2 tablets by mouth 2 (Two) Times a Day As Needed for Anxiety.   • aluminum-magnesium hydroxide-simethicone (MAALOX MAX)  400-400-40 MG/5ML suspension Take  by mouth As Needed for Indigestion or Heartburn.   • amLODIPine (NORVASC) 2.5 MG tablet Take 1 tablet by mouth once daily   • Calcium-Vitamin D-Vitamin K (VIACTIV CALCIUM PLUS D PO) Take 1 tablet by mouth Daily.   • Cyanocobalamin 2500 MCG sublingual tablet Place 2,500 mcg under the tongue.   • estradiol (ESTRACE) 0.1 MG/GM vaginal cream Insert 2 g into the vagina Every Night.   • famotidine (PEPCID) 40 MG tablet Take 1 tablet by mouth 2 (Two) Times a Day. With lunch and at bedtime   • fluticasone (FLONASE) 50 MCG/ACT nasal spray 2 sprays into each nostril daily.   • lisinopril (PRINIVIL,ZESTRIL) 30 MG tablet Take 1 tablet by mouth.   • loratadine (CLARITIN) 10 MG tablet Take 10 mg by mouth daily.   • ondansetron ODT (ZOFRAN-ODT) 4 MG disintegrating tablet Place 1 tablet on the tongue Every 8 (Eight) Hours As Needed for Nausea or Vomiting.   • pantoprazole (Protonix) 40 MG EC tablet Take 1 tablet by mouth Daily.   • polyethylene glycol (MIRALAX) packet Take 17 g by mouth daily.   • Probiotic Product (PROBIOTIC DAILY) capsule Once daily (Patient taking differently: Take 2 capsules by mouth Daily. Once daily)   • promethazine (PHENERGAN) 25 MG suppository Insert 1 suppository into the rectum Every 6 (Six) Hours As Needed for Nausea or Vomiting.   • SUMAtriptan (IMITREX) 100 MG tablet Take 1 tablet by mouth 1 (One) Time As Needed for Migraine for up to 1 dose. Take one tablet at onset of headache. May repeat dose one time in 2 hours. (Patient taking differently: Take 100 mg by mouth As Needed for Migraine. Take one tablet at onset of headache. May repeat dose one time in 2 hours.)   • vitamin B-12 (CYANOCOBALAMIN) 2500 MCG sublingual tablet tablet Place 2,500 mcg under the tongue Daily.   • zolpidem (AMBIEN) 10 MG tablet Take 1 tablet by mouth Every Night.   • [DISCONTINUED] desvenlafaxine (Pristiq) 50 MG 24 hr tablet Take 1 tablet by mouth Daily.   • [DISCONTINUED] promethazine  "(PHENERGAN) 25 MG tablet Take 1 tablet by mouth Every 8 (Eight) Hours As Needed for Nausea or Vomiting (Do not use at the same time as Phenergan suppositories).   • clobetasol (TEMOVATE) 0.05 % cream Apply 1 application topically to the appropriate area as directed 2 (Two) Times a Day.   • clotrimazole-betamethasone (Lotrisone) 1-0.05 % cream Apply 1 application topically to the appropriate area as directed 2 (Two) Times a Day.     No current facility-administered medications on file prior to visit.          The following portions of the patient's history were reviewed and updated as appropriate: allergies, current medications, past family history, past medical history, past social history, past surgical history and problem list.    Review of Systems   Constitutional: Positive for appetite change.   Respiratory: Negative.    Gastrointestinal: Positive for abdominal pain, nausea, GERD and indigestion. Negative for vomiting.   Psychiatric/Behavioral: Positive for stress. Negative for suicidal ideas. The patient is nervous/anxious.        Objective   Vitals:    02/13/23 1044   BP: 121/92   BP Location: Right arm   Pulse: 82   Temp: 97.5 °F (36.4 °C)   SpO2: 99%   Weight: 43.5 kg (96 lb)   Height: 154.9 cm (60.98\")       BP Readings from Last 3 Encounters:   02/13/23 121/92   02/11/23 143/83   02/06/23 100/58        Wt Readings from Last 3 Encounters:   02/13/23 43.5 kg (96 lb)   02/11/23 40.9 kg (90 lb 1.6 oz)   02/06/23 40.8 kg (90 lb)        Body mass index is 18.15 kg/m².  Nursing notes and vitals reviewed.    Physical Exam  Vitals and nursing note reviewed.   Constitutional:       General: She is not in acute distress.     Appearance: She is ill-appearing (frail). She is not toxic-appearing or diaphoretic.   HENT:      Head: Normocephalic and atraumatic.      Mouth/Throat:      Mouth: Mucous membranes are moist.   Eyes:      General:         Right eye: No discharge.         Left eye: No discharge.   Cardiovascular: "      Rate and Rhythm: Normal rate and regular rhythm.      Heart sounds: Normal heart sounds.   Pulmonary:      Effort: Pulmonary effort is normal.      Breath sounds: Normal breath sounds.   Abdominal:      General: There is no distension.      Palpations: Abdomen is soft. There is no mass.      Tenderness: There is abdominal tenderness (epigastric). There is no guarding or rebound.   Musculoskeletal:      Cervical back: Neck supple. No rigidity.      Right lower leg: No edema.      Left lower leg: No edema.   Skin:     General: Skin is warm and dry.   Neurological:      General: No focal deficit present.      Mental Status: She is alert and oriented to person, place, and time.   Psychiatric:         Attention and Perception: Attention normal.         Mood and Affect: Mood is depressed.         Speech: Speech normal.         Behavior: Behavior normal.         Judgment: Judgment normal.         Recent Results (from the past 672 hour(s))   Comprehensive Metabolic Panel    Collection Time: 01/27/23  3:54 PM    Specimen: Blood   Result Value Ref Range    Glucose 91 65 - 99 mg/dL    BUN 16 8 - 23 mg/dL    Creatinine 0.78 0.57 - 1.00 mg/dL    EGFR Result 83.4 >60.0 mL/min/1.73    BUN/Creatinine Ratio 20.5 7.0 - 25.0    Sodium 126 (L) 136 - 145 mmol/L    Potassium 4.7 3.5 - 5.2 mmol/L    Chloride 91 (L) 98 - 107 mmol/L    Total CO2 28.0 22.0 - 29.0 mmol/L    Calcium 9.8 8.6 - 10.5 mg/dL    Total Protein 6.6 6.0 - 8.5 g/dL    Albumin 4.3 3.5 - 5.2 g/dL    Globulin 2.3 gm/dL    A/G Ratio 1.9 g/dL    Total Bilirubin 0.5 0.0 - 1.2 mg/dL    Alkaline Phosphatase 98 39 - 117 U/L    AST (SGOT) 25 1 - 32 U/L    ALT (SGPT) 22 1 - 33 U/L   Basic metabolic panel    Collection Time: 02/06/23  2:07 PM    Specimen: Blood   Result Value Ref Range    Glucose 120 (H) 65 - 99 mg/dL    BUN 17 8 - 23 mg/dL    Creatinine 0.82 0.57 - 1.00 mg/dL    EGFR Result 78.5 >60.0 mL/min/1.73    BUN/Creatinine Ratio 20.7 7.0 - 25.0    Sodium 127 (L) 136 -  145 mmol/L    Potassium 4.8 3.5 - 5.2 mmol/L    Chloride 84 (L) 98 - 107 mmol/L    Total CO2 29.8 (H) 22.0 - 29.0 mmol/L    Calcium 10.4 8.6 - 10.5 mg/dL   Urinalysis With Microscopic If Indicated (No Culture) - Urine, Clean Catch    Collection Time: 02/11/23  4:58 PM    Specimen: Urine, Clean Catch   Result Value Ref Range    Color, UA Yellow Yellow, Straw    Appearance, UA Clear Clear    pH, UA 7.0 4.5 - 8.0    Specific Gravity, UA 1.010 1.003 - 1.030    Glucose, UA Negative Negative    Ketones, UA Negative Negative    Bilirubin, UA Negative Negative    Blood, UA Small (1+) (A) Negative    Protein, UA Negative Negative    Leuk Esterase, UA Negative Negative    Nitrite, UA Negative Negative    Urobilinogen, UA 0.2 E.U./dL 0.2 - 1.0 E.U./dL   Urinalysis, Microscopic Only - Urine, Clean Catch    Collection Time: 02/11/23  4:58 PM    Specimen: Urine, Clean Catch   Result Value Ref Range    RBC, UA 0-2 (A) None Seen /HPF    WBC, UA None Seen None Seen /HPF    Bacteria, UA None Seen None Seen /HPF    Squamous Epithelial Cells, UA None Seen None Seen, 0-2 /HPF    Hyaline Casts, UA None Seen None Seen /LPF    Methodology Manual Light Microscopy    Comprehensive Metabolic Panel    Collection Time: 02/11/23  5:10 PM    Specimen: Blood   Result Value Ref Range    Glucose 107 (H) 65 - 99 mg/dL    BUN 10 8 - 23 mg/dL    Creatinine 0.74 0.57 - 1.00 mg/dL    Sodium 121 (L) 136 - 145 mmol/L    Potassium 4.3 3.5 - 5.2 mmol/L    Chloride 85 (L) 98 - 107 mmol/L    CO2 26.5 22.0 - 29.0 mmol/L    Calcium 9.7 8.6 - 10.5 mg/dL    Total Protein 7.1 6.0 - 8.5 g/dL    Albumin 4.2 3.5 - 5.2 g/dL    ALT (SGPT) 14 1 - 33 U/L    AST (SGOT) 17 1 - 32 U/L    Alkaline Phosphatase 114 39 - 117 U/L    Total Bilirubin 0.6 0.0 - 1.2 mg/dL    Globulin 2.9 gm/dL    A/G Ratio 1.4 g/dL    BUN/Creatinine Ratio 13.5 7.0 - 25.0    Anion Gap 9.5 5.0 - 15.0 mmol/L    eGFR 88.8 >60.0 mL/min/1.73   Lipase    Collection Time: 02/11/23  5:10 PM    Specimen: Blood    Result Value Ref Range    Lipase 65 (H) 13 - 60 U/L   CK    Collection Time: 02/11/23  5:10 PM    Specimen: Blood   Result Value Ref Range    Creatine Kinase 56 20 - 180 U/L   CBC Auto Differential    Collection Time: 02/11/23  5:10 PM    Specimen: Blood   Result Value Ref Range    WBC 6.60 3.40 - 10.80 10*3/mm3    RBC 4.11 3.77 - 5.28 10*6/mm3    Hemoglobin 13.1 12.0 - 15.9 g/dL    Hematocrit 37.5 34.0 - 46.6 %    MCV 91.2 79.0 - 97.0 fL    MCH 31.9 26.6 - 33.0 pg    MCHC 34.9 31.5 - 35.7 g/dL    RDW 12.1 (L) 12.3 - 15.4 %    RDW-SD 40.2 37.0 - 54.0 fl    MPV 9.7 6.0 - 12.0 fL    Platelets 235 140 - 450 10*3/mm3    Neutrophil % 82.4 (H) 42.7 - 76.0 %    Lymphocyte % 8.8 (L) 19.6 - 45.3 %    Monocyte % 8.0 5.0 - 12.0 %    Eosinophil % 0.0 (L) 0.3 - 6.2 %    Basophil % 0.5 0.0 - 1.5 %    Immature Grans % 0.3 0.0 - 0.5 %    Neutrophils, Absolute 5.44 1.70 - 7.00 10*3/mm3    Lymphocytes, Absolute 0.58 (L) 0.70 - 3.10 10*3/mm3    Monocytes, Absolute 0.53 0.10 - 0.90 10*3/mm3    Eosinophils, Absolute 0.00 0.00 - 0.40 10*3/mm3    Basophils, Absolute 0.03 0.00 - 0.20 10*3/mm3    Immature Grans, Absolute 0.02 0.00 - 0.05 10*3/mm3    nRBC 0.0 0.0 - 0.2 /100 WBC         Assessment & Plan   Diagnoses and all orders for this visit:    1. Chronic abdominal pain (Primary)    2. Hyponatremia    3. Anxiety  -     QUEtiapine (SEROquel) 25 MG tablet; Take 2 tablets by mouth Every Night.  Dispense: 60 tablet; Refill: 1    4. Nausea  -     promethazine (PHENERGAN) 25 MG tablet; Take 1 tablet by mouth Every 8 (Eight) Hours As Needed for Nausea or Vomiting (Do not use at the same time as Phenergan suppositories).  Dispense: 30 tablet; Refill: 0  -     Ambulatory Referral to Gastroenterology        1. Pain, nausea.  ER records reviewed.  Her weight is holding steady.  She has f/u with pain management later this week as was recommended by [facility].  We need to get the nausea under control so she can increase PO intake.  Suspect  gastritis.  Increase pantoprazole to BID.  F/U with Dr. Ruff.  Promethazine refilled.    2. Anxiety.  D/C desvenlafaxine and start quetiapine, which is less likely to decrease sodium and may increase her appetite.  Behavioral health consult previously ordered.  Continue alprazolam for now.      3. Hyponatremia.  She has labs today and f/u with Dr. Stpehen Jalloh next week.      Medications, including side effects, were discussed with the patient. Patient verbalized understanding.  The plan of care was discussed. All questions were answered. Patient verbalized understanding.      Return in about 2 weeks (around 2/27/2023).

## 2023-02-14 ENCOUNTER — TELEPHONE (OUTPATIENT)
Dept: GASTROENTEROLOGY | Facility: CLINIC | Age: 68
End: 2023-02-14
Payer: MEDICARE

## 2023-02-14 NOTE — TELEPHONE ENCOUNTER
Referral form PCP for Nausea    LOV with PCP: 02/13/23- Chr. Abdominal Pain and Nausea    Per PCP (Pain, nausea.  ER records reviewed.  Her weight is holding steady.  She has f/u with pain management later this week as was recommended by [facility].  We need to get the nausea under control so she can increase PO intake.  Suspect gastritis.  Increase pantoprazole to BID.  F/U with Dr. Ruff.  Promethazine refilled.)    Pt actually established with [facility]   LOV: 12/19/22

## 2023-02-22 RX ORDER — PAROXETINE HYDROCHLORIDE 20 MG/1
20 TABLET, FILM COATED ORAL EVERY MORNING
Qty: 30 TABLET | Refills: 0 | Status: SHIPPED | OUTPATIENT
Start: 2023-02-22 | End: 2023-03-27 | Stop reason: SDUPTHER

## 2023-02-23 DIAGNOSIS — K21.9 GASTROESOPHAGEAL REFLUX DISEASE WITHOUT ESOPHAGITIS: ICD-10-CM

## 2023-02-23 RX ORDER — PANTOPRAZOLE SODIUM 40 MG/1
40 TABLET, DELAYED RELEASE ORAL DAILY
Qty: 30 TABLET | Refills: 2 | Status: SHIPPED | OUTPATIENT
Start: 2023-02-23 | End: 2023-02-27 | Stop reason: SDUPTHER

## 2023-02-23 NOTE — TELEPHONE ENCOUNTER
Caller: Martina Hardwick    Relationship: Self     Best call back number: [phone]     Requested Prescriptions:   Requested Prescriptions     Pending Prescriptions Disp Refills   • pantoprazole (Protonix) 40 MG EC tablet 30 tablet 2     Sig: Take 1 tablet by mouth Daily.        Pharmacy where request should be sent: [facility] [address] [place] [address] [phone] [facility] [phone] FX     Additional details provided by patient: PHARMACY IS STATING THEY NEVER RECEIVED THE NEW PRESCRIPTION PLEASE RESEND     Does the patient have less than a 3 day supply:  [x] Yes  [] No    Would you like a call back once the refill request has been completed: [x] Yes [] No    If the office needs to give you a call back, can they leave a voicemail: [x] Yes [] No    Lexie Frost Rep   02/23/23 09:26 EST

## 2023-02-23 NOTE — TELEPHONE ENCOUNTER
Flagged for no osteoporosis dx on chart. [facility] states they did not receive the initial rx.    Rx Refill Note  Requested Prescriptions     Pending Prescriptions Disp Refills    pantoprazole (Protonix) 40 MG EC tablet 30 tablet 2     Sig: Take 1 tablet by mouth Daily.      Last office visit with prescribing clinician: 2/13/2023   Last telemedicine visit with prescribing clinician: 2/27/2023   Next office visit with prescribing clinician: 2/27/2023                         Would you like a call back once the refill request has been completed: [] Yes [] No    If the office needs to give you a call back, can they leave a voicemail: [] Yes [] No    Felicia Arciniega, PCT  02/23/23, 10:33 EST

## 2023-02-24 LAB — CORTIS F SERPL-MCNC: 0.64 UG/DL

## 2023-02-27 ENCOUNTER — OFFICE VISIT (OUTPATIENT)
Dept: INTERNAL MEDICINE | Facility: CLINIC | Age: 68
End: 2023-02-27
Payer: MEDICARE

## 2023-02-27 VITALS
HEART RATE: 91 BPM | HEIGHT: 61 IN | TEMPERATURE: 98.2 F | WEIGHT: 96.2 LBS | RESPIRATION RATE: 18 BRPM | SYSTOLIC BLOOD PRESSURE: 112 MMHG | BODY MASS INDEX: 18.16 KG/M2 | DIASTOLIC BLOOD PRESSURE: 62 MMHG | OXYGEN SATURATION: 96 %

## 2023-02-27 DIAGNOSIS — K21.9 GASTROESOPHAGEAL REFLUX DISEASE WITHOUT ESOPHAGITIS: ICD-10-CM

## 2023-02-27 DIAGNOSIS — F41.8 DEPRESSION WITH ANXIETY: ICD-10-CM

## 2023-02-27 DIAGNOSIS — E87.1 HYPONATREMIA: ICD-10-CM

## 2023-02-27 DIAGNOSIS — R10.9 CHRONIC ABDOMINAL PAIN: Primary | ICD-10-CM

## 2023-02-27 DIAGNOSIS — G89.29 CHRONIC ABDOMINAL PAIN: Primary | ICD-10-CM

## 2023-02-27 PROCEDURE — 99214 OFFICE O/P EST MOD 30 MIN: CPT | Performed by: FAMILY MEDICINE

## 2023-02-27 RX ORDER — PANTOPRAZOLE SODIUM 40 MG/1
40 TABLET, DELAYED RELEASE ORAL 2 TIMES DAILY
Qty: 60 TABLET | Refills: 2 | Status: SHIPPED | OUTPATIENT
Start: 2023-02-27

## 2023-02-27 RX ORDER — OXYCODONE HYDROCHLORIDE AND ACETAMINOPHEN 5; 325 MG/1; MG/1
1 TABLET ORAL EVERY 6 HOURS PRN
Qty: 30 TABLET | Refills: 0 | Status: SHIPPED | OUTPATIENT
Start: 2023-02-27

## 2023-02-27 NOTE — PROGRESS NOTES
Subjective   Martina Hardwick is a 67 y.o. female presenting today for follow up of   Chief Complaint   Patient presents with   • Chronic abdominal pain     2 week follow up       History of Present Illness     1. Pt here for 2 week follow-up on chronic abdominal pain.  She has since seen Dr. Mejia, pain management, and had T7, T8, T9 intercostal nerve blocks 5 days ago.  She felt better for 1 day.  She has f/u with pain management in 2 months as other blocks are being considered, such as celiac plexus and transverses abdominis plane block.  She reports pain during the night, accompanied by nausea, and asks about a low dose narcotic for night time pain.    2. Hyponatremia.  She saw Dr. Stephen Jalloh last week and is awaiting further workup.    3. Anxiety.  She is back on paroxetine and is feeling somewhat better.  She stopped the Seroquel due to feeling jittery.  She has appointment to establish with [facility] psychiatry in 2 weeks.    Patient Active Problem List   Diagnosis   • Migraines   • Depression with anxiety   • Allergic rhinitis   • Primary insomnia   • Chronic constipation   • GERD (gastroesophageal reflux disease)   • Essential hypertension   • Routine health maintenance   • Family history of colonic polyps   • Psoriasis   • Age-related osteoporosis without current pathological fracture   • Adhesion of abdominal wall   • Chronic abdominal pain   • Hyponatremia   • Generalized abdominal pain   • Gastrointestinal hypomotility       Current Outpatient Medications on File Prior to Visit   Medication Sig   • ALPRAZolam (XANAX) 0.5 MG tablet Take 1-2 tablets by mouth 2 (Two) Times a Day As Needed for Anxiety.   • aluminum-magnesium hydroxide-simethicone (MAALOX MAX) 400-400-40 MG/5ML suspension Take  by mouth As Needed for Indigestion or Heartburn.   • amLODIPine (NORVASC) 2.5 MG tablet Take 1 tablet by mouth once daily   • Calcium-Vitamin D-Vitamin K (VIACTIV CALCIUM PLUS D PO) Take 1 tablet by mouth Daily.   •  clobetasol (TEMOVATE) 0.05 % cream Apply 1 application topically to the appropriate area as directed 2 (Two) Times a Day.   • clotrimazole-betamethasone (Lotrisone) 1-0.05 % cream Apply 1 application topically to the appropriate area as directed 2 (Two) Times a Day.   • famotidine (PEPCID) 40 MG tablet Take 1 tablet by mouth 2 (Two) Times a Day. With lunch and at bedtime   • fluticasone (FLONASE) 50 MCG/ACT nasal spray 2 sprays into each nostril daily.   • lisinopril (PRINIVIL,ZESTRIL) 30 MG tablet Take 1 tablet by mouth.   • loratadine (CLARITIN) 10 MG tablet Take 10 mg by mouth daily.   • PARoxetine (PAXIL) 20 MG tablet Take 1 tablet by mouth Every Morning.   • polyethylene glycol (MIRALAX) packet Take 17 g by mouth daily.   • Probiotic Product (PROBIOTIC DAILY) capsule Once daily (Patient taking differently: Take 2 capsules by mouth Daily. Once daily)   • promethazine (PHENERGAN) 25 MG suppository Insert 1 suppository into the rectum Every 6 (Six) Hours As Needed for Nausea or Vomiting.   • promethazine (PHENERGAN) 25 MG tablet Take 1 tablet by mouth Every 8 (Eight) Hours As Needed for Nausea or Vomiting (Do not use at the same time as Phenergan suppositories).   • Simethicone 250 MG capsule Take 250 mg by mouth.   • SUMAtriptan (IMITREX) 100 MG tablet Take 1 tablet by mouth 1 (One) Time As Needed for Migraine for up to 1 dose. Take one tablet at onset of headache. May repeat dose one time in 2 hours. (Patient taking differently: Take 100 mg by mouth As Needed for Migraine. Take one tablet at onset of headache. May repeat dose one time in 2 hours.)   • vitamin B-12 (CYANOCOBALAMIN) 2500 MCG sublingual tablet tablet Place 2,500 mcg under the tongue Daily.   • zolpidem (AMBIEN) 10 MG tablet Take 1 tablet by mouth Every Night.   • [DISCONTINUED] pantoprazole (Protonix) 40 MG EC tablet Take 1 tablet by mouth Daily.   • [DISCONTINUED] estradiol (ESTRACE) 0.1 MG/GM vaginal cream Insert 2 g into the vagina Every Night.  "  • [DISCONTINUED] ondansetron ODT (ZOFRAN-ODT) 4 MG disintegrating tablet Place 1 tablet on the tongue Every 8 (Eight) Hours As Needed for Nausea or Vomiting.   • [DISCONTINUED] QUEtiapine (SEROquel) 25 MG tablet Take 2 tablets by mouth Every Night.     No current facility-administered medications on file prior to visit.          The following portions of the patient's history were reviewed and updated as appropriate: allergies, current medications, past family history, past medical history, past social history, past surgical history and problem list.    Review of Systems   Constitutional: Positive for appetite change.   Respiratory: Negative.    Gastrointestinal: Positive for abdominal pain, nausea, GERD and indigestion. Negative for vomiting.   Psychiatric/Behavioral: Positive for stress. Negative for suicidal ideas. The patient is nervous/anxious.        Objective   Vitals:    02/27/23 1403   BP: 112/62   BP Location: Left arm   Patient Position: Sitting   Cuff Size: Adult   Pulse: 91   Resp: 18   Temp: 98.2 °F (36.8 °C)   TempSrc: Infrared   SpO2: 96%   Weight: 43.6 kg (96 lb 3.2 oz)   Height: 154.9 cm (60.98\")       BP Readings from Last 3 Encounters:   02/27/23 112/62   02/13/23 121/92   02/11/23 143/83        Wt Readings from Last 3 Encounters:   02/27/23 43.6 kg (96 lb 3.2 oz)   02/13/23 43.5 kg (96 lb)   02/11/23 40.9 kg (90 lb 1.6 oz)        Body mass index is 18.19 kg/m².  Nursing notes and vitals reviewed.    Physical Exam  Vitals and nursing note reviewed.   Constitutional:       General: She is not in acute distress.     Appearance: She is ill-appearing (frail). She is not toxic-appearing or diaphoretic.   HENT:      Head: Normocephalic and atraumatic.      Mouth/Throat:      Mouth: Mucous membranes are moist.   Eyes:      General:         Right eye: No discharge.         Left eye: No discharge.   Cardiovascular:      Rate and Rhythm: Normal rate and regular rhythm.      Heart sounds: Normal heart " sounds.   Pulmonary:      Effort: Pulmonary effort is normal.      Breath sounds: Normal breath sounds.   Abdominal:      General: There is no distension.      Palpations: Abdomen is soft. There is no mass.      Tenderness: There is abdominal tenderness (epigastric). There is no guarding or rebound.   Musculoskeletal:      Cervical back: Neck supple. No rigidity.      Right lower leg: No edema.      Left lower leg: No edema.   Skin:     General: Skin is warm and dry.   Neurological:      General: No focal deficit present.      Mental Status: She is alert and oriented to person, place, and time.   Psychiatric:         Attention and Perception: Attention normal.         Mood and Affect: Mood is depressed.         Speech: Speech normal.         Behavior: Behavior normal.         Judgment: Judgment normal.         Recent Results (from the past 672 hour(s))   Basic metabolic panel    Collection Time: 02/06/23  2:07 PM    Specimen: Blood   Result Value Ref Range    Glucose 120 (H) 65 - 99 mg/dL    BUN 17 8 - 23 mg/dL    Creatinine 0.82 0.57 - 1.00 mg/dL    EGFR Result 78.5 >60.0 mL/min/1.73    BUN/Creatinine Ratio 20.7 7.0 - 25.0    Sodium 127 (L) 136 - 145 mmol/L    Potassium 4.8 3.5 - 5.2 mmol/L    Chloride 84 (L) 98 - 107 mmol/L    Total CO2 29.8 (H) 22.0 - 29.0 mmol/L    Calcium 10.4 8.6 - 10.5 mg/dL   Urinalysis With Microscopic If Indicated (No Culture) - Urine, Clean Catch    Collection Time: 02/11/23  4:58 PM    Specimen: Urine, Clean Catch   Result Value Ref Range    Color, UA Yellow Yellow, Straw    Appearance, UA Clear Clear    pH, UA 7.0 4.5 - 8.0    Specific Gravity, UA 1.010 1.003 - 1.030    Glucose, UA Negative Negative    Ketones, UA Negative Negative    Bilirubin, UA Negative Negative    Blood, UA Small (1+) (A) Negative    Protein, UA Negative Negative    Leuk Esterase, UA Negative Negative    Nitrite, UA Negative Negative    Urobilinogen, UA 0.2 E.U./dL 0.2 - 1.0 E.U./dL   Urinalysis, Microscopic Only -  Urine, Clean Catch    Collection Time: 02/11/23  4:58 PM    Specimen: Urine, Clean Catch   Result Value Ref Range    RBC, UA 0-2 (A) None Seen /HPF    WBC, UA None Seen None Seen /HPF    Bacteria, UA None Seen None Seen /HPF    Squamous Epithelial Cells, UA None Seen None Seen, 0-2 /HPF    Hyaline Casts, UA None Seen None Seen /LPF    Methodology Manual Light Microscopy    Comprehensive Metabolic Panel    Collection Time: 02/11/23  5:10 PM    Specimen: Blood   Result Value Ref Range    Glucose 107 (H) 65 - 99 mg/dL    BUN 10 8 - 23 mg/dL    Creatinine 0.74 0.57 - 1.00 mg/dL    Sodium 121 (L) 136 - 145 mmol/L    Potassium 4.3 3.5 - 5.2 mmol/L    Chloride 85 (L) 98 - 107 mmol/L    CO2 26.5 22.0 - 29.0 mmol/L    Calcium 9.7 8.6 - 10.5 mg/dL    Total Protein 7.1 6.0 - 8.5 g/dL    Albumin 4.2 3.5 - 5.2 g/dL    ALT (SGPT) 14 1 - 33 U/L    AST (SGOT) 17 1 - 32 U/L    Alkaline Phosphatase 114 39 - 117 U/L    Total Bilirubin 0.6 0.0 - 1.2 mg/dL    Globulin 2.9 gm/dL    A/G Ratio 1.4 g/dL    BUN/Creatinine Ratio 13.5 7.0 - 25.0    Anion Gap 9.5 5.0 - 15.0 mmol/L    eGFR 88.8 >60.0 mL/min/1.73   Lipase    Collection Time: 02/11/23  5:10 PM    Specimen: Blood   Result Value Ref Range    Lipase 65 (H) 13 - 60 U/L   CK    Collection Time: 02/11/23  5:10 PM    Specimen: Blood   Result Value Ref Range    Creatine Kinase 56 20 - 180 U/L   CBC Auto Differential    Collection Time: 02/11/23  5:10 PM    Specimen: Blood   Result Value Ref Range    WBC 6.60 3.40 - 10.80 10*3/mm3    RBC 4.11 3.77 - 5.28 10*6/mm3    Hemoglobin 13.1 12.0 - 15.9 g/dL    Hematocrit 37.5 34.0 - 46.6 %    MCV 91.2 79.0 - 97.0 fL    MCH 31.9 26.6 - 33.0 pg    MCHC 34.9 31.5 - 35.7 g/dL    RDW 12.1 (L) 12.3 - 15.4 %    RDW-SD 40.2 37.0 - 54.0 fl    MPV 9.7 6.0 - 12.0 fL    Platelets 235 140 - 450 10*3/mm3    Neutrophil % 82.4 (H) 42.7 - 76.0 %    Lymphocyte % 8.8 (L) 19.6 - 45.3 %    Monocyte % 8.0 5.0 - 12.0 %    Eosinophil % 0.0 (L) 0.3 - 6.2 %    Basophil %  0.5 0.0 - 1.5 %    Immature Grans % 0.3 0.0 - 0.5 %    Neutrophils, Absolute 5.44 1.70 - 7.00 10*3/mm3    Lymphocytes, Absolute 0.58 (L) 0.70 - 3.10 10*3/mm3    Monocytes, Absolute 0.53 0.10 - 0.90 10*3/mm3    Eosinophils, Absolute 0.00 0.00 - 0.40 10*3/mm3    Basophils, Absolute 0.03 0.00 - 0.20 10*3/mm3    Immature Grans, Absolute 0.02 0.00 - 0.05 10*3/mm3    nRBC 0.0 0.0 - 0.2 /100 WBC   Renal Function Panel    Collection Time: 02/13/23 12:06 PM    Specimen: Blood   Result Value Ref Range    Glucose 112 (H) 65 - 99 mg/dL    BUN 9 8 - 23 mg/dL    Creatinine 0.81 0.57 - 1.00 mg/dL    Sodium 123 (L) 136 - 145 mmol/L    Potassium 4.8 3.5 - 5.2 mmol/L    Chloride 82 (L) 98 - 107 mmol/L    CO2 26.9 22.0 - 29.0 mmol/L    Calcium 10.5 8.6 - 10.5 mg/dL    Albumin 4.9 3.5 - 5.2 g/dL    Phosphorus 3.5 2.5 - 4.5 mg/dL    Anion Gap 14.1 5.0 - 15.0 mmol/L    BUN/Creatinine Ratio 11.1 7.0 - 25.0    eGFR 79.7 >60.0 mL/min/1.73   Cortisol, Free    Collection Time: 02/13/23 12:06 PM    Specimen: Blood   Result Value Ref Range    Cortisol, Free Dialysis, LCMS 0.642 ug/dL   TSH Rfx On Abnormal To Free T4    Collection Time: 02/13/23 12:06 PM    Specimen: Blood   Result Value Ref Range    TSH 1.900 0.270 - 4.200 uIU/mL         Assessment & Plan   Diagnoses and all orders for this visit:    1. Chronic abdominal pain (Primary)  -     oxyCODONE-acetaminophen (Percocet) 5-325 MG per tablet; Take 1 tablet by mouth Every 6 (Six) Hours As Needed for Severe Pain.  Dispense: 30 tablet; Refill: 0    2. Depression with anxiety    3. Hyponatremia    4. Gastroesophageal reflux disease without esophagitis  -     pantoprazole (Protonix) 40 MG EC tablet; Take 1 tablet by mouth 2 (Two) Times a Day.  Dispense: 60 tablet; Refill: 2      1. Pain management note reviewed.  S/p T7, T8, T9 intercostal nerve blocks by Dr. Mejia.  She may yet see benefit from this as it was only 5 days ago.  Trial low dose oxycodone to be used sparingly for night time  pain.  Continue PPI BID.  She has f/u with GI next later this week.    2. Depression with anxiety.  Continue paroxetine for now.  Establishing with psychiatry.  She will need to hold the alprazolam if taking oxycodone.      3. Hyponatremia.  Followed by Dr. Stephen Jalloh.  Didn't seem to have much improvement after stopping SSRI.      Medications, including side effects, were discussed with the patient. Patient verbalized understanding.  The plan of care was discussed. All questions were answered. Patient verbalized understanding.      Return in about 4 weeks (around 3/27/2023).

## 2023-03-02 ENCOUNTER — LAB (OUTPATIENT)
Dept: LAB | Facility: HOSPITAL | Age: 68
End: 2023-03-02
Payer: MEDICARE

## 2023-03-02 ENCOUNTER — OFFICE VISIT (OUTPATIENT)
Dept: GASTROENTEROLOGY | Facility: CLINIC | Age: 68
End: 2023-03-02
Payer: MEDICARE

## 2023-03-02 VITALS
BODY MASS INDEX: 18.77 KG/M2 | WEIGHT: 99.4 LBS | HEIGHT: 61 IN | DIASTOLIC BLOOD PRESSURE: 80 MMHG | SYSTOLIC BLOOD PRESSURE: 136 MMHG

## 2023-03-02 DIAGNOSIS — K59.04 CHRONIC IDIOPATHIC CONSTIPATION: ICD-10-CM

## 2023-03-02 DIAGNOSIS — R11.0 NAUSEA: ICD-10-CM

## 2023-03-02 DIAGNOSIS — I10 ESSENTIAL HYPERTENSION: ICD-10-CM

## 2023-03-02 DIAGNOSIS — K21.9 GASTROESOPHAGEAL REFLUX DISEASE WITHOUT ESOPHAGITIS: Primary | ICD-10-CM

## 2023-03-02 DIAGNOSIS — K21.9 GASTROESOPHAGEAL REFLUX DISEASE WITHOUT ESOPHAGITIS: ICD-10-CM

## 2023-03-02 PROCEDURE — 86231 EMA EACH IG CLASS: CPT

## 2023-03-02 PROCEDURE — 82784 ASSAY IGA/IGD/IGG/IGM EACH: CPT

## 2023-03-02 PROCEDURE — 99214 OFFICE O/P EST MOD 30 MIN: CPT

## 2023-03-02 PROCEDURE — 86258 DGP ANTIBODY EACH IG CLASS: CPT

## 2023-03-02 PROCEDURE — 36415 COLL VENOUS BLD VENIPUNCTURE: CPT

## 2023-03-02 PROCEDURE — 86364 TISS TRNSGLTMNASE EA IG CLAS: CPT

## 2023-03-02 PROCEDURE — 86677 HELICOBACTER PYLORI ANTIBODY: CPT

## 2023-03-02 RX ORDER — AMLODIPINE BESYLATE 2.5 MG/1
TABLET ORAL
Qty: 90 TABLET | Refills: 0 | Status: SHIPPED | OUTPATIENT
Start: 2023-03-02

## 2023-03-02 RX ORDER — SUCRALFATE ORAL 1 G/10ML
1 SUSPENSION ORAL 4 TIMES DAILY PRN
Qty: 1000 ML | Refills: 3 | Status: SHIPPED | OUTPATIENT
Start: 2023-03-02

## 2023-03-02 NOTE — PROGRESS NOTES
PATIENT INFORMATION  Martina Hardwick       - 1955    CHIEF COMPLAINT  Chief Complaint   Patient presents with   • Heartburn   • Abdominal Pain   • Nausea       HISTORY OF PRESENT ILLNESS  Here today to follow-up for abdominal pain    Evaluated by [facility] for abdominal pain and adhesions, plan is for pain injections and cannot travel to , so seeing pain management here. Went to PT briefly, not currently in. BM daily with full dose miralax, easy to pass most days, does get rectal pressure and sometimes has to pass shauna. Linzess caused worsening abdominal pain, no moving bowels.    Nausea for months worse, worse with pain, living on ensure, no vomiting, dry heaving often even from water. Did start BID PPI a few weeks ago. No HB, reflux. Extreme nausea biggest complaint. BID famotidine. Never treated for HP in the past, tested many times. No relief with phenergan.    Just received percocet for night time pains, has not started yet.      REVIEWED PERTINENT RESULTS/ LABS  Lab Results   Component Value Date    CASEREPORT  2018     Surgical Pathology Report                         Case: [id number]                                  Authorizing Provider:  Hernan Hinds MD        Collected:           2018 09:17 AM          Ordering Location[facility]     [facility]   Received:            2018 10:06 AM                                 OR                                                                           Pathologist:           Marquez Pierre MD                                                     Specimen:    Gallbladder, GALLBLADDER                                                                   FINALDX  2018     1. Gallbladder, CHOLECYSTECTOMY LAPAROSCOPIC:  Testing performed at outside laboratory. See scanned report.             Lab Results   Component Value Date    HGB 13.1 2023    MCV 91.2 2023     2023    ALT 14 2023    AST  17 02/11/2023    HGBA1C 5.3 06/20/2022    TRIG 65 06/20/2022      No results found.    REVIEW OF SYSTEMS  Review of Systems   Constitutional: Positive for appetite change, fatigue and unexpected weight change.   HENT: Negative.    Eyes: Negative.    Respiratory: Positive for shortness of breath.    Cardiovascular: Negative.    Gastrointestinal: Positive for abdominal pain, constipation and nausea.        GERD   Endocrine: Negative.    Genitourinary: Positive for difficulty urinating.   Musculoskeletal: Negative.    Skin: Negative.    Allergic/Immunologic: Negative.    Neurological: Positive for dizziness and light-headedness.   Hematological: Bruises/bleeds easily.   Psychiatric/Behavioral: Positive for suicidal ideas. The patient is nervous/anxious.          ACTIVE PROBLEMS  Patient Active Problem List    Diagnosis    • Gastrointestinal hypomotility [K31.89]    • Generalized abdominal pain [R10.84]    • Chronic abdominal pain [R10.9, G89.29]    • Hyponatremia [E87.1]    • Adhesion of abdominal wall [K66.0]    • Age-related osteoporosis without current pathological fracture [M81.0]    • Psoriasis [L40.9]    • Family history of colonic polyps [Z83.71]    • Routine health maintenance [Z00.00]    • Essential hypertension [I10]    • GERD (gastroesophageal reflux disease) [K21.9]    • Allergic rhinitis [J30.9]    • Primary insomnia [F51.01]    • Chronic constipation [K59.09]    • Migraines [G43.909]    • Depression with anxiety [F41.8]          PAST MEDICAL HISTORY  Past Medical History:   Diagnosis Date   • Arthritis    • Autoantibody titer positive    • Bloating    • Cholelithiasis     Removed 8/17/2018   • Chronic abdominal pain    • Chronic constipation    • Encounter for preventive health examination    • Endometriosis    • Gastritis    • Gastrointestinal hypomotility    • GERD (gastroesophageal reflux disease)    • Headache, tension-type    • Hypertension    • Hyponatremia    • Insomnia    • Intestinal autonomic  neuropathy    • Migraine    • Mixed anxiety and depressive disorder    • Moderate malnutrition (HCC)    • Noninfectious gastroenteritis    • OP (osteoporosis)    • Osteoporosis    • PONV (postoperative nausea and vomiting) 08/17/2018   • Poor sleep    • Psoriasis    • Seasonal allergies    • Visceral hyperalgesia          SURGICAL HISTORY  Past Surgical History:   Procedure Laterality Date   • APPENDECTOMY     • CHOLECYSTECTOMY N/A 8/17/2018    Procedure: CHOLECYSTECTOMY LAPAROSCOPIC converted to open procedure;  Surgeon: Hernan Hinds MD;  Location: [facility] OR;  Service: General   • COLON SURGERY     • COLONOSCOPY     • COLONOSCOPY N/A 12/12/2018    Procedure: COLONOSCOPY;  Surgeon: Darien Ruff MD;  Location: [facility] OR;  Service: Gastroenterology   • DIAGNOSTIC LAPAROSCOPY  1990   • DILATATION AND CURETTAGE  1985   • ENDOSCOPY N/A 5/13/2016    Procedure: ESOPHAGOGASTRODUODENOSCOPY ;  Surgeon: Darien Ruff MD;  Location: [facility] OR;  Service:    • HYSTERECTOMY     • REVISION / TAKEDOWN COLOSTOMY           FAMILY HISTORY  Family History   Problem Relation Age of Onset   • Hyperlipidemia Mother    • Macular degeneration Mother    • Hearing loss Mother    • Heart disease Father         Had 5 bypass surgery   • Hyperlipidemia Father    • Cancer Father         Prostate and bladder   • Depression Father    • Depression Sister    • COPD Sister    • Hyperlipidemia Sister    • Hyperlipidemia Brother    • Depression Brother    • Kidney disease Brother    • Breast cancer Maternal Aunt    • Breast cancer Cousin    • Breast cancer Cousin    • Colon cancer Neg Hx    • Colon polyps Neg Hx          SOCIAL HISTORY  Social History     Occupational History   • Not on file   Tobacco Use   • Smoking status: Never   • Smokeless tobacco: Never   Vaping Use   • Vaping Use: Never used   Substance and Sexual Activity   • Alcohol use: Not Currently     Comment: rare   • Drug use: No   • Sexual activity: Not  Currently     Partners: Male         CURRENT MEDICATIONS    Current Outpatient Medications:   •  ALPRAZolam (XANAX) 0.5 MG tablet, Take 1-2 tablets by mouth 2 (Two) Times a Day As Needed for Anxiety., Disp: 90 tablet, Rfl: 1  •  aluminum-magnesium hydroxide-simethicone (MAALOX MAX) 400-400-40 MG/5ML suspension, Take  by mouth As Needed for Indigestion or Heartburn., Disp: , Rfl:   •  amLODIPine (NORVASC) 2.5 MG tablet, Take 1 tablet by mouth once daily, Disp: 90 tablet, Rfl: 0  •  Calcium-Vitamin D-Vitamin K (VIACTIV CALCIUM PLUS D PO), Take 1 tablet by mouth Daily., Disp: , Rfl:   •  clobetasol (TEMOVATE) 0.05 % cream, Apply 1 application topically to the appropriate area as directed 2 (Two) Times a Day., Disp: , Rfl:   •  clotrimazole-betamethasone (Lotrisone) 1-0.05 % cream, Apply 1 application topically to the appropriate area as directed 2 (Two) Times a Day., Disp: 30 g, Rfl: 1  •  famotidine (PEPCID) 40 MG tablet, Take 1 tablet by mouth 2 (Two) Times a Day. With lunch and at bedtime, Disp: 180 tablet, Rfl: 3  •  fluticasone (FLONASE) 50 MCG/ACT nasal spray, 2 sprays into the nostril(s) as directed by provider Daily., Disp: , Rfl:   •  lisinopril (PRINIVIL,ZESTRIL) 30 MG tablet, Take 1 tablet by mouth., Disp: , Rfl:   •  loratadine (CLARITIN) 10 MG tablet, Take 1 tablet by mouth Daily., Disp: , Rfl:   •  oxyCODONE-acetaminophen (Percocet) 5-325 MG per tablet, Take 1 tablet by mouth Every 6 (Six) Hours As Needed for Severe Pain., Disp: 30 tablet, Rfl: 0  •  pantoprazole (Protonix) 40 MG EC tablet, Take 1 tablet by mouth 2 (Two) Times a Day., Disp: 60 tablet, Rfl: 2  •  PARoxetine (PAXIL) 20 MG tablet, Take 1 tablet by mouth Every Morning., Disp: 30 tablet, Rfl: 0  •  polyethylene glycol (MIRALAX) packet, Take 17 g by mouth Daily., Disp: , Rfl:   •  Probiotic Product (PROBIOTIC DAILY) capsule, Once daily (Patient taking differently: Take 2 capsules by mouth Daily. Once daily), Disp: , Rfl:   •  promethazine  "(PHENERGAN) 25 MG suppository, Insert 1 suppository into the rectum Every 6 (Six) Hours As Needed for Nausea or Vomiting., Disp: 30 suppository, Rfl: 0  •  promethazine (PHENERGAN) 25 MG tablet, Take 1 tablet by mouth Every 8 (Eight) Hours As Needed for Nausea or Vomiting (Do not use at the same time as Phenergan suppositories)., Disp: 30 tablet, Rfl: 0  •  Simethicone 250 MG capsule, Take 1 capsule by mouth., Disp: , Rfl:   •  SUMAtriptan (IMITREX) 100 MG tablet, Take 1 tablet by mouth 1 (One) Time As Needed for Migraine for up to 1 dose. Take one tablet at onset of headache. May repeat dose one time in 2 hours. (Patient taking differently: Take 1 tablet by mouth As Needed for Migraine. Take one tablet at onset of headache. May repeat dose one time in 2 hours.), Disp: 27 tablet, Rfl: 3  •  vitamin B-12 (CYANOCOBALAMIN) 2500 MCG sublingual tablet tablet, Place 2,500 mcg under the tongue Daily., Disp: , Rfl:   •  zolpidem (AMBIEN) 10 MG tablet, Take 1 tablet by mouth Every Night., Disp: 90 tablet, Rfl: 1  •  sucralfate (CARAFATE) 1 GM/10ML suspension, Take 10 mL by mouth 4 (Four) Times a Day As Needed (heartburn, pain)., Disp: 1000 mL, Rfl: 3    ALLERGIES  Hydrocodone and Sulfa antibiotics    VITALS  Vitals:    03/02/23 1402   BP: 136/80   BP Location: Left arm   Patient Position: Sitting   Cuff Size: Adult   Weight: 45.1 kg (99 lb 6.4 oz)   Height: 154.9 cm (60.98\")       PHYSICAL EXAM  Debilities/Disabilities Identified: None  Emotional Behavior: Appropriate  Wt Readings from Last 3 Encounters:   03/02/23 45.1 kg (99 lb 6.4 oz)   02/27/23 43.6 kg (96 lb 3.2 oz)   02/13/23 43.5 kg (96 lb)     Ht Readings from Last 1 Encounters:   03/02/23 154.9 cm (60.98\")     Body mass index is 18.79 kg/m².  Physical Exam  Constitutional:       General: She is not in acute distress.     Appearance: Normal appearance. She is not ill-appearing.   HENT:      Head: Normocephalic and atraumatic.      Mouth/Throat:      Mouth: Mucous " membranes are moist.      Pharynx: No posterior oropharyngeal erythema.   Eyes:      General: No scleral icterus.  Cardiovascular:      Rate and Rhythm: Normal rate and regular rhythm.      Heart sounds: Normal heart sounds.   Pulmonary:      Effort: Pulmonary effort is normal.      Breath sounds: Normal breath sounds.   Abdominal:      General: Abdomen is flat. Bowel sounds are normal. There is no distension.      Palpations: Abdomen is soft. There is no mass.      Tenderness: There is abdominal tenderness in the right upper quadrant, right lower quadrant, epigastric area and left upper quadrant. There is no guarding or rebound. Negative signs include Collins's sign.      Hernia: No hernia is present.      Comments: RLQ worst   Musculoskeletal:      Cervical back: Neck supple.   Skin:     General: Skin is warm.      Capillary Refill: Capillary refill takes less than 2 seconds.   Neurological:      General: No focal deficit present.      Mental Status: She is alert and oriented to person, place, and time.   Psychiatric:         Mood and Affect: Mood normal.         Behavior: Behavior normal.         Thought Content: Thought content normal.         Judgment: Judgment normal.         CLINICAL DATA REVIEWED   reviewed previous lab results and integrated with today's visit, reviewed notes from other physicians and/or last GI encounter, reviewed previous endoscopy results and available photos, reviewed surgical pathology results from previous biopsies    ASSESSMENT  Diagnoses and all orders for this visit:    Gastroesophageal reflux disease without esophagitis  -     Celiac Comprehensive Panel  -     Helicobacter Pylori, IgA IgG IgM; Future  -     sucralfate (CARAFATE) 1 GM/10ML suspension; Take 10 mL by mouth 4 (Four) Times a Day As Needed (heartburn, pain).    Nausea  -     Celiac Comprehensive Panel  -     Helicobacter Pylori, IgA IgG IgM; Future  -     sucralfate (CARAFATE) 1 GM/10ML suspension; Take 10 mL by mouth 4  (Four) Times a Day As Needed (heartburn, pain).    Chronic idiopathic constipation          PLAN    Alternate PPI AM and dinner  Pepcid lunch and bedtime  Carafate QID  Trial BID miralax for 2 weeks to see if abd pain improved  If Celiac and H Pylori negative, check GES    Return in about 3 months (around 6/2/2023).    I have discussed the above plan with the patient.  They verbalize understanding and are in agreement with the plan.  They have been advised to contact the office for any questions, concerns, or changes related to their health.

## 2023-03-03 LAB
ENDOMYSIUM IGA SER QL: NEGATIVE
GLIADIN PEPTIDE IGA SER-ACNC: 8 UNITS (ref 0–19)
GLIADIN PEPTIDE IGG SER-ACNC: 20 UNITS (ref 0–19)
H PYLORI IGA SER-ACNC: <9 UNITS (ref 0–8.9)
H PYLORI IGG SER IA-ACNC: 0.39 INDEX VALUE (ref 0–0.79)
H PYLORI IGM SER-ACNC: <9 UNITS (ref 0–8.9)
IGA SERPL-MCNC: 214 MG/DL (ref 87–352)
TTG IGA SER-ACNC: <2 U/ML (ref 0–3)
TTG IGG SER-ACNC: <2 U/ML (ref 0–5)

## 2023-03-06 ENCOUNTER — PREP FOR SURGERY (OUTPATIENT)
Dept: OTHER | Facility: HOSPITAL | Age: 68
End: 2023-03-06
Payer: MEDICARE

## 2023-03-06 ENCOUNTER — TELEPHONE (OUTPATIENT)
Dept: GASTROENTEROLOGY | Facility: CLINIC | Age: 68
End: 2023-03-06
Payer: MEDICARE

## 2023-03-06 DIAGNOSIS — R89.4 ABNORMAL CELIAC ANTIBODY PANEL: Primary | ICD-10-CM

## 2023-03-06 NOTE — TELEPHONE ENCOUNTER
----- Message from BROOKS Alejo sent at 3/6/2023 10:50 AM EST -----  Celiac panel with very mild elevation in one of the antibodies, she has been living on shakes and most of those are gluten free, so not sure if its celiac vs false positive. We discussed in office if positive she would need an EGD to confirm, I have placed those orders, she needs to maintain a normal diet with some gluten until after the scope, to decrease chance of false negative on biopsy.

## 2023-03-06 NOTE — TELEPHONE ENCOUNTER
Cydney transfer call to me to schedule her EGD.    Scheduled at [facility] 05/01/2023 arrive 10:30am.  Will mail instructions.

## 2023-03-27 ENCOUNTER — OFFICE VISIT (OUTPATIENT)
Dept: INTERNAL MEDICINE | Facility: CLINIC | Age: 68
End: 2023-03-27
Payer: MEDICARE

## 2023-03-27 VITALS
DIASTOLIC BLOOD PRESSURE: 78 MMHG | BODY MASS INDEX: 17.39 KG/M2 | RESPIRATION RATE: 16 BRPM | SYSTOLIC BLOOD PRESSURE: 130 MMHG | OXYGEN SATURATION: 96 % | WEIGHT: 92 LBS | HEART RATE: 85 BPM

## 2023-03-27 DIAGNOSIS — G89.29 CHRONIC ABDOMINAL PAIN: Primary | ICD-10-CM

## 2023-03-27 DIAGNOSIS — F41.8 DEPRESSION WITH ANXIETY: ICD-10-CM

## 2023-03-27 DIAGNOSIS — E87.1 HYPONATREMIA: ICD-10-CM

## 2023-03-27 DIAGNOSIS — R10.9 CHRONIC ABDOMINAL PAIN: Primary | ICD-10-CM

## 2023-03-27 PROCEDURE — 3075F SYST BP GE 130 - 139MM HG: CPT | Performed by: FAMILY MEDICINE

## 2023-03-27 PROCEDURE — 99214 OFFICE O/P EST MOD 30 MIN: CPT | Performed by: FAMILY MEDICINE

## 2023-03-27 PROCEDURE — 3078F DIAST BP <80 MM HG: CPT | Performed by: FAMILY MEDICINE

## 2023-03-27 PROCEDURE — 1160F RVW MEDS BY RX/DR IN RCRD: CPT | Performed by: FAMILY MEDICINE

## 2023-03-27 PROCEDURE — 1159F MED LIST DOCD IN RCRD: CPT | Performed by: FAMILY MEDICINE

## 2023-03-27 RX ORDER — PAROXETINE 30 MG/1
30 TABLET, FILM COATED ORAL EVERY MORNING
Qty: 30 TABLET | Refills: 2 | Status: SHIPPED | OUTPATIENT
Start: 2023-03-27

## 2023-03-27 RX ORDER — FAMOTIDINE 40 MG/1
40 TABLET, FILM COATED ORAL 2 TIMES DAILY
Qty: 180 TABLET | Refills: 3 | Status: SHIPPED | OUTPATIENT
Start: 2023-03-27

## 2023-03-27 NOTE — PROGRESS NOTES
Subjective   Martina Hardwick is a 67 y.o. female presenting today for follow up of   Chief Complaint   Patient presents with   • Abdominal Pain   • Anxiety       History of Present Illness   Subjective   Martina Hardwick is a 67 y.o. female presenting today for follow up of   Chief Complaint   Patient presents with   • Abdominal Pain   • Anxiety       History of Present Illness     1. Pt here for 4 week follow-up on chronic abdominal pain.  She has  seen Dr. Mejia, pain management, and had T7, T8, T9 intercostal nerve blocks.  This was not helpful for her.  She has f/u with pain management as other blocks are being considered, such as celiac plexus and transverses abdominis plane block.  She reports continued pain, including during the night, accompanied by nausea.  She has since seen gastroenterology and has endoscopy pending next month.  She is subsisting on nutrition shakes and a little food.    2. Hyponatremia, chronic.  She is working with Dr. Stephen Jalloh.  She didn't have improvement after stopping the SSRIs and we restarted paroxetine last time.    3. Anxiety.  She is back on paroxetine and is feeling a little better but would like to increase the dose.  We tried Seroquel and she stopped it due to feeling jittery.  She has has established with [facility] for counseling and is planning to see the psychiatrist but this may take a few months to get an appointment.    Patient Active Problem List   Diagnosis   • Migraines   • Depression with anxiety   • Allergic rhinitis   • Primary insomnia   • Chronic constipation   • GERD (gastroesophageal reflux disease)   • Essential hypertension   • Routine health maintenance   • Family history of colonic polyps   • Psoriasis   • Age-related osteoporosis without current pathological fracture   • Adhesion of abdominal wall   • Chronic abdominal pain   • Hyponatremia   • Generalized abdominal pain   • Gastrointestinal hypomotility   • Abnormal celiac antibody panel       Current  Outpatient Medications on File Prior to Visit   Medication Sig   • ALPRAZolam (XANAX) 0.5 MG tablet Take 1-2 tablets by mouth 2 (Two) Times a Day As Needed for Anxiety.   • aluminum-magnesium hydroxide-simethicone (MAALOX MAX) 400-400-40 MG/5ML suspension Take  by mouth As Needed for Indigestion or Heartburn.   • amLODIPine (NORVASC) 2.5 MG tablet Take 1 tablet by mouth once daily   • Calcium-Vitamin D-Vitamin K (VIACTIV CALCIUM PLUS D PO) Take 1 tablet by mouth Daily.   • clobetasol (TEMOVATE) 0.05 % cream Apply 1 application topically to the appropriate area as directed 2 (Two) Times a Day.   • clotrimazole-betamethasone (Lotrisone) 1-0.05 % cream Apply 1 application topically to the appropriate area as directed 2 (Two) Times a Day.   • fluticasone (FLONASE) 50 MCG/ACT nasal spray 2 sprays into the nostril(s) as directed by provider Daily.   • lisinopril (PRINIVIL,ZESTRIL) 30 MG tablet Take 1 tablet by mouth.   • loratadine (CLARITIN) 10 MG tablet Take 1 tablet by mouth Daily.   • oxyCODONE-acetaminophen (Percocet) 5-325 MG per tablet Take 1 tablet by mouth Every 6 (Six) Hours As Needed for Severe Pain.   • pantoprazole (Protonix) 40 MG EC tablet Take 1 tablet by mouth 2 (Two) Times a Day.   • polyethylene glycol (MIRALAX) packet Take 17 g by mouth Daily.   • Probiotic Product (PROBIOTIC DAILY) capsule Once daily (Patient taking differently: Take 2 capsules by mouth Daily. Once daily)   • promethazine (PHENERGAN) 25 MG suppository Insert 1 suppository into the rectum Every 6 (Six) Hours As Needed for Nausea or Vomiting.   • promethazine (PHENERGAN) 25 MG tablet Take 1 tablet by mouth Every 8 (Eight) Hours As Needed for Nausea or Vomiting (Do not use at the same time as Phenergan suppositories).   • Simethicone 250 MG capsule Take 1 capsule by mouth.   • sucralfate (CARAFATE) 1 GM/10ML suspension Take 10 mL by mouth 4 (Four) Times a Day As Needed (heartburn, pain).   • SUMAtriptan (IMITREX) 100 MG tablet Take 1  tablet by mouth 1 (One) Time As Needed for Migraine for up to 1 dose. Take one tablet at onset of headache. May repeat dose one time in 2 hours. (Patient taking differently: Take 1 tablet by mouth As Needed for Migraine. Take one tablet at onset of headache. May repeat dose one time in 2 hours.)   • vitamin B-12 (CYANOCOBALAMIN) 2500 MCG sublingual tablet tablet Place 2,500 mcg under the tongue Daily.   • zolpidem (AMBIEN) 10 MG tablet Take 1 tablet by mouth Every Night.   • [DISCONTINUED] famotidine (PEPCID) 40 MG tablet Take 1 tablet by mouth 2 (Two) Times a Day. With lunch and at bedtime   • [DISCONTINUED] PARoxetine (PAXIL) 20 MG tablet Take 1 tablet by mouth Every Morning.     No current facility-administered medications on file prior to visit.          The following portions of the patient's history were reviewed and updated as appropriate: allergies, current medications, past family history, past medical history, past social history, past surgical history and problem list.    Review of Systems   Constitutional: Positive for appetite change.   Respiratory: Negative.    Gastrointestinal: Positive for abdominal pain, nausea, GERD and indigestion. Negative for vomiting.   Psychiatric/Behavioral: Positive for stress. Negative for suicidal ideas. The patient is nervous/anxious.        Objective   Vitals:    03/27/23 1328   BP: 130/78   Pulse: 85   Resp: 16   SpO2: 96%   Weight: 41.7 kg (92 lb)       BP Readings from Last 3 Encounters:   03/27/23 130/78   03/02/23 136/80   02/27/23 112/62        Wt Readings from Last 3 Encounters:   03/27/23 41.7 kg (92 lb)   03/02/23 45.1 kg (99 lb 6.4 oz)   02/27/23 43.6 kg (96 lb 3.2 oz)        Body mass index is 17.39 kg/m².  Nursing notes and vitals reviewed.    Physical Exam  Vitals and nursing note reviewed.   Constitutional:       General: She is not in acute distress.     Appearance: She is ill-appearing (frail). She is not toxic-appearing or diaphoretic.   HENT:      Head:  Normocephalic and atraumatic.      Mouth/Throat:      Mouth: Mucous membranes are moist.   Eyes:      General:         Right eye: No discharge.         Left eye: No discharge.   Cardiovascular:      Rate and Rhythm: Normal rate and regular rhythm.      Heart sounds: Normal heart sounds.   Pulmonary:      Effort: Pulmonary effort is normal.      Breath sounds: Normal breath sounds.   Abdominal:      General: There is no distension.      Palpations: Abdomen is soft. There is no mass.      Tenderness: There is abdominal tenderness (epigastric). There is no guarding or rebound.   Musculoskeletal:      Cervical back: Neck supple. No rigidity.      Right lower leg: No edema.      Left lower leg: No edema.   Skin:     General: Skin is warm and dry.   Neurological:      General: No focal deficit present.      Mental Status: She is alert and oriented to person, place, and time.   Psychiatric:         Attention and Perception: Attention normal.         Mood and Affect: Mood is slightly depressed.         Speech: Speech normal.         Behavior: Behavior normal.         Judgment: Judgment normal.         Recent Results (from the past 672 hour(s))   Celiac Comprehensive Panel    Collection Time: 03/02/23  3:17 PM    Specimen: Blood   Result Value Ref Range    Gliadin Deamidated Peptide Ab, IgA 8 0 - 19 units    Deaminated Gliadin Ab IgG 20 (H) 0 - 19 units    Tissue Transglutaminase IgA <2 0 - 3 U/mL    Tissue Transglutaminase IgG <2 0 - 5 U/mL    Endomysial IgA Negative Negative    IgA 214 87 - 352 mg/dL   Helicobacter Pylori, IgA IgG IgM    Collection Time: 03/02/23  3:17 PM    Specimen: Blood   Result Value Ref Range    H. pylori IgG 0.39 0.00 - 0.79 Index Value    H. pylori, IgA ABS <9.0 0.0 - 8.9 units    H. Pylori, IgM <9.0 0.0 - 8.9 units         Assessment & Plan   Diagnoses and all orders for this visit:    1. Chronic abdominal pain (Primary)    2. Depression with anxiety    3. Hyponatremia    Other orders  -      PARoxetine (PAXIL) 30 MG tablet; Take 1 tablet by mouth Every Morning.  Dispense: 30 tablet; Refill: 2  -     famotidine (PEPCID) 40 MG tablet; Take 1 tablet by mouth 2 (Two) Times a Day. With lunch and at bedtime  Dispense: 180 tablet; Refill: 3      1. Pain management and gastroenterology notes reviewed. Pt had mildly positive celiac panel and has been told to increase her gluten intake prior to the EGD.    2. Depression with anxiety.  Some improvement.  Increase paroxetine to 30 mg daily (from 20 mg).  Continue counseling at [facility] and is plannint to establish with psychiatry.  She takes alprazolam as well.    3. Hyponatremia.  Followed by Dr. Stephen Jalloh.  Didn't seem to have much improvement after stopping SSRI.      Medications, including side effects, were discussed with the patient. Patient verbalized understanding.  The plan of care was discussed. All questions were answered. Patient verbalized understanding.      Return in about 4 weeks (around 4/24/2023).

## 2023-03-31 ENCOUNTER — ANESTHESIA EVENT (OUTPATIENT)
Dept: PERIOP | Facility: HOSPITAL | Age: 68
End: 2023-03-31
Payer: MEDICARE

## 2023-04-03 DIAGNOSIS — F51.01 PRIMARY INSOMNIA: ICD-10-CM

## 2023-04-03 NOTE — TELEPHONE ENCOUNTER
Rx Refill Note  Requested Prescriptions      No prescriptions requested or ordered in this encounter      Last office visit with prescribing clinician: 3/27/2023   Last telemedicine visit with prescribing clinician: 4/27/2023   Next office visit with prescribing clinician: 4/27/2023                         Would you like a call back once the refill request has been completed: [] Yes [] No    If the office needs to give you a call back, can they leave a voicemail: [] Yes [] No    Katelynn Garcia  04/03/23, 12:49 EDT

## 2023-04-04 RX ORDER — ZOLPIDEM TARTRATE 10 MG/1
10 TABLET ORAL NIGHTLY
Qty: 90 TABLET | Refills: 0 | Status: SHIPPED | OUTPATIENT
Start: 2023-04-04

## 2023-04-09 DIAGNOSIS — F41.0 PANIC DISORDER: ICD-10-CM

## 2023-04-10 RX ORDER — ALPRAZOLAM 0.5 MG/1
TABLET ORAL
Qty: 90 TABLET | Refills: 0 | Status: SHIPPED | OUTPATIENT
Start: 2023-04-10

## 2023-04-10 NOTE — TELEPHONE ENCOUNTER
Flagged for   Urine Toxicology Performed in Last 12 Months   No Benzodiazepines on Active Med List       Rx Refill Note  Requested Prescriptions     Pending Prescriptions Disp Refills    ALPRAZolam (XANAX) 0.5 MG tablet [Pharmacy Med Name: ALPRAZolam 0.5 MG Oral Tablet] 90 tablet 0     Sig: TAKE 1 TO 2 TABLETS BY MOUTH TWICE DAILY AS NEEDED FOR ANXIETY      Last office visit with prescribing clinician: 3/27/2023   Last telemedicine visit with prescribing clinician: 4/27/2023   Next office visit with prescribing clinician: 4/27/2023                         Would you like a call back once the refill request has been completed: [] Yes [] No    If the office needs to give you a call back, can they leave a voicemail: [] Yes [] No    Felicia Arciniega, PCT  04/10/23, 07:49 EDT

## 2023-04-11 ENCOUNTER — LAB (OUTPATIENT)
Dept: LAB | Facility: HOSPITAL | Age: 68
End: 2023-04-11
Payer: MEDICARE

## 2023-04-11 ENCOUNTER — TRANSCRIBE ORDERS (OUTPATIENT)
Dept: ADMINISTRATIVE | Facility: HOSPITAL | Age: 68
End: 2023-04-11
Payer: MEDICARE

## 2023-04-11 DIAGNOSIS — E87.1 HYPOSMOLALITY SYNDROME: ICD-10-CM

## 2023-04-11 DIAGNOSIS — I10 ESSENTIAL HYPERTENSION, MALIGNANT: ICD-10-CM

## 2023-04-11 DIAGNOSIS — E87.1 HYPOSMOLALITY SYNDROME: Primary | ICD-10-CM

## 2023-04-11 LAB
ALBUMIN SERPL-MCNC: 4.5 G/DL (ref 3.5–5.2)
ALBUMIN UR-MCNC: <1.2 MG/DL
AMORPH URATE CRY URNS QL MICRO: ABNORMAL /HPF
ANION GAP SERPL CALCULATED.3IONS-SCNC: 7.4 MMOL/L (ref 5–15)
BACTERIA UR QL AUTO: ABNORMAL /HPF
BILIRUB UR QL STRIP: NEGATIVE
BUN SERPL-MCNC: 13 MG/DL (ref 8–23)
BUN/CREAT SERPL: 16.7 (ref 7–25)
CALCIUM SPEC-SCNC: 9.9 MG/DL (ref 8.6–10.5)
CHLORIDE SERPL-SCNC: 91 MMOL/L (ref 98–107)
CLARITY UR: CLEAR
CO2 SERPL-SCNC: 26.6 MMOL/L (ref 22–29)
COLOR UR: YELLOW
CORTIS SERPL-MCNC: 15.8 MCG/DL
CREAT SERPL-MCNC: 0.78 MG/DL (ref 0.57–1)
CREAT UR-MCNC: 59.8 MG/DL
CREAT UR-MCNC: 59.8 MG/DL
EGFRCR SERPLBLD CKD-EPI 2021: 83.4 ML/MIN/1.73
GLUCOSE SERPL-MCNC: 105 MG/DL (ref 65–99)
GLUCOSE UR STRIP-MCNC: NEGATIVE MG/DL
HGB UR QL STRIP.AUTO: ABNORMAL
HYALINE CASTS UR QL AUTO: ABNORMAL /LPF
KETONES UR QL STRIP: NEGATIVE
LEUKOCYTE ESTERASE UR QL STRIP.AUTO: NEGATIVE
MICROALBUMIN/CREAT UR: NORMAL MG/G{CREAT}
NITRITE UR QL STRIP: NEGATIVE
OSMOLALITY UR: 273 MOSM/KG
PH UR STRIP.AUTO: 6 [PH] (ref 5–8)
PHOSPHATE SERPL-MCNC: 3.8 MG/DL (ref 2.5–4.5)
POTASSIUM SERPL-SCNC: 4.3 MMOL/L (ref 3.5–5.2)
PROT ?TM UR-MCNC: 7.3 MG/DL
PROT UR QL STRIP: NEGATIVE
PROT/CREAT UR: 122.1 MG/G CREA (ref 0–200)
RBC # UR STRIP: ABNORMAL /HPF
REF LAB TEST METHOD: ABNORMAL
SODIUM SERPL-SCNC: 125 MMOL/L (ref 136–145)
SODIUM UR-SCNC: <20 MMOL/L
SP GR UR STRIP: 1.01 (ref 1–1.03)
SQUAMOUS #/AREA URNS HPF: ABNORMAL /HPF
UROBILINOGEN UR QL STRIP: ABNORMAL
WBC # UR STRIP: ABNORMAL /HPF

## 2023-04-11 PROCEDURE — 82570 ASSAY OF URINE CREATININE: CPT

## 2023-04-11 PROCEDURE — 81001 URINALYSIS AUTO W/SCOPE: CPT

## 2023-04-11 PROCEDURE — 80069 RENAL FUNCTION PANEL: CPT

## 2023-04-11 PROCEDURE — 82533 TOTAL CORTISOL: CPT

## 2023-04-11 PROCEDURE — 82043 UR ALBUMIN QUANTITATIVE: CPT

## 2023-04-11 PROCEDURE — 36415 COLL VENOUS BLD VENIPUNCTURE: CPT

## 2023-04-11 PROCEDURE — 84300 ASSAY OF URINE SODIUM: CPT

## 2023-04-11 PROCEDURE — 83935 ASSAY OF URINE OSMOLALITY: CPT

## 2023-04-11 PROCEDURE — 84156 ASSAY OF PROTEIN URINE: CPT

## 2023-04-24 RX ORDER — LISINOPRIL 30 MG/1
TABLET ORAL
Qty: 180 TABLET | Refills: 0 | Status: SHIPPED | OUTPATIENT
Start: 2023-04-24

## 2023-04-24 NOTE — TELEPHONE ENCOUNTER
Previously filled by Historical Provider. Would you like to refill?    Rx Refill Note  Requested Prescriptions     Pending Prescriptions Disp Refills    lisinopril (PRINIVIL,ZESTRIL) 30 MG tablet [Pharmacy Med Name: Lisinopril 30 MG Oral Tablet] 180 tablet 0     Sig: Take 1 tablet by mouth twice daily      Last office visit with prescribing clinician: 3/27/2023   Last telemedicine visit with prescribing clinician: 4/27/2023   Next office visit with prescribing clinician: 4/27/2023                         Would you like a call back once the refill request has been completed: [] Yes [] No    If the office needs to give you a call back, can they leave a voicemail: [] Yes [] No    Yanni Messer MA  04/24/23, 16:36 EDT

## 2023-04-27 ENCOUNTER — OFFICE VISIT (OUTPATIENT)
Dept: INTERNAL MEDICINE | Facility: CLINIC | Age: 68
End: 2023-04-27
Payer: MEDICARE

## 2023-04-27 VITALS
OXYGEN SATURATION: 99 % | DIASTOLIC BLOOD PRESSURE: 68 MMHG | WEIGHT: 97 LBS | SYSTOLIC BLOOD PRESSURE: 112 MMHG | BODY MASS INDEX: 18.31 KG/M2 | HEART RATE: 80 BPM | TEMPERATURE: 98.2 F | HEIGHT: 61 IN

## 2023-04-27 DIAGNOSIS — R10.9 CHRONIC ABDOMINAL PAIN: Primary | ICD-10-CM

## 2023-04-27 DIAGNOSIS — F41.8 DEPRESSION WITH ANXIETY: ICD-10-CM

## 2023-04-27 DIAGNOSIS — G89.29 CHRONIC ABDOMINAL PAIN: Primary | ICD-10-CM

## 2023-04-27 DIAGNOSIS — E87.1 HYPONATREMIA: ICD-10-CM

## 2023-04-27 PROCEDURE — 1160F RVW MEDS BY RX/DR IN RCRD: CPT | Performed by: FAMILY MEDICINE

## 2023-04-27 PROCEDURE — 3078F DIAST BP <80 MM HG: CPT | Performed by: FAMILY MEDICINE

## 2023-04-27 PROCEDURE — 3074F SYST BP LT 130 MM HG: CPT | Performed by: FAMILY MEDICINE

## 2023-04-27 PROCEDURE — 1159F MED LIST DOCD IN RCRD: CPT | Performed by: FAMILY MEDICINE

## 2023-04-27 PROCEDURE — 99214 OFFICE O/P EST MOD 30 MIN: CPT | Performed by: FAMILY MEDICINE

## 2023-04-27 NOTE — PROGRESS NOTES
Aneta Hardwick is a 67 y.o. female presenting today for follow up of   Chief Complaint   Patient presents with   • Abdominal Pain     F/u on chronic pain       Abdominal Pain  This is a chronic problem. The current episode started more than 1 year ago. The problem occurs constantly. The problem has been gradually worsening. The pain is located in the RLQ and epigastric region. The pain is at a severity of 10/10. The quality of the pain is aching and burning. The abdominal pain radiates to the RLQ and RUQ. Associated symptoms include constipation, flatus, headaches, nausea and weight loss. Pertinent negatives include no anorexia, arthralgias, belching, diarrhea, dysuria, fever, frequency, hematochezia, hematuria, melena, myalgias or vomiting. The pain is aggravated by bowel movement and eating. The pain is relieved by nothing. Prior diagnostic workup includes CT scan.      Aenta Hardwick is a 67 y.o. female presenting today for follow up of   Chief Complaint   Patient presents with   • Abdominal Pain     F/u on chronic pain       History of Present Illness     1. Pt here for 4 week follow-up on chronic abdominal pain.  She has  seen Dr. Mejia, pain management, and had T7, T8, T9 intercostal nerve blocks.  This was not helpful for her.  She has f/u with pain management as other blocks are being considered, such as celiac plexus and transverses abdominis plane block.  Next appointment May 17th.  She reports continued pain, but her nighttime pain seems to have improved.  She has since seen gastroenterology and has endoscopy pending next week.  She is subsisting on nutrition shakes and a little food.    2. Hyponatremia, chronic.  She is working with Dr. Stephen Jalloh.  She didn't have improvement after stopping the SSRIs and we restarted paroxetine.    3. Anxiety.  She is back on paroxetine and is feeling a little better but still having quite a bit of anxiety.  We tried Seroquel and she stopped it  due to feeling jittery.  She has has established with [facility] for counseling and is planning to see the psychiatrist May 7th.    Patient Active Problem List   Diagnosis   • Migraines   • Depression with anxiety   • Allergic rhinitis   • Primary insomnia   • Chronic constipation   • GERD (gastroesophageal reflux disease)   • Essential hypertension   • Routine health maintenance   • Family history of colonic polyps   • Psoriasis   • Age-related osteoporosis without current pathological fracture   • Adhesion of abdominal wall   • Chronic abdominal pain   • Hyponatremia   • Generalized abdominal pain   • Gastrointestinal hypomotility   • Abnormal celiac antibody panel       Current Outpatient Medications on File Prior to Visit   Medication Sig   • ALPRAZolam (XANAX) 0.5 MG tablet TAKE 1 TO 2 TABLETS BY MOUTH TWICE DAILY AS NEEDED FOR ANXIETY   • aluminum-magnesium hydroxide-simethicone (MAALOX MAX) 400-400-40 MG/5ML suspension Take  by mouth As Needed for Indigestion or Heartburn.   • amLODIPine (NORVASC) 2.5 MG tablet Take 1 tablet by mouth once daily   • Calcium-Vitamin D-Vitamin K (VIACTIV CALCIUM PLUS D PO) Take 1 tablet by mouth Daily.   • clotrimazole-betamethasone (Lotrisone) 1-0.05 % cream Apply 1 application topically to the appropriate area as directed 2 (Two) Times a Day.   • famotidine (PEPCID) 40 MG tablet Take 1 tablet by mouth 2 (Two) Times a Day. With lunch and at bedtime   • fluticasone (FLONASE) 50 MCG/ACT nasal spray 2 sprays into the nostril(s) as directed by provider Daily.   • lisinopril (PRINIVIL,ZESTRIL) 30 MG tablet Take 1 tablet by mouth twice daily   • loratadine (CLARITIN) 10 MG tablet Take 1 tablet by mouth Daily.   • pantoprazole (Protonix) 40 MG EC tablet Take 1 tablet by mouth 2 (Two) Times a Day.   • PARoxetine (PAXIL) 30 MG tablet Take 1 tablet by mouth Every Morning.   • polyethylene glycol (MIRALAX) packet Take 17 g by mouth Daily.   • promethazine (PHENERGAN) 25 MG suppository  Insert 1 suppository into the rectum Every 6 (Six) Hours As Needed for Nausea or Vomiting.   • promethazine (PHENERGAN) 25 MG tablet Take 1 tablet by mouth Every 8 (Eight) Hours As Needed for Nausea or Vomiting (Do not use at the same time as Phenergan suppositories).   • Simethicone 250 MG capsule Take 1 capsule by mouth.   • SUMAtriptan (IMITREX) 100 MG tablet Take 1 tablet by mouth 1 (One) Time As Needed for Migraine for up to 1 dose. Take one tablet at onset of headache. May repeat dose one time in 2 hours. (Patient taking differently: Take 1 tablet by mouth As Needed for Migraine. Take one tablet at onset of headache. May repeat dose one time in 2 hours.)   • vitamin B-12 (CYANOCOBALAMIN) 2500 MCG sublingual tablet tablet Place 2,500 mcg under the tongue Daily.   • zolpidem (AMBIEN) 10 MG tablet Take 1 tablet by mouth Every Night.   • clobetasol (TEMOVATE) 0.05 % cream Apply 1 application topically to the appropriate area as directed 2 (Two) Times a Day.   • Probiotic Product (PROBIOTIC DAILY) capsule Once daily (Patient not taking: Reported on 4/27/2023)   • [DISCONTINUED] oxyCODONE-acetaminophen (Percocet) 5-325 MG per tablet Take 1 tablet by mouth Every 6 (Six) Hours As Needed for Severe Pain.   • [DISCONTINUED] sucralfate (CARAFATE) 1 GM/10ML suspension Take 10 mL by mouth 4 (Four) Times a Day As Needed (heartburn, pain).     No current facility-administered medications on file prior to visit.          The following portions of the patient's history were reviewed and updated as appropriate: allergies, current medications, past family history, past medical history, past social history, past surgical history and problem list.    Review of Systems   Constitutional: Positive for appetite change.   Respiratory: Negative.    Gastrointestinal: Positive for abdominal pain, nausea, GERD and indigestion. Negative for vomiting.   Psychiatric/Behavioral: Positive for stress. Negative for suicidal ideas. The patient is  "nervous/anxious.        Objective   Vitals:    04/27/23 1337   BP: 112/68   BP Location: Left arm   Pulse: 80   Temp: 98.2 °F (36.8 °C)   TempSrc: Infrared   SpO2: 99%   Weight: 44 kg (97 lb)   Height: 154.9 cm (60.98\")       BP Readings from Last 3 Encounters:   04/27/23 112/68   03/27/23 130/78   03/02/23 136/80        Wt Readings from Last 3 Encounters:   04/27/23 44 kg (97 lb)   03/27/23 41.7 kg (92 lb)   03/02/23 45.1 kg (99 lb 6.4 oz)        Body mass index is 18.34 kg/m².  Nursing notes and vitals reviewed.    Physical Exam  Vitals and nursing note reviewed.   Constitutional:       General: She is not in acute distress.     Appearance: She is ill-appearing (frail). She is not toxic-appearing or diaphoretic.   HENT:      Head: Normocephalic and atraumatic.      Mouth/Throat:      Mouth: Mucous membranes are moist.   Eyes:      General:         Right eye: No discharge.         Left eye: No discharge.   Cardiovascular:      Rate and Rhythm: Normal rate and regular rhythm.      Heart sounds: Normal heart sounds.   Pulmonary:      Effort: Pulmonary effort is normal.      Breath sounds: Normal breath sounds.   Abdominal:      General: There is no distension.      Palpations: Abdomen is soft. There is no mass.      Tenderness: There is abdominal tenderness (epigastric). There is no guarding or rebound.   Musculoskeletal:      Cervical back: Neck supple. No rigidity.      Right lower leg: No edema.      Left lower leg: No edema.   Skin:     General: Skin is warm and dry.   Neurological:      General: No focal deficit present.      Mental Status: She is alert and oriented to person, place, and time.   Psychiatric:         Attention and Perception: Attention normal.         Mood and Affect: Mood is slightly depressed.         Speech: Speech normal.         Behavior: Behavior normal.         Judgment: Judgment normal.         Recent Results (from the past 672 hour(s))   Osmolality, Urine - Urine, Clean Catch    " Collection Time: 04/11/23  1:40 PM    Specimen: Urine, Clean Catch   Result Value Ref Range    Osmolality, Urine 273 mOsm/kg   Protein / Creatinine Ratio, Urine - Urine, Clean Catch    Collection Time: 04/11/23  1:40 PM    Specimen: Urine, Clean Catch   Result Value Ref Range    Protein/Creatinine Ratio, Urine 122.1 0.0 - 200.0 mg/G Crea    Creatinine, Urine 59.8 mg/dL    Total Protein, Urine 7.3 mg/dL   Microalbumin / Creatinine Urine Ratio - Urine, Clean Catch    Collection Time: 04/11/23  1:40 PM    Specimen: Urine, Clean Catch   Result Value Ref Range    Microalbumin/Creatinine Ratio      Creatinine, Urine 59.8 mg/dL    Microalbumin, Urine <1.2 mg/dL   Sodium, Urine, Random - Urine, Clean Catch    Collection Time: 04/11/23  1:40 PM    Specimen: Urine, Clean Catch   Result Value Ref Range    Sodium, Urine <20 mmol/L   Renal Function Panel    Collection Time: 04/11/23  1:40 PM    Specimen: Blood   Result Value Ref Range    Glucose 105 (H) 65 - 99 mg/dL    BUN 13 8 - 23 mg/dL    Creatinine 0.78 0.57 - 1.00 mg/dL    Sodium 125 (L) 136 - 145 mmol/L    Potassium 4.3 3.5 - 5.2 mmol/L    Chloride 91 (L) 98 - 107 mmol/L    CO2 26.6 22.0 - 29.0 mmol/L    Calcium 9.9 8.6 - 10.5 mg/dL    Albumin 4.5 3.5 - 5.2 g/dL    Phosphorus 3.8 2.5 - 4.5 mg/dL    Anion Gap 7.4 5.0 - 15.0 mmol/L    BUN/Creatinine Ratio 16.7 7.0 - 25.0    eGFR 83.4 >60.0 mL/min/1.73   Cortisol    Collection Time: 04/11/23  1:40 PM    Specimen: Blood   Result Value Ref Range    Cortisol 15.80   mcg/dL   Urinalysis without microscopic (no culture) - Urine, Clean Catch    Collection Time: 04/11/23  1:40 PM    Specimen: Urine, Clean Catch   Result Value Ref Range    Color, UA Yellow Yellow, Straw    Appearance, UA Clear Clear    pH, UA 6.0 5.0 - 8.0    Specific Gravity, UA 1.011 1.005 - 1.030    Glucose, UA Negative Negative    Ketones, UA Negative Negative    Bilirubin, UA Negative Negative    Blood, UA Trace (A) Negative    Protein, UA Negative Negative     Leuk Esterase, UA Negative Negative    Nitrite, UA Negative Negative    Urobilinogen, UA 0.2 E.U./dL 0.2 - 1.0 E.U./dL   Urinalysis, Microscopic Only - Urine, Clean Catch    Collection Time: 04/11/23  1:40 PM    Specimen: Urine, Clean Catch   Result Value Ref Range    RBC, UA 0-2 None Seen, 0-2 /HPF    WBC, UA None Seen None Seen, 0-2 /HPF    Bacteria, UA Trace (A) None Seen /HPF    Squamous Epithelial Cells, UA None Seen None Seen, 0-2 /HPF    Hyaline Casts, UA 0-2 None Seen /LPF    Amorphous Crystals, UA Small/1+ None Seen /HPF    Methodology Manual Light Microscopy          Assessment & Plan   Diagnoses and all orders for this visit:    1. Chronic abdominal pain (Primary)    2. Depression with anxiety    3. Hyponatremia      1. Pain management and gastroenterology notes reviewed.  Pt had mildly positive celiac panel and has been told to increase her gluten intake prior to the EGD.    2. Depression with anxiety.  Some improvement with paroxetine 30 mg daily.  Continue counseling at [facility] and is planning to establish with psychiatry.  She takes alprazolam as well.    3. Hyponatremia.  Followed by Dr. Stephen Jalloh.  Didn't seem to have much improvement after stopping SSRI.      Medications, including side effects, were discussed with the patient. Patient verbalized understanding.  The plan of care was discussed. All questions were answered. Patient verbalized understanding.      Return in about 6 weeks (around 6/8/2023).

## 2023-05-01 ENCOUNTER — HOSPITAL ENCOUNTER (OUTPATIENT)
Facility: HOSPITAL | Age: 68
Setting detail: HOSPITAL OUTPATIENT SURGERY
Discharge: HOME | End: 2023-05-01
Attending: INTERNAL MEDICINE | Admitting: INTERNAL MEDICINE
Payer: MEDICARE

## 2023-05-01 ENCOUNTER — ANESTHESIA (OUTPATIENT)
Dept: PERIOP | Facility: HOSPITAL | Age: 68
End: 2023-05-01
Payer: MEDICARE

## 2023-05-01 VITALS
BODY MASS INDEX: 17.75 KG/M2 | WEIGHT: 94 LBS | TEMPERATURE: 98.1 F | HEIGHT: 61 IN | DIASTOLIC BLOOD PRESSURE: 74 MMHG | OXYGEN SATURATION: 99 % | RESPIRATION RATE: 14 BRPM | SYSTOLIC BLOOD PRESSURE: 129 MMHG | HEART RATE: 75 BPM

## 2023-05-01 DIAGNOSIS — R89.4 ABNORMAL CELIAC ANTIBODY PANEL: ICD-10-CM

## 2023-05-01 PROCEDURE — 25010000002 MIDAZOLAM PER 1MG: Performed by: NURSE ANESTHETIST, CERTIFIED REGISTERED

## 2023-05-01 PROCEDURE — 88313 SPECIAL STAINS GROUP 2: CPT | Performed by: INTERNAL MEDICINE

## 2023-05-01 PROCEDURE — 88305 TISSUE EXAM BY PATHOLOGIST: CPT | Performed by: INTERNAL MEDICINE

## 2023-05-01 PROCEDURE — 25010000002 PROPOFOL 200 MG/20ML EMULSION: Performed by: NURSE ANESTHETIST, CERTIFIED REGISTERED

## 2023-05-01 RX ORDER — FLUCONAZOLE 100 MG/1
100 TABLET ORAL DAILY
Qty: 21 TABLET | Refills: 0 | Status: SHIPPED | OUTPATIENT
Start: 2023-05-01 | End: 2023-05-22

## 2023-05-01 RX ORDER — SODIUM CHLORIDE, SODIUM LACTATE, POTASSIUM CHLORIDE, CALCIUM CHLORIDE 600; 310; 30; 20 MG/100ML; MG/100ML; MG/100ML; MG/100ML
9 INJECTION, SOLUTION INTRAVENOUS CONTINUOUS
Status: DISCONTINUED | OUTPATIENT
Start: 2023-05-01 | End: 2023-05-01 | Stop reason: HOSPADM

## 2023-05-01 RX ORDER — SODIUM CHLORIDE 0.9 % (FLUSH) 0.9 %
10 SYRINGE (ML) INJECTION AS NEEDED
Status: DISCONTINUED | OUTPATIENT
Start: 2023-05-01 | End: 2023-05-01 | Stop reason: HOSPADM

## 2023-05-01 RX ORDER — SODIUM CHLORIDE 0.9 % (FLUSH) 0.9 %
10 SYRINGE (ML) INJECTION EVERY 12 HOURS SCHEDULED
Status: DISCONTINUED | OUTPATIENT
Start: 2023-05-01 | End: 2023-05-01 | Stop reason: HOSPADM

## 2023-05-01 RX ORDER — LIDOCAINE HYDROCHLORIDE 10 MG/ML
0.5 INJECTION, SOLUTION EPIDURAL; INFILTRATION; INTRACAUDAL; PERINEURAL ONCE AS NEEDED
Status: DISCONTINUED | OUTPATIENT
Start: 2023-05-01 | End: 2023-05-01 | Stop reason: HOSPADM

## 2023-05-01 RX ORDER — SODIUM CHLORIDE 9 MG/ML
40 INJECTION, SOLUTION INTRAVENOUS AS NEEDED
Status: DISCONTINUED | OUTPATIENT
Start: 2023-05-01 | End: 2023-05-01 | Stop reason: HOSPADM

## 2023-05-01 RX ORDER — SODIUM CHLORIDE, SODIUM LACTATE, POTASSIUM CHLORIDE, CALCIUM CHLORIDE 600; 310; 30; 20 MG/100ML; MG/100ML; MG/100ML; MG/100ML
100 INJECTION, SOLUTION INTRAVENOUS CONTINUOUS
Status: DISCONTINUED | OUTPATIENT
Start: 2023-05-01 | End: 2023-05-01 | Stop reason: HOSPADM

## 2023-05-01 RX ORDER — LIDOCAINE HYDROCHLORIDE 20 MG/ML
INJECTION, SOLUTION INFILTRATION; PERINEURAL AS NEEDED
Status: DISCONTINUED | OUTPATIENT
Start: 2023-05-01 | End: 2023-05-01 | Stop reason: SURG

## 2023-05-01 RX ORDER — MIDAZOLAM HYDROCHLORIDE 1 MG/ML
1 INJECTION INTRAMUSCULAR; INTRAVENOUS ONCE
Status: COMPLETED | OUTPATIENT
Start: 2023-05-01 | End: 2023-05-01

## 2023-05-01 RX ORDER — PROPOFOL 10 MG/ML
INJECTION, EMULSION INTRAVENOUS AS NEEDED
Status: DISCONTINUED | OUTPATIENT
Start: 2023-05-01 | End: 2023-05-01 | Stop reason: SURG

## 2023-05-01 RX ORDER — SUCRALFATE 1 G/1
1 TABLET ORAL 4 TIMES DAILY
Qty: 120 TABLET | Refills: 11 | Status: SHIPPED | OUTPATIENT
Start: 2023-05-01

## 2023-05-01 RX ORDER — ONDANSETRON 2 MG/ML
4 INJECTION INTRAMUSCULAR; INTRAVENOUS ONCE AS NEEDED
Status: DISCONTINUED | OUTPATIENT
Start: 2023-05-01 | End: 2023-05-01 | Stop reason: HOSPADM

## 2023-05-01 RX ADMIN — SODIUM CHLORIDE, POTASSIUM CHLORIDE, SODIUM LACTATE AND CALCIUM CHLORIDE 9 ML/HR: 600; 310; 30; 20 INJECTION, SOLUTION INTRAVENOUS at 11:30

## 2023-05-01 RX ADMIN — MIDAZOLAM HYDROCHLORIDE 1 MG: 1 INJECTION, SOLUTION INTRAMUSCULAR; INTRAVENOUS at 12:26

## 2023-05-01 RX ADMIN — LIDOCAINE HYDROCHLORIDE 100 MG: 20 INJECTION, SOLUTION INFILTRATION; PERINEURAL at 12:58

## 2023-05-01 RX ADMIN — PROPOFOL INJECTABLE EMULSION 75 MG: 10 INJECTION, EMULSION INTRAVENOUS at 13:02

## 2023-05-01 NOTE — ANESTHESIA PREPROCEDURE EVALUATION
Anesthesia Evaluation     Patient summary reviewed and Nursing notes reviewed   no history of anesthetic complications:  NPO Solid Status: > 8 hours  NPO Liquid Status: > 8 hours           Airway   Mallampati: II  TM distance: >3 FB  No difficulty expected  Dental      Pulmonary     breath sounds clear to auscultation  Cardiovascular   Exercise tolerance: good (4-7 METS)    Rhythm: regular  Rate: normal    (+) hypertension well controlled less than 2 medications,       Neuro/Psych  (+) headaches, dizziness/light headedness, weakness (generalized ), psychiatric history Anxiety and Depression,    GI/Hepatic/Renal/Endo    (+)  GERD poorly controlled,      Musculoskeletal     Abdominal    Substance History      OB/GYN          Other   arthritis,                      Anesthesia Plan    ASA 3     MAC     intravenous induction     Anesthetic plan, risks, benefits, and alternatives have been provided, discussed and informed consent has been obtained with: patient.    Use of blood products discussed with patient  Consented to blood products.       CODE STATUS:

## 2023-05-01 NOTE — ANESTHESIA POSTPROCEDURE EVALUATION
Patient: Martina Hardwick    Procedure Summary     Date: 05/01/23 Room / Location: [facility] ENDOSCOPY 1 /  [facility] OR    Anesthesia Start: 1256 Anesthesia Stop: 1315    Procedure: ESOPHAGOGASTRODUODENOSCOPY WITH BIOPSY (Esophagus) Diagnosis:       Abnormal celiac antibody panel      Candida esophagitis      Reflux esophagitis      Gastritis      (Abnormal celiac antibody panel [R89.4])    Surgeons: Darien Ruff MD Provider: Cheslea Taylor CRNA    Anesthesia Type: MAC ASA Status: 3          Anesthesia Type: MAC    Vitals  Vitals Value Taken Time   /74 05/01/23 1400   Temp 98.1 °F (36.7 °C) 05/01/23 1318   Pulse 75 05/01/23 1400   Resp 14 05/01/23 1400   SpO2 99 % 05/01/23 1400           Post Anesthesia Care and Evaluation    Patient location during evaluation: PHASE II  Patient participation: complete - patient participated  Level of consciousness: awake  Pain management: adequate    Airway patency: patent  Anesthetic complications: No anesthetic complications  PONV Status: none  Cardiovascular status: acceptable  Respiratory status: acceptable  Hydration status: acceptable

## 2023-05-01 NOTE — H&P
Patient Care Team:  Idlefonso Duobis MD as PCP - General (Family Medicine)  Hernan Hinds MD as Surgeon (General Surgery)    CHIEF COMPLAINT: dyspepsia    HISTORY OF PRESENT ILLNESS:  Positive Celiac Panel    Past Medical History:   Diagnosis Date   • Arthritis    • Autoantibody titer positive    • Bloating    • Cataract    • Cholelithiasis     Removed 8/17/2018   • Chronic abdominal pain    • Chronic constipation    • Encounter for preventive health examination    • Endometriosis    • Gastritis    • Gastrointestinal hypomotility    • GERD (gastroesophageal reflux disease)    • Headache, tension-type    • Hypertension    • Hyponatremia    • Insomnia    • Intestinal autonomic neuropathy    • Irritable bowel syndrome    • Migraine    • Mixed anxiety and depressive disorder    • Moderate malnutrition    • Noninfectious gastroenteritis    • OP (osteoporosis)    • Osteopenia    • Osteoporosis    • PONV (postoperative nausea and vomiting) 08/17/2018   • Poor sleep    • Psoriasis    • Seasonal allergies    • Visceral hyperalgesia      Past Surgical History:   Procedure Laterality Date   • APPENDECTOMY     • CHOLECYSTECTOMY N/A 8/17/2018    Procedure: CHOLECYSTECTOMY LAPAROSCOPIC converted to open procedure;  Surgeon: Hernan Hinds MD;  Location: [facility] OR;  Service: General   • COLON SURGERY     • COLONOSCOPY     • COLONOSCOPY N/A 12/12/2018    Procedure: COLONOSCOPY;  Surgeon: Darien Ruff MD;  Location: [facility] OR;  Service: Gastroenterology   • DIAGNOSTIC LAPAROSCOPY  1990   • DILATATION AND CURETTAGE  1985   • ENDOSCOPY N/A 5/13/2016    Procedure: ESOPHAGOGASTRODUODENOSCOPY ;  Surgeon: Darien Ruff MD;  Location: [facility] OR;  Service:    • HYSTERECTOMY     • REVISION / TAKEDOWN COLOSTOMY       Family History   Problem Relation Age of Onset   • Hyperlipidemia Mother    • Macular degeneration Mother    • Hearing loss Mother    • Arthritis Mother    • Vision loss Mother    • Heart  disease Father         Had 5 bypass surgery   • Hyperlipidemia Father    • Cancer Father         Prostate and bladder   • Depression Father    • Stroke Father    • Depression Sister    • COPD Sister    • Hyperlipidemia Sister    • Arthritis Sister    • Hyperlipidemia Brother    • Depression Brother    • Kidney disease Brother    • Arthritis Brother    • Breast cancer Maternal Aunt    • Breast cancer Cousin    • Breast cancer Cousin    • Colon cancer Neg Hx    • Colon polyps Neg Hx      Social History     Tobacco Use   • Smoking status: Never   • Smokeless tobacco: Never   Vaping Use   • Vaping Use: Never used   Substance Use Topics   • Alcohol use: Not Currently     Comment: rare   • Drug use: No     Medications Prior to Admission   Medication Sig Dispense Refill Last Dose   • ALPRAZolam (XANAX) 0.5 MG tablet TAKE 1 TO 2 TABLETS BY MOUTH TWICE DAILY AS NEEDED FOR ANXIETY 90 tablet 0 4/30/2023   • aluminum-magnesium hydroxide-simethicone (MAALOX MAX) 400-400-40 MG/5ML suspension Take  by mouth As Needed for Indigestion or Heartburn.   4/30/2023   • amLODIPine (NORVASC) 2.5 MG tablet Take 1 tablet by mouth once daily 90 tablet 0 4/30/2023   • famotidine (PEPCID) 40 MG tablet Take 1 tablet by mouth 2 (Two) Times a Day. With lunch and at bedtime 180 tablet 3 4/30/2023   • lisinopril (PRINIVIL,ZESTRIL) 30 MG tablet Take 1 tablet by mouth twice daily 180 tablet 0 4/30/2023   • loratadine (CLARITIN) 10 MG tablet Take 1 tablet by mouth Daily.   Past Week   • pantoprazole (Protonix) 40 MG EC tablet Take 1 tablet by mouth 2 (Two) Times a Day. 60 tablet 2 Past Week   • PARoxetine (PAXIL) 30 MG tablet Take 1 tablet by mouth Every Morning. 30 tablet 2 5/1/2023 at 0800   • polyethylene glycol (MIRALAX) packet Take 17 g by mouth Daily.   4/30/2023   • Probiotic Product (PROBIOTIC DAILY) capsule Once daily   Past Week   • promethazine (PHENERGAN) 25 MG tablet Take 1 tablet by mouth Every 8 (Eight) Hours As Needed for Nausea or  "Vomiting (Do not use at the same time as Phenergan suppositories). 30 tablet 0 4/30/2023   • Simethicone 250 MG capsule Take 1 capsule by mouth.   Past Week   • SUMAtriptan (IMITREX) 100 MG tablet Take 1 tablet by mouth 1 (One) Time As Needed for Migraine for up to 1 dose. Take one tablet at onset of headache. May repeat dose one time in 2 hours. (Patient taking differently: Take 1 tablet by mouth As Needed for Migraine. Take one tablet at onset of headache. May repeat dose one time in 2 hours.) 27 tablet 3 4/30/2023   • vitamin B-12 (CYANOCOBALAMIN) 2500 MCG sublingual tablet tablet Place 2,500 mcg under the tongue Daily.   Past Week   • zolpidem (AMBIEN) 10 MG tablet Take 1 tablet by mouth Every Night. 90 tablet 0 4/30/2023   • Calcium-Vitamin D-Vitamin K (VIACTIV CALCIUM PLUS D PO) Take 1 tablet by mouth Daily.   Unknown   • clobetasol (TEMOVATE) 0.05 % cream Apply 1 application topically to the appropriate area as directed 2 (Two) Times a Day.   Unknown   • clotrimazole-betamethasone (Lotrisone) 1-0.05 % cream Apply 1 application topically to the appropriate area as directed 2 (Two) Times a Day. 30 g 1 Unknown   • fluticasone (FLONASE) 50 MCG/ACT nasal spray 2 sprays into the nostril(s) as directed by provider Daily.   Unknown   • promethazine (PHENERGAN) 25 MG suppository Insert 1 suppository into the rectum Every 6 (Six) Hours As Needed for Nausea or Vomiting. 30 suppository 0 Unknown     Allergies:  Hydrocodone and Sulfa antibiotics    REVIEW OF SYSTEMS:  Please see the above history of present illness for pertinent positives and negatives.  The remainder of the patient's systems have been reviewed and are negative.     Vital Signs  Temp:  [97.9 °F (36.6 °C)] 97.9 °F (36.6 °C)  Heart Rate:  [80] 80  Resp:  [12] 12  BP: (117)/(60) 117/60    Flowsheet Rows    Flowsheet Row First Filed Value   Admission Height 154.9 cm (61\") Documented at 05/01/2023 1110   Admission Weight 42.6 kg (94 lb) Documented at " 05/01/2023 1110           Physical Exam:  Physical Exam   Constitutional: Patient appears well-developed and well-nourished and in no acute distress   HEENT:   Head: Normocephalic and atraumatic.   Eyes:  Pupils are equal, round, and reactive to light. EOM are intact. Sclerae are anicteric and non-injected.  Mouth and Throat: Patient has moist mucous membranes. Oropharynx is clear of any erythema or exudate.     Neck: Neck supple. No JVD present. No thyromegaly present. No lymphadenopathy present.  Cardiovascular: Regular rate, regular rhythm, S1 normal and S2 normal.  Exam reveals no gallop and no friction rub.  No murmur heard.  Pulmonary/Chest: Lungs are clear to auscultation bilaterally. No respiratory distress. No wheezes. No rhonchi. No rales.   Abdominal: Soft. Bowel sounds are normal. No distension and no mass. There is no hepatosplenomegaly. There is no tenderness.   Musculoskeletal: Normal Muscle tone  Extremities: No edema. Pulses are palpable in all 4 extremities.  Neurological: Patient is alert and oriented to person, place, and time. Cranial nerves II-XII are grossly intact with no focal deficits.  Skin: Skin is warm. No rash noted. Nails show no clubbing.  No cyanosis or erythema.    Debilities/Disabilities Identified: None  Emotional Behavior: Appropriate     Results Review:   I reviewed the patient's new clinical results.    Lab Results (most recent)     None          Imaging Results (Most Recent)     None        reviewed    ECG/EMG Results (most recent)     None        reviewed    Assessment & Plan   dyspepsia/  EGD      I discussed the patient's findings and my recommendations with patient.     Darien Ruff MD  05/01/23  12:21 EDT    Time: 10 min prior to procedure.

## 2023-05-01 NOTE — BRIEF OP NOTE
ESOPHAGOGASTRODUODENOSCOPY WITH BIOPSY  Progress Note    Martina GALO Ronen  5/1/2023    Pre-op Diagnosis:   Abnormal celiac antibody panel [R89.4]       Post-Op Diagnosis Codes:     * Abnormal celiac antibody panel [R89.4]     * Candida esophagitis [B37.81]     * Reflux esophagitis [K21.00]     * Gastritis [K29.70]    Procedure/CPT® Codes:        Procedure(s):  ESOPHAGOGASTRODUODENOSCOPY WITH BIOPSY        Surgeon(s):  Darien Ruff MD    Anesthesia: Monitored Anesthesia Care    Staff:   Circulator: Meka Taveras RN  Scrub Person: Ceci White         Estimated Blood Loss: none    Urine Voided: * No values recorded between 5/1/2023 12:56 PM and 5/1/2023  1:12 PM *    Specimens:                Specimens     ID Source Type Tests Collected By Collected At Frozen?    A Small Intestine, Duodenum Tissue · TISSUE PATHOLOGY EXAM   Darien Ruff MD 5/1/23 1309     Description: Biopsies    This specimen was not marked as sent.    B Gastric, Antrum Tissue · TISSUE PATHOLOGY EXAM   Darien Ruff MD 5/1/23 1310     Description: Biopsies    This specimen was not marked as sent.    C Esophagus, Distal Tissue · TISSUE PATHOLOGY EXAM   Darien Ruff MD 5/1/23 1310     Description: Biopsies    This specimen was not marked as sent.                Drains: * No LDAs found *    Findings: Normal Duodenum-Biopsy  Moderate Gastritis-Biopsy  Reflux Esophagitis-Biopsy  Candida Esophagitis        Complications: None          Darien Ruff MD     Date: 5/1/2023  Time: 13:14 EDT

## 2023-05-03 LAB
LAB AP CASE REPORT: NORMAL
LAB AP DIAGNOSIS COMMENT: NORMAL
PATH REPORT.FINAL DX SPEC: NORMAL
PATH REPORT.GROSS SPEC: NORMAL

## 2023-05-08 ENCOUNTER — TELEPHONE (OUTPATIENT)
Dept: GASTROENTEROLOGY | Facility: CLINIC | Age: 68
End: 2023-05-08
Payer: MEDICARE

## 2023-05-08 NOTE — TELEPHONE ENCOUNTER
PT CALLED WISHING TO SPEAK WITH JORDI.    PT NEEDS TO GO OVER HER PROCEDURE SHE JUST SAW IN HER MYCHART    PLEASE CALL BACK [phone]

## 2023-05-10 DIAGNOSIS — F41.0 PANIC DISORDER: ICD-10-CM

## 2023-05-11 ENCOUNTER — TELEPHONE (OUTPATIENT)
Dept: GASTROENTEROLOGY | Facility: CLINIC | Age: 68
End: 2023-05-11
Payer: MEDICARE

## 2023-05-11 RX ORDER — ALPRAZOLAM 0.5 MG/1
TABLET ORAL
Qty: 90 TABLET | Refills: 0 | Status: SHIPPED | OUTPATIENT
Start: 2023-05-11

## 2023-05-11 NOTE — TELEPHONE ENCOUNTER
Patient called and is having trouble taking the Carafate. She states that she cannot take it 2 hours before other medications because she is on so many. She takes her medication all through out the day because there are so many and she cannot take them all at once.

## 2023-05-11 NOTE — TELEPHONE ENCOUNTER
Rx Refill Note  Requested Prescriptions     Pending Prescriptions Disp Refills    ALPRAZolam (XANAX) 0.5 MG tablet [Pharmacy Med Name: ALPRAZolam 0.5 MG Oral Tablet] 90 tablet 0     Sig: TAKE 1 TO 2 TABLETS BY MOUTH TWICE DAILY AS NEEDED FOR ANXIETY      Last office visit with prescribing clinician: 4/27/2023   Last telemedicine visit with prescribing clinician: 4/27/2023   Next office visit with prescribing clinician: 6/8/2023                         Would you like a call back once the refill request has been completed: [] Yes [] No    If the office needs to give you a call back, can they leave a voicemail: [] Yes [] No    Yanni Messer MA  05/11/23, 08:29 EDT

## 2023-06-05 ENCOUNTER — OFFICE VISIT (OUTPATIENT)
Dept: GASTROENTEROLOGY | Facility: CLINIC | Age: 68
End: 2023-06-05
Payer: MEDICARE

## 2023-06-05 ENCOUNTER — HOSPITAL ENCOUNTER (OUTPATIENT)
Dept: GENERAL RADIOLOGY | Facility: HOSPITAL | Age: 68
Discharge: HOME | End: 2023-06-05
Payer: MEDICARE

## 2023-06-05 VITALS
SYSTOLIC BLOOD PRESSURE: 118 MMHG | WEIGHT: 94.4 LBS | BODY MASS INDEX: 17.82 KG/M2 | DIASTOLIC BLOOD PRESSURE: 72 MMHG | HEIGHT: 61 IN

## 2023-06-05 DIAGNOSIS — R10.9 ABDOMINAL PAIN, UNSPECIFIED ABDOMINAL LOCATION: Primary | ICD-10-CM

## 2023-06-05 DIAGNOSIS — R10.31 CHRONIC RLQ PAIN: ICD-10-CM

## 2023-06-05 DIAGNOSIS — R63.4 WEIGHT LOSS: ICD-10-CM

## 2023-06-05 DIAGNOSIS — K59.04 CHRONIC IDIOPATHIC CONSTIPATION: ICD-10-CM

## 2023-06-05 DIAGNOSIS — G89.29 CHRONIC RLQ PAIN: ICD-10-CM

## 2023-06-05 PROCEDURE — 1159F MED LIST DOCD IN RCRD: CPT

## 2023-06-05 PROCEDURE — 3074F SYST BP LT 130 MM HG: CPT

## 2023-06-05 PROCEDURE — 99214 OFFICE O/P EST MOD 30 MIN: CPT

## 2023-06-05 PROCEDURE — 3078F DIAST BP <80 MM HG: CPT

## 2023-06-05 PROCEDURE — 74018 RADEX ABDOMEN 1 VIEW: CPT

## 2023-06-05 PROCEDURE — 1160F RVW MEDS BY RX/DR IN RCRD: CPT

## 2023-06-05 RX ORDER — PAROXETINE 30 MG/1
1 TABLET, FILM COATED ORAL DAILY
COMMUNITY
Start: 2023-03-27 | End: 2023-06-05 | Stop reason: SDUPTHER

## 2023-06-05 NOTE — PROGRESS NOTES
PATIENT INFORMATION  Martina Hardwick       - 1955    CHIEF COMPLAINT  Chief Complaint   Patient presents with    Follow-up     Gastropathy; candida esophagitis; Barretts; Constipatioin    Heartburn    Abdominal Pain    Nausea    Vomiting    Constipation       HISTORY OF PRESENT ILLNESS    Here today for EGD follow-up    2023 for weight loss positive for normal duodenum, chemical gastropathy, reflux with Barretts and candida esophagitis. Negative for celiac, HP, dysplasia. Reviewed path and images with patient.    Per LOV: Nausea for months worse, worse with pain, living on ensure, no vomiting, dry heaving often even from water. Did start BID PPI a few weeks ago. No HB, reflux. Extreme nausea biggest complaint. BID famotidine. Never treated for HP in the past, tested many times. No relief with phenergan. Today. She is only tolerating 3 ensure a day. BMI 17.85, down from LOV.     Evaluated by [facility] for abdominal pain and adhesions, plan is for pain injections and cannot travel to , so seeing pain management here. Went to PT briefly, not currently in. BM daily with full dose miralax, easy to pass most days, does get rectal pressure and sometimes has to pass shauna. Today has started BID Miralax and BM are much easier with no improvement. Pain injections have been less than helpful. Partial colectomy , endometriosis.    Xanax improves pain. May consider librax in future.    Heartburn  She complains of abdominal pain and nausea.   Abdominal Pain  Associated symptoms include arthralgias, constipation, headaches, nausea and vomiting. Her past medical history is significant for GERD.   Nausea  Associated symptoms include abdominal pain, arthralgias, headaches, nausea and vomiting.   Vomiting   Associated symptoms include abdominal pain, arthralgias, dizziness and headaches.   Constipation  Associated symptoms include abdominal pain, nausea and vomiting.     REVIEWED PERTINENT RESULTS/  LABS  Lab Results   Component Value Date    CASEREPORT  05/01/2023     Surgical Pathology Report                         Case: [id number]                                  Authorizing Provider:  Darien Ruff        Collected:           05/01/2023 01:09 PM                                 MD Luciano                                                                   Ordering Location:     [facility]   Received:            05/01/2023 01:35 PM                                 OR                                                                           Pathologist:           Eliazar Tyson MD                                                         Specimens:   1) - Small Intestine, Duodenum, Biopsies                                                            2) - Gastric, Antrum, Biopsies                                                                      3) - Esophagus, Distal, Biopsies                                                           FINALDX  05/01/2023     1. Duodenum Biopsy:  Small intestinal mucosa with   A. Normal villous architecture.  B. No active inflammation, granulomatous inflammation nor intestinal parasites identified by routine staining.    2. Gastric Antrum Biopsy:  Gastric mucosa with    A. Mild chronic inflammation with focal moderate mucosal alteration noted suggesting chemical gastropathy (bile       reflux, drug effect).   B. No H. pylori-like organisms identified by routine staining.    C. Reactive change noted, no dysplasia nor malignancy identified.    3. Distal Esophagus Biopsy:   Squamous and glandular mucosa with    A. Mild chronic inflammation without significant eosinophilia.   B. Focal intestinal metaplasia is identified by routine and Alcian Blue staining consistent with developing                      Goodwin's esophagus (see comment).   C. No dysplasia nor malignancy identified.    Jat/kds        Lab Results   Component Value Date    HGB 13.1 02/11/2023    MCV  91.2 02/11/2023     02/11/2023    ALT 14 02/11/2023    AST 17 02/11/2023    HGBA1C 5.3 06/20/2022    TRIG 65 06/20/2022      No results found.    REVIEW OF SYSTEMS  Review of Systems   Constitutional:  Positive for appetite change.   Eyes: Negative.    Respiratory: Negative.     Cardiovascular: Negative.    Gastrointestinal:  Positive for abdominal distention, abdominal pain, constipation, nausea and vomiting.   Endocrine: Positive for cold intolerance.   Genitourinary: Negative.    Musculoskeletal:  Positive for arthralgias.   Skin: Negative.    Allergic/Immunologic: Negative.    Neurological:  Positive for dizziness and headaches.   Hematological:  Bruises/bleeds easily.   Psychiatric/Behavioral: Negative.         ACTIVE PROBLEMS  Patient Active Problem List    Diagnosis     Abnormal celiac antibody panel [R89.4]     Gastrointestinal hypomotility [K31.89]     Generalized abdominal pain [R10.84]     Chronic abdominal pain [R10.9, G89.29]     Hyponatremia [E87.1]     Adhesion of abdominal wall [K66.0]     Age-related osteoporosis without current pathological fracture [M81.0]     Psoriasis [L40.9]     Family history of colonic polyps [Z83.71]     Routine health maintenance [Z00.00]     Essential hypertension [I10]     GERD (gastroesophageal reflux disease) [K21.9]     Allergic rhinitis [J30.9]     Primary insomnia [F51.01]     Chronic constipation [K59.09]     Migraines [G43.909]     Depression with anxiety [F41.8]          PAST MEDICAL HISTORY  Past Medical History:   Diagnosis Date    Arthritis     Autoantibody titer positive     Bloating     Cataract     Cholelithiasis     Removed 8/17/2018    Chronic abdominal pain     Chronic constipation     Encounter for preventive health examination     Endometriosis     Gastritis     Gastrointestinal hypomotility     GERD (gastroesophageal reflux disease)     Headache, tension-type     Hypertension     Hyponatremia     Insomnia     Intestinal autonomic neuropathy      Irritable bowel syndrome     Migraine     Mixed anxiety and depressive disorder     Moderate malnutrition     Noninfectious gastroenteritis     OP (osteoporosis)     Osteopenia     Osteoporosis     PONV (postoperative nausea and vomiting) 08/17/2018    Poor sleep     Psoriasis     Seasonal allergies     Visceral hyperalgesia          SURGICAL HISTORY  Past Surgical History:   Procedure Laterality Date    APPENDECTOMY      CHOLECYSTECTOMY N/A 8/17/2018    Procedure: CHOLECYSTECTOMY LAPAROSCOPIC converted to open procedure;  Surgeon: Hernan Hinds MD;  Location: [facility] OR;  Service: General    COLON SURGERY      COLONOSCOPY      COLONOSCOPY N/A 12/12/2018    Procedure: COLONOSCOPY;  Surgeon: Darien Ruff MD;  Location: [facility] OR;  Service: Gastroenterology    DIAGNOSTIC LAPAROSCOPY  1990    DILATATION AND CURETTAGE  1985    ENDOSCOPY N/A 5/13/2016    Procedure: ESOPHAGOGASTRODUODENOSCOPY ;  Surgeon: Darien Ruff MD;  Location: [facility] OR;  Service:     ENDOSCOPY N/A 5/1/2023    Procedure: ESOPHAGOGASTRODUODENOSCOPY WITH BIOPSY;  Surgeon: Darien Ruff MD;  Location: [facility] OR;  Service: Gastroenterology;  Laterality: N/A;  Mild Thrush; Gastritis; Esophagitis- thrush and reflux; Biopsies- duodenal, gastric, esophagus    HYSTERECTOMY      REVISION / TAKEDOWN COLOSTOMY           FAMILY HISTORY  Family History   Problem Relation Age of Onset    Hyperlipidemia Mother     Macular degeneration Mother     Hearing loss Mother     Arthritis Mother     Vision loss Mother     Heart disease Father         Had 5 bypass surgery    Hyperlipidemia Father     Cancer Father         Prostate and bladder    Depression Father     Stroke Father     Depression Sister     COPD Sister     Hyperlipidemia Sister     Arthritis Sister     Hyperlipidemia Brother     Depression Brother     Kidney disease Brother     Arthritis Brother     Breast cancer Maternal Aunt     Breast cancer Cousin     Breast  cancer Cousin     Colon cancer Neg Hx     Colon polyps Neg Hx          SOCIAL HISTORY  Social History     Occupational History    Not on file   Tobacco Use    Smoking status: Never    Smokeless tobacco: Never   Vaping Use    Vaping Use: Never used   Substance and Sexual Activity    Alcohol use: Not Currently     Comment: rare    Drug use: No    Sexual activity: Not Currently     Partners: Male     Birth control/protection: Post-menopausal, Hysterectomy         CURRENT MEDICATIONS    Current Outpatient Medications:     ALPRAZolam (XANAX) 0.5 MG tablet, TAKE 1 TO 2 TABLETS BY MOUTH TWICE DAILY AS NEEDED FOR ANXIETY, Disp: 90 tablet, Rfl: 0    aluminum-magnesium hydroxide-simethicone (MAALOX MAX) 400-400-40 MG/5ML suspension, Take  by mouth As Needed for Indigestion or Heartburn., Disp: , Rfl:     amLODIPine (NORVASC) 2.5 MG tablet, Take 1 tablet by mouth once daily, Disp: 90 tablet, Rfl: 0    Calcium-Vitamin D-Vitamin K (VIACTIV CALCIUM PLUS D PO), Take 1 tablet by mouth Daily., Disp: , Rfl:     clobetasol (TEMOVATE) 0.05 % cream, Apply 1 application topically to the appropriate area as directed 2 (Two) Times a Day., Disp: , Rfl:     clotrimazole-betamethasone (Lotrisone) 1-0.05 % cream, Apply 1 application topically to the appropriate area as directed 2 (Two) Times a Day., Disp: 30 g, Rfl: 1    famotidine (PEPCID) 40 MG tablet, Take 1 tablet by mouth 2 (Two) Times a Day. With lunch and at bedtime, Disp: 180 tablet, Rfl: 3    lisinopril (PRINIVIL,ZESTRIL) 30 MG tablet, Take 1 tablet by mouth twice daily, Disp: 180 tablet, Rfl: 0    loratadine (CLARITIN) 10 MG tablet, Take 1 tablet by mouth Daily., Disp: , Rfl:     Multiple Vitamins-Minerals (MULTI-VITAMIN GUMMIES PO), Take 2 each by mouth Daily., Disp: , Rfl:     pantoprazole (Protonix) 40 MG EC tablet, Take 1 tablet by mouth 2 (Two) Times a Day., Disp: 60 tablet, Rfl: 2    PARoxetine (PAXIL) 30 MG tablet, Take 1 tablet by mouth Every Morning., Disp: 30 tablet, Rfl:  "2    polyethylene glycol (MIRALAX) packet, Take 17 g by mouth Daily., Disp: , Rfl:     Probiotic Product (PROBIOTIC DAILY) capsule, Once daily, Disp: , Rfl:     promethazine (PHENERGAN) 25 MG suppository, Insert 1 suppository into the rectum Every 6 (Six) Hours As Needed for Nausea or Vomiting., Disp: 30 suppository, Rfl: 0    promethazine (PHENERGAN) 25 MG tablet, Take 1 tablet by mouth Every 8 (Eight) Hours As Needed for Nausea or Vomiting (Do not use at the same time as Phenergan suppositories)., Disp: 30 tablet, Rfl: 0    Simethicone 250 MG capsule, Take 1 capsule by mouth., Disp: , Rfl:     sucralfate (CARAFATE) 1 g tablet, Take 1 tablet by mouth 4 (Four) Times a Day., Disp: 120 tablet, Rfl: 11    SUMAtriptan (IMITREX) 100 MG tablet, Take 1 tablet by mouth 1 (One) Time As Needed for Migraine for up to 1 dose. Take one tablet at onset of headache. May repeat dose one time in 2 hours. (Patient taking differently: Take 1 tablet by mouth As Needed for Migraine. Take one tablet at onset of headache. May repeat dose one time in 2 hours.), Disp: 27 tablet, Rfl: 3    vitamin B-12 (CYANOCOBALAMIN) 2500 MCG sublingual tablet tablet, Place 2,500 mcg under the tongue Daily., Disp: , Rfl:     zolpidem (AMBIEN) 10 MG tablet, Take 1 tablet by mouth Every Night., Disp: 90 tablet, Rfl: 0    fluticasone (FLONASE) 50 MCG/ACT nasal spray, 2 sprays into the nostril(s) as directed by provider Daily. (Patient not taking: Reported on 6/5/2023), Disp: , Rfl:     ALLERGIES  Hydrocodone and Sulfa antibiotics    VITALS  Vitals:    06/05/23 1259   BP: 118/72   BP Location: Left arm   Patient Position: Sitting   Cuff Size: Small Adult   Weight: 42.8 kg (94 lb 6.4 oz)   Height: 154.9 cm (60.98\")       PHYSICAL EXAM  Debilities/Disabilities Identified: None  Emotional Behavior: Appropriate  Wt Readings from Last 3 Encounters:   06/05/23 42.8 kg (94 lb 6.4 oz)   05/01/23 42.6 kg (94 lb)   04/27/23 44 kg (97 lb)     Ht Readings from Last 1 " "Encounters:   06/05/23 154.9 cm (60.98\")     Body mass index is 17.85 kg/m².  Physical Exam  Constitutional:       General: She is not in acute distress.     Appearance: Normal appearance. She is not ill-appearing.   HENT:      Head: Normocephalic and atraumatic.      Mouth/Throat:      Mouth: Mucous membranes are moist.      Pharynx: No posterior oropharyngeal erythema.   Eyes:      General: No scleral icterus.  Cardiovascular:      Rate and Rhythm: Normal rate and regular rhythm.      Heart sounds: Normal heart sounds.   Pulmonary:      Effort: Pulmonary effort is normal.      Breath sounds: Normal breath sounds.   Abdominal:      General: Abdomen is flat. Bowel sounds are normal. There is no distension.      Palpations: Abdomen is soft. There is no mass.      Tenderness: There is no abdominal tenderness in the right lower quadrant, epigastric area, periumbilical area and left upper quadrant. There is no guarding or rebound. Negative signs include Collins's sign.      Hernia: No hernia is present.   Musculoskeletal:      Cervical back: Neck supple.   Skin:     General: Skin is warm.      Capillary Refill: Capillary refill takes less than 2 seconds.   Neurological:      General: No focal deficit present.      Mental Status: She is alert and oriented to person, place, and time.   Psychiatric:         Mood and Affect: Mood normal.         Behavior: Behavior normal.         Thought Content: Thought content normal.         Judgment: Judgment normal.       CLINICAL DATA REVIEWED   reviewed previous lab results and integrated with today's visit, reviewed notes from other physicians and/or last GI encounter, reviewed previous endoscopy results and available photos, reviewed surgical pathology results from previous biopsies    ASSESSMENT  Diagnoses and all orders for this visit:    Abdominal pain, unspecified abdominal location  -     NM Gastric Emptying; Future    Weight loss  -     NM Gastric Emptying; Future    Chronic " RLQ pain  -     XR Abdomen KUB; Future    Chronic idiopathic constipation  -     XR Abdomen KUB; Future    Other orders  -     Discontinue: PARoxetine (PAXIL) 30 MG tablet; Take 1 tablet by mouth Daily.  -     Multiple Vitamins-Minerals (MULTI-VITAMIN GUMMIES PO); Take 2 each by mouth Daily.          PLAN    GES  Elavil vs librax    Return in about 3 months (around 9/5/2023).    I have discussed the above plan with the patient.  They verbalize understanding and are in agreement with the plan.  They have been advised to contact the office for any questions, concerns, or changes related to their health.

## 2023-06-05 NOTE — PATIENT INSTRUCTIONS
Consider low dose elavil at night for GI discomfort, review with psych tomorrow    Librax may be considered in future related to adhesion pain

## 2023-06-11 DIAGNOSIS — F41.0 PANIC DISORDER: ICD-10-CM

## 2023-06-12 RX ORDER — ALPRAZOLAM 0.5 MG/1
TABLET ORAL
Qty: 90 TABLET | Refills: 0 | Status: SHIPPED | OUTPATIENT
Start: 2023-06-12

## 2023-06-12 NOTE — TELEPHONE ENCOUNTER
Urine Toxicology Performed in Last 12 Months    No Benzodiazepines on Active Med List    Medication not refilled in past 28 days     Rx Refill Note  Requested Prescriptions     Pending Prescriptions Disp Refills    ALPRAZolam (XANAX) 0.5 MG tablet [Pharmacy Med Name: ALPRAZolam 0.5 MG Oral Tablet] 90 tablet 0     Sig: TAKE 1 TO 2 TABLETS BY MOUTH TWICE DAILY AS NEEDED FOR ANXIETY      Last office visit with prescribing clinician: 4/27/2023   Last telemedicine visit with prescribing clinician: Visit date not found   Next office visit with prescribing clinician: 6/19/2023                         Would you like a call back once the refill request has been completed: [] Yes [] No    If the office needs to give you a call back, can they leave a voicemail: [] Yes [] No    Felicia Arciniega, PCT  06/12/23, 09:02 EDT

## 2023-06-19 ENCOUNTER — OFFICE VISIT (OUTPATIENT)
Dept: INTERNAL MEDICINE | Facility: CLINIC | Age: 68
End: 2023-06-19
Payer: MEDICARE

## 2023-06-19 VITALS
DIASTOLIC BLOOD PRESSURE: 82 MMHG | HEART RATE: 86 BPM | WEIGHT: 94.2 LBS | SYSTOLIC BLOOD PRESSURE: 124 MMHG | TEMPERATURE: 98.2 F | HEIGHT: 61 IN | BODY MASS INDEX: 17.79 KG/M2 | RESPIRATION RATE: 18 BRPM | OXYGEN SATURATION: 99 %

## 2023-06-19 DIAGNOSIS — G89.29 CHRONIC ABDOMINAL PAIN: Primary | ICD-10-CM

## 2023-06-19 DIAGNOSIS — E87.1 HYPONATREMIA: ICD-10-CM

## 2023-06-19 DIAGNOSIS — F41.8 DEPRESSION WITH ANXIETY: ICD-10-CM

## 2023-06-19 DIAGNOSIS — R10.9 CHRONIC ABDOMINAL PAIN: Primary | ICD-10-CM

## 2023-06-19 PROBLEM — K22.70 BARRETT'S ESOPHAGUS WITHOUT DYSPLASIA: Status: ACTIVE | Noted: 2023-06-19

## 2023-06-19 PROCEDURE — 3074F SYST BP LT 130 MM HG: CPT | Performed by: FAMILY MEDICINE

## 2023-06-19 PROCEDURE — 1160F RVW MEDS BY RX/DR IN RCRD: CPT | Performed by: FAMILY MEDICINE

## 2023-06-19 PROCEDURE — 99214 OFFICE O/P EST MOD 30 MIN: CPT | Performed by: FAMILY MEDICINE

## 2023-06-19 PROCEDURE — 1159F MED LIST DOCD IN RCRD: CPT | Performed by: FAMILY MEDICINE

## 2023-06-19 PROCEDURE — 3079F DIAST BP 80-89 MM HG: CPT | Performed by: FAMILY MEDICINE

## 2023-06-19 RX ORDER — PAROXETINE HYDROCHLORIDE 40 MG/1
40 TABLET, FILM COATED ORAL EVERY MORNING
COMMUNITY

## 2023-06-19 NOTE — PROGRESS NOTES
Aneta Hardwick is a 67 y.o. female presenting today for follow up of   Chief Complaint   Patient presents with    Chronic abdominal pain     Follow up visit        Abdominal Pain  This is a chronic problem. The current episode started more than 1 year ago. The problem occurs constantly. The problem has been gradually worsening. The pain is located in the RLQ and epigastric region. The pain is at a severity of 10/10. The quality of the pain is aching and burning. The abdominal pain radiates to the RLQ and RUQ. Associated symptoms include constipation, flatus, headaches, nausea and weight loss. Pertinent negatives include no anorexia, arthralgias, belching, diarrhea, dysuria, fever, frequency, hematochezia, hematuria, melena, myalgias or vomiting. The pain is aggravated by bowel movement and eating. The pain is relieved by Nothing. Prior diagnostic workup includes CT scan.    Aneta Hardwick is a 67 y.o. female presenting today for follow up of   Chief Complaint   Patient presents with    Chronic abdominal pain     Follow up visit        History of Present Illness     1. Pt here for 8 week follow-up on chronic abdominal pain.  She has  seen Dr. Mejia, pain management, and had T7, T8, T9 intercostal nerve blocks.  This was not helpful for her.  She has f/u with pain management as other blocks are being considered, such as celiac plexus and transverses abdominis plane block.  Next appointment in August.  She reports continued pain.  She wakes up in the night around 5 a.m.  She has since seen gastroenterology and had EGD.  She has gastric emptying study pendng.  She is subsisting on nutrition shakes and a little food.    2. Hyponatremia, chronic.  She is working with Dr. Stephen Jalloh.  She didn't have improvement after stopping the SSRIs and we restarted paroxetine.    3. Anxiety.  She is not planning to keep seeing the psychiatrist at [facility], but wants to keep seeing the therapist there.   She is doing okay with paroxetine.    Patient Active Problem List   Diagnosis    Migraines    Depression with anxiety    Allergic rhinitis    Primary insomnia    Chronic constipation    GERD (gastroesophageal reflux disease)    Essential hypertension    Routine health maintenance    Family history of colonic polyps    Psoriasis    Age-related osteoporosis without current pathological fracture    Adhesion of abdominal wall    Chronic abdominal pain    Hyponatremia    Generalized abdominal pain    Gastrointestinal hypomotility    Abnormal celiac antibody panel    Goodwin's esophagus without dysplasia       Current Outpatient Medications on File Prior to Visit   Medication Sig    ALPRAZolam (XANAX) 0.5 MG tablet TAKE 1 TO 2 TABLETS BY MOUTH TWICE DAILY AS NEEDED FOR ANXIETY    aluminum-magnesium hydroxide-simethicone (MAALOX MAX) 400-400-40 MG/5ML suspension Take  by mouth As Needed for Indigestion or Heartburn.    amLODIPine (NORVASC) 2.5 MG tablet Take 1 tablet by mouth once daily    clobetasol (TEMOVATE) 0.05 % cream Apply 1 application topically to the appropriate area as directed 2 (Two) Times a Day.    clotrimazole-betamethasone (Lotrisone) 1-0.05 % cream Apply 1 application topically to the appropriate area as directed 2 (Two) Times a Day.    famotidine (PEPCID) 40 MG tablet Take 1 tablet by mouth 2 (Two) Times a Day. With lunch and at bedtime    fluticasone (FLONASE) 50 MCG/ACT nasal spray 2 sprays into the nostril(s) as directed by provider Daily.    lisinopril (PRINIVIL,ZESTRIL) 30 MG tablet Take 1 tablet by mouth twice daily (Patient taking differently: Take 1 tablet by mouth Daily.)    loratadine (CLARITIN) 10 MG tablet Take 1 tablet by mouth Daily.    Multiple Vitamins-Minerals (MULTI-VITAMIN GUMMIES PO) Take 2 each by mouth Daily.    pantoprazole (Protonix) 40 MG EC tablet Take 1 tablet by mouth 2 (Two) Times a Day.    PARoxetine (PAXIL) 40 MG tablet Take 1 tablet by mouth Every Morning.    polyethylene  glycol (MIRALAX) packet Take 17 g by mouth 2 (Two) Times a Day.    Probiotic Product (PROBIOTIC DAILY) capsule Once daily    promethazine (PHENERGAN) 25 MG suppository Insert 1 suppository into the rectum Every 6 (Six) Hours As Needed for Nausea or Vomiting.    promethazine (PHENERGAN) 25 MG tablet Take 1 tablet by mouth Every 8 (Eight) Hours As Needed for Nausea or Vomiting (Do not use at the same time as Phenergan suppositories).    Simethicone 250 MG capsule Take 1 capsule by mouth.    sucralfate (CARAFATE) 1 g tablet Take 1 tablet by mouth 4 (Four) Times a Day.    SUMAtriptan (IMITREX) 100 MG tablet Take 1 tablet by mouth 1 (One) Time As Needed for Migraine for up to 1 dose. Take one tablet at onset of headache. May repeat dose one time in 2 hours. (Patient taking differently: Take 1 tablet by mouth As Needed for Migraine. Take one tablet at onset of headache. May repeat dose one time in 2 hours.)    vitamin B-12 (CYANOCOBALAMIN) 2500 MCG sublingual tablet tablet Place 2,500 mcg under the tongue Daily.    zolpidem (AMBIEN) 10 MG tablet Take 1 tablet by mouth Every Night.    [DISCONTINUED] Calcium-Vitamin D-Vitamin K (VIACTIV CALCIUM PLUS D PO) Take 1 tablet by mouth Daily. (Patient not taking: Reported on 6/19/2023)    [DISCONTINUED] PARoxetine (PAXIL) 30 MG tablet Take 1 tablet by mouth Every Morning.     No current facility-administered medications on file prior to visit.          The following portions of the patient's history were reviewed and updated as appropriate: allergies, current medications, past family history, past medical history, past social history, past surgical history and problem list.    Review of Systems   Constitutional: Positive for appetite change.   Respiratory: Negative.    Gastrointestinal: Positive for abdominal pain, nausea, GERD and indigestion. Negative for vomiting.   Psychiatric/Behavioral: Positive for stress. Negative for suicidal ideas. The patient is nervous/anxious.   "      Objective   Vitals:    06/19/23 1347   BP: 124/82   BP Location: Left arm   Patient Position: Sitting   Cuff Size: Pediatric   Pulse: 86   Resp: 18   Temp: 98.2 °F (36.8 °C)   TempSrc: Infrared   SpO2: 99%   Weight: 42.7 kg (94 lb 3.2 oz)   Height: 154.9 cm (60.98\")       BP Readings from Last 3 Encounters:   06/19/23 124/82   06/05/23 118/72   05/01/23 129/74        Wt Readings from Last 3 Encounters:   06/19/23 42.7 kg (94 lb 3.2 oz)   06/05/23 42.8 kg (94 lb 6.4 oz)   05/01/23 42.6 kg (94 lb)        Body mass index is 17.81 kg/m².  Nursing notes and vitals reviewed.    Physical Exam  Vitals and nursing note reviewed.   Constitutional:       General: She is not in acute distress.     Appearance: She is ill-appearing (frail). She is not toxic-appearing or diaphoretic.   HENT:      Head: Normocephalic and atraumatic.      Mouth/Throat:      Mouth: Mucous membranes are moist.   Eyes:      General:         Right eye: No discharge.         Left eye: No discharge.   Cardiovascular:      Rate and Rhythm: Normal rate and regular rhythm.      Heart sounds: Normal heart sounds.   Pulmonary:      Effort: Pulmonary effort is normal.      Breath sounds: Normal breath sounds.   Abdominal:      General: There is no distension.      Palpations: Abdomen is soft. There is no mass.      Tenderness: There is abdominal tenderness (epigastric). There is no guarding or rebound.   Musculoskeletal:      Cervical back: Neck supple. No rigidity.      Right lower leg: No edema.      Left lower leg: No edema.   Skin:     General: Skin is warm and dry.   Neurological:      General: No focal deficit present.      Mental Status: She is alert and oriented to person, place, and time.   Psychiatric:         Attention and Perception: Attention normal.         Mood and Affect: Mood is slightly depressed.         Speech: Speech normal.         Behavior: Behavior normal.         Judgment: Judgment normal.       No results found for this or any " previous visit (from the past 672 hour(s)).      Assessment & Plan   Diagnoses and all orders for this visit:    1. Chronic abdominal pain (Primary)    2. Depression with anxiety    3. Hyponatremia      1. Pain management and gastroenterology notes reviewed.  Gastric emptying study pending.  She is planning to see pain management again for possible celiac plexus block.    2. Depression with anxiety.  Some improvement with paroxetine 40 mg daily.  Continue counseling at [facility]. She takes alprazolam as well.    3. Hyponatremia.  Followed by Dr. Stephen Jalloh.  Didn't seem to have much improvement after stopping SSRI.      F/U 2 months after labs to discuss her other chronic issues such as hypertension.  (F/U from 10/2022 visit.)    Medications, including side effects, were discussed with the patient. Patient verbalized understanding.  The plan of care was discussed. All questions were answered. Patient verbalized understanding.      Return in about 2 months (around 8/19/2023).      Answers submitted by the patient for this visit:  Primary Reason for Visit (Submitted on 6/18/2023)  What is the primary reason for your visit?: Abdominal Pain

## 2023-07-23 DIAGNOSIS — K21.9 GASTROESOPHAGEAL REFLUX DISEASE WITHOUT ESOPHAGITIS: ICD-10-CM

## 2023-07-24 ENCOUNTER — TRANSCRIBE ORDERS (OUTPATIENT)
Dept: ADMINISTRATIVE | Facility: HOSPITAL | Age: 68
End: 2023-07-24
Payer: MEDICARE

## 2023-07-24 ENCOUNTER — LAB (OUTPATIENT)
Dept: LAB | Facility: HOSPITAL | Age: 68
End: 2023-07-24
Payer: MEDICARE

## 2023-07-24 DIAGNOSIS — E87.1 HYPOSMOLALITY SYNDROME: Primary | ICD-10-CM

## 2023-07-24 DIAGNOSIS — I10 ESSENTIAL HYPERTENSION, MALIGNANT: ICD-10-CM

## 2023-07-24 DIAGNOSIS — E87.1 HYPOSMOLALITY SYNDROME: ICD-10-CM

## 2023-07-24 LAB
ALBUMIN SERPL-MCNC: 4.4 G/DL (ref 3.5–5.2)
ANION GAP SERPL CALCULATED.3IONS-SCNC: 11.4 MMOL/L (ref 5–15)
BUN SERPL-MCNC: 12 MG/DL (ref 8–23)
BUN/CREAT SERPL: 13.5 (ref 7–25)
CALCIUM SPEC-SCNC: 9.7 MG/DL (ref 8.6–10.5)
CHLORIDE SERPL-SCNC: 91 MMOL/L (ref 98–107)
CO2 SERPL-SCNC: 26.6 MMOL/L (ref 22–29)
CREAT SERPL-MCNC: 0.89 MG/DL (ref 0.57–1)
EGFRCR SERPLBLD CKD-EPI 2021: 71.2 ML/MIN/1.73
GLUCOSE SERPL-MCNC: 105 MG/DL (ref 65–99)
OSMOLALITY UR: 270 MOSM/KG
PHOSPHATE SERPL-MCNC: 3.2 MG/DL (ref 2.5–4.5)
POTASSIUM SERPL-SCNC: 4.1 MMOL/L (ref 3.5–5.2)
SODIUM SERPL-SCNC: 129 MMOL/L (ref 136–145)
SODIUM UR-SCNC: <20 MMOL/L

## 2023-07-24 PROCEDURE — 36415 COLL VENOUS BLD VENIPUNCTURE: CPT

## 2023-07-24 PROCEDURE — 84300 ASSAY OF URINE SODIUM: CPT

## 2023-07-24 PROCEDURE — 83935 ASSAY OF URINE OSMOLALITY: CPT

## 2023-07-24 PROCEDURE — 80069 RENAL FUNCTION PANEL: CPT

## 2023-07-24 RX ORDER — PANTOPRAZOLE SODIUM 40 MG/1
TABLET, DELAYED RELEASE ORAL
Qty: 60 TABLET | Refills: 5 | Status: SHIPPED | OUTPATIENT
Start: 2023-07-24

## 2023-07-24 NOTE — TELEPHONE ENCOUNTER
No osteoporosis diagnosis on problem list   Rx Refill Note  Requested Prescriptions     Pending Prescriptions Disp Refills    pantoprazole (PROTONIX) 40 MG EC tablet [Pharmacy Med Name: Pantoprazole Sodium 40 MG Oral Tablet Delayed Release] 60 tablet 0     Sig: Take 1 tablet by mouth twice daily      Last office visit with prescribing clinician: 6/19/2023   Last telemedicine visit with prescribing clinician: Visit date not found   Next office visit with prescribing clinician: 8/24/2023                         Would you like a call back once the refill request has been completed: [] Yes [] No    If the office needs to give you a call back, can they leave a voicemail: [] Yes [] No    Brianda Maloney, PCT  07/24/23, 16:15 EDT

## 2023-08-08 NOTE — TELEPHONE ENCOUNTER
Are you ok with taking over this script? Pt is no longer seeing previous provider.     Rx Refill Note  Requested Prescriptions     Pending Prescriptions Disp Refills    PARoxetine (PAXIL) 40 MG tablet       Sig: Take 1 tablet by mouth Every Morning.      Last office visit with prescribing clinician: 6/19/2023   Last telemedicine visit with prescribing clinician: Visit date not found   Next office visit with prescribing clinician: 8/24/2023                         Would you like a call back once the refill request has been completed: [] Yes [] No    If the office needs to give you a call back, can they leave a voicemail: [] Yes [] No    Felicia Arciniega, PCT  08/08/23, 08:59 EDT

## 2023-08-09 RX ORDER — PAROXETINE HYDROCHLORIDE 40 MG/1
40 TABLET, FILM COATED ORAL EVERY MORNING
Qty: 30 TABLET | Refills: 5 | Status: SHIPPED | OUTPATIENT
Start: 2023-08-09

## 2023-08-10 ENCOUNTER — HOSPITAL ENCOUNTER (OUTPATIENT)
Dept: NUCLEAR MEDICINE | Facility: HOSPITAL | Age: 68
Discharge: HOME | End: 2023-08-10
Payer: MEDICARE

## 2023-08-10 DIAGNOSIS — R63.4 WEIGHT LOSS: ICD-10-CM

## 2023-08-10 DIAGNOSIS — R10.9 ABDOMINAL PAIN, UNSPECIFIED ABDOMINAL LOCATION: ICD-10-CM

## 2023-08-10 PROCEDURE — 78264 GASTRIC EMPTYING IMG STUDY: CPT

## 2023-08-10 PROCEDURE — 0 TECHNETIUM SULFUR COLLOID

## 2023-08-10 PROCEDURE — A9541 TC99M SULFUR COLLOID: HCPCS

## 2023-08-10 RX ADMIN — TECHNETIUM TC 99M SULFUR COLLOID 1 DOSE: KIT at 10:57

## 2023-08-13 DIAGNOSIS — F41.0 PANIC DISORDER: ICD-10-CM

## 2023-08-14 RX ORDER — ALPRAZOLAM 0.5 MG/1
TABLET ORAL
Qty: 90 TABLET | Refills: 0 | Status: SHIPPED | OUTPATIENT
Start: 2023-08-14

## 2023-08-14 NOTE — TELEPHONE ENCOUNTER
Urine Toxicology Performed in Last 12 Months    No Benzodiazepines on Active Med List    Medication not refilled in past 28 days     Rx Refill Note  Requested Prescriptions     Pending Prescriptions Disp Refills    ALPRAZolam (XANAX) 0.5 MG tablet [Pharmacy Med Name: ALPRAZolam 0.5 MG Oral Tablet] 90 tablet 0     Sig: TAKE 1 TO 2 TABLETS BY MOUTH TWICE DAILY AS NEEDED FOR ANXIETY      Last office visit with prescribing clinician: 6/19/2023   Last telemedicine visit with prescribing clinician: Visit date not found   Next office visit with prescribing clinician: 8/24/2023                         Would you like a call back once the refill request has been completed: [] Yes [] No    If the office needs to give you a call back, can they leave a voicemail: [] Yes [] No    Brianda Maloney, RUTH  08/14/23, 08:29 EDT

## 2023-08-16 ENCOUNTER — LAB (OUTPATIENT)
Dept: LAB | Facility: HOSPITAL | Age: 68
End: 2023-08-16
Payer: MEDICARE

## 2023-08-16 PROCEDURE — 80048 BASIC METABOLIC PNL TOTAL CA: CPT | Performed by: FAMILY MEDICINE

## 2023-08-24 ENCOUNTER — OFFICE VISIT (OUTPATIENT)
Dept: INTERNAL MEDICINE | Facility: CLINIC | Age: 68
End: 2023-08-24
Payer: MEDICARE

## 2023-08-24 VITALS
OXYGEN SATURATION: 100 % | WEIGHT: 94.4 LBS | TEMPERATURE: 98.6 F | SYSTOLIC BLOOD PRESSURE: 118 MMHG | HEIGHT: 61 IN | HEART RATE: 86 BPM | DIASTOLIC BLOOD PRESSURE: 68 MMHG | BODY MASS INDEX: 17.82 KG/M2

## 2023-08-24 DIAGNOSIS — F41.8 DEPRESSION WITH ANXIETY: ICD-10-CM

## 2023-08-24 DIAGNOSIS — R93.89 ABNORMAL FINDINGS ON DIAGNOSTIC IMAGING OF OTHER SPECIFIED BODY STRUCTURES: ICD-10-CM

## 2023-08-24 DIAGNOSIS — E87.1 HYPONATREMIA: ICD-10-CM

## 2023-08-24 DIAGNOSIS — E55.9 HYPOVITAMINOSIS D: ICD-10-CM

## 2023-08-24 DIAGNOSIS — R10.9 CHRONIC ABDOMINAL PAIN: Primary | ICD-10-CM

## 2023-08-24 DIAGNOSIS — D64.9 ANEMIA, UNSPECIFIED TYPE: ICD-10-CM

## 2023-08-24 DIAGNOSIS — G89.29 CHRONIC ABDOMINAL PAIN: Primary | ICD-10-CM

## 2023-08-24 PROCEDURE — 99214 OFFICE O/P EST MOD 30 MIN: CPT | Performed by: FAMILY MEDICINE

## 2023-08-24 PROCEDURE — 3074F SYST BP LT 130 MM HG: CPT | Performed by: FAMILY MEDICINE

## 2023-08-24 PROCEDURE — 3078F DIAST BP <80 MM HG: CPT | Performed by: FAMILY MEDICINE

## 2023-08-24 PROCEDURE — 1160F RVW MEDS BY RX/DR IN RCRD: CPT | Performed by: FAMILY MEDICINE

## 2023-08-24 PROCEDURE — 1159F MED LIST DOCD IN RCRD: CPT | Performed by: FAMILY MEDICINE

## 2023-08-24 RX ORDER — AMITRIPTYLINE HYDROCHLORIDE 10 MG/1
10 TABLET, FILM COATED ORAL NIGHTLY
Qty: 30 TABLET | Refills: 2 | Status: SHIPPED | OUTPATIENT
Start: 2023-08-24

## 2023-08-24 NOTE — PROGRESS NOTES
Aneta Hardwick is a 67 y.o. female presenting today for follow up of   Chief Complaint   Patient presents with    Abdominal Pain     2 month follow up       Abdominal Pain  This is a chronic problem. The current episode started more than 1 year ago. The onset quality is gradual. The problem occurs constantly. The most recent episode lasted 36 months. The problem has been gradually worsening. The pain is located in the RLQ and epigastric region. The pain is at a severity of 10/10. The quality of the pain is aching and burning. The abdominal pain radiates to the RLQ and RUQ. Associated symptoms include constipation, flatus, headaches, nausea and weight loss. Pertinent negatives include no anorexia, arthralgias, belching, diarrhea, dysuria, fever, frequency, hematochezia, hematuria, melena, myalgias or vomiting. The pain is aggravated by bowel movement and eating. The pain is relieved by Nothing. Prior diagnostic workup includes CT scan.    Aneta Hardwick is a 67 y.o. female presenting today for follow up of   Chief Complaint   Patient presents with    Abdominal Pain     2 month follow up       History of Present Illness     1. Pt here for 8 week follow-up on chronic abdominal pain.  She has  seen Dr. Mejia, pain management, and had T7, T8, T9 intercostal nerve blocks and transverse abdominis block; she had celiac plexus block yesterday and hasn't noticed any improvement.This was not helpful for her.  She reports continued pain.  She wakes up in the night around 3 a.m. and has a bowel movement.  (She takes Miralax)  She has since seen gastroenterology and had EGD.  She had normal gastric emptying study.  She is subsisting on nutrition shakes and a little food.    2. Hyponatremia, chronic.  She is working with Dr. Stephen Jalolh.  She didn't have improvement after stopping the SSRIs and we restarted paroxetine.    3. Anxiety.  She is not planning to keep seeing the psychiatrist at [facility], but  wants to keep seeing the therapist there.  She is doing okay with paroxetine.    Patient Active Problem List   Diagnosis    Migraines    Depression with anxiety    Allergic rhinitis    Primary insomnia    Chronic constipation    GERD (gastroesophageal reflux disease)    Essential hypertension    Routine health maintenance    Family history of colonic polyps    Psoriasis    Age-related osteoporosis without current pathological fracture    Adhesion of abdominal wall    Chronic abdominal pain    Hyponatremia    Generalized abdominal pain    Gastrointestinal hypomotility    Abnormal celiac antibody panel    Goodwin's esophagus without dysplasia       Current Outpatient Medications on File Prior to Visit   Medication Sig    ALPRAZolam (XANAX) 0.5 MG tablet TAKE 1 TO 2 TABLETS BY MOUTH TWICE DAILY AS NEEDED FOR ANXIETY    aluminum-magnesium hydroxide-simethicone (MAALOX MAX) 400-400-40 MG/5ML suspension Take  by mouth As Needed for Indigestion or Heartburn.    amLODIPine (NORVASC) 2.5 MG tablet Take 1 tablet by mouth once daily    clobetasol (TEMOVATE) 0.05 % cream Apply 1 application  topically to the appropriate area as directed 2 (Two) Times a Day.    clotrimazole-betamethasone (Lotrisone) 1-0.05 % cream Apply 1 application topically to the appropriate area as directed 2 (Two) Times a Day.    famotidine (PEPCID) 40 MG tablet Take 1 tablet by mouth 2 (Two) Times a Day. With lunch and at bedtime    fluticasone (FLONASE) 50 MCG/ACT nasal spray 2 sprays into the nostril(s) as directed by provider Daily.    lisinopril (PRINIVIL,ZESTRIL) 30 MG tablet Take 1 tablet by mouth twice daily (Patient taking differently: Take 1 tablet by mouth Daily.)    loratadine (CLARITIN) 10 MG tablet Take 1 tablet by mouth Daily.    Multiple Vitamins-Minerals (MULTI-VITAMIN GUMMIES PO) Take 2 each by mouth Daily.    pantoprazole (PROTONIX) 40 MG EC tablet Take 1 tablet by mouth twice daily    PARoxetine (PAXIL) 40 MG tablet Take 1 tablet by  mouth Every Morning.    polyethylene glycol (MIRALAX) packet Take 17 g by mouth 2 (Two) Times a Day.    Probiotic Product (PROBIOTIC DAILY) capsule Once daily    promethazine (PHENERGAN) 25 MG suppository Insert 1 suppository into the rectum Every 6 (Six) Hours As Needed for Nausea or Vomiting.    promethazine (PHENERGAN) 25 MG tablet Take 1 tablet by mouth Every 8 (Eight) Hours As Needed for Nausea or Vomiting (Do not use at the same time as Phenergan suppositories).    Simethicone 250 MG capsule Take 1 capsule by mouth.    sucralfate (CARAFATE) 1 g tablet Take 1 tablet by mouth 4 (Four) Times a Day.    SUMAtriptan (IMITREX) 100 MG tablet Take 1 tablet by mouth 1 (One) Time As Needed for Migraine for up to 1 dose. Take one tablet at onset of headache. May repeat dose one time in 2 hours. (Patient taking differently: Take 1 tablet by mouth As Needed for Migraine. Take one tablet at onset of headache. May repeat dose one time in 2 hours.)    vitamin B-12 (CYANOCOBALAMIN) 2500 MCG sublingual tablet tablet Place 2,500 mcg under the tongue Daily.    zolpidem (AMBIEN) 10 MG tablet Take 1 tablet by mouth Every Night.     No current facility-administered medications on file prior to visit.          The following portions of the patient's history were reviewed and updated as appropriate: allergies, current medications, past family history, past medical history, past social history, past surgical history and problem list.    Review of Systems   Constitutional: Positive for appetite change.   Respiratory: Negative.    Gastrointestinal: Positive for abdominal pain, nausea, GERD and indigestion. Negative for vomiting.   Psychiatric/Behavioral: Positive for stress. Negative for suicidal ideas. The patient is nervous/anxious.        Objective   Vitals:    08/24/23 1337   BP: 118/68   BP Location: Right arm   Patient Position: Sitting   Cuff Size: Adult   Pulse: 86   Temp: 98.6 øF (37 øC)   TempSrc: Infrared   SpO2: 100%   Weight:  "42.8 kg (94 lb 6.4 oz)   Height: 154.9 cm (60.98\")       BP Readings from Last 3 Encounters:   08/24/23 118/68   06/19/23 124/82   06/05/23 118/72        Wt Readings from Last 3 Encounters:   08/24/23 42.8 kg (94 lb 6.4 oz)   06/19/23 42.7 kg (94 lb 3.2 oz)   06/05/23 42.8 kg (94 lb 6.4 oz)        Body mass index is 17.85 kg/mý.  Nursing notes and vitals reviewed.    Physical Exam  Vitals and nursing note reviewed.   Constitutional:       General: She is not in acute distress.     Appearance: She is ill-appearing (frail). She is not toxic-appearing or diaphoretic.   HENT:      Head: Normocephalic and atraumatic.      Mouth/Throat:      Mouth: Mucous membranes are moist.   Eyes:      General:         Right eye: No discharge.         Left eye: No discharge.   Cardiovascular:      Rate and Rhythm: Normal rate and regular rhythm.      Heart sounds: Normal heart sounds.   Pulmonary:      Effort: Pulmonary effort is normal.      Breath sounds: Normal breath sounds.   Abdominal:      General: There is no distension.      Palpations: Abdomen is soft. There is no mass.      Tenderness: There is abdominal tenderness (epigastric). There is no guarding or rebound.   Musculoskeletal:      Cervical back: Neck supple. No rigidity.      Right lower leg: No edema.      Left lower leg: No edema.   Skin:     General: Skin is warm and dry.   Neurological:      General: No focal deficit present.      Mental Status: She is alert and oriented to person, place, and time.   Psychiatric:         Attention and Perception: Attention normal.         Mood and Affect: Mood is slightly depressed.         Speech: Speech normal.         Behavior: Behavior normal.         Judgment: Judgment normal.       Recent Results (from the past 672 hour(s))   Basic metabolic panel    Collection Time: 08/16/23 12:09 PM    Specimen: Blood   Result Value Ref Range    Glucose 102 (H) 65 - 99 mg/dL    BUN 9 8 - 23 mg/dL    Creatinine 0.72 0.57 - 1.00 mg/dL    Sodium " 128 (L) 136 - 145 mmol/L    Potassium 4.4 3.5 - 5.2 mmol/L    Chloride 90 (L) 98 - 107 mmol/L    CO2 27.4 22.0 - 29.0 mmol/L    Calcium 9.9 8.6 - 10.5 mg/dL    BUN/Creatinine Ratio 12.5 7.0 - 25.0    Anion Gap 10.6 5.0 - 15.0 mmol/L    eGFR 91.8 >60.0 mL/min/1.73         Assessment & Plan   Diagnoses and all orders for this visit:    1. Chronic abdominal pain (Primary)  -     CBC & Differential  -     Comprehensive Metabolic Panel  -     C-reactive Protein  -     TSH  -     Vitamin B12    2. Depression with anxiety    3. Hyponatremia    4. Abnormal findings on diagnostic imaging of other specified body structures  -     TSH    5. Hypovitaminosis D  -     Vitamin D,25-Hydroxy    6. Anemia, unspecified type  -     Ferritin    Other orders  -     amitriptyline (ELAVIL) 10 MG tablet; Take 1 tablet by mouth Every Night.  Dispense: 30 tablet; Refill: 2      1. Pain management and gastroenterology notes reviewed.  Gastric emptying study normal.  Trial of amitriptyline, start low dose as she has some difficulty tolerating medication. (This may help with her frequent migraines.)  Has f/u with gastroenterology in 2 weeks.  She is planning to consult again with Dr. Child.  Pain management mentioned to her the possibility of a pain pump.  Declines returning to [facility] as she is unable.    2. Depression with anxiety.  Some improvement with paroxetine 40 mg daily.  Continue counseling at [facility]. She takes alprazolam as well.    3. Hyponatremia.  Followed by Dr. Stephen Jalloh.  Didn't seem to have much improvement after stopping SSRI.      F/U 2 months after labs to discuss her other chronic issues such as hypertension.  (F/U from 10/2022 visit.)    Medications, including side effects, were discussed with the patient. Patient verbalized understanding.  The plan of care was discussed. All questions were answered. Patient verbalized understanding.      Return in about 2 months (around 10/24/2023).      Answers submitted by  the patient for this visit:  Primary Reason for Visit (Submitted on 6/18/2023)  What is the primary reason for your visit?: Abdominal Pain  Answers submitted by the patient for this visit:  Primary Reason for Visit (Submitted on 8/17/2023)  What is the primary reason for your visit?: Abdominal Pain

## 2023-08-25 LAB
25(OH)D3+25(OH)D2 SERPL-MCNC: 46.6 NG/ML (ref 30–100)
ALBUMIN SERPL-MCNC: 4.6 G/DL (ref 3.9–4.9)
ALBUMIN/GLOB SERPL: 1.7 {RATIO} (ref 1.2–2.2)
ALP SERPL-CCNC: 140 IU/L (ref 44–121)
ALT SERPL-CCNC: 39 IU/L (ref 0–32)
AST SERPL-CCNC: 36 IU/L (ref 0–40)
BASOPHILS # BLD AUTO: 0 X10E3/UL (ref 0–0.2)
BASOPHILS NFR BLD AUTO: 0 %
BILIRUB SERPL-MCNC: 0.8 MG/DL (ref 0–1.2)
BUN SERPL-MCNC: 19 MG/DL (ref 8–27)
BUN/CREAT SERPL: 22 (ref 12–28)
CALCIUM SERPL-MCNC: 10.1 MG/DL (ref 8.7–10.3)
CHLORIDE SERPL-SCNC: 86 MMOL/L (ref 96–106)
CO2 SERPL-SCNC: 21 MMOL/L (ref 20–29)
CREAT SERPL-MCNC: 0.85 MG/DL (ref 0.57–1)
CRP SERPL-MCNC: <1 MG/L (ref 0–10)
EGFRCR SERPLBLD CKD-EPI 2021: 75 ML/MIN/1.73
EOSINOPHIL # BLD AUTO: 0 X10E3/UL (ref 0–0.4)
EOSINOPHIL NFR BLD AUTO: 0 %
ERYTHROCYTE [DISTWIDTH] IN BLOOD BY AUTOMATED COUNT: 11.9 % (ref 11.7–15.4)
FERRITIN SERPL-MCNC: 30 NG/ML (ref 15–150)
GLOBULIN SER CALC-MCNC: 2.7 G/DL (ref 1.5–4.5)
GLUCOSE SERPL-MCNC: 95 MG/DL (ref 70–99)
HCT VFR BLD AUTO: 38.7 % (ref 34–46.6)
HGB BLD-MCNC: 13.2 G/DL (ref 11.1–15.9)
IMM GRANULOCYTES # BLD AUTO: 0 X10E3/UL (ref 0–0.1)
IMM GRANULOCYTES NFR BLD AUTO: 0 %
LYMPHOCYTES # BLD AUTO: 0.4 X10E3/UL (ref 0.7–3.1)
LYMPHOCYTES NFR BLD AUTO: 2 %
MCH RBC QN AUTO: 31.7 PG (ref 26.6–33)
MCHC RBC AUTO-ENTMCNC: 34.1 G/DL (ref 31.5–35.7)
MCV RBC AUTO: 93 FL (ref 79–97)
MONOCYTES # BLD AUTO: 1 X10E3/UL (ref 0.1–0.9)
MONOCYTES NFR BLD AUTO: 6 %
NEUTROPHILS # BLD AUTO: 15.3 X10E3/UL (ref 1.4–7)
NEUTROPHILS NFR BLD AUTO: 92 %
PLATELET # BLD AUTO: 251 X10E3/UL (ref 150–450)
POTASSIUM SERPL-SCNC: 4.6 MMOL/L (ref 3.5–5.2)
PROT SERPL-MCNC: 7.3 G/DL (ref 6–8.5)
RBC # BLD AUTO: 4.17 X10E6/UL (ref 3.77–5.28)
SODIUM SERPL-SCNC: 125 MMOL/L (ref 134–144)
TSH SERPL DL<=0.005 MIU/L-ACNC: 1.06 UIU/ML (ref 0.45–4.5)
VIT B12 SERPL-MCNC: 1656 PG/ML (ref 232–1245)
WBC # BLD AUTO: 16.8 X10E3/UL (ref 3.4–10.8)

## 2023-09-05 ENCOUNTER — OFFICE VISIT (OUTPATIENT)
Dept: GASTROENTEROLOGY | Facility: CLINIC | Age: 68
End: 2023-09-05
Payer: MEDICARE

## 2023-09-05 VITALS
HEIGHT: 61 IN | BODY MASS INDEX: 17.56 KG/M2 | DIASTOLIC BLOOD PRESSURE: 80 MMHG | WEIGHT: 93 LBS | SYSTOLIC BLOOD PRESSURE: 118 MMHG

## 2023-09-05 DIAGNOSIS — K22.70 BARRETT'S ESOPHAGUS WITHOUT DYSPLASIA: Primary | ICD-10-CM

## 2023-09-05 DIAGNOSIS — K59.09 CHRONIC CONSTIPATION: ICD-10-CM

## 2023-09-05 NOTE — PROGRESS NOTES
PATIENT INFORMATION  Martina Hardwick       - 1955    CHIEF COMPLAINT  Chief Complaint   Patient presents with    Chronic idiopathic constipation       HISTORY OF PRESENT ILLNESS    Here today for abd pain follow-up    Per LOV: Did start BID PPI a few weeks ago. No HB, reflux. Extreme nausea biggest complaint. BID famotidine. Never treated for HP in the past, tested many times. No relief with phenergan. Today. She is only tolerating 3 ensure a day. BMI 17.85, down from LOV. Normal GES.      Per LOV:  has started BID Miralax and BM are much easier with no improvement. Pain injections have been less than helpful. Partial colectomy , endometriosis. Was not feesible to travel to [facility], will refer to CRS at  for 2nd opinion. TODAY: having abdominal pain awakening her from sleep to have BM, interferes    2023 for weight loss positive for normal duodenum, chemical gastropathy, reflux with Barretts and candida esophagitis. Negative for celiac, HP, dysplasia. Reviewed path and images with patient.      REVIEWED PERTINENT RESULTS/ LABS  Lab Results   Component Value Date    CASEREPORT  2023     Surgical Pathology Report                         Case: [id number]                                  Authorizing Provider:  Darien Ruff        Collected:           2023 01:09 PM                                 MD Luciano                                                                   Ordering Location:     [facility]   Received:            2023 01:35 PM                                 OR                                                                           Pathologist:           Eliazar Tyson MD                                                         Specimens:   1) - Small Intestine, Duodenum, Biopsies                                                            2) - Gastric, Antrum, Biopsies                                                                       3) - Esophagus, Distal, Biopsies                                                           FINALDX  05/01/2023     1. Duodenum Biopsy:  Small intestinal mucosa with   A. Normal villous architecture.  B. No active inflammation, granulomatous inflammation nor intestinal parasites identified by routine staining.    2. Gastric Antrum Biopsy:  Gastric mucosa with    A. Mild chronic inflammation with focal moderate mucosal alteration noted suggesting chemical gastropathy (bile       reflux, drug effect).   B. No H. pylori-like organisms identified by routine staining.    C. Reactive change noted, no dysplasia nor malignancy identified.    3. Distal Esophagus Biopsy:   Squamous and glandular mucosa with    A. Mild chronic inflammation without significant eosinophilia.   B. Focal intestinal metaplasia is identified by routine and Alcian Blue staining consistent with developing                      Goodwin's esophagus (see comment).   C. No dysplasia nor malignancy identified.    Jat/kds        Lab Results   Component Value Date    HGB 13.2 08/24/2023    MCV 93 08/24/2023     08/24/2023    ALT 39 (H) 08/24/2023    AST 36 08/24/2023    HGBA1C 5.3 06/20/2022    TRIG 65 06/20/2022    FERRITIN 30 08/24/2023      NM Gastric Emptying    Result Date: 8/10/2023  Narrative: EXAM: GASTRIC EMPTYING STUDY 8/10/2023  HISTORY: Nausea and abdominal pain for 3 years  DOSE: Patient was given 590 uCi technetium 99 sulfur colloid mixed with scrambled eggs and ounces of water. Activity remaining in the stomach was measured over time  COMPARISON: None  FINDINGS: At 32 minutes, the stomach was 21% empty. At 60 minutes, the stomach was 27% empty, at 120 minutes the stomach was 68% empty. At 180 minutes the stomach was 90% empty at 240 minutes the stomach was 94% empty. Calculated T1 half is 94 minutes.      Impression: Normal 4-hour gastric emptying study with over 90% of the activity emptied by 4 hours. Calculated T1 half is at upper  limits of normal at 94 minutes   This report was finalized on 8/10/2023 4:24 PM by Dr. Logan Rizvi MD.       REVIEW OF SYSTEMS  Review of Systems   Constitutional:  Negative for activity change, chills, fever and unexpected weight change.   HENT:  Positive for trouble swallowing. Negative for congestion.    Eyes:  Negative for visual disturbance.   Respiratory:  Negative for shortness of breath.    Cardiovascular:  Negative for chest pain and palpitations.   Gastrointestinal:  Positive for abdominal distention, abdominal pain and nausea. Negative for blood in stool.   Endocrine: Negative for cold intolerance and heat intolerance.   Genitourinary:  Negative for hematuria.   Musculoskeletal:  Negative for gait problem.   Skin:  Negative for color change.   Allergic/Immunologic: Negative for immunocompromised state.   Neurological:  Negative for weakness and light-headedness.   Hematological:  Negative for adenopathy.   Psychiatric/Behavioral:  Negative for sleep disturbance. The patient is not nervous/anxious.        ACTIVE PROBLEMS  Patient Active Problem List    Diagnosis     Goodwin's esophagus without dysplasia [K22.70]     Abnormal celiac antibody panel [R89.4]     Gastrointestinal hypomotility [K31.89]     Generalized abdominal pain [R10.84]     Chronic abdominal pain [R10.9, G89.29]     Hyponatremia [E87.1]     Adhesion of abdominal wall [K66.0]     Age-related osteoporosis without current pathological fracture [M81.0]     Psoriasis [L40.9]     Family history of colonic polyps [Z83.71]     Routine health maintenance [Z00.00]     Essential hypertension [I10]     GERD (gastroesophageal reflux disease) [K21.9]     Allergic rhinitis [J30.9]     Primary insomnia [F51.01]     Chronic constipation [K59.09]     Migraines [G43.909]     Depression with anxiety [F41.8]          PAST MEDICAL HISTORY  Past Medical History:   Diagnosis Date    Arthritis     Autoantibody titer positive     Bloating     Cataract      Cholelithiasis     Removed 8/17/2018    Chronic abdominal pain     Chronic constipation     Encounter for preventive health examination     Endometriosis     Gastritis     Gastrointestinal hypomotility     GERD (gastroesophageal reflux disease)     Headache, tension-type     Hypertension     Hyponatremia     Insomnia     Intestinal autonomic neuropathy     Irritable bowel syndrome     Migraine     Mixed anxiety and depressive disorder     Moderate malnutrition     Noninfectious gastroenteritis     OP (osteoporosis)     Osteopenia     Osteoporosis     PONV (postoperative nausea and vomiting) 08/17/2018    Poor sleep     Psoriasis     Seasonal allergies     Visceral hyperalgesia          SURGICAL HISTORY  Past Surgical History:   Procedure Laterality Date    APPENDECTOMY      CHOLECYSTECTOMY N/A 8/17/2018    Procedure: CHOLECYSTECTOMY LAPAROSCOPIC converted to open procedure;  Surgeon: Hernan Hinds MD;  Location: [facility] OR;  Service: General    COLON SURGERY      COLONOSCOPY      COLONOSCOPY N/A 12/12/2018    Procedure: COLONOSCOPY;  Surgeon: Darien Ruff MD;  Location: [facility] OR;  Service: Gastroenterology    DIAGNOSTIC LAPAROSCOPY  1990    DILATATION AND CURETTAGE  1985    ENDOSCOPY N/A 5/13/2016    Procedure: ESOPHAGOGASTRODUODENOSCOPY ;  Surgeon: Darien Ruff MD;  Location: [facility] OR;  Service:     ENDOSCOPY N/A 5/1/2023    Procedure: ESOPHAGOGASTRODUODENOSCOPY WITH BIOPSY;  Surgeon: Darien Ruff MD;  Location: [facility] OR;  Service: Gastroenterology;  Laterality: N/A;  Mild Thrush; Gastritis; Esophagitis- thrush and reflux; Biopsies- duodenal, gastric, esophagus    HYSTERECTOMY      REVISION / TAKEDOWN COLOSTOMY           FAMILY HISTORY  Family History   Problem Relation Age of Onset    Hyperlipidemia Mother     Macular degeneration Mother     Hearing loss Mother     Arthritis Mother     Vision loss Mother     Heart disease Father         Had 5 bypass surgery     Hyperlipidemia Father     Cancer Father         Prostate and bladder    Depression Father     Stroke Father     Depression Sister     COPD Sister     Hyperlipidemia Sister     Arthritis Sister     Hyperlipidemia Brother     Depression Brother     Kidney disease Brother     Arthritis Brother     Breast cancer Maternal Aunt     Breast cancer Cousin     Breast cancer Cousin     Colon cancer Neg Hx     Colon polyps Neg Hx          SOCIAL HISTORY  Social History     Occupational History    Not on file   Tobacco Use    Smoking status: Never     Passive exposure: Never    Smokeless tobacco: Never   Vaping Use    Vaping Use: Never used   Substance and Sexual Activity    Alcohol use: Not Currently     Comment: rare    Drug use: No    Sexual activity: Not Currently     Partners: Male     Birth control/protection: Post-menopausal, Hysterectomy         CURRENT MEDICATIONS    Current Outpatient Medications:     ALPRAZolam (XANAX) 0.5 MG tablet, TAKE 1 TO 2 TABLETS BY MOUTH TWICE DAILY AS NEEDED FOR ANXIETY, Disp: 90 tablet, Rfl: 0    aluminum-magnesium hydroxide-simethicone (MAALOX MAX) 400-400-40 MG/5ML suspension, Take  by mouth As Needed for Indigestion or Heartburn., Disp: , Rfl:     amLODIPine (NORVASC) 2.5 MG tablet, Take 1 tablet by mouth once daily, Disp: 90 tablet, Rfl: 0    Calcium Carbonate Antacid (MAALOX PO), Take  by mouth Daily As Needed., Disp: , Rfl:     clobetasol (TEMOVATE) 0.05 % cream, Apply 1 application  topically to the appropriate area as directed 2 (Two) Times a Day., Disp: , Rfl:     clotrimazole-betamethasone (Lotrisone) 1-0.05 % cream, Apply 1 application topically to the appropriate area as directed 2 (Two) Times a Day., Disp: 30 g, Rfl: 1    famotidine (PEPCID) 40 MG tablet, Take 1 tablet by mouth 2 (Two) Times a Day. With lunch and at bedtime (Patient taking differently: Take 1 tablet by mouth Daily. With lunch and at bedtime), Disp: 180 tablet, Rfl: 3    lisinopril (PRINIVIL,ZESTRIL) 30 MG  tablet, Take 1 tablet by mouth twice daily (Patient taking differently: Take 1 tablet by mouth Daily.), Disp: 180 tablet, Rfl: 0    Multiple Vitamins-Minerals (MULTI-VITAMIN GUMMIES PO), Take 2 each by mouth Daily., Disp: , Rfl:     pantoprazole (PROTONIX) 40 MG EC tablet, Take 1 tablet by mouth twice daily (Patient taking differently: 1 tablet Daily.), Disp: 60 tablet, Rfl: 5    PARoxetine (PAXIL) 40 MG tablet, Take 1 tablet by mouth Every Morning., Disp: 30 tablet, Rfl: 5    polyethylene glycol (MIRALAX) packet, Take 17 g by mouth 2 (Two) Times a Day., Disp: , Rfl:     Probiotic Product (PROBIOTIC DAILY) capsule, Once daily, Disp: , Rfl:     SUMAtriptan (IMITREX) 100 MG tablet, Take 1 tablet by mouth 1 (One) Time As Needed for Migraine for up to 1 dose. Take one tablet at onset of headache. May repeat dose one time in 2 hours. (Patient taking differently: Take 1 tablet by mouth As Needed for Migraine. Take one tablet at onset of headache. May repeat dose one time in 2 hours.), Disp: 27 tablet, Rfl: 3    vitamin B-12 (CYANOCOBALAMIN) 2500 MCG sublingual tablet tablet, Place 2,500 mcg under the tongue Daily., Disp: , Rfl:     zolpidem (AMBIEN) 10 MG tablet, Take 1 tablet by mouth Every Night., Disp: 90 tablet, Rfl: 0    amitriptyline (ELAVIL) 10 MG tablet, Take 1 tablet by mouth Every Night. (Patient not taking: Reported on 9/5/2023), Disp: 30 tablet, Rfl: 2    promethazine (PHENERGAN) 25 MG suppository, Insert 1 suppository into the rectum Every 6 (Six) Hours As Needed for Nausea or Vomiting. (Patient not taking: Reported on 9/5/2023), Disp: 30 suppository, Rfl: 0    promethazine (PHENERGAN) 25 MG tablet, Take 1 tablet by mouth Every 8 (Eight) Hours As Needed for Nausea or Vomiting (Do not use at the same time as Phenergan suppositories). (Patient not taking: Reported on 9/5/2023), Disp: 30 tablet, Rfl: 0    sucralfate (CARAFATE) 1 g tablet, Take 1 tablet by mouth 4 (Four) Times a Day. (Patient not taking: Reported  "on 9/5/2023), Disp: 120 tablet, Rfl: 11    ALLERGIES  Hydrocodone and Sulfa antibiotics    VITALS  Vitals:    09/05/23 1431   BP: 118/80   BP Location: Left arm   Patient Position: Sitting   Cuff Size: Adult   Weight: 42.2 kg (93 lb)   Height: 154.9 cm (61\")       PHYSICAL EXAM  Debilities/Disabilities Identified: None  Emotional Behavior: Appropriate  Wt Readings from Last 3 Encounters:   09/05/23 42.2 kg (93 lb)   08/24/23 42.8 kg (94 lb 6.4 oz)   06/19/23 42.7 kg (94 lb 3.2 oz)     Ht Readings from Last 1 Encounters:   09/05/23 154.9 cm (61\")     Body mass index is 17.57 kg/m².  Physical Exam  Constitutional:       General: She is not in acute distress.     Appearance: Normal appearance. She is not ill-appearing.   HENT:      Head: Normocephalic and atraumatic.      Mouth/Throat:      Mouth: Mucous membranes are moist.      Pharynx: No posterior oropharyngeal erythema.   Eyes:      General: No scleral icterus.  Cardiovascular:      Rate and Rhythm: Normal rate and regular rhythm.      Heart sounds: Normal heart sounds.   Pulmonary:      Effort: Pulmonary effort is normal.      Breath sounds: Normal breath sounds.   Abdominal:      General: Abdomen is flat. Bowel sounds are normal. There is no distension.      Palpations: Abdomen is soft. There is no mass.      Tenderness: There is generalized no abdominal tenderness. There is no guarding or rebound. Negative signs include Collins's sign.      Hernia: No hernia is present.   Musculoskeletal:      Cervical back: Neck supple.   Skin:     General: Skin is warm.      Capillary Refill: Capillary refill takes less than 2 seconds.   Neurological:      General: No focal deficit present.      Mental Status: She is alert and oriented to person, place, and time.   Psychiatric:         Mood and Affect: Mood normal.         Behavior: Behavior normal.         Thought Content: Thought content normal.         Judgment: Judgment normal.       CLINICAL DATA REVIEWED   reviewed " previous lab results and integrated with today's visit, reviewed notes from other physicians and/or last GI encounter, reviewed previous endoscopy results and available photos, reviewed surgical pathology results from previous biopsies    ASSESSMENT  Diagnoses and all orders for this visit:    Goodwin's esophagus without dysplasia    Chronic constipation    Other orders  -     Calcium Carbonate Antacid (MAALOX PO); Take  by mouth Daily As Needed.          PLAN    Dr. Child this week for jose, pt to call if would like to try to return to  since CRS at [facility] is too far  Test for sucrase isomaltase deficiency, test given  Trial trulance, call if works and will attempt PAPA    Return in about 4 months (around 1/5/2024).    I have discussed the above plan with the patient.  They verbalize understanding and are in agreement with the plan.  They have been advised to contact the office for any questions, concerns, or changes related to their health.

## 2023-09-07 ENCOUNTER — OFFICE VISIT (OUTPATIENT)
Dept: SURGERY | Facility: CLINIC | Age: 68
End: 2023-09-07
Payer: MEDICARE

## 2023-09-07 VITALS
WEIGHT: 94.8 LBS | SYSTOLIC BLOOD PRESSURE: 115 MMHG | HEIGHT: 61 IN | DIASTOLIC BLOOD PRESSURE: 72 MMHG | BODY MASS INDEX: 17.9 KG/M2

## 2023-09-07 DIAGNOSIS — R10.31 RIGHT LOWER QUADRANT ABDOMINAL PAIN: Primary | ICD-10-CM

## 2023-09-07 DIAGNOSIS — K59.9 COLONIC INERTIA: ICD-10-CM

## 2023-09-07 PROCEDURE — 3078F DIAST BP <80 MM HG: CPT | Performed by: SURGERY

## 2023-09-07 PROCEDURE — 1159F MED LIST DOCD IN RCRD: CPT | Performed by: SURGERY

## 2023-09-07 PROCEDURE — 3074F SYST BP LT 130 MM HG: CPT | Performed by: SURGERY

## 2023-09-07 PROCEDURE — 1160F RVW MEDS BY RX/DR IN RCRD: CPT | Performed by: SURGERY

## 2023-09-07 PROCEDURE — 99214 OFFICE O/P EST MOD 30 MIN: CPT | Performed by: SURGERY

## 2023-09-07 NOTE — LETTER
September 7, 2023     Ildefonso Dubois MD     Patient: Martina Hardwick   YOB: 1955   Date of Visit: 9/7/2023     Dear Ildefonso Dubois MD:       Thank you for referring Martina Hardwick to me for evaluation. Below are the relevant portions of my assessment and plan of care.    If you have questions, please do not hesitate to call me. I look forward to following Martina along with you.         Sincerely,        Shahzad Child Jr., MD        CC:   No Recipients    Shahzad Child Jr., MD  09/07/23 1745  Sign when Signing Visit  Subjective  Martina Hardwick is a 67 y.o. female who returns to the office in follow-up of her chronic abdominal pain.    Abdominal Pain  Associated symptoms include constipation. Pertinent negatives include no diarrhea, fever, nausea or vomiting.      The patient has had chronic abdominal pain that started as a spasm in her right mid and upper abdomen and radiates toward the right lower quadrant.  She has a history of a colon obstruction related to endometriosis that required a sigmoid colon resection and a colostomy.  She had surgery a week or 2 later for a small bowel obstruction and recovered.  She then underwent a colostomy takedown.  She developed a stricture of her anastomosis that required a colonoscopic dilatation.  She had an attempted laparoscopic cholecystectomy that was converted to an open cholecystectomy in 2018.  About a year and a half later she began having her chronic abdominal pain and she attributes it to adhesions.  Her chronic abdominal pain has been persistent for more than 2 years and she has had extensive evaluation here as well as [facility].     Her appetite is decreased and she attributes this to her pain.  She has lost weight.  She is not having nausea and vomiting.  She has a bowel movement daily but it is often very small.    She had a sits marker study on 10/4/2021 that showed colonic inertia.  She had a small bowel follow-through on  10/7/2021 that was essentially normal.  She had a CT scan of the abdomen and pelvis on 1/14/2023 that was normal.  She had a colonoscopy in 2018 with no significant abnormalities.    She was initiated on treatment for chronic constipation including MiraLAX which did not improve her symptoms.  She was attempted to be treated with Movantik but her insurance did not cover it and it was too expensive.    She was seen by the [facility] and an ileostomy was discussed prior to proceeding with a total abdominal colectomy for inertia.    She was seen in our office on 1/24/2023 at which time she was advised that adhesions were not likely to be the full answer for her symptoms.    She now returns to the office with what she describes as worsening pain.  She develops intense pain that is primarily in the right lower quadrant.  At times she will also get distended and states that her abdomen is firm.    Review of Systems   Constitutional:  Negative for fatigue and fever.   Respiratory:  Negative for chest tightness and shortness of breath.    Cardiovascular:  Negative for chest pain and palpitations.   Gastrointestinal:  Positive for abdominal pain and constipation. Negative for blood in stool, diarrhea, nausea and vomiting.   Past Medical History:   Diagnosis Date   • Arthritis    • Autoantibody titer positive    • Bloating    • Cataract    • Cholelithiasis     Removed 8/17/2018   • Chronic abdominal pain    • Chronic constipation    • Encounter for preventive health examination    • Endometriosis    • Gastritis    • Gastrointestinal hypomotility    • GERD (gastroesophageal reflux disease)    • Headache, tension-type    • Hypertension    • Hyponatremia    • Insomnia    • Intestinal autonomic neuropathy    • Irritable bowel syndrome    • Migraine    • Mixed anxiety and depressive disorder    • Moderate malnutrition    • Noninfectious gastroenteritis    • OP (osteoporosis)    • Osteopenia    • Osteoporosis    • PONV  (postoperative nausea and vomiting) 08/17/2018   • Poor sleep    • Psoriasis    • Seasonal allergies    • Visceral hyperalgesia      Past Surgical History:   Procedure Laterality Date   • APPENDECTOMY     • CHOLECYSTECTOMY N/A 8/17/2018    Procedure: CHOLECYSTECTOMY LAPAROSCOPIC converted to open procedure;  Surgeon: Hernan Hinds MD;  Location: [facility] OR;  Service: General   • COLON SURGERY     • COLONOSCOPY     • COLONOSCOPY N/A 12/12/2018    Procedure: COLONOSCOPY;  Surgeon: Darien Ruff MD;  Location: [facility] OR;  Service: Gastroenterology   • DIAGNOSTIC LAPAROSCOPY  1990   • DILATATION AND CURETTAGE  1985   • ENDOSCOPY N/A 5/13/2016    Procedure: ESOPHAGOGASTRODUODENOSCOPY ;  Surgeon: Darien Ruff MD;  Location: [facility] OR;  Service:    • ENDOSCOPY N/A 5/1/2023    Procedure: ESOPHAGOGASTRODUODENOSCOPY WITH BIOPSY;  Surgeon: Darien Ruff MD;  Location: [facility] OR;  Service: Gastroenterology;  Laterality: N/A;  Mild Thrush; Gastritis; Esophagitis- thrush and reflux; Biopsies- duodenal, gastric, esophagus   • HYSTERECTOMY     • REVISION / TAKEDOWN COLOSTOMY       Family History   Problem Relation Age of Onset   • Hyperlipidemia Mother    • Macular degeneration Mother    • Hearing loss Mother    • Arthritis Mother    • Vision loss Mother    • Hypertension Mother    • Heart disease Father         Had 5 bypass surgery   • Hyperlipidemia Father    • Cancer Father         Prostate and bladder   • Depression Father    • Stroke Father    • Hypertension Father    • Depression Sister    • COPD Sister    • Hyperlipidemia Sister    • Arthritis Sister    • Hyperlipidemia Brother    • Depression Brother    • Kidney disease Brother    • Arthritis Brother    • Hypertension Brother    • Breast cancer Maternal Aunt    • Breast cancer Cousin    • Breast cancer Cousin    • Colon cancer Neg Hx    • Colon polyps Neg Hx      Social History     Socioeconomic History   • Marital status:     Tobacco Use   • Smoking status: Never     Passive exposure: Never   • Smokeless tobacco: Never   Vaping Use   • Vaping Use: Never used   Substance and Sexual Activity   • Alcohol use: Not Currently     Comment: rare   • Drug use: No   • Sexual activity: Not Currently     Partners: Male     Birth control/protection: Post-menopausal, Hysterectomy       Objective  Physical Exam  Constitutional:       Appearance: Normal appearance. She is well-developed. She is not toxic-appearing.   Eyes:      General: No scleral icterus.  Pulmonary:      Effort: Pulmonary effort is normal. No respiratory distress.   Abdominal:      Palpations: Abdomen is soft.      Tenderness: There is no abdominal tenderness.   Skin:     General: Skin is warm and dry.   Neurological:      Mental Status: She is alert and oriented to person, place, and time.   Psychiatric:         Behavior: Behavior normal.         Thought Content: Thought content normal.         Judgment: Judgment normal.       Assessment & Plan    1.  Abdominal pain: The patient has chronic abdominal pain that is primarily located in the right lower quadrant.  She has had an extensive evaluation that does show colonic inertia.  I suspect her pain is related to the colonic inertia with distention of her cecum due to a buildup of pressure.  This was discussed at length with her.  She may benefit from a total abdominal colectomy but would first likely require an ileostomy to confirm that her symptoms resolve once the colon is bypassed.  She is willing to consider this approach.  I explained to her that I do not operate for colonic inertia and will refer her to my partner, Dr. Salinas, who has fellowship training and colorectal surgery.

## 2023-09-07 NOTE — PROGRESS NOTES
Subjective   Martina Hardwick is a 67 y.o. female who returns to the office in follow-up of her chronic abdominal pain.    History of present illness:  The patient has had chronic abdominal pain that started as a spasm in her right mid and upper abdomen and radiates toward the right lower quadrant.  She has a history of a colon obstruction related to endometriosis that required a sigmoid colon resection and a colostomy.  She had surgery a week or 2 later for a small bowel obstruction and recovered.  She then underwent a colostomy takedown.  She developed a stricture of her anastomosis that required a colonoscopic dilatation.  She had an attempted laparoscopic cholecystectomy that was converted to an open cholecystectomy in 2018.  About a year and a half later she began having her chronic abdominal pain and she attributes it to adhesions.  Her chronic abdominal pain has been persistent for more than 2 years and she has had extensive evaluation here as well as at the [facility].     Her appetite is decreased and she attributes this to her pain.  She has lost weight.  She is not having nausea and vomiting.  She has a bowel movement daily but it is often very small.    She had a sits marker study on 10/4/2021 that showed colonic inertia.  She had a small bowel follow-through on 10/7/2021 that was essentially normal.  She had a CT scan of the abdomen and pelvis on 1/14/2023 that was normal.  She had a colonoscopy in 2018 with no significant abnormalities.    She was initiated on treatment for chronic constipation including MiraLAX which did not improve her symptoms.  She was attempted to be treated with Movantik but her insurance did not cover it and it was too expensive.    She was seen by the [facility] and an ileostomy was discussed prior to proceeding with a total abdominal colectomy for inertia.    She was seen in our office on 1/24/2023 at which time she was advised that adhesions were not likely to be the  full answer for her symptoms.    She now returns to the office with what she describes as worsening pain.  She develops intense pain that is primarily in the right lower quadrant.  At times she will also get distended and states that her abdomen is firm.    Review of Systems   Constitutional:  Negative for fatigue and fever.   Respiratory:  Negative for chest tightness and shortness of breath.    Cardiovascular:  Negative for chest pain and palpitations.   Gastrointestinal:  Positive for abdominal pain and constipation. Negative for blood in stool, diarrhea, nausea and vomiting.   Past Medical History:   Diagnosis Date    Arthritis     Autoantibody titer positive     Bloating     Cataract     Cholelithiasis     Removed 8/17/2018    Chronic abdominal pain     Chronic constipation     Encounter for preventive health examination     Endometriosis     Gastritis     Gastrointestinal hypomotility     GERD (gastroesophageal reflux disease)     Headache, tension-type     Hypertension     Hyponatremia     Insomnia     Intestinal autonomic neuropathy     Irritable bowel syndrome     Migraine     Mixed anxiety and depressive disorder     Moderate malnutrition     Noninfectious gastroenteritis     OP (osteoporosis)     Osteopenia     Osteoporosis     PONV (postoperative nausea and vomiting) 08/17/2018    Poor sleep     Psoriasis     Seasonal allergies     Visceral hyperalgesia      Past Surgical History:   Procedure Laterality Date    APPENDECTOMY      CHOLECYSTECTOMY N/A 8/17/2018    Procedure: CHOLECYSTECTOMY LAPAROSCOPIC converted to open procedure;  Surgeon: Hernan Hinds MD;  Location: [facility] OR;  Service: General    COLON SURGERY      COLONOSCOPY      COLONOSCOPY N/A 12/12/2018    Procedure: COLONOSCOPY;  Surgeon: Darien Ruff MD;  Location: [facility] OR;  Service: Gastroenterology    DIAGNOSTIC LAPAROSCOPY  1990    DILATATION AND CURETTAGE  1985    ENDOSCOPY N/A 5/13/2016    Procedure:  ESOPHAGOGASTRODUODENOSCOPY ;  Surgeon: Darien Ruff MD;  Location: [facility] OR;  Service:     ENDOSCOPY N/A 5/1/2023    Procedure: ESOPHAGOGASTRODUODENOSCOPY WITH BIOPSY;  Surgeon: Darien Ruff MD;  Location:  [facility] OR;  Service: Gastroenterology;  Laterality: N/A;  Mild Thrush; Gastritis; Esophagitis- thrush and reflux; Biopsies- duodenal, gastric, esophagus    HYSTERECTOMY      REVISION / TAKEDOWN COLOSTOMY       Family History   Problem Relation Age of Onset    Hyperlipidemia Mother     Macular degeneration Mother     Hearing loss Mother     Arthritis Mother     Vision loss Mother     Hypertension Mother     Heart disease Father         Had 5 bypass surgery    Hyperlipidemia Father     Cancer Father         Prostate and bladder    Depression Father     Stroke Father     Hypertension Father     Depression Sister     COPD Sister     Hyperlipidemia Sister     Arthritis Sister     Hyperlipidemia Brother     Depression Brother     Kidney disease Brother     Arthritis Brother     Hypertension Brother     Breast cancer Maternal Aunt     Breast cancer Cousin     Breast cancer Cousin     Colon cancer Neg Hx     Colon polyps Neg Hx      Social History     Socioeconomic History    Marital status:    Tobacco Use    Smoking status: Never     Passive exposure: Never    Smokeless tobacco: Never   Vaping Use    Vaping Use: Never used   Substance and Sexual Activity    Alcohol use: Not Currently     Comment: rare    Drug use: No    Sexual activity: Not Currently     Partners: Male     Birth control/protection: Post-menopausal, Hysterectomy       Objective   Physical Exam  Constitutional:       Appearance: Normal appearance. She is well-developed. She is not toxic-appearing.   Eyes:      General: No scleral icterus.  Pulmonary:      Effort: Pulmonary effort is normal. No respiratory distress.   Abdominal:      Palpations: Abdomen is soft.      Tenderness: There is no abdominal tenderness.    Skin:     General: Skin is warm and dry.   Neurological:      Mental Status: She is alert and oriented to person, place, and time.   Psychiatric:         Behavior: Behavior normal.         Thought Content: Thought content normal.         Judgment: Judgment normal.       Assessment & Plan     1.  Abdominal pain: The patient has chronic abdominal pain that is primarily located in the right lower quadrant.  She has had an extensive evaluation that does show colonic inertia.  I suspect her pain is related to the colonic inertia with distention of her cecum due to a buildup of pressure.  This was discussed at length with her.  She may benefit from a total abdominal colectomy but would first likely require an ileostomy to confirm that her symptoms resolve once the colon is bypassed.  She is willing to consider this approach.  I explained to her that I do not operate for colonic inertia and will refer her to my partner, Dr. Salinas, who has fellowship training and colorectal surgery.

## 2023-09-08 ENCOUNTER — TELEPHONE (OUTPATIENT)
Dept: SURGERY | Facility: CLINIC | Age: 68
End: 2023-09-08
Payer: MEDICARE

## 2023-09-08 NOTE — TELEPHONE ENCOUNTER
----- Message from Shahzad Child Jr., MD sent at 9/7/2023  5:45 PM EDT -----  Regarding: Appointment  Please make an appointment for this patient to see Dr. Salinas.  Thanks

## 2023-09-13 DIAGNOSIS — R74.8 ELEVATED LIVER ENZYMES: Primary | ICD-10-CM

## 2023-09-13 DIAGNOSIS — F41.0 PANIC DISORDER: ICD-10-CM

## 2023-09-13 DIAGNOSIS — D72.829 LEUKOCYTOSIS, UNSPECIFIED TYPE: ICD-10-CM

## 2023-09-13 RX ORDER — ALPRAZOLAM 0.5 MG/1
TABLET ORAL
Qty: 90 TABLET | Refills: 2 | Status: SHIPPED | OUTPATIENT
Start: 2023-09-13

## 2023-09-13 NOTE — TELEPHONE ENCOUNTER
Rx Refill Note  Requested Prescriptions     Pending Prescriptions Disp Refills    ALPRAZolam (XANAX) 0.5 MG tablet [Pharmacy Med Name: ALPRAZolam 0.5 MG Oral Tablet] 90 tablet 0     Sig: TAKE 1 TO 2 TABLETS BY MOUTH TWICE DAILY AS NEEDED FOR ANXIETY      Last office visit with prescribing clinician: 8/24/2023   Last telemedicine visit with prescribing clinician: Visit date not found   Next office visit with prescribing clinician: 10/16/2023                         Would you like a call back once the refill request has been completed: [] Yes [] No    If the office needs to give you a call back, can they leave a voicemail: [] Yes [] No    Bailey Morse MA  09/13/23, 09:50 EDT

## 2023-09-14 ENCOUNTER — OFFICE VISIT (OUTPATIENT)
Dept: SURGERY | Facility: CLINIC | Age: 68
End: 2023-09-14
Payer: MEDICARE

## 2023-09-14 ENCOUNTER — LAB (OUTPATIENT)
Dept: LAB | Facility: HOSPITAL | Age: 68
End: 2023-09-14
Payer: MEDICARE

## 2023-09-14 ENCOUNTER — TELEPHONE (OUTPATIENT)
Dept: GASTROENTEROLOGY | Facility: CLINIC | Age: 68
End: 2023-09-14
Payer: MEDICARE

## 2023-09-14 VITALS
BODY MASS INDEX: 17.75 KG/M2 | HEIGHT: 61 IN | WEIGHT: 94 LBS | DIASTOLIC BLOOD PRESSURE: 79 MMHG | SYSTOLIC BLOOD PRESSURE: 115 MMHG

## 2023-09-14 DIAGNOSIS — D72.829 LEUKOCYTOSIS, UNSPECIFIED TYPE: ICD-10-CM

## 2023-09-14 DIAGNOSIS — K59.09 CHRONIC CONSTIPATION: Primary | ICD-10-CM

## 2023-09-14 DIAGNOSIS — R74.8 ELEVATED LIVER ENZYMES: ICD-10-CM

## 2023-09-14 DIAGNOSIS — Z12.11 COLON CANCER SCREENING: ICD-10-CM

## 2023-09-14 DIAGNOSIS — Z83.71 FAMILY HISTORY OF COLONIC POLYPS: ICD-10-CM

## 2023-09-14 LAB
ALBUMIN SERPL-MCNC: 4.2 G/DL (ref 3.5–5.2)
ALBUMIN/GLOB SERPL: 1.4 G/DL
ALP SERPL-CCNC: 129 U/L (ref 39–117)
ALT SERPL W P-5'-P-CCNC: 35 U/L (ref 1–33)
ANION GAP SERPL CALCULATED.3IONS-SCNC: 10.4 MMOL/L (ref 5–15)
AST SERPL-CCNC: 33 U/L (ref 1–32)
BASOPHILS # BLD AUTO: 0.02 10*3/MM3 (ref 0–0.2)
BASOPHILS NFR BLD AUTO: 0.4 % (ref 0–1.5)
BILIRUB SERPL-MCNC: 0.8 MG/DL (ref 0–1.2)
BUN SERPL-MCNC: 12 MG/DL (ref 8–23)
BUN/CREAT SERPL: 14.1 (ref 7–25)
CALCIUM SPEC-SCNC: 9.9 MG/DL (ref 8.6–10.5)
CHLORIDE SERPL-SCNC: 93 MMOL/L (ref 98–107)
CO2 SERPL-SCNC: 27.6 MMOL/L (ref 22–29)
CREAT SERPL-MCNC: 0.85 MG/DL (ref 0.57–1)
DEPRECATED RDW RBC AUTO: 38.6 FL (ref 37–54)
EGFRCR SERPLBLD CKD-EPI 2021: 75.2 ML/MIN/1.73
EOSINOPHIL # BLD AUTO: 0.01 10*3/MM3 (ref 0–0.4)
EOSINOPHIL NFR BLD AUTO: 0.2 % (ref 0.3–6.2)
ERYTHROCYTE [DISTWIDTH] IN BLOOD BY AUTOMATED COUNT: 11.2 % (ref 12.3–15.4)
GLOBULIN UR ELPH-MCNC: 2.9 GM/DL
GLUCOSE SERPL-MCNC: 91 MG/DL (ref 65–99)
HCT VFR BLD AUTO: 37.7 % (ref 34–46.6)
HGB BLD-MCNC: 12.8 G/DL (ref 12–15.9)
IMM GRANULOCYTES # BLD AUTO: 0.01 10*3/MM3 (ref 0–0.05)
IMM GRANULOCYTES NFR BLD AUTO: 0.2 % (ref 0–0.5)
LYMPHOCYTES # BLD AUTO: 0.95 10*3/MM3 (ref 0.7–3.1)
LYMPHOCYTES NFR BLD AUTO: 17.2 % (ref 19.6–45.3)
MCH RBC QN AUTO: 31.9 PG (ref 26.6–33)
MCHC RBC AUTO-ENTMCNC: 34 G/DL (ref 31.5–35.7)
MCV RBC AUTO: 94 FL (ref 79–97)
MONOCYTES # BLD AUTO: 0.41 10*3/MM3 (ref 0.1–0.9)
MONOCYTES NFR BLD AUTO: 7.4 % (ref 5–12)
NEUTROPHILS NFR BLD AUTO: 4.13 10*3/MM3 (ref 1.7–7)
NEUTROPHILS NFR BLD AUTO: 74.6 % (ref 42.7–76)
NRBC BLD AUTO-RTO: 0 /100 WBC (ref 0–0.2)
PLATELET # BLD AUTO: 208 10*3/MM3 (ref 140–450)
PMV BLD AUTO: 10.8 FL (ref 6–12)
POTASSIUM SERPL-SCNC: 4.4 MMOL/L (ref 3.5–5.2)
PROT SERPL-MCNC: 7.1 G/DL (ref 6–8.5)
RBC # BLD AUTO: 4.01 10*6/MM3 (ref 3.77–5.28)
SODIUM SERPL-SCNC: 131 MMOL/L (ref 136–145)
WBC NRBC COR # BLD: 5.53 10*3/MM3 (ref 3.4–10.8)

## 2023-09-14 PROCEDURE — 36415 COLL VENOUS BLD VENIPUNCTURE: CPT

## 2023-09-14 PROCEDURE — 85025 COMPLETE CBC W/AUTO DIFF WBC: CPT

## 2023-09-14 PROCEDURE — 80053 COMPREHEN METABOLIC PANEL: CPT

## 2023-09-14 NOTE — PROGRESS NOTES
Colorectal & General Surgery  Consultation    Patient: Martina Hardwick  YOB: 1955  MRN: [id number]      Assessment  Martina Hardwick is a 67 y.o. female with a lengthy abdominal surgical history including colonic resection who presents with chronic abdominal pain likely due to chronic constipation.  I discussed with her the spectrum of treatment for chronic constipation, starting with aggressive medical management and utilizing surgery as a last resort.  In her situation, she has already tried a very aggressive medical regimen over the past few years, including fiber, MiraLAX, Linzess, and others.  Unfortunately, her pain has persisted despite her ability to produce an average of 1 bowel movement per day.  I do believe that there is an element of colonic dysmotility or colonic inertia as shown by the sits marker study performed in 2021.  She has a normal gastric emptying study and a normal small bowel follow-through, which suggest that this is not pan intestinal dysmotility.  I also do not suspect that she has obstructive defecation or pelvic floor dysfunction that contributes to this.    We also discussed the aggressive surgical therapy that we offer for refractory chronic constipation.  This includes major operations such as total abdominal colectomy with end ileostomy or potentially total abdominal colectomy with ileal anal pouch creation.  We discussed the risk of these surgeries, including anastomotic leak, fecal incontinence, frequent bowel movements, and the risk associated with anesthesia and major operations.  She has some hesitancy towards having a stoma again, mainly due to the mental and emotional anguish that she experienced with her first stoma.  We discussed that we do not make any decisions regarding that today and that at this time, we will proceed with further testing to confirm that she has colonic inertia and no other colonic pathology that could be contributing to this.    We will plan  to perform a radiopaque sits marker study since it has been more than 2 years since her last study and also perform a colonoscopy as she is due for screening colonoscopy anyway.    I will follow-up with her in the office after these tests are performed.      I discussed her clinical history and potential future steps with Dr. Shahzad Child in person.    Plan  Radiopaque sits marker study  Colonoscopy  Follow-up in the office after completion of studies    Referring Provider: Shahzad Child MD    Reason for Consultation: Colonic inertia    History of Present Illness   Martina Hardwick is a 67 y.o. female who I am seeing in consultation regarding colonic inertia at the request of Dr. Shahzad Child.     She has a complicated and extensive abdominal surgical history.  She previously had a sigmoid colectomy with end colostomy creation for a colonic obstruction secondary to endometriosis.  This was complicated by early postoperative bowel obstruction that required relaparotomy.  She subsequently underwent closure of her colostomy that was complicated by anastomotic stricture requiring endoscopic dilation.  Additionally, she has had an open cholecystectomy.    Her biggest complaint in the office today is pain.  She states that she has right lower quadrant abdominal pain that gradually worsens throughout the day.  The only thing that relieves it is falling asleep and starting over the next morning.  It is exacerbated by eating.  It is not relieved by having a bowel movement.  She describes it as constant, dull, achy, and at times sharp.  She states that she has lost more than 30 pounds over the last 18 months due to the pain as it inhibits her ability to eat comfortably.  She has tried multiple different methods for pain control, none of which have been successful.  She currently takes 2 scoops of MiraLAX in the morning and 2 scoops of MiraLAX in the evening.  She also takes 2 scoops of Benefiber powder every morning.  She  has 1 bowel movement per day, sometimes liquidy sometimes very hard.  She sits on the toilet for an average of 10 to 20 minutes and does not feel that she has any difficulty evacuating her bowels on an average day.  She does complain of tenesmus and usually feels that she is able to completely empty her rectum.    Most recent colonoscopy: 2018    Past Medical History   Past Medical History:   Diagnosis Date    Arthritis     Autoantibody titer positive     Bloating     Cataract     Cholelithiasis     Removed 8/17/2018    Chronic abdominal pain     Chronic constipation     Encounter for preventive health examination     Endometriosis     Gastritis     Gastrointestinal hypomotility     GERD (gastroesophageal reflux disease)     Headache, tension-type     Hypertension     Hyponatremia     Insomnia     Intestinal autonomic neuropathy     Irritable bowel syndrome     Migraine     Mixed anxiety and depressive disorder     Moderate malnutrition     Noninfectious gastroenteritis     OP (osteoporosis)     Osteopenia     Osteoporosis     PONV (postoperative nausea and vomiting) 08/17/2018    Poor sleep     Psoriasis     Seasonal allergies     Visceral hyperalgesia         Past Surgical History   Past Surgical History:   Procedure Laterality Date    APPENDECTOMY      CHOLECYSTECTOMY N/A 8/17/2018    Procedure: CHOLECYSTECTOMY LAPAROSCOPIC converted to open procedure;  Surgeon: Hernan Hinds MD;  Location: [facility] OR;  Service: General    COLON SURGERY      COLONOSCOPY      COLONOSCOPY N/A 12/12/2018    Procedure: COLONOSCOPY;  Surgeon: Darien Ruff MD;  Location: [facility] OR;  Service: Gastroenterology    DIAGNOSTIC LAPAROSCOPY  1990    DILATATION AND CURETTAGE  1985    ENDOSCOPY N/A 5/13/2016    Procedure: ESOPHAGOGASTRODUODENOSCOPY ;  Surgeon: Darien Ruff MD;  Location: [facility] OR;  Service:     ENDOSCOPY N/A 5/1/2023    Procedure: ESOPHAGOGASTRODUODENOSCOPY WITH BIOPSY;  Surgeon: Speedy  Darien Wolf MD;  Location: [facility] OR;  Service: Gastroenterology;  Laterality: N/A;  Mild Thrush; Gastritis; Esophagitis- thrush and reflux; Biopsies- duodenal, gastric, esophagus    HYSTERECTOMY      REVISION / TAKEDOWN COLOSTOMY         Social History  Social History     Socioeconomic History    Marital status:    Tobacco Use    Smoking status: Never     Passive exposure: Never    Smokeless tobacco: Never   Vaping Use    Vaping Use: Never used   Substance and Sexual Activity    Alcohol use: Not Currently     Comment: rare    Drug use: No    Sexual activity: Not Currently     Partners: Male     Birth control/protection: Post-menopausal, Hysterectomy       Family History  Family History   Problem Relation Age of Onset    Hyperlipidemia Mother     Macular degeneration Mother     Hearing loss Mother     Arthritis Mother     Vision loss Mother     Hypertension Mother     Heart disease Father         Had 5 bypass surgery    Hyperlipidemia Father     Cancer Father         Prostate and bladder    Depression Father     Stroke Father     Hypertension Father     Depression Sister     COPD Sister     Hyperlipidemia Sister     Arthritis Sister     Hyperlipidemia Brother     Depression Brother     Kidney disease Brother     Arthritis Brother     Hypertension Brother     Breast cancer Maternal Aunt     Breast cancer Cousin     Breast cancer Cousin     Colon cancer Neg Hx     Colon polyps Neg Hx        Colorectal cancer family history polyps but no cancer    Review of Systems  Negative except as documented in the HPI.     Allergies  Allergies   Allergen Reactions    Hydrocodone Nausea And Vomiting    Sulfa Antibiotics Nausea And Vomiting       Medications    Current Outpatient Medications:     ALPRAZolam (XANAX) 0.5 MG tablet, TAKE 1 TO 2 TABLETS BY MOUTH TWICE DAILY AS NEEDED FOR ANXIETY, Disp: 90 tablet, Rfl: 2    aluminum-magnesium hydroxide-simethicone (MAALOX MAX) 400-400-40 MG/5ML suspension, Take  by mouth As  Needed for Indigestion or Heartburn., Disp: , Rfl:     amitriptyline (ELAVIL) 10 MG tablet, Take 1 tablet by mouth Every Night., Disp: 30 tablet, Rfl: 2    amLODIPine (NORVASC) 2.5 MG tablet, Take 1 tablet by mouth once daily, Disp: 90 tablet, Rfl: 0    Calcium Carbonate Antacid (MAALOX PO), Take  by mouth Daily As Needed., Disp: , Rfl:     clobetasol (TEMOVATE) 0.05 % cream, Apply 1 application  topically to the appropriate area as directed 2 (Two) Times a Day., Disp: , Rfl:     clotrimazole-betamethasone (Lotrisone) 1-0.05 % cream, Apply 1 application topically to the appropriate area as directed 2 (Two) Times a Day., Disp: 30 g, Rfl: 1    famotidine (PEPCID) 40 MG tablet, Take 1 tablet by mouth 2 (Two) Times a Day. With lunch and at bedtime (Patient taking differently: Take 1 tablet by mouth Daily. With lunch and at bedtime), Disp: 180 tablet, Rfl: 3    lisinopril (PRINIVIL,ZESTRIL) 30 MG tablet, Take 1 tablet by mouth twice daily (Patient taking differently: Take 1 tablet by mouth Daily.), Disp: 180 tablet, Rfl: 0    Multiple Vitamins-Minerals (MULTI-VITAMIN GUMMIES PO), Take 2 each by mouth Daily., Disp: , Rfl:     pantoprazole (PROTONIX) 40 MG EC tablet, Take 1 tablet by mouth twice daily (Patient taking differently: 1 tablet Daily.), Disp: 60 tablet, Rfl: 5    PARoxetine (PAXIL) 40 MG tablet, Take 1 tablet by mouth Every Morning., Disp: 30 tablet, Rfl: 5    Plecanatide (TRULANCE PO), Take  by mouth., Disp: , Rfl:     polyethylene glycol (MIRALAX) packet, Take 17 g by mouth 2 (Two) Times a Day., Disp: , Rfl:     Probiotic Product (PROBIOTIC DAILY) capsule, Once daily, Disp: , Rfl:     promethazine (PHENERGAN) 25 MG suppository, Insert 1 suppository into the rectum Every 6 (Six) Hours As Needed for Nausea or Vomiting., Disp: 30 suppository, Rfl: 0    promethazine (PHENERGAN) 25 MG tablet, Take 1 tablet by mouth Every 8 (Eight) Hours As Needed for Nausea or Vomiting (Do not use at the same time as Phenergan  suppositories)., Disp: 30 tablet, Rfl: 0    sucralfate (CARAFATE) 1 g tablet, Take 1 tablet by mouth 4 (Four) Times a Day., Disp: 120 tablet, Rfl: 11    SUMAtriptan (IMITREX) 100 MG tablet, Take 1 tablet by mouth 1 (One) Time As Needed for Migraine for up to 1 dose. Take one tablet at onset of headache. May repeat dose one time in 2 hours. (Patient taking differently: Take 1 tablet by mouth As Needed for Migraine. Take one tablet at onset of headache. May repeat dose one time in 2 hours.), Disp: 27 tablet, Rfl: 3    vitamin B-12 (CYANOCOBALAMIN) 2500 MCG sublingual tablet tablet, Place 2,500 mcg under the tongue Daily., Disp: , Rfl:     zolpidem (AMBIEN) 10 MG tablet, Take 1 tablet by mouth Every Night., Disp: 90 tablet, Rfl: 0    Vital Signs  There were no vitals filed for this visit.     Physical Exam  Constitutional: Resting comfortably, no acute distress  Neck: Supple, trachea midline  Respiratory: No increased work of breathing, Symmetric excursion  Cardiovascular: Well pefursed, no jugular venous distention evident   Abdominal: Well-healed midline laparotomy, right upper quadrant subcostal incision, left lower quadrant colostomy site.  Soft, non-tender, non-distended  Lymphatics: No cervical or suprascapular adenopathy  Skin: Warm, dry, no rash on visualized skin surfaces  Musculoskeletal: Symmetric strength, no obvious gross abnormalities  Psychiatric: Alert and oriented ×3, normal affect     Laboratory Results  I have personally reviewed CBC from August 24, 2023 to demonstrate leukocytosis of 16,000 with normal hemoglobin at 13.2 and normal platelets of 251.  Complete metabolic profile performed on August 24, 2023 demonstrates normal creatinine, normal potassium, calcium, abnormal sodium of 125 and abnormal chloride of 86.  TSH was normal at 1.06 on August 24, 2023.  Glucose 95.    Radiology  I have personally reviewed sits marker study from September 30, October 2, and October 4, 2021 that demonstrate  retained sits markers on the fifth day, 13 in total.  Additionally she has a small bowel follow-through from August 7, 2021 that demonstrates normal transit time of 45 minutes to the small intestine with no transition points or areas of stricture.  She also had a CT scan of her abdomen pelvis on January 14, 2023 that demonstrates significant colonic stool burden and what appears to be a slightly narrowed colorectal anastomosis with no significant proximal dilation.  Gastric emptying study performed in August and 2023 with normal emptying time.    Endoscopy  I have personally reviewed a colonoscopy report from Dr. Darien Ruff on September 4, 2013 that demonstrated a normal colon with a widely patent colorectal anastomosis with fair prep.  I have additionally reviewed a colonoscopy report from December 12, 2018 also performed to Dr. Darien Ruff that demonstrates normal colon with widely patent colorectal anastomosis.     Osvaldo Salinas MD  Colorectal & General Surgery  [facility]    [address], [address]  [place], [place], [address]  P: [phone]  F: [phone]

## 2023-09-14 NOTE — TELEPHONE ENCOUNTER
----- Message from BROOKS Alejo sent at 9/14/2023  4:01 PM EDT -----  Sucrase activity normal, ruling out that as cause of persistent symptoms. She is going to see a new surgeon, so I am hopefully they will be able to help.

## 2023-09-14 NOTE — H&P (VIEW-ONLY)
Colorectal & General Surgery  Consultation    Patient: Martina Hardwick  YOB: 1955  MRN: [id number]      Assessment  Martina Hardwick is a 67 y.o. female with a lengthy abdominal surgical history including colonic resection who presents with chronic abdominal pain likely due to chronic constipation.  I discussed with her the spectrum of treatment for chronic constipation, starting with aggressive medical management and utilizing surgery as a last resort.  In her situation, she has already tried a very aggressive medical regimen over the past few years, including fiber, MiraLAX, Linzess, and others.  Unfortunately, her pain has persisted despite her ability to produce an average of 1 bowel movement per day.  I do believe that there is an element of colonic dysmotility or colonic inertia as shown by the sits marker study performed in 2021.  She has a normal gastric emptying study and a normal small bowel follow-through, which suggest that this is not pan intestinal dysmotility.  I also do not suspect that she has obstructive defecation or pelvic floor dysfunction that contributes to this.    We also discussed the aggressive surgical therapy that we offer for refractory chronic constipation.  This includes major operations such as total abdominal colectomy with end ileostomy or potentially total abdominal colectomy with ileal anal pouch creation.  We discussed the risk of these surgeries, including anastomotic leak, fecal incontinence, frequent bowel movements, and the risk associated with anesthesia and major operations.  She has some hesitancy towards having a stoma again, mainly due to the mental and emotional anguish that she experienced with her first stoma.  We discussed that we do not make any decisions regarding that today and that at this time, we will proceed with further testing to confirm that she has colonic inertia and no other colonic pathology that could be contributing to this.    We will plan  to perform a radiopaque sits marker study since it has been more than 2 years since her last study and also perform a colonoscopy as she is due for screening colonoscopy anyway.    I will follow-up with her in the office after these tests are performed.      I discussed her clinical history and potential future steps with Dr. Shahzad Child in person.    Plan  Radiopaque sits marker study  Colonoscopy  Follow-up in the office after completion of studies    Referring Provider: Shahzad Child MD    Reason for Consultation: Colonic inertia    History of Present Illness   Martina Hardwick is a 67 y.o. female who I am seeing in consultation regarding colonic inertia at the request of Dr. Shahzad Child.     She has a complicated and extensive abdominal surgical history.  She previously had a sigmoid colectomy with end colostomy creation for a colonic obstruction secondary to endometriosis.  This was complicated by early postoperative bowel obstruction that required relaparotomy.  She subsequently underwent closure of her colostomy that was complicated by anastomotic stricture requiring endoscopic dilation.  Additionally, she has had an open cholecystectomy.    Her biggest complaint in the office today is pain.  She states that she has right lower quadrant abdominal pain that gradually worsens throughout the day.  The only thing that relieves it is falling asleep and starting over the next morning.  It is exacerbated by eating.  It is not relieved by having a bowel movement.  She describes it as constant, dull, achy, and at times sharp.  She states that she has lost more than 30 pounds over the last 18 months due to the pain as it inhibits her ability to eat comfortably.  She has tried multiple different methods for pain control, none of which have been successful.  She currently takes 2 scoops of MiraLAX in the morning and 2 scoops of MiraLAX in the evening.  She also takes 2 scoops of Benefiber powder every morning.  She  has 1 bowel movement per day, sometimes liquidy sometimes very hard.  She sits on the toilet for an average of 10 to 20 minutes and does not feel that she has any difficulty evacuating her bowels on an average day.  She does complain of tenesmus and usually feels that she is able to completely empty her rectum.    Most recent colonoscopy: 2018    Past Medical History   Past Medical History:   Diagnosis Date    Arthritis     Autoantibody titer positive     Bloating     Cataract     Cholelithiasis     Removed 8/17/2018    Chronic abdominal pain     Chronic constipation     Encounter for preventive health examination     Endometriosis     Gastritis     Gastrointestinal hypomotility     GERD (gastroesophageal reflux disease)     Headache, tension-type     Hypertension     Hyponatremia     Insomnia     Intestinal autonomic neuropathy     Irritable bowel syndrome     Migraine     Mixed anxiety and depressive disorder     Moderate malnutrition     Noninfectious gastroenteritis     OP (osteoporosis)     Osteopenia     Osteoporosis     PONV (postoperative nausea and vomiting) 08/17/2018    Poor sleep     Psoriasis     Seasonal allergies     Visceral hyperalgesia         Past Surgical History   Past Surgical History:   Procedure Laterality Date    APPENDECTOMY      CHOLECYSTECTOMY N/A 8/17/2018    Procedure: CHOLECYSTECTOMY LAPAROSCOPIC converted to open procedure;  Surgeon: Hernan Hinds MD;  Location: [facility] OR;  Service: General    COLON SURGERY      COLONOSCOPY      COLONOSCOPY N/A 12/12/2018    Procedure: COLONOSCOPY;  Surgeon: Darien Ruff MD;  Location: [facility] OR;  Service: Gastroenterology    DIAGNOSTIC LAPAROSCOPY  1990    DILATATION AND CURETTAGE  1985    ENDOSCOPY N/A 5/13/2016    Procedure: ESOPHAGOGASTRODUODENOSCOPY ;  Surgeon: Darien Ruff MD;  Location: [facility] OR;  Service:     ENDOSCOPY N/A 5/1/2023    Procedure: ESOPHAGOGASTRODUODENOSCOPY WITH BIOPSY;  Surgeon: Speedy  Darien Wolf MD;  Location: [facility] OR;  Service: Gastroenterology;  Laterality: N/A;  Mild Thrush; Gastritis; Esophagitis- thrush and reflux; Biopsies- duodenal, gastric, esophagus    HYSTERECTOMY      REVISION / TAKEDOWN COLOSTOMY         Social History  Social History     Socioeconomic History    Marital status:    Tobacco Use    Smoking status: Never     Passive exposure: Never    Smokeless tobacco: Never   Vaping Use    Vaping Use: Never used   Substance and Sexual Activity    Alcohol use: Not Currently     Comment: rare    Drug use: No    Sexual activity: Not Currently     Partners: Male     Birth control/protection: Post-menopausal, Hysterectomy       Family History  Family History   Problem Relation Age of Onset    Hyperlipidemia Mother     Macular degeneration Mother     Hearing loss Mother     Arthritis Mother     Vision loss Mother     Hypertension Mother     Heart disease Father         Had 5 bypass surgery    Hyperlipidemia Father     Cancer Father         Prostate and bladder    Depression Father     Stroke Father     Hypertension Father     Depression Sister     COPD Sister     Hyperlipidemia Sister     Arthritis Sister     Hyperlipidemia Brother     Depression Brother     Kidney disease Brother     Arthritis Brother     Hypertension Brother     Breast cancer Maternal Aunt     Breast cancer Cousin     Breast cancer Cousin     Colon cancer Neg Hx     Colon polyps Neg Hx        Colorectal cancer family history polyps but no cancer    Review of Systems  Negative except as documented in the HPI.     Allergies  Allergies   Allergen Reactions    Hydrocodone Nausea And Vomiting    Sulfa Antibiotics Nausea And Vomiting       Medications    Current Outpatient Medications:     ALPRAZolam (XANAX) 0.5 MG tablet, TAKE 1 TO 2 TABLETS BY MOUTH TWICE DAILY AS NEEDED FOR ANXIETY, Disp: 90 tablet, Rfl: 2    aluminum-magnesium hydroxide-simethicone (MAALOX MAX) 400-400-40 MG/5ML suspension, Take  by mouth As  Needed for Indigestion or Heartburn., Disp: , Rfl:     amitriptyline (ELAVIL) 10 MG tablet, Take 1 tablet by mouth Every Night., Disp: 30 tablet, Rfl: 2    amLODIPine (NORVASC) 2.5 MG tablet, Take 1 tablet by mouth once daily, Disp: 90 tablet, Rfl: 0    Calcium Carbonate Antacid (MAALOX PO), Take  by mouth Daily As Needed., Disp: , Rfl:     clobetasol (TEMOVATE) 0.05 % cream, Apply 1 application  topically to the appropriate area as directed 2 (Two) Times a Day., Disp: , Rfl:     clotrimazole-betamethasone (Lotrisone) 1-0.05 % cream, Apply 1 application topically to the appropriate area as directed 2 (Two) Times a Day., Disp: 30 g, Rfl: 1    famotidine (PEPCID) 40 MG tablet, Take 1 tablet by mouth 2 (Two) Times a Day. With lunch and at bedtime (Patient taking differently: Take 1 tablet by mouth Daily. With lunch and at bedtime), Disp: 180 tablet, Rfl: 3    lisinopril (PRINIVIL,ZESTRIL) 30 MG tablet, Take 1 tablet by mouth twice daily (Patient taking differently: Take 1 tablet by mouth Daily.), Disp: 180 tablet, Rfl: 0    Multiple Vitamins-Minerals (MULTI-VITAMIN GUMMIES PO), Take 2 each by mouth Daily., Disp: , Rfl:     pantoprazole (PROTONIX) 40 MG EC tablet, Take 1 tablet by mouth twice daily (Patient taking differently: 1 tablet Daily.), Disp: 60 tablet, Rfl: 5    PARoxetine (PAXIL) 40 MG tablet, Take 1 tablet by mouth Every Morning., Disp: 30 tablet, Rfl: 5    Plecanatide (TRULANCE PO), Take  by mouth., Disp: , Rfl:     polyethylene glycol (MIRALAX) packet, Take 17 g by mouth 2 (Two) Times a Day., Disp: , Rfl:     Probiotic Product (PROBIOTIC DAILY) capsule, Once daily, Disp: , Rfl:     promethazine (PHENERGAN) 25 MG suppository, Insert 1 suppository into the rectum Every 6 (Six) Hours As Needed for Nausea or Vomiting., Disp: 30 suppository, Rfl: 0    promethazine (PHENERGAN) 25 MG tablet, Take 1 tablet by mouth Every 8 (Eight) Hours As Needed for Nausea or Vomiting (Do not use at the same time as Phenergan  suppositories)., Disp: 30 tablet, Rfl: 0    sucralfate (CARAFATE) 1 g tablet, Take 1 tablet by mouth 4 (Four) Times a Day., Disp: 120 tablet, Rfl: 11    SUMAtriptan (IMITREX) 100 MG tablet, Take 1 tablet by mouth 1 (One) Time As Needed for Migraine for up to 1 dose. Take one tablet at onset of headache. May repeat dose one time in 2 hours. (Patient taking differently: Take 1 tablet by mouth As Needed for Migraine. Take one tablet at onset of headache. May repeat dose one time in 2 hours.), Disp: 27 tablet, Rfl: 3    vitamin B-12 (CYANOCOBALAMIN) 2500 MCG sublingual tablet tablet, Place 2,500 mcg under the tongue Daily., Disp: , Rfl:     zolpidem (AMBIEN) 10 MG tablet, Take 1 tablet by mouth Every Night., Disp: 90 tablet, Rfl: 0    Vital Signs  There were no vitals filed for this visit.     Physical Exam  Constitutional: Resting comfortably, no acute distress  Neck: Supple, trachea midline  Respiratory: No increased work of breathing, Symmetric excursion  Cardiovascular: Well pefursed, no jugular venous distention evident   Abdominal: Well-healed midline laparotomy, right upper quadrant subcostal incision, left lower quadrant colostomy site.  Soft, non-tender, non-distended  Lymphatics: No cervical or suprascapular adenopathy  Skin: Warm, dry, no rash on visualized skin surfaces  Musculoskeletal: Symmetric strength, no obvious gross abnormalities  Psychiatric: Alert and oriented x3, normal affect     Laboratory Results  I have personally reviewed CBC from August 24, 2023 to demonstrate leukocytosis of 16,000 with normal hemoglobin at 13.2 and normal platelets of 251.  Complete metabolic profile performed on August 24, 2023 demonstrates normal creatinine, normal potassium, calcium, abnormal sodium of 125 and abnormal chloride of 86.  TSH was normal at 1.06 on August 24, 2023.  Glucose 95.    Radiology  I have personally reviewed sits marker study from September 30, October 2, and October 4, 2021 that demonstrate  retained sits markers on the fifth day, 13 in total.  Additionally she has a small bowel follow-through from August 7, 2021 that demonstrates normal transit time of 45 minutes to the small intestine with no transition points or areas of stricture.  She also had a CT scan of her abdomen pelvis on January 14, 2023 that demonstrates significant colonic stool burden and what appears to be a slightly narrowed colorectal anastomosis with no significant proximal dilation.  Gastric emptying study performed in August and 2023 with normal emptying time.    Endoscopy  I have personally reviewed a colonoscopy report from Dr. Darien Ruff on September 4, 2013 that demonstrated a normal colon with a widely patent colorectal anastomosis with fair prep.  I have additionally reviewed a colonoscopy report from December 12, 2018 also performed to Dr. Darien Ruff that demonstrates normal colon with widely patent colorectal anastomosis.     Osvaldo Salinas MD  Colorectal & General Surgery  [facility]    [address], [address]  [place], [place], [address]  P: [phone]  F: [phone]

## 2023-09-25 ENCOUNTER — HOSPITAL ENCOUNTER (OUTPATIENT)
Dept: GENERAL RADIOLOGY | Facility: HOSPITAL | Age: 68
Discharge: HOME | End: 2023-09-25
Admitting: SURGERY
Payer: MEDICARE

## 2023-09-25 DIAGNOSIS — Z12.11 COLON CANCER SCREENING: ICD-10-CM

## 2023-09-25 DIAGNOSIS — K59.09 CHRONIC CONSTIPATION: ICD-10-CM

## 2023-09-25 PROCEDURE — 74018 RADEX ABDOMEN 1 VIEW: CPT

## 2023-09-29 ENCOUNTER — HOSPITAL ENCOUNTER (OUTPATIENT)
Dept: GENERAL RADIOLOGY | Facility: HOSPITAL | Age: 68
Discharge: HOME | End: 2023-09-29
Admitting: SURGERY
Payer: MEDICARE

## 2023-09-29 DIAGNOSIS — K59.09 CHRONIC CONSTIPATION: ICD-10-CM

## 2023-09-29 PROCEDURE — 74018 RADEX ABDOMEN 1 VIEW: CPT

## 2023-10-03 DIAGNOSIS — F51.01 PRIMARY INSOMNIA: ICD-10-CM

## 2023-10-03 RX ORDER — ZOLPIDEM TARTRATE 10 MG/1
10 TABLET ORAL NIGHTLY
Qty: 90 TABLET | Refills: 0 | Status: CANCELLED | OUTPATIENT
Start: 2023-10-03

## 2023-10-04 DIAGNOSIS — F51.01 PRIMARY INSOMNIA: ICD-10-CM

## 2023-10-04 RX ORDER — ZOLPIDEM TARTRATE 10 MG/1
10 TABLET ORAL NIGHTLY
Qty: 90 TABLET | Refills: 1 | Status: SHIPPED | OUTPATIENT
Start: 2023-10-04

## 2023-10-04 NOTE — TELEPHONE ENCOUNTER
Urine Toxicology Performed in Last 12 Months    No Benzodiazepines on Active Med List     Rx Refill Note  Requested Prescriptions     Pending Prescriptions Disp Refills    zolpidem (AMBIEN) 10 MG tablet 90 tablet 0     Sig: Take 1 tablet by mouth Every Night.      Last office visit with prescribing clinician: 8/24/2023   Last telemedicine visit with prescribing clinician: Visit date not found   Next office visit with prescribing clinician: 10/16/2023                         Would you like a call back once the refill request has been completed: [] Yes [] No    If the office needs to give you a call back, can they leave a voicemail: [] Yes [] No    Brianda Maloney, PCT  10/04/23, 13:58 EDT

## 2023-10-12 DIAGNOSIS — I10 ESSENTIAL HYPERTENSION: ICD-10-CM

## 2023-10-12 RX ORDER — FLUTICASONE PROPIONATE 50 MCG
2 SPRAY, SUSPENSION (ML) NASAL DAILY
COMMUNITY

## 2023-10-12 RX ORDER — AMLODIPINE BESYLATE 2.5 MG/1
TABLET ORAL
Qty: 90 TABLET | Refills: 0 | Status: SHIPPED | OUTPATIENT
Start: 2023-10-12

## 2023-10-12 NOTE — TELEPHONE ENCOUNTER
Rx Refill Note  Requested Prescriptions     Pending Prescriptions Disp Refills    amLODIPine (NORVASC) 2.5 MG tablet [Pharmacy Med Name: amLODIPine Besylate 2.5 MG Oral Tablet] 90 tablet 0     Sig: Take 1 tablet by mouth once daily      Last office visit with prescribing clinician: 8/24/2023   Last telemedicine visit with prescribing clinician: Visit date not found   Next office visit with prescribing clinician: 10/16/2023                         Would you like a call back once the refill request has been completed: [] Yes [] No    If the office needs to give you a call back, can they leave a voicemail: [] Yes [] No    Felicia Arciniega, PCT  10/12/23, 10:34 EDT

## 2023-10-13 ENCOUNTER — ANESTHESIA (OUTPATIENT)
Dept: GASTROENTEROLOGY | Facility: HOSPITAL | Age: 68
End: 2023-10-13
Payer: MEDICARE

## 2023-10-13 ENCOUNTER — ANESTHESIA EVENT (OUTPATIENT)
Dept: GASTROENTEROLOGY | Facility: HOSPITAL | Age: 68
End: 2023-10-13
Payer: MEDICARE

## 2023-10-13 ENCOUNTER — HOSPITAL ENCOUNTER (OUTPATIENT)
Facility: HOSPITAL | Age: 68
Setting detail: HOSPITAL OUTPATIENT SURGERY
Discharge: HOME | End: 2023-10-13
Attending: SURGERY | Admitting: SURGERY
Payer: MEDICARE

## 2023-10-13 VITALS
RESPIRATION RATE: 16 BRPM | SYSTOLIC BLOOD PRESSURE: 118 MMHG | OXYGEN SATURATION: 100 % | BODY MASS INDEX: 16.62 KG/M2 | WEIGHT: 88 LBS | HEIGHT: 61 IN | DIASTOLIC BLOOD PRESSURE: 82 MMHG | HEART RATE: 67 BPM

## 2023-10-13 DIAGNOSIS — Z12.11 COLON CANCER SCREENING: ICD-10-CM

## 2023-10-13 DIAGNOSIS — K59.09 CHRONIC CONSTIPATION: ICD-10-CM

## 2023-10-13 PROCEDURE — 25810000003 LACTATED RINGERS PER 1000 ML: Performed by: SURGERY

## 2023-10-13 PROCEDURE — 45378 DIAGNOSTIC COLONOSCOPY: CPT | Performed by: SURGERY

## 2023-10-13 PROCEDURE — 25010000002 PROPOFOL 10 MG/ML EMULSION

## 2023-10-13 RX ORDER — PROPOFOL 10 MG/ML
VIAL (ML) INTRAVENOUS AS NEEDED
Status: DISCONTINUED | OUTPATIENT
Start: 2023-10-13 | End: 2023-10-13 | Stop reason: SURG

## 2023-10-13 RX ORDER — LIDOCAINE HYDROCHLORIDE 20 MG/ML
INJECTION, SOLUTION INFILTRATION; PERINEURAL AS NEEDED
Status: DISCONTINUED | OUTPATIENT
Start: 2023-10-13 | End: 2023-10-13 | Stop reason: SURG

## 2023-10-13 RX ORDER — SODIUM CHLORIDE, SODIUM LACTATE, POTASSIUM CHLORIDE, CALCIUM CHLORIDE 600; 310; 30; 20 MG/100ML; MG/100ML; MG/100ML; MG/100ML
30 INJECTION, SOLUTION INTRAVENOUS CONTINUOUS PRN
Status: DISCONTINUED | OUTPATIENT
Start: 2023-10-13 | End: 2023-10-13 | Stop reason: HOSPADM

## 2023-10-13 RX ADMIN — SODIUM CHLORIDE, POTASSIUM CHLORIDE, SODIUM LACTATE AND CALCIUM CHLORIDE 30 ML/HR: 600; 310; 30; 20 INJECTION, SOLUTION INTRAVENOUS at 11:36

## 2023-10-13 RX ADMIN — PROPOFOL 100 MG: 10 INJECTION, EMULSION INTRAVENOUS at 12:02

## 2023-10-13 RX ADMIN — LIDOCAINE HYDROCHLORIDE 60 MG: 20 INJECTION, SOLUTION INFILTRATION; PERINEURAL at 12:03

## 2023-10-13 RX ADMIN — PROPOFOL 180 MCG/KG/MIN: 10 INJECTION, EMULSION INTRAVENOUS at 12:03

## 2023-10-13 NOTE — OP NOTE
Colorectal & General Surgery  Operative Report    Patient: Martina Hardwick  YOB: 1955  MRN: [id number]  DATE OF PROCEDURE: 10/13/23     PREOPERATIVE DIAGNOSIS:  Chronic constipation  History diverticulitis status post sigmoid colectomy    POSTOPERATIVE DIAGNOSIS:  Chronic obstipation  History diverticulitis status post sigmoid colectomy    PROCEDURE:  Colonoscopy to cecum     FINDINGS:  Incredibly redundant and tortuous colon.  Excellent bowel prep.  No mucosal abnormalities.  Colorectal anastomosis widely patent.    RECOMMENDATIONS:   Repeat colonoscopy in 10 years  Follow-up in the office in 2 weeks to discuss chronic constipation    SURGEON:  Osvaldo Salinas MD    ANESTHESIA:  Monitored anesthesia care    EBL:  None    SPECIMEN:  None    OPERATIVE DESCRIPTION:  The patient was brought to the endoscopy suite under the care of the nursing staff.  A preoperative timeout was performed.  Monitored anesthesia care was initiated.  A digital rectal examination was performed.  The endoscope was inserted into the anus and advanced carefully to the cecum.  The ileocecal valve was identified.  The endoscope was then withdrawn and the entire mucosal surface of the colon and rectum were visualized.    Retroflexion was performed in the rectum.  The scope was then withdrawn.    The patient tolerated the procedure well and was transferred to the postanesthesia care unit in stable condition.    Osvaldo Salinas M.D.  Colorectal & General Surgery  [facility]    [address] [place] [address] [address]  [place], [place], [facility]  P: [phone]  F: [phone]

## 2023-10-13 NOTE — ANESTHESIA POSTPROCEDURE EVALUATION
"Patient: Martina Hardwick    Procedure Summary       Date: 10/13/23 Room / Location:  [facility] /  [facility]    Anesthesia Start: 1200 Anesthesia Stop: 1232    Procedure: COLONOSCOPY TO CECUM Diagnosis:       Chronic constipation      Colon cancer screening      (Chronic constipation [K59.09])      (Colon cancer screening [Z12.11])    Surgeons: Ranjeet Salinas MD Provider: López Kumari MD    Anesthesia Type: MAC ASA Status: 2            Anesthesia Type: MAC    Vitals  Vitals Value Taken Time   /78 10/13/23 1240   Temp     Pulse 73 10/13/23 1243   Resp 20 10/13/23 1240   SpO2 100 % 10/13/23 1243   Vitals shown include unfiled device data.        Post Anesthesia Care and Evaluation    Level of consciousness: awake and alert  Pain management: adequate    Airway patency: patent  Anesthetic complications: No anesthetic complications  PONV Status: none  Cardiovascular status: acceptable  Respiratory status: acceptable  Hydration status: acceptable    Comments: /78 (BP Location: Left arm, Patient Position: Lying)   Pulse 69   Resp 20   Ht 154.9 cm (61\")   Wt 39.9 kg (88 lb)   LMP  (LMP Unknown)   SpO2 100%   BMI 16.63 kg/mý       "

## 2023-10-13 NOTE — ANESTHESIA PREPROCEDURE EVALUATION
Anesthesia Evaluation     history of anesthetic complications:  PONV               Airway   Mallampati: II  TM distance: >3 FB  Neck ROM: full  No difficulty expected  Dental          Pulmonary    (-) rhonchi, decreased breath sounds, wheezesStridor: caps.  Cardiovascular     Rhythm: regular  Rate: normal    (+) hypertension  (-) murmur      Neuro/Psych  (+) psychiatric history Anxiety and Depression  GI/Hepatic/Renal/Endo    (+) GERD    Musculoskeletal     Abdominal     Abdomen: soft.   Substance History      OB/GYN          Other   arthritis,                 Anesthesia Plan    ASA 2     MAC   total IV anesthesia  intravenous induction     Anesthetic plan, risks, benefits, and alternatives have been provided, discussed and informed consent has been obtained with: patient.    CODE STATUS:

## 2023-10-13 NOTE — DISCHARGE INSTRUCTIONS

## 2023-10-16 ENCOUNTER — OFFICE VISIT (OUTPATIENT)
Dept: INTERNAL MEDICINE | Facility: CLINIC | Age: 68
End: 2023-10-16
Payer: MEDICARE

## 2023-10-16 VITALS
WEIGHT: 92.8 LBS | HEART RATE: 85 BPM | HEIGHT: 61 IN | TEMPERATURE: 97.8 F | OXYGEN SATURATION: 98 % | SYSTOLIC BLOOD PRESSURE: 110 MMHG | BODY MASS INDEX: 17.52 KG/M2 | DIASTOLIC BLOOD PRESSURE: 74 MMHG

## 2023-10-16 DIAGNOSIS — Z12.31 ENCOUNTER FOR SCREENING MAMMOGRAM FOR BREAST CANCER: ICD-10-CM

## 2023-10-16 DIAGNOSIS — I10 ESSENTIAL HYPERTENSION: ICD-10-CM

## 2023-10-16 DIAGNOSIS — F51.01 PRIMARY INSOMNIA: ICD-10-CM

## 2023-10-16 DIAGNOSIS — K21.9 GASTROESOPHAGEAL REFLUX DISEASE WITHOUT ESOPHAGITIS: ICD-10-CM

## 2023-10-16 DIAGNOSIS — J30.89 NON-SEASONAL ALLERGIC RHINITIS, UNSPECIFIED TRIGGER: ICD-10-CM

## 2023-10-16 DIAGNOSIS — K59.09 CHRONIC CONSTIPATION: ICD-10-CM

## 2023-10-16 DIAGNOSIS — R10.9 CHRONIC ABDOMINAL PAIN: Primary | ICD-10-CM

## 2023-10-16 DIAGNOSIS — G43.909 MIGRAINE WITHOUT STATUS MIGRAINOSUS, NOT INTRACTABLE, UNSPECIFIED MIGRAINE TYPE: ICD-10-CM

## 2023-10-16 DIAGNOSIS — M81.0 AGE-RELATED OSTEOPOROSIS WITHOUT CURRENT PATHOLOGICAL FRACTURE: ICD-10-CM

## 2023-10-16 DIAGNOSIS — G89.29 CHRONIC ABDOMINAL PAIN: Primary | ICD-10-CM

## 2023-10-16 DIAGNOSIS — F41.8 DEPRESSION WITH ANXIETY: ICD-10-CM

## 2023-10-16 DIAGNOSIS — Z23 ENCOUNTER FOR ADMINISTRATION OF COVID-19 VACCINE: ICD-10-CM

## 2023-10-16 DIAGNOSIS — E87.1 HYPONATREMIA: ICD-10-CM

## 2023-10-16 DIAGNOSIS — Z00.00 ROUTINE HEALTH MAINTENANCE: ICD-10-CM

## 2023-10-16 PROCEDURE — 1159F MED LIST DOCD IN RCRD: CPT | Performed by: FAMILY MEDICINE

## 2023-10-16 PROCEDURE — 99214 OFFICE O/P EST MOD 30 MIN: CPT | Performed by: FAMILY MEDICINE

## 2023-10-16 PROCEDURE — G0008 ADMIN INFLUENZA VIRUS VAC: HCPCS | Performed by: FAMILY MEDICINE

## 2023-10-16 PROCEDURE — 1160F RVW MEDS BY RX/DR IN RCRD: CPT | Performed by: FAMILY MEDICINE

## 2023-10-16 PROCEDURE — 90662 IIV NO PRSV INCREASED AG IM: CPT | Performed by: FAMILY MEDICINE

## 2023-10-16 PROCEDURE — 3078F DIAST BP <80 MM HG: CPT | Performed by: FAMILY MEDICINE

## 2023-10-16 PROCEDURE — 3074F SYST BP LT 130 MM HG: CPT | Performed by: FAMILY MEDICINE

## 2023-10-16 NOTE — PROGRESS NOTES
Subjective   Subjective     Martina Hardwick is a 67 y.o. female, who presents with a chief complaint of   Chief Complaint   Patient presents with    chronic abdominal pain     2 mo f/u     Hypertension  Associated symptoms include anxiety and headaches.   Heartburn  She complains of abdominal pain.   Anxiety  Symptoms include insomnia.     1. HTN.  Tolerates medication.  Takes lisinopril 30 mg daily and amlodipine 2.5 mg daily. Home blood pressures running 110s-130s/60s-70s.  Denies dizziness and chest pain.    2. Depression and anxiety.  She takes paroxetine and prn alprazolam.  Seeing a counselor at [facility].  Denies SI.    3. Headaches.  She takes sumatriptan for abortive treatment and this is effective.  She is having several migraines per week.    4. Chronic constipation, chronic abdominal pain.  She still has debilitating pain.  She is working with Dr. Salinas, colorectal surgeon, and had colonoscopy last week.    The following portions of the patient's history were reviewed and updated as appropriate: allergies, current medications, past family history, past medical history, past social history, past surgical history and problem list.    Allergies: Hydrocodone and Sulfa antibiotics    Review of Systems   Constitutional: Negative.    Eyes: Negative.    Respiratory: Negative.    Cardiovascular: Negative.    Gastrointestinal: Positive for abdominal pain and constipation.   Endocrine: Negative.    Genitourinary: Negative.    Musculoskeletal: Positive for arthralgias.   Allergic/Immunologic: Positive for environmental allergies.   Neurological: Positive for headaches.   Hematological: Negative.    Psychiatric/Behavioral: The patient has insomnia.      Objective     Wt Readings from Last 3 Encounters:   10/16/23 42.1 kg (92 lb 12.8 oz)   10/13/23 39.9 kg (88 lb)   09/14/23 42.6 kg (94 lb)     Temp Readings from Last 3 Encounters:   10/16/23 97.8 °F (36.6 °C) (Infrared)   08/24/23 98.6 °F (37 °C)  (Infrared)   06/19/23 98.2 °F (36.8 °C) (Infrared)     BP Readings from Last 3 Encounters:   10/16/23 110/74   10/13/23 118/82   09/14/23 115/79     Pulse Readings from Last 3 Encounters:   10/16/23 85   10/13/23 67   08/24/23 86     Body mass index is 17.53 kg/m².  SpO2 Readings from Last 3 Encounters:   01/15/18 96%   10/12/17 98%   08/10/17 99%     Physical Exam   Constitutional: She is oriented to person, place, and time. She appears well-developed.   HENT:   Head: Normocephalic and atraumatic.   Mouth/Throat: Mucous membranes are moist.   Eyes: Conjunctivae are normal.   Neck: No thyromegaly present.   Cardiovascular: Normal rate, regular rhythm and normal heart sounds.   Pulmonary/Chest: Effort normal and breath sounds normal.   Abdominal: Soft. Normal appearance and bowel sounds are normal.   Musculoskeletal: Normal range of motion.      Right lower leg: No edema.      Left lower leg: No edema.   Neurological: She is alert and oriented to person, place, and time.   Skin: Skin is warm and dry. No rash noted.   Psychiatric: Her behavior is normal. Mood normal.   Nursing note and vitals reviewed.      Assessment/Plan   Martina was seen today for hypertension, insomnia and heartburn.    Diagnoses and all orders for this visit:      Essential hypertension    Routine health maintenance    Migraine without status migrainosus, not intractable, unspecified migraine type    Gastroesophageal reflux disease without esophagitis    Depression with anxiety    Primary insomnia    Chronic constipation    Chronic abdominal pain    Adhesions    Family history of colonic polyps    Non-seasonal allergic rhinitis, unspecified trigger    Osteoporosis    Hyponatremia      1. Routine health maint.  Mammogram ordered.  Colonoscopy UTD.  Hysterectomy done for non-cancerous reasons.  Covid-19 vaccines done.  Pneumovax UTD.  Shingrix at pharmacy.      2. HTN.  Controlled.  Continue lisinopril 30 mg daily and amlodipine 2.5 mg daily.  Last  labs reviewed.    3. Migraines.  Doing well with these.  Continue sumatriptan for abortive treatment.  She is going to re-try amitriptyline at a lower dose and then work back up to 10 mg.    4. GERD.  Continue omeprazole, famotidine, and lifestyle measures.  She sees GI.    5. Depression with anxiety.  Doing okay with paroxetine 40 mg daily.  On prn alprazolam.  Med management agreement and Joby UTD.    6. Insomnia.  Zolpidem is effective.  Med management agreement and Joby UTD.    7. Chronic constipation/colonic hypomotility.  She has seen multiple specialists.  Surgery is being considered.  She is now working with Dr. Salinas.    8. LORRIE.  Controlled with fluticasone nasal spray and loratadine.    9. Osteoporosis.  Didn't tolerate bisphosphonates previously (Dr. Ghotra).  Continue calcium, vitamin D and weight-bearing exercise.  Discuss ordering Prolia next time.  We need to get a handle on her abdominal pain before adding more medication into the mix.    10. Chronic hyponatremia.  Seeing nephrology, Dr. Stephen Jalloh.  Stable.    Outpatient Medications Prior to Visit   Medication Sig Dispense Refill    ALPRAZolam (XANAX) 0.5 MG tablet TAKE 1 TO 2 TABLETS BY MOUTH TWICE DAILY AS NEEDED FOR ANXIETY 90 tablet 2    aluminum-magnesium hydroxide-simethicone (MAALOX MAX) 400-400-40 MG/5ML suspension Take  by mouth As Needed for Indigestion or Heartburn.      amitriptyline (ELAVIL) 10 MG tablet Take 1 tablet by mouth Every Night. 30 tablet 2    amLODIPine (NORVASC) 2.5 MG tablet Take 1 tablet by mouth once daily 90 tablet 0    Calcium Carbonate Antacid (MAALOX PO) Take  by mouth Daily As Needed.      clobetasol (TEMOVATE) 0.05 % cream Apply 1 application  topically to the appropriate area as directed 2 (Two) Times a Day. AS NEEDED      clotrimazole-betamethasone (Lotrisone) 1-0.05 % cream Apply 1 application topically to the appropriate area as directed 2 (Two) Times a Day. (Patient taking differently: Apply 1 application   topically to the appropriate area as directed 2 (Two) Times a Day. AS NEEDED) 30 g 1    famotidine (PEPCID) 40 MG tablet Take 1 tablet by mouth 2 (Two) Times a Day. With lunch and at bedtime (Patient taking differently: Take 1 tablet by mouth Daily. With lunch and at bedtime) 180 tablet 3    fluticasone (FLONASE) 50 MCG/ACT nasal spray 2 sprays into the nostril(s) as directed by provider Daily.      lisinopril (PRINIVIL,ZESTRIL) 30 MG tablet Take 1 tablet by mouth twice daily (Patient taking differently: Take 1 tablet by mouth Daily.) 180 tablet 0    Loratadine (CLARITIN PO) Take  by mouth Daily.      Multiple Vitamins-Minerals (MULTI-VITAMIN GUMMIES PO) Take 2 each by mouth Daily.      pantoprazole (PROTONIX) 40 MG EC tablet Take 1 tablet by mouth twice daily (Patient taking differently: 1 tablet Daily.) 60 tablet 5    PARoxetine (PAXIL) 40 MG tablet Take 1 tablet by mouth Every Morning. 30 tablet 5    polyethylene glycol (MIRALAX) packet Take 17 g by mouth 2 (Two) Times a Day.      Probiotic Product (PROBIOTIC DAILY) capsule Once daily      SUMAtriptan (IMITREX) 100 MG tablet Take 1 tablet by mouth 1 (One) Time As Needed for Migraine for up to 1 dose. Take one tablet at onset of headache. May repeat dose one time in 2 hours. (Patient taking differently: Take 1 tablet by mouth As Needed for Migraine. Take one tablet at onset of headache. May repeat dose one time in 2 hours.) 27 tablet 3    vitamin B-12 (CYANOCOBALAMIN) 2500 MCG sublingual tablet tablet Place 2,500 mcg under the tongue 1 (One) Time Per Week.      zolpidem (AMBIEN) 10 MG tablet Take 1 tablet by mouth Every Night. 90 tablet 1    promethazine (PHENERGAN) 25 MG suppository Insert 1 suppository into the rectum Every 6 (Six) Hours As Needed for Nausea or Vomiting. (Patient not taking: Reported on 10/16/2023) 30 suppository 0    promethazine (PHENERGAN) 25 MG tablet Take 1 tablet by mouth Every 8 (Eight) Hours As Needed for Nausea or Vomiting (Do not use  at the same time as Phenergan suppositories). (Patient not taking: Reported on 10/16/2023) 30 tablet 0     No facility-administered medications prior to visit.     No orders of the defined types were placed in this encounter.    [unfilled]  Medications Discontinued During This Encounter   Medication Reason    promethazine (PHENERGAN) 25 MG suppository     promethazine (PHENERGAN) 25 MG tablet        Return in about 3 months (around 1/16/2024).

## 2023-10-17 ENCOUNTER — TELEPHONE (OUTPATIENT)
Dept: SURGERY | Facility: CLINIC | Age: 68
End: 2023-10-17
Payer: MEDICARE

## 2023-10-17 NOTE — TELEPHONE ENCOUNTER
----- Message from Ranjeet Salinas MD sent at 10/16/2023  4:22 PM EDT -----  Can we schedule her in [place] in the next 2 weeks for a follow up appt?

## 2023-10-25 RX ORDER — LISINOPRIL 30 MG/1
TABLET ORAL
Qty: 180 TABLET | Refills: 0 | Status: SHIPPED | OUTPATIENT
Start: 2023-10-25

## 2023-10-27 ENCOUNTER — HOSPITAL ENCOUNTER (OUTPATIENT)
Dept: MAMMOGRAPHY | Facility: HOSPITAL | Age: 68
Discharge: HOME | End: 2023-10-27
Admitting: FAMILY MEDICINE
Payer: MEDICARE

## 2023-10-27 DIAGNOSIS — Z12.31 ENCOUNTER FOR SCREENING MAMMOGRAM FOR BREAST CANCER: ICD-10-CM

## 2023-10-27 PROCEDURE — 77063 BREAST TOMOSYNTHESIS BI: CPT

## 2023-10-27 PROCEDURE — 77067 SCR MAMMO BI INCL CAD: CPT

## 2023-11-01 ENCOUNTER — OFFICE VISIT (OUTPATIENT)
Dept: SURGERY | Facility: CLINIC | Age: 68
End: 2023-11-01
Payer: MEDICARE

## 2023-11-01 VITALS
DIASTOLIC BLOOD PRESSURE: 90 MMHG | WEIGHT: 93.6 LBS | SYSTOLIC BLOOD PRESSURE: 140 MMHG | HEIGHT: 61 IN | BODY MASS INDEX: 17.67 KG/M2

## 2023-11-01 DIAGNOSIS — K59.9 COLONIC INERTIA: Primary | ICD-10-CM

## 2023-11-01 PROBLEM — Z12.11 COLON CANCER SCREENING: Status: RESOLVED | Noted: 2023-09-14 | Resolved: 2023-11-01

## 2023-11-01 RX ORDER — ACETAMINOPHEN 325 MG/1
650 TABLET ORAL ONCE
OUTPATIENT
Start: 2023-11-01 | End: 2023-11-01

## 2023-11-01 RX ORDER — METRONIDAZOLE 500 MG/1
500 TABLET ORAL SEE ADMIN INSTRUCTIONS
Qty: 4 TABLET | Refills: 0 | Status: SHIPPED | OUTPATIENT
Start: 2023-11-01 | End: 2023-11-02

## 2023-11-01 RX ORDER — NEOMYCIN SULFATE 500 MG/1
TABLET ORAL
Qty: 2 TABLET | Refills: 0 | Status: SHIPPED | OUTPATIENT
Start: 2023-11-01

## 2023-11-01 RX ORDER — HEPARIN SODIUM 5000 [USP'U]/ML
5000 INJECTION, SOLUTION INTRAVENOUS; SUBCUTANEOUS ONCE
OUTPATIENT
Start: 2023-11-01 | End: 2023-11-01

## 2023-11-01 NOTE — H&P (VIEW-ONLY)
Colorectal & General Surgery  Follow - Up    Patient: Martina Hardwick  YOB: 1955  MRN: [id number]      Assessment  Martina Hardwick is a 67 y.o. 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  We had a very lengthy and forthright discussion in the office today.  We discussed her CT scan from earlier this year, the results of her colonoscopy, and the results of her sits marker study.  I outlined the role of colonic inertia and her abdominal pain and the treatment algorithms for that.  We discussed the role of right colectomy to decrease the absorptive capacity of her colon and hopefully decrease transit time through her colon by removing such absorptive capacity.  We discussed the role of creating an end ileostomy.  We discussed that she is not a candidate for an ileoanal J-pouch at this time due to her malnutrition and lack of optimization.  We also discussed that she is not a candidate for an ileal rectal anastomosis due to her prior sigmoid colectomy.  I have also discussed the role of the pelvic floor and the role of MR defecography and pelvic floor physical therapy to treat any dysfunction of her pelvic floor.  Given that her pain is so severe and life limiting at this time, we have agreed to proceed with diagnostic laparoscopy, right colectomy with ileocolic anastomosis keeping the middle colic vessels intact.  I discussed that while I will try to do this procedure laparoscopically, there is a high chance that I convert to an open procedure.  Additionally, we discussed that if right colectomy with ileocolostomy is not possible, she wishes to proceed with an end ileostomy creation.  I discussed the risk, benefits,  alternatives to these procedures, including the risk of bleeding, infection, damage to surrounding structures, need for additional operations, permanence of her stoma, and the possibility that this does not resolve her abdominal pain.  She voiced understanding of all of this and still wishes to proceed as below.    Plan  Plan for laparoscopic right colectomy with ileocolostomy, possible ileostomy creation, possible open procedure  ERAS protocol  I will ask the enterostomal therapy nurses to chu her in preop as she lives far away    Chief Complaint: Follow-up regarding abdominal pain and constipation    History of Present Illness   Martina Hardwick is a 67 y.o. female who is following up today regarding her chronic abdominal pain and colonic inertia.  She continues to have 1-2 bowel movements a day that are very small and alternate between diarrhea and hard stools.  She says the pain occurs every single day and throughout the day without relief.  She says the pain is worse now than it has ever been.  She barely eats secondary to the pain.    Past Medical History   Past Medical History:   Diagnosis Date    Arthritis     Autoantibody titer positive     Bloating     Cataract     Cholelithiasis     Removed 8/17/2018    Chronic abdominal pain     Chronic constipation     Encounter for preventive health examination     Endometriosis     Gastritis     Gastrointestinal hypomotility     GERD (gastroesophageal reflux disease)     Headache, tension-type     Hypertension     Hyponatremia     Insomnia     Intestinal autonomic neuropathy     Irritable bowel syndrome     Migraine     Mixed anxiety and depressive disorder     Moderate malnutrition     Noninfectious gastroenteritis     OP (osteoporosis)     Osteopenia     Osteoporosis     PONV (postoperative nausea and vomiting) 08/17/2018    Poor sleep     Psoriasis     Seasonal allergies     Visceral hyperalgesia         Past Surgical History   Past Surgical History:   Procedure  Laterality Date    APPENDECTOMY      CHOLECYSTECTOMY N/A 8/17/2018    Procedure: CHOLECYSTECTOMY LAPAROSCOPIC converted to open procedure;  Surgeon: Hernan Hinds MD;  Location: [facility] OR;  Service: General    COLON SURGERY      COLONOSCOPY      COLONOSCOPY N/A 12/12/2018    Procedure: COLONOSCOPY;  Surgeon: Darien Ruff MD;  Location: [facility] OR;  Service: Gastroenterology    COLONOSCOPY N/A 10/13/2023    Procedure: COLONOSCOPY TO CECUM;  Surgeon: Ranjeet Salinas MD;  [facility];  Service: General;  Laterality: N/A;  PREOP/ CHRONIC CONSTIPATION- POSTOP/ NORMAL    DIAGNOSTIC LAPAROSCOPY  1990    DILATATION AND CURETTAGE  1985    ENDOSCOPY N/A 5/13/2016    Procedure: ESOPHAGOGASTRODUODENOSCOPY ;  Surgeon: Darien Ruff MD;  Location: [facility] OR;  Service:     ENDOSCOPY N/A 5/1/2023    Procedure: ESOPHAGOGASTRODUODENOSCOPY WITH BIOPSY;  Surgeon: Darien Ruff MD;  Location: [facility] OR;  Service: Gastroenterology;  Laterality: N/A;  Mild Thrush; Gastritis; Esophagitis- thrush and reflux; Biopsies- duodenal, gastric, esophagus    HYSTERECTOMY      REVISION / TAKEDOWN COLOSTOMY         Social History  Social History     Socioeconomic History    Marital status:    Tobacco Use    Smoking status: Never     Passive exposure: Never    Smokeless tobacco: Never   Vaping Use    Vaping Use: Never used   Substance and Sexual Activity    Alcohol use: Not Currently     Comment: rare    Drug use: No    Sexual activity: Not Currently     Partners: Male     Birth control/protection: Post-menopausal, Hysterectomy       Family History  Family History   Problem Relation Age of Onset    Hyperlipidemia Mother     Macular degeneration Mother     Hearing loss Mother     Arthritis Mother     Vision loss Mother     Hypertension Mother     Heart disease Father         Had 5 bypass surgery    Hyperlipidemia Father     Cancer Father         Prostate and bladder    Depression  Father     Stroke Father     Hypertension Father     Depression Sister     COPD Sister     Hyperlipidemia Sister     Arthritis Sister     Hyperlipidemia Brother     Depression Brother     Kidney disease Brother     Arthritis Brother     Hypertension Brother     Breast cancer Maternal Aunt     Breast cancer Cousin     Breast cancer Cousin     Colon cancer Neg Hx     Colon polyps Neg Hx     Malig Hyperthermia Neg Hx        Colorectal cancer family history: None    Review of Systems  Negative except as documented in the HPI.     Allergies  Allergies   Allergen Reactions    Hydrocodone Nausea And Vomiting    Sulfa Antibiotics Nausea And Vomiting       Medications    Current Outpatient Medications:     ALPRAZolam (XANAX) 0.5 MG tablet, TAKE 1 TO 2 TABLETS BY MOUTH TWICE DAILY AS NEEDED FOR ANXIETY, Disp: 90 tablet, Rfl: 2    aluminum-magnesium hydroxide-simethicone (MAALOX MAX) 400-400-40 MG/5ML suspension, Take  by mouth As Needed for Indigestion or Heartburn., Disp: , Rfl:     amitriptyline (ELAVIL) 10 MG tablet, Take 1 tablet by mouth Every Night. (Patient taking differently: Take 0.5 tablets by mouth Every Night.), Disp: 30 tablet, Rfl: 2    amLODIPine (NORVASC) 2.5 MG tablet, Take 1 tablet by mouth once daily, Disp: 90 tablet, Rfl: 0    clobetasol (TEMOVATE) 0.05 % cream, Apply 1 application  topically to the appropriate area as directed 2 (Two) Times a Day. AS NEEDED, Disp: , Rfl:     clotrimazole-betamethasone (Lotrisone) 1-0.05 % cream, Apply 1 application topically to the appropriate area as directed 2 (Two) Times a Day. (Patient taking differently: Apply 1 application  topically to the appropriate area as directed 2 (Two) Times a Day. AS NEEDED), Disp: 30 g, Rfl: 1    famotidine (PEPCID) 40 MG tablet, Take 1 tablet by mouth 2 (Two) Times a Day. With lunch and at bedtime (Patient taking differently: Take 1 tablet by mouth Daily. With lunch and at bedtime), Disp: 180 tablet, Rfl: 3    fluticasone (FLONASE) 50  MCG/ACT nasal spray, 2 sprays into the nostril(s) as directed by provider Daily., Disp: , Rfl:     lisinopril (PRINIVIL,ZESTRIL) 30 MG tablet, Take 1 tablet by mouth twice daily, Disp: 180 tablet, Rfl: 0    Loratadine (CLARITIN PO), Take  by mouth Daily., Disp: , Rfl:     Multiple Vitamins-Minerals (MULTI-VITAMIN GUMMIES PO), Take 2 each by mouth Daily., Disp: , Rfl:     pantoprazole (PROTONIX) 40 MG EC tablet, Take 1 tablet by mouth twice daily (Patient taking differently: 1 tablet Daily.), Disp: 60 tablet, Rfl: 5    PARoxetine (PAXIL) 40 MG tablet, Take 1 tablet by mouth Every Morning., Disp: 30 tablet, Rfl: 5    polyethylene glycol (MIRALAX) packet, Take 17 g by mouth 2 (Two) Times a Day., Disp: , Rfl:     Probiotic Product (PROBIOTIC DAILY) capsule, Once daily, Disp: , Rfl:     SUMAtriptan (IMITREX) 100 MG tablet, Take 1 tablet by mouth 1 (One) Time As Needed for Migraine for up to 1 dose. Take one tablet at onset of headache. May repeat dose one time in 2 hours. (Patient taking differently: Take 1 tablet by mouth As Needed for Migraine. Take one tablet at onset of headache. May repeat dose one time in 2 hours.), Disp: 27 tablet, Rfl: 3    vitamin B-12 (CYANOCOBALAMIN) 2500 MCG sublingual tablet tablet, Place 2,500 mcg under the tongue 1 (One) Time Per Week., Disp: , Rfl:     zolpidem (AMBIEN) 10 MG tablet, Take 1 tablet by mouth Every Night., Disp: 90 tablet, Rfl: 1    Vital Signs  Vitals:    11/01/23 0903   BP: 140/90        Physical Exam  Constitutional: Resting comfortably, no acute distress  Neck: Supple, trachea midline  Respiratory: No increased work of breathing, Symmetric excursion  Cardiovascular: Well pefursed, no jugular venous distention evident   Abdominal: Soft, mildly tender, non-distended, well-healed midline and right subcostal incisions as well as prior colostomy site in the left lower quadrant  Lymphatics: No cervical or suprascapular adenopathy  Skin: Warm, dry, no rash on visualized skin  surfaces  Musculoskeletal: Symmetric strength, no obvious gross abnormalities  Psychiatric: Alert and oriented ×3, normal affect     Laboratory Results  I have personally reviewed CBC from September 14 with hemoglobin 12, platelets 208.  CMP from the same date with creatinine 0.5, potassium 4.4, albumin 4.2, ALT 35, AST 33, alkaline phosphatase 129, total bilirubin 0.8.    Radiology  I have personally reviewed sits marker studies, both images demonstrate retention of multiple markers that eventually make it to the left side but are not passed by the fifth day.         Osvaldo Salinas MD  Colorectal & General Surgery  [facility]    [address], [address]  [place], [place], [address]  P: [phone]  F: [phone]

## 2023-11-01 NOTE — LETTER
November 1, 2023       No Recipients    Patient: Martina Hardwick   YOB: 1955   Date of Visit: 11/1/2023     Dear Ildefonso Dubois MD:       Thank you for referring Martina Hardwick to me for evaluation. Below are the relevant portions of my assessment and plan of care.    If you have questions, please do not hesitate to call me. I look forward to following Martina along with you.         Sincerely,        Ranjeet Salinas MD        CC:   No Recipients    Ranjeet Salinas MD  11/01/23 1047  Sign when Signing Visit  Colorectal & General Surgery  Follow - Up    Patient: Martina Hardwick  YOB: 1955  MRN: [id number]      Assessment  Martina Hardwick is a 67 y.o. 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  We had a very lengthy and forthright discussion in the office today.  We discussed her CT scan from earlier this year, the results of her colonoscopy, and the results of her sits marker study.  I outlined the role of colonic inertia and her abdominal pain and the treatment algorithms for that.  We discussed the role of right colectomy to decrease the absorptive capacity of her colon and hopefully decrease transit time through her colon by removing such absorptive capacity.  We discussed the role of creating an end ileostomy.  We discussed that she is not a candidate for an ileoanal J-pouch at this time due to her malnutrition and lack of optimization.  We also discussed that she is not a candidate for an ileal rectal anastomosis due to her prior sigmoid colectomy.  I have also discussed the role of the pelvic floor and the role of MR defecography and pelvic floor physical therapy to treat any  dysfunction of her pelvic floor.  Given that her pain is so severe and life limiting at this time, we have agreed to proceed with diagnostic laparoscopy, right colectomy with ileocolic anastomosis keeping the middle colic vessels intact.  I discussed that while I will try to do this procedure laparoscopically, there is a high chance that I convert to an open procedure.  Additionally, we discussed that if right colectomy with ileocolostomy is not possible, she wishes to proceed with an end ileostomy creation.  I discussed the risk, benefits, alternatives to these procedures, including the risk of bleeding, infection, damage to surrounding structures, need for additional operations, permanence of her stoma, and the possibility that this does not resolve her abdominal pain.  She voiced understanding of all of this and still wishes to proceed as below.    Plan  Plan for laparoscopic right colectomy with ileocolostomy, possible ileostomy creation, possible open procedure  ERAS protocol  I will ask the enterostomal therapy nurses to chu her in preop as she lives far away    Chief Complaint: Follow-up regarding abdominal pain and constipation    History of Present Illness   Martina Hardwick is a 67 y.o. female who is following up today regarding her chronic abdominal pain and colonic inertia.  She continues to have 1-2 bowel movements a day that are very small and alternate between diarrhea and hard stools.  She says the pain occurs every single day and throughout the day without relief.  She says the pain is worse now than it has ever been.  She barely eats secondary to the pain.    Past Medical History   Past Medical History:   Diagnosis Date   • Arthritis    • Autoantibody titer positive    • Bloating    • Cataract    • Cholelithiasis     Removed 8/17/2018   • Chronic abdominal pain    • Chronic constipation    • Encounter for preventive health examination    • Endometriosis    • Gastritis    • Gastrointestinal  hypomotility    • GERD (gastroesophageal reflux disease)    • Headache, tension-type    • Hypertension    • Hyponatremia    • Insomnia    • Intestinal autonomic neuropathy    • Irritable bowel syndrome    • Migraine    • Mixed anxiety and depressive disorder    • Moderate malnutrition    • Noninfectious gastroenteritis    • OP (osteoporosis)    • Osteopenia    • Osteoporosis    • PONV (postoperative nausea and vomiting) 08/17/2018   • Poor sleep    • Psoriasis    • Seasonal allergies    • Visceral hyperalgesia         Past Surgical History   Past Surgical History:   Procedure Laterality Date   • APPENDECTOMY     • CHOLECYSTECTOMY N/A 8/17/2018    Procedure: CHOLECYSTECTOMY LAPAROSCOPIC converted to open procedure;  Surgeon: Hernan Hinds MD;  Location: [facility] OR;  Service: General   • COLON SURGERY     • COLONOSCOPY     • COLONOSCOPY N/A 12/12/2018    Procedure: COLONOSCOPY;  Surgeon: Darien Ruff MD;  Location: [facility] OR;  Service: Gastroenterology   • COLONOSCOPY N/A 10/13/2023    Procedure: COLONOSCOPY TO CECUM;  Surgeon: Ranjeet Salinas MD;  [facility];  Service: General;  Laterality: N/A;  PREOP/ CHRONIC CONSTIPATION- POSTOP/ NORMAL   • DIAGNOSTIC LAPAROSCOPY  1990   • DILATATION AND CURETTAGE  1985   • ENDOSCOPY N/A 5/13/2016    Procedure: ESOPHAGOGASTRODUODENOSCOPY ;  Surgeon: Darien Ruff MD;  Location: [facility] OR;  Service:    • ENDOSCOPY N/A 5/1/2023    Procedure: ESOPHAGOGASTRODUODENOSCOPY WITH BIOPSY;  Surgeon: Darien Ruff MD;  Location: [facility] OR;  Service: Gastroenterology;  Laterality: N/A;  Mild Thrush; Gastritis; Esophagitis- thrush and reflux; Biopsies- duodenal, gastric, esophagus   • HYSTERECTOMY     • REVISION / TAKEDOWN COLOSTOMY         Social History  Social History     Socioeconomic History   • Marital status:    Tobacco Use   • Smoking status: Never     Passive exposure: Never   • Smokeless tobacco: Never    Vaping Use   • Vaping Use: Never used   Substance and Sexual Activity   • Alcohol use: Not Currently     Comment: rare   • Drug use: No   • Sexual activity: Not Currently     Partners: Male     Birth control/protection: Post-menopausal, Hysterectomy       Family History  Family History   Problem Relation Age of Onset   • Hyperlipidemia Mother    • Macular degeneration Mother    • Hearing loss Mother    • Arthritis Mother    • Vision loss Mother    • Hypertension Mother    • Heart disease Father         Had 5 bypass surgery   • Hyperlipidemia Father    • Cancer Father         Prostate and bladder   • Depression Father    • Stroke Father    • Hypertension Father    • Depression Sister    • COPD Sister    • Hyperlipidemia Sister    • Arthritis Sister    • Hyperlipidemia Brother    • Depression Brother    • Kidney disease Brother    • Arthritis Brother    • Hypertension Brother    • Breast cancer Maternal Aunt    • Breast cancer Cousin    • Breast cancer Cousin    • Colon cancer Neg Hx    • Colon polyps Neg Hx    • Malig Hyperthermia Neg Hx        Colorectal cancer family history: None    Review of Systems  Negative except as documented in the HPI.     Allergies  Allergies   Allergen Reactions   • Hydrocodone Nausea And Vomiting   • Sulfa Antibiotics Nausea And Vomiting       Medications    Current Outpatient Medications:   •  ALPRAZolam (XANAX) 0.5 MG tablet, TAKE 1 TO 2 TABLETS BY MOUTH TWICE DAILY AS NEEDED FOR ANXIETY, Disp: 90 tablet, Rfl: 2  •  aluminum-magnesium hydroxide-simethicone (MAALOX MAX) 400-400-40 MG/5ML suspension, Take  by mouth As Needed for Indigestion or Heartburn., Disp: , Rfl:   •  amitriptyline (ELAVIL) 10 MG tablet, Take 1 tablet by mouth Every Night. (Patient taking differently: Take 0.5 tablets by mouth Every Night.), Disp: 30 tablet, Rfl: 2  •  amLODIPine (NORVASC) 2.5 MG tablet, Take 1 tablet by mouth once daily, Disp: 90 tablet, Rfl: 0  •  clobetasol (TEMOVATE) 0.05 % cream, Apply 1  application  topically to the appropriate area as directed 2 (Two) Times a Day. AS NEEDED, Disp: , Rfl:   •  clotrimazole-betamethasone (Lotrisone) 1-0.05 % cream, Apply 1 application topically to the appropriate area as directed 2 (Two) Times a Day. (Patient taking differently: Apply 1 application  topically to the appropriate area as directed 2 (Two) Times a Day. AS NEEDED), Disp: 30 g, Rfl: 1  •  famotidine (PEPCID) 40 MG tablet, Take 1 tablet by mouth 2 (Two) Times a Day. With lunch and at bedtime (Patient taking differently: Take 1 tablet by mouth Daily. With lunch and at bedtime), Disp: 180 tablet, Rfl: 3  •  fluticasone (FLONASE) 50 MCG/ACT nasal spray, 2 sprays into the nostril(s) as directed by provider Daily., Disp: , Rfl:   •  lisinopril (PRINIVIL,ZESTRIL) 30 MG tablet, Take 1 tablet by mouth twice daily, Disp: 180 tablet, Rfl: 0  •  Loratadine (CLARITIN PO), Take  by mouth Daily., Disp: , Rfl:   •  Multiple Vitamins-Minerals (MULTI-VITAMIN GUMMIES PO), Take 2 each by mouth Daily., Disp: , Rfl:   •  pantoprazole (PROTONIX) 40 MG EC tablet, Take 1 tablet by mouth twice daily (Patient taking differently: 1 tablet Daily.), Disp: 60 tablet, Rfl: 5  •  PARoxetine (PAXIL) 40 MG tablet, Take 1 tablet by mouth Every Morning., Disp: 30 tablet, Rfl: 5  •  polyethylene glycol (MIRALAX) packet, Take 17 g by mouth 2 (Two) Times a Day., Disp: , Rfl:   •  Probiotic Product (PROBIOTIC DAILY) capsule, Once daily, Disp: , Rfl:   •  SUMAtriptan (IMITREX) 100 MG tablet, Take 1 tablet by mouth 1 (One) Time As Needed for Migraine for up to 1 dose. Take one tablet at onset of headache. May repeat dose one time in 2 hours. (Patient taking differently: Take 1 tablet by mouth As Needed for Migraine. Take one tablet at onset of headache. May repeat dose one time in 2 hours.), Disp: 27 tablet, Rfl: 3  •  vitamin B-12 (CYANOCOBALAMIN) 2500 MCG sublingual tablet tablet, Place 2,500 mcg under the tongue 1 (One) Time Per Week., Disp: ,  Rfl:   •  zolpidem (AMBIEN) 10 MG tablet, Take 1 tablet by mouth Every Night., Disp: 90 tablet, Rfl: 1    Vital Signs  Vitals:    11/01/23 0903   BP: 140/90        Physical Exam  Constitutional: Resting comfortably, no acute distress  Neck: Supple, trachea midline  Respiratory: No increased work of breathing, Symmetric excursion  Cardiovascular: Well pefursed, no jugular venous distention evident   Abdominal: Soft, mildly tender, non-distended, well-healed midline and right subcostal incisions as well as prior colostomy site in the left lower quadrant  Lymphatics: No cervical or suprascapular adenopathy  Skin: Warm, dry, no rash on visualized skin surfaces  Musculoskeletal: Symmetric strength, no obvious gross abnormalities  Psychiatric: Alert and oriented ×3, normal affect     Laboratory Results  I have personally reviewed CBC from September 14 with hemoglobin 12, platelets 208.  CMP from the same date with creatinine 0.5, potassium 4.4, albumin 4.2, ALT 35, AST 33, alkaline phosphatase 129, total bilirubin 0.8.    Radiology  I have personally reviewed sits marker studies, both images demonstrate retention of multiple markers that eventually make it to the left side but are not passed by the fifth day.         Osvaldo Salinas MD  Colorectal & General Surgery  [facility]    [address], [address]  [place], [place], [address]  P: [phone]  F: [phone]

## 2023-11-01 NOTE — PROGRESS NOTES
Colorectal & General Surgery  Follow - Up    Patient: Martina Hardwick  YOB: 1955  MRN: [id number]      Assessment  Martina Hardwick is a 67 y.o. 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  We had a very lengthy and forthright discussion in the office today.  We discussed her CT scan from earlier this year, the results of her colonoscopy, and the results of her sits marker study.  I outlined the role of colonic inertia and her abdominal pain and the treatment algorithms for that.  We discussed the role of right colectomy to decrease the absorptive capacity of her colon and hopefully decrease transit time through her colon by removing such absorptive capacity.  We discussed the role of creating an end ileostomy.  We discussed that she is not a candidate for an ileoanal J-pouch at this time due to her malnutrition and lack of optimization.  We also discussed that she is not a candidate for an ileal rectal anastomosis due to her prior sigmoid colectomy.  I have also discussed the role of the pelvic floor and the role of MR defecography and pelvic floor physical therapy to treat any dysfunction of her pelvic floor.  Given that her pain is so severe and life limiting at this time, we have agreed to proceed with diagnostic laparoscopy, right colectomy with ileocolic anastomosis keeping the middle colic vessels intact.  I discussed that while I will try to do this procedure laparoscopically, there is a high chance that I convert to an open procedure.  Additionally, we discussed that if right colectomy with ileocolostomy is not possible, she wishes to proceed with an end ileostomy creation.  I discussed the risk, benefits,  alternatives to these procedures, including the risk of bleeding, infection, damage to surrounding structures, need for additional operations, permanence of her stoma, and the possibility that this does not resolve her abdominal pain.  She voiced understanding of all of this and still wishes to proceed as below.    Plan  Plan for laparoscopic right colectomy with ileocolostomy, possible ileostomy creation, possible open procedure  ERAS protocol  I will ask the enterostomal therapy nurses to chu her in preop as she lives far away    Chief Complaint: Follow-up regarding abdominal pain and constipation    History of Present Illness   Martina Hardwick is a 67 y.o. female who is following up today regarding her chronic abdominal pain and colonic inertia.  She continues to have 1-2 bowel movements a day that are very small and alternate between diarrhea and hard stools.  She says the pain occurs every single day and throughout the day without relief.  She says the pain is worse now than it has ever been.  She barely eats secondary to the pain.    Past Medical History   Past Medical History:   Diagnosis Date    Arthritis     Autoantibody titer positive     Bloating     Cataract     Cholelithiasis     Removed 8/17/2018    Chronic abdominal pain     Chronic constipation     Encounter for preventive health examination     Endometriosis     Gastritis     Gastrointestinal hypomotility     GERD (gastroesophageal reflux disease)     Headache, tension-type     Hypertension     Hyponatremia     Insomnia     Intestinal autonomic neuropathy     Irritable bowel syndrome     Migraine     Mixed anxiety and depressive disorder     Moderate malnutrition     Noninfectious gastroenteritis     OP (osteoporosis)     Osteopenia     Osteoporosis     PONV (postoperative nausea and vomiting) 08/17/2018    Poor sleep     Psoriasis     Seasonal allergies     Visceral hyperalgesia         Past Surgical History   Past Surgical History:   Procedure  Laterality Date    APPENDECTOMY      CHOLECYSTECTOMY N/A 8/17/2018    Procedure: CHOLECYSTECTOMY LAPAROSCOPIC converted to open procedure;  Surgeon: Hernan Hinds MD;  Location: [facility] OR;  Service: General    COLON SURGERY      COLONOSCOPY      COLONOSCOPY N/A 12/12/2018    Procedure: COLONOSCOPY;  Surgeon: Darien Ruff MD;  Location: [facility] OR;  Service: Gastroenterology    COLONOSCOPY N/A 10/13/2023    Procedure: COLONOSCOPY TO CECUM;  Surgeon: Ranjeet Salinas MD;  [facility];  Service: General;  Laterality: N/A;  PREOP/ CHRONIC CONSTIPATION- POSTOP/ NORMAL    DIAGNOSTIC LAPAROSCOPY  1990    DILATATION AND CURETTAGE  1985    ENDOSCOPY N/A 5/13/2016    Procedure: ESOPHAGOGASTRODUODENOSCOPY ;  Surgeon: Darien Ruff MD;  Location: [facility] OR;  Service:     ENDOSCOPY N/A 5/1/2023    Procedure: ESOPHAGOGASTRODUODENOSCOPY WITH BIOPSY;  Surgeon: Darien Ruff MD;  Location: [facility] OR;  Service: Gastroenterology;  Laterality: N/A;  Mild Thrush; Gastritis; Esophagitis- thrush and reflux; Biopsies- duodenal, gastric, esophagus    HYSTERECTOMY      REVISION / TAKEDOWN COLOSTOMY         Social History  Social History     Socioeconomic History    Marital status:    Tobacco Use    Smoking status: Never     Passive exposure: Never    Smokeless tobacco: Never   Vaping Use    Vaping Use: Never used   Substance and Sexual Activity    Alcohol use: Not Currently     Comment: rare    Drug use: No    Sexual activity: Not Currently     Partners: Male     Birth control/protection: Post-menopausal, Hysterectomy       Family History  Family History   Problem Relation Age of Onset    Hyperlipidemia Mother     Macular degeneration Mother     Hearing loss Mother     Arthritis Mother     Vision loss Mother     Hypertension Mother     Heart disease Father         Had 5 bypass surgery    Hyperlipidemia Father     Cancer Father         Prostate and bladder    Depression  Father     Stroke Father     Hypertension Father     Depression Sister     COPD Sister     Hyperlipidemia Sister     Arthritis Sister     Hyperlipidemia Brother     Depression Brother     Kidney disease Brother     Arthritis Brother     Hypertension Brother     Breast cancer Maternal Aunt     Breast cancer Cousin     Breast cancer Cousin     Colon cancer Neg Hx     Colon polyps Neg Hx     Malig Hyperthermia Neg Hx        Colorectal cancer family history: None    Review of Systems  Negative except as documented in the HPI.     Allergies  Allergies   Allergen Reactions    Hydrocodone Nausea And Vomiting    Sulfa Antibiotics Nausea And Vomiting       Medications    Current Outpatient Medications:     ALPRAZolam (XANAX) 0.5 MG tablet, TAKE 1 TO 2 TABLETS BY MOUTH TWICE DAILY AS NEEDED FOR ANXIETY, Disp: 90 tablet, Rfl: 2    aluminum-magnesium hydroxide-simethicone (MAALOX MAX) 400-400-40 MG/5ML suspension, Take  by mouth As Needed for Indigestion or Heartburn., Disp: , Rfl:     amitriptyline (ELAVIL) 10 MG tablet, Take 1 tablet by mouth Every Night. (Patient taking differently: Take 0.5 tablets by mouth Every Night.), Disp: 30 tablet, Rfl: 2    amLODIPine (NORVASC) 2.5 MG tablet, Take 1 tablet by mouth once daily, Disp: 90 tablet, Rfl: 0    clobetasol (TEMOVATE) 0.05 % cream, Apply 1 application  topically to the appropriate area as directed 2 (Two) Times a Day. AS NEEDED, Disp: , Rfl:     clotrimazole-betamethasone (Lotrisone) 1-0.05 % cream, Apply 1 application topically to the appropriate area as directed 2 (Two) Times a Day. (Patient taking differently: Apply 1 application  topically to the appropriate area as directed 2 (Two) Times a Day. AS NEEDED), Disp: 30 g, Rfl: 1    famotidine (PEPCID) 40 MG tablet, Take 1 tablet by mouth 2 (Two) Times a Day. With lunch and at bedtime (Patient taking differently: Take 1 tablet by mouth Daily. With lunch and at bedtime), Disp: 180 tablet, Rfl: 3    fluticasone (FLONASE) 50  MCG/ACT nasal spray, 2 sprays into the nostril(s) as directed by provider Daily., Disp: , Rfl:     lisinopril (PRINIVIL,ZESTRIL) 30 MG tablet, Take 1 tablet by mouth twice daily, Disp: 180 tablet, Rfl: 0    Loratadine (CLARITIN PO), Take  by mouth Daily., Disp: , Rfl:     Multiple Vitamins-Minerals (MULTI-VITAMIN GUMMIES PO), Take 2 each by mouth Daily., Disp: , Rfl:     pantoprazole (PROTONIX) 40 MG EC tablet, Take 1 tablet by mouth twice daily (Patient taking differently: 1 tablet Daily.), Disp: 60 tablet, Rfl: 5    PARoxetine (PAXIL) 40 MG tablet, Take 1 tablet by mouth Every Morning., Disp: 30 tablet, Rfl: 5    polyethylene glycol (MIRALAX) packet, Take 17 g by mouth 2 (Two) Times a Day., Disp: , Rfl:     Probiotic Product (PROBIOTIC DAILY) capsule, Once daily, Disp: , Rfl:     SUMAtriptan (IMITREX) 100 MG tablet, Take 1 tablet by mouth 1 (One) Time As Needed for Migraine for up to 1 dose. Take one tablet at onset of headache. May repeat dose one time in 2 hours. (Patient taking differently: Take 1 tablet by mouth As Needed for Migraine. Take one tablet at onset of headache. May repeat dose one time in 2 hours.), Disp: 27 tablet, Rfl: 3    vitamin B-12 (CYANOCOBALAMIN) 2500 MCG sublingual tablet tablet, Place 2,500 mcg under the tongue 1 (One) Time Per Week., Disp: , Rfl:     zolpidem (AMBIEN) 10 MG tablet, Take 1 tablet by mouth Every Night., Disp: 90 tablet, Rfl: 1    Vital Signs  Vitals:    11/01/23 0903   BP: 140/90        Physical Exam  Constitutional: Resting comfortably, no acute distress  Neck: Supple, trachea midline  Respiratory: No increased work of breathing, Symmetric excursion  Cardiovascular: Well pefursed, no jugular venous distention evident   Abdominal: Soft, mildly tender, non-distended, well-healed midline and right subcostal incisions as well as prior colostomy site in the left lower quadrant  Lymphatics: No cervical or suprascapular adenopathy  Skin: Warm, dry, no rash on visualized skin  surfaces  Musculoskeletal: Symmetric strength, no obvious gross abnormalities  Psychiatric: Alert and oriented ×3, normal affect     Laboratory Results  I have personally reviewed CBC from September 14 with hemoglobin 12, platelets 208.  CMP from the same date with creatinine 0.5, potassium 4.4, albumin 4.2, ALT 35, AST 33, alkaline phosphatase 129, total bilirubin 0.8.    Radiology  I have personally reviewed sits marker studies, both images demonstrate retention of multiple markers that eventually make it to the left side but are not passed by the fifth day.         Osvaldo Salinas MD  Colorectal & General Surgery  [facility]    [address], [address]  [place], [place], [address]  P: [phone]  F: [phone]

## 2023-11-07 RX ORDER — CLOTRIMAZOLE AND BETAMETHASONE DIPROPIONATE 10; .64 MG/G; MG/G
1 CREAM TOPICAL 2 TIMES DAILY
Qty: 30 G | Refills: 1 | Status: SHIPPED | OUTPATIENT
Start: 2023-11-07

## 2023-11-09 ENCOUNTER — PREP FOR SURGERY (OUTPATIENT)
Dept: OTHER | Facility: HOSPITAL | Age: 68
End: 2023-11-09
Payer: MEDICARE

## 2023-11-10 ENCOUNTER — TELEPHONE (OUTPATIENT)
Dept: SURGERY | Facility: CLINIC | Age: 68
End: 2023-11-10
Payer: MEDICARE

## 2023-11-10 NOTE — TELEPHONE ENCOUNTER
Voicemail left for patient informing her of new arrival time for surgery on 11/20 with Dr. Salinas. Patient will need to arrive at 10:00 am for a 12:00 surgery. Requested patient return call to confirm. Updated instructions also sent via [facility].

## 2023-11-13 ENCOUNTER — PRE-ADMISSION TESTING (OUTPATIENT)
Dept: PREADMISSION TESTING | Facility: HOSPITAL | Age: 68
End: 2023-11-13
Payer: MEDICARE

## 2023-11-13 VITALS
SYSTOLIC BLOOD PRESSURE: 129 MMHG | OXYGEN SATURATION: 99 % | HEIGHT: 62 IN | BODY MASS INDEX: 16.89 KG/M2 | HEART RATE: 76 BPM | DIASTOLIC BLOOD PRESSURE: 77 MMHG | RESPIRATION RATE: 20 BRPM | WEIGHT: 91.8 LBS | TEMPERATURE: 97 F

## 2023-11-13 LAB
ANION GAP SERPL CALCULATED.3IONS-SCNC: 9 MMOL/L (ref 5–15)
BUN SERPL-MCNC: 13 MG/DL (ref 8–23)
BUN/CREAT SERPL: 16 (ref 7–25)
CALCIUM SPEC-SCNC: 9.8 MG/DL (ref 8.6–10.5)
CHLORIDE SERPL-SCNC: 93 MMOL/L (ref 98–107)
CO2 SERPL-SCNC: 30 MMOL/L (ref 22–29)
CREAT SERPL-MCNC: 0.81 MG/DL (ref 0.57–1)
DEPRECATED RDW RBC AUTO: 38.5 FL (ref 37–54)
EGFRCR SERPLBLD CKD-EPI 2021: 79.7 ML/MIN/1.73
ERYTHROCYTE [DISTWIDTH] IN BLOOD BY AUTOMATED COUNT: 11.4 % (ref 12.3–15.4)
GLUCOSE SERPL-MCNC: 96 MG/DL (ref 65–99)
HCT VFR BLD AUTO: 37.8 % (ref 34–46.6)
HGB BLD-MCNC: 13 G/DL (ref 12–15.9)
MCH RBC QN AUTO: 32 PG (ref 26.6–33)
MCHC RBC AUTO-ENTMCNC: 34.4 G/DL (ref 31.5–35.7)
MCV RBC AUTO: 93.1 FL (ref 79–97)
PLATELET # BLD AUTO: 212 10*3/MM3 (ref 140–450)
PMV BLD AUTO: 10.1 FL (ref 6–12)
POTASSIUM SERPL-SCNC: 4.3 MMOL/L (ref 3.5–5.2)
RBC # BLD AUTO: 4.06 10*6/MM3 (ref 3.77–5.28)
SODIUM SERPL-SCNC: 132 MMOL/L (ref 136–145)
WBC NRBC COR # BLD: 5.44 10*3/MM3 (ref 3.4–10.8)

## 2023-11-13 PROCEDURE — 85027 COMPLETE CBC AUTOMATED: CPT

## 2023-11-13 PROCEDURE — 80048 BASIC METABOLIC PNL TOTAL CA: CPT

## 2023-11-13 PROCEDURE — 36415 COLL VENOUS BLD VENIPUNCTURE: CPT

## 2023-11-13 PROCEDURE — 93005 ELECTROCARDIOGRAM TRACING: CPT

## 2023-11-13 NOTE — DISCHARGE INSTRUCTIONS
Take the following medications the morning of surgery:    ARRIVE TO MAIN OR DESK 11-20-23 AT 10:00AM    PAROXETINE, PANTOPRAZOLE,  ALPRAZOLAM (IF NEEDED)    If you are on prescription narcotic pain medication to control your pain you may also take that medication the morning of surgery.    General Instructions:  Do not eat solid food after midnight the night before surgery.  You may drink clear liquids day of surgery but must stop at least one hour before your hospital arrival time.  It is beneficial for you to have a clear drink that contains carbohydrates the day of surgery.  We suggest a 12 to 20 ounce bottle of Gatorade or Powerade for non-diabetic patients or a 12 to 20 ounce bottle of G2 or Powerade Zero for diabetic patients. (Pediatric patients, are not advised to drink a 12 to 20 ounce carbohydrate drink)    Clear liquids are liquids you can see through.  Nothing red in color.     Plain water                               Sports drinks  Sodas                                   Gelatin (Jell-O)  Fruit juices without pulp such as white grape juice and apple juice  Popsicles that contain no fruit or yogurt  Tea or coffee (no cream or milk added)  Gatorade / Powerade  G2 / Powerade Zero    Infants may have breast milk up to four hours before surgery.  Infants drinking formula may drink formula up to six hours before surgery.   Patients who avoid smoking, chewing tobacco and alcohol for 4 weeks prior to surgery have a reduced risk of post-operative complications.  Quit smoking as many days before surgery as you can.  Do not smoke, use chewing tobacco or drink alcohol the day of surgery.   If applicable bring your C-PAP/ BI-PAP machine in with you to preop day of surgery.  Bring any papers given to you in the doctor’s office.  Wear clean comfortable clothes.  Do not wear contact lenses, false eyelashes or make-up.  Bring a case for your glasses.   Bring crutches or walker if applicable.  Remove all piercings.   Leave jewelry and any other valuables at home.  Hair extensions with metal clips must be removed prior to surgery.  The Pre-Admission Testing nurse will instruct you to bring medications if unable to obtain an accurate list in Pre-Admission Testing.        Preventing a Surgical Site Infection:  For 2 to 3 days before surgery, avoid shaving with a razor because the razor can irritate skin and make it easier to develop an infection.    Any areas of open skin can increase the risk of a post-operative wound infection by allowing bacteria to enter and travel throughout the body.  Notify your surgeon if you have any skin wounds / rashes even if it is not near the expected surgical site.  The area will need assessed to determine if surgery should be delayed until it is healed.  The night prior to surgery shower using a fresh bar of anti-bacterial soap (such as Dial) and clean washcloth.  Sleep in a clean bed with clean clothing.  Do not allow pets to sleep with you.  Shower on the morning of surgery using a fresh bar of anti-bacterial soap (such as Dial) and clean washcloth.  Dry with a clean towel and dress in clean clothing.  Ask your surgeon if you will be receiving antibiotics prior to surgery.  Make sure you, your family, and all healthcare providers clean their hands with soap and water or an alcohol based hand  before caring for you or your wound.    Day of surgery:  Your arrival time is approximately two hours before your scheduled surgery time.  Upon arrival, a Pre-op nurse and Anesthesiologist will review your health history, obtain vital signs, and answer questions you may have.  The only belongings needed at this time will be a list of your home medications and if applicable your C-PAP/BI-PAP machine.  A Pre-op nurse will start an IV and you may receive medication in preparation for surgery, including something to help you relax.     Please be aware that surgery does come with discomfort.  We want to  make every effort to control your discomfort so please discuss any uncontrolled symptoms with your nurse.   Your doctor will most likely have prescribed pain medications.      If you are going home after surgery you will receive individualized written care instructions before being discharged.  A responsible adult must drive you to and from the hospital on the day of your surgery and stay with you for 24 hours.  Discharge prescriptions can be filled by the hospital pharmacy during regular pharmacy hours.  If you are having surgery late in the day/evening your prescription may be e-prescribed to your pharmacy.  Please verify your pharmacy hours or chose a 24 hour pharmacy to avoid not having access to your prescription because your pharmacy has closed for the day.    If you are staying overnight following surgery, you will be transported to your hospital room following the recovery period.  [facility] has all private rooms.    If you have any questions please call Pre-Admission Testing at [phone].  Deductibles and co-payments are collected on the day of service. Please be prepared to pay the required co-pay, deductible or deposit on the day of service as defined by your plan.    Call your surgeon immediately if you experience any of the following symptoms:  Sore Throat  Shortness of Breath or difficulty breathing  Cough  Chills  Body soreness or muscle pain  Headache  Fever  New loss of taste or smell  Do not arrive for your surgery ill.  Your procedure will need to be rescheduled to another time.  You will need to call your physician before the day of surgery to avoid any unnecessary exposure to hospital staff as well as other patients.      CHLORHEXIDINE CLOTH INSTRUCTIONS  The morning of surgery follow these instructions using the Chlorhexidine cloths you've been given.  These steps reduce bacteria on the body.  Do not use the cloths near your eyes, ears mouth, genitalia or on open wounds.   Throw the cloths away after use but do not try to flush them down a toilet.      Open and remove one cloth at a time from the package.    Leave the cloth unfolded and begin the bathing.  Massage the skin with the cloths using gentle pressure to remove bacteria.  Do not scrub harshly.   Follow the steps below with one 2% CHG cloth per area (6 total cloths).  One cloth for neck, shoulders and chest.  One cloth for both arms, hands, fingers and underarms (do underarms last).  One cloth for the abdomen followed by groin.  One cloth for right leg and foot including between the toes.  One cloth for left leg and foot including between the toes.  The last cloth is to be used for the back of the neck, back and buttocks.    Allow the CHG to air dry 3 minutes on the skin which will give it time to work and decrease the chance of irritation.  The skin may feel sticky until it is dry.  Do not rinse with water or any other liquid or you will lose the beneficial effects of the CHG.  If mild skin irritation occurs, do rinse the skin to remove the CHG.  Report this to the nurse at time of admission.  Do not apply lotions, creams, ointments, deodorants or perfumes after using the clothes. Dress in clean clothes before coming to the hospital.

## 2023-11-13 NOTE — NURSING NOTE
11/13/23 1030   Stoma Site Marking   Procedure Marking For ileostomy   Site Marked RLQ: right lower quadrant   Patient Assessment Screen rectus muscle identified;marked within patient's visual field;bony prominences avoided;waistband avoided;creases/scars avoided   How Was Patient Marked? surgical marker;transparent dressing   Patient Position During Marking sitting;standing     Wound/ostomy - stoma site marking for possible ileostomy. Patient had a colostomy approx 30 years ago so has some familiarity with what to expect.

## 2023-11-14 LAB
QT INTERVAL: 372 MS
QTC INTERVAL: 424 MS

## 2023-11-20 ENCOUNTER — HOSPITAL ENCOUNTER (INPATIENT)
Facility: HOSPITAL | Age: 68
LOS: 30 days | Discharge: HOME | DRG: 329 | End: 2023-12-20
Attending: SURGERY | Admitting: SURGERY
Payer: MEDICARE

## 2023-11-20 ENCOUNTER — ANESTHESIA EVENT (OUTPATIENT)
Dept: PERIOP | Facility: HOSPITAL | Age: 68
End: 2023-11-20
Payer: MEDICARE

## 2023-11-20 ENCOUNTER — ANESTHESIA (OUTPATIENT)
Dept: PERIOP | Facility: HOSPITAL | Age: 68
End: 2023-11-20
Payer: MEDICARE

## 2023-11-20 DIAGNOSIS — R10.9 CHRONIC ABDOMINAL PAIN: ICD-10-CM

## 2023-11-20 DIAGNOSIS — E43 SEVERE MALNUTRITION: Primary | ICD-10-CM

## 2023-11-20 DIAGNOSIS — K59.9 COLONIC INERTIA: ICD-10-CM

## 2023-11-20 DIAGNOSIS — G89.29 CHRONIC ABDOMINAL PAIN: ICD-10-CM

## 2023-11-20 PROCEDURE — 25010000002 DEXAMETHASONE PER 1 MG: Performed by: NURSE ANESTHETIST, CERTIFIED REGISTERED

## 2023-11-20 PROCEDURE — 25010000002 MAGNESIUM SULFATE PER 500 MG OF MAGNESIUM: Performed by: NURSE ANESTHETIST, CERTIFIED REGISTERED

## 2023-11-20 PROCEDURE — 25010000002 SUGAMMADEX 200 MG/2ML SOLUTION: Performed by: STUDENT IN AN ORGANIZED HEALTH CARE EDUCATION/TRAINING PROGRAM

## 2023-11-20 PROCEDURE — 25010000002 PROPOFOL 10 MG/ML EMULSION: Performed by: ANESTHESIOLOGY

## 2023-11-20 PROCEDURE — 25010000002 ONDANSETRON PER 1 MG: Performed by: NURSE ANESTHETIST, CERTIFIED REGISTERED

## 2023-11-20 PROCEDURE — 88307 TISSUE EXAM BY PATHOLOGIST: CPT | Performed by: SURGERY

## 2023-11-20 PROCEDURE — 0DN80ZZ RELEASE SMALL INTESTINE, OPEN APPROACH: ICD-10-PCS | Performed by: SURGERY

## 2023-11-20 PROCEDURE — 0DBB0ZZ EXCISION OF ILEUM, OPEN APPROACH: ICD-10-PCS | Performed by: SURGERY

## 2023-11-20 PROCEDURE — P9041 ALBUMIN (HUMAN),5%, 50ML: HCPCS | Performed by: NURSE ANESTHETIST, CERTIFIED REGISTERED

## 2023-11-20 PROCEDURE — 0 BUPIVACAINE LIPOSOME 1.3 % SUSPENSION 20 ML VIAL: Performed by: SURGERY

## 2023-11-20 PROCEDURE — 0DBM0ZZ EXCISION OF DESCENDING COLON, OPEN APPROACH: ICD-10-PCS | Performed by: SURGERY

## 2023-11-20 PROCEDURE — 25010000002 PHENYLEPHRINE 10 MG/ML SOLUTION: Performed by: NURSE ANESTHETIST, CERTIFIED REGISTERED

## 2023-11-20 PROCEDURE — 44160 REMOVAL OF COLON: CPT | Performed by: SURGERY

## 2023-11-20 PROCEDURE — 0DTL0ZZ RESECTION OF TRANSVERSE COLON, OPEN APPROACH: ICD-10-PCS | Performed by: SURGERY

## 2023-11-20 PROCEDURE — 0DNW0ZZ RELEASE PERITONEUM, OPEN APPROACH: ICD-10-PCS | Performed by: SURGERY

## 2023-11-20 PROCEDURE — C9290 INJ, BUPIVACAINE LIPOSOME: HCPCS | Performed by: SURGERY

## 2023-11-20 PROCEDURE — 25010000002 CEFOXITIN PER 1 G: Performed by: SURGERY

## 2023-11-20 PROCEDURE — 25010000002 PROPOFOL 500 MG/50ML EMULSION: Performed by: ANESTHESIOLOGY

## 2023-11-20 PROCEDURE — 44160 REMOVAL OF COLON: CPT | Performed by: PHYSICIAN ASSISTANT

## 2023-11-20 PROCEDURE — 25810000003 LACTATED RINGERS PER 1000 ML: Performed by: ANESTHESIOLOGY

## 2023-11-20 PROCEDURE — 25010000002 DROPERIDOL PER 5 MG: Performed by: NURSE ANESTHETIST, CERTIFIED REGISTERED

## 2023-11-20 PROCEDURE — 25010000002 HEPARIN (PORCINE) PER 1000 UNITS: Performed by: SURGERY

## 2023-11-20 PROCEDURE — C1713 ANCHOR/SCREW BN/BN,TIS/BN: HCPCS | Performed by: SURGERY

## 2023-11-20 PROCEDURE — 25010000002 CEFOXITIN PER 1 G: Performed by: NURSE ANESTHETIST, CERTIFIED REGISTERED

## 2023-11-20 PROCEDURE — 25010000002 HYDROMORPHONE PER 4 MG: Performed by: NURSE ANESTHETIST, CERTIFIED REGISTERED

## 2023-11-20 PROCEDURE — 25010000002 HYDROMORPHONE PER 4 MG: Performed by: SURGERY

## 2023-11-20 PROCEDURE — 25810000003 SODIUM CHLORIDE PER 500 ML: Performed by: SURGERY

## 2023-11-20 PROCEDURE — 0DJD4ZZ INSPECTION OF LOWER INTESTINAL TRACT, PERCUTANEOUS ENDOSCOPIC APPROACH: ICD-10-PCS | Performed by: SURGERY

## 2023-11-20 PROCEDURE — 25810000003 DEXTROSE 5% IN LACTATED RINGERS PER 1000 ML: Performed by: SURGERY

## 2023-11-20 PROCEDURE — 25010000002 MIDAZOLAM PER 1 MG: Performed by: ANESTHESIOLOGY

## 2023-11-20 PROCEDURE — 25010000002 ALBUMIN HUMAN 5% PER 50 ML: Performed by: NURSE ANESTHETIST, CERTIFIED REGISTERED

## 2023-11-20 PROCEDURE — 25010000002 VASOPRESSIN 20 UNIT/ML SOLUTION: Performed by: NURSE ANESTHETIST, CERTIFIED REGISTERED

## 2023-11-20 DEVICE — CABL COCR 1.7MM WTI CR750MM STRL: Type: IMPLANTABLE DEVICE | Site: ABDOMEN | Status: FUNCTIONAL

## 2023-11-20 DEVICE — STPLR LNR CUT 60MM BLU REG TX60B: Type: IMPLANTABLE DEVICE | Site: ABDOMEN | Status: FUNCTIONAL

## 2023-11-20 RX ORDER — ONDANSETRON 2 MG/ML
INJECTION INTRAMUSCULAR; INTRAVENOUS AS NEEDED
Status: DISCONTINUED | OUTPATIENT
Start: 2023-11-20 | End: 2023-11-20 | Stop reason: SURG

## 2023-11-20 RX ORDER — CALCIUM CHLORIDE 100 MG/ML
INJECTION INTRAVENOUS; INTRAVENTRICULAR AS NEEDED
Status: DISCONTINUED | OUTPATIENT
Start: 2023-11-20 | End: 2023-11-20 | Stop reason: SURG

## 2023-11-20 RX ORDER — PAROXETINE HYDROCHLORIDE 20 MG/1
40 TABLET, FILM COATED ORAL EVERY MORNING
Status: DISCONTINUED | OUTPATIENT
Start: 2023-11-21 | End: 2023-11-27

## 2023-11-20 RX ORDER — PROPOFOL 10 MG/ML
VIAL (ML) INTRAVENOUS CONTINUOUS PRN
Status: DISCONTINUED | OUTPATIENT
Start: 2023-11-20 | End: 2023-11-20 | Stop reason: SURG

## 2023-11-20 RX ORDER — ONDANSETRON 4 MG/1
4 TABLET, FILM COATED ORAL EVERY 6 HOURS PRN
Status: DISCONTINUED | OUTPATIENT
Start: 2023-11-20 | End: 2023-11-27

## 2023-11-20 RX ORDER — PROPOFOL 10 MG/ML
INJECTION, EMULSION INTRAVENOUS AS NEEDED
Status: DISCONTINUED | OUTPATIENT
Start: 2023-11-20 | End: 2023-11-20 | Stop reason: SURG

## 2023-11-20 RX ORDER — METHOCARBAMOL 750 MG/1
750 TABLET, FILM COATED ORAL 4 TIMES DAILY
Status: DISCONTINUED | OUTPATIENT
Start: 2023-11-20 | End: 2023-11-27

## 2023-11-20 RX ORDER — FLUMAZENIL 0.1 MG/ML
0.2 INJECTION INTRAVENOUS AS NEEDED
Status: DISCONTINUED | OUTPATIENT
Start: 2023-11-20 | End: 2023-11-20 | Stop reason: HOSPADM

## 2023-11-20 RX ORDER — HYDROMORPHONE HYDROCHLORIDE 1 MG/ML
0.25 INJECTION, SOLUTION INTRAMUSCULAR; INTRAVENOUS; SUBCUTANEOUS
Status: DISCONTINUED | OUTPATIENT
Start: 2023-11-20 | End: 2023-11-20 | Stop reason: HOSPADM

## 2023-11-20 RX ORDER — AMLODIPINE BESYLATE 2.5 MG/1
2.5 TABLET ORAL DAILY
Status: DISCONTINUED | OUTPATIENT
Start: 2023-11-21 | End: 2023-11-26

## 2023-11-20 RX ORDER — SUMATRIPTAN 100 MG/1
100 TABLET, FILM COATED ORAL ONCE AS NEEDED
Status: DISCONTINUED | OUTPATIENT
Start: 2023-11-20 | End: 2023-11-27

## 2023-11-20 RX ORDER — DIPHENHYDRAMINE HYDROCHLORIDE 50 MG/ML
25 INJECTION INTRAMUSCULAR; INTRAVENOUS EVERY 4 HOURS PRN
Status: DISCONTINUED | OUTPATIENT
Start: 2023-11-20 | End: 2023-11-20 | Stop reason: HOSPADM

## 2023-11-20 RX ORDER — ROCURONIUM BROMIDE 10 MG/ML
INJECTION, SOLUTION INTRAVENOUS AS NEEDED
Status: DISCONTINUED | OUTPATIENT
Start: 2023-11-20 | End: 2023-11-20 | Stop reason: SURG

## 2023-11-20 RX ORDER — DROPERIDOL 2.5 MG/ML
0.62 INJECTION, SOLUTION INTRAMUSCULAR; INTRAVENOUS
Status: DISCONTINUED | OUTPATIENT
Start: 2023-11-20 | End: 2023-11-20 | Stop reason: HOSPADM

## 2023-11-20 RX ORDER — MIDAZOLAM HYDROCHLORIDE 1 MG/ML
0.5 INJECTION INTRAMUSCULAR; INTRAVENOUS
Status: COMPLETED | OUTPATIENT
Start: 2023-11-20 | End: 2023-11-20

## 2023-11-20 RX ORDER — PANTOPRAZOLE SODIUM 40 MG/1
40 TABLET, DELAYED RELEASE ORAL DAILY
Status: DISCONTINUED | OUTPATIENT
Start: 2023-11-21 | End: 2023-11-25

## 2023-11-20 RX ORDER — NALOXONE HCL 0.4 MG/ML
0.4 VIAL (ML) INJECTION
Status: DISCONTINUED | OUTPATIENT
Start: 2023-11-20 | End: 2023-11-20 | Stop reason: HOSPADM

## 2023-11-20 RX ORDER — HYDROMORPHONE HYDROCHLORIDE 1 MG/ML
0.5 INJECTION, SOLUTION INTRAMUSCULAR; INTRAVENOUS; SUBCUTANEOUS
Status: DISCONTINUED | OUTPATIENT
Start: 2023-11-20 | End: 2023-11-28

## 2023-11-20 RX ORDER — IPRATROPIUM BROMIDE AND ALBUTEROL SULFATE 2.5; .5 MG/3ML; MG/3ML
3 SOLUTION RESPIRATORY (INHALATION) ONCE AS NEEDED
Status: DISCONTINUED | OUTPATIENT
Start: 2023-11-20 | End: 2023-11-20 | Stop reason: HOSPADM

## 2023-11-20 RX ORDER — HYDRALAZINE HYDROCHLORIDE 20 MG/ML
5 INJECTION INTRAMUSCULAR; INTRAVENOUS
Status: DISCONTINUED | OUTPATIENT
Start: 2023-11-20 | End: 2023-11-20 | Stop reason: HOSPADM

## 2023-11-20 RX ORDER — SODIUM CHLORIDE, SODIUM LACTATE, POTASSIUM CHLORIDE, CALCIUM CHLORIDE 600; 310; 30; 20 MG/100ML; MG/100ML; MG/100ML; MG/100ML
9 INJECTION, SOLUTION INTRAVENOUS CONTINUOUS
Status: DISCONTINUED | OUTPATIENT
Start: 2023-11-20 | End: 2023-11-20

## 2023-11-20 RX ORDER — ACETAMINOPHEN 325 MG/1
650 TABLET ORAL ONCE
Status: DISCONTINUED | OUTPATIENT
Start: 2023-11-20 | End: 2023-11-20 | Stop reason: HOSPADM

## 2023-11-20 RX ORDER — PHENYLEPHRINE HYDROCHLORIDE 10 MG/ML
INJECTION INTRAVENOUS AS NEEDED
Status: DISCONTINUED | OUTPATIENT
Start: 2023-11-20 | End: 2023-11-20 | Stop reason: SURG

## 2023-11-20 RX ORDER — ONDANSETRON 2 MG/ML
4 INJECTION INTRAMUSCULAR; INTRAVENOUS ONCE AS NEEDED
Status: DISCONTINUED | OUTPATIENT
Start: 2023-11-20 | End: 2023-11-20 | Stop reason: HOSPADM

## 2023-11-20 RX ORDER — MAGNESIUM HYDROXIDE 1200 MG/15ML
LIQUID ORAL AS NEEDED
Status: DISCONTINUED | OUTPATIENT
Start: 2023-11-20 | End: 2023-11-20 | Stop reason: HOSPADM

## 2023-11-20 RX ORDER — DEXTROSE, SODIUM CHLORIDE, SODIUM LACTATE, POTASSIUM CHLORIDE, AND CALCIUM CHLORIDE 5; .6; .31; .03; .02 G/100ML; G/100ML; G/100ML; G/100ML; G/100ML
75 INJECTION, SOLUTION INTRAVENOUS CONTINUOUS
Status: ACTIVE | OUTPATIENT
Start: 2023-11-20 | End: 2023-11-24

## 2023-11-20 RX ORDER — SODIUM CHLORIDE 0.9 % (FLUSH) 0.9 %
3 SYRINGE (ML) INJECTION EVERY 12 HOURS SCHEDULED
Status: DISCONTINUED | OUTPATIENT
Start: 2023-11-20 | End: 2023-11-20 | Stop reason: HOSPADM

## 2023-11-20 RX ORDER — ONDANSETRON 2 MG/ML
4 INJECTION INTRAMUSCULAR; INTRAVENOUS EVERY 6 HOURS PRN
Status: DISCONTINUED | OUTPATIENT
Start: 2023-11-20 | End: 2023-12-19

## 2023-11-20 RX ORDER — KETAMINE HCL IN NACL, ISO-OSM 100MG/10ML
SYRINGE (ML) INJECTION AS NEEDED
Status: DISCONTINUED | OUTPATIENT
Start: 2023-11-20 | End: 2023-11-20 | Stop reason: SURG

## 2023-11-20 RX ORDER — CEFOXITIN 2 G/1
INJECTION, POWDER, FOR SOLUTION INTRAVENOUS AS NEEDED
Status: DISCONTINUED | OUTPATIENT
Start: 2023-11-20 | End: 2023-11-20 | Stop reason: SURG

## 2023-11-20 RX ORDER — LIDOCAINE HYDROCHLORIDE 10 MG/ML
0.5 INJECTION, SOLUTION INFILTRATION; PERINEURAL ONCE AS NEEDED
Status: DISCONTINUED | OUTPATIENT
Start: 2023-11-20 | End: 2023-11-20 | Stop reason: HOSPADM

## 2023-11-20 RX ORDER — LIDOCAINE HYDROCHLORIDE 20 MG/ML
INJECTION, SOLUTION INFILTRATION; PERINEURAL AS NEEDED
Status: DISCONTINUED | OUTPATIENT
Start: 2023-11-20 | End: 2023-11-20 | Stop reason: SURG

## 2023-11-20 RX ORDER — EPHEDRINE SULFATE 50 MG/ML
INJECTION, SOLUTION INTRAVENOUS AS NEEDED
Status: DISCONTINUED | OUTPATIENT
Start: 2023-11-20 | End: 2023-11-20 | Stop reason: SURG

## 2023-11-20 RX ORDER — OXYCODONE HYDROCHLORIDE 5 MG/1
5 TABLET ORAL ONCE AS NEEDED
Status: DISCONTINUED | OUTPATIENT
Start: 2023-11-20 | End: 2023-11-20 | Stop reason: HOSPADM

## 2023-11-20 RX ORDER — MAGNESIUM SULFATE HEPTAHYDRATE 500 MG/ML
INJECTION, SOLUTION INTRAMUSCULAR; INTRAVENOUS AS NEEDED
Status: DISCONTINUED | OUTPATIENT
Start: 2023-11-20 | End: 2023-11-20 | Stop reason: SURG

## 2023-11-20 RX ORDER — ZOLPIDEM TARTRATE 5 MG/1
10 TABLET ORAL NIGHTLY
Status: DISCONTINUED | OUTPATIENT
Start: 2023-11-20 | End: 2023-11-27

## 2023-11-20 RX ORDER — ALBUMIN, HUMAN INJ 5% 5 %
SOLUTION INTRAVENOUS CONTINUOUS PRN
Status: DISCONTINUED | OUTPATIENT
Start: 2023-11-20 | End: 2023-11-20 | Stop reason: SURG

## 2023-11-20 RX ORDER — HEPARIN SODIUM 5000 [USP'U]/ML
5000 INJECTION, SOLUTION INTRAVENOUS; SUBCUTANEOUS ONCE
Status: COMPLETED | OUTPATIENT
Start: 2023-11-20 | End: 2023-11-20

## 2023-11-20 RX ORDER — SODIUM CHLORIDE 9 MG/ML
INJECTION, SOLUTION INTRAVENOUS AS NEEDED
Status: DISCONTINUED | OUTPATIENT
Start: 2023-11-20 | End: 2023-11-20 | Stop reason: HOSPADM

## 2023-11-20 RX ORDER — NALOXONE HCL 0.4 MG/ML
0.4 VIAL (ML) INJECTION AS NEEDED
Status: DISCONTINUED | OUTPATIENT
Start: 2023-11-20 | End: 2023-11-20 | Stop reason: HOSPADM

## 2023-11-20 RX ORDER — DIPHENHYDRAMINE HCL 25 MG
25 CAPSULE ORAL EVERY 4 HOURS PRN
Status: DISCONTINUED | OUTPATIENT
Start: 2023-11-20 | End: 2023-11-20 | Stop reason: HOSPADM

## 2023-11-20 RX ORDER — VASOPRESSIN 20 U/ML
INJECTION PARENTERAL AS NEEDED
Status: DISCONTINUED | OUTPATIENT
Start: 2023-11-20 | End: 2023-11-20 | Stop reason: SURG

## 2023-11-20 RX ORDER — DROPERIDOL 2.5 MG/ML
INJECTION, SOLUTION INTRAMUSCULAR; INTRAVENOUS AS NEEDED
Status: DISCONTINUED | OUTPATIENT
Start: 2023-11-20 | End: 2023-11-20 | Stop reason: SURG

## 2023-11-20 RX ORDER — SODIUM CHLORIDE 0.9 % (FLUSH) 0.9 %
3-10 SYRINGE (ML) INJECTION AS NEEDED
Status: DISCONTINUED | OUTPATIENT
Start: 2023-11-20 | End: 2023-11-20 | Stop reason: HOSPADM

## 2023-11-20 RX ORDER — KETAMINE HCL IN NACL, ISO-OSM 100MG/10ML
10 SYRINGE (ML) INJECTION
Status: DISCONTINUED | OUTPATIENT
Start: 2023-11-20 | End: 2023-11-20 | Stop reason: HOSPADM

## 2023-11-20 RX ORDER — ACETAMINOPHEN 500 MG
1000 TABLET ORAL EVERY 6 HOURS SCHEDULED
Status: DISCONTINUED | OUTPATIENT
Start: 2023-11-20 | End: 2023-11-27

## 2023-11-20 RX ORDER — OXYCODONE HYDROCHLORIDE 5 MG/1
5 TABLET ORAL EVERY 4 HOURS PRN
Status: DISCONTINUED | OUTPATIENT
Start: 2023-11-20 | End: 2023-11-22

## 2023-11-20 RX ORDER — LABETALOL HYDROCHLORIDE 5 MG/ML
5 INJECTION, SOLUTION INTRAVENOUS
Status: DISCONTINUED | OUTPATIENT
Start: 2023-11-20 | End: 2023-11-20 | Stop reason: HOSPADM

## 2023-11-20 RX ORDER — ALPRAZOLAM 0.5 MG/1
0.5 TABLET ORAL 2 TIMES DAILY
Status: DISCONTINUED | OUTPATIENT
Start: 2023-11-20 | End: 2023-11-27

## 2023-11-20 RX ORDER — FAMOTIDINE 10 MG/ML
20 INJECTION, SOLUTION INTRAVENOUS ONCE
Status: COMPLETED | OUTPATIENT
Start: 2023-11-20 | End: 2023-11-20

## 2023-11-20 RX ORDER — FENTANYL CITRATE 50 UG/ML
50 INJECTION, SOLUTION INTRAMUSCULAR; INTRAVENOUS ONCE AS NEEDED
Status: DISCONTINUED | OUTPATIENT
Start: 2023-11-20 | End: 2023-11-20 | Stop reason: HOSPADM

## 2023-11-20 RX ORDER — GLYCOPYRROLATE 0.2 MG/ML
INJECTION INTRAMUSCULAR; INTRAVENOUS AS NEEDED
Status: DISCONTINUED | OUTPATIENT
Start: 2023-11-20 | End: 2023-11-20 | Stop reason: SURG

## 2023-11-20 RX ORDER — DEXAMETHASONE SODIUM PHOSPHATE 4 MG/ML
INJECTION, SOLUTION INTRA-ARTICULAR; INTRALESIONAL; INTRAMUSCULAR; INTRAVENOUS; SOFT TISSUE AS NEEDED
Status: DISCONTINUED | OUTPATIENT
Start: 2023-11-20 | End: 2023-11-20 | Stop reason: SURG

## 2023-11-20 RX ORDER — AMITRIPTYLINE HYDROCHLORIDE 10 MG/1
5 TABLET, FILM COATED ORAL NIGHTLY
Status: DISCONTINUED | OUTPATIENT
Start: 2023-11-20 | End: 2023-11-27

## 2023-11-20 RX ORDER — ENOXAPARIN SODIUM 100 MG/ML
30 INJECTION SUBCUTANEOUS DAILY
Status: DISCONTINUED | OUTPATIENT
Start: 2023-11-21 | End: 2023-11-23

## 2023-11-20 RX ADMIN — ROCURONIUM BROMIDE 20 MG: 10 INJECTION, SOLUTION INTRAVENOUS at 17:01

## 2023-11-20 RX ADMIN — PROPOFOL 100 MG: 10 INJECTION, EMULSION INTRAVENOUS at 12:08

## 2023-11-20 RX ADMIN — SODIUM CHLORIDE, POTASSIUM CHLORIDE, SODIUM LACTATE AND CALCIUM CHLORIDE 9 ML/HR: 600; 310; 30; 20 INJECTION, SOLUTION INTRAVENOUS at 11:18

## 2023-11-20 RX ADMIN — MIDAZOLAM 0.5 MG: 1 INJECTION INTRAMUSCULAR; INTRAVENOUS at 11:30

## 2023-11-20 RX ADMIN — PHENYLEPHRINE HYDROCHLORIDE 200 MCG: 10 INJECTION INTRAVENOUS at 15:33

## 2023-11-20 RX ADMIN — Medication 10 MG: at 13:40

## 2023-11-20 RX ADMIN — CALCIUM CHLORIDE 0.5 G: 100 INJECTION INTRAVENOUS; INTRAVENTRICULAR at 17:44

## 2023-11-20 RX ADMIN — PHENYLEPHRINE HYDROCHLORIDE 200 MCG: 10 INJECTION INTRAVENOUS at 15:18

## 2023-11-20 RX ADMIN — GLYCOPYRROLATE 0.2 MG: 0.2 INJECTION INTRAMUSCULAR; INTRAVENOUS at 15:55

## 2023-11-20 RX ADMIN — ROCURONIUM BROMIDE 20 MG: 10 INJECTION, SOLUTION INTRAVENOUS at 16:02

## 2023-11-20 RX ADMIN — MAGNESIUM SULFATE HEPTAHYDRATE 1 G: 500 INJECTION, SOLUTION INTRAMUSCULAR; INTRAVENOUS at 12:49

## 2023-11-20 RX ADMIN — HEPARIN SODIUM 5000 UNITS: 5000 INJECTION INTRAVENOUS; SUBCUTANEOUS at 11:28

## 2023-11-20 RX ADMIN — MAGNESIUM SULFATE HEPTAHYDRATE 1 G: 500 INJECTION, SOLUTION INTRAMUSCULAR; INTRAVENOUS at 12:39

## 2023-11-20 RX ADMIN — EPHEDRINE SULFATE 10 MG: 50 INJECTION INTRAVENOUS at 14:13

## 2023-11-20 RX ADMIN — MIDAZOLAM 0.5 MG: 1 INJECTION INTRAMUSCULAR; INTRAVENOUS at 11:23

## 2023-11-20 RX ADMIN — PHENYLEPHRINE HYDROCHLORIDE 200 MCG: 10 INJECTION INTRAVENOUS at 15:52

## 2023-11-20 RX ADMIN — LIDOCAINE HYDROCHLORIDE 80 MG: 20 INJECTION, SOLUTION INFILTRATION; PERINEURAL at 12:07

## 2023-11-20 RX ADMIN — SODIUM CHLORIDE, SODIUM LACTATE, POTASSIUM CHLORIDE, CALCIUM CHLORIDE AND DEXTROSE MONOHYDRATE 75 ML/HR: 5; 600; 310; 30; 20 INJECTION, SOLUTION INTRAVENOUS at 22:45

## 2023-11-20 RX ADMIN — DEXAMETHASONE SODIUM PHOSPHATE 8 MG: 4 INJECTION, SOLUTION INTRA-ARTICULAR; INTRALESIONAL; INTRAMUSCULAR; INTRAVENOUS; SOFT TISSUE at 12:45

## 2023-11-20 RX ADMIN — Medication 10 MG: at 14:43

## 2023-11-20 RX ADMIN — PHENYLEPHRINE HYDROCHLORIDE 200 MCG: 10 INJECTION INTRAVENOUS at 16:30

## 2023-11-20 RX ADMIN — ONDANSETRON 4 MG: 2 INJECTION INTRAMUSCULAR; INTRAVENOUS at 16:59

## 2023-11-20 RX ADMIN — PHENYLEPHRINE HYDROCHLORIDE 100 MCG: 10 INJECTION INTRAVENOUS at 15:10

## 2023-11-20 RX ADMIN — Medication 10 MG: at 19:25

## 2023-11-20 RX ADMIN — ROCURONIUM BROMIDE 20 MG: 10 INJECTION, SOLUTION INTRAVENOUS at 14:04

## 2023-11-20 RX ADMIN — CEFOXITIN SODIUM 2 G: 2 POWDER, FOR SOLUTION INTRAVENOUS at 14:00

## 2023-11-20 RX ADMIN — ROCURONIUM BROMIDE 10 MG: 10 INJECTION, SOLUTION INTRAVENOUS at 13:01

## 2023-11-20 RX ADMIN — FAMOTIDINE 20 MG: 10 INJECTION INTRAVENOUS at 11:20

## 2023-11-20 RX ADMIN — Medication 20 MG: at 12:38

## 2023-11-20 RX ADMIN — SODIUM CHLORIDE, POTASSIUM CHLORIDE, SODIUM LACTATE AND CALCIUM CHLORIDE: 600; 310; 30; 20 INJECTION, SOLUTION INTRAVENOUS at 15:52

## 2023-11-20 RX ADMIN — PHENYLEPHRINE HYDROCHLORIDE 200 MCG: 10 INJECTION INTRAVENOUS at 16:09

## 2023-11-20 RX ADMIN — VASOPRESSIN 2 UNITS: 20 INJECTION INTRAVENOUS at 17:16

## 2023-11-20 RX ADMIN — ROCURONIUM BROMIDE 40 MG: 10 INJECTION, SOLUTION INTRAVENOUS at 12:08

## 2023-11-20 RX ADMIN — EPHEDRINE SULFATE 10 MG: 50 INJECTION INTRAVENOUS at 13:08

## 2023-11-20 RX ADMIN — PHENYLEPHRINE HYDROCHLORIDE 200 MCG: 10 INJECTION INTRAVENOUS at 16:17

## 2023-11-20 RX ADMIN — CEFOXITIN SODIUM 2 G: 2 POWDER, FOR SOLUTION INTRAVENOUS at 15:50

## 2023-11-20 RX ADMIN — HYDROMORPHONE HYDROCHLORIDE 0.25 MG: 1 INJECTION, SOLUTION INTRAMUSCULAR; INTRAVENOUS; SUBCUTANEOUS at 20:20

## 2023-11-20 RX ADMIN — CEFOXITIN SODIUM 2 G: 2 POWDER, FOR SOLUTION INTRAVENOUS at 17:50

## 2023-11-20 RX ADMIN — PROPOFOL 50 MCG/KG/MIN: 10 INJECTION, EMULSION INTRAVENOUS at 12:28

## 2023-11-20 RX ADMIN — SUGAMMADEX 200 MG: 100 INJECTION, SOLUTION INTRAVENOUS at 18:22

## 2023-11-20 RX ADMIN — SODIUM CHLORIDE, POTASSIUM CHLORIDE, SODIUM LACTATE AND CALCIUM CHLORIDE: 600; 310; 30; 20 INJECTION, SOLUTION INTRAVENOUS at 13:15

## 2023-11-20 RX ADMIN — HYDROMORPHONE HYDROCHLORIDE 0.5 MG: 1 INJECTION, SOLUTION INTRAMUSCULAR; INTRAVENOUS; SUBCUTANEOUS at 22:56

## 2023-11-20 RX ADMIN — ALBUMIN (HUMAN): 12.5 INJECTION, SOLUTION INTRAVENOUS at 16:12

## 2023-11-20 RX ADMIN — ROCURONIUM BROMIDE 10 MG: 10 INJECTION, SOLUTION INTRAVENOUS at 18:04

## 2023-11-20 RX ADMIN — ROCURONIUM BROMIDE 20 MG: 10 INJECTION, SOLUTION INTRAVENOUS at 15:03

## 2023-11-20 RX ADMIN — ROCURONIUM BROMIDE 10 MG: 10 INJECTION, SOLUTION INTRAVENOUS at 12:36

## 2023-11-20 RX ADMIN — EPHEDRINE SULFATE 10 MG: 50 INJECTION INTRAVENOUS at 17:42

## 2023-11-20 RX ADMIN — HYDROMORPHONE HYDROCHLORIDE 0.25 MG: 1 INJECTION, SOLUTION INTRAMUSCULAR; INTRAVENOUS; SUBCUTANEOUS at 18:49

## 2023-11-20 RX ADMIN — Medication 10 MG: at 19:07

## 2023-11-20 RX ADMIN — PHENYLEPHRINE HYDROCHLORIDE 200 MCG: 10 INJECTION INTRAVENOUS at 15:57

## 2023-11-20 RX ADMIN — PHENYLEPHRINE HYDROCHLORIDE 200 MCG: 10 INJECTION INTRAVENOUS at 17:12

## 2023-11-20 RX ADMIN — DROPERIDOL 0.62 MG: 2.5 INJECTION, SOLUTION INTRAMUSCULAR; INTRAVENOUS at 12:42

## 2023-11-20 RX ADMIN — HYDROMORPHONE HYDROCHLORIDE 0.25 MG: 1 INJECTION, SOLUTION INTRAMUSCULAR; INTRAVENOUS; SUBCUTANEOUS at 19:15

## 2023-11-20 RX ADMIN — CEFOXITIN SODIUM 2000 MG: 2 POWDER, FOR SOLUTION INTRAVENOUS at 11:50

## 2023-11-20 RX ADMIN — EPHEDRINE SULFATE 10 MG: 50 INJECTION INTRAVENOUS at 13:04

## 2023-11-20 RX ADMIN — PHENYLEPHRINE HYDROCHLORIDE 200 MCG: 10 INJECTION INTRAVENOUS at 15:44

## 2023-11-20 NOTE — ANESTHESIA PREPROCEDURE EVALUATION
Anesthesia Evaluation     history of anesthetic complications:  PONV  NPO Solid Status: > 8 hours  NPO Liquid Status: > 2 hours           Airway   Mallampati: II  TM distance: >3 FB  Neck ROM: full  No difficulty expected  Dental          Pulmonary    (-) rhonchi, decreased breath sounds, wheezesStridor: caps.  Cardiovascular   Exercise tolerance: good (4-7 METS)    ECG reviewed  Rhythm: regular  Rate: normal    (+) hypertension  (-) murmur      Neuro/Psych  (+) headaches, numbness, psychiatric history Anxiety and Depression  GI/Hepatic/Renal/Endo    (+) GERD    Musculoskeletal     Abdominal     Abdomen: soft.   Substance History      OB/GYN          Other   arthritis,     ROS/Med Hx Other: Chronic hyponatremia  Underweight               Anesthesia Plan    ASA 3     general   total IV anesthesia  (ERAS, no mention of block, will clarify with surgeon)  intravenous induction     Anesthetic plan, risks, benefits, and alternatives have been provided, discussed and informed consent has been obtained with: patient.    CODE STATUS:

## 2023-11-20 NOTE — OP NOTE
Colorectal & General Surgery  Operative Report    Patient: Martina Hardwick  YOB: 1955  MRN: [id number]  DATE OF PROCEDURE: 11/20/23     PREOPERATIVE DIAGNOSIS:  Colonic inertia  History of sigmoid colectomy with coloproctostomy for diverticular disease  Chronic abdominal pain    POSTOPERATIVE DIAGNOSIS:  Same    PROCEDURE:  Exploratory laparotomy  Enterolysis, greater than 3.5 hours  Subtotal colectomy (terminal ileum, right colon, transverse colon, and proximal descending colon) with ileocolostomy to the descending colon, modifier 22  Mobilization of the splenic flexure    FINDINGS:  Incredibly hostile abdomen due to multiple prior surgeries and significant adhesive disease between the loops of bowel and the surrounding peritoneal cavity.  Multiple unavoidable enterotomies were made during adhesiolysis and entry into the abdominal cavity.  All of these enterotomies were closed primarily.  The right, transverse, and proximal descending colon appeared atonic and almost a pale white color.  Consistent with significant physiologic dysfunction.  A subtotal colectomy was performed.  From a vascular standpoint, great care was taken to ensure that the descending colon maintained its blood supply.  In order to achieve this, the ileocolic artery was divided, and the mesentery of the right colon was taken with the specimen.  The middle colic artery remains in vivo and the transverse and descending colon mesentery was divided at its junction with the colon itself.    SURGEON:  Osvaldo Salinas MD    ASSISTANT:  Assistant: Odette Jeong CSA was responsible for performing the following activities: Retraction, Suction, and Irrigation and their skilled assistance was necessary for the success of this case.     Assistant: Patrice Martinez PA-C was responsible for performing the following activities: Retraction, Suction, Irrigation, Suturing, Closing, and Placing Dressing and their skilled assistance was necessary for the  success of this case.     ANESTHESIA:  General-endotracheal    EBL:  150 mL    SPECIMEN:  Colon    OPERATIVE DESCRIPTION:  The patient was brought to the operating room under the care of the nursing staff.  The patient was placed on the operating room table in the supine position where anesthesia was induced.  The patient was then prepped and positioned in the usual sterile fashion.  A standardized timeout was then performed.    Local anesthetic was infiltrated just below the umbilicus in the midline.  A 12 mm incision was created.  The anterior fascia was divided intact with suture.  What was thought to be the abdominal cavity was then entered and the León trocar was placed.  It was immediately apparent that this was an intraluminal structure.  The León trocar was removed.  Midline laparotomy was created.  It was incredibly difficult to enter the abdomen as the entire anterior abdominal wall had significant adhesive disease between the underlying bowel and the peritoneum.  There were multiple unavoidable enterotomies made in an effort to free the bowel from the anterior abdominal wall.  This process took at least 90 minutes.  There was mild contamination of the abdomen with enteric contents.  Eventually, there was enough space along the anterior abdominal wall circumferentially to place a Bookwalter retractor.  The transverse colon was then freed from the underlying small intestine using combination of sharp dissection with scissors and electrocautery.  Once we were able to reflect the transverse colon cephalad, the omentum was  from the transverse colon mesentery with care to preserve the middle colic vessels.  The ligament of Treitz was then identified and working in an antegrade fashion, a massive amount of intra lysis was undertaken to free the small bowel from itself and its attachments to the peritoneal cavity.  Multiple enterotomies were again made that were unavoidable.  Eventually, after 2  additional hours of enterolysis, the cecum was encountered.  The cecum was then circumferentially mobilized free from the pelvis and elevated along the ileocolic pedicle.  The white line of Toldt was then incised and the right colon was mobilized medially.  The hepatic flexure was taken down sharply and under direct visualization.  The transverse colon was already mobilized.  We then turned our attention to the splenic flexure.  The gastrocolic ligament was divided as was the splenocolic ligament.  The left colon was then reflected medially by mobilizing along the white line of Toldt.  Retroperitoneal attachments to the splenic flexure of the colon were then taken down sharply with the Bovie electrocautery as the colon was rotated inferomedially.  Eventually, the entire splenic flexure was mobilized.  A window was made in the mesentery at the terminal ileum and the terminal ileum was divided with a 75 mm blue BRIE stapler.  The mid descending colon was then inspected and an appropriate location in the distal descending colon for division was selected.  A window was made in its mesentery and the descending colon was divided with a 75 mm blue BRIE stapler.  The LigaSure energy device was then used to divide the ileocolic pedicle and resect the mesentery of the right colon.  This line of dissection once past the hepatic flexure was transition to the margin of the mesentery at the edge of the transverse colon.  The middle colic vessels and the marginal vessel remained in place.  The remainder of the transverse colon and descending colon were then divided free from the mesentery at the junction of the colon and the mesentery.  The specimen was then passed off for permanent.  There were 9 enterotomies that were repaired primarily with 3-0 PDS inner layer and 3-0 Vicryl Lembert sutures.  These were all done transversely with adequate lumen remaining.  We inspected the entire length of the small intestine 3 times to ensure  that there were no additional serosal or full-thickness injuries.  There were 2 defects in the small bowel mesentery that were closed with 3-0 Vicryl interrupted sutures.  A side-to-side ileocolic anastomosis was then created with a 75 mm blue BRIE stapler.  The common enterotomy was then closed with a single fire of the 60 mm blue TX stapler.  The TX stapler staple line was imbricated with 3-0 Vicryl sutures.  A crotch stitch was placed.  The anastomosis was without tension.  There was Doppler signal in both the ileal mesentery as well as the colonic mesentery.  The abdomen was copiously irrigated.  The fascia was closed with 0 PDS pops in figure-of-eight fashion.  Fasciocutaneous flaps were raised.  The skin was closed with 4-0 Monocryl suture and Exofin.    Nasogastric tube placement was confirmed intraoperatively.    All needle, sponge, and instrument counts were correct at the end of the case.    The patient tolerated the procedure well and was transferred to the postanesthesia care unit in stable condition.    Modifier 22 was used for the subtotal colectomy with ileocolostomy creation due to the extensive amount of postsurgical changes that were present in the abdomen and the difficult and tortuous anatomy of the colon that required extensive dissection and significantly more time and effort than usual as well as the careful dissection required to preserve the tenuous blood supply to the descending colon due to her prior sigmoid colectomy.    Osvaldo Salinas M.D.  Colorectal & General Surgery  [facility]    [address], [address]  [place], [place], [address]  P: [phone]  F: [phone]

## 2023-11-20 NOTE — ANESTHESIA PROCEDURE NOTES
Airway  Urgency: elective    Date/Time: 11/20/2023 12:11 PM  Airway not difficult    General Information and Staff    Patient location during procedure: OR  Anesthesiologist: López Kumari MD    Indications and Patient Condition  Indications for airway management: airway protection    Preoxygenated: yes  Mask difficulty assessment: 1 - vent by mask    Final Airway Details  Final airway type: endotracheal airway      Successful airway: ETT  Cuffed: yes   Successful intubation technique: direct laryngoscopy  Facilitating devices/methods: intubating stylet  Endotracheal tube insertion site: oral  Blade: Katie  Blade size: 4  ETT size (mm): 7.0  Cormack-Lehane Classification: grade IIa - partial view of glottis  Placement verified by: chest auscultation   Measured from: lips  ETT/EBT  to lips (cm): 20  Number of attempts at approach: 1  Assessment: lips, teeth, and gum same as pre-op and atraumatic intubation

## 2023-11-21 LAB
ANION GAP SERPL CALCULATED.3IONS-SCNC: 12 MMOL/L (ref 5–15)
BUN SERPL-MCNC: 12 MG/DL (ref 8–23)
BUN/CREAT SERPL: 11.4 (ref 7–25)
CALCIUM SPEC-SCNC: 9 MG/DL (ref 8.6–10.5)
CHLORIDE SERPL-SCNC: 105 MMOL/L (ref 98–107)
CO2 SERPL-SCNC: 19 MMOL/L (ref 22–29)
CREAT SERPL-MCNC: 1.05 MG/DL (ref 0.57–1)
DEPRECATED RDW RBC AUTO: 39.7 FL (ref 37–54)
EGFRCR SERPLBLD CKD-EPI 2021: 58.4 ML/MIN/1.73
ERYTHROCYTE [DISTWIDTH] IN BLOOD BY AUTOMATED COUNT: 11.3 % (ref 12.3–15.4)
GLUCOSE SERPL-MCNC: 169 MG/DL (ref 65–99)
HCT VFR BLD AUTO: 34.4 % (ref 34–46.6)
HGB BLD-MCNC: 11.3 G/DL (ref 12–15.9)
LYMPHOCYTES # BLD MANUAL: 0.27 10*3/MM3 (ref 0.7–3.1)
LYMPHOCYTES NFR BLD MANUAL: 5.1 % (ref 5–12)
MAGNESIUM SERPL-MCNC: 2.6 MG/DL (ref 1.6–2.4)
MCH RBC QN AUTO: 31.4 PG (ref 26.6–33)
MCHC RBC AUTO-ENTMCNC: 32.8 G/DL (ref 31.5–35.7)
MCV RBC AUTO: 95.6 FL (ref 79–97)
MONOCYTES # BLD: 0.44 10*3/MM3 (ref 0.1–0.9)
NEUTROPHILS # BLD AUTO: 7.92 10*3/MM3 (ref 1.7–7)
NEUTROPHILS NFR BLD MANUAL: 91.8 % (ref 42.7–76)
PHOSPHATE SERPL-MCNC: 3.9 MG/DL (ref 2.5–4.5)
PLAT MORPH BLD: NORMAL
PLATELET # BLD AUTO: 224 10*3/MM3 (ref 140–450)
PMV BLD AUTO: 10.7 FL (ref 6–12)
POTASSIUM SERPL-SCNC: 4.3 MMOL/L (ref 3.5–5.2)
RBC # BLD AUTO: 3.6 10*6/MM3 (ref 3.77–5.28)
RBC MORPH BLD: NORMAL
SODIUM SERPL-SCNC: 136 MMOL/L (ref 136–145)
VARIANT LYMPHS NFR BLD MANUAL: 3.1 % (ref 19.6–45.3)
WBC MORPH BLD: NORMAL
WBC NRBC COR # BLD AUTO: 8.63 10*3/MM3 (ref 3.4–10.8)

## 2023-11-21 PROCEDURE — 25010000002 ONDANSETRON PER 1 MG: Performed by: SURGERY

## 2023-11-21 PROCEDURE — 25010000002 ENOXAPARIN PER 10 MG: Performed by: SURGERY

## 2023-11-21 PROCEDURE — 84100 ASSAY OF PHOSPHORUS: CPT | Performed by: SURGERY

## 2023-11-21 PROCEDURE — 97162 PT EVAL MOD COMPLEX 30 MIN: CPT

## 2023-11-21 PROCEDURE — 85007 BL SMEAR W/DIFF WBC COUNT: CPT | Performed by: SURGERY

## 2023-11-21 PROCEDURE — 83735 ASSAY OF MAGNESIUM: CPT | Performed by: SURGERY

## 2023-11-21 PROCEDURE — 85025 COMPLETE CBC W/AUTO DIFF WBC: CPT | Performed by: SURGERY

## 2023-11-21 PROCEDURE — 80048 BASIC METABOLIC PNL TOTAL CA: CPT | Performed by: SURGERY

## 2023-11-21 PROCEDURE — 99024 POSTOP FOLLOW-UP VISIT: CPT | Performed by: SURGERY

## 2023-11-21 PROCEDURE — 97530 THERAPEUTIC ACTIVITIES: CPT

## 2023-11-21 PROCEDURE — 25010000002 HYDROMORPHONE PER 4 MG: Performed by: SURGERY

## 2023-11-21 RX ADMIN — ALPRAZOLAM 0.5 MG: 0.5 TABLET ORAL at 21:07

## 2023-11-21 RX ADMIN — AMITRIPTYLINE HYDROCHLORIDE 5 MG: 10 TABLET, FILM COATED ORAL at 21:07

## 2023-11-21 RX ADMIN — PANTOPRAZOLE SODIUM 40 MG: 40 TABLET, DELAYED RELEASE ORAL at 09:10

## 2023-11-21 RX ADMIN — ACETAMINOPHEN 1000 MG: 500 TABLET ORAL at 18:43

## 2023-11-21 RX ADMIN — AMLODIPINE BESYLATE 2.5 MG: 2.5 TABLET ORAL at 09:10

## 2023-11-21 RX ADMIN — HYDROMORPHONE HYDROCHLORIDE 0.5 MG: 1 INJECTION, SOLUTION INTRAMUSCULAR; INTRAVENOUS; SUBCUTANEOUS at 21:07

## 2023-11-21 RX ADMIN — ENOXAPARIN SODIUM 30 MG: 100 INJECTION SUBCUTANEOUS at 09:09

## 2023-11-21 RX ADMIN — HYDROMORPHONE HYDROCHLORIDE 0.5 MG: 1 INJECTION, SOLUTION INTRAMUSCULAR; INTRAVENOUS; SUBCUTANEOUS at 16:21

## 2023-11-21 RX ADMIN — HYDROMORPHONE HYDROCHLORIDE 0.5 MG: 1 INJECTION, SOLUTION INTRAMUSCULAR; INTRAVENOUS; SUBCUTANEOUS at 13:32

## 2023-11-21 RX ADMIN — ONDANSETRON 4 MG: 2 INJECTION INTRAMUSCULAR; INTRAVENOUS at 13:32

## 2023-11-21 RX ADMIN — ONDANSETRON 4 MG: 2 INJECTION INTRAMUSCULAR; INTRAVENOUS at 21:23

## 2023-11-21 RX ADMIN — METHOCARBAMOL TABLETS 750 MG: 750 TABLET, COATED ORAL at 21:07

## 2023-11-21 RX ADMIN — HYDROMORPHONE HYDROCHLORIDE 0.5 MG: 1 INJECTION, SOLUTION INTRAMUSCULAR; INTRAVENOUS; SUBCUTANEOUS at 06:16

## 2023-11-21 RX ADMIN — METHOCARBAMOL TABLETS 750 MG: 750 TABLET, COATED ORAL at 13:30

## 2023-11-21 RX ADMIN — HYDROMORPHONE HYDROCHLORIDE 0.5 MG: 1 INJECTION, SOLUTION INTRAMUSCULAR; INTRAVENOUS; SUBCUTANEOUS at 09:09

## 2023-11-21 NOTE — PLAN OF CARE
Goal Outcome Evaluation:  Plan of Care Reviewed With: patient        Progress: no change  Outcome Evaluation: Pt admitted to 4P after surgery to have an open subtotal cholectomy.   Medicated with 0.5mg dilaudid x 1 for pain.  No complaints of nausea.  VSS.  NG tube with no output.  F/C in place.  LR @ 75mL/hr.  Pt appears comfortable at this time.

## 2023-11-21 NOTE — ANESTHESIA POSTPROCEDURE EVALUATION
Patient: Martina Hardwick    Procedure Summary       Date: 11/20/23 Room / Location: [facility] OR [address] [place] [address] MAIN OR    Anesthesia Start: 1158 Anesthesia Stop: 1837    Procedure: OPEN SUBTOTAL COLECTOMY AND ADESIOLYSIS (Abdomen) Diagnosis:       Colonic inertia      (Colonic inertia [K59.9])    Surgeons: Ranjeet Salinas MD Provider: Luis Jimenez MD    Anesthesia Type: general ASA Status: 3            Anesthesia Type: general    Vitals  Vitals Value Taken Time   /71 11/20/23 1945   Temp 36.4 °C (97.5 °F) 11/20/23 1845   Pulse 116 11/1955   Resp 14 11/20/23 1930   SpO2 100 % 11/1955   Vitals shown include unfiled device data.        Post Anesthesia Care and Evaluation    Patient location during evaluation: PACU  Patient participation: complete - patient participated  Level of consciousness: awake and alert  Pain management: adequate    Airway patency: patent  Anesthetic complications: No anesthetic complications    Cardiovascular status: acceptable  Respiratory status: acceptable  Hydration status: acceptable    Comments: /66   Pulse 95   Temp 36.4 °C (97.5 °F) (Oral)   Resp 14   LMP  (LMP Unknown)   SpO2 100%

## 2023-11-21 NOTE — PROGRESS NOTES
Nutrition Services    Patient Name:  Martina Hardwick  YOB: 1955  MRN: [id number]  Admit Date:  11/20/2023    Assessment Date:  11/21/23    Summary:     Pt is a 67 y.o. female adm for colonic inertia. H/o GERD,  hypomotility, Goodwin's esophagus. Nutrition assessment completed. Visited pt at bedside. Pt admitted to  after open subtotal colectomy and adesiolysis was completed. NG to LWS in place. Currently NPO. She endorses having decreased appetite r/t abdominal pain and nausea. Has noticed 26 lb wt loss x 3 yrs. Wt hx reviewed, no significant recent wt changes. NFPE completed, severe malnutrition.   Labs: Glu 169, Creat 1.05, Mg 2.6   Meds: protonix, IVFs  Drains: NG to LWS with 100 cc output    Pt meets ASPEN/AND criteria for nutrition dx of severe malnutrition of acute disease related to energy intake, muscle wasting, and fat loss.    REC:  Advance diet as tolerated and once medically appropriate per MD  Will add ONS to support adequate PO intake once diet is advanced     RD to follow per protocol.    CLINICAL NUTRITION ASSESSMENT      Reason for Assessment Age, BMI     Diagnosis/Problem   Colonic inertia    Medical/Surgical History Past Medical History:   Diagnosis Date    Arthritis     Autoantibody titer positive     Goodwin esophagus     Bloating     Cataract     BILAT    Chronic abdominal pain     Chronic constipation     Encounter for preventive health examination     Endometriosis     Gastritis     Gastrointestinal hypomotility     GERD (gastroesophageal reflux disease)     Headache, tension-type     Hypertension     Hyponatremia     Insomnia     Intestinal autonomic neuropathy     Irritable bowel syndrome     Migraine     Mixed anxiety and depressive disorder     Moderate malnutrition     Noninfectious gastroenteritis     OP (osteoporosis)     Osteopenia     Osteoporosis     PONV (postoperative nausea and vomiting) 08/17/2018    Psoriasis     KNEES, ELBOWS BILAT AND SCALP    Seasonal  "allergies     Visceral hyperalgesia        Past Surgical History:   Procedure Laterality Date    APPENDECTOMY      CHOLECYSTECTOMY N/A 08/17/2018    Procedure: CHOLECYSTECTOMY LAPAROSCOPIC converted to open procedure;  Surgeon: Hernan Hinds MD;  Location: [facility] OR;  Service: General    COLON RESECTION N/A 11/20/2023    Procedure: OPEN SUBTOTAL COLECTOMY AND ADESIOLYSIS;  Surgeon: Ranjeet Salinas MD;  Location: [facility] MAIN OR;  Service: General;  Laterality: N/A;    COLON SURGERY      STATES TOTAL OF 3 COLON SURGERY    COLONOSCOPY      COLONOSCOPY N/A 12/12/2018    Procedure: COLONOSCOPY;  Surgeon: Darien Ruff MD;  Location: [facility] OR;  Service: Gastroenterology    COLONOSCOPY N/A 10/13/2023    Procedure: COLONOSCOPY TO CECUM;  Surgeon: Ranjeet Salinas MD;  Location: [facility] ENDOSCOPY;  Service: General;  Laterality: N/A;  PREOP/ CHRONIC CONSTIPATION- POSTOP/ NORMAL    DIAGNOSTIC LAPAROSCOPY  1990    DILATATION AND CURETTAGE  1985    ENDOSCOPY N/A 05/13/2016    Procedure: ESOPHAGOGASTRODUODENOSCOPY ;  Surgeon: Darien Ruff MD;  Location: [facility] OR;  Service:     ENDOSCOPY N/A 05/01/2023    Procedure: ESOPHAGOGASTRODUODENOSCOPY WITH BIOPSY;  Surgeon: Darien Ruff MD;  Location: [facility] OR;  Service: Gastroenterology;  Laterality: N/A;  Mild Thrush; Gastritis; Esophagitis- thrush and reflux; Biopsies- duodenal, gastric, esophagus    HYSTERECTOMY      REVISION / TAKEDOWN COLOSTOMY          Anthropometrics        Current Height  Current Weight  BMI kg/m2 Height: 157.5 cm (62\")  Weight: 46.6 kg (102 lb 11.8 oz) (11/20/23 2053)  Body mass index is 18.79 kg/m².   Adjusted BMI (if applicable)    BMI Category Normal/Healthy (18.4 - 24.9)   Ideal Body Weight (IBW) 50.1 kg    Usual Body Weight (UBW)  lbs   Weight Trend Stable; pt endorses 26 lb wt loss x 3 yrs    Weight History Wt Readings from Last 30 Encounters:   11/20/23 2053 46.6 kg (102 lb 11.8 oz) " "  11/13/23 1000 41.6 kg (91 lb 12.8 oz)   11/01/23 0903 42.5 kg (93 lb 9.6 oz)   10/16/23 1305 42.1 kg (92 lb 12.8 oz)   10/13/23 1102 39.9 kg (88 lb)   09/14/23 1140 42.6 kg (94 lb)   09/07/23 1450 43 kg (94 lb 12.8 oz)   09/05/23 1431 42.2 kg (93 lb)   08/24/23 1337 42.8 kg (94 lb 6.4 oz)   06/19/23 1347 42.7 kg (94 lb 3.2 oz)   06/05/23 1259 42.8 kg (94 lb 6.4 oz)   05/01/23 1110 42.6 kg (94 lb)   04/27/23 1337 44 kg (97 lb)   03/27/23 1328 41.7 kg (92 lb)   03/02/23 1402 45.1 kg (99 lb 6.4 oz)   02/27/23 1403 43.6 kg (96 lb 3.2 oz)   02/13/23 1044 43.5 kg (96 lb)   02/11/23 1541 40.9 kg (90 lb 1.6 oz)   02/06/23 1302 40.8 kg (90 lb)   01/24/23 1303 44.3 kg (97 lb 9.6 oz)   01/19/23 1431 44.9 kg (99 lb)   01/14/23 1425 43.1 kg (95 lb)   01/05/23 1526 45 kg (99 lb 3.2 oz)   01/02/23 1628 44 kg (97 lb)   12/19/22 1403 45.3 kg (99 lb 12.8 oz)   10/03/22 1403 46.2 kg (101 lb 14.1 oz)   07/01/22 1312 47 kg (103 lb 9.6 oz)   06/01/22 1425 47.2 kg (104 lb)   05/23/22 1315 47.4 kg (104 lb 6.4 oz)   03/22/22 1519 47 kg (103 lb 9.9 oz)   03/22/22 1518 47 kg (103 lb 9.9 oz)        Estimated Requirements         Weight used  46.6 kg     Calories  0674-2377 (30 kcal/kg, 35 kcal/kg)    Protein  56-70 (1.2 - 1.5 gm/kg)    Fluid  1 mL/kcal     Labs       Pertinent Labs    Results from last 7 days   Lab Units 11/21/23  0557   SODIUM mmol/L 136   POTASSIUM mmol/L 4.3   CHLORIDE mmol/L 105   CO2 mmol/L 19.0*   BUN mg/dL 12   CREATININE mg/dL 1.05*   CALCIUM mg/dL 9.0   GLUCOSE mg/dL 169*     Results from last 7 days   Lab Units 11/21/23  0557   MAGNESIUM mg/dL 2.6*   PHOSPHORUS mg/dL 3.9   HEMOGLOBIN g/dL 11.3*   HEMATOCRIT % 34.4   WBC 10*3/mm3 8.63     Results from last 7 days   Lab Units 11/21/23  0557   PLATELETS 10*3/mm3 224     No results found for: \"COVID19\"  Lab Results   Component Value Date    HGBA1C 5.3 06/20/2022          Medications           Scheduled Medications acetaminophen, 1,000 mg, Oral, Q6H  ALPRAZolam, 0.5 mg, " Oral, BID  amitriptyline, 5 mg, Oral, Nightly  amLODIPine, 2.5 mg, Oral, Daily  enoxaparin, 30 mg, Subcutaneous, Daily  [Held by provider] lisinopril, 30 mg, Oral, Daily  methocarbamol, 750 mg, Oral, 4x Daily  pantoprazole, 40 mg, Oral, Daily  PARoxetine, 40 mg, Oral, QAM  [Held by provider] zolpidem, 10 mg, Oral, Nightly       Infusions dextrose 5 % and lactated Ringer's, 75 mL/hr, Last Rate: 75 mL/hr (11/20/23 2613)       PRN Medications   HYDROmorphone    ondansetron **OR** ondansetron    oxyCODONE **OR** oxyCODONE    SUMAtriptan     Physical Findings          General Findings alert, on oxygen therapy   Oral/Mouth Cavity WDL   Edema  no edema   Gastrointestinal abdominal pain, nausea, last bowel movement: 11/20   Skin  surgical incision: abdomen incision    Tubes/Drains/Lines NG tube to LWS   NFPE See Malnutrition Severity Assessment     Malnutrition Severity Assessment      Patient meets criteria for : (P) Severe Malnutrition (Pt meets ASPEN/AND criteria for nutrition dx of severe malnutrition of acute disease related to energy intake, muscle wasting, and fat loss.)  Malnutrition Type (last 8 hours)       Malnutrition Severity Assessment       Row Name 11/21/23 1340       Malnutrition Severity Assessment    Malnutrition Type Acute Disease or Injury - Related Malnutrition (P)       Row Name 11/21/23 1340       Insufficient Energy Intake     Insufficient Energy Intake Findings Severe (P)     Insufficient Energy Intake  < or equal to 50% of est. energy requirement for > or equal to 5d) (P)       Row Name 11/21/23 1340       Unintentional Weight Loss     Unintentional Weight Loss Findings -- (P)   Pt endorses 26 lb wt loss x 3 yrs      Row Name 11/21/23 1340       Muscle Loss    Loss of Muscle Mass Findings Severe (P)     [place] Severe - deep hollowing/scooping, lack of muscle to touch, facial bones well defined (P)     Clavicle Bone Region Severe - protruding prominent bone (P)     Anterior Thigh Region  Moderate - mild depression on inner thigh (P)     Posterior Calf Region Moderate - some roundness, slight firmness (P)       Row Name 11/21/23 1340       Fat Loss    Subcutaneous Fat Loss Findings Moderate (P)     Orbital Region  Severe - pronounced hollowness/depression, dark circles, loose saggy skin (P)     Upper Arm Region Moderate - some fat tissue, not ample (P)       Row Name 11/21/23 1340       Criteria Met (Must meet criteria for severity in at least 2 of these categories: M Wasting, Fat Loss, Fluid, Secondary Signs, Wt. Status, Intake)    Patient meets criteria for  Severe Malnutrition (P)   Pt meets ASPEN/AND criteria for nutrition dx of severe malnutrition of acute disease related to energy intake, muscle wasting, and fat loss.                     Current Nutrition Orders & Evaluation of Intake       Oral Nutrition     Food Allergies NKFA   Current PO Diet NPO Diet NPO Type: Sips with Meds, Ice Chips   Supplement n/a   PO Evaluation     % PO Intake NPO    Factors Affecting Intake: abdominal pain, altered GI function, decreased appetite, nausea   --  PES STATEMENT / NUTRITION DIAGNOSIS      Nutrition Dx Problem  Problem: Malnutrition (severe)  Etiology: Medical Diagnosis - Colonic inertia and Factors Affecting Nutrition - altered GI fxn, decreased appetite, abdominal pain, nausea     Signs/Symptoms: NPO, NFPE Results, and Unintended Weight Change     NUTRITION INTERVENTION / PLAN OF CARE      Intervention Goal(s) Maintain nutrition status, Establish goals of care, Reduce/improve symptoms, Disease management/therapy, Initiate feeding/diet, Establish PO intake, Tolerate PO , Appropriate weight gain, and PO intake goal %: 75%         RD Intervention/Action Await initiation/advancement of PO diet, Continue to monitor, and Care plan reviewed   --      Prescription/Orders:       PO Diet ADAT      Supplements       Enteral Nutrition       Parenteral Nutrition    New Prescription Ordered? No changes at this time     --      Monitor/Evaluation Per protocol   Discharge Plan/Needs Pending clinical course   --    RD to follow per protocol.      Electronically signed by:  Susanna Salas Dietitian Intern   11/21/23 11:03 EST

## 2023-11-21 NOTE — PLAN OF CARE
Goal Outcome Evaluation:  Plan of Care Reviewed With: patient, family        Progress: no change  Outcome Evaluation: VSS. Pt medicate with dilaudid x3 doses for pain. Pt reports sore throat and difficulty swallowing, NG still in place. MD placed for nursing communication to use NG tube for meds if pt has trouble swallowing, pt refused. Educated pt on calling out for pain meds when needed. Hodge to be removed tomorrow. No needs at this time.

## 2023-11-21 NOTE — PLAN OF CARE
Goal Outcome Evaluation:  Plan of Care Reviewed With: patient           Outcome Evaluation: Pt. is a 67 year old Female who is s/p Open Subtotal colectomy.  Pt. reports that prior to admission she was independent with functional mobility. Pt. currently presents with decreased strength, decreased balance, and decreased tolerance to functional activity.  This AM, pt. able to take 5 shuffled sidesteps at bedside, Min. assist x 2, with HHA x 2.  Pt. requires Min. assist x 2 for bed mobility and Min. assist x 2 for sit <-> stand transfers.  Verbal/tactile cues given throughout for posture correction and forward gait limited due to pain, fatigue, and weakness.  BLE ther. ex. program x 5 reps completed for general strengthening.  Pt. will benefit from skilled inpt. P.T. to address her functional deficits and to assist pt. in regaining her maximum level of independence with functional mobility.      Anticipated Discharge Disposition (PT): home with assist, home with home health, skilled nursing facility (Pending pt. progress)

## 2023-11-21 NOTE — PROGRESS NOTES
Colorectal & General Surgery  Progress Note    Patient: Martina Hardwick  YOB: 1955  MRN: [id number]      Assessment  Martina Hardwick is a 67 y.o. female with history of multiple abdominal surgeries and colonic inertia who is now postoperative day 1 from exploratory laparotomy, enterolysis, subtotal colectomy, and ileocolonic anastomosis.  She looks very good today considering everything she went through yesterday.  Pain is her biggest problem, but she has not been accepting of oral pain medications.  It is okay to crush her pain medications and deliver per nasogastric tube.  Hopefully her pain control will improve with oral Tylenol, Robaxin, and oxycodone.  Otherwise, she looks great.  Her labs look good.  Incision and abdominal exam are as expected.  Excellent urine output.  She even ambulated with physical therapy.    Plan  Okay to crush medications and deliver via nasogastric tube  Plan to remove Hodge catheter tomorrow  Plan to keep nasogastric tube for a few days given the complexity of her surgery and the multiple enterotomies that were repaired.  Up and out of bed is much as possible  Continue physical therapy  Continue routine postoperative care  Continue pharmacologic DVT prophylaxis      Subjective  No acute events.  Pain is biggest problem.  Nasogastric tube is uncomfortable in her throat.  No flatus or bowel movements.  Urine output excellent.  She ambulated with physical therapy this morning.    Objective    Vitals:    11/21/23 1011   BP: 99/69   Pulse: 88   Resp: 16   Temp: 97.8 °F (36.6 °C)   SpO2: 100%       Physical Exam  Constitutional: Well-developed well-nourished, no acute distress  Neck: Supple, trachea midline  Respiratory: No increased work of breathing, Symmetric excursion  Cardiovascular: Well pefursed, no jugular venous distention evident   Abdominal: Incisions clean, dry, intact without erythema, abdomen is soft, appropriately tender, non-distended  Skin: Warm, dry, no rash on  visualized skin surfaces  Psychiatric: Alert and oriented ×3, normal affect     Laboratory Results  I have personally reviewed CBC with WBC 8, hemoglobin 11, platelets 224.  BMP with creatinine 1.05, bicarb 19, potassium 4.3, magnesium 2.6, phosphorus 3.9.    Radiology  None to review         Osvaldo Salinas MD  Colorectal & General Surgery  [facility], [place], operated by [facility]    [address], [address]  [place], [place], [address]  P: [phone]  F: [phone]

## 2023-11-21 NOTE — THERAPY EVALUATION
Patient Name: Martina Hardwick  : 1955    MRN: [id number]                              Today's Date: 2023       Admit Date: 2023    Visit Dx:     ICD-10-CM ICD-9-CM   1. 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    Colonic inertia     Past Medical History:   Diagnosis Date    Arthritis     Autoantibody titer positive     Goodwin esophagus     Bloating     Cataract     BILAT    Chronic abdominal pain     Chronic constipation     Encounter for preventive health examination     Endometriosis     Gastritis     Gastrointestinal hypomotility     GERD (gastroesophageal reflux disease)     Headache, tension-type     Hypertension     Hyponatremia     Insomnia     Intestinal autonomic neuropathy     Irritable bowel syndrome     Migraine     Mixed anxiety and depressive disorder     Moderate malnutrition     Noninfectious gastroenteritis     OP (osteoporosis)     Osteopenia     Osteoporosis     PONV (postoperative nausea and vomiting) 2018    Psoriasis     KNEES, ELBOWS BILAT AND SCALP    Seasonal allergies     Visceral hyperalgesia      Past Surgical History:   Procedure Laterality Date    APPENDECTOMY      CHOLECYSTECTOMY N/A 2018    Procedure: CHOLECYSTECTOMY LAPAROSCOPIC converted to open procedure;  Surgeon: Hernan Hinds MD;  Location: [facility];  Service: General    COLON RESECTION N/A 2023    Procedure: OPEN SUBTOTAL COLECTOMY AND ADESIOLYSIS;  Surgeon:  Ranjeet Salinas MD;  Location: [facility] MAIN OR;  Service: General;  Laterality: N/A;    COLON SURGERY      STATES TOTAL OF 3 COLON SURGERY    COLONOSCOPY      COLONOSCOPY N/A 12/12/2018    Procedure: COLONOSCOPY;  Surgeon: Darien Ruff MD;  Location: [facility] OR;  Service: Gastroenterology    COLONOSCOPY N/A 10/13/2023    Procedure: COLONOSCOPY TO CECUM;  Surgeon: Ranjeet Salinas MD;  Location: [facility] ENDOSCOPY;  Service: General;  Laterality: N/A;  PREOP/ CHRONIC CONSTIPATION- POSTOP/ NORMAL    DIAGNOSTIC LAPAROSCOPY  1990    DILATATION AND CURETTAGE  1985    ENDOSCOPY N/A 05/13/2016    Procedure: ESOPHAGOGASTRODUODENOSCOPY ;  Surgeon: Darien Ruff MD;  Location: [facility] OR;  Service:     ENDOSCOPY N/A 05/01/2023    Procedure: ESOPHAGOGASTRODUODENOSCOPY WITH BIOPSY;  Surgeon: Darien Ruff MD;  Location: [facility] OR;  Service: Gastroenterology;  Laterality: N/A;  Mild Thrush; Gastritis; Esophagitis- thrush and reflux; Biopsies- duodenal, gastric, esophagus    HYSTERECTOMY      REVISION / TAKEDOWN COLOSTOMY        General Information       Row Name 11/21/23 1120          Physical Therapy Time and Intention    Document Type evaluation  Pt. is s/p Open Subtotal Colectomy  -MS     Mode of Treatment physical therapy;individual therapy  -MS       Row Name 11/21/23 1120          General Information    Patient Profile Reviewed yes  -MS     Prior Level of Function independent:  -MS     Existing Precautions/Restrictions fall;oxygen therapy device and L/min   Exit alarm; NG Tube  -MS     Barriers to Rehab none identified  -MS       Row Name 11/21/23 1120          Cognition    Orientation Status (Cognition) oriented x 3  -MS               User Key  (r) = Recorded By, (t) = Taken By, (c) = Cosigned By      Initials Name Provider Type    MS Miguel Beasley, PT Physical Therapist                   Mobility       Row Name 11/21/23 1120          Bed Mobility    Bed Mobility  supine-sit;sit-supine  -MS     Supine-Sit [place] (Bed Mobility) minimum assist (75% patient effort);2 person assist  -MS     Sit-Supine [place] (Bed Mobility) minimum assist (75% patient effort);2 person assist  -MS       Row Name 11/21/23 1120          Sit-Stand Transfer    Sit-Stand [place] (Transfers) minimum assist (75% patient effort);2 person assist  -MS     Assistive Device (Sit-Stand Transfers) --  HHa x 2  -MS       Row Name 11/21/23 1120          Gait/Stairs (Locomotion)    [place] Level (Gait) minimum assist (75% patient effort);2 person assist  -MS     Assistive Device (Gait) --  HHA x 2  -MS     Distance in Feet (Gait) 5 sidesteps at bedside  -MS     Bilateral Gait Deviations forward flexed posture  -MS     Comment, (Gait/Stairs) Posterior lean throughout.  Verbal/tactile cues given for posture correction.  Forward ambulation limited due to fatigue, pain, and weakness.  -MS               User Key  (r) = Recorded By, (t) = Taken By, (c) = Cosigned By      Initials Name Provider Type    Miguel Ramos, PT Physical Therapist                   Obj/Interventions       Row Name 11/21/23 1122          Range of Motion Comprehensive    Comment, General Range of Motion BUE/LE (WFL's)  -MS       Row Name 11/21/23 1122          Strength Comprehensive (MMT)    Comment, General Manual Muscle Testing (MMT) Assessment BUE/LE (3/5)  -MS       Row Name 11/21/23 1122          Motor Skills    Therapeutic Exercise --  BLE ther. ex. program x 5 reps completed (Ankle pumps, Hip Flexion, LAQ's)  -MS               User Key  (r) = Recorded By, (t) = Taken By, (c) = Cosigned By      Initials Name Provider Type    Miguel Ramos, PT Physical Therapist                   Goals/Plan       Row Name 11/21/23 1123          Bed Mobility Goal 1 (PT)    Activity/Assistive Device (Bed Mobility Goal 1, PT) bed mobility activities, all  -MS     [place] Level/Cues Needed (Bed Mobility Goal 1, PT) contact  guard required  -MS     Time Frame (Bed Mobility Goal 1, PT) long term goal (LTG);1 week  -MS       Row Name 11/21/23 1123          Transfer Goal 1 (PT)    Activity/Assistive Device (Transfer Goal 1, PT) transfers, all  With or without AAD  -MS     [place] Level/Cues Needed (Transfer Goal 1, PT) contact guard required  -MS     Time Frame (Transfer Goal 1, PT) long term goal (LTG);1 week  -MS       Row Name 11/21/23 1123          Gait Training Goal 1 (PT)    Activity/Assistive Device (Gait Training Goal 1, PT) gait (walking locomotion)  With or without AAD  -MS     [place] Level (Gait Training Goal 1, PT) contact guard required  -MS     Distance (Gait Training Goal 1, PT) 100 feet  -MS     Time Frame (Gait Training Goal 1, PT) long term goal (LTG);1 week  -MS       Row Name 11/21/23 1123          Therapy Assessment/Plan (PT)    Planned Therapy Interventions (PT) balance training;bed mobility training;gait training;home exercise program;patient/family education;postural re-education;transfer training;strengthening  -MS               User Key  (r) = Recorded By, (t) = Taken By, (c) = Cosigned By      Initials Name Provider Type    MS Miguel Beasley, PT Physical Therapist                   Clinical Impression       Row Name 11/21/23 1122          Pain    Pretreatment Pain Rating 8/10  -MS     Posttreatment Pain Rating 8/10  -MS     Pain Location - abdomen  -MS     Pain Intervention(s) Medication (See MAR);Nursing Notified;Repositioned  -MS       Row Name 11/21/23 1122          Plan of Care Review    Plan of Care Reviewed With patient  -MS       Row Name 11/21/23 1122          Therapy Assessment/Plan (PT)    Rehab Potential (PT) good, to achieve stated therapy goals  -MS     Criteria for Skilled Interventions Met (PT) skilled treatment is necessary  -MS     Therapy Frequency (PT) 6 times/wk  -MS       Row Name 11/21/23 1122          Positioning and Restraints    Pre-Treatment Position in bed  -MS     Post  Treatment Position bed  -MS     In Bed notified nsg;supine;call light within reach;encouraged to call for assist;exit alarm on  All lines intact.  -MS               User Key  (r) = Recorded By, (t) = Taken By, (c) = Cosigned By      Initials Name Provider Type    MS BeasleyMiguel, PT Physical Therapist                   Outcome Measures       Row Name 11/21/23 1124 11/21/23 0114       How much help from another person do you currently need...    Turning from your back to your side while in flat bed without using bedrails? 2  -MS 4  -HN    Moving from lying on back to sitting on the side of a flat bed without bedrails? 2  -MS 3  -HN    Moving to and from a bed to a chair (including a wheelchair)? 2  -MS 3  -HN    Standing up from a chair using your arms (e.g., wheelchair, bedside chair)? 2  -MS 3  -HN    Climbing 3-5 steps with a railing? 2  -MS 3  -HN    To walk in hospital room? 2  -MS 3  -HN    AM-PAC 6 Clicks Score (PT) 12  -MS 19  -HN    Highest Level of Mobility Goal 4 --> Transfer to chair/commode  -MS 6 --> Walk 10 steps or more  -HN      Row Name 11/21/23 1124          Functional Assessment    Outcome Measure Options AM-PAC 6 Clicks Basic Mobility (PT)  -MS               User Key  (r) = Recorded By, (t) = Taken By, (c) = Cosigned By      Initials Name Provider Type    MS BeasleyMiguel, PT Physical Therapist    HN Alice Menjivar, RN Registered Nurse                                 Physical Therapy Education       Title: PT OT SLP Therapies (Done)       Topic: Physical Therapy (Done)       Point: Mobility training (Done)       Learning Progress Summary             Patient Acceptance, E,D, VU,NR by MS at 11/21/2023 1125                         Point: Home exercise program (Done)       Learning Progress Summary             Patient Acceptance, E,D, VU,NR by MS at 11/21/2023 1125                         Point: Body mechanics (Done)       Learning Progress Summary             Patient Acceptance, E,D, VU,NR  by MS at 11/21/2023 1125                         Point: Precautions (Done)       Learning Progress Summary             Patient Acceptance, E,D, VU,NR by MS at 11/21/2023 1125                                         User Key       Initials Effective Dates Name Provider Type Discipline    MS 06/16/21 -  Miguel Beasley, PT Physical Therapist PT                  PT Recommendation and Plan  Planned Therapy Interventions (PT): balance training, bed mobility training, gait training, home exercise program, patient/family education, postural re-education, transfer training, strengthening  Plan of Care Reviewed With: patient  Outcome Evaluation: Pt. is a 67 year old Female who is s/p Open Subtotal colectomy.  Pt. reports that prior to admission she was independent with functional mobility. Pt. currently presents with decreased strength, decreased balance, and decreased tolerance to functional activity.  This AM, pt. able to take 5 shuffled sidesteps at bedside, Min. assist x 2, with HHA x 2.  Pt. requires Min. assist x 2 for bed mobility and Min. assist x 2 for sit <-> stand transfers.  Verbal/tactile cues given throughout for posture correction and forward gait limited due to pain, fatigue, and weakness.  BLE ther. ex. program x 5 reps completed for general strengthening.  Pt. will benefit from skilled inpt. P.T. to address her functional deficits and to assist pt. in regaining her maximum level of independence with functional mobility.     Time Calculation:         PT Charges       Row Name 11/21/23 1130             Time Calculation    Start Time 1030  -MS      Stop Time 1045  -MS      Time Calculation (min) 15 min  -MS      PT Received On 11/21/23  -MS      PT - Next Appointment 11/22/23  -MS      PT Goal Re-Cert Due Date 11/28/23  -MS         Time Calculation- PT    Total Timed Code Minutes- PT 14 minute(s)  -MS                User Key  (r) = Recorded By, (t) = Taken By, (c) = Cosigned By      Initials Name Provider  Type    MS Miguel Beasley, PT Physical Therapist                  Therapy Charges for Today       Code Description Service Date Service Provider Modifiers Qty    39589752188 HC PT EVAL MOD COMPLEXITY 2 11/21/2023 Miguel Beasley, PT GP 1    83459627112 HC PT THERAPEUTIC ACT EA 15 MIN 11/21/2023 Miguel Beasley, PT GP 1    93227703470 HC PT THER SUPP EA 15 MIN 11/21/2023 Miguel Beasley, PT GP 1            PT G-Codes  Outcome Measure Options: AM-PAC 6 Clicks Basic Mobility (PT)  AM-PAC 6 Clicks Score (PT): 12  PT Discharge Summary  Anticipated Discharge Disposition (PT): home with assist, home with home health, skilled nursing facility (Pending pt. progress)    Miguel Beasley, PT  11/21/2023

## 2023-11-22 PROBLEM — E43 SEVERE MALNUTRITION: Status: ACTIVE | Noted: 2023-11-22

## 2023-11-22 LAB
LAB AP CASE REPORT: NORMAL
LAB AP CLINICAL INFORMATION: NORMAL
PATH REPORT.FINAL DX SPEC: NORMAL
PATH REPORT.GROSS SPEC: NORMAL

## 2023-11-22 PROCEDURE — 25010000002 ONDANSETRON PER 1 MG: Performed by: SURGERY

## 2023-11-22 PROCEDURE — 25010000002 HYDROMORPHONE PER 4 MG: Performed by: SURGERY

## 2023-11-22 PROCEDURE — 97530 THERAPEUTIC ACTIVITIES: CPT

## 2023-11-22 PROCEDURE — 99024 POSTOP FOLLOW-UP VISIT: CPT | Performed by: SURGERY

## 2023-11-22 PROCEDURE — 25010000002 ENOXAPARIN PER 10 MG: Performed by: SURGERY

## 2023-11-22 RX ORDER — OXYCODONE HYDROCHLORIDE 5 MG/1
5 TABLET ORAL EVERY 4 HOURS PRN
Status: DISCONTINUED | OUTPATIENT
Start: 2023-11-22 | End: 2023-11-27

## 2023-11-22 RX ORDER — OXYCODONE HYDROCHLORIDE 10 MG/1
10 TABLET ORAL EVERY 4 HOURS PRN
Status: DISCONTINUED | OUTPATIENT
Start: 2023-11-22 | End: 2023-11-27

## 2023-11-22 RX ADMIN — ALPRAZOLAM 0.5 MG: 0.5 TABLET ORAL at 08:52

## 2023-11-22 RX ADMIN — PAROXETINE HYDROCHLORIDE HEMIHYDRATE 40 MG: 20 TABLET, FILM COATED ORAL at 06:39

## 2023-11-22 RX ADMIN — ALPRAZOLAM 0.5 MG: 0.5 TABLET ORAL at 21:51

## 2023-11-22 RX ADMIN — ENOXAPARIN SODIUM 30 MG: 100 INJECTION SUBCUTANEOUS at 08:52

## 2023-11-22 RX ADMIN — HYDROMORPHONE HYDROCHLORIDE 0.5 MG: 1 INJECTION, SOLUTION INTRAMUSCULAR; INTRAVENOUS; SUBCUTANEOUS at 05:01

## 2023-11-22 RX ADMIN — AMITRIPTYLINE HYDROCHLORIDE 5 MG: 10 TABLET, FILM COATED ORAL at 21:51

## 2023-11-22 RX ADMIN — PANTOPRAZOLE SODIUM 40 MG: 40 TABLET, DELAYED RELEASE ORAL at 08:52

## 2023-11-22 RX ADMIN — ACETAMINOPHEN 1000 MG: 500 TABLET ORAL at 06:40

## 2023-11-22 RX ADMIN — METHOCARBAMOL TABLETS 750 MG: 750 TABLET, COATED ORAL at 21:51

## 2023-11-22 RX ADMIN — METHOCARBAMOL TABLETS 750 MG: 750 TABLET, COATED ORAL at 06:40

## 2023-11-22 RX ADMIN — AMLODIPINE BESYLATE 2.5 MG: 2.5 TABLET ORAL at 08:52

## 2023-11-22 RX ADMIN — ACETAMINOPHEN 1000 MG: 500 TABLET ORAL at 12:23

## 2023-11-22 RX ADMIN — HYDROMORPHONE HYDROCHLORIDE 0.5 MG: 1 INJECTION, SOLUTION INTRAMUSCULAR; INTRAVENOUS; SUBCUTANEOUS at 22:06

## 2023-11-22 RX ADMIN — ACETAMINOPHEN 1000 MG: 500 TABLET ORAL at 00:21

## 2023-11-22 RX ADMIN — HYDROMORPHONE HYDROCHLORIDE 0.5 MG: 1 INJECTION, SOLUTION INTRAMUSCULAR; INTRAVENOUS; SUBCUTANEOUS at 17:54

## 2023-11-22 RX ADMIN — ONDANSETRON 4 MG: 2 INJECTION INTRAMUSCULAR; INTRAVENOUS at 05:02

## 2023-11-22 RX ADMIN — HYDROMORPHONE HYDROCHLORIDE 0.5 MG: 1 INJECTION, SOLUTION INTRAMUSCULAR; INTRAVENOUS; SUBCUTANEOUS at 11:14

## 2023-11-22 NOTE — PLAN OF CARE
Goal Outcome Evaluation:  Plan of Care Reviewed With: patient, family        Progress: no change  Outcome Evaluation: VSS. Pt has received 1x dose of dilaudid for pain. Pt was due to void at 1300, pt did not void by that time. Pt was bladder scanned and only 75mL was present in bladder. RN called Dr. Salinas.  advised to wait until 1800, bladder scan again if pt has not voided and call back with results. Pt has been up to the chair. Pt has had good output out of NG tube. Pt has been up to the bathroom. No needs at this time.

## 2023-11-22 NOTE — PLAN OF CARE
Goal Outcome Evaluation:           Progress: no change  Outcome Evaluation: Patient appears to have rested comfortably in bed this shift. Dilaudid IVx2 doses  for pain. Zofran IV for complaints nausea. NG tube in place. Approximately 60ml green/brown output noted from NG tube. Patient continues to complain of a sore throat when swallowing med but declines to have meds crushed and given per NG. Hodge cath rtemoved this am savanna MOREJON order.

## 2023-11-22 NOTE — THERAPY TREATMENT NOTE
Patient Name: Martina Hardwick  : 1955    MRN: [id number]                              Today's Date: 2023       Admit Date: 2023    Visit Dx:     ICD-10-CM ICD-9-CM   1. 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    Colonic inertia    Severe malnutrition     Past Medical History:   Diagnosis Date    Arthritis     Autoantibody titer positive     Goodwin esophagus     Bloating     Cataract     BILAT    Chronic abdominal pain     Chronic constipation     Encounter for preventive health examination     Endometriosis     Gastritis     Gastrointestinal hypomotility     GERD (gastroesophageal reflux disease)     Headache, tension-type     Hypertension     Hyponatremia     Insomnia     Intestinal autonomic neuropathy     Irritable bowel syndrome     Migraine     Mixed anxiety and depressive disorder     Moderate malnutrition     Noninfectious gastroenteritis     OP (osteoporosis)     Osteopenia     Osteoporosis     PONV (postoperative nausea and vomiting) 2018    Psoriasis     KNEES, ELBOWS BILAT AND SCALP    Seasonal allergies     Visceral hyperalgesia      Past Surgical History:   Procedure Laterality Date    APPENDECTOMY      CHOLECYSTECTOMY N/A 2018    Procedure: CHOLECYSTECTOMY LAPAROSCOPIC converted to open procedure;  Surgeon: Hernan Hinds MD;  Location: [facility];  Service: General    COLON RESECTION N/A 2023    Procedure: OPEN SUBTOTAL COLECTOMY AND  ADESIOLYSIS;  Surgeon: Ranjeet Salinas MD;  Location: [facility] MAIN OR;  Service: General;  Laterality: N/A;    COLON SURGERY      STATES TOTAL OF 3 COLON SURGERY    COLONOSCOPY      COLONOSCOPY N/A 12/12/2018    Procedure: COLONOSCOPY;  Surgeon: Darien Ruff MD;  Location: [facility] OR;  Service: Gastroenterology    COLONOSCOPY N/A 10/13/2023    Procedure: COLONOSCOPY TO CECUM;  Surgeon: Ranjeet Salinas MD;  [facility];  Service: General;  Laterality: N/A;  PREOP/ CHRONIC CONSTIPATION- POSTOP/ NORMAL    DIAGNOSTIC LAPAROSCOPY  1990    DILATATION AND CURETTAGE  1985    ENDOSCOPY N/A 05/13/2016    Procedure: ESOPHAGOGASTRODUODENOSCOPY ;  Surgeon: Darien Ruff MD;  Location: [facility] OR;  Service:     ENDOSCOPY N/A 05/01/2023    Procedure: ESOPHAGOGASTRODUODENOSCOPY WITH BIOPSY;  Surgeon: Darien Ruff MD;  Location: [facility] OR;  Service: Gastroenterology;  Laterality: N/A;  Mild Thrush; Gastritis; Esophagitis- thrush and reflux; Biopsies- duodenal, gastric, esophagus    HYSTERECTOMY      REVISION / TAKEDOWN COLOSTOMY        General Information       Row Name 11/22/23 1515          Physical Therapy Time and Intention    Document Type therapy note (daily note)  -MS     Mode of Treatment physical therapy;individual therapy  -MS       Row Name 11/22/23 1515          General Information    Patient Profile Reviewed yes  -MS     Existing Precautions/Restrictions fall  Exit alarm; NG Tube  -MS     Barriers to Rehab none identified  -MS       Row Name 11/22/23 1515          Cognition    Orientation Status (Cognition) oriented x 3  -MS       Row Name 11/22/23 1515          Safety Issues, Functional Mobility    Comment, Safety Issues/Impairments (Mobility) Gait belt used for safety.  -MS               User Key  (r) = Recorded By, (t) = Taken By, (c) = Cosigned By      Initials Name Provider Type    Miguel Ramos, PT Physical Therapist                    Mobility       Row Name 11/22/23 1516          Bed Mobility    Supine-Sit [place] (Bed Mobility) minimum assist (75% patient effort)  -MS       Row Name 11/22/23 1516          Sit-Stand Transfer    Sit-Stand [place] (Transfers) minimum assist (75% patient effort)  -MS     Assistive Device (Sit-Stand Transfers) walker, front-wheeled  -MS       Row Name 11/22/23 1516          Gait/Stairs (Locomotion)    [place] Level (Gait) contact guard;2 person assist  -MS     Assistive Device (Gait) walker, front-wheeled  -MS     Distance in Feet (Gait) 6 feet  -MS     Deviations/Abnormal Patterns (Gait) stacy decreased  -MS     Bilateral Gait Deviations forward flexed posture  -MS     Comment, (Gait/Stairs) Verbal/tactile cues given for posture correction and Rwx guidance.  -MS               User Key  (r) = Recorded By, (t) = Taken By, (c) = Cosigned By      Initials Name Provider Type    Miguel Ramos, PT Physical Therapist                   Obj/Interventions       Row Name 11/22/23 1517          Motor Skills    Therapeutic Exercise --  BLE ther. ex. program x 5 reps completed (Ankle pumps, Hip Flexion, LAQ's)  -MS               User Key  (r) = Recorded By, (t) = Taken By, (c) = Cosigned By      Initials Name Provider Type    Miguel Ramos, PT Physical Therapist                   Goals/Plan    No documentation.                  Clinical Impression       Row Name 11/22/23 1518          Pain    Pretreatment Pain Rating 4/10  -MS     Posttreatment Pain Rating 4/10  -MS     Pain Location - abdomen  -MS     Pain Intervention(s) Medication (See MAR);Nursing Notified;Repositioned  -MS       Row Name 11/22/23 1518          Positioning and Restraints    Pre-Treatment Position in bed  -MS     Post Treatment Position chair  -MS     In Chair notified nsg;reclined;sitting;call light within reach;encouraged to call for assist;exit alarm on  All lines intact.  -MS               User Key  (r) = Recorded By, (t) =  Taken By, (c) = Cosigned By      Initials Name Provider Type    Miguel Ramos ALISSA, PT Physical Therapist                   Outcome Measures       Row Name 11/22/23 1518 11/22/23 0852       How much help from another person do you currently need...    Turning from your back to your side while in flat bed without using bedrails? 3  -MS 3  -LR    Moving from lying on back to sitting on the side of a flat bed without bedrails? 3  -MS 3  -LR    Moving to and from a bed to a chair (including a wheelchair)? 3  -MS 3  -LR    Standing up from a chair using your arms (e.g., wheelchair, bedside chair)? 3  -MS 3  -LR    Climbing 3-5 steps with a railing? 2  -MS 2  -LR    To walk in hospital room? 3  -MS 2  -LR    AM-PAC 6 Clicks Score (PT) 17  -MS 16  -LR    Highest Level of Mobility Goal 5 --> Static standing  -MS 5 --> Static standing  -LR      Row Name 11/22/23 1518          Functional Assessment    Outcome Measure Options AM-PAC 6 Clicks Basic Mobility (PT)  -MS               User Key  (r) = Recorded By, (t) = Taken By, (c) = Cosigned By      Initials Name Provider Type    MS Beasley Miguel MAXWELL, PT Physical Therapist    LR Debbi Aguero RN Registered Nurse                                 Physical Therapy Education       Title: PT OT SLP Therapies (Done)       Topic: Physical Therapy (Done)       Point: Mobility training (Done)       Learning Progress Summary             Patient Acceptance, E,D, VU,NR by MS at 11/22/2023 1519    Acceptance, E,D, VU,NR by MS at 11/21/2023 1125                         Point: Home exercise program (Done)       Learning Progress Summary             Patient Acceptance, E,D, VU,NR by MS at 11/22/2023 1519    Acceptance, E,D, VU,NR by MS at 11/21/2023 1125                         Point: Body mechanics (Done)       Learning Progress Summary             Patient Acceptance, E,D, VU,NR by MS at 11/22/2023 1519    Acceptance, E,D, VU,NR by MS at 11/21/2023 1125                         Point:  Precautions (Done)       Learning Progress Summary             Patient Acceptance, E,D, VU,NR by MS at 11/22/2023 1519    Acceptance, E,D, VU,NR by MS at 11/21/2023 1125                                         User Key       Initials Effective Dates Name Provider Type Discipline    MS 06/16/21 -  Miguel Beasley, PT Physical Therapist PT                  PT Recommendation and Plan  Planned Therapy Interventions (PT): balance training, bed mobility training, gait training, home exercise program, patient/family education, postural re-education, transfer training, strengthening  Plan of Care Reviewed With: patient  Outcome Evaluation: Upon entering room, pt. supine in bed, awake/alert, and agreeable to work with P.T. this date. Pt. able to ambulate 6 feet, CGA x 2, with use of Rwx this PM. Pt. requires Min. assist x 1 for bed mobility and Min. assist x 1 for sit <-> stand transfers.  BLE ther. ex. program x 5 reps completed for general strengthening.  Verbal/tactile cues given during ambulation for posture correction and Rwx guidance.  Will continue to progress functional mobility as tolerated.     Time Calculation:         PT Charges       Row Name 11/22/23 1521             Time Calculation    Start Time 1445  -MS      Stop Time 1500  -MS      Time Calculation (min) 15 min  -MS      PT Received On 11/22/23  -MS      PT - Next Appointment 11/23/23  -MS         Time Calculation- PT    Total Timed Code Minutes- PT 14 minute(s)  -MS                User Key  (r) = Recorded By, (t) = Taken By, (c) = Cosigned By      Initials Name Provider Type    MS Miguel Beasley PT Physical Therapist                  Therapy Charges for Today       Code Description Service Date Service Provider Modifiers Qty    24248927381  PT EVAL MOD COMPLEXITY 2 11/21/2023 Miguel Beasley, PT GP 1    75105496024 HC PT THERAPEUTIC ACT EA 15 MIN 11/21/2023 Miguel Beasley, PT GP 1    82483601968 HC PT THER SUPP EA 15 MIN 11/21/2023 Gale  Miguel MAXWELL, PT GP 1    43683247282 HC PT THERAPEUTIC ACT EA 15 MIN 11/22/2023 Miguel Beasley, PT GP 1    49651429105 HC PT THER SUPP EA 15 MIN 11/22/2023 Miguel Beasley, PT GP 1            PT G-Codes  Outcome Measure Options: AM-PAC 6 Clicks Basic Mobility (PT)  AM-PAC 6 Clicks Score (PT): 17  PT Discharge Summary  Anticipated Discharge Disposition (PT): home with assist, home with home health, skilled nursing facility (Pending pt. progress)    Miguel Beasley, PT  11/22/2023

## 2023-11-22 NOTE — PROGRESS NOTES
Colorectal & General Surgery  Progress Note    Patient: Martina Hardwick  YOB: 1955  MRN: [id number]      Assessment  Martina Hardwick is a 67 y.o. female with history of multiple abdominal operations and colonic inertia who is now postoperative day 2 from exploratory laparotomy, enterolysis, subtotal colectomy with ileocolostomy.  Continues to recover well.  I reinforced with her the importance of allowing the nursing staff to administer her oral pain medications via the nasogastric tube so that we can control her pain with multimodal regimen.  Will discontinue Hodge catheter today.  Up and out of bed and continue physical therapy.  Continue routine postoperative care.    Subjective  No acute events overnight.  Pain reasonably well-controlled.  No flatus or bowel movements.  Minimal nasogastric tube output.  Excellent urine output.    Objective    Vitals:    11/22/23 0157   BP: 105/56   Pulse: 89   Resp: 16   Temp: 97.3 °F (36.3 °C)   SpO2: 100%       Physical Exam  Constitutional: Well-developed well-nourished, no acute distress  Neck: Supple, trachea midline  Respiratory: No increased work of breathing, Symmetric excursion  Cardiovascular: Well pefursed, no jugular venous distention evident   Abdominal: Incision intact without erythema, soft, appropriately tender, moderately distended.   Skin: Warm, dry, no rash on visualized skin surfaces  Psychiatric: Alert and oriented ×3, normal affect     Laboratory Results  None to review    Radiology  None to review         Osvaldo Salinas MD  Colorectal & General Surgery  [facility]    [address], [address]  [place], [place], [address]  P: [phone]  F: [phone]

## 2023-11-22 NOTE — PLAN OF CARE
Goal Outcome Evaluation:  Plan of Care Reviewed With: patient           Outcome Evaluation: Upon entering room, pt. supine in bed, awake/alert, and agreeable to work with P.T. this date. Pt. able to ambulate 6 feet, CGA x 2, with use of Rwx this PM. Pt. requires Min. assist x 1 for bed mobility and Min. assist x 1 for sit <-> stand transfers.  BLE ther. ex. program x 5 reps completed for general strengthening.  Verbal/tactile cues given during ambulation for posture correction and Rwx guidance.  Will continue to progress functional mobility as tolerated.      Anticipated Discharge Disposition (PT): home with assist, home with home health, skilled nursing facility (Pending pt. progress)

## 2023-11-22 NOTE — CASE MANAGEMENT/SOCIAL WORK
Discharge Planning Assessment  [facility]     Patient Name: Martina Hardwick  MRN: [id number]  Today's Date: 11/22/2023    Admit Date: 11/20/2023    Plan: Home with    Discharge Needs Assessment       Row Name 11/22/23 1352       Living Environment    People in Home spouse    Current Living Arrangements home    Potentially Unsafe Housing Conditions none    Primary Care Provided by self    Provides Primary Care For no one    Family Caregiver if Needed spouse    Quality of Family Relationships helpful;involved;supportive    Able to Return to Prior Arrangements yes       Resource/Environmental Concerns    Resource/Environmental Concerns none    Transportation Concerns none       Transition Planning    Patient/Family Anticipates Transition to home with family    Patient/Family Anticipated Services at Transition none    Transportation Anticipated family or friend will provide       Discharge Needs Assessment    Equipment Currently Used at Home none    Concerns to be Addressed no discharge needs identified;denies needs/concerns at this time    Anticipated Changes Related to Illness none    Equipment Needed After Discharge none    Provided Post Acute Provider List? N/A    Provided Post Acute Provider Quality & Resource List? N/A                   Discharge Plan       Row Name 11/22/23 8913       Plan    Plan Home with     Patient/Family in Agreement with Plan yes    Plan Comments IMM noted. [facility] spoke to the patient’s  López, per his request due to patient having an NG tube, patient gave permission. López confirmed the information on the patient’s face sheet was accurate. López states that the patient lives at home with him in a 1-level ranch style home with a basement. López confirmed the patient’s PCP is Ildefonso Dubois. López denies any history of HH/SNF for the patient. López denies the patient using any kind of DME. The patient is enrolled in the [facility] program. López states there are 3 steps to  enter the home at the front door with handrails on both sides. López states he does the laundry that is located in the basement and that the patient does not go down to the basement for anything. López denies any needs at this time and states he can transport the patient at discharge. CCP to follow. CD, CSW.                  Continued Care and Services - Admitted Since 11/20/2023    Coordination has not been started for this encounter.          Demographic Summary       Row Name 11/22/23 1341       General Information    Admission Type inpatient    Arrived From emergency department    Required Notices Provided Important Message from Medicare    Referral Source admission list    Reason for Consult discharge planning    Preferred Language English                   Functional Status       Row Name 11/22/23 1352       Functional Status    Usual Activity Tolerance good    Current Activity Tolerance moderate       Functional Status, IADL    Medications independent    Meal Preparation independent    Housekeeping independent    Laundry independent    Shopping independent       Mental Status    General Appearance WDL WDL       Mental Status Summary    Recent Changes in Mental Status/Cognitive Functioning no changes       Employment/    Employment Status retired                   Psychosocial    No documentation.                  Abuse/Neglect    No documentation.                  Legal    No documentation.                  Substance Abuse    No documentation.                  Patient Forms    No documentation.

## 2023-11-23 LAB
ABO GROUP BLD: NORMAL
ANION GAP SERPL CALCULATED.3IONS-SCNC: 5 MMOL/L (ref 5–15)
ANION GAP SERPL CALCULATED.3IONS-SCNC: 8.2 MMOL/L (ref 5–15)
BLD GP AB SCN SERPL QL: NEGATIVE
BUN SERPL-MCNC: 30 MG/DL (ref 8–23)
BUN SERPL-MCNC: 30 MG/DL (ref 8–23)
BUN/CREAT SERPL: 22.2 (ref 7–25)
BUN/CREAT SERPL: 23.8 (ref 7–25)
CALCIUM SPEC-SCNC: 9 MG/DL (ref 8.6–10.5)
CALCIUM SPEC-SCNC: 9 MG/DL (ref 8.6–10.5)
CHLORIDE SERPL-SCNC: 106 MMOL/L (ref 98–107)
CHLORIDE SERPL-SCNC: 108 MMOL/L (ref 98–107)
CO2 SERPL-SCNC: 22.8 MMOL/L (ref 22–29)
CO2 SERPL-SCNC: 25 MMOL/L (ref 22–29)
CREAT SERPL-MCNC: 1.26 MG/DL (ref 0.57–1)
CREAT SERPL-MCNC: 1.35 MG/DL (ref 0.57–1)
DEPRECATED RDW RBC AUTO: 37.4 FL (ref 37–54)
EGFRCR SERPLBLD CKD-EPI 2021: 43.2 ML/MIN/1.73
EGFRCR SERPLBLD CKD-EPI 2021: 46.9 ML/MIN/1.73
ERYTHROCYTE [DISTWIDTH] IN BLOOD BY AUTOMATED COUNT: 11.3 % (ref 12.3–15.4)
GLUCOSE SERPL-MCNC: 104 MG/DL (ref 65–99)
GLUCOSE SERPL-MCNC: 110 MG/DL (ref 65–99)
HCT VFR BLD AUTO: 20.1 % (ref 34–46.6)
HCT VFR BLD AUTO: 25.8 % (ref 34–46.6)
HGB BLD-MCNC: 6.7 G/DL (ref 12–15.9)
HGB BLD-MCNC: 8.4 G/DL (ref 12–15.9)
MAGNESIUM SERPL-MCNC: 2.4 MG/DL (ref 1.6–2.4)
MCH RBC QN AUTO: 30.3 PG (ref 26.6–33)
MCHC RBC AUTO-ENTMCNC: 32.6 G/DL (ref 31.5–35.7)
MCV RBC AUTO: 93.1 FL (ref 79–97)
PHOSPHATE SERPL-MCNC: 2.3 MG/DL (ref 2.5–4.5)
PHOSPHATE SERPL-MCNC: 2.4 MG/DL (ref 2.5–4.5)
PLATELET # BLD AUTO: 165 10*3/MM3 (ref 140–450)
PMV BLD AUTO: 10.6 FL (ref 6–12)
POTASSIUM SERPL-SCNC: 4.2 MMOL/L (ref 3.5–5.2)
POTASSIUM SERPL-SCNC: 4.5 MMOL/L (ref 3.5–5.2)
PROCALCITONIN SERPL-MCNC: 4.53 NG/ML (ref 0–0.25)
RBC # BLD AUTO: 2.77 10*6/MM3 (ref 3.77–5.28)
RH BLD: POSITIVE
SODIUM SERPL-SCNC: 136 MMOL/L (ref 136–145)
SODIUM SERPL-SCNC: 139 MMOL/L (ref 136–145)
T&S EXPIRATION DATE: NORMAL
WBC NRBC COR # BLD AUTO: 10.95 10*3/MM3 (ref 3.4–10.8)

## 2023-11-23 PROCEDURE — 86923 COMPATIBILITY TEST ELECTRIC: CPT

## 2023-11-23 PROCEDURE — 36430 TRANSFUSION BLD/BLD COMPNT: CPT

## 2023-11-23 PROCEDURE — 25810000003 DEXTROSE 5% IN LACTATED RINGERS PER 1000 ML: Performed by: SURGERY

## 2023-11-23 PROCEDURE — 86850 RBC ANTIBODY SCREEN: CPT | Performed by: SURGERY

## 2023-11-23 PROCEDURE — 85014 HEMATOCRIT: CPT | Performed by: SURGERY

## 2023-11-23 PROCEDURE — 86900 BLOOD TYPING SEROLOGIC ABO: CPT

## 2023-11-23 PROCEDURE — 84145 PROCALCITONIN (PCT): CPT | Performed by: SURGERY

## 2023-11-23 PROCEDURE — 86901 BLOOD TYPING SEROLOGIC RH(D): CPT | Performed by: SURGERY

## 2023-11-23 PROCEDURE — 83735 ASSAY OF MAGNESIUM: CPT | Performed by: SURGERY

## 2023-11-23 PROCEDURE — 85018 HEMOGLOBIN: CPT | Performed by: SURGERY

## 2023-11-23 PROCEDURE — 80048 BASIC METABOLIC PNL TOTAL CA: CPT | Performed by: SURGERY

## 2023-11-23 PROCEDURE — 84100 ASSAY OF PHOSPHORUS: CPT | Performed by: SURGERY

## 2023-11-23 PROCEDURE — 86901 BLOOD TYPING SEROLOGIC RH(D): CPT

## 2023-11-23 PROCEDURE — P9016 RBC LEUKOCYTES REDUCED: HCPCS

## 2023-11-23 PROCEDURE — 25010000002 HYDROMORPHONE PER 4 MG: Performed by: SURGERY

## 2023-11-23 PROCEDURE — 99024 POSTOP FOLLOW-UP VISIT: CPT | Performed by: SURGERY

## 2023-11-23 PROCEDURE — 25010000002 ENOXAPARIN PER 10 MG: Performed by: SURGERY

## 2023-11-23 PROCEDURE — 86900 BLOOD TYPING SEROLOGIC ABO: CPT | Performed by: SURGERY

## 2023-11-23 PROCEDURE — 85027 COMPLETE CBC AUTOMATED: CPT | Performed by: SURGERY

## 2023-11-23 RX ADMIN — ALPRAZOLAM 0.5 MG: 0.5 TABLET ORAL at 08:27

## 2023-11-23 RX ADMIN — METHOCARBAMOL TABLETS 750 MG: 750 TABLET, COATED ORAL at 17:29

## 2023-11-23 RX ADMIN — AMITRIPTYLINE HYDROCHLORIDE 5 MG: 10 TABLET, FILM COATED ORAL at 20:22

## 2023-11-23 RX ADMIN — OXYCODONE HYDROCHLORIDE 5 MG: 5 TABLET ORAL at 08:27

## 2023-11-23 RX ADMIN — SODIUM CHLORIDE, SODIUM LACTATE, POTASSIUM CHLORIDE, CALCIUM CHLORIDE AND DEXTROSE MONOHYDRATE 100 ML/HR: 5; 600; 310; 30; 20 INJECTION, SOLUTION INTRAVENOUS at 08:34

## 2023-11-23 RX ADMIN — AMLODIPINE BESYLATE 2.5 MG: 2.5 TABLET ORAL at 08:28

## 2023-11-23 RX ADMIN — ENOXAPARIN SODIUM 30 MG: 100 INJECTION SUBCUTANEOUS at 08:27

## 2023-11-23 RX ADMIN — ALPRAZOLAM 0.5 MG: 0.5 TABLET ORAL at 20:22

## 2023-11-23 RX ADMIN — METHOCARBAMOL TABLETS 750 MG: 750 TABLET, COATED ORAL at 13:40

## 2023-11-23 RX ADMIN — ACETAMINOPHEN 1000 MG: 500 TABLET ORAL at 06:09

## 2023-11-23 RX ADMIN — PAROXETINE HYDROCHLORIDE HEMIHYDRATE 40 MG: 20 TABLET, FILM COATED ORAL at 06:09

## 2023-11-23 RX ADMIN — PANTOPRAZOLE SODIUM 40 MG: 40 TABLET, DELAYED RELEASE ORAL at 08:27

## 2023-11-23 RX ADMIN — METHOCARBAMOL TABLETS 750 MG: 750 TABLET, COATED ORAL at 06:09

## 2023-11-23 RX ADMIN — ACETAMINOPHEN 1000 MG: 500 TABLET ORAL at 13:39

## 2023-11-23 RX ADMIN — HYDROMORPHONE HYDROCHLORIDE 0.5 MG: 1 INJECTION, SOLUTION INTRAMUSCULAR; INTRAVENOUS; SUBCUTANEOUS at 05:30

## 2023-11-23 RX ADMIN — SODIUM CHLORIDE, SODIUM LACTATE, POTASSIUM CHLORIDE, CALCIUM CHLORIDE AND DEXTROSE MONOHYDRATE 150 ML/HR: 5; 600; 310; 30; 20 INJECTION, SOLUTION INTRAVENOUS at 17:28

## 2023-11-23 RX ADMIN — ACETAMINOPHEN 1000 MG: 500 TABLET ORAL at 17:29

## 2023-11-23 RX ADMIN — HYDROMORPHONE HYDROCHLORIDE 0.5 MG: 1 INJECTION, SOLUTION INTRAMUSCULAR; INTRAVENOUS; SUBCUTANEOUS at 14:25

## 2023-11-23 RX ADMIN — HYDROMORPHONE HYDROCHLORIDE 0.5 MG: 1 INJECTION, SOLUTION INTRAMUSCULAR; INTRAVENOUS; SUBCUTANEOUS at 20:20

## 2023-11-23 NOTE — PLAN OF CARE
Goal Outcome Evaluation:     Progress: no change    Administered Dilaudid 2X. Good output from vigil and NG tube. Will continue with plan of care.

## 2023-11-23 NOTE — PROGRESS NOTES
Chief Complaint:    S/P Exploratory laparotomy with lysis of adhesions, subtotal colectomy, and ileocolic anastomosis, POD 3    Subjective:    The patient is generally feeling well except for ongoing an expected postop abdominal pain.  She is getting adequate pain relief with her pain medication.  She has some intermittent mild nausea but no vomiting.  The NG tube put out 650 cc yesterday that is bilious in nature.  She has not passed flatus or had a bowel movement.    Objective:    Temp:  [97.8 °F (36.6 °C)-98.7 °F (37.1 °C)] 98 °F (36.7 °C)  Heart Rate:  [103-115] 115  Resp:  [18-20] 20  BP: (100-131)/(58-72) 131/72    Physical Exam  Constitutional:       Appearance: She is not ill-appearing or toxic-appearing.   Pulmonary:      Effort: Pulmonary effort is normal. No respiratory distress.   Abdominal:      General: Bowel sounds are decreased. There is distension.      Palpations: Abdomen is soft.      Tenderness: There is generalized abdominal tenderness.      Comments: The abdomen is soft with appropriate postop tenderness.  It is mildly distended.  There are few hypoactive bowel sounds.  The exam is benign.  The incision is intact with no evidence of infection.   Neurological:      Mental Status: She is alert.   Psychiatric:         Behavior: Behavior is cooperative.         Results:    WBC is 10.95.  H/H is 8.4/25.8.  Platelets are 165.  BUN is 30 and creatinine is 1.35.  Phosphorus is 2.4.    Impression/Plan:    The patient is POD 3 from an exploratory laparotomy with extensive lysis of adhesions, subtotal colectomy, and ileocolic anastomosis for colonic inertia.  She continues to have an expected postop ileus and is not ready to have the NG tube removed.    Her H/H has decreased from 11.3/34.4 to 8.4/25.8.  She is also mildly tachycardic.  This is concerning for bleeding although it does not appear to be clinically significant to the point that reoperation is necessary.  She is at increased risk for mild  intra-abdominal bleeding from the extensive lysis of adhesions.  I have stopped Lovenox.  I will recheck the H/H and type and screen for packed red blood cells in case they are needed.    Her BUN and creatinine have increased which could be related to fluid losses in the abdomen or from the possible bleeding.  I have increased her IV fluid rate to 150 cc/h and will recheck electrolytes later today.    Her phosphorus is slightly decreased and I will initiate the phosphorus replacement protocol in case it drops further.    Shahzad Child Jr., M.D.

## 2023-11-23 NOTE — SIGNIFICANT NOTE
11/23/23 1439   OTHER   Discipline physical therapist   Rehab Time/Intention   Session Not Performed other (see comments)  (Hemoglobin 6.7, hematocrit 20.1. PT will follow up tomorrow depending on lab results.)   Recommendation   PT - Next Appointment 11/24/23

## 2023-11-23 NOTE — PROGRESS NOTES
"I checked on Ms. Hardwick. She is hemodynamically acceptable without tachycardia and normotensive on last BP check. She denies CP, palpitations, SOB, NV, dizziness/lightheadedness. She reports abdominal pain that \"comes and goes.\" She has not had a bowel movement. Denies blood per NGT or per rectum.  She has been out of bed to the chair.     On exam she is awake, alert, cooperative, has a raspy voice but answers questions appropriately, NGT to LIWS has bilious fluid in cannister, her abdomen is distended and appropriately tender without rebound or guarding; the midline incision is c/d with skin glue in place. Hodge has dark yellow urine approximately 300 cc in the bag. Patient has 1U RBCs hanging.     Discussed with patient and her  at bedside plan to move patient to telemetry floor and repeat CBC following transfusion. I explained my concern for bleeding postop with her decrease in Hgb, and plans for transfusion and ongoing assessment to determine need to return to the OR. At this time, she is stable and does not require surgical intervention.  Patient and her  appear in good understanding of the plan.    Sarah Pearl M.D.  General, Robotic, and Endoscopic Surgery  [facility] [place]    [address], [address]  [place], [place], [address]  P: [phone]  F: [phone]     " show

## 2023-11-24 LAB
ANION GAP SERPL CALCULATED.3IONS-SCNC: 6 MMOL/L (ref 5–15)
BH BB BLOOD EXPIRATION DATE: NORMAL
BH BB BLOOD EXPIRATION DATE: NORMAL
BH BB BLOOD TYPE BARCODE: 6200
BH BB BLOOD TYPE BARCODE: 6200
BH BB DISPENSE STATUS: NORMAL
BH BB DISPENSE STATUS: NORMAL
BH BB PRODUCT CODE: NORMAL
BH BB PRODUCT CODE: NORMAL
BH BB UNIT NUMBER: NORMAL
BH BB UNIT NUMBER: NORMAL
BUN SERPL-MCNC: 18 MG/DL (ref 8–23)
BUN/CREAT SERPL: 28.6 (ref 7–25)
CALCIUM SPEC-SCNC: 9 MG/DL (ref 8.6–10.5)
CHLORIDE SERPL-SCNC: 106 MMOL/L (ref 98–107)
CO2 SERPL-SCNC: 27 MMOL/L (ref 22–29)
CREAT SERPL-MCNC: 0.63 MG/DL (ref 0.57–1)
CROSSMATCH INTERPRETATION: NORMAL
CROSSMATCH INTERPRETATION: NORMAL
DEPRECATED RDW RBC AUTO: 47.4 FL (ref 37–54)
EGFRCR SERPLBLD CKD-EPI 2021: 97.4 ML/MIN/1.73
ERYTHROCYTE [DISTWIDTH] IN BLOOD BY AUTOMATED COUNT: 15 % (ref 12.3–15.4)
GLUCOSE SERPL-MCNC: 112 MG/DL (ref 65–99)
HCT VFR BLD AUTO: 30.1 % (ref 34–46.6)
HCT VFR BLD AUTO: 30.1 % (ref 34–46.6)
HCT VFR BLD AUTO: 30.9 % (ref 34–46.6)
HGB BLD-MCNC: 10 G/DL (ref 12–15.9)
HGB BLD-MCNC: 10 G/DL (ref 12–15.9)
HGB BLD-MCNC: 10.5 G/DL (ref 12–15.9)
MAGNESIUM SERPL-MCNC: 2 MG/DL (ref 1.6–2.4)
MCH RBC QN AUTO: 29.1 PG (ref 26.6–33)
MCHC RBC AUTO-ENTMCNC: 33.2 G/DL (ref 31.5–35.7)
MCV RBC AUTO: 87.5 FL (ref 79–97)
PHOSPHATE SERPL-MCNC: 1.8 MG/DL (ref 2.5–4.5)
PLATELET # BLD AUTO: 116 10*3/MM3 (ref 140–450)
PMV BLD AUTO: 9.8 FL (ref 6–12)
POTASSIUM SERPL-SCNC: 3.7 MMOL/L (ref 3.5–5.2)
PROCALCITONIN SERPL-MCNC: 2.04 NG/ML (ref 0–0.25)
RBC # BLD AUTO: 3.44 10*6/MM3 (ref 3.77–5.28)
SODIUM SERPL-SCNC: 139 MMOL/L (ref 136–145)
UNIT  ABO: NORMAL
UNIT  ABO: NORMAL
UNIT  RH: NORMAL
UNIT  RH: NORMAL
WBC NRBC COR # BLD AUTO: 6.54 10*3/MM3 (ref 3.4–10.8)

## 2023-11-24 PROCEDURE — 99024 POSTOP FOLLOW-UP VISIT: CPT | Performed by: SURGERY

## 2023-11-24 PROCEDURE — 25810000003 SODIUM CHLORIDE 0.9 % SOLUTION: Performed by: SURGERY

## 2023-11-24 PROCEDURE — 84145 PROCALCITONIN (PCT): CPT | Performed by: SURGERY

## 2023-11-24 PROCEDURE — 85018 HEMOGLOBIN: CPT | Performed by: SURGERY

## 2023-11-24 PROCEDURE — 85014 HEMATOCRIT: CPT | Performed by: SURGERY

## 2023-11-24 PROCEDURE — 25010000002 CALCIUM GLUCONATE PER 10 ML: Performed by: SURGERY

## 2023-11-24 PROCEDURE — 80048 BASIC METABOLIC PNL TOTAL CA: CPT | Performed by: SURGERY

## 2023-11-24 PROCEDURE — 25010000002 POTASSIUM CHLORIDE PER 2 MEQ OF POTASSIUM: Performed by: SURGERY

## 2023-11-24 PROCEDURE — 02HV33Z INSERTION OF INFUSION DEVICE INTO SUPERIOR VENA CAVA, PERCUTANEOUS APPROACH: ICD-10-PCS | Performed by: SURGERY

## 2023-11-24 PROCEDURE — 25010000002 MAGNESIUM SULFATE PER 500 MG OF MAGNESIUM: Performed by: SURGERY

## 2023-11-24 PROCEDURE — 85027 COMPLETE CBC AUTOMATED: CPT | Performed by: SURGERY

## 2023-11-24 PROCEDURE — C1751 CATH, INF, PER/CENT/MIDLINE: HCPCS

## 2023-11-24 PROCEDURE — 97530 THERAPEUTIC ACTIVITIES: CPT

## 2023-11-24 PROCEDURE — 25010000002 HYDROMORPHONE PER 4 MG: Performed by: SURGERY

## 2023-11-24 PROCEDURE — 25810000003 DEXTROSE 5% IN LACTATED RINGERS PER 1000 ML: Performed by: SURGERY

## 2023-11-24 PROCEDURE — 84100 ASSAY OF PHOSPHORUS: CPT | Performed by: SURGERY

## 2023-11-24 PROCEDURE — 25010000002 ENOXAPARIN PER 10 MG: Performed by: SURGERY

## 2023-11-24 PROCEDURE — 83735 ASSAY OF MAGNESIUM: CPT | Performed by: SURGERY

## 2023-11-24 RX ORDER — SODIUM CHLORIDE 0.9 % (FLUSH) 0.9 %
10 SYRINGE (ML) INJECTION AS NEEDED
Status: DISCONTINUED | OUTPATIENT
Start: 2023-11-24 | End: 2023-12-01

## 2023-11-24 RX ORDER — TAMSULOSIN HYDROCHLORIDE 0.4 MG/1
0.4 CAPSULE ORAL DAILY
Status: DISCONTINUED | OUTPATIENT
Start: 2023-11-24 | End: 2023-11-27

## 2023-11-24 RX ORDER — SODIUM CHLORIDE 9 MG/ML
40 INJECTION, SOLUTION INTRAVENOUS AS NEEDED
Status: DISCONTINUED | OUTPATIENT
Start: 2023-11-24 | End: 2023-12-01

## 2023-11-24 RX ORDER — SODIUM CHLORIDE 0.9 % (FLUSH) 0.9 %
10 SYRINGE (ML) INJECTION EVERY 12 HOURS SCHEDULED
Status: DISCONTINUED | OUTPATIENT
Start: 2023-11-24 | End: 2023-12-01

## 2023-11-24 RX ORDER — SODIUM CHLORIDE 0.9 % (FLUSH) 0.9 %
20 SYRINGE (ML) INJECTION AS NEEDED
Status: DISCONTINUED | OUTPATIENT
Start: 2023-11-24 | End: 2023-12-01

## 2023-11-24 RX ORDER — ENOXAPARIN SODIUM 100 MG/ML
30 INJECTION SUBCUTANEOUS EVERY 24 HOURS
Status: DISCONTINUED | OUTPATIENT
Start: 2023-11-24 | End: 2023-12-11

## 2023-11-24 RX ORDER — FENTANYL/ROPIVACAINE/NS/PF 2-625MCG/1
15 PLASTIC BAG, INJECTION (ML) EPIDURAL ONCE
Status: COMPLETED | OUTPATIENT
Start: 2023-11-24 | End: 2023-11-24

## 2023-11-24 RX ADMIN — AMLODIPINE BESYLATE 2.5 MG: 2.5 TABLET ORAL at 09:25

## 2023-11-24 RX ADMIN — ACETAMINOPHEN 1000 MG: 500 TABLET ORAL at 18:29

## 2023-11-24 RX ADMIN — HYDROMORPHONE HYDROCHLORIDE 0.5 MG: 1 INJECTION, SOLUTION INTRAMUSCULAR; INTRAVENOUS; SUBCUTANEOUS at 04:13

## 2023-11-24 RX ADMIN — HYDROMORPHONE HYDROCHLORIDE 0.5 MG: 1 INJECTION, SOLUTION INTRAMUSCULAR; INTRAVENOUS; SUBCUTANEOUS at 20:57

## 2023-11-24 RX ADMIN — HYDROMORPHONE HYDROCHLORIDE 0.5 MG: 1 INJECTION, SOLUTION INTRAMUSCULAR; INTRAVENOUS; SUBCUTANEOUS at 15:34

## 2023-11-24 RX ADMIN — PAROXETINE HYDROCHLORIDE HEMIHYDRATE 40 MG: 20 TABLET, FILM COATED ORAL at 09:25

## 2023-11-24 RX ADMIN — SODIUM CHLORIDE, SODIUM LACTATE, POTASSIUM CHLORIDE, CALCIUM CHLORIDE AND DEXTROSE MONOHYDRATE 150 ML/HR: 5; 600; 310; 30; 20 INJECTION, SOLUTION INTRAVENOUS at 00:25

## 2023-11-24 RX ADMIN — METHOCARBAMOL TABLETS 750 MG: 750 TABLET, COATED ORAL at 00:17

## 2023-11-24 RX ADMIN — POTASSIUM PHOSPHATE, MONOBASIC POTASSIUM PHOSPHATE, DIBASIC: 224; 236 INJECTION, SOLUTION, CONCENTRATE INTRAVENOUS at 18:32

## 2023-11-24 RX ADMIN — HYDROMORPHONE HYDROCHLORIDE 0.5 MG: 1 INJECTION, SOLUTION INTRAMUSCULAR; INTRAVENOUS; SUBCUTANEOUS at 12:51

## 2023-11-24 RX ADMIN — Medication 10 ML: at 20:57

## 2023-11-24 RX ADMIN — METHOCARBAMOL TABLETS 750 MG: 750 TABLET, COATED ORAL at 12:26

## 2023-11-24 RX ADMIN — ALPRAZOLAM 0.5 MG: 0.5 TABLET ORAL at 09:25

## 2023-11-24 RX ADMIN — POTASSIUM PHOSPHATE, MONOBASIC POTASSIUM PHOSPHATE, DIBASIC 15 MMOL: 224; 236 INJECTION, SOLUTION, CONCENTRATE INTRAVENOUS at 11:38

## 2023-11-24 RX ADMIN — ACETAMINOPHEN 1000 MG: 500 TABLET ORAL at 12:26

## 2023-11-24 RX ADMIN — METHOCARBAMOL TABLETS 750 MG: 750 TABLET, COATED ORAL at 18:29

## 2023-11-24 RX ADMIN — ENOXAPARIN SODIUM 30 MG: 100 INJECTION SUBCUTANEOUS at 12:31

## 2023-11-24 RX ADMIN — PANTOPRAZOLE SODIUM 40 MG: 40 TABLET, DELAYED RELEASE ORAL at 09:25

## 2023-11-24 RX ADMIN — HYDROMORPHONE HYDROCHLORIDE 0.5 MG: 1 INJECTION, SOLUTION INTRAMUSCULAR; INTRAVENOUS; SUBCUTANEOUS at 00:20

## 2023-11-24 RX ADMIN — ACETAMINOPHEN 1000 MG: 500 TABLET ORAL at 06:58

## 2023-11-24 RX ADMIN — TAMSULOSIN HYDROCHLORIDE 0.4 MG: 0.4 CAPSULE ORAL at 12:26

## 2023-11-24 RX ADMIN — AMITRIPTYLINE HYDROCHLORIDE 5 MG: 10 TABLET, FILM COATED ORAL at 20:57

## 2023-11-24 RX ADMIN — METHOCARBAMOL TABLETS 750 MG: 750 TABLET, COATED ORAL at 23:59

## 2023-11-24 RX ADMIN — ALPRAZOLAM 0.5 MG: 0.5 TABLET ORAL at 20:57

## 2023-11-24 RX ADMIN — ACETAMINOPHEN 1000 MG: 500 TABLET ORAL at 00:17

## 2023-11-24 RX ADMIN — METHOCARBAMOL TABLETS 750 MG: 750 TABLET, COATED ORAL at 09:25

## 2023-11-24 NOTE — PLAN OF CARE
Problem: Parenteral Nutrition  Goal: Effective Intravenous Nutrition Therapy Delivery  Outcome: Ongoing, Progressing   Goal Outcome Evaluation:   PICC to be placed and TPN to start today   Goal base: 1200 kcal dex, 70 g AA, 200 kcal lipids   RD to follow closely

## 2023-11-24 NOTE — PROGRESS NOTES
"[facility] Clinical Pharmacy Services: Total Parental Nutrition Initial Consult    Indication: Prolonged Paralytic Ileus  Route: central - PICC line to be placed  Type: standard    Relevant clinical data and objective history reviewed:  67 y.o. female 157.5 cm (62\") 46.6 kg (102 lb 11.8 oz)    Results from last 7 days   Lab Units 11/24/23  0617   SODIUM mmol/L 139   POTASSIUM mmol/L 3.7   CHLORIDE mmol/L 106   CO2 mmol/L 27.0   BUN mg/dL 18   CREATININE mg/dL 0.63   CALCIUM mg/dL 9.0   GLUCOSE mg/dL 112*   MAGNESIUM mg/dL 2.0   PHOSPHORUS mg/dL 1.8*        Estimated Creatinine Clearance: 63.7 mL/min (by C-G formula based on SCr of 0.63 mg/dL).    Active fluid orders: D5W & LR at 75ml/hr    Dietary Orders (From admission, onward)       Start     Ordered    11/20/23 2057  NPO Diet NPO Type: Sips with Meds, Ice Chips  Diet Effective Now        Question Answer Comment   NPO Type Sips with Meds    NPO Type Ice Chips        11/20/23 2056                  Assessment  Ideal Body Weight: 50.1 kg  Body Weight Used for Calculations: 46.6 kg  Daily Est. Jad: 1400 -1680 kCal/Day (30-35kcal/kg/day d/t severe malnourishment at baseline)  Estimated Fluid Requirements: 1680 mL/day    Goal   Protein: 1.5 grams/kg/day (70 grams/day)   Dextrose: 1200 Kcal/day   Lipids: 100 ml of 20% lipid infusion 7 days/week    Plan  Will start TPN at half goal dextrose and will taper up as appropriate. Begin at goal protein and hold lipids until day 2. Will initiate TPN with the following macros:   Protein/Dextrose/Lipids: 70g/600kcal/hold lipids    Volume: 1680 ml (70 mL/hr over 24 hrs daily)    Based on the above labs, will add the following electrolytes/additives to the TPN.    Sodium Chloride: 70 mEq   Sodium Acetate: 0 mEq   Sodium Phosphate: 0 mEq   Potassium Chloride: 50 mEq   Potassium Acetate: 0 mEq   Potassium Phosphate: 30 mEq   Calcium Gluconate: 9 mEq   Magnesium Sulfate: 10 mEq   MVI for TPN   Trace Elements              Other " Additives: None    Labs to be ordered: BMP, Mg, Phos, Triglycerides    Pharmacy will continue to follow.     Niko Reardon [facility]  Clinical Pharmacist

## 2023-11-24 NOTE — THERAPY TREATMENT NOTE
Patient Name: Martina Hardwick  : 1955    MRN: [id number]                              Today's Date: 2023       Admit Date: 2023    Visit Dx:     ICD-10-CM ICD-9-CM   1. 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    Colonic inertia    Severe malnutrition     Past Medical History:   Diagnosis Date    Arthritis     Autoantibody titer positive     Goodwin esophagus     Bloating     Cataract     BILAT    Chronic abdominal pain     Chronic constipation     Encounter for preventive health examination     Endometriosis     Gastritis     Gastrointestinal hypomotility     GERD (gastroesophageal reflux disease)     Headache, tension-type     Hypertension     Hyponatremia     Insomnia     Intestinal autonomic neuropathy     Irritable bowel syndrome     Migraine     Mixed anxiety and depressive disorder     Moderate malnutrition     Noninfectious gastroenteritis     OP (osteoporosis)     Osteopenia     Osteoporosis     PONV (postoperative nausea and vomiting) 2018    Psoriasis     KNEES, ELBOWS BILAT AND SCALP    Seasonal allergies     Visceral hyperalgesia      Past Surgical History:   Procedure Laterality Date    APPENDECTOMY      CHOLECYSTECTOMY N/A 2018    Procedure: CHOLECYSTECTOMY LAPAROSCOPIC converted to open procedure;  Surgeon: Hernan Hinds MD;  Location: [facility];  Service: General    COLON RESECTION N/A 2023    Procedure: OPEN SUBTOTAL COLECTOMY AND  ADESIOLYSIS;  Surgeon: Ranjeet Salinas MD;  Location: [facility] MAIN OR;  Service: General;  Laterality: N/A;    COLON SURGERY      STATES TOTAL OF 3 COLON SURGERY    COLONOSCOPY      COLONOSCOPY N/A 12/12/2018    Procedure: COLONOSCOPY;  Surgeon: Darien Ruff MD;  Location: [facility] OR;  Service: Gastroenterology    COLONOSCOPY N/A 10/13/2023    Procedure: COLONOSCOPY TO CECUM;  Surgeon: Ranjeet Salinas MD;  Location: [facility] ENDOSCOPY;  Service: General;  Laterality: N/A;  PREOP/ CHRONIC CONSTIPATION- POSTOP/ NORMAL    DIAGNOSTIC LAPAROSCOPY  1990    DILATATION AND CURETTAGE  1985    ENDOSCOPY N/A 05/13/2016    Procedure: ESOPHAGOGASTRODUODENOSCOPY ;  Surgeon: Darien Ruff MD;  Location: [facility] OR;  Service:     ENDOSCOPY N/A 05/01/2023    Procedure: ESOPHAGOGASTRODUODENOSCOPY WITH BIOPSY;  Surgeon: Darien Ruff MD;  Location: [facility] OR;  Service: Gastroenterology;  Laterality: N/A;  Mild Thrush; Gastritis; Esophagitis- thrush and reflux; Biopsies- duodenal, gastric, esophagus    HYSTERECTOMY      REVISION / TAKEDOWN COLOSTOMY        General Information       Row Name 11/24/23 0903          Physical Therapy Time and Intention    Document Type therapy note (daily note)  -PH     Mode of Treatment physical therapy  -PH       Row Name 11/24/23 0903          General Information    Existing Precautions/Restrictions fall  -PH       Row Name 11/24/23 0903          Cognition    Orientation Status (Cognition) oriented x 3  -PH       Row Name 11/24/23 0903          Safety Issues, Functional Mobility    Impairments Affecting Function (Mobility) balance;endurance/activity tolerance;strength  -PH     Comment, Safety Issues/Impairments (Mobility) gt belt and non skid socks donned  -PH               User Key  (r) = Recorded By, (t) = Taken By, (c) = Cosigned By      Initials Name Provider Type    PH Radha Rosen PTA Physical Therapist Assistant                    Mobility       Row Name 11/24/23 0903          Bed Mobility    Bed Mobility supine-sit  -PH     Supine-Sit [place] (Bed Mobility) standby assist  -PH     Comment, (Bed Mobility) improved mobility  -PH       Row Name 11/24/23 0903          Sit-Stand Transfer    Sit-Stand [place] (Transfers) minimum assist (75% patient effort);verbal cues;nonverbal cues (demo/gesture)  -PH     Assistive Device (Sit-Stand Transfers) walker, front-wheeled  -PH     Comment, (Sit-Stand Transfer) STS 2x from EOB and from chair; cues for B hand placement  -PH       Row Name 11/24/23 0903          Gait/Stairs (Locomotion)    [place] Level (Gait) minimum assist (75% patient effort);verbal cues  -PH     Assistive Device (Gait) walker, front-wheeled  -PH     Distance in Feet (Gait) 30'  -PH     Deviations/Abnormal Patterns (Gait) stacy decreased;gait speed decreased;stride length decreased;festinating/shuffling  -PH     Bilateral Gait Deviations forward flexed posture;heel strike decreased  -PH     [place] Level (Stairs) unable to assess  -PH     Comment, (Gait/Stairs) slow and mildly unsteady particularly when turning w/ 1 LOB on L turn. Cues for postural correction.  -PH               User Key  (r) = Recorded By, (t) = Taken By, (c) = Cosigned By      Initials Name Provider Type    PH Radah Rosen PTA Physical Therapist Assistant                   Obj/Interventions       Row Name 11/24/23 0905          Motor Skills    Therapeutic Exercise other (see comments)  BAP, LAQ, seated march; x 10 reps  -PH       Row Name 11/24/23 0905          Balance    Balance Assessment sitting static balance;standing static balance  -PH     Static Sitting Balance standby assist  -PH     Static Standing Balance contact guard  -PH     Position/Device Used, Standing Balance walker, front-wheeled  -PH               User Key  (r) = Recorded By, (t) = Taken By, (c) = Cosigned By      Initials Name Provider Type    PH Silas  RITU Garcia Physical Therapist Assistant                   Goals/Plan    No documentation.                  Clinical Impression       Row Name 11/24/23 0905          Pain    Pretreatment Pain Rating 5/10  -PH     Posttreatment Pain Rating 5/10  -PH     Pre/Posttreatment Pain Comment c/o back and abd pain  -PH     Pain Intervention(s) Repositioned;Ambulation/increased activity;Rest  -PH       Row Name 11/24/23 0905          Plan of Care Review    Plan of Care Reviewed With patient  -PH     Progress improving  -PH     Outcome Evaluation Pt was in bed at beg of PT tx. Pt made improvement w/ supine to sit and sat up to EOB w/ SBA. Pt then stood and amb longer distance of 30' req min A and use of fww. Pt was slow and unsteady w/ 1 LOB when turning L. Pt performed ther ex for strengthening. Pt educ on imp of continued mobility and sitting up in chair at reg intervals. Also discussed continued mobility and UIC w/ RN. PT will prog as pt ramin.  -PH       Row Name 11/24/23 0905          Vital Signs    O2 Delivery Pre Treatment room air  -PH     O2 Delivery Intra Treatment room air  -PH     O2 Delivery Post Treatment room air  -PH       Row Name 11/24/23 0905          Positioning and Restraints    Pre-Treatment Position in bed  -PH     Post Treatment Position chair  -PH     In Chair reclined;call light within reach;encouraged to call for assist;exit alarm on;notified ns  -PH               User Key  (r) = Recorded By, (t) = Taken By, (c) = Cosigned By      Initials Name Provider Type    PH Radha Rosen PTA Physical Therapist Assistant                   Outcome Measures       Row Name 11/24/23 0908          How much help from another person do you currently need...    Turning from your back to your side while in flat bed without using bedrails? 4  -PH     Moving from lying on back to sitting on the side of a flat bed without bedrails? 3  -PH     Moving to and from a bed to a chair (including a wheelchair)? 3  -PH      Standing up from a chair using your arms (e.g., wheelchair, bedside chair)? 3  -PH     Climbing 3-5 steps with a railing? 2  -PH     To walk in hospital room? 3  -PH     AM-PAC 6 Clicks Score (PT) 18  -PH     Highest Level of Mobility Goal 6 --> Walk 10 steps or more  -PH       Row Name 11/24/23 0908          Functional Assessment    Outcome Measure Options AM-PAC 6 Clicks Basic Mobility (PT)  -               User Key  (r) = Recorded By, (t) = Taken By, (c) = Cosigned By      Initials Name Provider Type     Radha Rosen PTA Physical Therapist Assistant                                 Physical Therapy Education       Title: PT OT SLP Therapies (Done)       Topic: Physical Therapy (Done)       Point: Mobility training (Done)       Learning Progress Summary             Patient Acceptance, E,TB,D, VU,NR by  at 11/24/2023 0908    Acceptance, E,D, VU,NR by MS at 11/22/2023 1519    Acceptance, E,D, VU,NR by MS at 11/21/2023 1125                         Point: Home exercise program (Done)       Learning Progress Summary             Patient Acceptance, E,TB,D, VU,NR by  at 11/24/2023 0908    Acceptance, E,D, VU,NR by MS at 11/22/2023 1519    Acceptance, E,D, VU,NR by MS at 11/21/2023 1125                         Point: Body mechanics (Done)       Learning Progress Summary             Patient Acceptance, E,TB,D, VU,NR by  at 11/24/2023 0908    Acceptance, E,D, VU,NR by MS at 11/22/2023 1519    Acceptance, E,D, VU,NR by MS at 11/21/2023 1125                         Point: Precautions (Done)       Learning Progress Summary             Patient Acceptance, E,TB,D, VU,NR by  at 11/24/2023 0908    Acceptance, E,D, VU,NR by MS at 11/22/2023 1519    Acceptance, E,D, VU,NR by MS at 11/21/2023 1125                                         User Key       Initials Effective Dates Name Provider Type Discipline    MS 06/16/21 -  Miguel Beasley PT Physical Therapist PT     06/16/21 -  Radha Rosen  RITU Physical Therapist Assistant PT                  PT Recommendation and Plan     Plan of Care Reviewed With: patient  Progress: improving  Outcome Evaluation: Pt was in bed at beg of PT tx. Pt made improvement w/ supine to sit and sat up to EOB w/ SBA. Pt then stood and amb longer distance of 30' req min A and use of fww. Pt was slow and unsteady w/ 1 LOB when turning L. Pt performed ther ex for strengthening. Pt educ on imp of continued mobility and sitting up in chair at reg intervals. Also discussed continued mobility and UIC w/ RN. PT will prog as pt ramin.     Time Calculation:         PT Charges       Row Name 11/24/23 0909             Time Calculation    Start Time 0834  -PH      Stop Time 0853  -PH      Time Calculation (min) 19 min  -PH      PT Received On 11/24/23  -PH      PT - Next Appointment 11/25/23  -PH         Timed Charges    74627 - PT Therapeutic Exercise Minutes 6  -PH      19345 - PT Therapeutic Activity Minutes 13  -PH         Total Minutes    Timed Charges Total Minutes 19  -PH       Total Minutes 19  -PH                User Key  (r) = Recorded By, (t) = Taken By, (c) = Cosigned By      Initials Name Provider Type    PH Radha Rosen PTA Physical Therapist Assistant                  Therapy Charges for Today       Code Description Service Date Service Provider Modifiers Qty    30078182483 HC PT THERAPEUTIC ACT EA 15 MIN 11/24/2023 Radha Rosen PTA GP 1            PT G-Codes  Outcome Measure Options: AM-PAC 6 Clicks Basic Mobility (PT)  AM-PAC 6 Clicks Score (PT): 18  PT Discharge Summary  Anticipated Discharge Disposition (PT): skilled nursing facility, home with home health, home with 24/7 care    Radha Rosen PTA  11/24/2023

## 2023-11-24 NOTE — SIGNIFICANT NOTE
"   11/24/23 1659   PICC Double Lumen 11/24/23 Right Basilic   Placement date: If unknown, DO NOT use \"Add Comment\" note/Placement time: If unknown, DO NOT use \"Add Comment\" note: 11/24/23 1659   Hand Hygiene Completed: Yes  Size (Fr): 4  Description (optional): DL PICC  Length (cm): 38 cm  Orientation: Right  Lo...   Site Assessment Clean;Dry;Intact   #1 Lumen Status Blood return noted;Capped;Flushed;Normal saline locked   #2 Lumen Status Capped;Blood return noted;Flushed;Normal saline locked   Length chu (cm) 1 cm   Line Care Connections checked and tightened   Extremity Circumference (cm) 24 cm   Dressing Type Border Dressing;Transparent;Securing device;Antimicrobial dressing/disc   Dressing Status Clean;Dry;Intact   Dressing Intervention New dressing   Liquid Adhesive Applied   Dressing Change Due 12/01/23   Indication/Daily Review of Necessity admin of parenteral nutrition/lipids     LOT - [id number]  EX - 04/30/24    FINAL COUNT - 3 NEEDLES, 2 GUIDEWIRES, 1 SCALPEL ACCOUNTED FOR AT PROCEDURE END     DL PICC PLACED SUCCESSFULLY, CONFIRMATION VIA 3CG. PRIMARY RN, NISHI, NOTIFIED THAT PICC LINE IS READY TO BE USED         "

## 2023-11-24 NOTE — PLAN OF CARE
Problem: Adult Inpatient Plan of Care  Goal: Plan of Care Review  Outcome: Ongoing, Progressing  Flowsheets (Taken 11/24/2023 0634)  Plan of Care Reviewed With: patient  Outcome Evaluation: Pt is A&Ox4, having throat pain managed by medication. Pt had no overnight complaints. Pt is in bed at lowest position, brakes on, 2 side rails up, call light within reach.  Goal: Patient-Specific Goal (Individualized)  Outcome: Ongoing, Progressing  Goal: Absence of Hospital-Acquired Illness or Injury  Outcome: Ongoing, Progressing  Intervention: Identify and Manage Fall Risk  Recent Flowsheet Documentation  Taken 11/24/2023 0456 by Feliz Pollack RN  Safety Promotion/Fall Prevention: safety round/check completed  Taken 11/24/2023 0256 by Feliz Pollack RN  Safety Promotion/Fall Prevention: safety round/check completed  Taken 11/24/2023 0056 by Feliz Pollack RN  Safety Promotion/Fall Prevention: safety round/check completed  Taken 11/23/2023 2256 by Feliz Pollack RN  Safety Promotion/Fall Prevention: safety round/check completed  Taken 11/23/2023 2056 by Feliz Pollack RN  Safety Promotion/Fall Prevention: safety round/check completed  Intervention: Prevent Skin Injury  Recent Flowsheet Documentation  Taken 11/24/2023 0456 by Feliz Pollack RN  Body Position: weight shifting  Taken 11/24/2023 0256 by Feliz Pollack RN  Body Position: weight shifting  Taken 11/24/2023 0056 by Feliz Pollack RN  Body Position: weight shifting  Taken 11/23/2023 2256 by Feliz Pollack RN  Body Position: weight shifting  Taken 11/23/2023 2056 by Feliz Pollack RN  Body Position: weight shifting  Intervention: Prevent and Manage VTE (Venous Thromboembolism) Risk  Recent Flowsheet Documentation  Taken 11/24/2023 0456 by Feliz Pollack RN  Activity Management: activity encouraged  Taken 11/24/2023 0256 by Feliz Pollack RN  Activity Management: activity encouraged  Taken 11/24/2023 0056 by Feliz Pollack RN  Activity Management: activity  encouraged  Taken 11/23/2023 2256 by Feliz Pollack RN  Activity Management: activity encouraged  Taken 11/23/2023 2056 by Feliz Pollack RN  Activity Management: activity encouraged  VTE Prevention/Management:   bilateral   sequential compression devices on  Range of Motion: active ROM (range of motion) encouraged  Goal: Optimal Comfort and Wellbeing  Outcome: Ongoing, Progressing  Intervention: Provide Person-Centered Care  Recent Flowsheet Documentation  Taken 11/24/2023 0056 by Feliz Pollack RN  Trust Relationship/Rapport:   care explained   choices provided  Taken 11/23/2023 2056 by Feliz Pollack RN  Trust Relationship/Rapport:   care explained   choices provided  Goal: Readiness for Transition of Care  Outcome: Ongoing, Progressing     Problem: Pain Acute  Goal: Acceptable Pain Control and Functional Ability  Outcome: Ongoing, Progressing  Intervention: Optimize Psychosocial Wellbeing  Recent Flowsheet Documentation  Taken 11/23/2023 2056 by Feliz Pollack RN  Diversional Activities: television     Problem: Fall Injury Risk  Goal: Absence of Fall and Fall-Related Injury  Outcome: Ongoing, Progressing  Intervention: Identify and Manage Contributors  Recent Flowsheet Documentation  Taken 11/24/2023 0456 by Feliz Pollack RN  Self-Care Promotion: independence encouraged  Taken 11/24/2023 0256 by Feliz Pollack RN  Self-Care Promotion: independence encouraged  Taken 11/24/2023 0056 by Feliz Pollack RN  Self-Care Promotion: independence encouraged  Taken 11/23/2023 2256 by Feliz Pollack RN  Self-Care Promotion: independence encouraged  Taken 11/23/2023 2056 by Feliz Pollack RN  Self-Care Promotion: independence encouraged  Intervention: Promote Injury-Free Environment  Recent Flowsheet Documentation  Taken 11/24/2023 0456 by Feliz Pollack RN  Safety Promotion/Fall Prevention: safety round/check completed  Taken 11/24/2023 0256 by Feliz Pollack RN  Safety Promotion/Fall Prevention: safety round/check  completed  Taken 11/24/2023 0056 by Feliz Pollack RN  Safety Promotion/Fall Prevention: safety round/check completed  Taken 11/23/2023 2256 by Feliz Pollack RN  Safety Promotion/Fall Prevention: safety round/check completed  Taken 11/23/2023 2056 by Feliz Pollack RN  Safety Promotion/Fall Prevention: safety round/check completed     Problem: Skin Injury Risk Increased  Goal: Skin Health and Integrity  Outcome: Ongoing, Progressing  Intervention: Optimize Skin Protection  Recent Flowsheet Documentation  Taken 11/23/2023 2256 by Feliz Pollack RN  Head of Bed (HOB) Positioning: HOB elevated  Taken 11/23/2023 2056 by Feliz Pollack RN  Head of Bed (HOB) Positioning: HOB elevated   Goal Outcome Evaluation:  Plan of Care Reviewed With: patient           Outcome Evaluation: Pt is A&Ox4, having throat pain managed by medication. Pt had no overnight complaints. Pt is in bed at lowest position, brakes on, 2 side rails up, call light within reach.

## 2023-11-24 NOTE — PROGRESS NOTES
Colorectal & General Surgery  Progress Note    Patient: Martina Hardwick  YOB: 1955  MRN: [id number]      Assessment  Martina Hardwick is a 67 y.o. 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  She looks pretty good today.  Her abdominal exam is reassuring and slightly distended consistent with a postoperative ileus as expected.  I do anticipate that her ileus will be prolonged given the sheer amount of surgery that she has had in addition to her history of significant ileus after prior abdominal operations.  I have asked the vascular access team to place a PICC line in the pharmacy to initiate TPN.    Yesterday, there was some concern for bleeding.  She has responded well to blood products with no signs of bleeding today.  I have restarted her DVT prophylaxis and discontinued every 6 hours hemoglobin and hematocrit checks.    Hodge was reanchored for urinary retention.  Suspect that oliguria also played a role.  We will leave the Hodge catheter in place today and plan to take it out tomorrow.  I did start short course of tamsulosin.    She is significantly hypophosphatemic.  Repletion in progress.  Will recheck labs in the morning.  No need to recheck again this evening.    I will start Chloraseptic spray for pharyngeal irritation from nasogastric tube.    Subjective  Postoperative day 4.  Continues to have significant abdominal pain though it is improving.  I believe her biggest complaint is the nasogastric tube and the irritation of her throat.  No flatus or bowel movement yet.    Objective    Vitals:    11/24/23 0723   BP: 156/76   Pulse: 98   Resp: 18   Temp: 98.6 °F (37 °C)   SpO2:        Physical Exam  Constitutional: Well-developed well-nourished, no acute distress  Neck: Supple, trachea midline  Respiratory: No increased work of breathing,  Symmetric excursion  Cardiovascular: Well pefursed, no jugular venous distention evident   Abdominal: Incision without erythema.  Abdomen is soft, distended, appropriately tender.    Skin: Warm, dry, no rash on visualized skin surfaces  Psychiatric: Alert and oriented ×3, normal affect     Laboratory Results  I have personally reviewed BMP with creatinine 0.63, magnesium 2.0, phosphorus 1.8, potassium 3.7.  Procalcitonin 2.04.  WBC 6, hemoglobin 10, platelets 116.    Radiology  None to review         Osvaldo Salinas MD  Colorectal & General Surgery  [facility]    [address] [place] [address], [address]  [place], [place], [address]  P: [phone]  F: [phone]

## 2023-11-24 NOTE — CONSULTS
"Nutrition Services    Patient Name:  Martina Hardwick  YOB: 1955  MRN: [id number]  Admit Date:  11/20/2023    Assessment Date:  11/24/23    Summary: Consult for TPN assessment    POD#4 s/p ex lap, significant enterolysis, subtotal colectomy and ileocolostomy.  Expected post op ileus.  NGT to LWS.      PICC to be placed today and TPN to begin.  Recommend eventual goal base of: 1200 kcal dextrose, 70 grams AA, 200 kcal lipids.    REC:  Begin TPN and advance to goal as recommended by pharmacist.  Advance diet once medically appropriate per MD.  Will add ONS to support adequate PO intake once diet is advanced.    RD to follow per protocol.    CLINICAL NUTRITION ASSESSMENT      Reason for Assessment Physician Consult, TPN Assessment     Diagnosis/Problem Colonic inertia      Anthropometrics        Current Height  Current Weight  BMI kg/m2 Height: 157.5 cm (62\")  Weight: 46.6 kg (102 lb 11.8 oz) (11/20/23 2053)  Body mass index is 18.79 kg/m².   Adjusted BMI (if applicable)    BMI Category Normal/Healthy (18.4 - 24.9)   Ideal Body Weight (IBW) 50.1 kg    Usual Body Weight (UBW)  lbs   Weight Trend Stable; pt endorses 26 lb wt loss x 3 yrs      Estimated Requirements         Weight used  46.6 kg     Calories  6061-1690 (30 kcal/kg, 35 kcal/kg)    Protein  56-70 (1.2 - 1.5 gm/kg)    Fluid  1 mL/kcal     Labs       Pertinent Labs    Results from last 7 days   Lab Units 11/24/23  0617 11/23/23  1005 11/23/23  0545   SODIUM mmol/L 139 136 139   POTASSIUM mmol/L 3.7 4.2 4.5   CHLORIDE mmol/L 106 106 108*   CO2 mmol/L 27.0 25.0 22.8   BUN mg/dL 18 30* 30*   CREATININE mg/dL 0.63 1.26* 1.35*   CALCIUM mg/dL 9.0 9.0 9.0   GLUCOSE mg/dL 112* 110* 104*     Results from last 7 days   Lab Units 11/24/23  0617 11/23/23  1005 11/23/23  0545 11/21/23  0557   MAGNESIUM mg/dL 2.0  --  2.4 2.6*   PHOSPHORUS mg/dL 1.8*   < > 2.4* 3.9   HEMOGLOBIN g/dL 10.0*  10.0*   < > 8.4* 11.3*   HEMATOCRIT % 30.1*  30.1*   < > 25.8* " "34.4   WBC 10*3/mm3 6.54  --  10.95* 8.63    < > = values in this interval not displayed.     Results from last 7 days   Lab Units 11/24/23  0617 11/23/23  0545 11/21/23  0557   PLATELETS 10*3/mm3 116* 165 224     No results found for: \"COVID19\"  Lab Results   Component Value Date    HGBA1C 5.3 06/20/2022          Medications           Scheduled Medications acetaminophen, 1,000 mg, Oral, Q6H  ALPRAZolam, 0.5 mg, Oral, BID  amitriptyline, 5 mg, Oral, Nightly  amLODIPine, 2.5 mg, Oral, Daily  enoxaparin, 30 mg, Subcutaneous, Q24H  [Held by provider] lisinopril, 30 mg, Oral, Daily  methocarbamol, 750 mg, Oral, 4x Daily  pantoprazole, 40 mg, Oral, Daily  PARoxetine, 40 mg, Oral, QAM  potassium phosphate, 15 mmol, Intravenous, Once  tamsulosin, 0.4 mg, Oral, Daily  [Held by provider] zolpidem, 10 mg, Oral, Nightly       Infusions dextrose 5 % and lactated Ringer's, 75 mL/hr, Last Rate: 75 mL/hr (11/24/23 0752)  Pharmacy to Dose TPN,        PRN Medications   HYDROmorphone    ondansetron **OR** ondansetron    oxyCODONE **OR** oxyCODONE    Pharmacy to Dose TPN    phenol    SUMAtriptan     Physical Findings          General Findings alert, oriented, room air   Oral/Mouth Cavity WDL   Edema  lower extremity , upper extremity, 1+ (trace)   Gastrointestinal abdominal pain, nausea, last bowel movement: 11/20   Skin  surgical incision: abdomen incision    Tubes/Drains/Lines NG tube to LWS   NFPE See Malnutrition Severity Assessment     Malnutrition Severity Assessment      Patient meets criteria for : Severe Malnutrition (Pt meets ASPEN/AND criteria for nutrition dx of severe malnutrition of acute disease related to energy intake, muscle wasting, and fat loss.)       Current Nutrition Orders & Evaluation of Intake       Oral Nutrition     Food Allergies NKFA   Current PO Diet NPO Diet NPO Type: Sips with Meds, Ice Chips   Supplement n/a   PO Evaluation     % PO Intake NPO    Factors Affecting Intake: abdominal pain, altered GI " function, decreased appetite, nausea   --  PES STATEMENT / NUTRITION DIAGNOSIS      Nutrition Dx Problem  Problem: Malnutrition (severe)  Etiology: Medical Diagnosis - Colonic inertia and Factors Affecting Nutrition - altered GI fxn, decreased appetite, abdominal pain, nausea     Signs/Symptoms: NPO, NFPE Results, and Unintended Weight Change     NUTRITION INTERVENTION / PLAN OF CARE      Intervention Goal(s) Maintain nutrition status, Reduce/improve symptoms, Meet estimated needs, Disease management/therapy, Initiate TF/PN, Tolerate TF/PN at goal, Transition PN to PO, and Appropriate weight gain         RD Intervention/Action Await initiation of EN/PN, Continue to monitor, and Care plan reviewed   --      Prescription/Orders:       PO Diet ADAT      Supplements       Enteral Nutrition       Parenteral Nutrition    Parenteral Prescription:     TPN Route PICC   TPN Rate (mL/hr)    TPN Recommendation:        Dextrose (kcal) 1200       Amino Acid (gm) 70       Lipid Concentration 20%       Lipid Volume/Frequency  100 mL   Propofol Rate/Kcal    TPN Provision:  1680 kcal, 70 gm protein        Calories 100 % needs met        Protein  100 % needs met        Fluid  mL total      New Prescription Ordered? No changes at this time    --      Monitor/Evaluation Per protocol, I&O, Pertinent labs, PN delivery/tolerance, Weight, Skin status, GI status, Symptoms   Discharge Plan/Needs Pending clinical course   --    RD to follow per protocol.      Electronically signed by:  Victorina Caceres RD, Dietitian Intern   11/24/23 10:34 EST

## 2023-11-24 NOTE — PLAN OF CARE
Goal Outcome Evaluation:  Plan of Care Reviewed With: patient        Progress: improving  Outcome Evaluation: Pt was in bed at beg of PT tx. Pt made improvement w/ supine to sit and sat up to EOB w/ SBA. Pt then stood and amb longer distance of 30' req min A and use of fww. Pt was slow and unsteady w/ 1 LOB when turning L. Pt performed ther ex for strengthening. Pt educ on imp of continued mobility and sitting up in chair at reg intervals. Also discussed continued mobility and UIC w/ RN. PT will prog as pt ramin.      Anticipated Discharge Disposition (PT): skilled nursing facility, home with home health, home with 24/7 care

## 2023-11-25 LAB
ANION GAP SERPL CALCULATED.3IONS-SCNC: 7.1 MMOL/L (ref 5–15)
BUN SERPL-MCNC: 12 MG/DL (ref 8–23)
BUN/CREAT SERPL: 20.7 (ref 7–25)
CALCIUM SPEC-SCNC: 9 MG/DL (ref 8.6–10.5)
CHLORIDE SERPL-SCNC: 102 MMOL/L (ref 98–107)
CO2 SERPL-SCNC: 28.9 MMOL/L (ref 22–29)
CREAT SERPL-MCNC: 0.58 MG/DL (ref 0.57–1)
DEPRECATED RDW RBC AUTO: 50.6 FL (ref 37–54)
EGFRCR SERPLBLD CKD-EPI 2021: 99.3 ML/MIN/1.73
ERYTHROCYTE [DISTWIDTH] IN BLOOD BY AUTOMATED COUNT: 15.1 % (ref 12.3–15.4)
GLUCOSE SERPL-MCNC: 100 MG/DL (ref 65–99)
HCT VFR BLD AUTO: 31.2 % (ref 34–46.6)
HGB BLD-MCNC: 10.6 G/DL (ref 12–15.9)
MAGNESIUM SERPL-MCNC: 1.8 MG/DL (ref 1.6–2.4)
MCH RBC QN AUTO: 30.5 PG (ref 26.6–33)
MCHC RBC AUTO-ENTMCNC: 34 G/DL (ref 31.5–35.7)
MCV RBC AUTO: 89.9 FL (ref 79–97)
PHOSPHATE SERPL-MCNC: 3.4 MG/DL (ref 2.5–4.5)
PLATELET # BLD AUTO: 126 10*3/MM3 (ref 140–450)
PMV BLD AUTO: 10.1 FL (ref 6–12)
POTASSIUM SERPL-SCNC: 3.5 MMOL/L (ref 3.5–5.2)
PROCALCITONIN SERPL-MCNC: 1.22 NG/ML (ref 0–0.25)
RBC # BLD AUTO: 3.47 10*6/MM3 (ref 3.77–5.28)
SODIUM SERPL-SCNC: 138 MMOL/L (ref 136–145)
TRIGL SERPL-MCNC: 60 MG/DL (ref 0–150)
WBC NRBC COR # BLD AUTO: 5.81 10*3/MM3 (ref 3.4–10.8)

## 2023-11-25 PROCEDURE — 25010000002 CALCIUM GLUCONATE PER 10 ML: Performed by: SURGERY

## 2023-11-25 PROCEDURE — 25010000002 MAGNESIUM SULFATE PER 500 MG OF MAGNESIUM: Performed by: SURGERY

## 2023-11-25 PROCEDURE — 25010000002 HYDROMORPHONE 1 MG/ML SOLUTION: Performed by: SURGERY

## 2023-11-25 PROCEDURE — 25010000002 ENOXAPARIN PER 10 MG: Performed by: SURGERY

## 2023-11-25 PROCEDURE — 25010000002 HYDROMORPHONE PER 4 MG: Performed by: SURGERY

## 2023-11-25 PROCEDURE — 83735 ASSAY OF MAGNESIUM: CPT | Performed by: SURGERY

## 2023-11-25 PROCEDURE — 93005 ELECTROCARDIOGRAM TRACING: CPT | Performed by: STUDENT IN AN ORGANIZED HEALTH CARE EDUCATION/TRAINING PROGRAM

## 2023-11-25 PROCEDURE — 99024 POSTOP FOLLOW-UP VISIT: CPT | Performed by: STUDENT IN AN ORGANIZED HEALTH CARE EDUCATION/TRAINING PROGRAM

## 2023-11-25 PROCEDURE — 80048 BASIC METABOLIC PNL TOTAL CA: CPT | Performed by: SURGERY

## 2023-11-25 PROCEDURE — 93010 ELECTROCARDIOGRAM REPORT: CPT | Performed by: INTERNAL MEDICINE

## 2023-11-25 PROCEDURE — 25010000002 POTASSIUM CHLORIDE PER 2 MEQ OF POTASSIUM: Performed by: SURGERY

## 2023-11-25 PROCEDURE — 84145 PROCALCITONIN (PCT): CPT | Performed by: SURGERY

## 2023-11-25 PROCEDURE — 84100 ASSAY OF PHOSPHORUS: CPT | Performed by: SURGERY

## 2023-11-25 PROCEDURE — 85027 COMPLETE CBC AUTOMATED: CPT | Performed by: SURGERY

## 2023-11-25 PROCEDURE — 25010000002 ONDANSETRON PER 1 MG: Performed by: SURGERY

## 2023-11-25 PROCEDURE — 84478 ASSAY OF TRIGLYCERIDES: CPT | Performed by: SURGERY

## 2023-11-25 RX ORDER — PANTOPRAZOLE SODIUM 40 MG/10ML
40 INJECTION, POWDER, LYOPHILIZED, FOR SOLUTION INTRAVENOUS
Status: DISCONTINUED | OUTPATIENT
Start: 2023-11-26 | End: 2023-12-17

## 2023-11-25 RX ADMIN — ALPRAZOLAM 0.5 MG: 0.5 TABLET ORAL at 22:15

## 2023-11-25 RX ADMIN — METHOCARBAMOL TABLETS 750 MG: 750 TABLET, COATED ORAL at 18:31

## 2023-11-25 RX ADMIN — Medication 10 ML: at 22:15

## 2023-11-25 RX ADMIN — ENOXAPARIN SODIUM 30 MG: 100 INJECTION SUBCUTANEOUS at 11:37

## 2023-11-25 RX ADMIN — I.V. FAT EMULSION 20 G: 20 EMULSION INTRAVENOUS at 18:12

## 2023-11-25 RX ADMIN — METHOCARBAMOL TABLETS 750 MG: 750 TABLET, COATED ORAL at 22:15

## 2023-11-25 RX ADMIN — Medication 10 ML: at 15:39

## 2023-11-25 RX ADMIN — HYDROMORPHONE HYDROCHLORIDE 0.5 MG: 1 INJECTION, SOLUTION INTRAMUSCULAR; INTRAVENOUS; SUBCUTANEOUS at 06:18

## 2023-11-25 RX ADMIN — Medication 10 ML: at 18:13

## 2023-11-25 RX ADMIN — OXYCODONE HYDROCHLORIDE 10 MG: 10 TABLET ORAL at 18:16

## 2023-11-25 RX ADMIN — Medication 10 ML: at 20:35

## 2023-11-25 RX ADMIN — PAROXETINE HYDROCHLORIDE HEMIHYDRATE 40 MG: 20 TABLET, FILM COATED ORAL at 06:07

## 2023-11-25 RX ADMIN — AMITRIPTYLINE HYDROCHLORIDE 5 MG: 10 TABLET, FILM COATED ORAL at 22:15

## 2023-11-25 RX ADMIN — HYDROMORPHONE HYDROCHLORIDE 0.5 MG: 1 INJECTION, SOLUTION INTRAMUSCULAR; INTRAVENOUS; SUBCUTANEOUS at 15:39

## 2023-11-25 RX ADMIN — ONDANSETRON 4 MG: 2 INJECTION INTRAMUSCULAR; INTRAVENOUS at 13:01

## 2023-11-25 RX ADMIN — Medication 10 ML: at 07:46

## 2023-11-25 RX ADMIN — ALPRAZOLAM 0.5 MG: 0.5 TABLET ORAL at 08:58

## 2023-11-25 RX ADMIN — PANTOPRAZOLE SODIUM 40 MG: 40 TABLET, DELAYED RELEASE ORAL at 08:58

## 2023-11-25 RX ADMIN — ACETAMINOPHEN 1000 MG: 500 TABLET ORAL at 11:37

## 2023-11-25 RX ADMIN — HYDROMORPHONE HYDROCHLORIDE 0.5 MG: 1 INJECTION, SOLUTION INTRAMUSCULAR; INTRAVENOUS; SUBCUTANEOUS at 13:01

## 2023-11-25 RX ADMIN — Medication 10 ML: at 13:01

## 2023-11-25 RX ADMIN — ACETAMINOPHEN 1000 MG: 500 TABLET ORAL at 18:13

## 2023-11-25 RX ADMIN — TAMSULOSIN HYDROCHLORIDE 0.4 MG: 0.4 CAPSULE ORAL at 08:58

## 2023-11-25 RX ADMIN — HYDROMORPHONE HYDROCHLORIDE 0.5 MG: 1 INJECTION, SOLUTION INTRAMUSCULAR; INTRAVENOUS; SUBCUTANEOUS at 02:36

## 2023-11-25 RX ADMIN — AMLODIPINE BESYLATE 2.5 MG: 2.5 TABLET ORAL at 08:58

## 2023-11-25 RX ADMIN — HYDROMORPHONE HYDROCHLORIDE 0.5 MG: 1 INJECTION, SOLUTION INTRAMUSCULAR; INTRAVENOUS; SUBCUTANEOUS at 20:32

## 2023-11-25 RX ADMIN — METHOCARBAMOL TABLETS 750 MG: 750 TABLET, COATED ORAL at 11:37

## 2023-11-25 RX ADMIN — METHOCARBAMOL TABLETS 750 MG: 750 TABLET, COATED ORAL at 06:08

## 2023-11-25 RX ADMIN — ACETAMINOPHEN 1000 MG: 500 TABLET ORAL at 00:00

## 2023-11-25 RX ADMIN — POTASSIUM PHOSPHATE, MONOBASIC POTASSIUM PHOSPHATE, DIBASIC: 224; 236 INJECTION, SOLUTION, CONCENTRATE INTRAVENOUS at 18:14

## 2023-11-25 RX ADMIN — OXYCODONE HYDROCHLORIDE 10 MG: 10 TABLET ORAL at 11:37

## 2023-11-25 NOTE — PROGRESS NOTES
[facility] Clinical Pharmacy Services: TPN Daily Progress Note    TPN Day # 2   Indication: Prolonged Paralytic Ileus  Route: central  Type: standard    Subjective/Objective  Results from last 7 days   Lab Units 23  0618   SODIUM mmol/L 138   POTASSIUM mmol/L 3.5   CHLORIDE mmol/L 102   CO2 mmol/L 28.9   BUN mg/dL 12   CREATININE mg/dL 0.58   CALCIUM mg/dL 9.0   GLUCOSE mg/dL 100*   MAGNESIUM mg/dL 1.8   PHOSPHORUS mg/dL 3.4   TRIGLYCERIDES mg/dL 60        Diet Orders (active) (From admission, onward)       Start     Ordered    23  NPO Diet NPO Type: Sips with Meds, Ice Chips  Diet Effective Now         23                  Additional insulin administration while previous TPN infusin units  Additional electrolyte administration while previous TPN infusing: Potassium Phos 15 mmol x1  Acid suppression: Protonix PO    Goal TPN Formula Recommendations: /100 AA/Dex/Lipids  Current TPN Formula: 70g/600kcal/0 AA/Dex/Lipids    Assessment/Plan    Macronutrients: will increase to 70g/900kcal/100 AA/Dex/lipids today  Electrolytes/Additives: Will increase KCl to 70 mEq and magnesium sulfate to 12 mEq  MVI, trace elements   Labs: BMP, mag, sirena Michelle [facility]  Clinical Pharmacist

## 2023-11-25 NOTE — PLAN OF CARE
Goal Outcome Evaluation:  Plan of Care Reviewed With: patient, spouse        Progress: improving  Outcome Evaluation: Pt is A&Ox4,  at bedside most of day, PICC line placed for TPN, up to chair with assist, room air, NPO with sips for meds and ice chips, NG tube was in place until approx 445 when it came out, RN at bedside - pt was not messing with tube, drainage had been green without solids, edema BLE and hands, vigil cath to bedside drain with stat lock in place, midline incision glued- no drainage, nutrition consult- TPN ordered, iv fluids continued, pain addressed with prn meds, SCDs on, ST /SR on monitor with vitals as charted,  MD aware tube out and said ok to not replace.

## 2023-11-25 NOTE — PLAN OF CARE
Goal Outcome Evaluation:  Plan of Care Reviewed With: patient           Outcome Evaluation: VSS; more alert, verbalize an increase in discomfort during this surgery than before. Dangled at bedside for a few minutes c/o some dizziness. Nauseated earlier, gone with zofran adm IV. TPN continued, will start lipids @ 1800. NPO status enforced except sips of water with po meds. Tolerated indwelling vigil cath discontinuation. I&O documented.

## 2023-11-25 NOTE — PROGRESS NOTES
"General Surgery Progress Note    CC: Follow-up status post exploratory laparotomy, enterolysis, subtotal colectomy, ileocolostomy    S: Patient states she is nauseated but denies any vomiting and is asking for Sprite.  Her NG tube came out yesterday and she has been n.p.o. except for sips with her medications.  She asks me when her TPN would start,, it is infusing currently.  She states that she has back pain which she attributes to the bed.  She denies passing any gas or any bowel movements.  She has not had any bleeding per rectum.    O:BP (!) 177/102 (BP Location: Right arm, Patient Position: Lying)   Pulse 112   Temp 100.4 °F (38 °C) (Oral)   Resp 18   Ht 157.5 cm (62\")   Wt 46.6 kg (102 lb 11.8 oz)   LMP  (LMP Unknown)   SpO2 96%   BMI 18.79 kg/m²     Intake & Output (last day)         11/24 0701  11/25 0700 11/25 0701  11/26 0700    P.O. 50     I.V. (mL/kg)      Blood      Total Intake(mL/kg) 50 (1.1)     Urine (mL/kg/hr) 1875 (1.7)     Emesis/NG output 200     Total Output 2075     Net -2025                   GENERAL: awake, alert, resting in bed, interactive and cooperative with exam  HEENT: EOMI, clear sclera, moist mucus membranes   CHEST: normal work of breathing  CARDIAC: regular rate and rhythm    ABDOMEN: Distended, mildly diffusely tender without rebound or guarding, incision is clean and dry with skin glue in place and no signs of infection  EXTREMITIES: DYE, no cyanosis or edema ; right upper extremity PICC line in place without infection, TPN running   SKIN: Warm and dry, no rash    LABS  Results from last 7 days   Lab Units 11/25/23  0618 11/24/23  0617 11/24/23  0050 11/23/23  1005 11/23/23  0545 11/21/23  0557   WBC 10*3/mm3 5.81 6.54  --   --  10.95* 8.63   HEMOGLOBIN g/dL 10.6* 10.0*  10.0* 10.5*   < > 8.4* 11.3*   HEMATOCRIT % 31.2* 30.1*  30.1* 30.9*   < > 25.8* 34.4   PLATELETS 10*3/mm3 126* 116*  --   --  165 224   MONOCYTES % %  --   --   --   --   --  5.1    < > = values in " this interval not displayed.     Results from last 7 days   Lab Units 11/25/23  0618 11/24/23  0617 11/23/23  1005   SODIUM mmol/L 138 139 136   POTASSIUM mmol/L 3.5 3.7 4.2   CHLORIDE mmol/L 102 106 106   CO2 mmol/L 28.9 27.0 25.0   BUN mg/dL 12 18 30*   CREATININE mg/dL 0.58 0.63 1.26*   CALCIUM mg/dL 9.0 9.0 9.0   GLUCOSE mg/dL 100* 112* 110*             A/P: 67 y.o. female with postop day 5 exploratory laparotomy, enterolysis, subtotal colectomy, ileocolostomy.    AVSS.  Abdominal exam appropriate postop.    Hemoglobin 10.6, stable after transfusion 11/23.  No evidence of bleeding.  Continue n.p.o., TPN, await return of bowel function.  Electrolytes are within normal limits.  DC Hodge and trial voiding.  Encourage out of bed to chair, I-S.    Sarah Pearl MD  General, Robotic and Endoscopic Surgery  [facility]    [address] [place] [address], [address]  [place], [place], [address]  P: [phone]  F: [phone]

## 2023-11-25 NOTE — PLAN OF CARE
Problem: Adult Inpatient Plan of Care  Goal: Plan of Care Review  Outcome: Ongoing, Progressing  Flowsheets (Taken 11/25/2023 0541)  Progress: improving  Plan of Care Reviewed With: patient  Outcome Evaluation:   VSS   A&OX4   SR on monitor   on RA   ice chips very minimal overnight, little sips of water w/PO meds   ed on turning and tilting, shifting weight   c/o pain in abd, no change in amount of pain since NG tube removed accidentally on dayshift   no c/o N/V   no addtl' distention of abd since beginning of shift   IVF stopped d/t order expiring   TPN cont at 70   FC to bsd   PICC in right upper arm intact, old drainage from placement, blood return   no acute distress noted   will cont to monitor   Goal Outcome Evaluation:  Plan of Care Reviewed With: patient        Progress: improving  Outcome Evaluation: VSS; A&OX4; SR on monitor; on RA; ice chips very minimal overnight, little sips of water w/PO meds; ed on turning and tilting, shifting weight; c/o pain in abd, no change in amount of pain since NG tube removed accidentally on dayshift; no c/o N/V; no addtl' distention of abd since beginning of shift; IVF stopped d/t order expiring; TPN cont at 70; FC to bsd; PICC in right upper arm intact, old drainage from placement, blood return; no acute distress noted; will cont to monitor

## 2023-11-26 LAB
ANION GAP SERPL CALCULATED.3IONS-SCNC: 8 MMOL/L (ref 5–15)
BUN SERPL-MCNC: 20 MG/DL (ref 8–23)
BUN/CREAT SERPL: 28.2 (ref 7–25)
CALCIUM SPEC-SCNC: 9.1 MG/DL (ref 8.6–10.5)
CHLORIDE SERPL-SCNC: 96 MMOL/L (ref 98–107)
CO2 SERPL-SCNC: 28 MMOL/L (ref 22–29)
CREAT SERPL-MCNC: 0.71 MG/DL (ref 0.57–1)
DEPRECATED RDW RBC AUTO: 47.3 FL (ref 37–54)
EGFRCR SERPLBLD CKD-EPI 2021: 93.3 ML/MIN/1.73
ERYTHROCYTE [DISTWIDTH] IN BLOOD BY AUTOMATED COUNT: 14.6 % (ref 12.3–15.4)
GLUCOSE SERPL-MCNC: 128 MG/DL (ref 65–99)
HCT VFR BLD AUTO: 34 % (ref 34–46.6)
HGB BLD-MCNC: 11.4 G/DL (ref 12–15.9)
LYMPHOCYTES # BLD MANUAL: 0.24 10*3/MM3 (ref 0.7–3.1)
LYMPHOCYTES NFR BLD MANUAL: 9.1 % (ref 5–12)
MAGNESIUM SERPL-MCNC: 1.8 MG/DL (ref 1.6–2.4)
MCH RBC QN AUTO: 29.8 PG (ref 26.6–33)
MCHC RBC AUTO-ENTMCNC: 33.5 G/DL (ref 31.5–35.7)
MCV RBC AUTO: 88.8 FL (ref 79–97)
MONOCYTES # BLD: 1.09 10*3/MM3 (ref 0.1–0.9)
NEUTROPHILS # BLD AUTO: 10.62 10*3/MM3 (ref 1.7–7)
NEUTROPHILS NFR BLD MANUAL: 88.9 % (ref 42.7–76)
NRBC BLD AUTO-RTO: 0.1 /100 WBC (ref 0–0.2)
PHOSPHATE SERPL-MCNC: 3.1 MG/DL (ref 2.5–4.5)
PLAT MORPH BLD: NORMAL
PLATELET # BLD AUTO: 138 10*3/MM3 (ref 140–450)
PMV BLD AUTO: 9.8 FL (ref 6–12)
POTASSIUM SERPL-SCNC: 4.1 MMOL/L (ref 3.5–5.2)
QT INTERVAL: 295 MS
QTC INTERVAL: 394 MS
RBC # BLD AUTO: 3.83 10*6/MM3 (ref 3.77–5.28)
RBC MORPH BLD: NORMAL
SODIUM SERPL-SCNC: 132 MMOL/L (ref 136–145)
VARIANT LYMPHS NFR BLD MANUAL: 2 % (ref 19.6–45.3)
WBC MORPH BLD: NORMAL
WBC NRBC COR # BLD AUTO: 11.95 10*3/MM3 (ref 3.4–10.8)

## 2023-11-26 PROCEDURE — 25010000002 HYDROMORPHONE PER 4 MG: Performed by: SURGERY

## 2023-11-26 PROCEDURE — 25010000002 CALCIUM GLUCONATE PER 10 ML: Performed by: SURGERY

## 2023-11-26 PROCEDURE — 83735 ASSAY OF MAGNESIUM: CPT | Performed by: SURGERY

## 2023-11-26 PROCEDURE — 85025 COMPLETE CBC W/AUTO DIFF WBC: CPT | Performed by: STUDENT IN AN ORGANIZED HEALTH CARE EDUCATION/TRAINING PROGRAM

## 2023-11-26 PROCEDURE — 99222 1ST HOSP IP/OBS MODERATE 55: CPT | Performed by: INTERNAL MEDICINE

## 2023-11-26 PROCEDURE — 84100 ASSAY OF PHOSPHORUS: CPT | Performed by: SURGERY

## 2023-11-26 PROCEDURE — 99024 POSTOP FOLLOW-UP VISIT: CPT | Performed by: STUDENT IN AN ORGANIZED HEALTH CARE EDUCATION/TRAINING PROGRAM

## 2023-11-26 PROCEDURE — 25010000002 POTASSIUM CHLORIDE PER 2 MEQ OF POTASSIUM: Performed by: SURGERY

## 2023-11-26 PROCEDURE — 85007 BL SMEAR W/DIFF WBC COUNT: CPT | Performed by: STUDENT IN AN ORGANIZED HEALTH CARE EDUCATION/TRAINING PROGRAM

## 2023-11-26 PROCEDURE — 25010000002 MAGNESIUM SULFATE PER 500 MG OF MAGNESIUM: Performed by: SURGERY

## 2023-11-26 PROCEDURE — 25010000002 ENOXAPARIN PER 10 MG: Performed by: SURGERY

## 2023-11-26 PROCEDURE — 80048 BASIC METABOLIC PNL TOTAL CA: CPT | Performed by: SURGERY

## 2023-11-26 RX ORDER — NITROGLYCERIN 0.4 MG/1
0.4 TABLET SUBLINGUAL
Status: DISCONTINUED | OUTPATIENT
Start: 2023-11-26 | End: 2023-11-27

## 2023-11-26 RX ORDER — METOPROLOL SUCCINATE 25 MG/1
25 TABLET, EXTENDED RELEASE ORAL DAILY
Status: DISCONTINUED | OUTPATIENT
Start: 2023-11-26 | End: 2023-11-27

## 2023-11-26 RX ADMIN — HYDROMORPHONE HYDROCHLORIDE 0.5 MG: 1 INJECTION, SOLUTION INTRAMUSCULAR; INTRAVENOUS; SUBCUTANEOUS at 12:49

## 2023-11-26 RX ADMIN — PANTOPRAZOLE SODIUM 40 MG: 40 INJECTION, POWDER, FOR SOLUTION INTRAVENOUS at 06:37

## 2023-11-26 RX ADMIN — ALPRAZOLAM 0.5 MG: 0.5 TABLET ORAL at 09:01

## 2023-11-26 RX ADMIN — AMLODIPINE BESYLATE 2.5 MG: 2.5 TABLET ORAL at 09:00

## 2023-11-26 RX ADMIN — METHOCARBAMOL TABLETS 750 MG: 750 TABLET, COATED ORAL at 12:00

## 2023-11-26 RX ADMIN — HYDROMORPHONE HYDROCHLORIDE 0.5 MG: 1 INJECTION, SOLUTION INTRAMUSCULAR; INTRAVENOUS; SUBCUTANEOUS at 01:50

## 2023-11-26 RX ADMIN — ENOXAPARIN SODIUM 30 MG: 100 INJECTION SUBCUTANEOUS at 11:24

## 2023-11-26 RX ADMIN — ACETAMINOPHEN 1000 MG: 500 TABLET ORAL at 20:39

## 2023-11-26 RX ADMIN — ACETAMINOPHEN 1000 MG: 500 TABLET ORAL at 09:02

## 2023-11-26 RX ADMIN — METHOCARBAMOL TABLETS 750 MG: 750 TABLET, COATED ORAL at 06:37

## 2023-11-26 RX ADMIN — Medication 10 ML: at 09:00

## 2023-11-26 RX ADMIN — OXYCODONE HYDROCHLORIDE 10 MG: 10 TABLET ORAL at 02:52

## 2023-11-26 RX ADMIN — METHOCARBAMOL TABLETS 750 MG: 750 TABLET, COATED ORAL at 18:13

## 2023-11-26 RX ADMIN — PAROXETINE HYDROCHLORIDE HEMIHYDRATE 40 MG: 20 TABLET, FILM COATED ORAL at 06:37

## 2023-11-26 RX ADMIN — METHOCARBAMOL TABLETS 750 MG: 750 TABLET, COATED ORAL at 22:56

## 2023-11-26 RX ADMIN — AMITRIPTYLINE HYDROCHLORIDE 5 MG: 10 TABLET, FILM COATED ORAL at 20:40

## 2023-11-26 RX ADMIN — TAMSULOSIN HYDROCHLORIDE 0.4 MG: 0.4 CAPSULE ORAL at 09:00

## 2023-11-26 RX ADMIN — ACETAMINOPHEN 1000 MG: 500 TABLET ORAL at 00:59

## 2023-11-26 RX ADMIN — Medication 10 ML: at 20:40

## 2023-11-26 RX ADMIN — HYDROMORPHONE HYDROCHLORIDE 0.5 MG: 1 INJECTION, SOLUTION INTRAMUSCULAR; INTRAVENOUS; SUBCUTANEOUS at 06:38

## 2023-11-26 RX ADMIN — Medication 10 ML: at 20:45

## 2023-11-26 RX ADMIN — METOPROLOL SUCCINATE 25 MG: 25 TABLET, EXTENDED RELEASE ORAL at 14:26

## 2023-11-26 RX ADMIN — HYDROMORPHONE HYDROCHLORIDE 0.5 MG: 1 INJECTION, SOLUTION INTRAMUSCULAR; INTRAVENOUS; SUBCUTANEOUS at 20:40

## 2023-11-26 RX ADMIN — ALPRAZOLAM 0.5 MG: 0.5 TABLET ORAL at 20:40

## 2023-11-26 RX ADMIN — POTASSIUM PHOSPHATE, MONOBASIC POTASSIUM PHOSPHATE, DIBASIC: 224; 236 INJECTION, SOLUTION, CONCENTRATE INTRAVENOUS at 18:12

## 2023-11-26 RX ADMIN — I.V. FAT EMULSION 20 G: 20 EMULSION INTRAVENOUS at 18:12

## 2023-11-26 NOTE — PROGRESS NOTES
[facility] Clinical Pharmacy Services: TPN Daily Progress Note    TPN Day # 3   Indication: Prolonged Paralytic Ileus  Route: central  Type: standard    Subjective/Objective  Results from last 7 days   Lab Units 23  0721 23  0618   SODIUM mmol/L 132* 138   POTASSIUM mmol/L 4.1 3.5   CHLORIDE mmol/L 96* 102   CO2 mmol/L 28.0 28.9   BUN mg/dL 20 12   CREATININE mg/dL 0.71 0.58   CALCIUM mg/dL 9.1 9.0   GLUCOSE mg/dL 128* 100*   MAGNESIUM mg/dL 1.8 1.8   PHOSPHORUS mg/dL 3.1 3.4   TRIGLYCERIDES mg/dL  --  60        Diet Orders (active) (From admission, onward)       Start     Ordered    23  NPO Diet NPO Type: Sips with Meds, Ice Chips  Diet Effective Now         23                  Additional insulin administration while previous TPN infusin units  Additional electrolyte administration while previous TPN infusing: none  Acid suppression: Protonix PO    Goal TPN Formula Recommendations: /100 AA/Dex/Lipids  Current TPN Formula: 70g/900kcal/100 AA/Dex/Lipids    Assessment/Plan    Macronutrients: will increase to goal of 70g/1200kcal/100 AA/Dex/lipids today  Based on the above labs, will add the following electrolytes/additives to the TPN.    Sodium Chloride: 90 mEq (increased from 70 mEq)   Sodium Acetate: 0 mEq   Sodium Phosphate: 10 mEq (increased from 0 mEq)   Potassium Chloride: 70 mEq   Potassium Acetate: 0 mEq   Potassium Phosphate: 25 mEq (decreased from 30 mEq)   Calcium Gluconate: 9 mEq   Magnesium Sulfate: 12 mEq   MVI for TPN   Trace Elements              Other Additives: None  Labs: BMP, mag, phos    Michelle [facility]  Clinical Pharmacist

## 2023-11-26 NOTE — PROGRESS NOTES
"General Surgery Progress Note    CC: Follow-up status post exploratory laparotomy, enterolysis, subtotal colectomy, ileocolostomy    S: Patient states she has had episodes of diarrhea when getting up out of bed to ambulate once and when getting up to work with PT. She denies blood in her stool. She has voided following vigil discontinuation. She states her pain is a little better today. She has not had nausea, and is asking for Sprite. She denies any chest pain, SOB, fevers, or chills. Male family member at bedside.    O:/60 (BP Location: Left arm, Patient Position: Lying)   Pulse 114   Temp 98.6 °F (37 °C) (Oral)   Resp 18   Ht 157.5 cm (62\")   Wt 46.6 kg (102 lb 11.8 oz)   LMP  (LMP Unknown)   SpO2 95%   BMI 18.79 kg/m²     Intake & Output (last day)         11/25 0701  11/26 0700 11/26 0701  11/27 0700    P.O. 90 60    TPN 2077.4 1078    Total Intake(mL/kg) 2167.4 (46.5) 1138 (24.4)    Urine (mL/kg/hr) 1625 (1.5)     Emesis/NG output      Stool 50     Total Output 1675     Net +492.4 +1138          Urine Unmeasured Occurrence 1 x     Stool Unmeasured Occurrence 1 x             GENERAL: awake, alert, sitting up in bed, interactive and cooperative   HEENT: EOMI, clear sclera, moist mucus membranes   CHEST: normal work of breathing  CARDIAC: regular rate and rhythm    ABDOMEN: Moderately distended, mildly tender around incision without rebound or guarding, incision is clean and dry with skin glue in place and no signs of infection  : no vigil, patient is wearing a brief  EXTREMITIES: DYE, no cyanosis or edema; right upper extremity PICC line in place without infection, TPN running   SKIN: Warm and dry, no rash    LABS  Results from last 7 days   Lab Units 11/26/23  0705 11/25/23  0618 11/24/23  0617 11/23/23  0545 11/21/23  0557   WBC 10*3/mm3 11.95* 5.81 6.54   < > 8.63   HEMOGLOBIN g/dL 11.4* 10.6* 10.0*  10.0*   < > 11.3*   HEMATOCRIT % 34.0 31.2* 30.1*  30.1*   < > 34.4   PLATELETS 10*3/mm3 " 138* 126* 116*   < > 224   MONOCYTES % % 9.1  --   --   --  5.1    < > = values in this interval not displayed.     Results from last 7 days   Lab Units 11/26/23  0721 11/25/23  0618 11/24/23  0617   SODIUM mmol/L 132* 138 139   POTASSIUM mmol/L 4.1 3.5 3.7   CHLORIDE mmol/L 96* 102 106   CO2 mmol/L 28.0 28.9 27.0   BUN mg/dL 20 12 18   CREATININE mg/dL 0.71 0.58 0.63   CALCIUM mg/dL 9.1 9.0 9.0   GLUCOSE mg/dL 128* 100* 112*             A/P: 67 y.o. female with postop day 6 exploratory laparotomy, enterolysis, subtotal colectomy, ileocolostomy.    AVSS.  Abdominal exam appropriate postop.    Hemoglobin stable after transfusion 11/23.  No evidence of bleeding.  Patient is having bowel function. Will hold off immodium for diarrhea currently as she has just begun to open up. Continue TPN, start clear liquids and advance diet as tolerated.    Hodge out yesterday, voiding spontaneously.   Encourage out of bed to chair, I-S.  Nonsustained Vtach reported yesterday; appreciate cardiology recommendations-  echo pending. Continue tele.    Sarah Pearl MD  General, Robotic and Endoscopic Surgery  [facility]    [address], [address]  [place], [place], [address]  P: [phone]  F: [phone]

## 2023-11-26 NOTE — NURSING NOTE
Pt has had some runs of v-tach from 7 beats to 12 beats. Patient stated she has not had any chest pain, soa, anxious feelings, or lightheadedness. I called and left a voicemail with Dr Pearl (Dr Pearl saw patient today and there is not an option on voicemail to leave a message for Dr Salinas). Will await any new orders.

## 2023-11-26 NOTE — SIGNIFICANT NOTE
Pt politely declines PT treatment this AM stating that she has been up for testing and up walking to the bathroom with nursing this morning.  States that she did not get any breakfast and just got back into bed, requests check back tomorrow.  Nurse notified.

## 2023-11-26 NOTE — NURSING NOTE
1500 ambulate with minimal assistance to the restroom. Passed liquidy yellowish greenish BM (some in diaper unable to measure). Voided 300cc yellow urine without difficulty. Ambulated back to bed. Monitor tech called to report tachycardia with activity (asked to save strip). Ginger ale requested. Will moinitor if tolerate liquid diet as ordered.

## 2023-11-26 NOTE — CONSULTS
Patient Name: Martina Hardwick  :1955  67 y.o.    Date of Admission: 2023  Date of Consultation:  23  Encounter Provider: Mahin Hernandez III, MD  Place of Service: [facility] CARDIOLOGY  Referring Provider: Ranjeet Salinas,*  Patient Care Team:  Ildefonso Dubois MD as PCP - General (Family Medicine)  Hernan Hinds MD as Surgeon (General Surgery)      Chief complaint: Subtotal colectomy and ileocolostomy    History of Present Illness:    67-year-old female, admitted for exploratory lap for enterolysis subtotal colectomy and ileocolostomy.  We have been consulted to see her for nonsustained ventricular tachycardia.    This patient has no known cardiac history.  This patient has no history of coronary artery disease, congestive heart failure, rheumatic fever, rheumatic heart disease, congenital heart disease or heart murmur.  This patient has never required invasive cardiovascular evaluation.  No prior history of ventricular tachycardia or any other arrhythmia.    She denies any cardiac complaints.  No chest pain or chest discomfort.  No palpitations or tachycardia.    Late in the evening on the  and then again in the morning of the , both around 10 AM and then 12 noon, patient had runs of nonsustained ventricular tachycardia.  Longest duration is 17 beats.  Rates about 180 bpm.  Patient says she was unaware of this.    Past Medical History:   Diagnosis Date    Arthritis     Autoantibody titer positive     Goodwin esophagus     Bloating     Cataract     BILAT    Chronic abdominal pain     Chronic constipation     Encounter for preventive health examination     Endometriosis     Gastritis     Gastrointestinal hypomotility     GERD (gastroesophageal reflux disease)     Headache, tension-type     Hypertension     Hyponatremia     Insomnia     Intestinal autonomic neuropathy     Irritable bowel syndrome     Migraine     Mixed anxiety and depressive  disorder     Moderate malnutrition     Noninfectious gastroenteritis     OP (osteoporosis)     Osteopenia     Osteoporosis     PONV (postoperative nausea and vomiting) 08/17/2018    Psoriasis     KNEES, ELBOWS BILAT AND SCALP    Seasonal allergies     Visceral hyperalgesia        Past Surgical History:   Procedure Laterality Date    APPENDECTOMY      CHOLECYSTECTOMY N/A 08/17/2018    Procedure: CHOLECYSTECTOMY LAPAROSCOPIC converted to open procedure;  Surgeon: Hernan Hinds MD;  Location: [facility] OR;  Service: General    COLON RESECTION N/A 11/20/2023    Procedure: OPEN SUBTOTAL COLECTOMY AND ADESIOLYSIS;  Surgeon: Ranjeet Salinas MD;  Location: [facility] MAIN OR;  Service: General;  Laterality: N/A;    COLON SURGERY      STATES TOTAL OF 3 COLON SURGERY    COLONOSCOPY      COLONOSCOPY N/A 12/12/2018    Procedure: COLONOSCOPY;  Surgeon: Darien Ruff MD;  Location: [facility] OR;  Service: Gastroenterology    COLONOSCOPY N/A 10/13/2023    Procedure: COLONOSCOPY TO CECUM;  Surgeon: Ranjeet Salinas MD;  Location: [facility] ENDOSCOPY;  Service: General;  Laterality: N/A;  PREOP/ CHRONIC CONSTIPATION- POSTOP/ NORMAL    DIAGNOSTIC LAPAROSCOPY  1990    DILATATION AND CURETTAGE  1985    ENDOSCOPY N/A 05/13/2016    Procedure: ESOPHAGOGASTRODUODENOSCOPY ;  Surgeon: Darien Ruff MD;  Location: [facility] OR;  Service:     ENDOSCOPY N/A 05/01/2023    Procedure: ESOPHAGOGASTRODUODENOSCOPY WITH BIOPSY;  Surgeon: Darien Ruff MD;  Location: [facility] OR;  Service: Gastroenterology;  Laterality: N/A;  Mild Thrush; Gastritis; Esophagitis- thrush and reflux; Biopsies- duodenal, gastric, esophagus    HYSTERECTOMY      REVISION / TAKEDOWN COLOSTOMY           Prior to Admission medications    Medication Sig Start Date End Date Taking? Authorizing Provider   ALPRAZolam (XANAX) 0.5 MG tablet TAKE 1 TO 2 TABLETS BY MOUTH TWICE DAILY AS NEEDED FOR ANXIETY  Patient taking differently: Take 1  tablet by mouth 2 (Two) Times a Day. 9/13/23  Yes Ildefonso Dubois MD   aluminum-magnesium hydroxide-simethicone (MAALOX MAX) 400-400-40 MG/5ML suspension Take  by mouth As Needed for Indigestion or Heartburn. STATES TAKES SEVERAL TIMES DURING THE DAY   Yes Julius Cortes MD   amLODIPine (NORVASC) 2.5 MG tablet Take 1 tablet by mouth once daily  Patient taking differently: Take 1 tablet by mouth Every Night. PT STATES MANY DAYS SHE ALSO TAKES A DOSE DURING THE DAY D/T INCREASED BP D/T CHRONIC PAIN 10/12/23  Yes Ildefonso Dubois MD   clobetasol (TEMOVATE) 0.05 % cream Apply 1 application  topically to the appropriate area as directed As Needed (VAGINAL).   Yes Julius Cortes MD   clotrimazole-betamethasone (Lotrisone) 1-0.05 % cream Apply 1 application  topically to the appropriate area as directed 2 (Two) Times a Day.  Patient taking differently: Apply 1 application  topically to the appropriate area as directed 2 (Two) Times a Day. PSORASIS 11/7/23  Yes Ildefonso Dubois MD   famotidine (PEPCID) 40 MG tablet Take 1 tablet by mouth 2 (Two) Times a Day. With lunch and at bedtime  Patient taking differently: Take 1 tablet by mouth After Dinner. With lunch and at bedtime 3/27/23  Yes Ildefonso Dubois MD   fluticasone (FLONASE) 50 MCG/ACT nasal spray 2 sprays into the nostril(s) as directed by provider As Needed.   Yes Julius Cortes MD   lisinopril (PRINIVIL,ZESTRIL) 30 MG tablet Take 1 tablet by mouth twice daily  Patient taking differently: Take 1 tablet by mouth Daily. 10/25/23  Yes Ildefonso Dubois MD   pantoprazole (PROTONIX) 40 MG EC tablet Take 1 tablet by mouth twice daily  Patient taking differently: 1 tablet Daily. 7/24/23  Yes Ildefonso Dubois MD   PARoxetine (PAXIL) 40 MG tablet Take 1 tablet by mouth Every Morning. 8/9/23  Yes Ildefonso Dubois MD   SUMAtriptan (IMITREX) 100 MG tablet Take 1 tablet by mouth 1 (One) Time As Needed for Migraine for up to  1 dose. Take one tablet at onset of headache. May repeat dose one time in 2 hours.  Patient taking differently: Take 1 tablet by mouth As Needed for Migraine. Take one tablet at onset of headache. May repeat dose one time in 2 hours. 2/24/20  Yes Ildefonso Dubois MD   zolpidem (AMBIEN) 10 MG tablet Take 1 tablet by mouth Every Night. 10/4/23  Yes Ildefonso Dubois MD   amitriptyline (ELAVIL) 10 MG tablet Take 1 tablet by mouth Every Night.  Patient taking differently: Take 0.5 tablets by mouth Every Night. 8/24/23   Ildefonso Dubois MD   Loratadine (CLARITIN PO) Take 10 mg by mouth Daily.    ProviderJulius MD   Multiple Vitamins-Minerals (MULTI-VITAMIN GUMMIES PO) Take 2 each by mouth Daily. HOLD FOR SURGERY    Julius Cortes MD   vitamin B-12 (CYANOCOBALAMIN) 2500 MCG sublingual tablet tablet Place 5,000 mcg under the tongue 1 (One) Time Per Week. SATURDAY    Julius Cortes MD   Wheat Dextrin (BENEFIBER DRINK MIX PO) Take  by mouth 2 (Two) Times a Day.    ProviderJulius MD       Allergies   Allergen Reactions    Hydrocodone Nausea And Vomiting    Sulfa Antibiotics Nausea And Vomiting       Social History     Socioeconomic History    Marital status:    Tobacco Use    Smoking status: Never     Passive exposure: Never    Smokeless tobacco: Never   Vaping Use    Vaping Use: Never used   Substance and Sexual Activity    Alcohol use: Not Currently    Drug use: No    Sexual activity: Not Currently     Partners: Male     Birth control/protection: Post-menopausal, Hysterectomy       Family History   Problem Relation Age of Onset    Hyperlipidemia Mother     Macular degeneration Mother     Hearing loss Mother     Arthritis Mother     Vision loss Mother     Hypertension Mother     Heart disease Father         Had 5 bypass surgery    Hyperlipidemia Father     Cancer Father         Prostate and bladder    Depression Father     Stroke Father     Hypertension Father     Depression  Sister     COPD Sister     Hyperlipidemia Sister     Arthritis Sister     Hyperlipidemia Brother     Depression Brother     Kidney disease Brother     Arthritis Brother     Hypertension Brother     Breast cancer Maternal Aunt     Breast cancer Cousin     Breast cancer Cousin     Colon cancer Neg Hx     Colon polyps Neg Hx     Malig Hyperthermia Neg Hx        REVIEW OF SYSTEMS:   All systems reviewed.  Pertinent positives identified in HPI.  All other systems are negative.      Objective:     Vitals:    11/25/23 2035 11/25/23 2338 11/26/23 0440 11/26/23 0700   BP: 140/82 128/71 106/64 103/60   BP Location: Left arm Left arm Left arm Left arm   Patient Position: Lying Lying Lying Lying   Pulse: 113 103 114    Resp: 18 18 18 18   Temp: 99.1 °F (37.3 °C) 98.4 °F (36.9 °C) 98.6 °F (37 °C) 98.6 °F (37 °C)   TempSrc: Oral Oral Oral Oral   SpO2: 97% 95% 95%    Weight:       Height:         Body mass index is 18.79 kg/m².    Physical Exam:  General Appearance:    Alert, cooperative, in no acute distress   Head:    Normocephalic, without obvious abnormality   Eyes:            Lids and lashes normal, conjunctivae and sclerae normal, no icterus, no pallor, corneas clear   Ears:    Ears appear intact with no abnormalities noted   Throat:   No oral lesions, oral mucosa moist   Neck:   No adenopathy, supple, trachea midline, no thyromegaly, no carotid bruit, no JVD   Back:     No kyphosis present, no erythema or scars, no tenderness to palpation    Lungs:     Clear to auscultation,respirations regular, even and unlabored    Heart:    Regular rhythm and normal rate, normal S1 and S2, no murmur, no gallop, no rub, no click   Chest Wall:    No abnormalities observed   Abdomen:   Diminished bowel sounds, not distended   Extremities:   Moves all extremities well, no edema, no cyanosis, no redness   Pulses:  Bilateral carotids brisk   Skin:  Psychiatric:   No bleeding or rash    Alert and oriented, normal mood and affect         Lab  Review:     Results from last 7 days   Lab Units 11/26/23  0721   SODIUM mmol/L 132*   POTASSIUM mmol/L 4.1   CHLORIDE mmol/L 96*   CO2 mmol/L 28.0   BUN mg/dL 20   CREATININE mg/dL 0.71   CALCIUM mg/dL 9.1   GLUCOSE mg/dL 128*         Results from last 7 days   Lab Units 11/26/23  0705   WBC 10*3/mm3 11.95*   HEMOGLOBIN g/dL 11.4*   HEMATOCRIT % 34.0   PLATELETS 10*3/mm3 138*         Results from last 7 days   Lab Units 11/26/23  0721   MAGNESIUM mg/dL 1.8     Results from last 7 days   Lab Units 11/25/23  0618   TRIGLYCERIDES mg/dL 60               I personally viewed and interpreted the patient's EKG/Telemetry data.      Current Facility-Administered Medications:     acetaminophen (TYLENOL) tablet 1,000 mg, 1,000 mg, Oral, Q6H, Ranjeet Salinas MD, 1,000 mg at 11/26/23 0902    Adult Central 2-in-1 TPN, , Intravenous, Continuous, Ranjeet Salinas MD, Last Rate: 70 mL/hr at 11/25/23 1814, New Bag at 11/25/23 1814    Adult Central 2-in-1 TPN, , Intravenous, Continuous, Ranjeet Salinas MD    ALPRAZolam (XANAX) tablet 0.5 mg, 0.5 mg, Oral, BID, Ranjeet Salinas MD, 0.5 mg at 11/26/23 0901    amitriptyline (ELAVIL) tablet 5 mg, 5 mg, Oral, Nightly, Ranjeet Salinas MD, 5 mg at 11/25/23 2215    amLODIPine (NORVASC) tablet 2.5 mg, 2.5 mg, Oral, Daily, Ranjeet Salinas MD, 2.5 mg at 11/26/23 0900    Enoxaparin Sodium (LOVENOX) syringe 30 mg, 30 mg, Subcutaneous, Q24H, Ranjeet Salinas MD, 30 mg at 11/26/23 1124    Fat Emulsion Plant Based (INTRALIPID,LIPOSYN) 20 % infusion 20 g, 100 mL, Intravenous, Q24H (TPN), Ranjeet Salinas MD, Last Rate: 8.33 mL/hr at 11/25/23 1812, 20 g at 11/25/23 1812    HYDROmorphone (DILAUDID) injection 0.5 mg, 0.5 mg, Intravenous, Q2H PRN, Ranjeet Salinas MD, 0.5 mg at 11/26/23 1249    [Held by provider] lisinopril (PRINIVIL,ZESTRIL) tablet 30 mg, 30 mg, Oral, Daily, Ranjeet Salinas MD    methocarbamol (ROBAXIN)  tablet 750 mg, 750 mg, Oral, 4x Daily, Ranjeet Salinas MD, 750 mg at 11/26/23 0637    ondansetron (ZOFRAN) tablet 4 mg, 4 mg, Oral, Q6H PRN **OR** ondansetron (ZOFRAN) injection 4 mg, 4 mg, Intravenous, Q6H PRN, Ranjeet Salinas MD, 4 mg at 11/25/23 1301    oxyCODONE (ROXICODONE) immediate release tablet 5 mg, 5 mg, Oral, Q4H PRN, 5 mg at 11/23/23 0827 **OR** oxyCODONE (ROXICODONE) immediate release tablet 10 mg, 10 mg, Oral, Q4H PRN, Ranjeet Salinas MD, 10 mg at 11/26/23 0252    pantoprazole (PROTONIX) injection 40 mg, 40 mg, Intravenous, Q AM, Ranjeet Salinas MD, 40 mg at 11/26/23 0637    PARoxetine (PAXIL) tablet 40 mg, 40 mg, Oral, QAM, Ranjeet Salinas MD, 40 mg at 11/26/23 0637    Pharmacy to Dose TPN, , Does not apply, Continuous PRN, Ranjeet Salinas MD    phenol (CHLORASEPTIC) 1.4 % liquid 1 spray, 1 spray, Mouth/Throat, Q2H PRN, Ranjeet Salinas MD    sodium chloride 0.9 % flush 10 mL, 10 mL, Intravenous, Q12H, Ranjeet Salinas MD, 10 mL at 11/26/23 0900    sodium chloride 0.9 % flush 10 mL, 10 mL, Intravenous, Q12H, Ranjeet Salinas MD, 10 mL at 11/26/23 0900    sodium chloride 0.9 % flush 10 mL, 10 mL, Intravenous, PRN, Ranjeet Salinas MD, 10 mL at 11/25/23 1539    sodium chloride 0.9 % flush 20 mL, 20 mL, Intravenous, PRN, Ranjeet Salinas MD    sodium chloride 0.9 % infusion 40 mL, 40 mL, Intravenous, PRN, Ranjeet Salinas MD    SUMAtriptan (IMITREX) tablet 100 mg, 100 mg, Oral, Once PRN, Ranjeet Salinas MD    tamsulosin (FLOMAX) 24 hr capsule 0.4 mg, 0.4 mg, Oral, Daily, Ranjeet Salinas MD, 0.4 mg at 11/26/23 0900    [Held by provider] zolpidem (AMBIEN) tablet 10 mg, 10 mg, Oral, Nightly, Ranjeet Salinas MD    Assessment and Plan:       Active Hospital Problems    Diagnosis  POA    **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     1.  Exploratory lap, subtotal colectomy, enterolysis, ileocolostomy  2.  Nonsustained ventricular tachycardia-new finding, potassium 3.5, magnesium 1.8, calcium is 9.0.  Will check an echocardiogram.  Will add metoprolol, discontinue amlodipine  3.  Hypertension-typically on lisinopril as well as amlodipine at home, currently only on amlodipine, we will stop the amlodipine since him starting metoprolol.    Mahin Hernandez III, MD  11/26/23  13:05 EST

## 2023-11-27 ENCOUNTER — ANESTHESIA EVENT (OUTPATIENT)
Dept: PERIOP | Facility: HOSPITAL | Age: 68
End: 2023-11-27
Payer: MEDICARE

## 2023-11-27 ENCOUNTER — APPOINTMENT (OUTPATIENT)
Dept: CT IMAGING | Facility: HOSPITAL | Age: 68
DRG: 329 | End: 2023-11-27
Payer: MEDICARE

## 2023-11-27 ENCOUNTER — APPOINTMENT (OUTPATIENT)
Dept: CARDIOLOGY | Facility: HOSPITAL | Age: 68
DRG: 329 | End: 2023-11-27
Payer: MEDICARE

## 2023-11-27 ENCOUNTER — APPOINTMENT (OUTPATIENT)
Dept: GENERAL RADIOLOGY | Facility: HOSPITAL | Age: 68
DRG: 329 | End: 2023-11-27
Payer: MEDICARE

## 2023-11-27 ENCOUNTER — ANESTHESIA (OUTPATIENT)
Dept: PERIOP | Facility: HOSPITAL | Age: 68
End: 2023-11-27
Payer: MEDICARE

## 2023-11-27 LAB
ABO GROUP BLD: NORMAL
ANION GAP SERPL CALCULATED.3IONS-SCNC: 7.1 MMOL/L (ref 5–15)
AORTIC ARCH: 2.5 CM
ASCENDING AORTA: 2.6 CM
BH CV ECHO MEAS - ACS: 0.95 CM
BH CV ECHO MEAS - AO MAX PG: 10.5 MMHG
BH CV ECHO MEAS - AO MEAN PG: 5.3 MMHG
BH CV ECHO MEAS - AO ROOT DIAM: 2.29 CM
BH CV ECHO MEAS - AO V2 MAX: 161.9 CM/SEC
BH CV ECHO MEAS - AO V2 VTI: 28.1 CM
BH CV ECHO MEAS - AVA(I,D): 1.71 CM2
BH CV ECHO MEAS - EDV(CUBED): 38.4 ML
BH CV ECHO MEAS - EDV(MOD-SP2): 48 ML
BH CV ECHO MEAS - EDV(MOD-SP4): 50 ML
BH CV ECHO MEAS - EF(MOD-BP): 63.9 %
BH CV ECHO MEAS - EF(MOD-SP2): 60.4 %
BH CV ECHO MEAS - EF(MOD-SP4): 68 %
BH CV ECHO MEAS - ESV(CUBED): 5.4 ML
BH CV ECHO MEAS - ESV(MOD-SP2): 19 ML
BH CV ECHO MEAS - ESV(MOD-SP4): 16 ML
BH CV ECHO MEAS - FS: 48 %
BH CV ECHO MEAS - IVS/LVPW: 0.96 CM
BH CV ECHO MEAS - IVSD: 0.57 CM
BH CV ECHO MEAS - LAT PEAK E' VEL: 13.1 CM/SEC
BH CV ECHO MEAS - LV MASS(C)D: 46.3 GRAMS
BH CV ECHO MEAS - LV MAX PG: 6.9 MMHG
BH CV ECHO MEAS - LV MEAN PG: 3.1 MMHG
BH CV ECHO MEAS - LV V1 MAX: 131.2 CM/SEC
BH CV ECHO MEAS - LV V1 VTI: 24.6 CM
BH CV ECHO MEAS - LVIDD: 3.4 CM
BH CV ECHO MEAS - LVIDS: 1.75 CM
BH CV ECHO MEAS - LVOT AREA: 1.95 CM2
BH CV ECHO MEAS - LVOT DIAM: 1.58 CM
BH CV ECHO MEAS - LVPWD: 0.59 CM
BH CV ECHO MEAS - MED PEAK E' VEL: 13.8 CM/SEC
BH CV ECHO MEAS - MV A DUR: 0.11 SEC
BH CV ECHO MEAS - MV A MAX VEL: 76.4 CM/SEC
BH CV ECHO MEAS - MV DEC SLOPE: 831.7 CM/SEC2
BH CV ECHO MEAS - MV DEC TIME: 0.16 SEC
BH CV ECHO MEAS - MV E MAX VEL: 90.7 CM/SEC
BH CV ECHO MEAS - MV E/A: 1.19
BH CV ECHO MEAS - MV MAX PG: 5.2 MMHG
BH CV ECHO MEAS - MV MEAN PG: 2.6 MMHG
BH CV ECHO MEAS - MV P1/2T: 37.2 MSEC
BH CV ECHO MEAS - MV V2 VTI: 21.2 CM
BH CV ECHO MEAS - MVA(P1/2T): 5.9 CM2
BH CV ECHO MEAS - MVA(VTI): 2.26 CM2
BH CV ECHO MEAS - PA ACC TIME: 0.14 SEC
BH CV ECHO MEAS - PA V2 MAX: 103.2 CM/SEC
BH CV ECHO MEAS - RAP SYSTOLE: 3 MMHG
BH CV ECHO MEAS - RV MAX PG: 2.8 MMHG
BH CV ECHO MEAS - RV V1 MAX: 83.6 CM/SEC
BH CV ECHO MEAS - RV V1 VTI: 18.3 CM
BH CV ECHO MEAS - RVSP: 37 MMHG
BH CV ECHO MEAS - SUP REN AO DIAM: 1.9 CM
BH CV ECHO MEAS - SV(LVOT): 47.9 ML
BH CV ECHO MEAS - SV(MOD-SP2): 29 ML
BH CV ECHO MEAS - SV(MOD-SP4): 34 ML
BH CV ECHO MEAS - TAPSE (>1.6): 2.42 CM
BH CV ECHO MEAS - TR MAX PG: 34.7 MMHG
BH CV ECHO MEAS - TR MAX VEL: 294.5 CM/SEC
BH CV ECHO MEASUREMENTS AVERAGE E/E' RATIO: 6.74
BH CV XLRA - RV BASE: 3.1 CM
BH CV XLRA - RV LENGTH: 6.1 CM
BH CV XLRA - RV MID: 3 CM
BH CV XLRA - TDI S': 13.5 CM/SEC
BLD GP AB SCN SERPL QL: NEGATIVE
BUN SERPL-MCNC: 23 MG/DL (ref 8–23)
BUN/CREAT SERPL: 31.5 (ref 7–25)
CALCIUM SPEC-SCNC: 8.8 MG/DL (ref 8.6–10.5)
CHLORIDE SERPL-SCNC: 99 MMOL/L (ref 98–107)
CO2 SERPL-SCNC: 23.9 MMOL/L (ref 22–29)
CREAT SERPL-MCNC: 0.73 MG/DL (ref 0.57–1)
DEPRECATED RDW RBC AUTO: 47.5 FL (ref 37–54)
EGFRCR SERPLBLD CKD-EPI 2021: 90.3 ML/MIN/1.73
ERYTHROCYTE [DISTWIDTH] IN BLOOD BY AUTOMATED COUNT: 14.5 % (ref 12.3–15.4)
GLUCOSE SERPL-MCNC: 147 MG/DL (ref 65–99)
HCT VFR BLD AUTO: 28.9 % (ref 34–46.6)
HGB BLD-MCNC: 9.8 G/DL (ref 12–15.9)
LEFT ATRIUM VOLUME INDEX: 14.5 ML/M2
MAGNESIUM SERPL-MCNC: 2.1 MG/DL (ref 1.6–2.4)
MCH RBC QN AUTO: 30.2 PG (ref 26.6–33)
MCHC RBC AUTO-ENTMCNC: 33.9 G/DL (ref 31.5–35.7)
MCV RBC AUTO: 88.9 FL (ref 79–97)
PHOSPHATE SERPL-MCNC: 2.9 MG/DL (ref 2.5–4.5)
PLATELET # BLD AUTO: 142 10*3/MM3 (ref 140–450)
PMV BLD AUTO: 10.1 FL (ref 6–12)
POTASSIUM SERPL-SCNC: 4.7 MMOL/L (ref 3.5–5.2)
PROCALCITONIN SERPL-MCNC: 1.4 NG/ML (ref 0–0.25)
RBC # BLD AUTO: 3.25 10*6/MM3 (ref 3.77–5.28)
RH BLD: POSITIVE
SINUS: 2.12 CM
SODIUM SERPL-SCNC: 130 MMOL/L (ref 136–145)
STJ: 1.94 CM
T&S EXPIRATION DATE: NORMAL
WBC NRBC COR # BLD AUTO: 15.25 10*3/MM3 (ref 3.4–10.8)

## 2023-11-27 PROCEDURE — 0 DIATRIZOATE MEGLUMINE & SODIUM PER 1 ML: Performed by: SURGERY

## 2023-11-27 PROCEDURE — 25010000002 HYDROMORPHONE PER 4 MG: Performed by: SURGERY

## 2023-11-27 PROCEDURE — 25010000002 PROPOFOL 10 MG/ML EMULSION: Performed by: STUDENT IN AN ORGANIZED HEALTH CARE EDUCATION/TRAINING PROGRAM

## 2023-11-27 PROCEDURE — 0DB80ZZ EXCISION OF SMALL INTESTINE, OPEN APPROACH: ICD-10-PCS | Performed by: SURGERY

## 2023-11-27 PROCEDURE — 25010000002 DEXAMETHASONE SODIUM PHOSPHATE 20 MG/5ML SOLUTION: Performed by: STUDENT IN AN ORGANIZED HEALTH CARE EDUCATION/TRAINING PROGRAM

## 2023-11-27 PROCEDURE — 49002 REOPENING OF ABDOMEN: CPT | Performed by: SURGERY

## 2023-11-27 PROCEDURE — 83735 ASSAY OF MAGNESIUM: CPT | Performed by: SURGERY

## 2023-11-27 PROCEDURE — 25010000002 PIPERACILLIN SOD-TAZOBACTAM PER 1 G: Performed by: SURGERY

## 2023-11-27 PROCEDURE — 86901 BLOOD TYPING SEROLOGIC RH(D): CPT | Performed by: SURGERY

## 2023-11-27 PROCEDURE — 25010000002 FENTANYL CITRATE (PF) 50 MCG/ML SOLUTION: Performed by: STUDENT IN AN ORGANIZED HEALTH CARE EDUCATION/TRAINING PROGRAM

## 2023-11-27 PROCEDURE — 74270 X-RAY XM COLON 1CNTRST STD: CPT

## 2023-11-27 PROCEDURE — 25010000002 CALCIUM GLUCONATE PER 10 ML: Performed by: SURGERY

## 2023-11-27 PROCEDURE — 44120 REMOVAL OF SMALL INTESTINE: CPT | Performed by: STUDENT IN AN ORGANIZED HEALTH CARE EDUCATION/TRAINING PROGRAM

## 2023-11-27 PROCEDURE — 25010000002 BUPIVACAINE (PF) 0.25 % SOLUTION: Performed by: STUDENT IN AN ORGANIZED HEALTH CARE EDUCATION/TRAINING PROGRAM

## 2023-11-27 PROCEDURE — 80048 BASIC METABOLIC PNL TOTAL CA: CPT | Performed by: SURGERY

## 2023-11-27 PROCEDURE — 85027 COMPLETE CBC AUTOMATED: CPT | Performed by: SURGERY

## 2023-11-27 PROCEDURE — 49002 REOPENING OF ABDOMEN: CPT | Performed by: STUDENT IN AN ORGANIZED HEALTH CARE EDUCATION/TRAINING PROGRAM

## 2023-11-27 PROCEDURE — 99024 POSTOP FOLLOW-UP VISIT: CPT | Performed by: SURGERY

## 2023-11-27 PROCEDURE — 25010000002 POTASSIUM CHLORIDE PER 2 MEQ OF POTASSIUM: Performed by: SURGERY

## 2023-11-27 PROCEDURE — 0 BUPIVACAINE LIPOSOME 1.3 % SUSPENSION: Performed by: STUDENT IN AN ORGANIZED HEALTH CARE EDUCATION/TRAINING PROGRAM

## 2023-11-27 PROCEDURE — 25010000002 SUGAMMADEX 200 MG/2ML SOLUTION: Performed by: NURSE ANESTHETIST, CERTIFIED REGISTERED

## 2023-11-27 PROCEDURE — 84145 PROCALCITONIN (PCT): CPT | Performed by: SURGERY

## 2023-11-27 PROCEDURE — 25010000002 MAGNESIUM SULFATE PER 500 MG OF MAGNESIUM: Performed by: SURGERY

## 2023-11-27 PROCEDURE — 86850 RBC ANTIBODY SCREEN: CPT | Performed by: SURGERY

## 2023-11-27 PROCEDURE — C9290 INJ, BUPIVACAINE LIPOSOME: HCPCS | Performed by: STUDENT IN AN ORGANIZED HEALTH CARE EDUCATION/TRAINING PROGRAM

## 2023-11-27 PROCEDURE — 25010000002 ONDANSETRON PER 1 MG: Performed by: SURGERY

## 2023-11-27 PROCEDURE — 25510000001 IOPAMIDOL 61 % SOLUTION: Performed by: SURGERY

## 2023-11-27 PROCEDURE — 99232 SBSQ HOSP IP/OBS MODERATE 35: CPT | Performed by: INTERNAL MEDICINE

## 2023-11-27 PROCEDURE — 25010000002 ENOXAPARIN PER 10 MG: Performed by: SURGERY

## 2023-11-27 PROCEDURE — 97530 THERAPEUTIC ACTIVITIES: CPT

## 2023-11-27 PROCEDURE — 93306 TTE W/DOPPLER COMPLETE: CPT

## 2023-11-27 PROCEDURE — 88307 TISSUE EXAM BY PATHOLOGIST: CPT | Performed by: SURGERY

## 2023-11-27 PROCEDURE — 74177 CT ABD & PELVIS W/CONTRAST: CPT

## 2023-11-27 PROCEDURE — 87040 BLOOD CULTURE FOR BACTERIA: CPT | Performed by: SURGERY

## 2023-11-27 PROCEDURE — 84100 ASSAY OF PHOSPHORUS: CPT | Performed by: SURGERY

## 2023-11-27 PROCEDURE — 86900 BLOOD TYPING SEROLOGIC ABO: CPT | Performed by: SURGERY

## 2023-11-27 PROCEDURE — 25010000002 ONDANSETRON PER 1 MG: Performed by: STUDENT IN AN ORGANIZED HEALTH CARE EDUCATION/TRAINING PROGRAM

## 2023-11-27 PROCEDURE — 44120 REMOVAL OF SMALL INTESTINE: CPT | Performed by: SURGERY

## 2023-11-27 PROCEDURE — 25010000002 HYDROMORPHONE PER 4 MG: Performed by: STUDENT IN AN ORGANIZED HEALTH CARE EDUCATION/TRAINING PROGRAM

## 2023-11-27 PROCEDURE — 93306 TTE W/DOPPLER COMPLETE: CPT | Performed by: INTERNAL MEDICINE

## 2023-11-27 PROCEDURE — 25810000003 LACTATED RINGERS PER 1000 ML: Performed by: STUDENT IN AN ORGANIZED HEALTH CARE EDUCATION/TRAINING PROGRAM

## 2023-11-27 DEVICE — STPLR LNR CUT PROX 75MM BLU TLC75: Type: IMPLANTABLE DEVICE | Site: ABDOMEN | Status: FUNCTIONAL

## 2023-11-27 DEVICE — CLIP LIGAT VASC HORIZON TI LG ORNG 6CT: Type: IMPLANTABLE DEVICE | Site: ABDOMEN | Status: FUNCTIONAL

## 2023-11-27 DEVICE — RELOAD STPLR LNR CUT PROX 75MM BLU TCR75: Type: IMPLANTABLE DEVICE | Site: ABDOMEN | Status: FUNCTIONAL

## 2023-11-27 DEVICE — CLIP LIGAT VASC HORIZON TI MD/LG GRN 6CT: Type: IMPLANTABLE DEVICE | Site: ABDOMEN | Status: FUNCTIONAL

## 2023-11-27 RX ORDER — DIPHENHYDRAMINE HYDROCHLORIDE 50 MG/ML
12.5 INJECTION INTRAMUSCULAR; INTRAVENOUS
Status: DISCONTINUED | OUTPATIENT
Start: 2023-11-27 | End: 2023-11-28 | Stop reason: HOSPADM

## 2023-11-27 RX ORDER — SODIUM CHLORIDE, SODIUM LACTATE, POTASSIUM CHLORIDE, CALCIUM CHLORIDE 600; 310; 30; 20 MG/100ML; MG/100ML; MG/100ML; MG/100ML
INJECTION, SOLUTION INTRAVENOUS CONTINUOUS PRN
Status: DISCONTINUED | OUTPATIENT
Start: 2023-11-27 | End: 2023-11-27 | Stop reason: SURG

## 2023-11-27 RX ORDER — DIPHENHYDRAMINE HYDROCHLORIDE 50 MG/ML
12.5 INJECTION INTRAMUSCULAR; INTRAVENOUS ONCE AS NEEDED
Status: DISCONTINUED | OUTPATIENT
Start: 2023-11-27 | End: 2023-11-28 | Stop reason: HOSPADM

## 2023-11-27 RX ORDER — KETAMINE HCL IN NACL, ISO-OSM 100MG/10ML
SYRINGE (ML) INJECTION AS NEEDED
Status: DISCONTINUED | OUTPATIENT
Start: 2023-11-27 | End: 2023-11-27 | Stop reason: SURG

## 2023-11-27 RX ORDER — ONDANSETRON 2 MG/ML
INJECTION INTRAMUSCULAR; INTRAVENOUS AS NEEDED
Status: DISCONTINUED | OUTPATIENT
Start: 2023-11-27 | End: 2023-11-27 | Stop reason: SURG

## 2023-11-27 RX ORDER — DEXAMETHASONE SODIUM PHOSPHATE 4 MG/ML
INJECTION, SOLUTION INTRA-ARTICULAR; INTRALESIONAL; INTRAMUSCULAR; INTRAVENOUS; SOFT TISSUE AS NEEDED
Status: DISCONTINUED | OUTPATIENT
Start: 2023-11-27 | End: 2023-11-27 | Stop reason: SURG

## 2023-11-27 RX ORDER — ROCURONIUM BROMIDE 10 MG/ML
INJECTION, SOLUTION INTRAVENOUS AS NEEDED
Status: DISCONTINUED | OUTPATIENT
Start: 2023-11-27 | End: 2023-11-27 | Stop reason: SURG

## 2023-11-27 RX ORDER — OXYCODONE HYDROCHLORIDE 10 MG/1
10 TABLET ORAL EVERY 4 HOURS PRN
Status: DISCONTINUED | OUTPATIENT
Start: 2023-11-27 | End: 2023-11-27

## 2023-11-27 RX ORDER — NALOXONE HCL 0.4 MG/ML
0.4 VIAL (ML) INJECTION AS NEEDED
Status: DISCONTINUED | OUTPATIENT
Start: 2023-11-27 | End: 2023-11-28 | Stop reason: HOSPADM

## 2023-11-27 RX ORDER — MAGNESIUM HYDROXIDE 1200 MG/15ML
LIQUID ORAL AS NEEDED
Status: DISCONTINUED | OUTPATIENT
Start: 2023-11-27 | End: 2023-11-27 | Stop reason: HOSPADM

## 2023-11-27 RX ORDER — BUPIVACAINE HYDROCHLORIDE 2.5 MG/ML
INJECTION, SOLUTION EPIDURAL; INFILTRATION; INTRACAUDAL
Status: COMPLETED | OUTPATIENT
Start: 2023-11-27 | End: 2023-11-27

## 2023-11-27 RX ORDER — PROPOFOL 10 MG/ML
VIAL (ML) INTRAVENOUS AS NEEDED
Status: DISCONTINUED | OUTPATIENT
Start: 2023-11-27 | End: 2023-11-27 | Stop reason: SURG

## 2023-11-27 RX ORDER — OXYCODONE HYDROCHLORIDE 5 MG/1
5 TABLET ORAL EVERY 4 HOURS PRN
Status: DISCONTINUED | OUTPATIENT
Start: 2023-11-27 | End: 2023-11-27

## 2023-11-27 RX ORDER — LABETALOL HYDROCHLORIDE 5 MG/ML
5 INJECTION, SOLUTION INTRAVENOUS
Status: DISCONTINUED | OUTPATIENT
Start: 2023-11-27 | End: 2023-11-28 | Stop reason: HOSPADM

## 2023-11-27 RX ORDER — HYDROMORPHONE HYDROCHLORIDE 1 MG/ML
0.5 INJECTION, SOLUTION INTRAMUSCULAR; INTRAVENOUS; SUBCUTANEOUS
Status: DISCONTINUED | OUTPATIENT
Start: 2023-11-27 | End: 2023-11-28 | Stop reason: HOSPADM

## 2023-11-27 RX ORDER — FENTANYL CITRATE 50 UG/ML
25 INJECTION, SOLUTION INTRAMUSCULAR; INTRAVENOUS
Status: DISCONTINUED | OUTPATIENT
Start: 2023-11-27 | End: 2023-11-28 | Stop reason: HOSPADM

## 2023-11-27 RX ORDER — FENTANYL CITRATE 50 UG/ML
INJECTION, SOLUTION INTRAMUSCULAR; INTRAVENOUS AS NEEDED
Status: DISCONTINUED | OUTPATIENT
Start: 2023-11-27 | End: 2023-11-27 | Stop reason: SURG

## 2023-11-27 RX ORDER — ENALAPRILAT 1.25 MG/ML
2.5 INJECTION INTRAVENOUS ONCE AS NEEDED
Status: DISCONTINUED | OUTPATIENT
Start: 2023-11-27 | End: 2023-11-28 | Stop reason: HOSPADM

## 2023-11-27 RX ORDER — LIDOCAINE HYDROCHLORIDE 20 MG/ML
INJECTION, SOLUTION INFILTRATION; PERINEURAL AS NEEDED
Status: DISCONTINUED | OUTPATIENT
Start: 2023-11-27 | End: 2023-11-27 | Stop reason: SURG

## 2023-11-27 RX ORDER — IPRATROPIUM BROMIDE AND ALBUTEROL SULFATE 2.5; .5 MG/3ML; MG/3ML
3 SOLUTION RESPIRATORY (INHALATION) ONCE AS NEEDED
Status: DISCONTINUED | OUTPATIENT
Start: 2023-11-27 | End: 2023-11-28 | Stop reason: HOSPADM

## 2023-11-27 RX ORDER — ONDANSETRON 2 MG/ML
4 INJECTION INTRAMUSCULAR; INTRAVENOUS ONCE AS NEEDED
Status: COMPLETED | OUTPATIENT
Start: 2023-11-27 | End: 2023-11-28

## 2023-11-27 RX ADMIN — Medication 10 ML: at 09:15

## 2023-11-27 RX ADMIN — FENTANYL CITRATE 50 MCG: 50 INJECTION, SOLUTION INTRAMUSCULAR; INTRAVENOUS at 20:18

## 2023-11-27 RX ADMIN — DEXAMETHASONE SODIUM PHOSPHATE 8 MG: 4 INJECTION, SOLUTION INTRAMUSCULAR; INTRAVENOUS at 20:39

## 2023-11-27 RX ADMIN — HYDROMORPHONE HYDROCHLORIDE 0.5 MG: 1 INJECTION, SOLUTION INTRAMUSCULAR; INTRAVENOUS; SUBCUTANEOUS at 09:14

## 2023-11-27 RX ADMIN — ACETAMINOPHEN 1000 MG: 500 TABLET ORAL at 12:33

## 2023-11-27 RX ADMIN — Medication 10 MG: at 20:48

## 2023-11-27 RX ADMIN — PAROXETINE HYDROCHLORIDE HEMIHYDRATE 40 MG: 20 TABLET, FILM COATED ORAL at 06:05

## 2023-11-27 RX ADMIN — DIATRIZOATE MEGLUMINE AND DIATRIZOATE SODIUM 480 ML: 660; 100 LIQUID ORAL; RECTAL at 17:17

## 2023-11-27 RX ADMIN — SODIUM CHLORIDE, POTASSIUM CHLORIDE, SODIUM LACTATE AND CALCIUM CHLORIDE: 600; 310; 30; 20 INJECTION, SOLUTION INTRAVENOUS at 20:09

## 2023-11-27 RX ADMIN — POTASSIUM PHOSPHATE, MONOBASIC POTASSIUM PHOSPHATE, DIBASIC: 224; 236 INJECTION, SOLUTION, CONCENTRATE INTRAVENOUS at 22:37

## 2023-11-27 RX ADMIN — ROCURONIUM BROMIDE 10 MG: 10 INJECTION, SOLUTION INTRAVENOUS at 21:42

## 2023-11-27 RX ADMIN — FENTANYL CITRATE 25 MCG: 50 INJECTION, SOLUTION INTRAMUSCULAR; INTRAVENOUS at 23:23

## 2023-11-27 RX ADMIN — SUGAMMADEX 200 MG: 100 INJECTION, SOLUTION INTRAVENOUS at 22:02

## 2023-11-27 RX ADMIN — FENTANYL CITRATE 25 MCG: 50 INJECTION, SOLUTION INTRAMUSCULAR; INTRAVENOUS at 23:42

## 2023-11-27 RX ADMIN — Medication 40 MG: at 20:34

## 2023-11-27 RX ADMIN — ALPRAZOLAM 0.5 MG: 0.5 TABLET ORAL at 09:14

## 2023-11-27 RX ADMIN — BUPIVACAINE 20 ML: 13.3 INJECTION, SUSPENSION, LIPOSOMAL INFILTRATION at 20:26

## 2023-11-27 RX ADMIN — PIPERACILLIN SODIUM AND TAZOBACTAM SODIUM 3.38 G: 3; .375 INJECTION, SOLUTION INTRAVENOUS at 17:55

## 2023-11-27 RX ADMIN — ACETAMINOPHEN 1000 MG: 500 TABLET ORAL at 06:05

## 2023-11-27 RX ADMIN — LIDOCAINE HYDROCHLORIDE 80 MG: 20 INJECTION, SOLUTION INFILTRATION; PERINEURAL at 20:18

## 2023-11-27 RX ADMIN — FENTANYL CITRATE 50 MCG: 50 INJECTION, SOLUTION INTRAMUSCULAR; INTRAVENOUS at 21:17

## 2023-11-27 RX ADMIN — METHOCARBAMOL TABLETS 750 MG: 750 TABLET, COATED ORAL at 12:33

## 2023-11-27 RX ADMIN — TAZOBACTAM SODIUM AND PIPERACILLIN SODIUM 3.38 G: 375; 3 INJECTION, SOLUTION INTRAVENOUS at 17:55

## 2023-11-27 RX ADMIN — DIATRIZOATE MEGLUMINE AND DIATRIZOATE SODIUM 30 ML: 660; 100 LIQUID ORAL; RECTAL at 10:01

## 2023-11-27 RX ADMIN — PROPOFOL 125 MCG/KG/MIN: 10 INJECTION, EMULSION INTRAVENOUS at 20:22

## 2023-11-27 RX ADMIN — METHOCARBAMOL TABLETS 750 MG: 750 TABLET, COATED ORAL at 06:05

## 2023-11-27 RX ADMIN — BUPIVACAINE HYDROCHLORIDE 10 ML: 2.5 INJECTION, SOLUTION EPIDURAL; INFILTRATION; INTRACAUDAL; PERINEURAL at 20:26

## 2023-11-27 RX ADMIN — HYDROMORPHONE HYDROCHLORIDE 0.5 MG: 1 INJECTION, SOLUTION INTRAMUSCULAR; INTRAVENOUS; SUBCUTANEOUS at 23:44

## 2023-11-27 RX ADMIN — ONDANSETRON 4 MG: 2 INJECTION INTRAMUSCULAR; INTRAVENOUS at 06:16

## 2023-11-27 RX ADMIN — HYDROMORPHONE HYDROCHLORIDE 0.5 MG: 1 INJECTION, SOLUTION INTRAMUSCULAR; INTRAVENOUS; SUBCUTANEOUS at 12:33

## 2023-11-27 RX ADMIN — ONDANSETRON 4 MG: 2 INJECTION INTRAMUSCULAR; INTRAVENOUS at 20:39

## 2023-11-27 RX ADMIN — ENOXAPARIN SODIUM 30 MG: 100 INJECTION SUBCUTANEOUS at 12:33

## 2023-11-27 RX ADMIN — METOPROLOL SUCCINATE 25 MG: 25 TABLET, EXTENDED RELEASE ORAL at 09:14

## 2023-11-27 RX ADMIN — ROCURONIUM BROMIDE 10 MG: 10 INJECTION, SOLUTION INTRAVENOUS at 21:02

## 2023-11-27 RX ADMIN — PROPOFOL 100 MG: 10 INJECTION, EMULSION INTRAVENOUS at 20:18

## 2023-11-27 RX ADMIN — PANTOPRAZOLE SODIUM 40 MG: 40 INJECTION, POWDER, FOR SOLUTION INTRAVENOUS at 06:05

## 2023-11-27 RX ADMIN — ROCURONIUM BROMIDE 40 MG: 10 INJECTION, SOLUTION INTRAVENOUS at 20:18

## 2023-11-27 RX ADMIN — IOPAMIDOL 85 ML: 612 INJECTION, SOLUTION INTRAVENOUS at 11:37

## 2023-11-27 NOTE — PROGRESS NOTES
Colorectal & General Surgery  Progress Note    Patient: Martina Hardwick  YOB: 1955  MRN: [id number]      Assessment  Martina Hardwick is a 67 y.o. female with colonic inertia who is now postoperative day 7 from exploratory laparotomy, enterolysis, subtotal colectomy, and ileocolostomy creation.  She has had return of bowel function and is tolerating a very small amount of clear liquids.  She has an uptrending leukocytosis without fever.  I am suspicious for intraperitoneal abscess, so we will obtain a CT scan of the abdomen and pelvis with intravenous and oral contrast today.  I will hold off on antibiotics until results of the CT scan are available.  If antibiotics are warranted, I will obtain blood cultures prior to administration of antibiotics given that she is on TPN and has a new PICC line.    She is also being seen by the cardiology service for nonsustained ventricular tachycardia.  Echo pending.    Subjective  No acute events overnight.  Pain reasonably well-controlled.  Having uncontrolled bowel movements.  Tolerating very small sips of clears.  Was up and walking yesterday with less assistance.    Objective    Vitals:    11/27/23 0004   BP: 108/55   Pulse: 110   Resp: 18   Temp: 98.6 °F (37 °C)   SpO2: 96%       Physical Exam  Constitutional: Well-developed well-nourished, no acute distress  Neck: Supple, trachea midline  Respiratory: No increased work of breathing, Symmetric excursion  Cardiovascular: Well pefursed, no jugular venous distention evident   Abdominal: Incision without erythema, soft, mildly distended, mildly tender.    Skin: Warm, dry, no rash on visualized skin surfaces  Psychiatric: Alert and oriented ×3, normal affect     Laboratory Results  I have personally reviewed CBC with WBC 15, hemoglobin 9.8, platelets 142.  Procalcitonin 1.40.  BMP with creatinine 0.73, potassium 4.7, magnesium 2.1, phosphorus 2.9.    Radiology  None to review         Osvaldo Salinas MD  Colorectal &  General Surgery  [facility]    [address], [address]  [place], [place], [address]  P: [phone]  F: [phone]

## 2023-11-27 NOTE — ANESTHESIA PREPROCEDURE EVALUATION
Anesthesia Evaluation     Patient summary reviewed and Nursing notes reviewed   history of anesthetic complications:  PONV  NPO Solid Status: > 8 hours  NPO Liquid Status: > 2 hours           Airway   Mallampati: II  TM distance: >3 FB  Neck ROM: full  Dental      Pulmonary      ROS comment: Pulmonary HTN  Cardiovascular     (+) hypertension    ROS comment: Echo today with mild pulmonary HTN but otherwise unremarkable    Neuro/Psych  GI/Hepatic/Renal/Endo    (+) GERD    ROS Comment: Bowel anastomotic leak    Musculoskeletal     Abdominal    Substance History      OB/GYN          Other                          Anesthesia Plan    ASA 4     general     intravenous induction     Anesthetic plan, risks, benefits, and alternatives have been provided, discussed and informed consent has been obtained with: patient.        CODE STATUS:    Level Of Support Discussed With: Patient  Code Status (Patient has no pulse and is not breathing): CPR (Attempt to Resuscitate)  Medical Interventions (Patient has pulse or is breathing): Full Support

## 2023-11-27 NOTE — PROGRESS NOTES
[facility] Clinical Pharmacy Services: TPN Daily Progress Note    TPN Day # 3   Indication: Prolonged Paralytic Ileus  Route: central  Type: standard    Subjective/Objective  Results from last 7 days   Lab Units 23  0418 23  0721 23  0618   SODIUM mmol/L 130*   < > 138   POTASSIUM mmol/L 4.7   < > 3.5   CHLORIDE mmol/L 99   < > 102   CO2 mmol/L 23.9   < > 28.9   BUN mg/dL 23   < > 12   CREATININE mg/dL 0.73   < > 0.58   CALCIUM mg/dL 8.8   < > 9.0   GLUCOSE mg/dL 147*   < > 100*   MAGNESIUM mg/dL 2.1   < > 1.8   PHOSPHORUS mg/dL 2.9   < > 3.4   TRIGLYCERIDES mg/dL  --   --  60    < > = values in this interval not displayed.      Dietary Orders (From admission, onward)       Start     Ordered    23 1455  Diet: Liquid Diets; Clear Liquid; Fluid Consistency: Thin (IDDSI 0)  Diet Effective Now        References:    Diet Order Crosswalk   Question Answer Comment   Diets: Liquid Diets    Liquid Diet: Clear Liquid    Fluid Consistency: Thin (IDDSI 0)        23 1455                      Additional insulin administration while previous TPN infusin units  Additional electrolyte administration while previous TPN infusing: none  Acid suppression: Protonix injection    Goal TPN Formula Recommendations: /100 AA/Dex/Lipids  Current TPN Formula: 70g/1200kcal/100 AA/Dex/Lipids    Assessment/Plan    Macronutrients: will continue goal formula of 70g/1200kcal/100 AA/Dex/lipids today  Based on the above labs, will add the following electrolytes/additives to the TPN.    Sodium Chloride: 90 mEq   Sodium Acetate: 0 mEq   Sodium Phosphate: 10 mEq   Potassium Chloride: 40 mEq (decreased from 70 mEq)   Potassium Acetate: 0 mEq   Potassium Phosphate: 25 mEq   Calcium Gluconate: 9 mEq   Magnesium Sulfate: 12 mEq   MVI for TPN   Trace Elements              Other Additives: None  Labs: CMP, mag, sirena Prince, Pharm.D., BCPS  Clinical Pharmacist  2023  10:21 EST

## 2023-11-27 NOTE — PLAN OF CARE
Problem: Adult Inpatient Plan of Care  Goal: Plan of Care Review  Outcome: Ongoing, Progressing  Flowsheets (Taken 11/27/2023 1809)  Progress: no change  Plan of Care Reviewed With:   patient   spouse  Outcome Evaluation: Pt stable throughout shift. Pt complains of severe pain in abdomen and back, medicated x2. Pts spouse was updated on new surgical plan. No concerns or complaints noted.     Problem: Adult Inpatient Plan of Care  Goal: Absence of Hospital-Acquired Illness or Injury  Outcome: Ongoing, Progressing  Intervention: Identify and Manage Fall Risk  Recent Flowsheet Documentation  Taken 11/27/2023 1436 by Claudia Whitaker RN  Safety Promotion/Fall Prevention:   activity supervised   assistive device/personal items within reach   clutter free environment maintained   fall prevention program maintained   room organization consistent   safety round/check completed  Taken 11/27/2023 1244 by Claudia Whitaker RN  Safety Promotion/Fall Prevention:   activity supervised   assistive device/personal items within reach   clutter free environment maintained   fall prevention program maintained   room organization consistent   safety round/check completed  Taken 11/27/2023 1032 by Claudia Whitaker RN  Safety Promotion/Fall Prevention:   activity supervised   assistive device/personal items within reach   clutter free environment maintained   fall prevention program maintained   room organization consistent   safety round/check completed  Intervention: Prevent Skin Injury  Recent Flowsheet Documentation  Taken 11/27/2023 1436 by Claudia Whitkaer RN  Body Position:   position changed independently   foot of bed elevated  Taken 11/27/2023 1244 by Claudia Whitaker RN  Body Position:   position changed independently   foot of bed elevated  Taken 11/27/2023 1032 by Claudia Whitaker RN  Body Position:   position changed independently   foot of bed elevated  Intervention: Prevent and Manage VTE (Venous Thromboembolism)  Risk  Recent Flowsheet Documentation  Taken 11/27/2023 0914 by Claudia Whitaker RN  Range of Motion:   active ROM (range of motion) encouraged   ROM (range of motion) performed  Taken 11/27/2023 0857 by Claudia Whitaker RN  Activity Management: ambulated outside room     Problem: Adult Inpatient Plan of Care  Goal: Optimal Comfort and Wellbeing  Outcome: Ongoing, Progressing  Intervention: Monitor Pain and Promote Comfort  Recent Flowsheet Documentation  Taken 11/27/2023 0914 by Claudia Whitaker RN  Pain Management Interventions:   see MAR   position adjusted   pillow support provided   care clustered  Intervention: Provide Person-Centered Care  Recent Flowsheet Documentation  Taken 11/27/2023 0914 by Claudia Whitaker RN  Trust Relationship/Rapport:   care explained   choices provided   questions answered   questions encouraged     Problem: Pain Acute  Goal: Acceptable Pain Control and Functional Ability  Outcome: Ongoing, Progressing  Intervention: Prevent or Manage Pain  Recent Flowsheet Documentation  Taken 11/27/2023 1436 by Claudia Whitaker RN  Medication Review/Management: medications reviewed  Taken 11/27/2023 1244 by Claudia Whitaker RN  Medication Review/Management: medications reviewed  Taken 11/27/2023 1032 by Claudia Whitaker RN  Medication Review/Management: medications reviewed  Intervention: Develop Pain Management Plan  Recent Flowsheet Documentation  Taken 11/27/2023 0914 by Claudia Whitaker RN  Pain Management Interventions:   see MAR   position adjusted   pillow support provided   care clustered  Intervention: Optimize Psychosocial Wellbeing  Recent Flowsheet Documentation  Taken 11/27/2023 0914 by Claudia Whitaker RN  Diversional Activities:   television   smartphone     Problem: Fall Injury Risk  Goal: Absence of Fall and Fall-Related Injury  Outcome: Ongoing, Progressing  Intervention: Identify and Manage Contributors  Recent Flowsheet Documentation  Taken 11/27/2023 1436 by Sherman  Claudia GALO RN  Medication Review/Management: medications reviewed  Taken 11/27/2023 1244 by Claudia Whitaker RN  Medication Review/Management: medications reviewed  Taken 11/27/2023 1032 by Claudia Whitaker RN  Medication Review/Management: medications reviewed  Self-Care Promotion: independence encouraged  Taken 11/27/2023 0914 by Claudia Whitaker RN  Self-Care Promotion: independence encouraged  Intervention: Promote Injury-Free Environment  Recent Flowsheet Documentation  Taken 11/27/2023 1436 by Claudia Whitaker RN  Safety Promotion/Fall Prevention:   activity supervised   assistive device/personal items within reach   clutter free environment maintained   fall prevention program maintained   room organization consistent   safety round/check completed  Taken 11/27/2023 1244 by Claudia Whitaker RN  Safety Promotion/Fall Prevention:   activity supervised   assistive device/personal items within reach   clutter free environment maintained   fall prevention program maintained   room organization consistent   safety round/check completed  Taken 11/27/2023 1032 by Claudia Whitaker RN  Safety Promotion/Fall Prevention:   activity supervised   assistive device/personal items within reach   clutter free environment maintained   fall prevention program maintained   room organization consistent   safety round/check completed     Problem: Skin Injury Risk Increased  Goal: Skin Health and Integrity  Outcome: Ongoing, Progressing  Intervention: Optimize Skin Protection  Recent Flowsheet Documentation  Taken 11/27/2023 1436 by Claudia Whitaker RN  Head of Bed (HOB) Positioning: HOB elevated  Taken 11/27/2023 1244 by Claudia Whitaker RN  Head of Bed (HOB) Positioning: HOB elevated  Taken 11/27/2023 1032 by Claudia Whitaker RN  Head of Bed (HOB) Positioning: HOB elevated     Problem: Parenteral Nutrition  Goal: Effective Intravenous Nutrition Therapy Delivery  Outcome: Ongoing, Progressing  Intervention: Optimize  Intravenous Nutrition Delivery  Recent Flowsheet Documentation  Taken 11/27/2023 1436 by Claudia Whitaker, RN  Medication Review/Management: medications reviewed  Taken 11/27/2023 1244 by Claudia Whitaker, RN  Medication Review/Management: medications reviewed  Taken 11/27/2023 1032 by Claudia Whitaker, RN  Medication Review/Management: medications reviewed   Goal Outcome Evaluation:  Plan of Care Reviewed With: patient, spouse        Progress: no change  Outcome Evaluation: Pt stable throughout shift. Pt complains of severe pain in abdomen and back, medicated x2. Pts spouse was updated on new surgical plan. No concerns or complaints noted.

## 2023-11-27 NOTE — PROGRESS NOTES
CT scan demonstrates extraluminal contrast. I suspect this is from a leak associated with one of the unavoidable enterotomies. While she is hemodynamically normal aside from some tachycardia at times, I do think she will benefit from early reoperation. I have asked radiology to perform a gastrograffin enema to elucidate if the ileocolic anastomosis is the source of the leak or if it is more proximal. I have initiated antibiotics and asked blood cultures be drawn. We discussed reopening of recent laparotomy, possible bowel resection, possible stoma creation. She recognizes that she could have a permanent stoma and has risk of further leak or intra-abdominal sepsis as well as bleeding and damage to surrounding structures.     We will proceed to the OR for reopening of recent laparotomy, possible bowel resection, possible ostomy creation this evening after GG enema is completed.

## 2023-11-27 NOTE — PROGRESS NOTES
LOS: 7 days   Patient Care Team:  Ildefonso Dubois MD as PCP - General (Family Medicine)  Hernan Hinds MD as Surgeon (General Surgery)    Chief Complaint:     Follow-up PT    Interval History:     She is having abdominal pain but no chest pain or difficulty breathing.    Echo 11/27/23  Interpretation Summary         Left ventricular systolic function is normal. Calculated left ventricular EF = 63.9%    Left ventricular diastolic function was normal.    Estimated right ventricular systolic pressure from tricuspid regurgitation is mildly elevated (35-45 mmHg). Calculated right ventricular systolic pressure from tricuspid regurgitation is 37 mmHg.    Mild pulmonary hypertension is present.    There is a trivial pericardial effusion.    Objective   Vital Signs  Temp:  [98.4 °F (36.9 °C)-100.2 °F (37.9 °C)] 98.6 °F (37 °C)  Heart Rate:  [101-110] 110  Resp:  [18-20] 18  BP: (108-123)/(55-70) 108/55    Intake/Output Summary (Last 24 hours) at 11/27/2023 0920  Last data filed at 11/26/2023 1950  Gross per 24 hour   Intake 60 ml   Output --   Net 60 ml       Comfortable NAD  Neck supple, no JVD or thyromegaly appreciated  S1/S2 RRR, no m/r/g  Lungs CTA B, normal effort  Abdomen soft but tender +) BS, no HSM appreciated  Extremities warm, no clubbing, cyanosis, or edema  No visible or palpable skin lesions  A/Ox4, mood and affect appropriate    Results Review:      Results from last 7 days   Lab Units 11/27/23 0418 11/26/23 0721 11/25/23  0618   SODIUM mmol/L 130* 132* 138   POTASSIUM mmol/L 4.7 4.1 3.5   CHLORIDE mmol/L 99 96* 102   CO2 mmol/L 23.9 28.0 28.9   BUN mg/dL 23 20 12   CREATININE mg/dL 0.73 0.71 0.58   GLUCOSE mg/dL 147* 128* 100*   CALCIUM mg/dL 8.8 9.1 9.0         Results from last 7 days   Lab Units 11/27/23 0418 11/26/23  0705 11/25/23  0618   WBC 10*3/mm3 15.25* 11.95* 5.81   HEMOGLOBIN g/dL 9.8* 11.4* 10.6*   HEMATOCRIT % 28.9* 34.0 31.2*   PLATELETS 10*3/mm3 142 138* 126*              Results from last 7 days   Lab Units 11/27/23  0418   MAGNESIUM mg/dL 2.1     Results from last 7 days   Lab Units 11/25/23  0618   TRIGLYCERIDES mg/dL 60       I reviewed the patient's new clinical results.  I personally viewed and interpreted the patient's EKG/Telemetry data        Medication Review:   acetaminophen, 1,000 mg, Oral, Q6H  ALPRAZolam, 0.5 mg, Oral, BID  amitriptyline, 5 mg, Oral, Nightly  enoxaparin, 30 mg, Subcutaneous, Q24H  Fat Emulsion Plant Based, 100 mL, Intravenous, Q24H (TPN)  [Held by provider] lisinopril, 30 mg, Oral, Daily  methocarbamol, 750 mg, Oral, 4x Daily  metoprolol succinate XL, 25 mg, Oral, Daily  pantoprazole, 40 mg, Intravenous, Q AM  PARoxetine, 40 mg, Oral, QAM  sodium chloride, 10 mL, Intravenous, Q12H  sodium chloride, 10 mL, Intravenous, Q12H  [Held by provider] zolpidem, 10 mg, Oral, Nightly        Adult Central 2-in-1 TPN, , Last Rate: 70 mL/hr at 11/26/23 1812  Pharmacy to Dose TPN,         Assessment & Plan       Colonic inertia    Severe malnutrition      S/p ex lag with subtotal colectomy, enterolysis, ileocolostomy.   VT, nonsustained. Normal echo.   Hypertension. BP controlled.   Anemia.     Overall stable cardiac status. No further VT. continue p.o. metoprolol.  We will see as needed.    Carol Antunez MD  11/27/23  09:20 EST

## 2023-11-27 NOTE — THERAPY TREATMENT NOTE
Patient Name: Martina Hardwick  : 1955    MRN: [id number]                              Today's Date: 2023       Admit Date: 2023    Visit Dx:     ICD-10-CM ICD-9-CM   1. 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    Colonic inertia    Severe malnutrition     Past Medical History:   Diagnosis Date    Arthritis     Autoantibody titer positive     Goodwin esophagus     Bloating     Cataract     BILAT    Chronic abdominal pain     Chronic constipation     Encounter for preventive health examination     Endometriosis     Gastritis     Gastrointestinal hypomotility     GERD (gastroesophageal reflux disease)     Headache, tension-type     Hypertension     Hyponatremia     Insomnia     Intestinal autonomic neuropathy     Irritable bowel syndrome     Migraine     Mixed anxiety and depressive disorder     Moderate malnutrition     Noninfectious gastroenteritis     OP (osteoporosis)     Osteopenia     Osteoporosis     PONV (postoperative nausea and vomiting) 2018    Psoriasis     KNEES, ELBOWS BILAT AND SCALP    Seasonal allergies     Visceral hyperalgesia      Past Surgical History:   Procedure Laterality Date    APPENDECTOMY      CHOLECYSTECTOMY N/A 2018    Procedure: CHOLECYSTECTOMY LAPAROSCOPIC converted to open procedure;  Surgeon: Hernan Hinds MD;  Location: [facility];  Service: General    COLON RESECTION N/A 2023    Procedure: OPEN SUBTOTAL COLECTOMY AND  ADESIOLYSIS;  Surgeon: Ranjeet Salinas MD;  Location: [facility] MAIN OR;  Service: General;  Laterality: N/A;    COLON SURGERY      STATES TOTAL OF 3 COLON SURGERY    COLONOSCOPY      COLONOSCOPY N/A 12/12/2018    Procedure: COLONOSCOPY;  Surgeon: Darien Ruff MD;  Location: [facility] OR;  Service: Gastroenterology    COLONOSCOPY N/A 10/13/2023    Procedure: COLONOSCOPY TO CECUM;  Surgeon: Ranjeet Salinas MD;  Location: [facility] ENDOSCOPY;  Service: General;  Laterality: N/A;  PREOP/ CHRONIC CONSTIPATION- POSTOP/ NORMAL    DIAGNOSTIC LAPAROSCOPY  1990    DILATATION AND CURETTAGE  1985    ENDOSCOPY N/A 05/13/2016    Procedure: ESOPHAGOGASTRODUODENOSCOPY ;  Surgeon: Darien Ruff MD;  Location: [facility] OR;  Service:     ENDOSCOPY N/A 05/01/2023    Procedure: ESOPHAGOGASTRODUODENOSCOPY WITH BIOPSY;  Surgeon: Darien Ruff MD;  Location: [facility] OR;  Service: Gastroenterology;  Laterality: N/A;  Mild Thrush; Gastritis; Esophagitis- thrush and reflux; Biopsies- duodenal, gastric, esophagus    HYSTERECTOMY      REVISION / TAKEDOWN COLOSTOMY        General Information       Row Name 11/27/23 1200          Physical Therapy Time and Intention    Document Type therapy note (daily note)  -PH     Mode of Treatment physical therapy  -       Row Name 11/27/23 1200          General Information    Existing Precautions/Restrictions fall  -       Row Name 11/27/23 1200          Cognition    Orientation Status (Cognition) oriented x 3  -       Row Name 11/27/23 1200          Safety Issues, Functional Mobility    Impairments Affecting Function (Mobility) endurance/activity tolerance;balance;strength  -     Comment, Safety Issues/Impairments (Mobility) gt belt and non skid socks donned  -PH               User Key  (r) = Recorded By, (t) = Taken By, (c) = Cosigned By      Initials Name Provider Type    PH Radha Rosen PTA Physical Therapist Assistant                    Mobility       Row Name 11/27/23 1200          Bed Mobility    Bed Mobility supine-sit;sit-supine  -PH     Supine-Sit [place] (Bed Mobility) standby assist  -PH     Sit-Supine [place] (Bed Mobility) supervision;verbal cues  -PH     Assistive Device (Bed Mobility) bed rails;head of bed elevated  -PH       Row Name 11/27/23 1200          Sit-Stand Transfer    Sit-Stand [place] (Transfers) contact guard;verbal cues  -PH     Assistive Device (Sit-Stand Transfers) walker, front-wheeled  -PH     Comment, (Sit-Stand Transfer) cues for b hand placement  -PH       Row Name 11/27/23 1200          Gait/Stairs (Locomotion)    [place] Level (Gait) contact guard;verbal cues;standby assist  -PH     Assistive Device (Gait) walker, front-wheeled  -PH     Distance in Feet (Gait) 200'  -PH     Deviations/Abnormal Patterns (Gait) stacy decreased;gait speed decreased;stride length decreased  -PH     Bilateral Gait Deviations forward flexed posture  -PH     [place] Level (Stairs) unable to assess  -PH     Comment, (Gait/Stairs) pt slow w/ much improved distance; bal improved w/ less assist today  -PH               User Key  (r) = Recorded By, (t) = Taken By, (c) = Cosigned By      Initials Name Provider Type    PH Radha Rosen PTA Physical Therapist Assistant                   Obj/Interventions       Row Name 11/27/23 1202          Motor Skills    Therapeutic Exercise other (see comments)  BAP, LAQ, seated march; x 5-10 reps  -PH       Row Name 11/27/23 1202          Balance    Balance Assessment sitting static balance;standing static balance  -PH     Static Sitting Balance standby assist  -PH     Static Standing Balance standby assist  -PH     Position/Device Used, Standing Balance walker, front-wheeled  -PH     Comment, Balance steadiness improved today during amb  -PH               User Key  (r) = Recorded By, (t) = Taken By, (c) = Cosigned By      Initials Name Provider Type    PH Silas  RITU Garcia Physical Therapist Assistant                   Goals/Plan    No documentation.                  Clinical Impression       Row Name 11/27/23 1203          Pain    Pretreatment Pain Rating 8/10  -PH     Posttreatment Pain Rating 7/10  -PH     Pain Intervention(s) Medication (See MAR);Repositioned;Ambulation/increased activity  -PH       Row Name 11/27/23 1203          Plan of Care Review    Plan of Care Reviewed With patient  -PH     Progress improving  -PH     Outcome Evaluation Pt was seen by PT this AM for tx. Pt was in bed and sat up to EOB w/ improved SBA. Pt then stood and amb much improved distance of approx 200'. Pt pace was slow and guarded although fairly steady w/ no overt LOB. Pt performed ther ex while sitting EOB then returned to bed w/ SV. Discussed w/ pt moving to and from chair and amb w/ staff to which pt verbalized agreement. Also, discussed amb w/ staff w/ RN who verbalized understanding. PT will prog as pt ramin.  -PH       Row Name 11/27/23 1203          Vital Signs    O2 Delivery Pre Treatment room air  -PH     O2 Delivery Intra Treatment room air  -PH     O2 Delivery Post Treatment room air  -PH       Row Name 11/27/23 1203          Positioning and Restraints    Pre-Treatment Position in bed  -PH     Post Treatment Position bed  declined chair  -PH               User Key  (r) = Recorded By, (t) = Taken By, (c) = Cosigned By      Initials Name Provider Type    PH Radha Rosen PTA Physical Therapist Assistant                   Outcome Measures       Row Name 11/27/23 1207          How much help from another person do you currently need...    Turning from your back to your side while in flat bed without using bedrails? 4  -PH     Moving from lying on back to sitting on the side of a flat bed without bedrails? 3  -PH     Moving to and from a bed to a chair (including a wheelchair)? 3  -PH     Standing up from a chair using your arms (e.g., wheelchair, bedside chair)? 3  -PH      Climbing 3-5 steps with a railing? 2  -PH     To walk in hospital room? 3  -PH     AM-PAC 6 Clicks Score (PT) 18  -PH     Highest Level of Mobility Goal 6 --> Walk 10 steps or more  -       Row Name 11/27/23 1207          Functional Assessment    Outcome Measure Options AM-PAC 6 Clicks Basic Mobility (PT)  -               User Key  (r) = Recorded By, (t) = Taken By, (c) = Cosigned By      Initials Name Provider Type     Radha Rosen PTA Physical Therapist Assistant                                 Physical Therapy Education       Title: PT OT SLP Therapies (Done)       Topic: Physical Therapy (Done)       Point: Mobility training (Done)       Learning Progress Summary             Patient Acceptance, E,TB,D, VU,NR by  at 11/27/2023 1207    Acceptance, E,TB,D, VU,NR by  at 11/24/2023 0908    Acceptance, E,D, VU,NR by MS at 11/22/2023 1519    Acceptance, E,D, VU,NR by MS at 11/21/2023 1125                         Point: Home exercise program (Done)       Learning Progress Summary             Patient Acceptance, E,TB,D, VU,NR by  at 11/27/2023 1207    Acceptance, E,TB,D, VU,NR by  at 11/24/2023 0908    Acceptance, E,D, VU,NR by MS at 11/22/2023 1519    Acceptance, E,D, VU,NR by MS at 11/21/2023 1125                         Point: Body mechanics (Done)       Learning Progress Summary             Patient Acceptance, E,TB,D, VU,NR by  at 11/27/2023 1207    Acceptance, E,TB,D, VU,NR by  at 11/24/2023 0908    Acceptance, E,D, VU,NR by MS at 11/22/2023 1519    Acceptance, E,D, VU,NR by MS at 11/21/2023 1125                         Point: Precautions (Done)       Learning Progress Summary             Patient Acceptance, E,TB,D, VU,NR by  at 11/27/2023 1207    Acceptance, E,TB,D, VU,NR by  at 11/24/2023 0908    Acceptance, E,D, VU,NR by MS at 11/22/2023 1519    Acceptance, E,D, VU,NR by MS at 11/21/2023 1125                                         User Key       Initials Effective Dates Name  Provider Type Discipline    MS 06/16/21 -  Miguel Beasley, PT Physical Therapist PT     06/16/21 -  Radha Rosen PTA Physical Therapist Assistant PT                  PT Recommendation and Plan     Plan of Care Reviewed With: patient  Progress: improving  Outcome Evaluation: Pt was seen by PT this AM for tx. Pt was in bed and sat up to EOB w/ improved SBA. Pt then stood and amb much improved distance of approx 200'. Pt pace was slow and guarded although fairly steady w/ no overt LOB. Pt performed ther ex while sitting EOB then returned to bed w/ SV. Discussed w/ pt moving to and from chair and amb w/ staff to which pt verbalized agreement. Also, discussed amb w/ staff w/ RN who verbalized understanding. PT will prog as pt ramin.     Time Calculation:         PT Charges       Row Name 11/27/23 1207             Time Calculation    Start Time 0948  -PH      Stop Time 1003  -PH      Time Calculation (min) 15 min  -PH      PT Received On 11/27/23  -PH      PT - Next Appointment 11/28/23  -PH         Timed Charges    16821 - PT Therapeutic Exercise Minutes 5  -PH      60213 - PT Therapeutic Activity Minutes 10  -PH         Total Minutes    Timed Charges Total Minutes 15  -PH       Total Minutes 15  -PH                User Key  (r) = Recorded By, (t) = Taken By, (c) = Cosigned By      Initials Name Provider Type    PH Radha Rosen PTA Physical Therapist Assistant                  Therapy Charges for Today       Code Description Service Date Service Provider Modifiers Qty    [id number]  PT THERAPEUTIC ACT EA 15 MIN 11/27/2023 Radha Rosen PTA GP 1            PT G-Codes  Outcome Measure Options: AM-PAC 6 Clicks Basic Mobility (PT)  AM-PAC 6 Clicks Score (PT): 18  PT Discharge Summary  Anticipated Discharge Disposition (PT): home with home health, home with assist, skilled nursing facility    Radha Rosen PTA  11/27/2023

## 2023-11-27 NOTE — PLAN OF CARE
Goal Outcome Evaluation:  Plan of Care Reviewed With: patient        Progress: improving  Outcome Evaluation: Pt was seen by PT this AM for tx. Pt was in bed and sat up to EOB w/ improved SBA. Pt then stood and amb much improved distance of approx 200'. Pt pace was slow and guarded although fairly steady w/ no overt LOB. Pt performed ther ex while sitting EOB then returned to bed w/ SV. Discussed w/ pt moving to and from chair and amb w/ staff to which pt verbalized agreement. Also, discussed amb w/ staff w/ RN who verbalized understanding. PT will prog as pt ramin.      Anticipated Discharge Disposition (PT): home with home health, home with assist, skilled nursing facility

## 2023-11-27 NOTE — PLAN OF CARE
Goal Outcome Evaluation:  Plan of Care Reviewed With: patient           Outcome Evaluation: Patient VSS; ST to SR on monitor, up to RR with loose stool/incontinence. Requesting IV pain medication early in shift for abdominal discomfort. No requests for nausea medications throughout shift at this time.

## 2023-11-27 NOTE — PROGRESS NOTES
"Nutrition Services    Patient Name:  Martina Hardwick  YOB: 1955  MRN: [id number]  Admit Date:  11/20/2023    Assessment Date:  11/27/23    Summary: TPN F/up     TPN Day # 3   Indication: Prolonged Paralytic Ileus  Route: central  Type: standard  Goal TPN Formula Recommendations: 70/1200/100 AA/Dex/Lipids  Current TPN Formula: 70g/1200kcal/100 AA/Dex/Lipids  Labs: Na 130, Glu 147 Procal 1.40   Last BM: 11/26    TPN follow up completed. Visited pt at bedside. She endorses having extreme nausea and having a hard time tolerating clear liquids. Tearful and stated she is at her \"breaking point\". Was agreeable to try Boost Breeze.     REC:  Continue same TPN regimen at goal  Boost Breeze peach TID for additional calories/protein and to promote adequate PO intake  Advance diet as tolerated and once medically appropriate per MD   Will continue to encourage and monitor PO/ONS intake     RD to follow per protocol.    CLINICAL NUTRITION ASSESSMENT      Reason for Assessment TPN F/up      Diagnosis/Problem Colonic inertia      Anthropometrics        Current Height  Current Weight  BMI kg/m2 Height: 157.5 cm (62\")  Weight: 46.3 kg (102 lb) (11/27/23 0834)  Body mass index is 18.66 kg/m².   Adjusted BMI (if applicable)    BMI Category Normal/Healthy (18.4 - 24.9)   Ideal Body Weight (IBW) 50.1 kg    Usual Body Weight (UBW)  lbs   Weight Trend Stable; pt endorses 26 lb wt loss x 3 yrs      Estimated Requirements         Weight used  46.6 kg     Calories  8239-6283 (30 kcal/kg, 35 kcal/kg)    Protein  56-70 (1.2 - 1.5 gm/kg)    Fluid  1 mL/kcal     Labs       Pertinent Labs    Results from last 7 days   Lab Units 11/27/23  0418 11/26/23  0721 11/25/23  0618   SODIUM mmol/L 130* 132* 138   POTASSIUM mmol/L 4.7 4.1 3.5   CHLORIDE mmol/L 99 96* 102   CO2 mmol/L 23.9 28.0 28.9   BUN mg/dL 23 20 12   CREATININE mg/dL 0.73 0.71 0.58   CALCIUM mg/dL 8.8 9.1 9.0   GLUCOSE mg/dL 147* 128* 100*     Results from last 7 days " "  Lab Units 11/27/23  0418 11/26/23  0721 11/26/23  0705 11/25/23  0618   MAGNESIUM mg/dL 2.1 1.8  --  1.8   PHOSPHORUS mg/dL 2.9 3.1  --  3.4   HEMOGLOBIN g/dL 9.8*  --    < > 10.6*   HEMATOCRIT % 28.9*  --    < > 31.2*   WBC 10*3/mm3 15.25*  --    < > 5.81   TRIGLYCERIDES mg/dL  --   --   --  60    < > = values in this interval not displayed.     Results from last 7 days   Lab Units 11/27/23  0418 11/26/23  0705 11/25/23  0618 11/24/23  0617 11/23/23  0545   PLATELETS 10*3/mm3 142 138* 126* 116* 165     No results found for: \"COVID19\"  Lab Results   Component Value Date    HGBA1C 5.3 06/20/2022          Medications           Scheduled Medications acetaminophen, 1,000 mg, Oral, Q6H  ALPRAZolam, 0.5 mg, Oral, BID  amitriptyline, 5 mg, Oral, Nightly  enoxaparin, 30 mg, Subcutaneous, Q24H  Fat Emulsion Plant Based, 100 mL, Intravenous, Q24H (TPN)  [Held by provider] lisinopril, 30 mg, Oral, Daily  methocarbamol, 750 mg, Oral, 4x Daily  metoprolol succinate XL, 25 mg, Oral, Daily  pantoprazole, 40 mg, Intravenous, Q AM  PARoxetine, 40 mg, Oral, QAM  sodium chloride, 10 mL, Intravenous, Q12H  sodium chloride, 10 mL, Intravenous, Q12H  [Held by provider] zolpidem, 10 mg, Oral, Nightly       Infusions Adult Central 2-in-1 TPN, , Last Rate: 70 mL/hr at 11/26/23 1812  Adult Central 2-in-1 TPN,   Pharmacy to Dose TPN,        PRN Medications   HYDROmorphone    nitroglycerin    ondansetron **OR** ondansetron    oxyCODONE **OR** oxyCODONE    Pharmacy to Dose TPN    phenol    sodium chloride    sodium chloride    sodium chloride    SUMAtriptan     Physical Findings          General Findings alert, oriented, room air   Oral/Mouth Cavity WDL   Edema  no edema   Gastrointestinal diarrhea, nausea, non-distended , normoactive, last bowel movement: 11/26   Skin  surgical incision: abdomen incision    Tubes/Drains/Lines central line/PICC    NFPE See Malnutrition Severity Assessment, Date Completed: 11/21     Malnutrition Severity " Assessment      Patient meets criteria for : Severe Malnutrition (Pt meets ASPEN/AND criteria for nutrition dx of severe malnutrition of acute disease related to energy intake, muscle wasting, and fat loss.)       Current Nutrition Orders & Evaluation of Intake       Oral Nutrition     Current PO Diet Adult Central 2-in-1 TPN  Diet: Liquid Diets; Clear Liquid; Fluid Consistency: Thin (IDDSI 0)  Adult Central 2-in-1 TPN   Supplement n/a   PO Evaluation     Trending % PO Intake 120 mL on 11/26    Factors Affecting Intake  abdominal pain, altered GI function, decreased appetite, nausea       Parenteral Nutrition      TPN Route PICC   TPN Rate (mL/hr) 70 mL/hr (1680 mL total)   Current TPN Order        Dextrose (kcal) 1200 kcal        Amino Acid (gm) 70 gm (280 kcal)        Lipid Concentration 20%       Lipid Volume/Frequency  100 mL   MVI Frequency  Per PharmD    Trace Element Frequency  Per PharmD   Total # Days on TPN 3   Propofol Rate/Kcal    TPN Current Provision          Calories 100% of needs met        Protein (gm) 100% of needs met        Fluid (mL) 1710 mL    TPN Needs Evaluation meeting needs for calories & protein   TPN Changes none     PES STATEMENT / NUTRITION DIAGNOSIS      Nutrition Dx Problem  Problem: Malnutrition (severe)  Etiology: Medical Diagnosis - Colonic inertia and Factors Affecting Nutrition - altered GI fxn, decreased appetite, abdominal pain, nausea     Signs/Symptoms: Clear Liquid Diet, NFPE Results, and Unintended Weight Change     NUTRITION INTERVENTION / PLAN OF CARE      Intervention Goal(s) Maintain nutrition status, Reduce/improve symptoms, Meet estimated needs, Disease management/therapy, Maintain TF/PN, Transition PN to PO, and Appropriate weight gain         RD Intervention/Action Await initiation/advancement of PO diet, Continue to monitor, and Care plan reviewed   --      Prescription/Orders:       PO Diet ADAT      Supplements Boost Breeze peach TID       Enteral Nutrition        Parenteral Nutrition    Parenteral Prescription:     TPN Route PICC   TPN Rate (mL/hr)    TPN Recommendation:        Dextrose (kcal) 1200       Amino Acid (gm) 70       Lipid Concentration 20%       Lipid Volume/Frequency  100 mL   Propofol Rate/Kcal    TPN Provision:  1680 kcal, 70 gm protein        Calories 100 % needs met        Protein  100 % needs met        Fluid  mL total      New Prescription Ordered? Yes   --      Monitor/Evaluation Per protocol, I&O, PO intake, Supplement intake, Pertinent labs, PN delivery/tolerance, Weight, Skin status, GI status, Symptoms   Discharge Plan/Needs Pending clinical course   --    RD to follow per protocol.    Electronically signed by:  Susanna Salas Dietitian Intern   11/27/23 12:23 EST

## 2023-11-28 LAB
ALBUMIN SERPL-MCNC: 2.2 G/DL (ref 3.5–5.2)
ALBUMIN/GLOB SERPL: 0.8 G/DL
ALP SERPL-CCNC: 126 U/L (ref 39–117)
ALT SERPL W P-5'-P-CCNC: 41 U/L (ref 1–33)
ANION GAP SERPL CALCULATED.3IONS-SCNC: 11 MMOL/L (ref 5–15)
AST SERPL-CCNC: 38 U/L (ref 1–32)
BILIRUB SERPL-MCNC: 0.4 MG/DL (ref 0–1.2)
BUN SERPL-MCNC: 17 MG/DL (ref 8–23)
BUN/CREAT SERPL: 27 (ref 7–25)
CALCIUM SPEC-SCNC: 9.2 MG/DL (ref 8.6–10.5)
CHLORIDE SERPL-SCNC: 99 MMOL/L (ref 98–107)
CO2 SERPL-SCNC: 21 MMOL/L (ref 22–29)
CREAT SERPL-MCNC: 0.63 MG/DL (ref 0.57–1)
DEPRECATED RDW RBC AUTO: 45.5 FL (ref 37–54)
EGFRCR SERPLBLD CKD-EPI 2021: 97.4 ML/MIN/1.73
ERYTHROCYTE [DISTWIDTH] IN BLOOD BY AUTOMATED COUNT: 14.2 % (ref 12.3–15.4)
GLOBULIN UR ELPH-MCNC: 2.8 GM/DL
GLUCOSE SERPL-MCNC: 213 MG/DL (ref 65–99)
HCT VFR BLD AUTO: 29.3 % (ref 34–46.6)
HGB BLD-MCNC: 10 G/DL (ref 12–15.9)
MAGNESIUM SERPL-MCNC: 1.9 MG/DL (ref 1.6–2.4)
MCH RBC QN AUTO: 30 PG (ref 26.6–33)
MCHC RBC AUTO-ENTMCNC: 34.1 G/DL (ref 31.5–35.7)
MCV RBC AUTO: 88 FL (ref 79–97)
PHOSPHATE SERPL-MCNC: 3.5 MG/DL (ref 2.5–4.5)
PLATELET # BLD AUTO: 245 10*3/MM3 (ref 140–450)
PMV BLD AUTO: 10.2 FL (ref 6–12)
POTASSIUM SERPL-SCNC: 5.2 MMOL/L (ref 3.5–5.2)
PROCALCITONIN SERPL-MCNC: 1.15 NG/ML (ref 0–0.25)
PROT SERPL-MCNC: 5 G/DL (ref 6–8.5)
RBC # BLD AUTO: 3.33 10*6/MM3 (ref 3.77–5.28)
SODIUM SERPL-SCNC: 131 MMOL/L (ref 136–145)
WBC NRBC COR # BLD AUTO: 22.95 10*3/MM3 (ref 3.4–10.8)

## 2023-11-28 PROCEDURE — 80053 COMPREHEN METABOLIC PANEL: CPT | Performed by: SURGERY

## 2023-11-28 PROCEDURE — 25010000002 MAGNESIUM SULFATE PER 500 MG OF MAGNESIUM: Performed by: SURGERY

## 2023-11-28 PROCEDURE — 25010000002 PIPERACILLIN SOD-TAZOBACTAM PER 1 G: Performed by: SURGERY

## 2023-11-28 PROCEDURE — 84100 ASSAY OF PHOSPHORUS: CPT | Performed by: SURGERY

## 2023-11-28 PROCEDURE — 25010000002 POTASSIUM CHLORIDE PER 2 MEQ OF POTASSIUM: Performed by: SURGERY

## 2023-11-28 PROCEDURE — 99024 POSTOP FOLLOW-UP VISIT: CPT | Performed by: SURGERY

## 2023-11-28 PROCEDURE — 97530 THERAPEUTIC ACTIVITIES: CPT | Performed by: PHYSICAL THERAPIST

## 2023-11-28 PROCEDURE — 25810000003 SODIUM CHLORIDE 0.9 % SOLUTION: Performed by: SURGERY

## 2023-11-28 PROCEDURE — 84145 PROCALCITONIN (PCT): CPT | Performed by: SURGERY

## 2023-11-28 PROCEDURE — 25010000002 ENOXAPARIN PER 10 MG: Performed by: SURGERY

## 2023-11-28 PROCEDURE — 97110 THERAPEUTIC EXERCISES: CPT | Performed by: PHYSICAL THERAPIST

## 2023-11-28 PROCEDURE — 25010000002 HYDROMORPHONE 1 MG/ML SOLUTION: Performed by: SURGERY

## 2023-11-28 PROCEDURE — 25010000002 HYDROMORPHONE PER 4 MG: Performed by: STUDENT IN AN ORGANIZED HEALTH CARE EDUCATION/TRAINING PROGRAM

## 2023-11-28 PROCEDURE — 85027 COMPLETE CBC AUTOMATED: CPT | Performed by: SURGERY

## 2023-11-28 PROCEDURE — 25010000002 ONDANSETRON PER 1 MG: Performed by: STUDENT IN AN ORGANIZED HEALTH CARE EDUCATION/TRAINING PROGRAM

## 2023-11-28 PROCEDURE — 25010000002 ACETAMINOPHEN 10 MG/ML SOLUTION: Performed by: SURGERY

## 2023-11-28 PROCEDURE — 25010000002 CALCIUM GLUCONATE PER 10 ML: Performed by: SURGERY

## 2023-11-28 PROCEDURE — 83735 ASSAY OF MAGNESIUM: CPT | Performed by: SURGERY

## 2023-11-28 PROCEDURE — 25010000002 HYDROMORPHONE HCL PF 50 MG/5ML SOLUTION: Performed by: SURGERY

## 2023-11-28 PROCEDURE — 25010000002 METHOCARBAMOL 1000 MG/10ML SOLUTION: Performed by: SURGERY

## 2023-11-28 RX ORDER — METHOCARBAMOL 100 MG/ML
500 INJECTION, SOLUTION INTRAMUSCULAR; INTRAVENOUS EVERY 6 HOURS
Status: DISCONTINUED | OUTPATIENT
Start: 2023-11-28 | End: 2023-12-03

## 2023-11-28 RX ORDER — SODIUM CHLORIDE 9 MG/ML
30 INJECTION, SOLUTION INTRAVENOUS CONTINUOUS PRN
Status: DISCONTINUED | OUTPATIENT
Start: 2023-11-28 | End: 2023-11-28

## 2023-11-28 RX ORDER — HYDROMORPHONE HCL/0.9% NACL/PF 10 MG/50ML
PATIENT CONTROLLED ANALGESIA SYRINGE INTRAVENOUS CONTINUOUS
Status: DISCONTINUED | OUTPATIENT
Start: 2023-11-28 | End: 2023-11-28

## 2023-11-28 RX ORDER — NALOXONE HCL 0.4 MG/ML
0.1 VIAL (ML) INJECTION
Status: DISCONTINUED | OUTPATIENT
Start: 2023-11-28 | End: 2023-11-28

## 2023-11-28 RX ORDER — ACETAMINOPHEN 10 MG/ML
1000 INJECTION, SOLUTION INTRAVENOUS EVERY 6 HOURS
Status: DISCONTINUED | OUTPATIENT
Start: 2023-11-28 | End: 2023-12-05

## 2023-11-28 RX ADMIN — METHOCARBAMOL 500 MG: 100 INJECTION INTRAMUSCULAR; INTRAVENOUS at 21:20

## 2023-11-28 RX ADMIN — PIPERACILLIN SODIUM AND TAZOBACTAM SODIUM 3.38 G: 3; .375 INJECTION, SOLUTION INTRAVENOUS at 12:24

## 2023-11-28 RX ADMIN — PIPERACILLIN SODIUM AND TAZOBACTAM SODIUM 3.38 G: 3; .375 INJECTION, SOLUTION INTRAVENOUS at 21:21

## 2023-11-28 RX ADMIN — ACETAMINOPHEN 1000 MG: 10 INJECTION, SOLUTION INTRAVENOUS at 15:29

## 2023-11-28 RX ADMIN — HYDROMORPHONE HYDROCHLORIDE: 10 INJECTION, SOLUTION INTRAMUSCULAR; INTRAVENOUS; SUBCUTANEOUS at 03:52

## 2023-11-28 RX ADMIN — METOPROLOL TARTRATE 5 MG: 1 INJECTION, SOLUTION INTRAVENOUS at 14:39

## 2023-11-28 RX ADMIN — ENOXAPARIN SODIUM 30 MG: 100 INJECTION SUBCUTANEOUS at 12:25

## 2023-11-28 RX ADMIN — METOPROLOL TARTRATE 5 MG: 1 INJECTION, SOLUTION INTRAVENOUS at 03:12

## 2023-11-28 RX ADMIN — METHOCARBAMOL 500 MG: 100 INJECTION INTRAMUSCULAR; INTRAVENOUS at 14:38

## 2023-11-28 RX ADMIN — HYDROMORPHONE HYDROCHLORIDE 1 MG: 1 INJECTION, SOLUTION INTRAMUSCULAR; INTRAVENOUS; SUBCUTANEOUS at 21:20

## 2023-11-28 RX ADMIN — METOPROLOL TARTRATE 5 MG: 1 INJECTION, SOLUTION INTRAVENOUS at 09:23

## 2023-11-28 RX ADMIN — PIPERACILLIN SODIUM AND TAZOBACTAM SODIUM 3.38 G: 3; .375 INJECTION, SOLUTION INTRAVENOUS at 06:21

## 2023-11-28 RX ADMIN — ONDANSETRON 4 MG: 2 INJECTION INTRAMUSCULAR; INTRAVENOUS at 00:28

## 2023-11-28 RX ADMIN — PANTOPRAZOLE SODIUM 40 MG: 40 INJECTION, POWDER, FOR SOLUTION INTRAVENOUS at 06:21

## 2023-11-28 RX ADMIN — Medication 10 ML: at 21:20

## 2023-11-28 RX ADMIN — Medication 10 ML: at 09:02

## 2023-11-28 RX ADMIN — ACETAMINOPHEN 1000 MG: 10 INJECTION, SOLUTION INTRAVENOUS at 04:13

## 2023-11-28 RX ADMIN — METOPROLOL TARTRATE 5 MG: 1 INJECTION, SOLUTION INTRAVENOUS at 21:20

## 2023-11-28 RX ADMIN — METOPROLOL TARTRATE 5 MG: 1 INJECTION, SOLUTION INTRAVENOUS at 09:22

## 2023-11-28 RX ADMIN — SODIUM PHOSPHATE, MONOBASIC, MONOHYDRATE AND SODIUM PHOSPHATE, DIBASIC, ANHYDROUS: 142; 276 INJECTION, SOLUTION INTRAVENOUS at 18:50

## 2023-11-28 RX ADMIN — ACETAMINOPHEN 1000 MG: 10 INJECTION, SOLUTION INTRAVENOUS at 21:20

## 2023-11-28 RX ADMIN — SMOFLIPID 100 ML: 6; 6; 5; 3 INJECTION, EMULSION INTRAVENOUS at 18:49

## 2023-11-28 RX ADMIN — ACETAMINOPHEN 1000 MG: 10 INJECTION, SOLUTION INTRAVENOUS at 09:02

## 2023-11-28 RX ADMIN — Medication 10 ML: at 09:03

## 2023-11-28 RX ADMIN — HYDROMORPHONE HYDROCHLORIDE 0.5 MG: 1 INJECTION, SOLUTION INTRAMUSCULAR; INTRAVENOUS; SUBCUTANEOUS at 00:28

## 2023-11-28 RX ADMIN — Medication 10 ML: at 21:22

## 2023-11-28 NOTE — ANESTHESIA PROCEDURE NOTES
Peripheral Block      Patient reassessed immediately prior to procedure    Patient location during procedure: OR  Reason for block: at surgeon's request  Performed by  Anesthesiologist: Yair Nixon MD  Preanesthetic Checklist  Completed: patient identified, IV checked, site marked, risks and benefits discussed, surgical consent, monitors and equipment checked, pre-op evaluation and timeout performed  Prep:  Pt Position: supine  Sterile barriers:cap, gloves and sterile barriers  Prep: ChloraPrep  Patient monitoring: blood pressure monitoring, continuous pulse oximetry and EKG  Procedure  Performed under: general  Guidance:ultrasound guided    ULTRASOUND INTERPRETATION.  Using ultrasound guidance a 21 G gauge needle was placed in close proximity to the nerve, at which point, under ultrasound guidance anesthetic was injected in the area of the nerve and spread of the anesthesia was seen on ultrasound in close proximity thereto.  There were no abnormalities seen on ultrasound; a digital image was taken; and the patient tolerated the procedure with no complications. Images:still images not obtained    Laterality:Bilateral  Block Type:rectus sheath  Injection Technique:single-shot    Medications Used: bupivacaine liposome (EXPAREL) 1.3 % injection - Infiltration   20 mL - 11/27/2023 8:26:00 PM  bupivacaine PF (MARCAINE) 0.25 % injection - Injection   10 mL - 11/27/2023 8:26:00 PM      Post Assessment  Injection Assessment: negative aspiration for heme and incremental injection  Patient Tolerance:comfortable throughout block  Complications:no

## 2023-11-28 NOTE — PROGRESS NOTES
[facility] Clinical Pharmacy Services: TPN Daily Progress Note    TPN Day # 5   Indication: Prolonged Paralytic Ileus  Route: central  Type: standard    Subjective/Objective  Results from last 7 days   Lab Units 23  0651 23  0721 23  0618   SODIUM mmol/L 131*   < > 138   POTASSIUM mmol/L 5.2   < > 3.5   CHLORIDE mmol/L 99   < > 102   CO2 mmol/L 21.0*   < > 28.9   BUN mg/dL 17   < > 12   CREATININE mg/dL 0.63   < > 0.58   CALCIUM mg/dL 9.2   < > 9.0   ALBUMIN g/dL 2.2*  --   --    BILIRUBIN mg/dL 0.4  --   --    ALK PHOS U/L 126*  --   --    ALT (SGPT) U/L 41*  --   --    AST (SGOT) U/L 38*  --   --    GLUCOSE mg/dL 213*   < > 100*   MAGNESIUM mg/dL 1.9   < > 1.8   PHOSPHORUS mg/dL 3.5   < > 3.4   TRIGLYCERIDES mg/dL  --   --  60    < > = values in this interval not displayed.      Dietary Orders (From admission, onward)       Start     Ordered    23 1416  NPO Diet NPO Type: Strict NPO  Diet Effective Now        Question:  NPO Type  Answer:  Strict NPO    23 1415                      Additional insulin administration while previous TPN infusin units  Additional electrolyte administration while previous TPN infusing: none  Acid suppression: Protonix injection    Goal TPN Formula Recommendations: /100 AA/Dex/Lipids  Current TPN Formula: 70g/1200kcal/100 AA/Dex/Lipids    Assessment/Plan    Macronutrients: will continue goal formula of 70g/1200kcal/100 AA/Dex/lipids today  Sodium, chloride, and bicarbonate all remain low, suspect from lower GI fluid loss/enterotomy. Potassium and phosphorus trending up, will decrease these two electrolytes. Based on the above labs, will add the following electrolytes/additives to the TPN.    Sodium Chloride: 0 mEq   Sodium Acetate:  90 mEq   Sodium Phosphate: 20 mEq   Potassium Chloride: 40 mEq   Potassium Acetate: 0 mEq   Potassium Phosphate: 0 mEq   Calcium Gluconate: 9 mEq   Magnesium Sulfate: 12 mEq   MVI for TPN   Trace Elements               Other Additives: None    Labs: CMP, mag, phos  LFTs increasing, will change Intralipid to SMOFlipid formulation    Niko Reardon [facility]  Clinical Pharmacist

## 2023-11-28 NOTE — ANESTHESIA PROCEDURE NOTES
Airway  Urgency: elective    Date/Time: 11/27/2023 8:21 PM  Airway not difficult    General Information and Staff    Patient location during procedure: OR  Anesthesiologist: Yair Nixon MD    Indications and Patient Condition  Indications for airway management: airway protection    Preoxygenated: yes  Mask difficulty assessment: 1 - vent by mask    Final Airway Details  Final airway type: endotracheal airway      Successful airway: ETT  Cuffed: yes   Successful intubation technique: direct laryngoscopy  Facilitating devices/methods: intubating stylet and anterior pressure/BURP  Endotracheal tube insertion site: oral  Blade: Katie  Blade size: 3  ETT size (mm): 7.0  Cormack-Lehane Classification: grade IIa - partial view of glottis  Placement verified by: chest auscultation   Cuff volume (mL): 6  Measured from: teeth  ETT/EBT  to teeth (cm): 20  Number of attempts at approach: 1  Assessment: lips, teeth, and gum same as pre-op and atraumatic intubation

## 2023-11-28 NOTE — THERAPY RE-EVALUATION
Patient Name: Martina Hardwick  : 1955    MRN: [id number]                              Today's Date: 2023       Admit Date: 2023    Visit Dx:     ICD-10-CM ICD-9-CM   1. 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    Colonic inertia    Severe malnutrition     Past Medical History:   Diagnosis Date    Arthritis     Autoantibody titer positive     Goodwin esophagus     Bloating     Cataract     BILAT    Chronic abdominal pain     Chronic constipation     Encounter for preventive health examination     Endometriosis     Gastritis     Gastrointestinal hypomotility     GERD (gastroesophageal reflux disease)     Headache, tension-type     Hypertension     Hyponatremia     Insomnia     Intestinal autonomic neuropathy     Irritable bowel syndrome     Migraine     Mixed anxiety and depressive disorder     Moderate malnutrition     Noninfectious gastroenteritis     OP (osteoporosis)     Osteopenia     Osteoporosis     PONV (postoperative nausea and vomiting) 2018    Psoriasis     KNEES, ELBOWS BILAT AND SCALP    Seasonal allergies     Visceral hyperalgesia      Past Surgical History:   Procedure Laterality Date    APPENDECTOMY      CHOLECYSTECTOMY N/A 2018    Procedure: CHOLECYSTECTOMY LAPAROSCOPIC converted to open procedure;  Surgeon: Hernan Hinds MD;  Location: [facility];  Service: General    COLON RESECTION N/A 2023    Procedure: OPEN SUBTOTAL COLECTOMY AND  ADESIOLYSIS;  Surgeon: Ranjeet Salinas MD;  Location: [facility] MAIN OR;  Service: General;  Laterality: N/A;    COLON SURGERY      STATES TOTAL OF 3 COLON SURGERY    COLONOSCOPY      COLONOSCOPY N/A 12/12/2018    Procedure: COLONOSCOPY;  Surgeon: Darien Rfuf MD;  Location: [facility] OR;  Service: Gastroenterology    COLONOSCOPY N/A 10/13/2023    Procedure: COLONOSCOPY TO CECUM;  Surgeon: Ranjeet Salinas MD;  Location: [facility] ENDOSCOPY;  Service: General;  Laterality: N/A;  PREOP/ CHRONIC CONSTIPATION- POSTOP/ NORMAL    DIAGNOSTIC LAPAROSCOPY  1990    DILATATION AND CURETTAGE  1985    ENDOSCOPY N/A 05/13/2016    Procedure: ESOPHAGOGASTRODUODENOSCOPY ;  Surgeon: Darien Ruff MD;  Location: [facility] OR;  Service:     ENDOSCOPY N/A 05/01/2023    Procedure: ESOPHAGOGASTRODUODENOSCOPY WITH BIOPSY;  Surgeon: Darien Ruff MD;  Location: [facility] OR;  Service: Gastroenterology;  Laterality: N/A;  Mild Thrush; Gastritis; Esophagitis- thrush and reflux; Biopsies- duodenal, gastric, esophagus    EXPLORATORY LAPAROTOMY N/A 11/27/2023    Procedure: exploratory laparotomy, small bowel resection;  Surgeon: Ranjeet Salinas MD;  Location: [facility] MAIN OR;  Service: General;  Laterality: N/A;    HYSTERECTOMY      REVISION / TAKEDOWN COLOSTOMY        General Information       Row Name 11/28/23 2528          Physical Therapy Time and Intention    Document Type re-evaluation  -     Mode of Treatment physical therapy  -       Row Name 11/28/23 6513          General Information    Patient Profile Reviewed yes  -     Existing Precautions/Restrictions fall  -               User Key  (r) = Recorded By, (t) = Taken By, (c) = Cosigned By      Initials Name Provider Type    Yaneth Zamora, PT Physical Therapist                   Mobility       Row Name 11/28/23 2592          Bed Mobility    Supine-Sit [place] (Bed Mobility) standby assist  -     Assistive  Device (Bed Mobility) bed rails;head of bed elevated  -       Row Name 11/28/23 1649          Bed-Chair Transfer    Bed-Chair [place] (Transfers) minimum assist (75% patient effort)  -     Assistive Device (Bed-Chair Transfers) walker, front-wheeled  -       Row Name 11/28/23 1649          Sit-Stand Transfer    Sit-Stand [place] (Transfers) minimum assist (75% patient effort)  -     Assistive Device (Sit-Stand Transfers) walker, front-wheeled  -       Row Name 11/28/23 1649          Gait/Stairs (Locomotion)    [place] Level (Gait) contact guard;minimum assist (75% patient effort)  -     Assistive Device (Gait) walker, front-wheeled  -     Distance in Feet (Gait) 5 ft forward/backward x 4  with 1 seated rest break.  -     Deviations/Abnormal Patterns (Gait) stacy decreased;gait speed decreased;stride length decreased  -     Bilateral Gait Deviations forward flexed posture  -     Comment, (Gait/Stairs) CGA forward/ min A backwards, guarded posture, likely secondary to NG to wall suction  -               User Key  (r) = Recorded By, (t) = Taken By, (c) = Cosigned By      Initials Name Provider Type    Yaneth Zamora, PT Physical Therapist                   Obj/Interventions       Row Name 11/28/23 1652          Range of Motion Comprehensive    Comment, General Range of Motion WFL  -       Row Name 11/28/23 1652          Strength Comprehensive (MMT)    Comment, General Manual Muscle Testing (MMT) Assessment generalized weakness but functionally at least 3/5  -       Row Name 11/28/23 1652          Motor Skills    Therapeutic Exercise --  BLE AP, heel raises, LAQ, seated marching x 10 reps  -               User Key  (r) = Recorded By, (t) = Taken By, (c) = Cosigned By      Initials Name Provider Type    Yaneth Zamora, PT Physical Therapist                   Goals/Plan       Row Name 11/28/23 1702          Bed Mobility Goal 1 (PT)    Activity/Assistive  Device (Bed Mobility Goal 1, PT) bed mobility activities, all  -     [place] Level/Cues Needed (Bed Mobility Goal 1, PT) independent  -     Time Frame (Bed Mobility Goal 1, PT) long term goal (LTG);10 days  -       Row Name 11/28/23 1702          Transfer Goal 1 (PT)    Activity/Assistive Device (Transfer Goal 1, PT) transfers, all  -     [place] Level/Cues Needed (Transfer Goal 1, PT) standby assist  -     Time Frame (Transfer Goal 1, PT) long term goal (LTG);1 week  -       Row Name 11/28/23 1702          Gait Training Goal 1 (PT)    Activity/Assistive Device (Gait Training Goal 1, PT) gait (walking locomotion)  -     [place] Level (Gait Training Goal 1, PT) standby assist  -     Distance (Gait Training Goal 1, PT) 100 ft  -     Time Frame (Gait Training Goal 1, PT) 10 days  -       Row Name 11/28/23 1702          Therapy Assessment/Plan (PT)    Planned Therapy Interventions (PT) balance training;bed mobility training;gait training;home exercise program;stair training;transfer training;strengthening;patient/family education  -               User Key  (r) = Recorded By, (t) = Taken By, (c) = Cosigned By      Initials Name Provider Type     Yaneth Marti, PT Physical Therapist                   Clinical Impression       Row Name 11/28/23 3182          Pain    Pretreatment Pain Rating 6/10  -     Posttreatment Pain Rating 6/10  -     Pain Location - abdomen  -     Pain Intervention(s) Repositioned;Rest  -       Row Name 11/28/23 1782          Plan of Care Review    Plan of Care Reviewed With patient  -     Outcome Evaluation Pt is now s/p abdominal surgery for bowel obstruction. She has an NG tube to wall suction that RN said must remain to suction. Pt was agreeable to therapy despite pain. She pivoted to chair then stood and ambualted 5 ft forward/backwards twice. Pt required a seated rest, but was then ableto stand and ambulated forward/backward x 5 ft two  more times. Pt tolerated BLE exercises well. Activity was limited by wall suction to NG tube. Pt would benefit from continued PT to address strengthening, endurance and gait training.  -TAY       Row Name 11/28/23 1654          Positioning and Restraints    Post Treatment Position chair  -TAY     In Chair reclined;call light within reach;encouraged to call for assist;exit alarm on;notified nsg  -TAY               User Key  (r) = Recorded By, (t) = Taken By, (c) = Cosigned By      Initials Name Provider Type    Yaneth Zamora, PT Physical Therapist                   Outcome Measures       Row Name 11/28/23 1703 11/28/23 1449       How much help from another person do you currently need...    Turning from your back to your side while in flat bed without using bedrails? 3  -KH 3  -TC    Moving from lying on back to sitting on the side of a flat bed without bedrails? 3  -KH 3  -TC    Moving to and from a bed to a chair (including a wheelchair)? 3  -TAY 3  -TC    Standing up from a chair using your arms (e.g., wheelchair, bedside chair)? 3  -TAY 3  -TC    Climbing 3-5 steps with a railing? 2  -KH 2  -TC    To walk in hospital room? 3  -KH 3  -TC    AM-PAC 6 Clicks Score (PT) 17  -KH 17  -TC    Highest Level of Mobility Goal 5 --> Static standing  -KH 5 --> Static standing  -TC      Row Name 11/28/23 1703          Functional Assessment    Outcome Measure Options AM-PAC 6 Clicks Basic Mobility (PT)  -TAY               User Key  (r) = Recorded By, (t) = Taken By, (c) = Cosigned By      Initials Name Provider Type    Yaneth Zamora, PT Physical Therapist    Rebecca Bolden, RN Registered Nurse                                 Physical Therapy Education       Title: PT OT SLP Therapies (Done)       Topic: Physical Therapy (Done)       Point: Mobility training (Done)       Learning Progress Summary             Patient Acceptance, E,TB,D, VU,NR by  at 11/27/2023 1207    Acceptance, E,TB,D, VU,NR by PH at  11/24/2023 0908    Acceptance, E,D, VU,NR by MS at 11/22/2023 1519    Acceptance, E,D, VU,NR by MS at 11/21/2023 1125                         Point: Home exercise program (Done)       Learning Progress Summary             Patient Acceptance, E,TB,D, VU,NR by  at 11/27/2023 1207    Acceptance, E,TB,D, VU,NR by PH at 11/24/2023 0908    Acceptance, E,D, VU,NR by MS at 11/22/2023 1519    Acceptance, E,D, VU,NR by MS at 11/21/2023 1125                         Point: Body mechanics (Done)       Learning Progress Summary             Patient Acceptance, E,TB,D, VU,NR by  at 11/27/2023 1207    Acceptance, E,TB,D, VU,NR by PH at 11/24/2023 0908    Acceptance, E,D, VU,NR by MS at 11/22/2023 1519    Acceptance, E,D, VU,NR by MS at 11/21/2023 1125                         Point: Precautions (Done)       Learning Progress Summary             Patient Acceptance, E,TB,D, VU,NR by  at 11/27/2023 1207    Acceptance, E,TB,D, VU,NR by  at 11/24/2023 0908    Acceptance, E,D, VU,NR by MS at 11/22/2023 1519    Acceptance, E,D, VU,NR by MS at 11/21/2023 1125                                         User Key       Initials Effective Dates Name Provider Type Discipline    MS 06/16/21 -  Miguel Beasley PT Physical Therapist PT     06/16/21 -  Radha Rosen PTA Physical Therapist Assistant PT                  PT Recommendation and Plan  Planned Therapy Interventions (PT): balance training, bed mobility training, gait training, home exercise program, stair training, transfer training, strengthening, patient/family education  Plan of Care Reviewed With: patient  Outcome Evaluation: Pt is now s/p abdominal surgery for bowel obstruction. She has an NG tube to wall suction that RN said must remain to suction. Pt was agreeable to therapy despite pain. She pivoted to chair then stood and ambualted 5 ft forward/backwards twice. Pt required a seated rest, but was then ableto stand and ambulated forward/backward x 5 ft two more  times. Pt tolerated BLE exercises well. Activity was limited by wall suction to NG tube. Pt would benefit from continued PT to address strengthening, endurance and gait training.     Time Calculation:         PT Charges       Row Name 11/28/23 1703             Time Calculation    Start Time 1526  -KH      Stop Time 1554  -KH      Time Calculation (min) 28 min  -KH      PT Received On 11/28/23  -      PT - Next Appointment 11/29/23  -      PT Goal Re-Cert Due Date 12/08/23  -         Time Calculation- PT    Total Timed Code Minutes- PT 28 minute(s)  -         Timed Charges    82601 - PT Therapeutic Exercise Minutes 10  -KH      12745 - PT Therapeutic Activity Minutes 18  -KH         Total Minutes    Timed Charges Total Minutes 28  -KH       Total Minutes 28  -KH                User Key  (r) = Recorded By, (t) = Taken By, (c) = Cosigned By      Initials Name Provider Type    Yaneth Zamora, PT Physical Therapist                  Therapy Charges for Today       Code Description Service Date Service Provider Modifiers Qty    00586063429 HC PT THER PROC EA 15 MIN 11/28/2023 Yaneth Marti, PT GP 1    70396537507 HC PT THERAPEUTIC ACT EA 15 MIN 11/28/2023 Yaneth Marti, PT GP 1            PT G-Codes  Outcome Measure Options: AM-PAC 6 Clicks Basic Mobility (PT)  AM-PAC 6 Clicks Score (PT): 17  PT Discharge Summary  Anticipated Discharge Disposition (PT): home with home health, home with assist, skilled nursing facility    Yaneth Marti, PT  11/28/2023

## 2023-11-28 NOTE — PLAN OF CARE
Problem: Adult Inpatient Plan of Care  Goal: Plan of Care Review  Outcome: Ongoing, Progressing  Goal: Patient-Specific Goal (Individualized)  Outcome: Ongoing, Progressing  Goal: Absence of Hospital-Acquired Illness or Injury  Outcome: Ongoing, Progressing  Intervention: Prevent and Manage VTE (Venous Thromboembolism) Risk  Recent Flowsheet Documentation  Taken 11/28/2023 0200 by Skylar Ch RN  VTE Prevention/Management:   bilateral   sequential compression devices on  Goal: Optimal Comfort and Wellbeing  Outcome: Ongoing, Progressing  Intervention: Monitor Pain and Promote Comfort  Recent Flowsheet Documentation  Taken 11/28/2023 0200 by Skyalr Ch RN  Pain Management Interventions:   see MAR   position adjusted   pillow support provided   care clustered  Intervention: Provide Person-Centered Care  Recent Flowsheet Documentation  Taken 11/28/2023 0200 by Skylar Ch RN  Trust Relationship/Rapport:   care explained   choices provided   emotional support provided   empathic listening provided   questions answered  Goal: Readiness for Transition of Care  Outcome: Ongoing, Progressing     Problem: Pain Acute  Goal: Acceptable Pain Control and Functional Ability  Outcome: Ongoing, Progressing  Intervention: Prevent or Manage Pain  Recent Flowsheet Documentation  Taken 11/28/2023 0200 by Skylar Ch RN  Bowel Elimination Promotion:   ambulation promoted   adequate fluid intake promoted  Sleep/Rest Enhancement:   awakenings minimized   regular sleep/rest pattern promoted  Intervention: Develop Pain Management Plan  Recent Flowsheet Documentation  Taken 11/28/2023 0200 by Skylar Ch RN  Pain Management Interventions:   see MAR   position adjusted   pillow support provided   care clustered  Intervention: Optimize Psychosocial Wellbeing  Recent Flowsheet Documentation  Taken 11/28/2023 0200 by Skylar Ch RN  Supportive Measures: active listening utilized  Diversional Activities:    television   smartphone     Problem: Fall Injury Risk  Goal: Absence of Fall and Fall-Related Injury  Outcome: Ongoing, Progressing     Problem: Skin Injury Risk Increased  Goal: Skin Health and Integrity  Outcome: Ongoing, Progressing     Problem: Parenteral Nutrition  Goal: Effective Intravenous Nutrition Therapy Delivery  Outcome: Ongoing, Progressing   Goal Outcome Evaluation:

## 2023-11-28 NOTE — PROGRESS NOTES
Colorectal & General Surgery  Progress Note    Patient: Martina Hardwick  YOB: 1955  MRN: [id number]      Assessment  Martina Hardwick is a 67 y.o. female with history of multiple abdominal operations and colonic inertia who is now postoperative day 8 from exploratory laparotomy, intra lysis, subtotal colectomy complicated by leak from small bowel enterotomy now postoperative day 1 from reopening of recent laparotomy with small bowel resection.  Overall, I am very pleased today.  Drains are serosanguineous.  Incision is dry without any bilious drainage.  Labs are reassuring.  Pain control is her biggest issue.  I have discussed with Ms. Hardwick's nurse Rebecca, and if PCA is not a good option for her, we will switch to as needed IV medications.  I will add intravenous Robaxin and continue intravenous Tylenol for now.    Plan  Continue n.p.o., okay for a very small amount of ice chips  Continue nasogastric tube to continuous low wall suction  Continued both drains  Will change packing in midline tomorrow  Plan for 4 days of Zosyn  Adding intravenous Robaxin  Continue intravenous Tylenol until oral intake acceptable  Will continue PCA for now but may need to switch to as needed IV medications.  Will continue intravenous metoprolol for now until oral intake acceptable    Subjective  Postoperative day 8 and 1.  Pain is biggest complaint.  Does not completely understand how to use the PCA at this point, but we will continue to work with her.  No flatus or bowel movements.  Nasogastric tube bilious.  Drain serosanguineous and low-volume.    Objective    Vitals:    11/28/23 0905   BP:    Pulse: 100   Resp:    Temp:    SpO2: 97%       Physical Exam  Constitutional: Appears tired and uncomfortable neck: Supple, trachea midline  Respiratory: No increased work of breathing, Symmetric excursion  Cardiovascular: Well pefursed, no jugular venous distention evident   Abdominal: Soft, distended, tender, drain serosanguineous,  midline incision without significant drainage.    Skin: Warm, dry, no rash on visualized skin surfaces  Psychiatric: Alert and oriented ×3, normal affect     Laboratory Results  I have personally reviewed CBC with WBC 22, hemoglobin 10, platelets 245.  Procalcitonin 1.15.  CMP with creatinine 0.63, potassium 5.2, chloride 99, bicarb 21, magnesium 1.9, phosphorus 3.5, alkaline phosphatase 126, albumin 2.2, AST 38, ALT 41, total bilirubin 0.4.    Radiology  I have personally reviewed Gastrografin enema demonstrates patent ileocolic anastomosis without leak.         Osvaldo Salinas MD  Colorectal & General Surgery  [facility]    [address], [address]  [place], [place], [address]  P: [phone]  F: [phone]

## 2023-11-28 NOTE — OP NOTE
Colorectal & General Surgery  Operative Report    Patient: Martina Hardwick  YOB: 1955  MRN: [id number]  DATE OF PROCEDURE: 11/27/23     PREOPERATIVE DIAGNOSIS:  Colonic inertia  History of multiple abdominal operations  Status post recent exploratory laparotomy, enterolysis, subtotal colectomy  Concern for intra-abdominal sepsis secondary to small bowel enterotomy    POSTOPERATIVE DIAGNOSIS:  Same    PROCEDURE:  Reopening of recent laparotomy  Enterectomy with enteroenterostomy    FINDINGS:  Significant adhesive disease is already developed within the abdomen with significant interloop adhesions.  Portion of distal small bowel with multiple prior enterotomies that were repaired was adherent to the retroperitoneum.  An abscess cavity was unroofed that demonstrated 1 previously repaired enterotomy with approximately 1 cm defect in the enterotomy repair.  Approximately 10 cm abscess cavity in the retroperitoneum.  Remainder of bowel appeared viable to the point of our visualization, but I did not feel it was necessary to lyse every adhesion and inspect every inch of the small bowel due to the hostility of the abdomen.  Right lower quadrant drain in the right retroperitoneum terminating over the duodenum  Left lower quadrant drain in the pelvis terminating in the right lower quadrant    SURGEON:  Osvaldo Salinas MD    ASSISTANT:  Breann Marie MD  was responsible for performing the following activities: Retraction, Suction, Irrigation, Suturing, Closing, and Placing Dressing and their skilled assistance was necessary for the success of this case.     ANESTHESIA:  General endotracheal    EBL:  25 mL    SPECIMEN:  Small bowel    OPERATIVE DESCRIPTION:  The patient was brought to the operating room under the care of the nursing staff.  The patient was placed on the operating room table in the supine position where anesthesia was induced.  The patient was then prepped and positioned in the usual sterile  fashion.  A standardized timeout was then performed.    Prior midline laparotomy was reopened.  There was significant adhesive disease already present in the abdomen with significant interloop adhesions between the loops of small bowel.  These loops of small bowel were bluntly dissected free from 1 another.  As we worked in the right side of the abdomen, there was an obvious abscess cavity between the loops of small bowel and the retroperitoneum.  The area of concern was dissected free from the surrounding abscess cavity and we identified an approximately 1 cm defect in one of the enterotomy closure is in the mid small bowel.  There was 1 other area of questionable integrity of the small bowel approximately 10 cm distal to the obviously open enterotomy.  The remainder of the small bowel appeared relatively healthy.  The abscess cavity was completely drained.  We then divided the small bowel just proximal and just distal to these areas of concern.  The mesentery was divided with a LigaSure device.  The bowel appeared viable and a tension-free end-to-end anastomosis was fashioned with interrupted Vicryl sutures.  The mesenteric defect was closed with Vicryl suture.  The abdomen was copiously irrigated.  Nasogastric tube was confirmed within the stomach.  Left lower quadrant drain was placed into the pelvis terminating in the right lower quadrant.  Right lower quadrant drain was placed in the right retroperitoneum terminating near the duodenum.  The abscess cavity was adjacent to the duodenum, but the duodenum did appear viable.  The fascia was reapproximated with figure-of-eight 0 PDS sutures.  The skin was stapled with 2 areas left open for Telfa wick.  [place] dressing was placed.    All needle, sponge, and instrument counts were correct at the end of the case.    The patient tolerated the procedure well and was transferred to the postanesthesia care unit in stable condition.    Osvaldo Salinas M.D.  Colorectal &  General Surgery  [facility]    [address], [address]  [place], [place], [address]  P: [phone]  F: [phone]

## 2023-11-28 NOTE — PLAN OF CARE
Goal Outcome Evaluation:  Plan of Care Reviewed With: patient           Outcome Evaluation: Pt is now s/p abdominal surgery for bowel obstruction. She has an NG tube to wall suction that RN said must remain to suction. Pt was agreeable to therapy despite pain. She pivoted to chair then stood and ambualted 5 ft forward/backwards twice. Pt required a seated rest, but was then ableto stand and ambulated forward/backward x 5 ft two more times. Pt tolerated BLE exercises well. Activity was limited by wall suction to NG tube. Pt would benefit from continued PT to address strengthening, endurance and gait training.      Anticipated Discharge Disposition (PT): home with home health, home with assist, skilled nursing facility

## 2023-11-28 NOTE — PROGRESS NOTES
"[facility] Clinical Pharmacy Services: PCA Consult     Martina Hardwick has a pharmacy consult to assess opioid medication per Ranjeet Salinas MD 's request based on the current protocol \"Pharmacist to discontinue PRN opioid pain medications at connection of PCA. If there is no basal rate on PCA, long-acting opioids may be continued. Scheduled opioids for pain are allowed while weaning patient off of PCA but PRN opioids should be limited to breakthrough pain only.\"     The following PRN medications have been discontinued per the protocol above:    Hydromorphone 0.5mg IV     Alexandria Camacho CAROLEE  Clinical Pharmacist    "

## 2023-11-28 NOTE — ANESTHESIA POSTPROCEDURE EVALUATION
Patient: Martina Hardwick    Procedure Summary       Date: 11/27/23 Room / Location: [facility] OR [address] [place] [address] MAIN OR    Anesthesia Start: 2009 Anesthesia Stop: 2220    Procedure: exploratory laparotomy, small bowel resection (Abdomen) Diagnosis:       Colonic inertia      (Colonic inertia [K59.9])    Surgeons: Ranjeet Salinas MD Provider: Yair Nixon MD    Anesthesia Type: general ASA Status: 4            Anesthesia Type: general    Vitals  Vitals Value Taken Time   /66 11/27/23 2315   Temp 36.6 °C (97.8 °F) 11/27/23 2211   Pulse 104 11/27/23 2322   Resp 16 11/27/23 2211   SpO2 97 % 11/27/23 2322   Vitals shown include unfiled device data.        Post Anesthesia Care and Evaluation    Patient location during evaluation: bedside  Patient participation: complete - patient cannot participate  Level of consciousness: sleepy but conscious  Pain management: adequate    Airway patency: patent  Anesthetic complications: No anesthetic complications  PONV Status: controlled  Cardiovascular status: acceptable  Respiratory status: acceptable  Hydration status: acceptable

## 2023-11-29 ENCOUNTER — APPOINTMENT (OUTPATIENT)
Dept: GENERAL RADIOLOGY | Facility: HOSPITAL | Age: 68
DRG: 329 | End: 2023-11-29
Payer: MEDICARE

## 2023-11-29 LAB
ANION GAP SERPL CALCULATED.3IONS-SCNC: 7.3 MMOL/L (ref 5–15)
BUN SERPL-MCNC: 18 MG/DL (ref 8–23)
BUN/CREAT SERPL: 31.6 (ref 7–25)
CALCIUM SPEC-SCNC: 9 MG/DL (ref 8.6–10.5)
CHLORIDE SERPL-SCNC: 98 MMOL/L (ref 98–107)
CO2 SERPL-SCNC: 27.7 MMOL/L (ref 22–29)
CREAT SERPL-MCNC: 0.57 MG/DL (ref 0.57–1)
EGFRCR SERPLBLD CKD-EPI 2021: 99.7 ML/MIN/1.73
GLUCOSE SERPL-MCNC: 154 MG/DL (ref 65–99)
MAGNESIUM SERPL-MCNC: 2.3 MG/DL (ref 1.6–2.4)
PHOSPHATE SERPL-MCNC: 2.8 MG/DL (ref 2.5–4.5)
POTASSIUM SERPL-SCNC: 4.2 MMOL/L (ref 3.5–5.2)
SODIUM SERPL-SCNC: 133 MMOL/L (ref 136–145)

## 2023-11-29 PROCEDURE — 25010000002 POTASSIUM CHLORIDE PER 2 MEQ OF POTASSIUM: Performed by: SURGERY

## 2023-11-29 PROCEDURE — 97530 THERAPEUTIC ACTIVITIES: CPT

## 2023-11-29 PROCEDURE — 25010000002 HYDROMORPHONE 1 MG/ML SOLUTION: Performed by: SURGERY

## 2023-11-29 PROCEDURE — 74018 RADEX ABDOMEN 1 VIEW: CPT

## 2023-11-29 PROCEDURE — 25010000002 CALCIUM GLUCONATE PER 10 ML: Performed by: SURGERY

## 2023-11-29 PROCEDURE — 25010000002 PIPERACILLIN SOD-TAZOBACTAM PER 1 G: Performed by: SURGERY

## 2023-11-29 PROCEDURE — 25010000002 MAGNESIUM SULFATE PER 500 MG OF MAGNESIUM: Performed by: SURGERY

## 2023-11-29 PROCEDURE — 83735 ASSAY OF MAGNESIUM: CPT | Performed by: SURGERY

## 2023-11-29 PROCEDURE — 25010000002 ACETAMINOPHEN 10 MG/ML SOLUTION: Performed by: SURGERY

## 2023-11-29 PROCEDURE — 25010000002 METHOCARBAMOL 1000 MG/10ML SOLUTION: Performed by: SURGERY

## 2023-11-29 PROCEDURE — 25010000002 ENOXAPARIN PER 10 MG: Performed by: SURGERY

## 2023-11-29 PROCEDURE — 80048 BASIC METABOLIC PNL TOTAL CA: CPT | Performed by: SURGERY

## 2023-11-29 PROCEDURE — 84100 ASSAY OF PHOSPHORUS: CPT | Performed by: SURGERY

## 2023-11-29 PROCEDURE — 25010000002 ONDANSETRON PER 1 MG: Performed by: SURGERY

## 2023-11-29 PROCEDURE — 99024 POSTOP FOLLOW-UP VISIT: CPT | Performed by: SURGERY

## 2023-11-29 RX ADMIN — SMOFLIPID 100 ML: 6; 6; 5; 3 INJECTION, EMULSION INTRAVENOUS at 18:12

## 2023-11-29 RX ADMIN — METHOCARBAMOL 500 MG: 100 INJECTION INTRAMUSCULAR; INTRAVENOUS at 09:09

## 2023-11-29 RX ADMIN — ACETAMINOPHEN 1000 MG: 10 INJECTION, SOLUTION INTRAVENOUS at 15:32

## 2023-11-29 RX ADMIN — HYDROMORPHONE HYDROCHLORIDE 1 MG: 1 INJECTION, SOLUTION INTRAMUSCULAR; INTRAVENOUS; SUBCUTANEOUS at 14:34

## 2023-11-29 RX ADMIN — METOPROLOL TARTRATE 5 MG: 1 INJECTION, SOLUTION INTRAVENOUS at 15:32

## 2023-11-29 RX ADMIN — METOPROLOL TARTRATE 5 MG: 1 INJECTION, SOLUTION INTRAVENOUS at 03:31

## 2023-11-29 RX ADMIN — HYDROMORPHONE HYDROCHLORIDE 1 MG: 1 INJECTION, SOLUTION INTRAMUSCULAR; INTRAVENOUS; SUBCUTANEOUS at 21:54

## 2023-11-29 RX ADMIN — SODIUM PHOSPHATE, MONOBASIC, MONOHYDRATE AND SODIUM PHOSPHATE, DIBASIC, ANHYDROUS: 142; 276 INJECTION, SOLUTION INTRAVENOUS at 18:12

## 2023-11-29 RX ADMIN — ONDANSETRON 4 MG: 2 INJECTION INTRAMUSCULAR; INTRAVENOUS at 23:58

## 2023-11-29 RX ADMIN — HYDROMORPHONE HYDROCHLORIDE 1 MG: 1 INJECTION, SOLUTION INTRAMUSCULAR; INTRAVENOUS; SUBCUTANEOUS at 17:49

## 2023-11-29 RX ADMIN — Medication 10 ML: at 21:37

## 2023-11-29 RX ADMIN — HYDROMORPHONE HYDROCHLORIDE 1 MG: 1 INJECTION, SOLUTION INTRAMUSCULAR; INTRAVENOUS; SUBCUTANEOUS at 05:17

## 2023-11-29 RX ADMIN — METOPROLOL TARTRATE 5 MG: 1 INJECTION, SOLUTION INTRAVENOUS at 09:09

## 2023-11-29 RX ADMIN — PIPERACILLIN SODIUM AND TAZOBACTAM SODIUM 3.38 G: 3; .375 INJECTION, SOLUTION INTRAVENOUS at 21:37

## 2023-11-29 RX ADMIN — ENOXAPARIN SODIUM 30 MG: 100 INJECTION SUBCUTANEOUS at 12:27

## 2023-11-29 RX ADMIN — HYDROMORPHONE HYDROCHLORIDE 1 MG: 1 INJECTION, SOLUTION INTRAMUSCULAR; INTRAVENOUS; SUBCUTANEOUS at 23:58

## 2023-11-29 RX ADMIN — PANTOPRAZOLE SODIUM 40 MG: 40 INJECTION, POWDER, FOR SOLUTION INTRAVENOUS at 05:03

## 2023-11-29 RX ADMIN — PIPERACILLIN SODIUM AND TAZOBACTAM SODIUM 3.38 G: 3; .375 INJECTION, SOLUTION INTRAVENOUS at 12:27

## 2023-11-29 RX ADMIN — METOPROLOL TARTRATE 5 MG: 1 INJECTION, SOLUTION INTRAVENOUS at 21:36

## 2023-11-29 RX ADMIN — Medication 10 ML: at 09:10

## 2023-11-29 RX ADMIN — HYDROMORPHONE HYDROCHLORIDE 1 MG: 1 INJECTION, SOLUTION INTRAMUSCULAR; INTRAVENOUS; SUBCUTANEOUS at 03:31

## 2023-11-29 RX ADMIN — METHOCARBAMOL 500 MG: 100 INJECTION INTRAMUSCULAR; INTRAVENOUS at 03:31

## 2023-11-29 RX ADMIN — ONDANSETRON 4 MG: 2 INJECTION INTRAMUSCULAR; INTRAVENOUS at 03:31

## 2023-11-29 RX ADMIN — PIPERACILLIN SODIUM AND TAZOBACTAM SODIUM 3.38 G: 3; .375 INJECTION, SOLUTION INTRAVENOUS at 05:03

## 2023-11-29 RX ADMIN — METHOCARBAMOL 500 MG: 100 INJECTION INTRAMUSCULAR; INTRAVENOUS at 15:32

## 2023-11-29 RX ADMIN — HYDROMORPHONE HYDROCHLORIDE 1 MG: 1 INJECTION, SOLUTION INTRAMUSCULAR; INTRAVENOUS; SUBCUTANEOUS at 11:26

## 2023-11-29 RX ADMIN — ACETAMINOPHEN 1000 MG: 10 INJECTION, SOLUTION INTRAVENOUS at 21:37

## 2023-11-29 RX ADMIN — ONDANSETRON 4 MG: 2 INJECTION INTRAMUSCULAR; INTRAVENOUS at 14:47

## 2023-11-29 RX ADMIN — ACETAMINOPHEN 1000 MG: 10 INJECTION, SOLUTION INTRAVENOUS at 09:09

## 2023-11-29 RX ADMIN — ACETAMINOPHEN 1000 MG: 10 INJECTION, SOLUTION INTRAVENOUS at 04:20

## 2023-11-29 RX ADMIN — METHOCARBAMOL 500 MG: 100 INJECTION INTRAMUSCULAR; INTRAVENOUS at 21:37

## 2023-11-29 NOTE — THERAPY TREATMENT NOTE
Patient Name: Martina Hardwick  : 1955    MRN: [id number]                              Today's Date: 2023       Admit Date: 2023    Visit Dx:     ICD-10-CM ICD-9-CM   1. 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    Colonic inertia    Severe malnutrition     Past Medical History:   Diagnosis Date    Arthritis     Autoantibody titer positive     Goodwin esophagus     Bloating     Cataract     BILAT    Chronic abdominal pain     Chronic constipation     Encounter for preventive health examination     Endometriosis     Gastritis     Gastrointestinal hypomotility     GERD (gastroesophageal reflux disease)     Headache, tension-type     Hypertension     Hyponatremia     Insomnia     Intestinal autonomic neuropathy     Irritable bowel syndrome     Migraine     Mixed anxiety and depressive disorder     Moderate malnutrition     Noninfectious gastroenteritis     OP (osteoporosis)     Osteopenia     Osteoporosis     PONV (postoperative nausea and vomiting) 2018    Psoriasis     KNEES, ELBOWS BILAT AND SCALP    Seasonal allergies     Visceral hyperalgesia      Past Surgical History:   Procedure Laterality Date    APPENDECTOMY      CHOLECYSTECTOMY N/A 2018    Procedure: CHOLECYSTECTOMY LAPAROSCOPIC converted to open procedure;  Surgeon: Hernan Hinds MD;  Location: [facility];  Service: General    COLON RESECTION N/A 2023    Procedure: OPEN SUBTOTAL COLECTOMY AND  ADESIOLYSIS;  Surgeon: Ranjeet Salinas MD;  Location: [facility] MAIN OR;  Service: General;  Laterality: N/A;    COLON SURGERY      STATES TOTAL OF 3 COLON SURGERY    COLONOSCOPY      COLONOSCOPY N/A 12/12/2018    Procedure: COLONOSCOPY;  Surgeon: Darien Ruff MD;  Location: [facility] OR;  Service: Gastroenterology    COLONOSCOPY N/A 10/13/2023    Procedure: COLONOSCOPY TO CECUM;  Surgeon: Ranjeet Salinas MD;  Location: [facility] ENDOSCOPY;  Service: General;  Laterality: N/A;  PREOP/ CHRONIC CONSTIPATION- POSTOP/ NORMAL    DIAGNOSTIC LAPAROSCOPY  1990    DILATATION AND CURETTAGE  1985    ENDOSCOPY N/A 05/13/2016    Procedure: ESOPHAGOGASTRODUODENOSCOPY ;  Surgeon: Darien Ruff MD;  Location: [facility] OR;  Service:     ENDOSCOPY N/A 05/01/2023    Procedure: ESOPHAGOGASTRODUODENOSCOPY WITH BIOPSY;  Surgeon: Darien Ruff MD;  Location: [facility] OR;  Service: Gastroenterology;  Laterality: N/A;  Mild Thrush; Gastritis; Esophagitis- thrush and reflux; Biopsies- duodenal, gastric, esophagus    EXPLORATORY LAPAROTOMY N/A 11/27/2023    Procedure: exploratory laparotomy, small bowel resection;  Surgeon: Ranjeet Salinas MD;  Location: [facility] MAIN OR;  Service: General;  Laterality: N/A;    HYSTERECTOMY      REVISION / TAKEDOWN COLOSTOMY        General Information       Row Name 11/29/23 9642          Physical Therapy Time and Intention    Document Type therapy note (daily note)  -TAY (jenna) DRE (t) TAY (c)     Mode of Treatment individual therapy;physical therapy  -TAY (r) DRE (t) TAY (c)       Row Name 11/29/23 1346          General Information    Patient Profile Reviewed yes  -TAY (r) DRE (t) TAY (c)       Row Name 11/29/23 2015          Safety Issues, Functional Mobility    Comment, Safety Issues/Impairments (Mobility) Gait belt and non skid socks donned for safety  -KH (r) JY (t) KH (c)               User Key  (r) = Recorded By, (t) = Taken By, (c) = Cosigned By      Initials  Name Provider Type    Yaneth Zamora, PT Physical Therapist    Kenroy Elizabeth, PTA Student PTA Student                   Mobility       Row Name 11/29/23 1348          Bed Mobility    Bed Mobility supine-sit;scooting/bridging  -KH (r) JY (t) KH (c)     Scooting/Bridging [place] (Bed Mobility) supervision  -KH (r) JY (t) KH (c)     Supine-Sit [place] (Bed Mobility) standby assist  -KH (r) JY (t) KH (c)     Assistive Device (Bed Mobility) head of bed elevated  -KH (r) JY (t) KH (c)       Row Name 11/29/23 1348          Sit-Stand Transfer    Sit-Stand [place] (Transfers) contact guard  -KH (r) JY (t) KH (c)     Assistive Device (Sit-Stand Transfers) walker, front-wheeled  -KH (r) JY (t) KH (c)       Row Name 11/29/23 1348          Gait/Stairs (Locomotion)    [place] Level (Gait) contact guard  -KH (r) JY (t) KH (c)     Assistive Device (Gait) walker, front-wheeled  -KH (r) JY (t) KH (c)     Patient was able to Ambulate yes  -KH (r) JY (t) KH (c)     Distance in Feet (Gait) 130'  -KH (r) JY (t) KH (c)     Bilateral Gait Deviations forward flexed posture  -KH (r) JY (t) KH (c)     Comment, (Gait/Stairs) Ambulated 130' w Rwx CGA  -KH (r) JY (t) KH (c)               User Key  (r) = Recorded By, (t) = Taken By, (c) = Cosigned By      Initials Name Provider Type    Yaneth Zamora, PT Physical Therapist    Kenroy Elizabeth, PTA Student PTA Student                   Obj/Interventions       Row Name 11/29/23 1350          Motor Skills    Therapeutic Exercise --  Seated (chair) NIRU SIMONS, [place] x 10 reps  -KH (r) JY (t) KH (c)       Row Name 11/29/23 1350          Balance    Balance Assessment sitting static balance;standing static balance  -KH (r) JY (t) KH (c)     Static Sitting Balance standby assist  -KH (r) DRE (t) TAY (c)     Position, Sitting Balance sitting edge of bed  -TAY (r) DRE (t) TAY (c)     Static Standing Balance contact guard  -TAY (r) DRE (t) TAY (c)     Position/Device Used,  Standing Balance walker, front-wheeled  -TAY (r) DRE (t) TAY (c)               User Key  (r) = Recorded By, (t) = Taken By, (c) = Cosigned By      Initials Name Provider Type    Yaneth Zamora, PT Physical Therapist    Kenroy Elizabeth, PTA Student PTA Student                   Goals/Plan    No documentation.                  Clinical Impression       Row Name 11/29/23 1350          Pain    Additional Documentation Pain Scale: FACES Pre/Post-Treatment (Group)  -TAY (r) DRE (t) TAY (c)       Row Name 11/29/23 1350          Pain Scale: FACES Pre/Post-Treatment    Pain: FACES Scale, Pretreatment 0-->no hurt  -TAY (r) DRE (t) TAY (c)     Posttreatment Pain Rating 0-->no hurt  -TAY (r) DRE (t) TAY (c)     Pre/Posttreatment Pain Comment No signs or c/o pain this session  -TAY (jenna) DRE (paddy) TAY (c)       Row Name 11/29/23 6150          Plan of Care Review    Plan of Care Reviewed With patient  -TAY (r) DRE (t) TAY (c)     Outcome Evaluation Nurse gave OK to take pt off wall suction for short walk in hallway. Pt ambulated 130' w Rwx CGA. Pt had no LOB or need for rest break during ambulation. Pt sat UIC at end of session and completed seated ther ex. Pt returned to wall suction and left UIC to rest comfortably.  -TAY (jenna) DRE (t) TAY (c)       Row Name 11/29/23 3620          Positioning and Restraints    Pre-Treatment Position in bed  -TAY (r) DRE (t) TAY (c)     Post Treatment Position chair  -TAY (r) DRE (t) TAY (c)     In Chair reclined;call light within reach;encouraged to call for assist;exit alarm on;notified nsg  -TAY (r) DRE (t) TAY (c)               User Key  (r) = Recorded By, (t) = Taken By, (c) = Cosigned By      Initials Name Provider Type    Yaneth Zamora, PT Physical Therapist    Kenroy Elizabeth, PTA Student PTA Student                   Outcome Measures       Row Name 11/29/23 1560 11/29/23 5317       How much help from another person do you currently need...    Turning from your back to your side while in flat bed  without using bedrails? 3  -KH (r) JY (t) KH (c) 3  -TC    Moving from lying on back to sitting on the side of a flat bed without bedrails? 3  -KH (r) JY (t) KH (c) 3  -TC    Moving to and from a bed to a chair (including a wheelchair)? 3  -KH (r) JY (t) KH (c) 3  -TC    Standing up from a chair using your arms (e.g., wheelchair, bedside chair)? 3  -KH (r) JY (t) KH (c) 3  -TC    Climbing 3-5 steps with a railing? 2  -KH (r) JY (t) KH (c) 2  -TC    To walk in hospital room? 3  -KH (r) JY (t) KH (c) 3  -TC    AM-PAC 6 Clicks Score (PT) 17  -KH (r) JY (t) 17  -TC    Highest Level of Mobility Goal 5 --> Static standing  -KH (r) JY (t) 5 --> Static standing  -TC      Row Name 11/29/23 1353          Functional Assessment    Outcome Measure Options AM-PAC 6 Clicks Basic Mobility (PT)  -KH (r) JY (t) KH (c)               User Key  (r) = Recorded By, (t) = Taken By, (c) = Cosigned By      Initials Name Provider Type    Yaneth Zamora, PT Physical Therapist    Rebecca Bolden, RN Registered Nurse    Kenroy Elizabeth, PTA Student PTA Student                                 Physical Therapy Education       Title: PT OT SLP Therapies (Done)       Topic: Physical Therapy (Done)       Point: Mobility training (Done)       Learning Progress Summary             Patient Acceptance, E, DU,VU by DRE at 11/29/2023 1353    Acceptance, E,TB,D, VU,NR by PH at 11/27/2023 1207    Acceptance, E,TB,D, VU,NR by PH at 11/24/2023 0908    Acceptance, E,D, VU,NR by MS at 11/22/2023 1519    Acceptance, E,D, VU,NR by MS at 11/21/2023 1125                         Point: Home exercise program (Done)       Learning Progress Summary             Patient Acceptance, E, DU,VU by DRE at 11/29/2023 1353    Acceptance, E,TB,D, VU,NR by PH at 11/27/2023 1207    Acceptance, E,TB,D, VU,NR by PH at 11/24/2023 0908    Acceptance, E,D, VU,NR by MS at 11/22/2023 1519    Acceptance, E,D, VU,NR by MS at 11/21/2023 1125                         Point: Body  mechanics (Done)       Learning Progress Summary             Patient Acceptance, E, DU,VU by JY at 11/29/2023 1353    Acceptance, E,TB,D, VU,NR by PH at 11/27/2023 1207    Acceptance, E,TB,D, VU,NR by PH at 11/24/2023 0908    Acceptance, E,D, VU,NR by MS at 11/22/2023 1519    Acceptance, E,D, VU,NR by MS at 11/21/2023 1125                         Point: Precautions (Done)       Learning Progress Summary             Patient Acceptance, E, DU,VU by JY at 11/29/2023 1353    Acceptance, E,TB,D, VU,NR by PH at 11/27/2023 1207    Acceptance, E,TB,D, VU,NR by PH at 11/24/2023 0908    Acceptance, E,D, VU,NR by MS at 11/22/2023 1519    Acceptance, E,D, VU,NR by MS at 11/21/2023 1125                                         User Key       Initials Effective Dates Name Provider Type Discipline    MS 06/16/21 -  Miguel Beasley, PT Physical Therapist PT     06/16/21 -  Radha Rosen PTA Physical Therapist Assistant PT     11/08/23 -  Kenroy Deleon PTA Student PTA Student PT                  PT Recommendation and Plan     Plan of Care Reviewed With: patient  Outcome Evaluation: Nurse gave OK to take pt off wall suction for short walk in hallway. Pt ambulated 130' w Rwx CGA. Pt had no LOB or need for rest break during ambulation. Pt sat UIC at end of session and completed seated ther ex. Pt returned to wall suction and left UIC to rest comfortably.     Time Calculation:         PT Charges       Row Name 11/29/23 9051             Time Calculation    Start Time 1330  -KH (r) JY (t) KH (c)      Stop Time 1346  -KH (r) JY (t) KH (c)      Time Calculation (min) 16 min  -KH (r) JY (t)      PT Received On 11/29/23  -KH (r) JY (t) KH (c)      PT - Next Appointment 11/30/23  -KH (r) JY (t) KH (c)         Time Calculation- PT    Total Timed Code Minutes- PT 16 minute(s)  -KH (r) JY (paddy) TAY (c)         Timed Charges    05559 - PT Therapeutic Exercise Minutes 4  -TAY (jenna) DRE (paddy) TAY (c)      66924 - PT Therapeutic Activity Minutes  12  -KH (r) JY (t) KH (c)         Total Minutes    Timed Charges Total Minutes 16  -KH (r) JY (t)       Total Minutes 16  -KH (r) JY (t)                User Key  (r) = Recorded By, (t) = Taken By, (c) = Cosigned By      Initials Name Provider Type    Yaneth Zamora, PT Physical Therapist    Kenroy Elizabeth, PTA Student PTA Student                  Therapy Charges for Today       Code Description Service Date Service Provider Modifiers Qty    62480346662 HC PT THERAPEUTIC ACT EA 15 MIN 11/29/2023 Kenroy Deleon PTA Student GP 1            PT G-Codes  Outcome Measure Options: AM-PAC 6 Clicks Basic Mobility (PT)  AM-PAC 6 Clicks Score (PT): 17       Kenroy Deleon PTA Student  11/29/2023

## 2023-11-29 NOTE — PLAN OF CARE
Goal Outcome Evaluation:  Plan of Care Reviewed With: patient, spouse        Progress: improving  Outcome Evaluation: Pt A&Ox4, weaned to room air, PCA pain- reported not working well for control, requested late in shift for pain meds to be switched back to RN given and PCA to be dc'd, NGtube to low wall suction, vigil cath to bedside, midline dressing, bilat drains in place with output, PICC used for TPN, SR on monitor with vitals as charted, up to chair for about an hour today, mildly anxious today

## 2023-11-29 NOTE — PROGRESS NOTES
[facility] Clinical Pharmacy Services: TPN Daily Progress Note    TPN Day # 6  Indication: Prolonged Paralytic Ileus  Route: central  Type: standard    Subjective/Objective  Results from last 7 days   Lab Units 23  0508 23  0651 23  0721 23  0618   SODIUM mmol/L 133* 131*   < > 138   POTASSIUM mmol/L 4.2 5.2   < > 3.5   CHLORIDE mmol/L 98 99   < > 102   CO2 mmol/L 27.7 21.0*   < > 28.9   BUN mg/dL 18 17   < > 12   CREATININE mg/dL 0.57 0.63   < > 0.58   CALCIUM mg/dL 9.0 9.2   < > 9.0   ALBUMIN g/dL  --  2.2*  --   --    BILIRUBIN mg/dL  --  0.4  --   --    ALK PHOS U/L  --  126*  --   --    ALT (SGPT) U/L  --  41*  --   --    AST (SGOT) U/L  --  38*  --   --    GLUCOSE mg/dL 154* 213*   < > 100*   MAGNESIUM mg/dL 2.3 1.9   < > 1.8   PHOSPHORUS mg/dL 2.8 3.5   < > 3.4   TRIGLYCERIDES mg/dL  --   --   --  60    < > = values in this interval not displayed.      Dietary Orders (From admission, onward)       Start     Ordered    23 1144  NPO Diet NPO Type: Strict NPO, Ice Chips  Diet Effective Now        Question Answer Comment   NPO Type Strict NPO    NPO Type Ice Chips        23 1143                      Additional insulin administration while previous TPN infusin units  Additional electrolyte administration while previous TPN infusing: none  Acid suppression: Protonix injection    Goal TPN Formula Recommendations: /100 AA/Dex/Lipids  Current TPN Formula: 70g/1200kcal/100 AA/Dex/Lipids    Assessment/Plan    Macronutrients: will continue goal formula of 70g/1200kcal/100 AA/Dex/lipids today  Based on the above labs, will add the following electrolytes/additives to the TPN. Electrolytes are more stable today, no changes. Remains NPO, suspect TPN to continue a few more days.    Sodium Chloride: 0 mEq   Sodium Acetate:  90 mEq   Sodium Phosphate: 20 mEq   Potassium Chloride: 40 mEq   Potassium Acetate: 0 mEq   Potassium Phosphate: 0 mEq   Calcium Gluconate: 9  mEq   Magnesium Sulfate: 12 mEq   MVI for TPN   Trace Elements              Other Additives: None    Labs: CMP, mag, phos  LFTs increasing, will change Intralipid to SMOFlipid formulation    Lowell Manriquez [facility]  Clinical Pharmacist

## 2023-11-29 NOTE — PROGRESS NOTES
"Nutrition Services    Patient Name:  Martina Hardwick  YOB: 1955  MRN: [id number]  Admit Date:  11/20/2023    Assessment Date:  11/29/23    Summary: TPN F/up     TPN Day #5  Indication: Prolonged Paralytic Ileus  Route: central  Type: standard  Goal TPN Formula Recommendations: 70/1200/100 AA/Dex/Lipids  Current TPN Formula: 70g/1200kcal/100 AA/Dex/Lipids   Labs: Na 133, Glu 154  Last BM: 11/27  Drains: NG to LWS w/ 225 cc output, RLQ and LLQ drains w/ 150 cc output combined     TPN follow up completed. Visited pt at bedside. POD #2 from reopening of recent laparotomy with small bowel resection for leaking small bowel enterotomy. Currently NPO. Endorses feeling better today but still nauseous. TPN at goal.     REC:  Continue same TPN regimen at goal  Advance diet as tolerated and once medically appropriate per MD    RD to follow per protocol.    CLINICAL NUTRITION ASSESSMENT      Reason for Assessment TPN F/up      Diagnosis/Problem Colonic inertia      Anthropometrics        Current Height  Current Weight  BMI kg/m2 Height: 157.5 cm (62\")  Weight: 46.3 kg (102 lb) (11/27/23 0834)  Body mass index is 18.66 kg/m².   Adjusted BMI (if applicable)    BMI Category Normal/Healthy (18.4 - 24.9)   Ideal Body Weight (IBW) 50.1 kg    Usual Body Weight (UBW)  lbs   Weight Trend Stable; pt endorses 26 lb wt loss x 3 yrs      Estimated Requirements         Weight used  46.6 kg     Calories  4204-1256 (30 kcal/kg, 35 kcal/kg)    Protein  56-70 (1.2 - 1.5 gm/kg)    Fluid  1 mL/kcal     Labs       Pertinent Labs    Results from last 7 days   Lab Units 11/29/23  0508 11/28/23  0651 11/27/23  0418   SODIUM mmol/L 133* 131* 130*   POTASSIUM mmol/L 4.2 5.2 4.7   CHLORIDE mmol/L 98 99 99   CO2 mmol/L 27.7 21.0* 23.9   BUN mg/dL 18 17 23   CREATININE mg/dL 0.57 0.63 0.73   CALCIUM mg/dL 9.0 9.2 8.8   BILIRUBIN mg/dL  --  0.4  --    ALK PHOS U/L  --  126*  --    ALT (SGPT) U/L  --  41*  --    AST (SGOT) U/L  --  38*  -- " "   GLUCOSE mg/dL 154* 213* 147*     Results from last 7 days   Lab Units 11/29/23  0508 11/28/23  0651 11/27/23  0418 11/26/23  0705 11/25/23  0618   MAGNESIUM mg/dL 2.3 1.9 2.1   < > 1.8   PHOSPHORUS mg/dL 2.8 3.5 2.9   < > 3.4   HEMOGLOBIN g/dL  --  10.0* 9.8*   < > 10.6*   HEMATOCRIT %  --  29.3* 28.9*   < > 31.2*   WBC 10*3/mm3  --  22.95* 15.25*   < > 5.81   TRIGLYCERIDES mg/dL  --   --   --   --  60   ALBUMIN g/dL  --  2.2*  --   --   --     < > = values in this interval not displayed.     Results from last 7 days   Lab Units 11/28/23  0651 11/27/23 0418 11/26/23  0705 11/25/23 0618 11/24/23  0617   PLATELETS 10*3/mm3 245 142 138* 126* 116*     No results found for: \"COVID19\"  Lab Results   Component Value Date    HGBA1C 5.3 06/20/2022          Medications           Scheduled Medications acetaminophen, 1,000 mg, Intravenous, Q6H  enoxaparin, 30 mg, Subcutaneous, Q24H  Fat Emul Fish Oil/Plant Based, 100 mL, Intravenous, Q24H (TPN)  Methocarbamol, 500 mg, Intravenous, Q6H  metoprolol tartrate, 5 mg, Intravenous, Q6H  pantoprazole, 40 mg, Intravenous, Q AM  piperacillin-tazobactam, 3.375 g, Intravenous, Q8H  sodium chloride, 10 mL, Intravenous, Q12H  sodium chloride, 10 mL, Intravenous, Q12H       Infusions Adult Central 2-in-1 TPN, , Last Rate: 70 mL/hr at 11/28/23 1850  Pharmacy to Dose TPN,        PRN Medications   HYDROmorphone    [DISCONTINUED] ondansetron **OR** ondansetron    Pharmacy to Dose TPN    sodium chloride    sodium chloride    sodium chloride     Physical Findings          General Findings alert   Oral/Mouth Cavity WDL   Edema  no edema   Gastrointestinal hypoactive bowel sounds, nausea, non-distended , passing flatus, last bowel movement: 11/27   Skin  bruising, pale, surgical incision: abdomen incision    Tubes/Drains/Lines central line/PICC, drain, RLQ and LLQ, NG tube to LWS    NFPE See Malnutrition Severity Assessment, Date Completed: 11/21     Malnutrition Severity Assessment      Patient " meets criteria for : Severe Malnutrition (Pt meets ASPEN/AND criteria for nutrition dx of severe malnutrition of acute disease related to energy intake, muscle wasting, and fat loss.)       Current Nutrition Orders & Evaluation of Intake       Oral Nutrition     Current PO Diet Adult Central 2-in-1 TPN  Diet: Liquid Diets; Clear Liquid; Fluid Consistency: Thin (IDDSI 0)  Adult Central 2-in-1 TPN   Supplement N/A   PO Evaluation     Trending % PO Intake NPO    Factors Affecting Intake  altered GI function, decreased appetite, nausea       Parenteral Nutrition      TPN Route PICC   TPN Rate (mL/hr)  70 mL/hr (1680 mL total)   Current TPN Order        Dextrose (kcal) 1200 kcal        Amino Acid (gm) 70 gm (280 kcal)        Lipid Concentration 20%       Lipid Volume/Frequency  100 mL, daily    MVI Frequency  Per PharmD    Trace Element Frequency  Per PharmD   Total # Days on TPN 5   Propofol Rate/Kcal    TPN Current Provision          Calories 100% of needs met        Protein (gm) 100% of needs met        Fluid (mL) 1710 mL    TPN Needs Evaluation meeting needs for calories & protein   TPN Changes LFTs were increasing, changed to SMOF lipid formulation per PharmD note on 11/28     PES STATEMENT / NUTRITION DIAGNOSIS      Nutrition Dx Problem  Problem: Malnutrition (severe)  Etiology: Medical Diagnosis - Colonic inertia and Factors Affecting Nutrition - altered GI fxn, decreased appetite, abdominal pain, nausea     Signs/Symptoms: NPO, NFPE Results, and Unintended Weight Change     NUTRITION INTERVENTION / PLAN OF CARE      Intervention Goal(s) Maintain nutrition status, Reduce/improve symptoms, Meet estimated needs, Disease management/therapy, Maintain TF/PN, Transition PN to PO, and Appropriate weight gain         RD Intervention/Action Await initiation/advancement of PO diet, Continue to monitor, and Care plan reviewed   --      Prescription/Orders:       PO Diet ADAT      Supplements       Enteral Nutrition        Parenteral Nutrition    Parenteral Prescription:     TPN Route PICC   TPN Rate (mL/hr)    TPN Recommendation:        Dextrose (kcal) 1200       Amino Acid (gm) 70       Lipid Concentration 20%       Lipid Volume/Frequency  100 mL   Propofol Rate/Kcal    TPN Provision:  1680 kcal, 70 gm protein        Calories 100% needs met        Protein  100% needs met        Fluid 1710 mL       New Prescription Ordered? No changes at this time, continue same per protocol    --      Monitor/Evaluation Per protocol, I&O, Pertinent labs, PN delivery/tolerance, Weight, Skin status, GI status, Symptoms   Discharge Plan/Needs Pending clinical course   --    RD to follow per protocol.    Electronically signed by:  Susanna Salas Dietitian Intern   11/29/23 08:50 EST

## 2023-11-29 NOTE — PLAN OF CARE
Problem: Adult Inpatient Plan of Care  Goal: Plan of Care Review  Outcome: Ongoing, Progressing  Flowsheets (Taken 11/29/2023 0637)  Progress: no change  Plan of Care Reviewed With: patient  Outcome Evaluation:   VSS   NG tube to low wall suction   TPN cont   lipids cont   PCA pump DC'd - IV PRN pain medication given as needed   FC to bsd   drsg no new blood - dry, intact   MARY drains x2 draining well   no acute distress noted   will cont to monitor   Goal Outcome Evaluation:  Plan of Care Reviewed With: patient        Progress: no change  Outcome Evaluation: VSS; NG tube to low wall suction; TPN cont; lipids cont; PCA pump DC'd - IV PRN pain medication given as needed; FC to bsd; drsg no new blood - dry, intact; MARY drains x2 draining well; no acute distress noted; will cont to monitor

## 2023-11-29 NOTE — PLAN OF CARE
Goal Outcome Evaluation:  Plan of Care Reviewed With: patient           Outcome Evaluation: Nurse gave OK to take pt off wall suction for short walk in hallway. Pt ambulated 130' w Rwx CGA. Pt had no LOB or need for rest break during ambulation. Pt sat UIC at end of session and completed seated ther ex. Pt returned to wall suction and left UIC to rest comfortably.

## 2023-11-29 NOTE — PROGRESS NOTES
Colorectal & General Surgery  Progress Note    Patient: Martina Hardwick  YOB: 1955  MRN: [id number]      Assessment  Martina Hardwick is a 67 y.o. female with history of multiple abdominal operations and colonic inertia now postoperative day 9 from exploratory laparotomy, enterolysis, subtotal colectomy and postoperative day 2 from reopening of recent laparotomy with small bowel resection for leaking small bowel enterotomy.  She looks like a completed from person today.  Her pain is well-controlled, she is in good spirits.  Both of her drains are serosanguineous, and she has minimal nasogastric tube output she has passed flatus twice per her own report.  No bowel movements.    Plan  Continue nothing by mouth, including intravenous pain medications only  Continue Hodge catheter  Continue TPN  Hopefully we can get some tubes out tomorrow      Subjective  Feels much better today.  Pain is well-controlled.  Got plenty of sleep last night.  She is looking forward to getting up and walking around again today.    Objective    Vitals:    11/29/23 0730   BP: 122/70   Pulse: 87   Resp: 18   Temp: 97.2 °F (36.2 °C)   SpO2: 95%       Physical Exam  Constitutional: Well-developed well-nourished, no acute distress  Neck: Supple, trachea midline  Respiratory: No increased work of breathing, Symmetric excursion  Cardiovascular: Well pefursed, no jugular venous distention evident   Abdominal: Midline incision is clean without erythema, minimal drainage.  2 small areas are open for packing.  Abdomen is soft, appropriately tender and mildly distended.    Skin: Warm, dry, no rash on visualized skin surfaces  Psychiatric: Alert and oriented ×3, normal affect     Laboratory Results  I have personally reviewed BMP with creatinine 0.57, potassium 4.2, magnesium 2.3, phosphorus 2.8.    Radiology  None to review         Osvaldo Salinas MD  Colorectal & General Surgery  [facility]    [address], [address]  [place], [place], [address]  P: [phone]  F: [phone]

## 2023-11-30 LAB
ANION GAP SERPL CALCULATED.3IONS-SCNC: 8.9 MMOL/L (ref 5–15)
BUN SERPL-MCNC: 16 MG/DL (ref 8–23)
BUN/CREAT SERPL: 26.2 (ref 7–25)
CALCIUM SPEC-SCNC: 9 MG/DL (ref 8.6–10.5)
CHLORIDE SERPL-SCNC: 93 MMOL/L (ref 98–107)
CO2 SERPL-SCNC: 29.1 MMOL/L (ref 22–29)
CREAT SERPL-MCNC: 0.61 MG/DL (ref 0.57–1)
EGFRCR SERPLBLD CKD-EPI 2021: 98.1 ML/MIN/1.73
GLUCOSE SERPL-MCNC: 131 MG/DL (ref 65–99)
MAGNESIUM SERPL-MCNC: 1.9 MG/DL (ref 1.6–2.4)
PHOSPHATE SERPL-MCNC: 2.7 MG/DL (ref 2.5–4.5)
POTASSIUM SERPL-SCNC: 3.5 MMOL/L (ref 3.5–5.2)
SODIUM SERPL-SCNC: 131 MMOL/L (ref 136–145)

## 2023-11-30 PROCEDURE — 84100 ASSAY OF PHOSPHORUS: CPT | Performed by: SURGERY

## 2023-11-30 PROCEDURE — 25010000002 ENOXAPARIN PER 10 MG: Performed by: SURGERY

## 2023-11-30 PROCEDURE — 80048 BASIC METABOLIC PNL TOTAL CA: CPT | Performed by: SURGERY

## 2023-11-30 PROCEDURE — 25010000002 PIPERACILLIN SOD-TAZOBACTAM PER 1 G: Performed by: SURGERY

## 2023-11-30 PROCEDURE — 25010000002 ONDANSETRON PER 1 MG: Performed by: SURGERY

## 2023-11-30 PROCEDURE — 25010000002 MAGNESIUM SULFATE PER 500 MG OF MAGNESIUM: Performed by: SURGERY

## 2023-11-30 PROCEDURE — 25010000002 HYDROMORPHONE 1 MG/ML SOLUTION: Performed by: SURGERY

## 2023-11-30 PROCEDURE — 83735 ASSAY OF MAGNESIUM: CPT | Performed by: SURGERY

## 2023-11-30 PROCEDURE — 97530 THERAPEUTIC ACTIVITIES: CPT

## 2023-11-30 PROCEDURE — 99024 POSTOP FOLLOW-UP VISIT: CPT | Performed by: SURGERY

## 2023-11-30 PROCEDURE — 97110 THERAPEUTIC EXERCISES: CPT

## 2023-11-30 PROCEDURE — 25010000002 POTASSIUM CHLORIDE PER 2 MEQ OF POTASSIUM: Performed by: SURGERY

## 2023-11-30 PROCEDURE — 25010000002 CALCIUM GLUCONATE PER 10 ML: Performed by: SURGERY

## 2023-11-30 RX ORDER — HALOPERIDOL 5 MG/ML
1 INJECTION INTRAMUSCULAR ONCE AS NEEDED
Status: DISCONTINUED | OUTPATIENT
Start: 2023-11-30 | End: 2023-12-01

## 2023-11-30 RX ADMIN — METOPROLOL TARTRATE 5 MG: 1 INJECTION, SOLUTION INTRAVENOUS at 14:51

## 2023-11-30 RX ADMIN — PANTOPRAZOLE SODIUM 40 MG: 40 INJECTION, POWDER, FOR SOLUTION INTRAVENOUS at 05:11

## 2023-11-30 RX ADMIN — ONDANSETRON 4 MG: 2 INJECTION INTRAMUSCULAR; INTRAVENOUS at 11:21

## 2023-11-30 RX ADMIN — PIPERACILLIN SODIUM AND TAZOBACTAM SODIUM 3.38 G: 3; .375 INJECTION, SOLUTION INTRAVENOUS at 05:11

## 2023-11-30 RX ADMIN — HYDROMORPHONE HYDROCHLORIDE 1 MG: 1 INJECTION, SOLUTION INTRAMUSCULAR; INTRAVENOUS; SUBCUTANEOUS at 02:05

## 2023-11-30 RX ADMIN — Medication 10 ML: at 09:08

## 2023-11-30 RX ADMIN — SODIUM PHOSPHATE, MONOBASIC, MONOHYDRATE AND SODIUM PHOSPHATE, DIBASIC, ANHYDROUS: 142; 276 INJECTION, SOLUTION INTRAVENOUS at 18:14

## 2023-11-30 RX ADMIN — PIPERACILLIN SODIUM AND TAZOBACTAM SODIUM 3.38 G: 3; .375 INJECTION, SOLUTION INTRAVENOUS at 14:50

## 2023-11-30 RX ADMIN — ONDANSETRON 4 MG: 2 INJECTION INTRAMUSCULAR; INTRAVENOUS at 18:44

## 2023-11-30 RX ADMIN — METOPROLOL TARTRATE 5 MG: 1 INJECTION, SOLUTION INTRAVENOUS at 03:28

## 2023-11-30 RX ADMIN — METOPROLOL TARTRATE 5 MG: 1 INJECTION, SOLUTION INTRAVENOUS at 09:08

## 2023-11-30 RX ADMIN — ENOXAPARIN SODIUM 30 MG: 100 INJECTION SUBCUTANEOUS at 14:49

## 2023-11-30 RX ADMIN — HYDROMORPHONE HYDROCHLORIDE 1 MG: 1 INJECTION, SOLUTION INTRAMUSCULAR; INTRAVENOUS; SUBCUTANEOUS at 15:06

## 2023-11-30 NOTE — PLAN OF CARE
Goal Outcome Evaluation:  Plan of Care Reviewed With: patient        Progress: improving  Outcome Evaluation: Pt in bed at beg of PT tx and sat up to EOB w/ SBA. Pt stood 2x from EOB this AM req SBA and use of fww. Pt then amb around nsg unit req CGA and use of fww. Pt was fairly steady w/ no overt LOB although req cues to avoid obstacles in hallway and room. Pt performed ther ex for strengthening then returned to bed req assist for B LE to bed 2/2 abd pain. PT will prog as pt ramin.      Anticipated Discharge Disposition (PT): home with home health, home with assist, skilled nursing facility

## 2023-11-30 NOTE — PROGRESS NOTES
[facility] Clinical Pharmacy Services: TPN Daily Progress Note    TPN Day # 7  Indication: Prolonged Paralytic Ileus  Route: central  Type: standard    Subjective/Objective  Results from last 7 days   Lab Units 23  1102 23  0508 23  0651 23  0721 23  0618   SODIUM mmol/L 131*   < > 131*   < > 138   POTASSIUM mmol/L 3.5   < > 5.2   < > 3.5   CHLORIDE mmol/L 93*   < > 99   < > 102   CO2 mmol/L 29.1*   < > 21.0*   < > 28.9   BUN mg/dL 16   < > 17   < > 12   CREATININE mg/dL 0.61   < > 0.63   < > 0.58   CALCIUM mg/dL 9.0   < > 9.2   < > 9.0   ALBUMIN g/dL  --   --  2.2*  --   --    BILIRUBIN mg/dL  --   --  0.4  --   --    ALK PHOS U/L  --   --  126*  --   --    ALT (SGPT) U/L  --   --  41*  --   --    AST (SGOT) U/L  --   --  38*  --   --    GLUCOSE mg/dL 131*   < > 213*   < > 100*   MAGNESIUM mg/dL 1.9   < > 1.9   < > 1.8   PHOSPHORUS mg/dL 2.7   < > 3.5   < > 3.4   TRIGLYCERIDES mg/dL  --   --   --   --  60    < > = values in this interval not displayed.      Dietary Orders (From admission, onward)       Start     Ordered    23 1144  NPO Diet NPO Type: Strict NPO, Ice Chips  Diet Effective Now        Question Answer Comment   NPO Type Strict NPO    NPO Type Ice Chips        23 1143                      Additional insulin administration while previous TPN infusin units  Additional electrolyte administration while previous TPN infusing: none  Acid suppression: Protonix injection    Goal TPN Formula Recommendations: /100 AA/Dex/Lipids  Current TPN Formula: 70g/1200kcal/100 AA/Dex/Lipids    Assessment/Plan    Macronutrients: will continue goal formula of 70g/1200kcal/100 AA/Dex/lipids today  Sodium and chloride have trended down while enzymatic CO2 is slightly elevated. Based on the above labs, will add the following electrolytes/additives to the TPN.    Sodium Chloride: 60 mEq   Sodium Acetate:  40 mEq   Sodium Phosphate: 20 mEq   Potassium Chloride: 50  mEq   Potassium Acetate: 0 mEq   Potassium Phosphate: 0 mEq   Calcium Gluconate: 9 mEq   Magnesium Sulfate: 12 mEq   MVI for TPN   Trace Elements              Other Additives: None    Labs: BMP, mag, phos    Michelle Michelle RPH  Clinical Pharmacist

## 2023-11-30 NOTE — CASE MANAGEMENT/SOCIAL WORK
Continued Stay Note  [facility]     Patient Name: Martina Hardwick  MRN: [id number]  Today's Date: 11/30/2023    Admit Date: 11/20/2023    Plan: Home with , may need infusions/HH?   Discharge Plan       Row Name 11/30/23 1241       Plan    Plan Home with , may need infusions/HH?    Patient/Family in Agreement with Plan yes    Plan Comments Chart reviewed, discussed with care team, pt still with TPN, and mulitiple issues, still plans d/c home with , CCP following for possible need for infusions of TPN or HH needs. -Shavon LOPEZ                   Discharge Codes    No documentation.                 Expected Discharge Date and Time       Expected Discharge Date Expected Discharge Time    Dec 5, 2023               Shavon Bhakta RN

## 2023-11-30 NOTE — THERAPY TREATMENT NOTE
Patient Name: Martina Hardwick  : 1955    MRN: [id number]                              Today's Date: 2023       Admit Date: 2023    Visit Dx:     ICD-10-CM ICD-9-CM   1. 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    Colonic inertia    Severe malnutrition     Past Medical History:   Diagnosis Date    Arthritis     Autoantibody titer positive     Goodwin esophagus     Bloating     Cataract     BILAT    Chronic abdominal pain     Chronic constipation     Encounter for preventive health examination     Endometriosis     Gastritis     Gastrointestinal hypomotility     GERD (gastroesophageal reflux disease)     Headache, tension-type     Hypertension     Hyponatremia     Insomnia     Intestinal autonomic neuropathy     Irritable bowel syndrome     Migraine     Mixed anxiety and depressive disorder     Moderate malnutrition     Noninfectious gastroenteritis     OP (osteoporosis)     Osteopenia     Osteoporosis     PONV (postoperative nausea and vomiting) 2018    Psoriasis     KNEES, ELBOWS BILAT AND SCALP    Seasonal allergies     Visceral hyperalgesia      Past Surgical History:   Procedure Laterality Date    APPENDECTOMY      CHOLECYSTECTOMY N/A 2018    Procedure: CHOLECYSTECTOMY LAPAROSCOPIC converted to open procedure;  Surgeon: Henran Hinds MD;  Location: [facility];  Service: General    COLON RESECTION N/A 2023    Procedure: OPEN SUBTOTAL COLECTOMY AND  ADESIOLYSIS;  Surgeon: Ranjeet Salinas MD;  Location: [facility] MAIN OR;  Service: General;  Laterality: N/A;    COLON SURGERY      STATES TOTAL OF 3 COLON SURGERY    COLONOSCOPY      COLONOSCOPY N/A 12/12/2018    Procedure: COLONOSCOPY;  Surgeon: Darien Ruff MD;  Location: [facility] OR;  Service: Gastroenterology    COLONOSCOPY N/A 10/13/2023    Procedure: COLONOSCOPY TO CECUM;  Surgeon: Ranjeet Salinas MD;  Location: [facility] ENDOSCOPY;  Service: General;  Laterality: N/A;  PREOP/ CHRONIC CONSTIPATION- POSTOP/ NORMAL    DIAGNOSTIC LAPAROSCOPY  1990    DILATATION AND CURETTAGE  1985    ENDOSCOPY N/A 05/13/2016    Procedure: ESOPHAGOGASTRODUODENOSCOPY ;  Surgeon: Darien Ruff MD;  Location: [facility] OR;  Service:     ENDOSCOPY N/A 05/01/2023    Procedure: ESOPHAGOGASTRODUODENOSCOPY WITH BIOPSY;  Surgeon: Darien Ruff MD;  Location: [facility] OR;  Service: Gastroenterology;  Laterality: N/A;  Mild Thrush; Gastritis; Esophagitis- thrush and reflux; Biopsies- duodenal, gastric, esophagus    EXPLORATORY LAPAROTOMY N/A 11/27/2023    Procedure: exploratory laparotomy, small bowel resection;  Surgeon: Ranjeet Salinas MD;  Location: [facility] MAIN OR;  Service: General;  Laterality: N/A;    HYSTERECTOMY      REVISION / TAKEDOWN COLOSTOMY        General Information       Row Name 11/30/23 0946          Physical Therapy Time and Intention    Document Type therapy note (daily note)  -PH     Mode of Treatment physical therapy  -PH       Row Name 11/30/23 0946          General Information    Existing Precautions/Restrictions fall  -PH     Barriers to Rehab medically complex  -PH       Row Name 11/30/23 0946          Cognition    Orientation Status (Cognition) oriented x 3  -PH       Row Name 11/30/23 0946          Safety Issues, Functional Mobility    Impairments Affecting Function (Mobility) endurance/activity tolerance;strength;balance  -PH     Comment, Safety  Issues/Impairments (Mobility) gt belt and non skid socks donned  -PH               User Key  (r) = Recorded By, (t) = Taken By, (c) = Cosigned By      Initials Name Provider Type     Radha Rosen PTA Physical Therapist Assistant                   Mobility       Row Name 11/30/23 0946          Bed Mobility    Bed Mobility supine-sit;sit-supine  -PH     Supine-Sit Canadian (Bed Mobility) standby assist  -PH     Sit-Supine Canadian (Bed Mobility) minimum assist (75% patient effort);verbal cues  -PH     Comment, (Bed Mobility) min A for B LE to bed 2/2 abd pain  -PH       Row Name 11/30/23 0946          Sit-Stand Transfer    Sit-Stand Canadian (Transfers) standby assist  -PH     Assistive Device (Sit-Stand Transfers) walker, front-wheeled  -PH     Comment, (Sit-Stand Transfer) 2x from EOB  -PH       Row Name 11/30/23 0946          Gait/Stairs (Locomotion)    Canadian Level (Gait) contact guard  -PH     Assistive Device (Gait) walker, front-wheeled  -PH     Distance in Feet (Gait) 180'  -PH     Deviations/Abnormal Patterns (Gait) gait speed decreased;stride length decreased;stacy decreased  -PH     Canadian Level (Stairs) unable to assess  -PH     Comment, (Gait/Stairs) improved distance w/ pt req cues to avoid obstacles in room and hallway; no overt LOB w/ pain and fatigue limiting; cues for postural correction  -PH               User Key  (r) = Recorded By, (t) = Taken By, (c) = Cosigned By      Initials Name Provider Type     Radha Rosen PTA Physical Therapist Assistant                   Obj/Interventions       Row Name 11/30/23 0948          Motor Skills    Therapeutic Exercise other (see comments)  BAP, LAQ; x 10 reps  -PH       Row Name 11/30/23 0948          Balance    Balance Assessment sitting static balance;standing static balance  -PH     Static Sitting Balance standby assist  -PH     Static Standing Balance standby assist  -PH     Position/Device Used, Standing  Balance walker, front-wheeled  -PH     Comment, Balance pt stood approx 30 sec at EOB w/ improved SBA  -PH               User Key  (r) = Recorded By, (t) = Taken By, (c) = Cosigned By      Initials Name Provider Type    Radha Aguila PTA Physical Therapist Assistant                   Goals/Plan    No documentation.                  Clinical Impression       Row Name 11/30/23 0949          Pain    Pretreatment Pain Rating 10/10  -PH     Posttreatment Pain Rating 10/10  -PH     Pain Location - abdomen  -PH     Pain Intervention(s) Ambulation/increased activity;Repositioned;Rest  -PH       Row Name 11/30/23 0949          Plan of Care Review    Plan of Care Reviewed With patient  -PH     Progress improving  -PH     Outcome Evaluation Pt in bed at beg of PT tx and sat up to EOB w/ SBA. Pt stood 2x from EOB this AM req SBA and use of fww. Pt then amb around nsg unit req CGA and use of fww. Pt was fairly steady w/ no overt LOB although req cues to avoid obstacles in hallway and room. Pt performed ther ex for strengthening then returned to bed req assist for B LE to bed 2/2 abd pain. PT will prog as pt ramin.  -PH       Row Name 11/30/23 0949          Vital Signs    O2 Delivery Pre Treatment room air  -PH     O2 Delivery Intra Treatment room air  -PH     O2 Delivery Post Treatment room air  -PH       Row Name 11/30/23 0949          Positioning and Restraints    Pre-Treatment Position in bed  -PH     Post Treatment Position bed  -PH     In Bed fowlers;call light within reach;encouraged to call for assist;exit alarm on;notified nsg  declined chair  -PH               User Key  (r) = Recorded By, (t) = Taken By, (c) = Cosigned By      Initials Name Provider Type    Radha Aguila PTA Physical Therapist Assistant                   Outcome Measures       Row Name 11/30/23 0969          How much help from another person do you currently need...    Turning from your back to your side while in flat bed without  using bedrails? 4  -PH     Moving from lying on back to sitting on the side of a flat bed without bedrails? 3  -PH     Moving to and from a bed to a chair (including a wheelchair)? 3  -PH     Standing up from a chair using your arms (e.g., wheelchair, bedside chair)? 3  -PH     Climbing 3-5 steps with a railing? 2  -PH     To walk in hospital room? 3  -PH     AM-PAC 6 Clicks Score (PT) 18  -PH     Highest Level of Mobility Goal 6 --> Walk 10 steps or more  -PH       Row Name 11/30/23 0951          Functional Assessment    Outcome Measure Options AM-PAC 6 Clicks Basic Mobility (PT)  -               User Key  (r) = Recorded By, (t) = Taken By, (c) = Cosigned By      Initials Name Provider Type    Radha Aguila PTA Physical Therapist Assistant                                 Physical Therapy Education       Title: PT OT SLP Therapies (Done)       Topic: Physical Therapy (Done)       Point: Mobility training (Done)       Learning Progress Summary             Patient Acceptance, E,TB,D, VU by  at 11/30/2023 0951    Acceptance, E,TB,D, VU,DU,NR by  at 11/30/2023 0523    Acceptance, E, DU,VU by J at 11/29/2023 1353    Acceptance, E,TB,D, VU,NR by  at 11/27/2023 1207    Acceptance, E,TB,D, VU,NR by  at 11/24/2023 0908    Acceptance, E,D, VU,NR by MS at 11/22/2023 1519    Acceptance, E,D, VU,NR by MS at 11/21/2023 1125                         Point: Home exercise program (Done)       Learning Progress Summary             Patient Acceptance, E,TB,D, VU by  at 11/30/2023 0951    Acceptance, E,TB,D, VU,DU,NR by  at 11/30/2023 0523    Acceptance, E, DU,VU by J at 11/29/2023 1353    Acceptance, E,TB,D, VU,NR by  at 11/27/2023 1207    Acceptance, E,TB,D, VU,NR by  at 11/24/2023 0908    Acceptance, E,D, VU,NR by MS at 11/22/2023 1519    Acceptance, E,D, VU,NR by MS at 11/21/2023 1125                         Point: Body mechanics (Done)       Learning Progress Summary             Patient Acceptance,  E,TB,D, VU by  at 11/30/2023 0951    Acceptance, E,TB,D, VU,DU,NR by  at 11/30/2023 0523    Acceptance, E, DU,VU by J at 11/29/2023 1353    Acceptance, E,TB,D, VU,NR by PH at 11/27/2023 1207    Acceptance, E,TB,D, VU,NR by PH at 11/24/2023 0908    Acceptance, E,D, VU,NR by MS at 11/22/2023 1519    Acceptance, E,D, VU,NR by MS at 11/21/2023 1125                         Point: Precautions (Done)       Learning Progress Summary             Patient Acceptance, E,TB,D, VU by  at 11/30/2023 0951    Acceptance, E,TB,D, VU,DU,NR by  at 11/30/2023 0523    Acceptance, E, DU,VU by J at 11/29/2023 1353    Acceptance, E,TB,D, VU,NR by  at 11/27/2023 1207    Acceptance, E,TB,D, VU,NR by PH at 11/24/2023 0908    Acceptance, E,D, VU,NR by MS at 11/22/2023 1519    Acceptance, E,D, VU,NR by MS at 11/21/2023 1125                                         User Key       Initials Effective Dates Name Provider Type Discipline    MS 06/16/21 -  Miguel Beasley, PT Physical Therapist PT     06/16/21 -  Yanira Hinds, RN Registered Nurse Nurse     06/16/21 -  Radha Rosen, PTA Physical Therapist Assistant PT    J 11/08/23 -  Kenroy Deleon PTA Student PTA Student PT                  PT Recommendation and Plan     Plan of Care Reviewed With: patient  Progress: improving  Outcome Evaluation: Pt in bed at beg of PT tx and sat up to EOB w/ SBA. Pt stood 2x from EOB this AM req SBA and use of fww. Pt then amb around nsg unit req CGA and use of fww. Pt was fairly steady w/ no overt LOB although req cues to avoid obstacles in hallway and room. Pt performed ther ex for strengthening then returned to bed req assist for B LE to bed 2/2 abd pain. PT will prog as pt ramin.     Time Calculation:         PT Charges       Row Name 11/30/23 0953             Time Calculation    Start Time 0919  -PH      Stop Time 0942  -PH      Time Calculation (min) 23 min  -PH      PT Received On 11/30/23  -PH      PT - Next Appointment 12/01/23  -PH          Timed Charges    62266 - PT Therapeutic Exercise Minutes 4  -PH      99501 - PT Therapeutic Activity Minutes 19  -PH         Total Minutes    Timed Charges Total Minutes 23  -PH       Total Minutes 23  -PH                User Key  (r) = Recorded By, (t) = Taken By, (c) = Cosigned By      Initials Name Provider Type    PH Radha Rosen PTA Physical Therapist Assistant                  Therapy Charges for Today       Code Description Service Date Service Provider Modifiers Qty    26684035553 HC PT THER PROC EA 15 MIN 11/30/2023 Radha Rosen PTA GP 1    31572363962 HC PT THERAPEUTIC ACT EA 15 MIN 11/30/2023 Radha Rosen PTA GP 1            PT G-Codes  Outcome Measure Options: AM-PAC 6 Clicks Basic Mobility (PT)  AM-PAC 6 Clicks Score (PT): 18  PT Discharge Summary  Anticipated Discharge Disposition (PT): home with home health, home with assist, skilled nursing facility    Radha Rosen PTA  11/30/2023

## 2023-11-30 NOTE — PROGRESS NOTES
Colorectal & General Surgery  Progress Note    Patient: Martina Hardwick  YOB: 1955  MRN: [id number]      Assessment  Martina Hardwick is a 67 y.o. female with history of multiple abdominal operations and colonic inertia who is now postoperative day 10 from exploratory laparotomy, enterolysis, subtotal colectomy, and postoperative day 3 from reopening of recent laparotomy with small bowel resection for leaking small bowel enterotomy.  She continues to recover well.  No bowel function yet.  We will continue nasogastric tube decompression, total parenteral nutrition, intravenous pain medications, and twice daily wet-to-dry dressing changes with continuation of her MARY drains.    I have discontinued her telemetry as she seems to be stable from a cardiovascular standpoint and she is beginning to show some signs of delirium    Subjective  No acute events overnight.  Slightly confused this morning.  Pain seems to be well-controlled at this time.    Objective    Vitals:    11/30/23 1435   BP: 176/93   Pulse: 106   Resp: 18   Temp: 98.6 °F (37 °C)   SpO2: 97%       Physical Exam  Constitutional: Well-developed well-nourished, no acute distress  Neck: Supple, trachea midline  Respiratory: No increased work of breathing, Symmetric excursion  Cardiovascular: Well pefursed, no jugular venous distention evident   Abdominal: Midline incision is clean without erythema, 2 small areas are open and packed.  Soft, appropriately tender, moderately distended  Skin: Warm, dry, no rash on visualized skin surfaces  Psychiatric: Alert and oriented ×3, normal affect     Laboratory Results  I have personally reviewed BMP with creatinine 0.61, potassium 3.5, glucose 131, magnesium 1.9, phosphorus 2.7.         Osvaldo Salinas MD  Colorectal & General Surgery  [facility]    [address] [place] [address], [address]  [place], [place], [address]  P: [phone]  F: [phone]

## 2023-11-30 NOTE — PLAN OF CARE
Goal Outcome Evaluation:  Plan of Care Reviewed With: patient, spouse        Progress: no change  Outcome Evaluation: Pt A&Ox4, Iv pain meds prn given as requested, continuously reports pain as 6 or higher, room air, SR on monitor with vitals as charted, NG tube to low wall suction, MARY drain Bilat abd, vigil cath to bds drain, ambulated with PT OT , MD removed midline dressing and no new drainage noted to new dressing, educatin for out of bed to improve GI motility, PICC x2 with TPN, KUB today due to NG tube displaced (65cm verses 70 cm yesterday) ok given by MD to resume suction, new anchor dressing applied

## 2023-11-30 NOTE — PLAN OF CARE
"Goal Outcome Evaluation:  Plan of Care Reviewed With: patient        Progress: no change  Outcome Evaluation: Patient able to answer orientation questions but forgetful and having auditory halluncinations, tolrating PRN IV Dil, requests for pain medication less, patient refusing Robaxin and IV tylenol, patient became suspicious of staff stating \"everyone is acting weird\" refering to the crying man she was hearing abouut his brother dying tonight, and refused allowing labs to be drawn, will attempt later in morning, NG to LWS, IV TPN & Lipids tolerated, IV zosyn continued, turns encouraged, VSS, will CTM         "

## 2023-11-30 NOTE — PLAN OF CARE
Goal Outcome Evaluation:  Plan of Care Reviewed With: patient        Progress: improving  Outcome Evaluation: Patient in bed with bed alarm on and bed in lowest position. Patient is able to answer orientation questions but at times has confused conversation and paranoia. Patient has refused scheduled tylenol and robaxin throughout the shift. Patient has reported 10/10 pain throughout the shift but has only taken PRN pain medication one time. PICC line dressing changed, MARY drains output charted, vigil care performed, midline dressing changed. Patient was transfered to [facility] level of care and is being transferred to [facility]- report has been called.

## 2023-12-01 ENCOUNTER — APPOINTMENT (OUTPATIENT)
Dept: GENERAL RADIOLOGY | Facility: HOSPITAL | Age: 68
DRG: 329 | End: 2023-12-01
Payer: MEDICARE

## 2023-12-01 ENCOUNTER — APPOINTMENT (OUTPATIENT)
Dept: CT IMAGING | Facility: HOSPITAL | Age: 68
DRG: 329 | End: 2023-12-01
Payer: MEDICARE

## 2023-12-01 LAB
ALBUMIN SERPL-MCNC: 2.7 G/DL (ref 3.5–5.2)
ALBUMIN/GLOB SERPL: 0.9 G/DL
ALP SERPL-CCNC: 178 U/L (ref 39–117)
ALT SERPL W P-5'-P-CCNC: 92 U/L (ref 1–33)
AMMONIA BLD-SCNC: 14 UMOL/L (ref 11–51)
ANION GAP SERPL CALCULATED.3IONS-SCNC: 16 MMOL/L (ref 5–15)
ANION GAP SERPL CALCULATED.3IONS-SCNC: 16.1 MMOL/L (ref 5–15)
AST SERPL-CCNC: 77 U/L (ref 1–32)
ATMOSPHERIC PRESS: 741.8 MMHG
BASE EXCESS BLDV CALC-SCNC: 5.7 MMOL/L (ref -2–2)
BILIRUB SERPL-MCNC: 0.8 MG/DL (ref 0–1.2)
BUN SERPL-MCNC: 38 MG/DL (ref 8–23)
BUN SERPL-MCNC: 38 MG/DL (ref 8–23)
BUN/CREAT SERPL: 33.9 (ref 7–25)
BUN/CREAT SERPL: 35.8 (ref 7–25)
CALCIUM SPEC-SCNC: 9.3 MG/DL (ref 8.6–10.5)
CALCIUM SPEC-SCNC: 9.5 MG/DL (ref 8.6–10.5)
CHLORIDE SERPL-SCNC: 93 MMOL/L (ref 98–107)
CHLORIDE SERPL-SCNC: 93 MMOL/L (ref 98–107)
CO2 BLDA-SCNC: 31.2 MMOL/L (ref 23–27)
CO2 SERPL-SCNC: 24 MMOL/L (ref 22–29)
CO2 SERPL-SCNC: 24.9 MMOL/L (ref 22–29)
CREAT SERPL-MCNC: 1.06 MG/DL (ref 0.57–1)
CREAT SERPL-MCNC: 1.12 MG/DL (ref 0.57–1)
DEPRECATED RDW RBC AUTO: 45.6 FL (ref 37–54)
DEVICE COMMENT: ABNORMAL
EGFRCR SERPLBLD CKD-EPI 2021: 54 ML/MIN/1.73
EGFRCR SERPLBLD CKD-EPI 2021: 57.7 ML/MIN/1.73
ERYTHROCYTE [DISTWIDTH] IN BLOOD BY AUTOMATED COUNT: 14.1 % (ref 12.3–15.4)
GLOBULIN UR ELPH-MCNC: 3 GM/DL
GLUCOSE BLDC GLUCOMTR-MCNC: 152 MG/DL (ref 70–130)
GLUCOSE SERPL-MCNC: 106 MG/DL (ref 65–99)
GLUCOSE SERPL-MCNC: 99 MG/DL (ref 65–99)
HCO3 BLDV-SCNC: 29.9 MMOL/L (ref 22–28)
HCT VFR BLD AUTO: 25.1 % (ref 34–46.6)
HGB BLD-MCNC: 8.1 G/DL (ref 12–15.9)
MAGNESIUM SERPL-MCNC: 2.1 MG/DL (ref 1.6–2.4)
MCH RBC QN AUTO: 28.4 PG (ref 26.6–33)
MCHC RBC AUTO-ENTMCNC: 32.3 G/DL (ref 31.5–35.7)
MCV RBC AUTO: 88.1 FL (ref 79–97)
MODALITY: ABNORMAL
PCO2 BLDV: 41.4 MM HG (ref 41–51)
PH BLDV: 7.47 PH UNITS (ref 7.31–7.41)
PHOSPHATE SERPL-MCNC: 4.6 MG/DL (ref 2.5–4.5)
PLATELET # BLD AUTO: 528 10*3/MM3 (ref 140–450)
PMV BLD AUTO: 10.1 FL (ref 6–12)
PO2 BLDV: 30.9 MM HG (ref 35–45)
POTASSIUM SERPL-SCNC: 3.7 MMOL/L (ref 3.5–5.2)
POTASSIUM SERPL-SCNC: 3.7 MMOL/L (ref 3.5–5.2)
PROCALCITONIN SERPL-MCNC: 1.31 NG/ML (ref 0–0.25)
PROT SERPL-MCNC: 5.7 G/DL (ref 6–8.5)
RBC # BLD AUTO: 2.85 10*6/MM3 (ref 3.77–5.28)
SAO2 % BLDCOV: 63.2 % (ref 45–75)
SET MECH RESP RATE: 20
SODIUM SERPL-SCNC: 133 MMOL/L (ref 136–145)
SODIUM SERPL-SCNC: 134 MMOL/L (ref 136–145)
WBC NRBC COR # BLD AUTO: 35.27 10*3/MM3 (ref 3.4–10.8)

## 2023-12-01 PROCEDURE — 74177 CT ABD & PELVIS W/CONTRAST: CPT

## 2023-12-01 PROCEDURE — 80053 COMPREHEN METABOLIC PANEL: CPT | Performed by: SURGERY

## 2023-12-01 PROCEDURE — 25010000002 METHOCARBAMOL 1000 MG/10ML SOLUTION: Performed by: SURGERY

## 2023-12-01 PROCEDURE — 0 DIATRIZOATE MEGLUMINE & SODIUM PER 1 ML: Performed by: SURGERY

## 2023-12-01 PROCEDURE — 25010000002 ONDANSETRON PER 1 MG: Performed by: SURGERY

## 2023-12-01 PROCEDURE — 82948 REAGENT STRIP/BLOOD GLUCOSE: CPT

## 2023-12-01 PROCEDURE — 84145 PROCALCITONIN (PCT): CPT | Performed by: SURGERY

## 2023-12-01 PROCEDURE — 25010000002 ACETAMINOPHEN 10 MG/ML SOLUTION: Performed by: SURGERY

## 2023-12-01 PROCEDURE — 82803 BLOOD GASES ANY COMBINATION: CPT

## 2023-12-01 PROCEDURE — 87449 NOS EACH ORGANISM AG IA: CPT | Performed by: SURGERY

## 2023-12-01 PROCEDURE — 87040 BLOOD CULTURE FOR BACTERIA: CPT | Performed by: SURGERY

## 2023-12-01 PROCEDURE — 25510000001 IOPAMIDOL 61 % SOLUTION: Performed by: SURGERY

## 2023-12-01 PROCEDURE — 25010000002 PIPERACILLIN SOD-TAZOBACTAM PER 1 G: Performed by: SURGERY

## 2023-12-01 PROCEDURE — 85027 COMPLETE CBC AUTOMATED: CPT | Performed by: SURGERY

## 2023-12-01 PROCEDURE — 99223 1ST HOSP IP/OBS HIGH 75: CPT | Performed by: INTERNAL MEDICINE

## 2023-12-01 PROCEDURE — 71045 X-RAY EXAM CHEST 1 VIEW: CPT

## 2023-12-01 PROCEDURE — 25810000003 SODIUM CHLORIDE 0.9 % SOLUTION: Performed by: SURGERY

## 2023-12-01 PROCEDURE — 25010000002 ENOXAPARIN PER 10 MG: Performed by: SURGERY

## 2023-12-01 PROCEDURE — 25010000002 VANCOMYCIN PER 500 MG: Performed by: SURGERY

## 2023-12-01 PROCEDURE — 25010000002 MICAFUNGIN SODIUM 100 MG RECONSTITUTED SOLUTION 1 EACH VIAL: Performed by: SURGERY

## 2023-12-01 PROCEDURE — 84100 ASSAY OF PHOSPHORUS: CPT | Performed by: SURGERY

## 2023-12-01 PROCEDURE — 82140 ASSAY OF AMMONIA: CPT | Performed by: SURGERY

## 2023-12-01 PROCEDURE — 83735 ASSAY OF MAGNESIUM: CPT | Performed by: SURGERY

## 2023-12-01 PROCEDURE — 99024 POSTOP FOLLOW-UP VISIT: CPT | Performed by: SURGERY

## 2023-12-01 PROCEDURE — 25010000002 HYDROMORPHONE 1 MG/ML SOLUTION: Performed by: SURGERY

## 2023-12-01 RX ORDER — ALPRAZOLAM 0.5 MG/1
0.5 TABLET ORAL DAILY
Status: DISCONTINUED | OUTPATIENT
Start: 2023-12-01 | End: 2023-12-20 | Stop reason: HOSPADM

## 2023-12-01 RX ORDER — SODIUM CHLORIDE 9 MG/ML
75 INJECTION, SOLUTION INTRAVENOUS CONTINUOUS
Status: DISCONTINUED | OUTPATIENT
Start: 2023-12-01 | End: 2023-12-05

## 2023-12-01 RX ORDER — QUETIAPINE FUMARATE 25 MG/1
25 TABLET, FILM COATED ORAL ONCE
Qty: 1 TABLET | Refills: 0 | Status: DISCONTINUED | OUTPATIENT
Start: 2023-12-01 | End: 2023-12-01

## 2023-12-01 RX ORDER — VANCOMYCIN HYDROCHLORIDE 1 G/200ML
1000 INJECTION, SOLUTION INTRAVENOUS ONCE
Status: COMPLETED | OUTPATIENT
Start: 2023-12-01 | End: 2023-12-01

## 2023-12-01 RX ORDER — QUETIAPINE FUMARATE 50 MG/1
100 TABLET, EXTENDED RELEASE ORAL NIGHTLY
Status: DISCONTINUED | OUTPATIENT
Start: 2023-12-01 | End: 2023-12-17

## 2023-12-01 RX ADMIN — ONDANSETRON 4 MG: 2 INJECTION INTRAMUSCULAR; INTRAVENOUS at 17:18

## 2023-12-01 RX ADMIN — HYDROMORPHONE HYDROCHLORIDE 1 MG: 1 INJECTION, SOLUTION INTRAMUSCULAR; INTRAVENOUS; SUBCUTANEOUS at 17:17

## 2023-12-01 RX ADMIN — VANCOMYCIN HYDROCHLORIDE 1000 MG: 1 INJECTION, SOLUTION INTRAVENOUS at 22:13

## 2023-12-01 RX ADMIN — MICAFUNGIN SODIUM 100 MG: 100 INJECTION, POWDER, LYOPHILIZED, FOR SOLUTION INTRAVENOUS at 19:03

## 2023-12-01 RX ADMIN — Medication 10 ML: at 01:11

## 2023-12-01 RX ADMIN — HYDROMORPHONE HYDROCHLORIDE 1 MG: 1 INJECTION, SOLUTION INTRAMUSCULAR; INTRAVENOUS; SUBCUTANEOUS at 19:12

## 2023-12-01 RX ADMIN — METHOCARBAMOL 500 MG: 100 INJECTION INTRAMUSCULAR; INTRAVENOUS at 22:02

## 2023-12-01 RX ADMIN — HYDROMORPHONE HYDROCHLORIDE 1 MG: 1 INJECTION, SOLUTION INTRAMUSCULAR; INTRAVENOUS; SUBCUTANEOUS at 21:04

## 2023-12-01 RX ADMIN — SODIUM CHLORIDE 1000 ML: 9 INJECTION, SOLUTION INTRAVENOUS at 18:02

## 2023-12-01 RX ADMIN — Medication 10 ML: at 01:10

## 2023-12-01 RX ADMIN — PIPERACILLIN SODIUM AND TAZOBACTAM SODIUM 3.38 G: 3; .375 INJECTION, SOLUTION INTRAVENOUS at 23:16

## 2023-12-01 RX ADMIN — IOPAMIDOL 85 ML: 612 INJECTION, SOLUTION INTRAVENOUS at 21:26

## 2023-12-01 RX ADMIN — ALPRAZOLAM 0.5 MG: 0.5 TABLET ORAL at 19:03

## 2023-12-01 RX ADMIN — ACETAMINOPHEN 1000 MG: 10 INJECTION, SOLUTION INTRAVENOUS at 23:27

## 2023-12-01 RX ADMIN — PIPERACILLIN SODIUM AND TAZOBACTAM SODIUM 3.38 G: 3; .375 INJECTION, SOLUTION INTRAVENOUS at 01:10

## 2023-12-01 RX ADMIN — HYDROMORPHONE HYDROCHLORIDE 1 MG: 1 INJECTION, SOLUTION INTRAMUSCULAR; INTRAVENOUS; SUBCUTANEOUS at 01:07

## 2023-12-01 RX ADMIN — DIATRIZOATE MEGLUMINE AND DIATRIZOATE SODIUM 30 ML: 660; 100 LIQUID ORAL; RECTAL at 20:16

## 2023-12-01 RX ADMIN — QUETIAPINE FUMARATE 100 MG: 50 TABLET, EXTENDED RELEASE ORAL at 22:02

## 2023-12-01 RX ADMIN — ENOXAPARIN SODIUM 30 MG: 100 INJECTION SUBCUTANEOUS at 17:25

## 2023-12-01 RX ADMIN — SODIUM CHLORIDE 100 ML/HR: 9 INJECTION, SOLUTION INTRAVENOUS at 21:03

## 2023-12-01 NOTE — NURSING NOTE
Patient has pulled out her PICC line, no longer have access for TPN or IV abx. Earlier just prior to shift change she removed her NG tube, she states she wants the other tubes removed because she just wants to die. Have call out to notify MD of situation and see what needs to be done from here.

## 2023-12-01 NOTE — NURSING NOTE
"Found pt to have again pulled out her recently placed IV, disconnected her Vigil cath from the bag, removed her abd dressing, is trying repeatedly to get out of the bed, she sees \"men walking around\" and is hearing things claims the staff are all cult members. Reattached vigil bag, cleaned and re dressed abd midline incision, re applied abd binder.   "

## 2023-12-01 NOTE — CONSULTS
"Referring Provider: Ranjeet Salinas MD  [address]  [address] [address]    Reason for Consultation: Leukocytosis 35,000 s/p abdominal surgery     History of present illness:  Martina Hardwick is a 67 y.o. who I am asked to evaluate on hospital day #11 and give opinion for \"Leukocytosis 35,000 s/p abdominal surgery.\" History is obtained from the patient though quite limited due to her willingness to interact during the interview. 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 She was seen in general surgery clinic on 11/1/23 and plans were made for right sided colectomy. On 11/20/23, she underwent ex-lap with enterolysis, subtotal colectomy, and mobilization of the splenic flexure per my review of the op note which describes her abdomen as hostile due to prior surgeries and adhesive disease. She was started on TPN.     On 11/27/23 she developed a leukocytosis. CT was ordered and showed an abscess due to a leak. Blood cultures were drawn and those are negative. On 11/27 she returned to the OR for enterectomy with enteroenterostomy and washout of a 10 cm abscess. She was started on Zosyn post-operatively.     She has remained afebrile but WBC has continued to trend upwards as has the heart rate. Therefore her primary surgical team has ordered an infectious diseases workup and add vancomycin and micafungin.     There have also been troubles with paranoia. She has removed her PICC line. Psychiatry has been consulted.     She tells me \"everything hurts\" and when I try to ask more specific follow up questions she just keeps saying \"everything.\" She would not allow me to perform an exam    Past " Medical History:   Diagnosis Date    Arthritis     Autoantibody titer positive     Goodwin esophagus     Bloating     Cataract     BILAT    Chronic abdominal pain     Chronic constipation     Encounter for preventive health examination     Endometriosis     Gastritis     Gastrointestinal hypomotility     GERD (gastroesophageal reflux disease)     Headache, tension-type     Hypertension     Hyponatremia     Insomnia     Intestinal autonomic neuropathy     Irritable bowel syndrome     Migraine     Mixed anxiety and depressive disorder     Moderate malnutrition     Noninfectious gastroenteritis     OP (osteoporosis)     Osteopenia     Osteoporosis     PONV (postoperative nausea and vomiting) 08/17/2018    Psoriasis     KNEES, ELBOWS BILAT AND SCALP    Seasonal allergies     Visceral hyperalgesia        Past Surgical History:   Procedure Laterality Date    APPENDECTOMY      CHOLECYSTECTOMY N/A 08/17/2018    Procedure: CHOLECYSTECTOMY LAPAROSCOPIC converted to open procedure;  Surgeon: Hernan Hinds MD;  Location: [facility] OR;  Service: General    COLON RESECTION N/A 11/20/2023    Procedure: OPEN SUBTOTAL COLECTOMY AND ADESIOLYSIS;  Surgeon: Ranjeet Salinas MD;  Location: [facility] MAIN OR;  Service: General;  Laterality: N/A;    COLON SURGERY      STATES TOTAL OF 3 COLON SURGERY    COLONOSCOPY      COLONOSCOPY N/A 12/12/2018    Procedure: COLONOSCOPY;  Surgeon: Darien Ruff MD;  Location: [facility] OR;  Service: Gastroenterology    COLONOSCOPY N/A 10/13/2023    Procedure: COLONOSCOPY TO CECUM;  Surgeon: Ranjeet Salinas MD;  Location: [facility] ENDOSCOPY;  Service: General;  Laterality: N/A;  PREOP/ CHRONIC CONSTIPATION- POSTOP/ NORMAL    DIAGNOSTIC LAPAROSCOPY  1990    DILATATION AND CURETTAGE  1985    ENDOSCOPY N/A 05/13/2016    Procedure: ESOPHAGOGASTRODUODENOSCOPY ;  Surgeon: Darien Ruff MD;  Location: [facility] OR;  Service:     ENDOSCOPY N/A 05/01/2023    Procedure:  ESOPHAGOGASTRODUODENOSCOPY WITH BIOPSY;  Surgeon: Darien Ruff MD;  Location: [facility] OR;  Service: Gastroenterology;  Laterality: N/A;  Mild Thrush; Gastritis; Esophagitis- thrush and reflux; Biopsies- duodenal, gastric, esophagus    EXPLORATORY LAPAROTOMY N/A 11/27/2023    Procedure: exploratory laparotomy, small bowel resection;  Surgeon: Ranjeet Salinas MD;  Location: [facility] MAIN OR;  Service: General;  Laterality: N/A;    HYSTERECTOMY      REVISION / TAKEDOWN COLOSTOMY         Antibiotic allergies and intolerances:    Sulfa - n/v    Medications:    Current Facility-Administered Medications:     acetaminophen (OFIRMEV) injection 1,000 mg, 1,000 mg, Intravenous, Q6H, Ranjeet Salinas MD, 1,000 mg at 11/29/23 2137    Enoxaparin Sodium (LOVENOX) syringe 30 mg, 30 mg, Subcutaneous, Q24H, Ranjeet Salinas MD, 30 mg at 11/30/23 1449    haloperidol lactate (HALDOL) injection 1 mg, 1 mg, Intramuscular, Once PRN, Sarah Pearl MD    HYDROmorphone (DILAUDID) injection 1 mg, 1 mg, Intravenous, Q2H PRN, America Villalobos MD, 1 mg at 12/01/23 0107    Methocarbamol (ROBAXIN) injection 500 mg, 500 mg, Intravenous, Q6H, Ranjeet Salinas MD, 500 mg at 11/29/23 2137    metoprolol tartrate (LOPRESSOR) injection 5 mg, 5 mg, Intravenous, Q6H, Ranjeet Salinas MD, 5 mg at 11/30/23 1451    micafungin sodium (MYCAMINE) 100 mg in sodium chloride 0.9 % 100 mL IVPB-VTB, 100 mg, Intravenous, Q24H, Ranjeet Salinas MD    [DISCONTINUED] ondansetron (ZOFRAN) tablet 4 mg, 4 mg, Oral, Q6H PRN **OR** ondansetron (ZOFRAN) injection 4 mg, 4 mg, Intravenous, Q6H PRN, Ranjeet Salinas MD, 4 mg at 11/30/23 1844    pantoprazole (PROTONIX) injection 40 mg, 40 mg, Intravenous, Q AM, Ranjeet Salinas MD, 40 mg at 11/30/23 0511    Pharmacy to dose vancomycin, , Does not apply, Continuous PRN, Ranjeet Salinas MD    piperacillin-tazobactam (ZOSYN) 3.375 g in  "iso-osmotic dextrose 50 ml (premix), 3.375 g, Intravenous, Q8H, Ranjeet Salinas MD, 3.375 g at 12/01/23 0110    QUEtiapine (SEROquel) tablet 25 mg, 25 mg, Oral, Once, Ranjeet Salinas MD    QUEtiapine fumarate ER (SEROquel XR) tablet 100 mg, 100 mg, Oral, Nightly, Ranjeet Salinas MD    sodium chloride 0.9 % bolus 1,000 mL, 1,000 mL, Intravenous, Once, Ranjeet Salinas MD    vancomycin (VANCOCIN) 1000 mg/200 mL dextrose 5% IVPB, 1,000 mg, Intravenous, Once, Ranjeet Salinas MD      Objective   Vital Signs   Temp:  [96.8 °F (36 °C)-99.2 °F (37.3 °C)] 97.3 °F (36.3 °C)  Heart Rate:  [100-129] 114  Resp:  [18] 18  BP: (101-177)/(61-93) 143/75    Physical Exam:   General: awake, alert, in chair  Eyes: no scleral icterus  Cardiovascular: tachycardic  Respiratory: normal work of breathing on ambient air  GI: refused exam  :  no Hodge catheter  Skin: No rashes  Psych: paranoid behavior  Vasc: no current IV; removed her own PICC line    Labs:     Lab Results   Component Value Date    WBC 35.27 (C) 12/01/2023    HGB 8.1 (L) 12/01/2023    HCT 25.1 (L) 12/01/2023    MCV 88.1 12/01/2023     (H) 12/01/2023       Lab Results   Component Value Date    GLUCOSE 106 (H) 12/01/2023    GLUCOSE 99 12/01/2023    BUN 38 (H) 12/01/2023    BUN 38 (H) 12/01/2023    CREATININE 1.06 (H) 12/01/2023    CREATININE 1.12 (H) 12/01/2023    EGFRIFNONA 78 12/13/2021    EGFRIFAFRI 90 12/13/2021    BCR 35.8 (H) 12/01/2023    BCR 33.9 (H) 12/01/2023    CO2 24.9 12/01/2023    CO2 24.0 12/01/2023    CALCIUM 9.3 12/01/2023    CALCIUM 9.5 12/01/2023    PROTENTOTREF 7.3 08/24/2023    ALBUMIN 2.7 (L) 12/01/2023    LABIL2 1.7 08/24/2023    AST 77 (H) 12/01/2023    ALT 92 (H) 12/01/2023     Procal 1.3    Microbiology:  11/27 BCx: NGTD  12/1 BCx: pending    EKG personally reviewed w/ QTc 394 ms    Radiology:  11/27/23 CT A/P:  \"1. FINDINGS compatible with abscess located posterior to the liver, likely within the " "retroperitoneal space with contrast tracking from the colon, possibly indicating perforation/leak      2. Small amount of intraperitoneal free gas      3. Small amount of ascites      4. Gas in the ventral abdominal wall. Correlate for history of recent instrumentation      5. Small pleural effusions      6. Gas in the urinary bladder, which could be secondary to recent instrumentation      7. Prominent bowel loops, possibly ileus \"    12/1 CXR personally reviewed and shows a R pleural effusion    ASSESSMENT/PLAN:  Leukocytosis  Tachycardia  S/p colectomy for colonic inertia  Post-operative leak and abscess  History of multiple abdominal surgeries due to diverticular disease  On TPN  Paranoid behavior    Difficult situation as she would not really participate in the history taking or the exam. She kept asking me if I was \"in on the crime\" or \"part of the scam.\" I assured her that I was here to help take care of her and evaluate and treat her for an infection concerns. Psychiatry has been consulted.     I do agree with the infection workup as begun by her primary team. I will follow-up the results of her cultures. Addition of vancomycin and micafungin is reasonable given concerns for intra-abdominal infection, TPN w/ PICC line, and prolonged hospital stay. We could also consider repeating a CT scan of the abdomen/pelvis and/or IR-guided thoracentesis. Check CBC and CMP in the AM for monitoring.     While the patient is on vancomycin, vancomycin levels will be ordered and reviewed with dose adjustments made as needed. The patient will have a daily creatinine level checked while on vancomycin as part of monitoring this medication.     ID will follow.     "

## 2023-12-01 NOTE — PLAN OF CARE
Goal Outcome Evaluation:  Plan of Care Reviewed With: patient           Outcome Evaluation: VSS ex heart rate tachy, incision dresssing changed, PICC line and IV lines both pulled out, F/C disconnected from bag, Made MD aware of situation and placed ab binder over incision and MARY drains, had a smear of an BM, Dilaudid given once for complaint 10/10 on pain, meds unable to give d/t lack of IV access, necessary to sit at bedside to calm and reassure pt, continuing to monitor.

## 2023-12-01 NOTE — PROGRESS NOTES
Colorectal & General Surgery  Progress Note    Patient: Martina Hardwick  YOB: 1955  MRN: [id number]      Assessment  Martina Hardwick is a 67 y.o. female with history of multiple abdominal operations and colonic inertia who is now postoperative day 11 from exploratory laparotomy, significant enterolysis, and subtotal colectomy complicated by leak from small bowel enterotomy and is now postoperative day 4 from reopening of recent laparotomy, small bowel resection.  Given her tachycardia and leukocytosis of 35,000, I am concerned that she has persistent intra-abdominal sepsis possibly due to leak from her small bowel anastomosis or one of the unavoidable enterotomies that was repaired at the initial operation.  Complicating this, she has been on TPN, which puts her at high risk for bacteremia and fungemia.  I have initiated an infectious workup including blood cultures, urinalysis with reflex urine culture, chest x-ray, and CT scan of her abdomen and pelvis with intravenous and oral contrast.  I have continued her empiric Zosyn and added vancomycin and micafungin.  I have asked our infectious disease colleagues to see her for their thoughts.  I will keep her n.p.o. except for medications and continue her drains.    Complicating all of this, she has developed severe paranoia.  She believes that everyone here in the hospital is attempting to harm her.  She has removed her IV access, including her PICC line and has disconnected her Hodge catheter from the collection bag.  She is also removed her nasogastric tube.  She refuses to have any of this replaced at this time.  She also refused to allow me to examine her today.  I have started low-dose Seroquel and have placed a consult for inpatient psychiatry to assist in managing this paranoia.  I have ordered a sitter to be with her at all times.    I have also called her  to try to explain this entire situation to him, but he did not answer the phone.  I  will continue to try to call him throughout the day.      Subjective  She became incredibly paranoid overnight, removed her IVs, her PICC line, and her nasogastric tube.  This morning, she is sitting in the chair calm and apparently comfortable.  She tells me that she thinks I am trying to kill her and does not trust anyone at the hospital as we are all in on the plot to kill her.  She is oriented to time, place, person, and situation.    Objective    Vitals:    12/01/23 0443   BP: 101/61   Pulse: 101   Resp: 18   Temp: 96.8 °F (36 °C)   SpO2: 99%       Physical Exam  Constitutional: Sitting comfortably in chair  Neck: Supple, trachea midline  Respiratory: No increased work of breathing, Symmetric excursion  Cardiovascular: Well pefursed, no jugular venous distention evident   Abdominal: Patient did not allow me to examine  Skin: Visualized surfaces normal  Psychiatric: Alert and oriented × 4, paranoid affect    Laboratory Results  I have personally reviewed CBC with WBC 35, hemoglobin 8, platelets 528.  Procalcitonin 1.31.  BMP with creatinine 1.06, potassium 3.7, bicarb 24, glucose 106.    Radiology  None to review.          Osvaldo Salinas MD  Colorectal & General Surgery  [facility]    [address], [address]  [place], [place], [address]  P: [phone]  F: [phone]

## 2023-12-01 NOTE — PROGRESS NOTES
"Nutrition Services    Patient Name:  Martina Hardwick  YOB: 1955  MRN: [id number]  Admit Date:  11/20/2023    Assessment Date:  12/01/23    Summary: TPN Follow Up     POD#11 s/p ex lap, significant enterolysis, subtotal colectomy complicated by leak from SB enterotomy and is now POD#4 s/p reopening of recent laparotomy, SB resection.  Persistent intra-abdominal sepsis.      Patient now paranoid.  Thinks everyone here is trying to harm her.  She has pulled out her PICC line, IV, vigil catheter and NG tube.  Now with sitter and psych to see patient.    REC:  If PICC is able to be replaced, recommend starting same TPN regimen at goal (70 g AA / 1200 kcal dextrose / 100 kcal lipids).  Advance diet as tolerated and once medically appropriate per MD.    RD to follow per protocol.    CLINICAL NUTRITION ASSESSMENT      Reason for Assessment TPN F/up      Diagnosis/Problem Colonic inertia      Anthropometrics        Current Height  Current Weight  BMI kg/m2 Height: 157.5 cm (62\")  Weight: 43.5 kg (95 lb 14.4 oz) (11/30/23 1800)  Body mass index is 17.54 kg/m².   Adjusted BMI (if applicable)    BMI Category Underweight (18.4 or below)   Ideal Body Weight (IBW) 50.1 kg    Usual Body Weight (UBW)  lbs   Weight Trend Stable; pt endorses 26 lb wt loss x 3 yrs      Estimated Requirements         Weight used  46.6 kg     Calories  6628-8580 (30 kcal/kg, 35 kcal/kg)    Protein  56-70 (1.2 - 1.5 gm/kg)    Fluid  1 mL/kcal     Labs       Pertinent Labs    Results from last 7 days   Lab Units 12/01/23  0534 11/30/23  1102 11/29/23  0508 11/28/23  0651   SODIUM mmol/L 133*  134* 131* 133* 131*   POTASSIUM mmol/L 3.7  3.7 3.5 4.2 5.2   CHLORIDE mmol/L 93*  93* 93* 98 99   CO2 mmol/L 24.0  24.9 29.1* 27.7 21.0*   BUN mg/dL 38*  38* 16 18 17   CREATININE mg/dL 1.12*  1.06* 0.61 0.57 0.63   CALCIUM mg/dL 9.5  9.3 9.0 9.0 9.2   BILIRUBIN mg/dL 0.8  --   --  0.4   ALK PHOS U/L 178*  --   --  126*   ALT (SGPT) U/L 92* " " --   --  41*   AST (SGOT) U/L 77*  --   --  38*   GLUCOSE mg/dL 99  106* 131* 154* 213*     Results from last 7 days   Lab Units 12/01/23  0534 11/30/23  1102 11/29/23  0508 11/26/23  0705 11/25/23  0618   MAGNESIUM mg/dL 2.1 1.9 2.3   < > 1.8   PHOSPHORUS mg/dL 4.6* 2.7 2.8   < > 3.4   HEMOGLOBIN g/dL 8.1*  --   --    < > 10.6*   HEMATOCRIT % 25.1*  --   --    < > 31.2*   WBC 10*3/mm3 35.27*  --   --    < > 5.81   TRIGLYCERIDES mg/dL  --   --   --   --  60   ALBUMIN g/dL 2.7*  --   --    < >  --     < > = values in this interval not displayed.     Results from last 7 days   Lab Units 12/01/23  0534 11/28/23  0651 11/27/23  0418 11/26/23  0705 11/25/23  0618   PLATELETS 10*3/mm3 528* 245 142 138* 126*     No results found for: \"COVID19\"  Lab Results   Component Value Date    HGBA1C 5.3 06/20/2022          Medications           Scheduled Medications acetaminophen, 1,000 mg, Intravenous, Q6H  enoxaparin, 30 mg, Subcutaneous, Q24H  Methocarbamol, 500 mg, Intravenous, Q6H  metoprolol tartrate, 5 mg, Intravenous, Q6H  micafungin (MYCAMINE) IV, 100 mg, Intravenous, Q24H  pantoprazole, 40 mg, Intravenous, Q AM  piperacillin-tazobactam, 3.375 g, Intravenous, Q8H  QUEtiapine, 25 mg, Oral, Once  QUEtiapine fumarate ER, 100 mg, Oral, Nightly  sodium chloride, 1,000 mL, Intravenous, Once  vancomycin, 1,000 mg, Intravenous, Once       Infusions Pharmacy to dose vancomycin,        PRN Medications   haloperidol lactate    HYDROmorphone    [DISCONTINUED] ondansetron **OR** ondansetron    Pharmacy to dose vancomycin     Physical Findings          General Findings confused, disoriented, underweight   Oral/Mouth Cavity WDL   Edema  no edema   Gastrointestinal hypoactive bowel sounds, nausea, non-distended , passing flatus, last bowel movement: 11/29   Skin  bruising, pale, surgical incision: abdomen incision    Tubes/Drains/Lines drain, RLQ and LLQ     NFPE See Malnutrition Severity Assessment, Date Completed: 11/21     Malnutrition " Severity Assessment      Patient meets criteria for : Severe Malnutrition (Pt meets ASPEN/AND criteria for nutrition dx of severe malnutrition of acute disease related to energy intake, muscle wasting, and fat loss.)       Current Nutrition Orders & Evaluation of Intake       Oral Nutrition     Current PO Diet NPO   Supplement N/A   PO Evaluation     Trending % PO Intake NPO    Factors Affecting Intake  altered GI function, decreased appetite, nausea      PES STATEMENT / NUTRITION DIAGNOSIS      Nutrition Dx Problem  Problem: Malnutrition (severe)  Etiology: Medical Diagnosis - Colonic inertia and Factors Affecting Nutrition - altered GI fxn, decreased appetite, abdominal pain, nausea     Signs/Symptoms: NPO, NFPE Results, and Unintended Weight Change     NUTRITION INTERVENTION / PLAN OF CARE      Intervention Goal(s) Maintain nutrition status, Reduce/improve symptoms, Meet estimated needs, Disease management/therapy, Initiate TF/PN, Transition PN to PO, and Appropriate weight gain         RD Intervention/Action Await initiation/advancement of PO diet, Await initiation of EN/PN, Continue to monitor, and Care plan reviewed   --      Prescription/Orders:       PO Diet ADAT      Supplements       Enteral Nutrition       Parenteral Nutrition    Parenteral Prescription:     TPN Route PICC   TPN Rate (mL/hr)    TPN Recommendation:        Dextrose (kcal) 1200       Amino Acid (gm) 70       Lipid Concentration 20%       Lipid Volume/Frequency  100 mL   Propofol Rate/Kcal    TPN Provision:  1680 kcal, 70 gm protein        Calories 100% needs met        Protein  100% needs met        Fluid 1710 mL       New Prescription Ordered? No   --      Monitor/Evaluation Per protocol, I&O, Pertinent labs, PN delivery/tolerance, Weight, Skin status, GI status, Symptoms   Discharge Plan/Needs Pending clinical course   --    RD to follow per protocol.    Electronically signed by:  Victorina Caceres RD, Dietitian Intern   12/01/23 15:10 EST

## 2023-12-02 LAB
ABO GROUP BLD: NORMAL
ALBUMIN SERPL-MCNC: 2.2 G/DL (ref 3.5–5.2)
ALP SERPL-CCNC: 153 U/L (ref 39–117)
ALT SERPL W P-5'-P-CCNC: 87 U/L (ref 1–33)
ANION GAP SERPL CALCULATED.3IONS-SCNC: 9.7 MMOL/L (ref 5–15)
AST SERPL-CCNC: 67 U/L (ref 1–32)
BACTERIA SPEC AEROBE CULT: NORMAL
BACTERIA SPEC AEROBE CULT: NORMAL
BASOPHILS # BLD AUTO: 0.03 10*3/MM3 (ref 0–0.2)
BASOPHILS NFR BLD AUTO: 0.1 % (ref 0–1.5)
BILIRUB CONJ SERPL-MCNC: 0.4 MG/DL (ref 0–0.3)
BILIRUB INDIRECT SERPL-MCNC: 0.2 MG/DL
BILIRUB SERPL-MCNC: 0.6 MG/DL (ref 0–1.2)
BLD GP AB SCN SERPL QL: NEGATIVE
BUN SERPL-MCNC: 41 MG/DL (ref 8–23)
BUN/CREAT SERPL: 41 (ref 7–25)
CALCIUM SPEC-SCNC: 8.3 MG/DL (ref 8.6–10.5)
CHLORIDE SERPL-SCNC: 101 MMOL/L (ref 98–107)
CO2 SERPL-SCNC: 23.3 MMOL/L (ref 22–29)
CREAT SERPL-MCNC: 1 MG/DL (ref 0.57–1)
DEPRECATED RDW RBC AUTO: 47.5 FL (ref 37–54)
EGFRCR SERPLBLD CKD-EPI 2021: 61.9 ML/MIN/1.73
EOSINOPHIL # BLD AUTO: 0.08 10*3/MM3 (ref 0–0.4)
EOSINOPHIL NFR BLD AUTO: 0.4 % (ref 0.3–6.2)
ERYTHROCYTE [DISTWIDTH] IN BLOOD BY AUTOMATED COUNT: 14.5 % (ref 12.3–15.4)
GLUCOSE SERPL-MCNC: 96 MG/DL (ref 65–99)
HCT VFR BLD AUTO: 18 % (ref 34–46.6)
HCT VFR BLD AUTO: 19.3 % (ref 34–46.6)
HGB BLD-MCNC: 5.9 G/DL (ref 12–15.9)
HGB BLD-MCNC: 6 G/DL (ref 12–15.9)
LYMPHOCYTES # BLD AUTO: 1.75 10*3/MM3 (ref 0.7–3.1)
LYMPHOCYTES NFR BLD AUTO: 8.3 % (ref 19.6–45.3)
MAGNESIUM SERPL-MCNC: 2.2 MG/DL (ref 1.6–2.4)
MCH RBC QN AUTO: 29.2 PG (ref 26.6–33)
MCHC RBC AUTO-ENTMCNC: 32.8 G/DL (ref 31.5–35.7)
MCV RBC AUTO: 89.1 FL (ref 79–97)
MONOCYTES # BLD AUTO: 1.85 10*3/MM3 (ref 0.1–0.9)
MONOCYTES NFR BLD AUTO: 8.8 % (ref 5–12)
NEUTROPHILS NFR BLD AUTO: 16.24 10*3/MM3 (ref 1.7–7)
NEUTROPHILS NFR BLD AUTO: 77.6 % (ref 42.7–76)
PHOSPHATE SERPL-MCNC: 3.3 MG/DL (ref 2.5–4.5)
PLATELET # BLD AUTO: 521 10*3/MM3 (ref 140–450)
PMV BLD AUTO: 9.9 FL (ref 6–12)
POTASSIUM SERPL-SCNC: 3.8 MMOL/L (ref 3.5–5.2)
PROCALCITONIN SERPL-MCNC: 2.68 NG/ML (ref 0–0.25)
PROT SERPL-MCNC: 4.8 G/DL (ref 6–8.5)
RBC # BLD AUTO: 2.02 10*6/MM3 (ref 3.77–5.28)
RH BLD: POSITIVE
SODIUM SERPL-SCNC: 134 MMOL/L (ref 136–145)
T&S EXPIRATION DATE: NORMAL
VANCOMYCIN SERPL-MCNC: 15.9 MCG/ML (ref 5–40)
WBC NRBC COR # BLD AUTO: 20.96 10*3/MM3 (ref 3.4–10.8)

## 2023-12-02 PROCEDURE — 25010000002 MICAFUNGIN SODIUM 100 MG RECONSTITUTED SOLUTION 1 EACH VIAL: Performed by: SURGERY

## 2023-12-02 PROCEDURE — 36430 TRANSFUSION BLD/BLD COMPNT: CPT

## 2023-12-02 PROCEDURE — 86850 RBC ANTIBODY SCREEN: CPT | Performed by: SURGERY

## 2023-12-02 PROCEDURE — 84100 ASSAY OF PHOSPHORUS: CPT | Performed by: SURGERY

## 2023-12-02 PROCEDURE — 86900 BLOOD TYPING SEROLOGIC ABO: CPT

## 2023-12-02 PROCEDURE — 80076 HEPATIC FUNCTION PANEL: CPT | Performed by: INTERNAL MEDICINE

## 2023-12-02 PROCEDURE — 80202 ASSAY OF VANCOMYCIN: CPT | Performed by: SURGERY

## 2023-12-02 PROCEDURE — 83735 ASSAY OF MAGNESIUM: CPT | Performed by: SURGERY

## 2023-12-02 PROCEDURE — 99233 SBSQ HOSP IP/OBS HIGH 50: CPT | Performed by: INTERNAL MEDICINE

## 2023-12-02 PROCEDURE — 25010000002 ONDANSETRON PER 1 MG: Performed by: SURGERY

## 2023-12-02 PROCEDURE — 85014 HEMATOCRIT: CPT | Performed by: SURGERY

## 2023-12-02 PROCEDURE — P9016 RBC LEUKOCYTES REDUCED: HCPCS

## 2023-12-02 PROCEDURE — 25010000002 PIPERACILLIN SOD-TAZOBACTAM PER 1 G: Performed by: SURGERY

## 2023-12-02 PROCEDURE — 86901 BLOOD TYPING SEROLOGIC RH(D): CPT | Performed by: SURGERY

## 2023-12-02 PROCEDURE — 86923 COMPATIBILITY TEST ELECTRIC: CPT

## 2023-12-02 PROCEDURE — 25010000002 PROCHLORPERAZINE 10 MG/2ML SOLUTION: Performed by: SURGERY

## 2023-12-02 PROCEDURE — 85018 HEMOGLOBIN: CPT | Performed by: SURGERY

## 2023-12-02 PROCEDURE — 80048 BASIC METABOLIC PNL TOTAL CA: CPT | Performed by: SURGERY

## 2023-12-02 PROCEDURE — 25010000002 VANCOMYCIN 750 MG RECONSTITUTED SOLUTION 1 EACH VIAL: Performed by: SURGERY

## 2023-12-02 PROCEDURE — 25010000002 ENOXAPARIN PER 10 MG: Performed by: SURGERY

## 2023-12-02 PROCEDURE — 25010000002 METHOCARBAMOL 1000 MG/10ML SOLUTION: Performed by: SURGERY

## 2023-12-02 PROCEDURE — 25010000002 ACETAMINOPHEN 10 MG/ML SOLUTION: Performed by: SURGERY

## 2023-12-02 PROCEDURE — 86900 BLOOD TYPING SEROLOGIC ABO: CPT | Performed by: SURGERY

## 2023-12-02 PROCEDURE — 25010000002 HYDROMORPHONE 1 MG/ML SOLUTION: Performed by: SURGERY

## 2023-12-02 PROCEDURE — 25810000003 SODIUM CHLORIDE 0.9 % SOLUTION 250 ML FLEX CONT: Performed by: SURGERY

## 2023-12-02 PROCEDURE — 84145 PROCALCITONIN (PCT): CPT | Performed by: SURGERY

## 2023-12-02 PROCEDURE — 85025 COMPLETE CBC W/AUTO DIFF WBC: CPT | Performed by: SURGERY

## 2023-12-02 PROCEDURE — 99024 POSTOP FOLLOW-UP VISIT: CPT | Performed by: SURGERY

## 2023-12-02 RX ORDER — PROCHLORPERAZINE EDISYLATE 5 MG/ML
5 INJECTION INTRAMUSCULAR; INTRAVENOUS EVERY 6 HOURS PRN
Status: DISCONTINUED | OUTPATIENT
Start: 2023-12-02 | End: 2023-12-19

## 2023-12-02 RX ORDER — PAROXETINE HYDROCHLORIDE 20 MG/1
40 TABLET, FILM COATED ORAL DAILY
Status: DISCONTINUED | OUTPATIENT
Start: 2023-12-02 | End: 2023-12-20 | Stop reason: HOSPADM

## 2023-12-02 RX ORDER — ALPRAZOLAM 0.5 MG/1
0.5 TABLET ORAL 2 TIMES DAILY PRN
Status: DISPENSED | OUTPATIENT
Start: 2023-12-02 | End: 2023-12-09

## 2023-12-02 RX ADMIN — METHOCARBAMOL 500 MG: 100 INJECTION INTRAMUSCULAR; INTRAVENOUS at 08:48

## 2023-12-02 RX ADMIN — PANTOPRAZOLE SODIUM 40 MG: 40 INJECTION, POWDER, FOR SOLUTION INTRAVENOUS at 05:47

## 2023-12-02 RX ADMIN — ACETAMINOPHEN 1000 MG: 10 INJECTION, SOLUTION INTRAVENOUS at 19:25

## 2023-12-02 RX ADMIN — PIPERACILLIN SODIUM AND TAZOBACTAM SODIUM 3.38 G: 3; .375 INJECTION, SOLUTION INTRAVENOUS at 05:47

## 2023-12-02 RX ADMIN — ACETAMINOPHEN 1000 MG: 10 INJECTION, SOLUTION INTRAVENOUS at 12:32

## 2023-12-02 RX ADMIN — MICAFUNGIN SODIUM 100 MG: 100 INJECTION, POWDER, LYOPHILIZED, FOR SOLUTION INTRAVENOUS at 20:28

## 2023-12-02 RX ADMIN — PIPERACILLIN SODIUM AND TAZOBACTAM SODIUM 3.38 G: 3; .375 INJECTION, SOLUTION INTRAVENOUS at 20:36

## 2023-12-02 RX ADMIN — ONDANSETRON 4 MG: 2 INJECTION INTRAMUSCULAR; INTRAVENOUS at 08:43

## 2023-12-02 RX ADMIN — PROCHLORPERAZINE EDISYLATE 5 MG: 5 INJECTION INTRAMUSCULAR; INTRAVENOUS at 15:10

## 2023-12-02 RX ADMIN — ACETAMINOPHEN 1000 MG: 10 INJECTION, SOLUTION INTRAVENOUS at 05:27

## 2023-12-02 RX ADMIN — HYDROMORPHONE HYDROCHLORIDE 1 MG: 1 INJECTION, SOLUTION INTRAMUSCULAR; INTRAVENOUS; SUBCUTANEOUS at 12:32

## 2023-12-02 RX ADMIN — ALPRAZOLAM 0.5 MG: 0.5 TABLET ORAL at 08:48

## 2023-12-02 RX ADMIN — HYDROMORPHONE HYDROCHLORIDE 1 MG: 1 INJECTION, SOLUTION INTRAMUSCULAR; INTRAVENOUS; SUBCUTANEOUS at 15:15

## 2023-12-02 RX ADMIN — METHOCARBAMOL 500 MG: 100 INJECTION INTRAMUSCULAR; INTRAVENOUS at 04:04

## 2023-12-02 RX ADMIN — PROCHLORPERAZINE EDISYLATE 5 MG: 5 INJECTION INTRAMUSCULAR; INTRAVENOUS at 22:00

## 2023-12-02 RX ADMIN — ENOXAPARIN SODIUM 30 MG: 100 INJECTION SUBCUTANEOUS at 20:51

## 2023-12-02 RX ADMIN — QUETIAPINE FUMARATE 100 MG: 50 TABLET, EXTENDED RELEASE ORAL at 22:29

## 2023-12-02 RX ADMIN — PIPERACILLIN SODIUM AND TAZOBACTAM SODIUM 3.38 G: 3; .375 INJECTION, SOLUTION INTRAVENOUS at 13:03

## 2023-12-02 RX ADMIN — VANCOMYCIN HYDROCHLORIDE 750 MG: 750 INJECTION, POWDER, LYOPHILIZED, FOR SOLUTION INTRAVENOUS at 19:31

## 2023-12-02 RX ADMIN — METOPROLOL TARTRATE 12.5 MG: 25 TABLET, FILM COATED ORAL at 20:30

## 2023-12-02 RX ADMIN — PAROXETINE HYDROCHLORIDE 40 MG: 20 TABLET, FILM COATED ORAL at 20:28

## 2023-12-02 RX ADMIN — HYDROMORPHONE HYDROCHLORIDE 1 MG: 1 INJECTION, SOLUTION INTRAMUSCULAR; INTRAVENOUS; SUBCUTANEOUS at 08:43

## 2023-12-02 RX ADMIN — METHOCARBAMOL 500 MG: 100 INJECTION INTRAMUSCULAR; INTRAVENOUS at 20:29

## 2023-12-02 NOTE — PROGRESS NOTES
Colorectal & General Surgery  Progress Note    Patient: Martina Hardwick  YOB: 1955  MRN: [id number]      Assessment  Martina Hardwick is a 67 y.o. female with history of multiple abdominal operations and colonic inertia who is now postoperative day 12 from exploratory laparotomy, significant enterolysis, and subtotal colectomy complicated by leak from small bowel enterotomy and is now postoperative day 5 from reopening of recent laparotomy and small bowel resection.    Overall, she looks much better today.  She is resting comfortably, her abdomen is soft and appropriately tender with mild to moderate distention.  Drains are serosanguineous.  Her leukocytosis is improving.  CT scan overnight demonstrates intra-abdominal abscess, as expected, though it is a really difficult scan to read with lack of oral contrast and such significant surgical anatomy and bowel distention in such a small abdominal cavity.  One of the abscesses is at the site of prior perforation in her right sided retroperitoneum consistent with the abscess cavity that was existing at the time of the second operation.  Not surprising that it has reaccumulated.  The other likely abscess is in the right lower quadrant.  It is 11 cm long but very skinny and only 2 cm in width.  I do not believe that that is accessible via a percutaneous drain at this time.  Given that she is improving on antibiotics, I would like to continue with the current antibiotic regimen for a few days and see how she does.  If there is no indication for vancomycin or micafungin by tomorrow, I would be fine to stop them but defer to Dr. Turk for advice on that as well.  I do think that her intra-abdominal collections are well covered by Zosyn.    Regarding her anemia this morning, I suspect that is likely delusional given the large amount of fluid that she was given overnight.  Low suspicion for true surgical bleeding given her hemodynamic stability.  We are  rechecking hemoglobin and hematocrit and getting a type and screen.  Anticipate she will require a unit of blood later today.    Additionally, her paranoia seems to be improving.  If related to sepsis, I think this is a positive sign as well.    I have advanced her diet to clear liquids.    Subjective  No acute events overnight.  Resting comfortably this morning, so I did not wake her.    Objective    Vitals:    12/02/23 0915   BP: 92/51   Pulse: 99   Resp: 18   Temp: 97 °F (36.1 °C)   SpO2: 96%       Physical Exam  Constitutional: Well-developed well-nourished, no acute distress  Neck: Supple, trachea midline  Respiratory: No increased work of breathing, Symmetric excursion  Cardiovascular: Well pefursed, no jugular venous distention evident   Abdominal: Drain serosanguineous.  Abdomen soft, appropriately tender, moderately distended.  Skin: Warm, dry, no rash on visualized skin surfaces  Psychiatric: Alert and oriented ×3, normal affect     Laboratory Results  I have personally reviewed CBC with WBC 20, hemoglobin 5.9, hematocrit 18, platelets 521.  Procalcitonin 2.68.  CMP with creatinine 1.0, potassium 3.8, bicarb 23, albumin 2.2, AST 67, ALT 87, total bilirubin 0.6, direct bilirubin 0.4, indirect bilirubin 0.2.    Radiology  I have personally reviewed CT scan of the abdomen and pelvis demonstrates appropriate postsurgical anatomy with very little locules of free air.  There is an abscess in the right retroperitoneum consistent with prior site of abscess from small bowel enterotomy perforation.  There is also a small thin abscess in the right lower quadrant.         Osvaldo Salinas MD  Colorectal & General Surgery  [facility]    [address], [address]  [place], [place], [address]  P: [phone]  F: [phone]

## 2023-12-02 NOTE — PLAN OF CARE
Goal Outcome Evaluation:  Plan of Care Reviewed With: patient        Progress: improving  Outcome Evaluation: vss, c/o pain medicated with iv dilaudid, for CT abdomen with oral contrast. refused to drink the contrast says nauseated, called to Dr. Brad mckeon to do CT w/o oral cobtrast, keep npo with sips of meds and ice chips, bed alarm for sagetu, pt is very anxious and paranoid, voiding freely, continue to monitor the pt.

## 2023-12-02 NOTE — PROGRESS NOTES
"[facility] Clinical Pharmacy Services: Vancomycin Pharmacokinetic Initial Consult Note    Martina Hardwick is a 67 y.o. female who is on day 1 of pharmacy to dose vancomycin.    Indication: Intra-Abdominal Infection  Consulting Provider: Dr. Salinas  Planned Duration of Therapy: 7 days  Loading Dose Ordered or Given: 1000 mg on 12/1 at 2100  MRSA PCR performed: no;  Culture/Source:   12/1 BloodCx x2 - pending  Target: Dose by Levels  Pertinent Vanc Dosing History: n/a  Other Antimicrobials: Micafungin    Vitals/Labs  Ht: 157.5 cm (62\"); Wt: 43.5 kg (95 lb 14.4 oz)  Temp Readings from Last 1 Encounters:   12/01/23 97.3 °F (36.3 °C) (Oral)    Estimated Creatinine Clearance: 35.4 mL/min (A) (by C-G formula based on SCr of 1.06 mg/dL (H)).  MERCEDEZ     Results from last 7 days   Lab Units 12/01/23  0534 11/30/23  1102 11/29/23  0508 11/28/23  0651 11/27/23  0418   CREATININE mg/dL 1.12*  1.06* 0.61 0.57 0.63 0.73   WBC 10*3/mm3 35.27*  --   --  22.95* 15.25*     Assessment/Plan:    Vancomycin Dose:  no further dose tonight other than the 1g loading dose (which is still pending currently)  Vanc Random has been ordered for 12/2 at 0600 with AM labs     Pharmacy will follow patient's kidney function and will adjust doses and obtain levels as necessary. Thank you for involving pharmacy in this patient's care. Please contact pharmacy with any questions or concerns.                           Maria Dolores Denis, [facility]  Clinical Pharmacist    "

## 2023-12-02 NOTE — PLAN OF CARE
Goal Outcome Evaluation:  Plan of Care Reviewed With: patient        Progress: improving  Outcome Evaluation: Midline dressing is dry and intact, cornelio x2 has small amount of serosanguinous output, given Dilaudid and Zofran for pain/nausea, disoriented to situation, patient was very paranoid at the beginning of the shift, patient had a very large watery bowel movement, afterwards she reports an improvement in her pain and appears less paranoid, tachycardic, afebrile, IVF infusing, receiving IV antibiotics, falls precautions maintained, will continue to monitor.

## 2023-12-02 NOTE — CONSULTS
"IDENTIFYING INFORMATION: The patient is a 67-year-old white female admitted on 11/20/2023 with complications from recent abdominal surgery.  She is seen by psychiatry related to depressed mood and anxiety as well as some mild paranoia    CHIEF COMPLAINT: None given    INFORMANT: Patient and chart    RELIABILITY: Good    HISTORY OF PRESENT ILLNESS: The patient is a 67-year-old white female admitted on 11/20/2023 with complications from recent abdominal surgery.  Since her surgery, the patient has reported that she feels as though the staff is \"out to get her\" and is reporting recurrence of depression.  The patient reports that she has been on paroxetine 40 mg daily \"since 1993 that this medication is not presently ordered for her.  She also had been prescribed as needed alprazolam.  She has been started on Seroquel XR for sleep and paranoia.  When seen today the patient does not seem frankly psychotic but rather frustrated at her arduous hospital course.  She denies suicidal or homicidal ideation or psychotic features and denies prior suicide attempts or gestures.    PAST PSYCHIATRIC HISTORY: As above    PAST MEDICAL HISTORY: Significant for diverticular disease, arthritis, cataracts, history of gallstones, chronic constipation, endometriosis, gastritis, gastrointestinal hypomobility, GERD, hypertension, hyponatremia, irritable bowel syndrome, migraine headache, osteoporosis, osteopenia, psoriasis    MEDICATIONS:   Current Facility-Administered Medications   Medication Dose Route Frequency Provider Last Rate Last Admin    acetaminophen (OFIRMEV) injection 1,000 mg  1,000 mg Intravenous Q6H Ranjeet Salinas MD   1,000 mg at 12/02/23 1232    ALPRAZolam (XANAX) tablet 0.5 mg  0.5 mg Oral Daily Ranjeet Salinas MD   0.5 mg at 12/02/23 0848    ALPRAZolam (XANAX) tablet 0.5 mg  0.5 mg Oral BID PRN Triston Malcolm III, MD        Enoxaparin Sodium (LOVENOX) syringe 30 mg  30 mg Subcutaneous Q24H " Ranjeet Salinas MD   30 mg at 12/01/23 1725    HYDROmorphone (DILAUDID) injection 1 mg  1 mg Intravenous Q2H PRN America Villalobos MD   1 mg at 12/02/23 1515    Methocarbamol (ROBAXIN) injection 500 mg  500 mg Intravenous Q6H Ranjeet Salinas MD   500 mg at 12/02/23 0848    metoprolol tartrate (LOPRESSOR) injection 5 mg  5 mg Intravenous Q6H Ranjeet Salinas MD   5 mg at 11/30/23 1451    micafungin sodium (MYCAMINE) 100 mg in sodium chloride 0.9 % 100 mL IVPB-VTB  100 mg Intravenous Q24H Ranjeet Salinas MD   100 mg at 12/01/23 1903    ondansetron (ZOFRAN) injection 4 mg  4 mg Intravenous Q6H PRN Ranjeet Salinas MD   4 mg at 12/02/23 0843    pantoprazole (PROTONIX) injection 40 mg  40 mg Intravenous Q AM Ranjeet Salinas MD   40 mg at 12/02/23 0547    PARoxetine (PAXIL) tablet 40 mg  40 mg Oral Daily Triston Malcolm III, MD        Pharmacy to dose vancomycin   Does not apply Continuous PRN Ranjeet Salinas MD        piperacillin-tazobactam (ZOSYN) 3.375 g in iso-osmotic dextrose 50 ml (premix)  3.375 g Intravenous Q8H Ranjeet Salinas MD   3.375 g at 12/02/23 1303    prochlorperazine (COMPAZINE) injection 5 mg  5 mg Intravenous Q6H PRN Ranjeet Salinas MD   5 mg at 12/02/23 1510    QUEtiapine fumarate ER (SEROquel XR) tablet 100 mg  100 mg Oral Nightly Ranjeet Salinas MD   100 mg at 12/01/23 2202    sodium chloride 0.9 % infusion  75 mL/hr Intravenous Continuous Ranjeet Slainas MD 75 mL/hr at 12/02/23 0903 75 mL/hr at 12/02/23 0903    vancomycin 750 mg in sodium chloride 0.9 % 250 mL IVPB-VTB  750 mg Intravenous Q24H Ranjeet Salinas MD             ALLERGIES: Hydrocodone and sulfa    FAMILY HISTORY: Noncontributory    SOCIAL HISTORY: No reported use of alcohol, tobacco, or street drugs    MENTAL STATUS EXAM: The patient is a very thin ill-appearing white female.  She is in no apparent physical distress at  the time of examination.  She is awake alert and oriented all spheres.  Her mood is dysphoric her affect blunted.  Speech is generally relevant and coherent.  There are no deficits memory or cognition noted.  Intelligence is judged to be in the average range based on fund of knowledge, the patient is cooperative throughout the interview.  She is currently denying suicidal or homicidal ideation and denies zee psychotic symptoms.  Her judgment and insight appear to be reasonably intact.    ASSETS/LIABILITIES: To be assessed    DIAGNOSTIC IMPRESSION: Adjustment disorder with mixed emotional features, major depressive disorder by history, medical problems as noted previously    PLAN: It appears as though most of the patient's medications are being given intravenously however she is taking oral Seroquel XR.  I have therefore restarted previously prescribed Paxil and as needed Xanax.  I will continue to follow with you.

## 2023-12-02 NOTE — PROGRESS NOTES
ID NOTE    CC: f/u leukocytosis and abdominal abscesses following abdominal surgery    Subj: No fever. Mental status much better. No longer paranoid. By report she had a massive BM yesterday that led to much improvement. She does report some ongoing abdominal pain. CT A/P shows 2 fluid collections.     Medications:    Current Facility-Administered Medications:     acetaminophen (OFIRMEV) injection 1,000 mg, 1,000 mg, Intravenous, Q6H, Ranjeet Salinas MD, 1,000 mg at 12/02/23 0527    ALPRAZolam (XANAX) tablet 0.5 mg, 0.5 mg, Oral, Daily, Ranjeet Salinas MD, 0.5 mg at 12/01/23 1903    Enoxaparin Sodium (LOVENOX) syringe 30 mg, 30 mg, Subcutaneous, Q24H, Ranjeet Salinas MD, 30 mg at 12/01/23 1725    HYDROmorphone (DILAUDID) injection 1 mg, 1 mg, Intravenous, Q2H PRN, America Villalobos MD, 1 mg at 12/01/23 2104    Methocarbamol (ROBAXIN) injection 500 mg, 500 mg, Intravenous, Q6H, Ranjeet Salinas MD, 500 mg at 12/02/23 0404    metoprolol tartrate (LOPRESSOR) injection 5 mg, 5 mg, Intravenous, Q6H, Ranjeet Salinas MD, 5 mg at 11/30/23 1451    micafungin sodium (MYCAMINE) 100 mg in sodium chloride 0.9 % 100 mL IVPB-VTB, 100 mg, Intravenous, Q24H, Ranjeet Salinas MD, 100 mg at 12/01/23 1903    [DISCONTINUED] ondansetron (ZOFRAN) tablet 4 mg, 4 mg, Oral, Q6H PRN **OR** ondansetron (ZOFRAN) injection 4 mg, 4 mg, Intravenous, Q6H PRN, Ranjeet Salinas MD, 4 mg at 12/01/23 1718    pantoprazole (PROTONIX) injection 40 mg, 40 mg, Intravenous, Q AM, Ranjeet Salinas MD, 40 mg at 12/02/23 0547    Pharmacy to dose vancomycin, , Does not apply, Continuous PRN, Ranjeet Salinas MD    piperacillin-tazobactam (ZOSYN) 3.375 g in iso-osmotic dextrose 50 ml (premix), 3.375 g, Intravenous, Q8H, Ranjeet Salinas MD, 3.375 g at 12/02/23 0547    QUEtiapine fumarate ER (SEROquel XR) tablet 100 mg, 100 mg, Oral, Nightly, Ranjeet Salinas MD, 100 mg  "at 12/01/23 2202    sodium chloride 0.9 % infusion, 100 mL/hr, Intravenous, Continuous, Ranjeet Salinas MD, Last Rate: 100 mL/hr at 12/01/23 2103, 100 mL/hr at 12/01/23 2103    Vancomycin Pharmacy Intermittent/Pulse Dosing, , Does not apply, Daily, Massimo Turk MD      Objective   Vital Signs   Temp:  [97.3 °F (36.3 °C)-98.8 °F (37.1 °C)] 97.7 °F (36.5 °C)  Heart Rate:  [101-134] 101  Resp:  [18] 18  BP: ()/(52-75) 95/52    Physical Exam:   General: awake, alert, in bed, very nice, not paranoid today  Eyes: no scleral icterus  Cardiovascular: tachycardic  Respiratory: normal work of breathing  GI: not distended  :  no Hodge catheter  Skin: No rashes  Psych: calm and pleasant  Vasc: RUE PIV w/o erythema    Labs:   CBC, CMP, Procal, and blood cultures reviewed today  Lab Results   Component Value Date    WBC 20.96 (H) 12/02/2023    HGB 5.9 (C) 12/02/2023    HCT 18.0 (C) 12/02/2023    MCV 89.1 12/02/2023     (H) 12/02/2023       Lab Results   Component Value Date    GLUCOSE 96 12/02/2023    BUN 41 (H) 12/02/2023    CREATININE 1.00 12/02/2023    EGFRIFNONA 78 12/13/2021    EGFRIFAFRI 90 12/13/2021    BCR 41.0 (H) 12/02/2023    CO2 23.3 12/02/2023    CALCIUM 8.3 (L) 12/02/2023    PROTENTOTREF 7.3 08/24/2023    ALBUMIN 2.2 (L) 12/02/2023    LABIL2 1.7 08/24/2023    AST 67 (H) 12/02/2023    ALT 87 (H) 12/02/2023     Procal 2.6    Microbiology:  11/27 BCx: negative  12/1 BCx: NGTD    Radiology:  12/1/23 CT A/P:  \"Status post small bowel-small bowel and ileocolic anastomoses.  Midline   cutaneous skin staples.  Multiple surgical drains which terminate in the   pelvis.  Small locules of free air are present in the pelvis, which may   be postoperative in etiology.      Evaluation for postoperative collection in the pelvis is limited due to   lack of oral contrast.  It is difficult to discern fluid from within   bowel loop compared to fluid between bowel loops.      Suspected developing " "collection abscess in the RIGHT lower quadrant   measuring approximately 8.0 x 4.0 cm.  Evaluation following initiation of   oral contrast recommended to opacify the bowel loops within the pelvis.      Additional collection is present in the retroperitoneum measuring   approximately 11.1 cm superior-inferior by 2.6 cm mid-lateral.  See   series 3 image 89 and series 2 image 52.  Developing retroperitoneal   abscess cannot be excluded.     Surgical reevaluation recommended. \"    ASSESSMENT/PLAN:  Leukocytosis  Tachycardia  S/p colectomy for colonic inertia  Post-operative leak and abscesses  History of multiple abdominal surgeries due to diverticular disease  Recently on TPN  Paranoid behavior - resolved  Anemia    She is afebrile and WBC is improved. CT shows a RLQ and RP fluid collection. She is also anemic.  I'll follow-up surgical evaluation today. BCx are negative. Continue empiric vancomycin, Zosyn, and micafungin w/ duration TBD.     While the patient is on vancomycin, vancomycin levels will be ordered and reviewed with dose adjustments made as needed. The patient will have a daily creatinine level checked while on vancomycin as part of monitoring this medication.     D/W RN. ID will follow.     "

## 2023-12-02 NOTE — SIGNIFICANT NOTE
12/02/23 1406   OTHER   Discipline physical therapist   Rehab Time/Intention   Session Not Performed other (see comments)  (Patient with low Hgb this PM. Acute PT will f/u as appropriate.)   Recommendation   PT - Next Appointment 12/03/23

## 2023-12-02 NOTE — PROGRESS NOTES
Ms. Hardwick is significantly improved this evening.  She had a large bowel movement that I believe was a large amount of oral contrast that was retained.  After this, she has felt much better she.  She says that her pain and discomfort is significantly improved.  Her fear and paranoia have also resolved with the bowel movement.    I appreciate the assistance from Dr. Turk.  Now that we have intravenous access, we will get Zosyn, vancomycin, and micafungin on board.  CT scan of the abdomen pelvis set to be done tonight.  I will keep her n.p.o. until after that CT scan is performed.    Osvaldo Salinas MD  Colorectal & General Surgery  [facility]    [address], [address]  [place], [place], [address]  P: [phone]  F: [phone]

## 2023-12-02 NOTE — PROGRESS NOTES
"[facility] Clinical Pharmacy Services: Vancomycin Monitoring Note    Martina Hardwick is a 67 y.o. female who is on day 2/7 of pharmacy to dose vancomycin for Intra-Abdominal Infection.    Previous Vancomycin Dose: Intermittent   Updated Cultures and Sensitivities: NGTD  Results from last 7 days   Lab Units 12/02/23  0638   VANCOMYCIN RM mcg/mL 15.90     Vitals/Labs  Ht: 157.5 cm (62\"); Wt: 43.5 kg (95 lb 14.4 oz)   Temp Readings from Last 1 Encounters:   12/02/23 97 °F (36.1 °C) (Oral)     Estimated Creatinine Clearance: 37.5 mL/min (by C-G formula based on SCr of 1 mg/dL).       Results from last 7 days   Lab Units 12/02/23  0638 12/01/23  0534 11/30/23  1102 11/29/23  0508 11/28/23  0651   CREATININE mg/dL 1.00 1.12*  1.06* 0.61   < > 0.63   WBC 10*3/mm3 20.96* 35.27*  --   --  22.95*    < > = values in this interval not displayed.     Assessment/Plan  Scr appears to have reached a plateau and is now downtrending. Random level collected this AM returned at 15.9 mg/L (8.5 hr level). I will transition to a scheduled regimen as below.    Current Vancomycin Dose: Begin 750 mg IV every  24  hours; provides a predicted  mg/L.hr   Next Level Date and Time: Vanc Trough on 12/4 at 1200  We will continue to monitor patient changes and renal function     Thank you for involving pharmacy in this patient's care. Please contact pharmacy with any questions or concerns.       Cheyenne Gowers, [facility]  Clinical Pharmacist  "

## 2023-12-03 LAB
ALBUMIN SERPL-MCNC: 2.1 G/DL (ref 3.5–5.2)
ALBUMIN/GLOB SERPL: 0.8 G/DL
ALP SERPL-CCNC: 145 U/L (ref 39–117)
ALT SERPL W P-5'-P-CCNC: 75 U/L (ref 1–33)
ANION GAP SERPL CALCULATED.3IONS-SCNC: 15.5 MMOL/L (ref 5–15)
AST SERPL-CCNC: 77 U/L (ref 1–32)
BH BB BLOOD EXPIRATION DATE: NORMAL
BH BB BLOOD TYPE BARCODE: 6200
BH BB DISPENSE STATUS: NORMAL
BH BB PRODUCT CODE: NORMAL
BH BB UNIT NUMBER: NORMAL
BILIRUB SERPL-MCNC: 0.8 MG/DL (ref 0–1.2)
BUN SERPL-MCNC: 41 MG/DL (ref 8–23)
BUN/CREAT SERPL: 22.4 (ref 7–25)
CALCIUM SPEC-SCNC: 8.5 MG/DL (ref 8.6–10.5)
CHLORIDE SERPL-SCNC: 102 MMOL/L (ref 98–107)
CO2 SERPL-SCNC: 16.5 MMOL/L (ref 22–29)
CREAT SERPL-MCNC: 1.83 MG/DL (ref 0.57–1)
CROSSMATCH INTERPRETATION: NORMAL
DEPRECATED RDW RBC AUTO: 46 FL (ref 37–54)
EGFRCR SERPLBLD CKD-EPI 2021: 30 ML/MIN/1.73
ERYTHROCYTE [DISTWIDTH] IN BLOOD BY AUTOMATED COUNT: 14.4 % (ref 12.3–15.4)
GLOBULIN UR ELPH-MCNC: 2.8 GM/DL
GLUCOSE SERPL-MCNC: 82 MG/DL (ref 65–99)
HCT VFR BLD AUTO: 24.4 % (ref 34–46.6)
HGB BLD-MCNC: 7.8 G/DL (ref 12–15.9)
MAGNESIUM SERPL-MCNC: 2.1 MG/DL (ref 1.6–2.4)
MCH RBC QN AUTO: 28.5 PG (ref 26.6–33)
MCHC RBC AUTO-ENTMCNC: 32 G/DL (ref 31.5–35.7)
MCV RBC AUTO: 89.1 FL (ref 79–97)
PHOSPHATE SERPL-MCNC: 4.3 MG/DL (ref 2.5–4.5)
PLATELET # BLD AUTO: 580 10*3/MM3 (ref 140–450)
PMV BLD AUTO: 9.6 FL (ref 6–12)
POTASSIUM SERPL-SCNC: 3.7 MMOL/L (ref 3.5–5.2)
PROT SERPL-MCNC: 4.9 G/DL (ref 6–8.5)
RBC # BLD AUTO: 2.74 10*6/MM3 (ref 3.77–5.28)
SODIUM SERPL-SCNC: 134 MMOL/L (ref 136–145)
UNIT  ABO: NORMAL
UNIT  RH: NORMAL
WBC NRBC COR # BLD AUTO: 22.63 10*3/MM3 (ref 3.4–10.8)

## 2023-12-03 PROCEDURE — 80053 COMPREHEN METABOLIC PANEL: CPT | Performed by: INTERNAL MEDICINE

## 2023-12-03 PROCEDURE — 25010000002 ENOXAPARIN PER 10 MG: Performed by: SURGERY

## 2023-12-03 PROCEDURE — 84100 ASSAY OF PHOSPHORUS: CPT | Performed by: SURGERY

## 2023-12-03 PROCEDURE — 25010000002 MICAFUNGIN SODIUM 100 MG RECONSTITUTED SOLUTION 1 EACH VIAL: Performed by: SURGERY

## 2023-12-03 PROCEDURE — 25010000002 ACETAMINOPHEN 10 MG/ML SOLUTION: Performed by: SURGERY

## 2023-12-03 PROCEDURE — 99024 POSTOP FOLLOW-UP VISIT: CPT | Performed by: SURGERY

## 2023-12-03 PROCEDURE — 25810000003 SODIUM CHLORIDE 0.9 % SOLUTION: Performed by: SURGERY

## 2023-12-03 PROCEDURE — 25010000002 ONDANSETRON PER 1 MG: Performed by: SURGERY

## 2023-12-03 PROCEDURE — 99232 SBSQ HOSP IP/OBS MODERATE 35: CPT | Performed by: INTERNAL MEDICINE

## 2023-12-03 PROCEDURE — 97116 GAIT TRAINING THERAPY: CPT

## 2023-12-03 PROCEDURE — 25010000002 PIPERACILLIN SOD-TAZOBACTAM PER 1 G: Performed by: SURGERY

## 2023-12-03 PROCEDURE — 25010000002 METHOCARBAMOL 1000 MG/10ML SOLUTION: Performed by: SURGERY

## 2023-12-03 PROCEDURE — 85027 COMPLETE CBC AUTOMATED: CPT | Performed by: INTERNAL MEDICINE

## 2023-12-03 PROCEDURE — 97530 THERAPEUTIC ACTIVITIES: CPT

## 2023-12-03 PROCEDURE — 25010000002 PROCHLORPERAZINE 10 MG/2ML SOLUTION: Performed by: SURGERY

## 2023-12-03 PROCEDURE — 83735 ASSAY OF MAGNESIUM: CPT | Performed by: SURGERY

## 2023-12-03 RX ORDER — METHOCARBAMOL 100 MG/ML
500 INJECTION, SOLUTION INTRAMUSCULAR; INTRAVENOUS EVERY 6 HOURS PRN
Status: DISCONTINUED | OUTPATIENT
Start: 2023-12-03 | End: 2023-12-17

## 2023-12-03 RX ADMIN — SODIUM CHLORIDE 75 ML/HR: 9 INJECTION, SOLUTION INTRAVENOUS at 01:16

## 2023-12-03 RX ADMIN — ONDANSETRON 4 MG: 2 INJECTION INTRAMUSCULAR; INTRAVENOUS at 01:37

## 2023-12-03 RX ADMIN — METHOCARBAMOL 500 MG: 100 INJECTION INTRAMUSCULAR; INTRAVENOUS at 03:07

## 2023-12-03 RX ADMIN — ALPRAZOLAM 0.5 MG: 0.5 TABLET ORAL at 01:37

## 2023-12-03 RX ADMIN — ACETAMINOPHEN 1000 MG: 10 INJECTION, SOLUTION INTRAVENOUS at 19:43

## 2023-12-03 RX ADMIN — ACETAMINOPHEN 1000 MG: 10 INJECTION, SOLUTION INTRAVENOUS at 12:54

## 2023-12-03 RX ADMIN — QUETIAPINE FUMARATE 100 MG: 50 TABLET, EXTENDED RELEASE ORAL at 20:42

## 2023-12-03 RX ADMIN — METOPROLOL TARTRATE 12.5 MG: 25 TABLET, FILM COATED ORAL at 20:42

## 2023-12-03 RX ADMIN — MICAFUNGIN SODIUM 100 MG: 100 INJECTION, POWDER, LYOPHILIZED, FOR SOLUTION INTRAVENOUS at 20:42

## 2023-12-03 RX ADMIN — PIPERACILLIN SODIUM AND TAZOBACTAM SODIUM 3.38 G: 3; .375 INJECTION, SOLUTION INTRAVENOUS at 22:48

## 2023-12-03 RX ADMIN — PANTOPRAZOLE SODIUM 40 MG: 40 INJECTION, POWDER, FOR SOLUTION INTRAVENOUS at 05:22

## 2023-12-03 RX ADMIN — PIPERACILLIN SODIUM AND TAZOBACTAM SODIUM 3.38 G: 3; .375 INJECTION, SOLUTION INTRAVENOUS at 05:22

## 2023-12-03 RX ADMIN — SODIUM CHLORIDE 500 ML: 9 INJECTION, SOLUTION INTRAVENOUS at 15:11

## 2023-12-03 RX ADMIN — PAROXETINE HYDROCHLORIDE 40 MG: 20 TABLET, FILM COATED ORAL at 10:53

## 2023-12-03 RX ADMIN — PROCHLORPERAZINE EDISYLATE 5 MG: 5 INJECTION INTRAMUSCULAR; INTRAVENOUS at 06:34

## 2023-12-03 RX ADMIN — ENOXAPARIN SODIUM 30 MG: 100 INJECTION SUBCUTANEOUS at 20:42

## 2023-12-03 RX ADMIN — ALPRAZOLAM 0.5 MG: 0.5 TABLET ORAL at 14:03

## 2023-12-03 RX ADMIN — PIPERACILLIN SODIUM AND TAZOBACTAM SODIUM 3.38 G: 3; .375 INJECTION, SOLUTION INTRAVENOUS at 12:51

## 2023-12-03 RX ADMIN — ACETAMINOPHEN 1000 MG: 10 INJECTION, SOLUTION INTRAVENOUS at 01:16

## 2023-12-03 RX ADMIN — ACETAMINOPHEN 1000 MG: 10 INJECTION, SOLUTION INTRAVENOUS at 06:34

## 2023-12-03 RX ADMIN — METOPROLOL TARTRATE 12.5 MG: 25 TABLET, FILM COATED ORAL at 10:52

## 2023-12-03 NOTE — THERAPY TREATMENT NOTE
Patient Name: Martina Hardwick  : 1955    MRN: [id number]                              Today's Date: 12/3/2023       Admit Date: 2023    Visit Dx:     ICD-10-CM ICD-9-CM   1. 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    Colonic inertia    Severe malnutrition     Past Medical History:   Diagnosis Date    Arthritis     Autoantibody titer positive     Goodwin esophagus     Bloating     Cataract     BILAT    Chronic abdominal pain     Chronic constipation     Encounter for preventive health examination     Endometriosis     Gastritis     Gastrointestinal hypomotility     GERD (gastroesophageal reflux disease)     Headache, tension-type     Hypertension     Hyponatremia     Insomnia     Intestinal autonomic neuropathy     Irritable bowel syndrome     Migraine     Mixed anxiety and depressive disorder     Moderate malnutrition     Noninfectious gastroenteritis     OP (osteoporosis)     Osteopenia     Osteoporosis     PONV (postoperative nausea and vomiting) 2018    Psoriasis     KNEES, ELBOWS BILAT AND SCALP    Seasonal allergies     Visceral hyperalgesia      Past Surgical History:   Procedure Laterality Date    APPENDECTOMY      CHOLECYSTECTOMY N/A 2018    Procedure: CHOLECYSTECTOMY LAPAROSCOPIC converted to open procedure;  Surgeon: Hernan Hinds MD;  Location: [facility];  Service: General    COLON RESECTION N/A 2023    Procedure: OPEN SUBTOTAL COLECTOMY AND  ADESIOLYSIS;  Surgeon: Ranjeet Salinas MD;  Location: [facility] MAIN OR;  Service: General;  Laterality: N/A;    COLON SURGERY      STATES TOTAL OF 3 COLON SURGERY    COLONOSCOPY      COLONOSCOPY N/A 12/12/2018    Procedure: COLONOSCOPY;  Surgeon: Darien Ruff MD;  Location:  [facility] OR;  Service: Gastroenterology    COLONOSCOPY N/A 10/13/2023    Procedure: COLONOSCOPY TO CECUM;  Surgeon: Ranjeet Salinas MD;  Location[facility] ENDOSCOPY;  Service: General;  Laterality: N/A;  PREOP/ CHRONIC CONSTIPATION- POSTOP/ NORMAL    DIAGNOSTIC LAPAROSCOPY  1990    DILATATION AND CURETTAGE  1985    ENDOSCOPY N/A 05/13/2016    Procedure: ESOPHAGOGASTRODUODENOSCOPY ;  Surgeon: Darien Ruff MD;  Location: [facility] OR;  Service:     ENDOSCOPY N/A 05/01/2023    Procedure: ESOPHAGOGASTRODUODENOSCOPY WITH BIOPSY;  Surgeon: Darien Ruff MD;  Location: [facility] OR;  Service: Gastroenterology;  Laterality: N/A;  Mild Thrush; Gastritis; Esophagitis- thrush and reflux; Biopsies- duodenal, gastric, esophagus    EXPLORATORY LAPAROTOMY N/A 11/27/2023    Procedure: exploratory laparotomy, small bowel resection;  Surgeon: Ranjeet Salinas MD;  Location: [facility] MAIN OR;  Service: General;  Laterality: N/A;    HYSTERECTOMY      REVISION / TAKEDOWN COLOSTOMY        General Information       Row Name 12/03/23 1491          Physical Therapy Time and Intention    Document Type therapy note (daily note)  -     Mode of Treatment physical therapy;individual therapy  -       Row Name 12/03/23 1325          General Information    Patient Profile Reviewed yes  -     Existing Precautions/Restrictions fall  -       Row Name 12/03/23 1325          Cognition    Orientation Status (Cognition) oriented x 3  -       Row Name 12/03/23 1327          Safety Issues, Functional Mobility    Impairments Affecting Function (Mobility) endurance/activity tolerance;strength;balance;pain  -                User Key  (r) = Recorded By, (t) = Taken By, (c) = Cosigned By      Initials Name Provider Type     Codi Hinds PT Physical Therapist                   Mobility       Row Name 12/03/23 1325          Bed Mobility    Bed Mobility supine-sit  -     Supine-Sit Warren (Bed Mobility) contact guard;verbal cues  -     Sit-Supine Warren (Bed Mobility) not tested  NT - UIC  -     Assistive Device (Bed Mobility) head of bed elevated  -       Row Name 12/03/23 1325          Sit-Stand Transfer    Sit-Stand Warren (Transfers) contact guard;minimum assist (75% patient effort)  -     Assistive Device (Sit-Stand Transfers) walker, front-wheeled  -     Comment, (Sit-Stand Transfer) CGA from bed, min A x1 from low commode  -       Row Name 12/03/23 1325          Gait/Stairs (Locomotion)    Warren Level (Gait) contact guard  -     Assistive Device (Gait) walker, front-wheeled  -     Distance in Feet (Gait) 15ft + 10ft  -     Deviations/Abnormal Patterns (Gait) gait speed decreased;stride length decreased;stacy decreased  -     Bilateral Gait Deviations forward flexed posture  -     Comment, (Gait/Stairs) Slow pace, forward flexed, unsteady  -               User Key  (r) = Recorded By, (t) = Taken By, (c) = Cosigned By      Initials Name Provider Type     Codi Hinds PT Physical Therapist                   Obj/Interventions    No documentation.                  Goals/Plan    No documentation.                  Clinical Impression       Row Name 12/03/23 1326          Pain    Pre/Posttreatment Pain Comment C/o abdominal pain with mobility  -       Row Name 12/03/23 1326          Plan of Care Review    Plan of Care Reviewed With patient  -     Progress declining  -     Outcome Evaluation Pt seen for PT this AM, needing some encouragement and somewhat down/flat throughout session. Pt transferred to EOB with CGA and stood with CGA from bed. Pt ambulated 15ft to  "bathroom, required min A for toilet transfer. Encouraged to attempt her own toilet hygiene, nearly falling off commode saying \"I told you I couldn't do it.\" Pt seems to be poorly motivated, requesting total A. Pt fatigues quickly, but agreeable to sitting UIC. Ambulated 10ft with min A x1 and rwx, assisted with repositioning and left with needs met. PT will continue to follow to progress mobility as tolerated. Anticipate DC to SNF as pt is unable to care for herself at current level of mobility.  -       Row Name 12/03/23 1326          Vital Signs    O2 Delivery Pre Treatment room air  -     O2 Delivery Intra Treatment room air  -     O2 Delivery Post Treatment room air  -       Row Name 12/03/23 1326          Positioning and Restraints    Pre-Treatment Position in bed  -     Post Treatment Position chair  -     In Chair notified nsg;reclined;call light within reach;encouraged to call for assist;exit alarm on  -               User Key  (r) = Recorded By, (t) = Taken By, (c) = Cosigned By      Initials Name Provider Type     Codi Hinds, PT Physical Therapist                   Outcome Measures       Row Name 12/03/23 1331          How much help from another person do you currently need...    Turning from your back to your side while in flat bed without using bedrails? 4  -BH     Moving from lying on back to sitting on the side of a flat bed without bedrails? 3  -BH     Moving to and from a bed to a chair (including a wheelchair)? 3  -BH     Standing up from a chair using your arms (e.g., wheelchair, bedside chair)? 3  -BH     Climbing 3-5 steps with a railing? 2  -BH     To walk in hospital room? 3  -     AM-PAC 6 Clicks Score (PT) 18  -     Highest Level of Mobility Goal 6 --> Walk 10 steps or more  -       Row Name 12/03/23 1331          Functional Assessment    Outcome Measure Options AM-PAC 6 Clicks Basic Mobility (PT)  -               User Key  (r) = Recorded By, (t) = Taken By, (c) " = Cosigned By      Initials Name Provider Type     Codi Hinds PT Physical Therapist                                 Physical Therapy Education       Title: PT OT SLP Therapies (Done)       Topic: Physical Therapy (Done)       Point: Mobility training (Done)       Learning Progress Summary             Patient Acceptance, E,TB,D, VU,NR by  at 12/3/2023 1331    Acceptance, E,TB, VU by MT at 12/3/2023 0459    Acceptance, E,TB, VU by MT at 12/2/2023 0446    Acceptance, E,TB, VU by  at 12/1/2023 0520    Acceptance, E,TB,D, VU by  at 11/30/2023 0951    Acceptance, E,TB,D, VU,DU,NR by  at 11/30/2023 0523    Acceptance, E, DU,VU by J at 11/29/2023 1353    Acceptance, E,TB,D, VU,NR by  at 11/27/2023 1207    Acceptance, E,TB,D, VU,NR by  at 11/24/2023 0908    Acceptance, E,D, VU,NR by MS at 11/22/2023 1519    Acceptance, E,D, VU,NR by MS at 11/21/2023 1125                         Point: Home exercise program (Done)       Learning Progress Summary             Patient Acceptance, E,TB, VU by MT at 12/3/2023 0459    Acceptance, E,TB, VU by MT at 12/2/2023 0446    Acceptance, E,TB, VU by  at 12/1/2023 0520    Acceptance, E,TB,D, VU by  at 11/30/2023 0951    Acceptance, E,TB,D, VU,DU,NR by  at 11/30/2023 0523    Acceptance, E, DU,VU by J at 11/29/2023 1353    Acceptance, E,TB,D, VU,NR by  at 11/27/2023 1207    Acceptance, E,TB,D, VU,NR by  at 11/24/2023 0908    Acceptance, E,D, VU,NR by MS at 11/22/2023 1519    Acceptance, E,D, VU,NR by MS at 11/21/2023 1125                         Point: Body mechanics (Done)       Learning Progress Summary             Patient Acceptance, E,TB,D, VU,NR by  at 12/3/2023 1331    Acceptance, E,TB, VU by MT at 12/3/2023 0459    Acceptance, E,TB, VU by MT at 12/2/2023 0446    Acceptance, E,TB, VU by JS at 12/1/2023 0520    Acceptance, E,TB,D, VU by PH at 11/30/2023 0951    Acceptance, E,TB,D, VU,DU,NR by  at 11/30/2023 0523    Acceptance, E, DU,VU by DRE at  "11/29/2023 1353    Acceptance, E,TB,D, VU,NR by PH at 11/27/2023 1207    Acceptance, E,TB,D, VU,NR by PH at 11/24/2023 0908    Acceptance, E,D, VU,NR by MS at 11/22/2023 1519    Acceptance, E,D, VU,NR by MS at 11/21/2023 1125                         Point: Precautions (Done)       Learning Progress Summary             Patient Acceptance, E,TB,D, VU,NR by  at 12/3/2023 1331    Acceptance, E,TB, VU by MT at 12/3/2023 0459    Acceptance, E,TB, VU by MT at 12/2/2023 0446    Acceptance, E,TB, VU by  at 12/1/2023 0520    Acceptance, E,TB,D, VU by  at 11/30/2023 0951    Acceptance, E,TB,D, VU,DU,NR by  at 11/30/2023 0523    Acceptance, E, DU,VU by J at 11/29/2023 1353    Acceptance, E,TB,D, VU,NR by PH at 11/27/2023 1207    Acceptance, E,TB,D, VU,NR by PH at 11/24/2023 0908    Acceptance, E,D, VU,NR by MS at 11/22/2023 1519    Acceptance, E,D, VU,NR by MS at 11/21/2023 1125                                         User Key       Initials Effective Dates Name Provider Type Discipline    MS 06/16/21 -  Miguel Beasley, PT Physical Therapist PT    MT 06/16/21 -  Aileen Lezama, RN Registered Nurse Nurse     06/16/21 -  Yanira Hinds, RN Registered Nurse Nurse     10/01/20 -  Isis Fitzpatrick, RN Registered Nurse Nurse     06/16/21 -  Radha Rosen PTA Physical Therapist Assistant PT     04/08/22 -  Codi Hinds, PT Physical Therapist PT    J 11/08/23 -  Kenroy Deleon PTA Student PTA Student PT                  PT Recommendation and Plan     Plan of Care Reviewed With: patient  Progress: declining  Outcome Evaluation: Pt seen for PT this AM, needing some encouragement and somewhat down/flat throughout session. Pt transferred to EOB with CGA and stood with CGA from bed. Pt ambulated 15ft to bathroom, required min A for toilet transfer. Encouraged to attempt her own toilet hygiene, nearly falling off commode saying \"I told you I couldn't do it.\" Pt seems to be poorly motivated, requesting total A. Pt " fatigues quickly, but agreeable to sitting UIC. Ambulated 10ft with min A x1 and rwx, assisted with repositioning and left with needs met. PT will continue to follow to progress mobility as tolerated. Anticipate DC to SNF as pt is unable to care for herself at current level of mobility.     Time Calculation:         PT Charges       Row Name 12/03/23 1331             Time Calculation    Start Time 0915  -      Stop Time 0943  -      Time Calculation (min) 28 min  -      PT Received On 12/03/23  -      PT - Next Appointment 12/04/23  -         Time Calculation- PT    Total Timed Code Minutes- PT 28 minute(s)  -         Timed Charges    42258 - Gait Training Minutes  10  -BH      62444 - PT Therapeutic Activity Minutes 18  -BH         Total Minutes    Timed Charges Total Minutes 28  -       Total Minutes 28  -                User Key  (r) = Recorded By, (t) = Taken By, (c) = Cosigned By      Initials Name Provider Type    Codi Wills, PT Physical Therapist                  Therapy Charges for Today       Code Description Service Date Service Provider Modifiers Qty    36751042319 HC GAIT TRAINING EA 15 MIN 12/3/2023 Codi Hinds, PT GP 1    [id number]  PT THERAPEUTIC ACT EA 15 MIN 12/3/2023 Codi Hinds, PT GP 1    49152649279 HC PT THER SUPP EA 15 MIN 12/3/2023 Codi Hinds, PT GP 2            PT G-Codes  Outcome Measure Options: AM-PAC 6 Clicks Basic Mobility (PT)  AM-PAC 6 Clicks Score (PT): 18  PT Discharge Summary  Anticipated Discharge Disposition (PT): skilled nursing facility    Codi Hinds PT  12/3/2023

## 2023-12-03 NOTE — PROGRESS NOTES
Colorectal & General Surgery  Progress Note    Patient: Martina Hardwick  YOB: 1955  MRN: [id number]      Assessment  Martina Hardwick is a 67 y.o. female with history of multiple abdominal operations and colonic inertia who is now postoperative day 13 from exploratory laparotomy, enterolysis, subtotal colectomy that was complicated by leak from small bowel enterotomy and is now postoperative day 6 from reopening of recent laparotomy and small bowel resection.    Continues to improve.  WBC relatively stable.  Having bowel function.  Tolerating small amount of clear liquids with some nausea.  Abdominal pain better today.  From a surgical standpoint, we will continue the drains and clear liquid diet.  I do think that she would benefit from TPN, but will wait another day or two to place PICC to allow the blood cultures to result a little further.  Appreciate the assistance of Dr. Turk.  Agree with discontinuing vancomycin.    Worsening MERCEDEZ.  I gave her 500 cc bolus and we will continue maintenance intravenous fluids.  Hemoglobin stable after 1 unit of PRBC yesterday, suspect delusional and no active bleeding.    Subjective  No acute events overnight.  Feels better today.  Abdominal pain improving.  Continued nausea.  Resting comfortably in bed this morning.    Objective    Vitals:    12/03/23 0915   BP: 113/63   Pulse: 88   Resp: 16   Temp: 97.2 °F (36.2 °C)   SpO2: 100%       Physical Exam  Constitutional: Frail, no acute distress  Neck: Supple, trachea midline  Respiratory: No increased work of breathing, Symmetric excursion  Cardiovascular: Well pefursed, no jugular venous distention evident   Abdominal: Soft, distended, appropriately tender, drain serosanguineous, incision healing well  Skin: Warm, dry, no rash on visualized skin surfaces  Psychiatric: Alert and oriented ×3, normal affect     Laboratory Results  I have personally reviewed CMP with creatinine 1.3, bicarb 16, potassium 3.7, AST 77, ALT  75, total bilirubin 0.8.  CBC with WBC 22, hemoglobin 7.8, platelets 580.    Radiology  None to review         Osvaldo Salinas MD  Colorectal & General Surgery  [facility]    [address], [address]  [place], [place], [address]  P: [phone]  F: [phone]

## 2023-12-03 NOTE — PLAN OF CARE
"Goal Outcome Evaluation:  Plan of Care Reviewed With: patient        Progress: declining  Outcome Evaluation: Pt seen for PT this AM, needing some encouragement and somewhat down/flat throughout session. Pt transferred to EOB with CGA and stood with CGA from bed. Pt ambulated 15ft to bathroom, required min A for toilet transfer. Encouraged to attempt her own toilet hygiene, nearly falling off commode saying \"I told you I couldn't do it.\" Pt seems to be poorly motivated, requesting total A. Pt fatigues quickly, but agreeable to sitting UIC. Ambulated 10ft with min A x1 and rwx, assisted with repositioning and left with needs met. PT will continue to follow to progress mobility as tolerated. Anticipate DC to SNF as pt is unable to care for herself at current level of mobility.      Anticipated Discharge Disposition (PT): skilled nursing facility  "

## 2023-12-03 NOTE — PLAN OF CARE
Goal Outcome Evaluation:              Outcome Evaluation: VSS. Bolus given. IV fluids infusing. Worked with PT and sat up in chair. Midline abd incision with border dressing. Fall precautions maintained. MARY drains x 2.

## 2023-12-03 NOTE — PLAN OF CARE
Goal Outcome Evaluation:  Plan of Care Reviewed With: patient        Progress: improving  Outcome Evaluation: vss, po meds given, nauseated medicated with iv zofran and compazine, bed alarm for safety, having loose stool, voiding freely, continue to monitor the pt.

## 2023-12-03 NOTE — PROGRESS NOTES
ID NOTE    CC: f/u leukocytosis and abdominal abscesses following abdominal surgery    Subj: No fever. Mental status remains normal. She reports improved abdominal pain today. Crt is higher.     Medications:    Current Facility-Administered Medications:     acetaminophen (OFIRMEV) injection 1,000 mg, 1,000 mg, Intravenous, Q6H, Ranjeet Salinas MD, 1,000 mg at 12/03/23 0634    ALPRAZolam (XANAX) tablet 0.5 mg, 0.5 mg, Oral, Daily, Ranjeet Salinas MD, 0.5 mg at 12/02/23 0848    ALPRAZolam (XANAX) tablet 0.5 mg, 0.5 mg, Oral, BID PRN, Triston Malcolm III, MD, 0.5 mg at 12/03/23 0137    Enoxaparin Sodium (LOVENOX) syringe 30 mg, 30 mg, Subcutaneous, Q24H, Ranjeet Salinas MD, 30 mg at 12/02/23 2051    HYDROmorphone (DILAUDID) injection 1 mg, 1 mg, Intravenous, Q2H PRN, America Villalobos MD, 1 mg at 12/02/23 1515    Methocarbamol (ROBAXIN) injection 500 mg, 500 mg, Intravenous, Q6H PRN, Ranjeet Salinas MD    metoprolol tartrate (LOPRESSOR) tablet 12.5 mg, 12.5 mg, Oral, Q12H, Ranjeet Salinas MD, 12.5 mg at 12/02/23 2030    micafungin sodium (MYCAMINE) 100 mg in sodium chloride 0.9 % 100 mL IVPB-VTB, 100 mg, Intravenous, Q24H, Ranjeet Salinas MD, 100 mg at 12/02/23 2028    [DISCONTINUED] ondansetron (ZOFRAN) tablet 4 mg, 4 mg, Oral, Q6H PRN **OR** ondansetron (ZOFRAN) injection 4 mg, 4 mg, Intravenous, Q6H PRN, Ranjeet Salinas MD, 4 mg at 12/03/23 0137    pantoprazole (PROTONIX) injection 40 mg, 40 mg, Intravenous, Q AM, Ranjeet Salinas MD, 40 mg at 12/03/23 0522    PARoxetine (PAXIL) tablet 40 mg, 40 mg, Oral, Daily, Triston Malcolm III, MD, 40 mg at 12/02/23 2028    piperacillin-tazobactam (ZOSYN) 3.375 g in iso-osmotic dextrose 50 ml (premix), 3.375 g, Intravenous, Q8H, Ranjeet Salinas MD, 3.375 g at 12/03/23 0522    prochlorperazine (COMPAZINE) injection 5 mg, 5 mg, Intravenous, Q6H PRN, Ranjeet Salinas MD,  "5 mg at 12/03/23 0634    QUEtiapine fumarate ER (SEROquel XR) tablet 100 mg, 100 mg, Oral, Nightly, Ranjeet Salinas MD, 100 mg at 12/02/23 2229    sodium chloride 0.9 % infusion, 75 mL/hr, Intravenous, Continuous, Ranjeet Salinas MD, Last Rate: 75 mL/hr at 12/03/23 0116, 75 mL/hr at 12/03/23 0116      Objective   Vital Signs   Temp:  [97.2 °F (36.2 °C)-100.3 °F (37.9 °C)] 97.2 °F (36.2 °C)  Heart Rate:  [] 88  Resp:  [16-18] 16  BP: (105-149)/(58-76) 113/63    Physical Exam:   General: awake, alert, in bed, very nice  Eyes: no scleral icterus  Cardiovascular: NR  Respiratory: normal work of breathing; no wheezing  GI: not distended  :  no Hodge catheter  Skin: No rashes  Psych: calm and pleasant  Vasc: RUE PIV w/o erythema    Labs:   CBC, CMP, and blood cultures reviewed today  Lab Results   Component Value Date    WBC 22.63 (H) 12/03/2023    HGB 7.8 (L) 12/03/2023    HCT 24.4 (L) 12/03/2023    MCV 89.1 12/03/2023     (H) 12/03/2023       Lab Results   Component Value Date    GLUCOSE 82 12/03/2023    BUN 41 (H) 12/03/2023    CREATININE 1.83 (H) 12/03/2023    EGFRIFNONA 78 12/13/2021    EGFRIFAFRI 90 12/13/2021    BCR 22.4 12/03/2023    CO2 16.5 (L) 12/03/2023    CALCIUM 8.5 (L) 12/03/2023    PROTENTOTREF 7.3 08/24/2023    ALBUMIN 2.1 (L) 12/03/2023    LABIL2 1.7 08/24/2023    AST 77 (H) 12/03/2023    ALT 75 (H) 12/03/2023     Procal 2.6    Microbiology:  11/27 BCx: negative  12/1 BCx: NGTD    Prior Radiology:  12/1/23 CT A/P:  \"Status post small bowel-small bowel and ileocolic anastomoses.  Midline   cutaneous skin staples.  Multiple surgical drains which terminate in the   pelvis.  Small locules of free air are present in the pelvis, which may   be postoperative in etiology.      Evaluation for postoperative collection in the pelvis is limited due to   lack of oral contrast.  It is difficult to discern fluid from within   bowel loop compared to fluid between bowel loops.  " "    Suspected developing collection abscess in the RIGHT lower quadrant   measuring approximately 8.0 x 4.0 cm.  Evaluation following initiation of   oral contrast recommended to opacify the bowel loops within the pelvis.      Additional collection is present in the retroperitoneum measuring   approximately 11.1 cm superior-inferior by 2.6 cm mid-lateral.  See   series 3 image 89 and series 2 image 52.  Developing retroperitoneal   abscess cannot be excluded.     Surgical reevaluation recommended. \"    ASSESSMENT/PLAN:  Leukocytosis  Tachycardia  S/p colectomy for colonic inertia  Post-operative leak and abscesses  History of multiple abdominal surgeries due to diverticular disease  Recently on TPN  Paranoid behavior - resolved  Anemia    She is afebrile and WBC is better than 2 days ago. Crt higher today. I am stopping vancomycin since no MRSA has been identified on her cultures. Continue empiric Zosyn and micafungin w/ duration TBD. D/w Dr Salinas and no current plans for IR guided drainage of the fluid collections but this will be an ongoing evaluation. I am glad to hear she has less abdominal pain today and is willing to get up and work with PT.     ID will follow.     "

## 2023-12-03 NOTE — PLAN OF CARE
Goal Outcome Evaluation:              Outcome Evaluation: HR tachy otherwise VSS. 1 unit PRBC given. Fall precautions maintained. Another IV placed tonight. Sat up in chair.

## 2023-12-04 LAB
ALBUMIN SERPL-MCNC: 2 G/DL (ref 3.5–5.2)
ALBUMIN/GLOB SERPL: 0.7 G/DL
ALP SERPL-CCNC: 125 U/L (ref 39–117)
ALT SERPL W P-5'-P-CCNC: 73 U/L (ref 1–33)
ANION GAP SERPL CALCULATED.3IONS-SCNC: 14.6 MMOL/L (ref 5–15)
AST SERPL-CCNC: 75 U/L (ref 1–32)
BILIRUB SERPL-MCNC: 0.6 MG/DL (ref 0–1.2)
BUN SERPL-MCNC: 29 MG/DL (ref 8–23)
BUN/CREAT SERPL: 22.8 (ref 7–25)
CALCIUM SPEC-SCNC: 8.5 MG/DL (ref 8.6–10.5)
CHLORIDE SERPL-SCNC: 103 MMOL/L (ref 98–107)
CO2 SERPL-SCNC: 14.4 MMOL/L (ref 22–29)
CREAT SERPL-MCNC: 1.27 MG/DL (ref 0.57–1)
DEPRECATED RDW RBC AUTO: 46.8 FL (ref 37–54)
EGFRCR SERPLBLD CKD-EPI 2021: 46.4 ML/MIN/1.73
ERYTHROCYTE [DISTWIDTH] IN BLOOD BY AUTOMATED COUNT: 14.2 % (ref 12.3–15.4)
GLOBULIN UR ELPH-MCNC: 2.9 GM/DL
GLUCOSE SERPL-MCNC: 76 MG/DL (ref 65–99)
HCT VFR BLD AUTO: 23.7 % (ref 34–46.6)
HGB BLD-MCNC: 7.4 G/DL (ref 12–15.9)
LAB AP CASE REPORT: NORMAL
MAGNESIUM SERPL-MCNC: 1.8 MG/DL (ref 1.6–2.4)
MCH RBC QN AUTO: 28.5 PG (ref 26.6–33)
MCHC RBC AUTO-ENTMCNC: 31.2 G/DL (ref 31.5–35.7)
MCV RBC AUTO: 91.2 FL (ref 79–97)
PATH REPORT.FINAL DX SPEC: NORMAL
PATH REPORT.GROSS SPEC: NORMAL
PHOSPHATE SERPL-MCNC: 3.3 MG/DL (ref 2.5–4.5)
PLATELET # BLD AUTO: 751 10*3/MM3 (ref 140–450)
PMV BLD AUTO: 9.6 FL (ref 6–12)
POTASSIUM SERPL-SCNC: 3.5 MMOL/L (ref 3.5–5.2)
PROT SERPL-MCNC: 4.9 G/DL (ref 6–8.5)
RBC # BLD AUTO: 2.6 10*6/MM3 (ref 3.77–5.28)
SODIUM SERPL-SCNC: 132 MMOL/L (ref 136–145)
WBC NRBC COR # BLD AUTO: 24.31 10*3/MM3 (ref 3.4–10.8)

## 2023-12-04 PROCEDURE — 25010000002 MICAFUNGIN SODIUM 100 MG RECONSTITUTED SOLUTION 1 EACH VIAL: Performed by: SURGERY

## 2023-12-04 PROCEDURE — 25010000002 ACETAMINOPHEN 10 MG/ML SOLUTION: Performed by: SURGERY

## 2023-12-04 PROCEDURE — 99232 SBSQ HOSP IP/OBS MODERATE 35: CPT | Performed by: INTERNAL MEDICINE

## 2023-12-04 PROCEDURE — 85027 COMPLETE CBC AUTOMATED: CPT | Performed by: INTERNAL MEDICINE

## 2023-12-04 PROCEDURE — 84100 ASSAY OF PHOSPHORUS: CPT | Performed by: SURGERY

## 2023-12-04 PROCEDURE — 25010000002 ONDANSETRON PER 1 MG: Performed by: SURGERY

## 2023-12-04 PROCEDURE — 83735 ASSAY OF MAGNESIUM: CPT | Performed by: SURGERY

## 2023-12-04 PROCEDURE — 25810000003 SODIUM CHLORIDE 0.9 % SOLUTION: Performed by: SURGERY

## 2023-12-04 PROCEDURE — 80053 COMPREHEN METABOLIC PANEL: CPT | Performed by: SURGERY

## 2023-12-04 PROCEDURE — 25010000002 PIPERACILLIN SOD-TAZOBACTAM PER 1 G: Performed by: SURGERY

## 2023-12-04 PROCEDURE — 99024 POSTOP FOLLOW-UP VISIT: CPT | Performed by: SURGERY

## 2023-12-04 RX ORDER — OXYMETAZOLINE HYDROCHLORIDE 0.05 G/100ML
2 SPRAY NASAL 2 TIMES DAILY
Status: DISPENSED | OUTPATIENT
Start: 2023-12-04 | End: 2023-12-07

## 2023-12-04 RX ORDER — SODIUM CHLORIDE 0.9 % (FLUSH) 0.9 %
10 SYRINGE (ML) INJECTION AS NEEDED
Status: DISCONTINUED | OUTPATIENT
Start: 2023-12-04 | End: 2023-12-05

## 2023-12-04 RX ORDER — ECHINACEA PURPUREA EXTRACT 125 MG
2 TABLET ORAL AS NEEDED
Status: DISCONTINUED | OUTPATIENT
Start: 2023-12-04 | End: 2023-12-20 | Stop reason: HOSPADM

## 2023-12-04 RX ORDER — SODIUM CHLORIDE 0.9 % (FLUSH) 0.9 %
10 SYRINGE (ML) INJECTION EVERY 12 HOURS SCHEDULED
Status: DISCONTINUED | OUTPATIENT
Start: 2023-12-04 | End: 2023-12-05

## 2023-12-04 RX ORDER — SODIUM CHLORIDE 0.9 % (FLUSH) 0.9 %
20 SYRINGE (ML) INJECTION AS NEEDED
Status: DISCONTINUED | OUTPATIENT
Start: 2023-12-04 | End: 2023-12-05

## 2023-12-04 RX ADMIN — SODIUM CHLORIDE 75 ML/HR: 9 INJECTION, SOLUTION INTRAVENOUS at 17:52

## 2023-12-04 RX ADMIN — ALPRAZOLAM 0.5 MG: 0.5 TABLET ORAL at 08:25

## 2023-12-04 RX ADMIN — ACETAMINOPHEN 1000 MG: 10 INJECTION, SOLUTION INTRAVENOUS at 20:58

## 2023-12-04 RX ADMIN — Medication 100 DROP: at 04:32

## 2023-12-04 RX ADMIN — METOPROLOL TARTRATE 12.5 MG: 25 TABLET, FILM COATED ORAL at 08:25

## 2023-12-04 RX ADMIN — PANTOPRAZOLE SODIUM 40 MG: 40 INJECTION, POWDER, FOR SOLUTION INTRAVENOUS at 05:40

## 2023-12-04 RX ADMIN — ACETAMINOPHEN 1000 MG: 10 INJECTION, SOLUTION INTRAVENOUS at 01:48

## 2023-12-04 RX ADMIN — PIPERACILLIN SODIUM AND TAZOBACTAM SODIUM 3.38 G: 3; .375 INJECTION, SOLUTION INTRAVENOUS at 13:34

## 2023-12-04 RX ADMIN — PIPERACILLIN SODIUM AND TAZOBACTAM SODIUM 3.38 G: 3; .375 INJECTION, SOLUTION INTRAVENOUS at 05:40

## 2023-12-04 RX ADMIN — ONDANSETRON 4 MG: 2 INJECTION INTRAMUSCULAR; INTRAVENOUS at 05:40

## 2023-12-04 RX ADMIN — Medication 10 ML: at 21:06

## 2023-12-04 RX ADMIN — PIPERACILLIN SODIUM AND TAZOBACTAM SODIUM 3.38 G: 3; .375 INJECTION, SOLUTION INTRAVENOUS at 21:05

## 2023-12-04 RX ADMIN — ACETAMINOPHEN 1000 MG: 10 INJECTION, SOLUTION INTRAVENOUS at 07:42

## 2023-12-04 RX ADMIN — Medication 10 ML: at 21:05

## 2023-12-04 RX ADMIN — ACETAMINOPHEN 1000 MG: 10 INJECTION, SOLUTION INTRAVENOUS at 13:34

## 2023-12-04 RX ADMIN — MICAFUNGIN SODIUM 100 MG: 100 INJECTION, POWDER, LYOPHILIZED, FOR SOLUTION INTRAVENOUS at 17:52

## 2023-12-04 RX ADMIN — PAROXETINE HYDROCHLORIDE 40 MG: 20 TABLET, FILM COATED ORAL at 08:25

## 2023-12-04 NOTE — PROGRESS NOTES
ID NOTE    CC: f/u leukocytosis and abdominal abscesses following abdominal surgery    Subj: No fever. Epistaxis overnight and this AM. She reports mild abdominal pain. Near syncope so moving to telemetry floor.     Medications:    Current Facility-Administered Medications:     acetaminophen (OFIRMEV) injection 1,000 mg, 1,000 mg, Intravenous, Q6H, Ranjeet Salinas MD, 1,000 mg at 12/04/23 0742    ALPRAZolam (XANAX) tablet 0.5 mg, 0.5 mg, Oral, Daily, Ranjeet Salinas MD, 0.5 mg at 12/04/23 0825    ALPRAZolam (XANAX) tablet 0.5 mg, 0.5 mg, Oral, BID PRN, Triston Malcolm III, MD, 0.5 mg at 12/03/23 0137    [Held by provider] Enoxaparin Sodium (LOVENOX) syringe 30 mg, 30 mg, Subcutaneous, Q24H, Ranjeet Salinas MD, 30 mg at 12/03/23 2042    HYDROmorphone (DILAUDID) injection 1 mg, 1 mg, Intravenous, Q2H PRN, America Villalobos MD, 1 mg at 12/02/23 1515    Methocarbamol (ROBAXIN) injection 500 mg, 500 mg, Intravenous, Q6H PRN, Ranjeet Salinas MD    metoprolol tartrate (LOPRESSOR) tablet 12.5 mg, 12.5 mg, Oral, Q12H, Ranjeet Salinas MD, 12.5 mg at 12/04/23 0825    micafungin sodium (MYCAMINE) 100 mg in sodium chloride 0.9 % 100 mL IVPB-VTB, 100 mg, Intravenous, Q24H, Ranjeet Salinas MD, 100 mg at 12/03/23 2042    [DISCONTINUED] ondansetron (ZOFRAN) tablet 4 mg, 4 mg, Oral, Q6H PRN **OR** ondansetron (ZOFRAN) injection 4 mg, 4 mg, Intravenous, Q6H PRN, Ranjeet Salinas MD, 4 mg at 12/04/23 0540    pantoprazole (PROTONIX) injection 40 mg, 40 mg, Intravenous, Q AM, Ranjeet Salinas MD, 40 mg at 12/04/23 0540    PARoxetine (PAXIL) tablet 40 mg, 40 mg, Oral, Daily, Triston Malcolm III, MD, 40 mg at 12/04/23 0825    phenylephrine (ABHINAV-SYNEPHRINE) nasal drops 3 drop, 3 drop, Each Nare, Q4H PRN, Ranjeet Salinas MD, 3 drop at 12/04/23 0826    piperacillin-tazobactam (ZOSYN) 3.375 g in iso-osmotic dextrose 50 ml (premix), 3.375 g,  "Intravenous, Q8H, Ranjeet Salinas MD, 3.375 g at 12/04/23 0540    prochlorperazine (COMPAZINE) injection 5 mg, 5 mg, Intravenous, Q6H PRN, Ranjeet Salinas MD, 5 mg at 12/03/23 0634    QUEtiapine fumarate ER (SEROquel XR) tablet 100 mg, 100 mg, Oral, Nightly, Ranjeet Salinas MD, 100 mg at 12/03/23 2042    sodium chloride 0.9 % infusion, 75 mL/hr, Intravenous, Continuous, Ranjeet Salinas MD, Last Rate: 75 mL/hr at 12/03/23 0116, 75 mL/hr at 12/03/23 0116      Objective   Vital Signs   Temp:  [96.8 °F (36 °C)-98.7 °F (37.1 °C)] 98.7 °F (37.1 °C)  Heart Rate:  [] 114  Resp:  [14-16] 14  BP: ()/(59-77) 114/62    Physical Exam:   General: awake, alert, in bed, very nice  Eyes: no scleral icterus  ENT: Nares packed  Cardiovascular: NR  Respiratory: normal work of breathing; no wheezing  GI: not distended; bandaged; drain in place  :  no Hodge catheter  Psych: calm and pleasant    Labs:   CBC, CMP, and blood cultures reviewed today  Lab Results   Component Value Date    WBC 24.31 (H) 12/04/2023    HGB 7.4 (L) 12/04/2023    HCT 23.7 (L) 12/04/2023    MCV 91.2 12/04/2023     (H) 12/04/2023       Lab Results   Component Value Date    GLUCOSE 76 12/04/2023    BUN 29 (H) 12/04/2023    CREATININE 1.27 (H) 12/04/2023    EGFRIFNONA 78 12/13/2021    EGFRIFAFRI 90 12/13/2021    BCR 22.8 12/04/2023    CO2 14.4 (L) 12/04/2023    CALCIUM 8.5 (L) 12/04/2023    PROTENTOTREF 7.3 08/24/2023    ALBUMIN 2.0 (L) 12/04/2023    LABIL2 1.7 08/24/2023    AST 75 (H) 12/04/2023    ALT 73 (H) 12/04/2023     Procal 2.6    Microbiology:  11/27 BCx: negative  12/1 BCx: NGTD    Prior Radiology:  12/1/23 CT A/P:  \"Status post small bowel-small bowel and ileocolic anastomoses.  Midline   cutaneous skin staples.  Multiple surgical drains which terminate in the   pelvis.  Small locules of free air are present in the pelvis, which may   be postoperative in etiology.      Evaluation for postoperative " "collection in the pelvis is limited due to   lack of oral contrast.  It is difficult to discern fluid from within   bowel loop compared to fluid between bowel loops.      Suspected developing collection abscess in the RIGHT lower quadrant   measuring approximately 8.0 x 4.0 cm.  Evaluation following initiation of   oral contrast recommended to opacify the bowel loops within the pelvis.      Additional collection is present in the retroperitoneum measuring   approximately 11.1 cm superior-inferior by 2.6 cm mid-lateral.  See   series 3 image 89 and series 2 image 52.  Developing retroperitoneal   abscess cannot be excluded.     Surgical reevaluation recommended. \"    ASSESSMENT/PLAN:  Leukocytosis  Epistaxis  S/p colectomy for colonic inertia  Post-operative leak and abscesses  History of multiple abdominal surgeries due to diverticular disease  Recently on TPN  Anemia    She is afebrile and hemodynamically stable. WBC still hovering in the mid-low 20s. Once safe to do so, I would recommend re-evaluating the fluid collections for possibility of IR-guided drainage.     Creatinine better today. Continue empiric Zosyn and micafungin w/ duration TBD.     ID will follow.     "

## 2023-12-04 NOTE — PROGRESS NOTES
Continued Stay Note  [facility]     Patient Name: Martina Hardwick  MRN: [id number]  Today's Date: 12/4/2023    Admit Date: 11/20/2023    Plan: Home with spouse   Discharge Plan       Row Name 12/04/23 1542       Plan    Plan Home with spouse    Plan Comments CCP is continuing to follow. Patient is being transfered to [facility] due to syncope when stands. Currently off TPN.                   Discharge Codes    No documentation.                 Expected Discharge Date and Time       Expected Discharge Date Expected Discharge Time    Dec 9, 2023               Hannah Webb RN

## 2023-12-04 NOTE — PROGRESS NOTES
Colorectal & General Surgery  Progress Note    Patient: Martina Hardwick  YOB: 1955  MRN: [id number]      Assessment  Martina Hardwick is a 67 y.o. female with colonic inertia now postoperative day 14 from exploratory laparotomy, intra lysis, subtotal colectomy that was complicated by leak from small bowel enterotomy and is now postoperative day 7 from reopening of recent laparotomy and small bowel resection.    Persistent leukocytosis with known intra-abdominal abscesses.  Blood cultures negative.  Appreciate Dr. Turk's assistance.  Plan for repeat CT scan of the abdomen and pelvis once she is able to tolerate oral contrast, hopefully tomorrow or Wednesday.  I think the oral contrast will be necessary so that we can reliably determine what is bowel and what is abscess on the scan.    Significant and persistent epistaxis overnight.  This has happened a few times during her admission, including in the operating room and her index operation with significant bleeding with nasogastric tube placement.  Her nose was packed with phenylephrine soaked gauze.  I have asked our ENT colleagues to weigh in given the persistent nature of her epistaxis.  Prophylactic pharmacologic DVT prophylaxis held.    MERCEDEZ improving but continues to have some near syncope when she stands.  Will transfer her to telemetry.    Subjective  Significant epistaxis overnight.  Abdomen feels better.  Having bowel movements.    Objective    Vitals:    12/04/23 0953   BP: 114/62   Pulse: 114   Resp: 14   Temp: 98.7 °F (37.1 °C)   SpO2: 97%       Physical Exam  Constitutional: Well-developed well-nourished, no acute distress  Neck: Supple, trachea midline  Respiratory: No increased work of breathing, Symmetric excursion  Cardiovascular: Well pefursed, no jugular venous distention evident   Abdominal: Midline incision without erythema, drains with very little output, soft, appropriately tender, mildly distended  Skin: Warm, dry, no rash on  visualized skin surfaces  Psychiatric: Alert and oriented ×3, normal affect     Laboratory Results  I have personally reviewed CMP with creatinine 1.27, magnesium 1.8, phosphorus 3.3, alkaline phosphatase 125, albumin 2.0, AST 75, ALT 73.  CBC with WBC 24, hemoglobin 7.4, platelets of 51.    Radiology  None to review         Osvaldo Salinas MD  Colorectal & General Surgery  [facility]    [address], [address]  [place], [place], [address]  P: [phone]  F: [phone]

## 2023-12-04 NOTE — PROGRESS NOTES
The patient is in somewhat down spirits today after having suffered a severe episode of epistaxis.  She does not voice any paranoid thinking, however.  Continuing to follow.

## 2023-12-04 NOTE — CONSULTS
We are asked to see this very nice patient for acute epistaxis following self removal of nasogastric tube which was placed on the left.  By report from the patient and  the bleeding has been bilateral.  It has been controlled with tissue paper packing.    She has experienced some intermittent nasal bleeding throughout the hospitalization.    She reports no prior history of sinonasal surgery or trauma.  She did not relate a history of recurring epistaxis.  Her Lovenox has been held.    She is now postop day 14 from exploratory lap/subtotal colectomy complicated by small bowel leak now postop day 7 from reopen and small bowel resection.    She has persisted with leukocytosis and general colonic inertia.    Past medical history and data relevant to the current hospitalization were reviewed.  White blood cell count currently 24,300.  Platelet count is elevated at 751.  Hemoglobin 7.4.    Physical exam:    On entering the room white tissue paper is rolled and placed in both nostrils.  There is no blood staining or fresh blood.  After removal of the tissue, there is no active bleeding.    Both nasal cavities were entirely filled with organized dark black clot.    All clot was evacuated from both nasal cavities.  A significant leftward septal spur is present the apex of which approximates the middle turbinate.  Clot was present posterior to the spur and was quite difficult to resolve but we were successful in removing it.  The anterior septum showed no blood staining or obvious vascular abnormality.    I continue to observe both nasal cavities with good illumination and saw no active bleeding or clot formation.  Some dark clot staining was present on examination of the oropharynx.  Remainder of ENT survey was unremarkable.    Impression: Probable left posterior epistaxis in a patient with severe leftward septal spur    Comment: Nasogastric tube on the left may have rubbed along the edge of the spur creating active  bleeding.  It is also plausible that the spur and septal deviation directed the NG tube more superiorly where some abrasion of the middle turbinate may have occurred.    Any additional severe posterior nasal bleeding on the left would be difficult to control with packing alone because of the anatomic aberration.    Recommendation: No instrumentation or intubation of the left nasal cavity; Afrin nasal spray 2 sprays every 8 hours x 3; saline nasal spray 3-4 times daily; hold Lovenox at least 3 days if possible/practical.    Following

## 2023-12-04 NOTE — SIGNIFICANT NOTE
12/04/23 6035   OTHER   Discipline physical therapist   Rehab Time/Intention   Session Not Performed other (see comments)  (Spoke with RN, pt. with near syncope when OOB. PT will hold this date, and follow up next service date as appropriate.)   Therapy Assessment/Plan (PT)   Criteria for Skilled Interventions Met (PT) yes   Recommendation   PT - Next Appointment 12/05/23

## 2023-12-04 NOTE — PLAN OF CARE
Goal Outcome Evaluation:           Progress: improving  Outcome Evaluation: WEnt over the plan of care and answered all questions, Vitals stable abd pain is well controlled. Patient had a episode of syncope and havinf epitaxis. Nose packed and ENT consulted and clots removed and suctioned. All meds given without complications and patient is not really eating. Patient is drinking and voiding. No other issues this shift.

## 2023-12-04 NOTE — PLAN OF CARE
Goal Outcome Evaluation:  Plan of Care Reviewed With: patient           Outcome Evaluation: VSS, IVF infusing, IV abxs given, IV tylenol given, no request for pain medication, c/o nausea with bloody nose and drainage, set up suction, packed nostrils per orders, multiple voiding and liquid BM this shift, applied barrier cream with each brief change, tolerating diet, on falls with bed alarm for safety, dressing changed to midline incision,  continuing to monitor.

## 2023-12-05 LAB
ANION GAP SERPL CALCULATED.3IONS-SCNC: 17.1 MMOL/L (ref 5–15)
BUN SERPL-MCNC: 23 MG/DL (ref 8–23)
BUN/CREAT SERPL: 25.8 (ref 7–25)
CALCIUM SPEC-SCNC: 8.5 MG/DL (ref 8.6–10.5)
CHLORIDE SERPL-SCNC: 104 MMOL/L (ref 98–107)
CO2 SERPL-SCNC: 11.9 MMOL/L (ref 22–29)
CREAT SERPL-MCNC: 0.89 MG/DL (ref 0.57–1)
DEPRECATED RDW RBC AUTO: 49.4 FL (ref 37–54)
EGFRCR SERPLBLD CKD-EPI 2021: 71.2 ML/MIN/1.73
ERYTHROCYTE [DISTWIDTH] IN BLOOD BY AUTOMATED COUNT: 14.5 % (ref 12.3–15.4)
GLUCOSE SERPL-MCNC: 84 MG/DL (ref 65–99)
HCT VFR BLD AUTO: 21.8 % (ref 34–46.6)
HGB BLD-MCNC: 7.1 G/DL (ref 12–15.9)
MAGNESIUM SERPL-MCNC: 1.7 MG/DL (ref 1.6–2.4)
MCH RBC QN AUTO: 30.5 PG (ref 26.6–33)
MCHC RBC AUTO-ENTMCNC: 32.6 G/DL (ref 31.5–35.7)
MCV RBC AUTO: 93.6 FL (ref 79–97)
PHOSPHATE SERPL-MCNC: 2.3 MG/DL (ref 2.5–4.5)
PLATELET # BLD AUTO: 805 10*3/MM3 (ref 140–450)
PMV BLD AUTO: 9.5 FL (ref 6–12)
POTASSIUM SERPL-SCNC: 3.6 MMOL/L (ref 3.5–5.2)
RBC # BLD AUTO: 2.33 10*6/MM3 (ref 3.77–5.28)
SODIUM SERPL-SCNC: 133 MMOL/L (ref 136–145)
WBC NRBC COR # BLD AUTO: 24.31 10*3/MM3 (ref 3.4–10.8)

## 2023-12-05 PROCEDURE — 80048 BASIC METABOLIC PNL TOTAL CA: CPT | Performed by: SURGERY

## 2023-12-05 PROCEDURE — 84100 ASSAY OF PHOSPHORUS: CPT | Performed by: SURGERY

## 2023-12-05 PROCEDURE — 25010000002 ONDANSETRON PER 1 MG: Performed by: SURGERY

## 2023-12-05 PROCEDURE — 83735 ASSAY OF MAGNESIUM: CPT | Performed by: SURGERY

## 2023-12-05 PROCEDURE — 25010000002 PROCHLORPERAZINE 10 MG/2ML SOLUTION: Performed by: SURGERY

## 2023-12-05 PROCEDURE — 99232 SBSQ HOSP IP/OBS MODERATE 35: CPT | Performed by: INTERNAL MEDICINE

## 2023-12-05 PROCEDURE — 85027 COMPLETE CBC AUTOMATED: CPT | Performed by: SURGERY

## 2023-12-05 PROCEDURE — 25010000002 MAGNESIUM SULFATE PER 500 MG OF MAGNESIUM: Performed by: SURGERY

## 2023-12-05 PROCEDURE — C1751 CATH, INF, PER/CENT/MIDLINE: HCPCS

## 2023-12-05 PROCEDURE — 25010000002 CALCIUM GLUCONATE PER 10 ML: Performed by: SURGERY

## 2023-12-05 PROCEDURE — 25010000002 POTASSIUM CHLORIDE PER 2 MEQ OF POTASSIUM: Performed by: SURGERY

## 2023-12-05 PROCEDURE — 25010000002 PIPERACILLIN SOD-TAZOBACTAM PER 1 G: Performed by: SURGERY

## 2023-12-05 PROCEDURE — 25010000002 ACETAMINOPHEN 10 MG/ML SOLUTION: Performed by: SURGERY

## 2023-12-05 PROCEDURE — 25010000002 MICAFUNGIN SODIUM 100 MG RECONSTITUTED SOLUTION 1 EACH VIAL: Performed by: SURGERY

## 2023-12-05 PROCEDURE — 02HV33Z INSERTION OF INFUSION DEVICE INTO SUPERIOR VENA CAVA, PERCUTANEOUS APPROACH: ICD-10-PCS | Performed by: SURGERY

## 2023-12-05 PROCEDURE — 25810000003 SODIUM CHLORIDE 0.9 % SOLUTION: Performed by: SURGERY

## 2023-12-05 PROCEDURE — 99024 POSTOP FOLLOW-UP VISIT: CPT | Performed by: SURGERY

## 2023-12-05 PROCEDURE — 97530 THERAPEUTIC ACTIVITIES: CPT

## 2023-12-05 PROCEDURE — 25010000002 HYDROMORPHONE PER 4 MG: Performed by: SURGERY

## 2023-12-05 RX ORDER — SODIUM CHLORIDE 0.9 % (FLUSH) 0.9 %
10 SYRINGE (ML) INJECTION EVERY 12 HOURS SCHEDULED
Status: DISCONTINUED | OUTPATIENT
Start: 2023-12-05 | End: 2023-12-20 | Stop reason: HOSPADM

## 2023-12-05 RX ORDER — HYDROMORPHONE HYDROCHLORIDE 1 MG/ML
0.5 INJECTION, SOLUTION INTRAMUSCULAR; INTRAVENOUS; SUBCUTANEOUS EVERY 4 HOURS PRN
Status: DISCONTINUED | OUTPATIENT
Start: 2023-12-05 | End: 2023-12-16

## 2023-12-05 RX ORDER — SODIUM CHLORIDE 0.9 % (FLUSH) 0.9 %
10 SYRINGE (ML) INJECTION AS NEEDED
Status: DISCONTINUED | OUTPATIENT
Start: 2023-12-05 | End: 2023-12-05

## 2023-12-05 RX ORDER — HYDROMORPHONE HYDROCHLORIDE 1 MG/ML
0.5 INJECTION, SOLUTION INTRAMUSCULAR; INTRAVENOUS; SUBCUTANEOUS
Status: DISCONTINUED | OUTPATIENT
Start: 2023-12-05 | End: 2023-12-05

## 2023-12-05 RX ORDER — SODIUM CHLORIDE 0.9 % (FLUSH) 0.9 %
20 SYRINGE (ML) INJECTION AS NEEDED
Status: DISCONTINUED | OUTPATIENT
Start: 2023-12-05 | End: 2023-12-05

## 2023-12-05 RX ORDER — FENTANYL/ROPIVACAINE/NS/PF 2-625MCG/1
15 PLASTIC BAG, INJECTION (ML) EPIDURAL ONCE
Status: COMPLETED | OUTPATIENT
Start: 2023-12-05 | End: 2023-12-05

## 2023-12-05 RX ORDER — SODIUM CHLORIDE 9 MG/ML
40 INJECTION, SOLUTION INTRAVENOUS AS NEEDED
Status: DISCONTINUED | OUTPATIENT
Start: 2023-12-05 | End: 2023-12-05

## 2023-12-05 RX ORDER — SODIUM CHLORIDE 0.9 % (FLUSH) 0.9 %
10 SYRINGE (ML) INJECTION EVERY 12 HOURS SCHEDULED
Status: DISCONTINUED | OUTPATIENT
Start: 2023-12-05 | End: 2023-12-05

## 2023-12-05 RX ADMIN — Medication 10 ML: at 21:27

## 2023-12-05 RX ADMIN — METOPROLOL TARTRATE 12.5 MG: 25 TABLET, FILM COATED ORAL at 21:22

## 2023-12-05 RX ADMIN — PROCHLORPERAZINE EDISYLATE 5 MG: 5 INJECTION INTRAMUSCULAR; INTRAVENOUS at 03:35

## 2023-12-05 RX ADMIN — Medication 10 ML: at 09:11

## 2023-12-05 RX ADMIN — PROCHLORPERAZINE EDISYLATE 5 MG: 5 INJECTION INTRAMUSCULAR; INTRAVENOUS at 21:27

## 2023-12-05 RX ADMIN — ALPRAZOLAM 0.5 MG: 0.5 TABLET ORAL at 21:20

## 2023-12-05 RX ADMIN — PIPERACILLIN SODIUM AND TAZOBACTAM SODIUM 3.38 G: 3; .375 INJECTION, SOLUTION INTRAVENOUS at 21:48

## 2023-12-05 RX ADMIN — OXYMETAZOLINE HYDROCHLORIDE 2 SPRAY: 0.05 SPRAY NASAL at 09:15

## 2023-12-05 RX ADMIN — QUETIAPINE FUMARATE 100 MG: 50 TABLET, EXTENDED RELEASE ORAL at 21:23

## 2023-12-05 RX ADMIN — OXYMETAZOLINE HYDROCHLORIDE 2 SPRAY: 0.05 SPRAY NASAL at 21:26

## 2023-12-05 RX ADMIN — POTASSIUM PHOSPHATE, MONOBASIC POTASSIUM PHOSPHATE, DIBASIC: 224; 236 INJECTION, SOLUTION, CONCENTRATE INTRAVENOUS at 17:44

## 2023-12-05 RX ADMIN — METOPROLOL TARTRATE 12.5 MG: 25 TABLET, FILM COATED ORAL at 09:11

## 2023-12-05 RX ADMIN — MICAFUNGIN SODIUM 100 MG: 100 INJECTION, POWDER, LYOPHILIZED, FOR SOLUTION INTRAVENOUS at 21:24

## 2023-12-05 RX ADMIN — Medication 10 ML: at 21:26

## 2023-12-05 RX ADMIN — ONDANSETRON 4 MG: 2 INJECTION INTRAMUSCULAR; INTRAVENOUS at 18:01

## 2023-12-05 RX ADMIN — PAROXETINE HYDROCHLORIDE 40 MG: 20 TABLET, FILM COATED ORAL at 09:11

## 2023-12-05 RX ADMIN — ONDANSETRON 4 MG: 2 INJECTION INTRAMUSCULAR; INTRAVENOUS at 00:14

## 2023-12-05 RX ADMIN — PIPERACILLIN SODIUM AND TAZOBACTAM SODIUM 3.38 G: 3; .375 INJECTION, SOLUTION INTRAVENOUS at 11:11

## 2023-12-05 RX ADMIN — POTASSIUM PHOSPHATE, MONOBASIC POTASSIUM PHOSPHATE, DIBASIC 15 MMOL: 224; 236 INJECTION, SOLUTION, CONCENTRATE INTRAVENOUS at 18:01

## 2023-12-05 RX ADMIN — ONDANSETRON 4 MG: 2 INJECTION INTRAMUSCULAR; INTRAVENOUS at 11:06

## 2023-12-05 RX ADMIN — HYDROMORPHONE HYDROCHLORIDE 0.5 MG: 1 INJECTION, SOLUTION INTRAMUSCULAR; INTRAVENOUS; SUBCUTANEOUS at 21:28

## 2023-12-05 RX ADMIN — ACETAMINOPHEN 1000 MG: 10 INJECTION, SOLUTION INTRAVENOUS at 03:19

## 2023-12-05 RX ADMIN — SODIUM CHLORIDE 75 ML/HR: 9 INJECTION, SOLUTION INTRAVENOUS at 11:08

## 2023-12-05 RX ADMIN — PANTOPRAZOLE SODIUM 40 MG: 40 INJECTION, POWDER, FOR SOLUTION INTRAVENOUS at 11:06

## 2023-12-05 RX ADMIN — HYDROMORPHONE HYDROCHLORIDE 0.5 MG: 1 INJECTION, SOLUTION INTRAMUSCULAR; INTRAVENOUS; SUBCUTANEOUS at 11:18

## 2023-12-05 RX ADMIN — I.V. FAT EMULSION 20 G: 20 EMULSION INTRAVENOUS at 17:44

## 2023-12-05 NOTE — SIGNIFICANT NOTE
"   12/05/23 1518   PICC Double Lumen 12/05/23 Right Brachial   Placement date: If unknown, DO NOT use \"Add Comment\" note/Placement time: If unknown, DO NOT use \"Add Comment\" note: 12/05/23 1518   Hand Hygiene Completed: Yes  Size (Fr): 4  Description (optional): single lumen PICC  Length (cm): 33 cm  Orientation:...   Site Assessment Clean;Dry;Intact   #1 Lumen Status Blood return noted;Capped;Flushed;Normal saline locked   #2 Lumen Status Blood return noted;Capped;Flushed;Infiltrated   Line Care Connections checked and tightened   Extremity Circumference (cm) 0 cm   Dressing Type Border Dressing;Transparent;Securing device;Antimicrobial dressing/disc   Dressing Status Clean;Dry;Intact   Dressing Intervention New dressing   Liquid Adhesive Applied   Dressing Change Due 12/12/23   Indication/Daily Review of Necessity admin of parenteral nutrition/lipids         LOT: [id number]  EXP: 11/30/2024    3 needles, 2 guide wires, 1 scalpel accounted for at procedure end.     PICC line successful with 3CG confirmation. Primary RN notified that line may be used  "

## 2023-12-05 NOTE — CONSULTS
"Nutrition Services    Patient Name:  Martina Hardwick  YOB: 1955  MRN: [id number]  Admit Date:  11/20/2023    Assessment Date:  12/05/23    Summary: Consult for TPN assessment    TPN assessment completed. Pt was previously on TPN but pulled out her PICC line as she thought everyone here was trying to harm her. Currently on clears. Offered ONS, pt declined at this time. Recommend re-initiating TPN at goal: 1200 kcal dextrose, 70 grams AA, 200 kcal lipids.    REC:  If PICC is able to be replaced, recommend starting same TPN regimen at goal (70 g AA / 1200 kcal dextrose / 100 kcal lipids)  Will continue to encourage and monitor PO/ONS intake   Advance diet once medically appropriate per MD    RD to follow per protocol.    CLINICAL NUTRITION ASSESSMENT      Reason for Assessment Physician Consult, TPN Assessment     Diagnosis/Problem Colonic inertia      Anthropometrics        Current Height  Current Weight  BMI kg/m2 Height: 154.9 cm (61\")  Weight: 48.8 kg (107 lb 9.4 oz) (12/04/23 1731)  Body mass index is 20.33 kg/m².   Adjusted BMI (if applicable)    BMI Category Normal/Healthy (18.4 - 24.9)   Ideal Body Weight (IBW) 50.1 kg    Usual Body Weight (UBW)  lbs   Weight Trend Stable; pt endorses 26 lb wt loss x 3 yrs      Estimated Requirements         Weight used  46.6 kg     Calories  5205-9301 (30 kcal/kg, 35 kcal/kg)    Protein  56-70 (1.2 - 1.5 gm/kg)    Fluid  1 mL/kcal     Labs       Pertinent Labs    Results from last 7 days   Lab Units 12/05/23  0753 12/04/23  0513 12/03/23  0411 12/02/23  0638   SODIUM mmol/L 133* 132* 134* 134*   POTASSIUM mmol/L 3.6 3.5 3.7 3.8   CHLORIDE mmol/L 104 103 102 101   CO2 mmol/L 11.9* 14.4* 16.5* 23.3   BUN mg/dL 23 29* 41* 41*   CREATININE mg/dL 0.89 1.27* 1.83* 1.00   CALCIUM mg/dL 8.5* 8.5* 8.5* 8.3*   BILIRUBIN mg/dL  --  0.6 0.8 0.6   ALK PHOS U/L  --  125* 145* 153*   ALT (SGPT) U/L  --  73* 75* 87*   AST (SGOT) U/L  --  75* 77* 67*   GLUCOSE mg/dL 84 76 " "82 96     Results from last 7 days   Lab Units 12/05/23  0753 12/04/23  0513 12/03/23  0411   MAGNESIUM mg/dL 1.7 1.8 2.1   PHOSPHORUS mg/dL 2.3* 3.3 4.3   HEMOGLOBIN g/dL 7.1* 7.4* 7.8*   HEMATOCRIT % 21.8* 23.7* 24.4*   WBC 10*3/mm3 24.31* 24.31* 22.63*   ALBUMIN g/dL  --  2.0* 2.1*     Results from last 7 days   Lab Units 12/05/23  0753 12/04/23  0513 12/03/23  0411 12/02/23  0638 12/01/23  0534   PLATELETS 10*3/mm3 805* 751* 580* 521* 528*     No results found for: \"COVID19\"  Lab Results   Component Value Date    HGBA1C 5.3 06/20/2022          Medications           Scheduled Medications ALPRAZolam, 0.5 mg, Oral, Daily  [Held by provider] enoxaparin, 30 mg, Subcutaneous, Q24H  metoprolol tartrate, 12.5 mg, Oral, Q12H  micafungin (MYCAMINE) IV, 100 mg, Intravenous, Q24H  oxymetazoline, 2 spray, Each Nare, BID  pantoprazole, 40 mg, Intravenous, Q AM  PARoxetine, 40 mg, Oral, Daily  piperacillin-tazobactam, 3.375 g, Intravenous, Q8H  QUEtiapine fumarate ER, 100 mg, Oral, Nightly  sodium chloride, 10 mL, Intravenous, Q12H  sodium chloride, 10 mL, Intravenous, Q12H       Infusions Pharmacy to Dose TPN,   sodium chloride, 75 mL/hr, Last Rate: 75 mL/hr (12/05/23 1108)       PRN Medications   ALPRAZolam    HYDROmorphone    Methocarbamol    [DISCONTINUED] ondansetron **OR** ondansetron    Pharmacy to Dose TPN    prochlorperazine    sodium chloride    sodium chloride    sodium chloride     Physical Findings          General Findings alert, oriented, room air   Oral/Mouth Cavity WDL   Edema  no edema   Gastrointestinal fecal incontinence, last bowel movement: 12/4   Skin  bruising, pale, surgical incision: abdomen incision    Tubes/Drains/Lines drain, medial RLQ and medial LLQ      NFPE See Malnutrition Severity Assessment, Date completed 11/21     Malnutrition Severity Assessment      Patient meets criteria for : Severe Malnutrition (Pt meets ASPEN/AND criteria for nutrition dx of severe malnutrition of acute disease " related to energy intake, muscle wasting, and fat loss.)       Current Nutrition Orders & Evaluation of Intake       Oral Nutrition     Food Allergies NKFA   Current PO Diet Diet: Liquid Diets; Clear Liquid; Fluid Consistency: Thin (IDDSI 0)   Supplement n/a   PO Evaluation     % PO Intake Minimal     Factors Affecting Intake: abdominal pain, altered GI function, decreased appetite, nausea   --  PES STATEMENT / NUTRITION DIAGNOSIS      Nutrition Dx Problem  Problem: Malnutrition (severe)  Etiology: Medical Diagnosis - Colonic inertia and Factors Affecting Nutrition - altered GI fxn, decreased appetite, abdominal pain, nausea     Signs/Symptoms: Clear Liquid Diet, NFPE Results, and Unintended Weight Change     NUTRITION INTERVENTION / PLAN OF CARE      Intervention Goal(s) Maintain nutrition status, Reduce/improve symptoms, Meet estimated needs, Disease management/therapy, Increase intake, Initiate TF/PN, Tolerate TF/PN at goal, and Appropriate weight gain         RD Intervention/Action Await initiation/advancement of PO diet, Await initiation of EN/PN, Encourage intake, Continue to monitor, Care plan reviewed, and Recommend/order: ONS, TPN   --      Prescription/Orders:       PO Diet ADAT      Supplements       Enteral Nutrition       Parenteral Nutrition    Parenteral Prescription:     TPN Route PICC   TPN Rate (mL/hr)    TPN Recommendation:        Dextrose (kcal) 1200       Amino Acid (gm) 70       Lipid Concentration 20%       Lipid Volume/Frequency  100 mL   Propofol Rate/Kcal    TPN Provision:  1680 kcal, 70 gm protein        Calories 100 % needs met        Protein  100 % needs met        Fluid  mL total      New Prescription Ordered? Yes    --      Monitor/Evaluation Per protocol, I&O, PO intake, Pertinent labs, PN delivery/tolerance, Weight, Skin status, GI status, Symptoms   Discharge Plan/Needs Pending clinical course   --    RD to follow per protocol.      Electronically signed by:  Susanna Salas  Dietitian Intern   12/05/23 12:31 EST

## 2023-12-05 NOTE — PLAN OF CARE
Problem: Adult Inpatient Plan of Care  Goal: Absence of Hospital-Acquired Illness or Injury  Intervention: Identify and Manage Fall Risk  Recent Flowsheet Documentation  Taken 12/5/2023 1600 by Claudia Alvarez RN  Safety Promotion/Fall Prevention: safety round/check completed  Taken 12/5/2023 1400 by Claudia Alvarez RN  Safety Promotion/Fall Prevention: safety round/check completed  Taken 12/5/2023 1200 by Claudia Alvarez RN  Safety Promotion/Fall Prevention: safety round/check completed  Taken 12/5/2023 1000 by Claudia Alvarez RN  Safety Promotion/Fall Prevention: safety round/check completed  Taken 12/5/2023 0800 by Claudia Alvarez RN  Safety Promotion/Fall Prevention: safety round/check completed  Intervention: Prevent Skin Injury  Recent Flowsheet Documentation  Taken 12/5/2023 1600 by Claudia Alvarez RN  Body Position: position changed independently  Taken 12/5/2023 1400 by Claudia Alvarez RN  Body Position: position changed independently  Skin Protection:   adhesive use limited   tubing/devices free from skin contact  Taken 12/5/2023 1200 by Claudia Alvarez RN  Body Position: position changed independently  Taken 12/5/2023 1000 by Claudia Alvarez RN  Body Position: position changed independently  Taken 12/5/2023 0800 by Claudia Alvarez RN  Body Position: position changed independently  Skin Protection:   adhesive use limited   tubing/devices free from skin contact  Intervention: Prevent and Manage VTE (Venous Thromboembolism) Risk  Recent Flowsheet Documentation  Taken 12/5/2023 1600 by Claudia Alvarez RN  Activity Management: activity encouraged  Taken 12/5/2023 1400 by Claudia Alvarez RN  Activity Management: activity encouraged  Range of Motion: active ROM (range of motion) encouraged  Taken 12/5/2023 1200 by Claudia Alvarez RN  Activity Management: activity encouraged  Taken 12/5/2023 1000 by Claudia Alvarez RN  Activity Management: activity encouraged  Taken 12/5/2023 0800 by Claudia Alvarez  RN  Activity Management: activity encouraged  Range of Motion: active ROM (range of motion) encouraged     Problem: Fall Injury Risk  Goal: Absence of Fall and Fall-Related Injury  Intervention: Promote Injury-Free Environment  Recent Flowsheet Documentation  Taken 12/5/2023 1600 by Claudia Alvarez RN  Safety Promotion/Fall Prevention: safety round/check completed  Taken 12/5/2023 1400 by Claudia Alvarez RN  Safety Promotion/Fall Prevention: safety round/check completed  Taken 12/5/2023 1200 by Claudia Alvaerz RN  Safety Promotion/Fall Prevention: safety round/check completed  Taken 12/5/2023 1000 by Claudia Alvarez RN  Safety Promotion/Fall Prevention: safety round/check completed  Taken 12/5/2023 0800 by Claudia Alvarez RN  Safety Promotion/Fall Prevention: safety round/check completed     Problem: Skin Injury Risk Increased  Goal: Skin Health and Integrity  Intervention: Optimize Skin Protection  Recent Flowsheet Documentation  Taken 12/5/2023 1400 by Claudia Alvarez RN  Pressure Reduction Techniques: frequent weight shift encouraged  Pressure Reduction Devices: positioning supports utilized  Skin Protection:   adhesive use limited   tubing/devices free from skin contact  Taken 12/5/2023 0800 by Claudia Alvarez RN  Pressure Reduction Techniques: frequent weight shift encouraged  Pressure Reduction Devices: positioning supports utilized  Skin Protection:   adhesive use limited   tubing/devices free from skin contact   Goal Outcome Evaluation:   VSS on room air; ST on monitor at times; PICC reinserted and pt to start back on TPN this evening; awaiting CT abd w/ contrast; little output form MARY drains; phosphorus replaced IV per order; bed locked and in low position; bed alarm set; call light in reach; plan of care continues.

## 2023-12-05 NOTE — PLAN OF CARE
Problem: Adult Inpatient Plan of Care  Goal: Absence of Hospital-Acquired Illness or Injury  Intervention: Identify and Manage Fall Risk  Recent Flowsheet Documentation  Taken 12/4/2023 1726 by Claudia Alvarez RN  Safety Promotion/Fall Prevention: safety round/check completed  Intervention: Prevent Skin Injury  Recent Flowsheet Documentation  Taken 12/4/2023 1726 by Claudia Alvarez RN  Body Position: supine  Skin Protection:   adhesive use limited   tubing/devices free from skin contact  Intervention: Prevent and Manage VTE (Venous Thromboembolism) Risk  Recent Flowsheet Documentation  Taken 12/4/2023 1726 by Claudia Alvarez RN  Activity Management: activity encouraged  Range of Motion: active ROM (range of motion) encouraged     Problem: Fall Injury Risk  Goal: Absence of Fall and Fall-Related Injury  Intervention: Promote Injury-Free Environment  Recent Flowsheet Documentation  Taken 12/4/2023 1726 by Claudia Alvarez RN  Safety Promotion/Fall Prevention: safety round/check completed     Problem: Skin Injury Risk Increased  Goal: Skin Health and Integrity  Intervention: Optimize Skin Protection  Recent Flowsheet Documentation  Taken 12/4/2023 1726 by Claudia Alvarez RN  Pressure Reduction Techniques:   frequent weight shift encouraged   weight shift assistance provided  Head of Bed (HOB) Positioning: HOB elevated  Pressure Reduction Devices: positioning supports utilized  Skin Protection:   adhesive use limited   tubing/devices free from skin contact

## 2023-12-05 NOTE — THERAPY TREATMENT NOTE
Patient Name: Martina Hardwick  : 1955    MRN: [id number]                              Today's Date: 2023       Admit Date: 2023    Visit Dx:     ICD-10-CM ICD-9-CM   1. 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    Colonic inertia    Severe malnutrition     Past Medical History:   Diagnosis Date    Arthritis     Autoantibody titer positive     Goodwin esophagus     Bloating     Cataract     BILAT    Chronic abdominal pain     Chronic constipation     Encounter for preventive health examination     Endometriosis     Gastritis     Gastrointestinal hypomotility     GERD (gastroesophageal reflux disease)     Headache, tension-type     Hypertension     Hyponatremia     Insomnia     Intestinal autonomic neuropathy     Irritable bowel syndrome     Migraine     Mixed anxiety and depressive disorder     Moderate malnutrition     Noninfectious gastroenteritis     OP (osteoporosis)     Osteopenia     Osteoporosis     PONV (postoperative nausea and vomiting) 2018    Psoriasis     KNEES, ELBOWS BILAT AND SCALP    Seasonal allergies     Visceral hyperalgesia      Past Surgical History:   Procedure Laterality Date    APPENDECTOMY      CHOLECYSTECTOMY N/A 2018    Procedure: CHOLECYSTECTOMY LAPAROSCOPIC converted to open procedure;  Surgeon: Hernan Hinds MD;  Location: [facility];  Service: General    COLON RESECTION N/A 2023    Procedure: OPEN SUBTOTAL COLECTOMY AND  ADESIOLYSIS;  Surgeon: Ranjeet Salinas MD;  Location: [facility] MAIN OR;  Service: General;  Laterality: N/A;    COLON SURGERY      STATES TOTAL OF 3 COLON SURGERY    COLONOSCOPY      COLONOSCOPY N/A 12/12/2018    Procedure: COLONOSCOPY;  Surgeon: Darien Ruff MD;  Location: [facility] OR;  Service: Gastroenterology    COLONOSCOPY N/A 10/13/2023    Procedure: COLONOSCOPY TO CECUM;  Surgeon: Ranjeet Salinas MD;  [facility];  Service: General;  Laterality: N/A;  PREOP/ CHRONIC CONSTIPATION- POSTOP/ NORMAL    DIAGNOSTIC LAPAROSCOPY  1990    DILATATION AND CURETTAGE  1985    ENDOSCOPY N/A 05/13/2016    Procedure: ESOPHAGOGASTRODUODENOSCOPY ;  Surgeon: Darien Ruff MD;  Location: [facility] OR;  Service:     ENDOSCOPY N/A 05/01/2023    Procedure: ESOPHAGOGASTRODUODENOSCOPY WITH BIOPSY;  Surgeon: Darien Ruff MD;  Location: [facility] OR;  Service: Gastroenterology;  Laterality: N/A;  Mild Thrush; Gastritis; Esophagitis- thrush and reflux; Biopsies- duodenal, gastric, esophagus    EXPLORATORY LAPAROTOMY N/A 11/27/2023    Procedure: exploratory laparotomy, small bowel resection;  Surgeon: Ranjeet Salinas MD;  Location: [facility] MAIN OR;  Service: General;  Laterality: N/A;    HYSTERECTOMY      REVISION / TAKEDOWN COLOSTOMY        General Information       Row Name 12/05/23 1054          Physical Therapy Time and Intention    Document Type therapy note (daily note) (P)   -JY     Mode of Treatment individual therapy;physical therapy (P)   -JY       Row Name 12/05/23 1054          General Information    Patient Profile Reviewed yes (P)   -JY       Row Name 12/05/23 1054          Safety Issues, Functional Mobility    Comment, Safety Issues/Impairments (Mobility) Gait belt and non skid socks donned (P)   -JY               User Key  (r) = Recorded By, (t) = Taken By, (c) = Cosigned By      Initials Name Provider Type    Kenroy Elizabeth, PTA Student PTA Student                    Mobility       Row Name 12/05/23 1056          Bed Mobility    Bed Mobility supine-sit-supine (P)   -JY     Scooting/Bridging [place] (Bed Mobility) standby assist (P)   -JY     Supine-Sit [place] (Bed Mobility) standby assist;verbal cues (P)   -JY     Sit-Supine [place] (Bed Mobility) minimum assist (75% patient effort);1 person assist (P)   -JY     Assistive Device (Bed Mobility) head of bed elevated (P)   -JY     Comment, (Bed Mobility) Min A for BLE sit to supine (P)   -JY       Row Name 12/05/23 1056          Sit-Stand Transfer    Sit-Stand [place] (Transfers) contact guard;1 person assist (P)   -JY     Assistive Device (Sit-Stand Transfers) walker, front-wheeled (P)   -JY     Comment, (Sit-Stand Transfer) 2 STS, CGA from bed and w/c. (P)   -JY       Row Name 12/05/23 1056          Gait/Stairs (Locomotion)    [place] Level (Gait) contact guard (P)   -JY     Assistive Device (Gait) walker, front-wheeled (P)   -JY     Patient was able to Ambulate yes (P)   -JY     Distance in Feet (Gait) 5' to sink + 10' to reyes (P)   -JY     Deviations/Abnormal Patterns (Gait) stacy decreased;stride length decreased;gait speed decreased (P)   -JY     Bilateral Gait Deviations forward flexed posture (P)   -JY     Comment, (Gait/Stairs) Slow, shuffled steps. Pt ambulated 5' to sink to brush teeth, then 10' to reyes before suddenly needing to sit in w/c, due to dizziness. (P)   -JY               User Key  (r) = Recorded By, (t) = Taken By, (c) = Cosigned By      Initials Name Provider Type    Kenroy Elizabeth PTA Student PTA Student                   Obj/Interventions       Row Name 12/05/23 1059          Balance    Balance Assessment sitting static balance;standing static balance;standing dynamic balance (P)   -JY     Static Sitting Balance standby assist (P)   -JY     Position, Sitting Balance sitting edge of bed (P)   -JY     Static Standing Balance contact guard (P)   -JY     Dynamic  "Standing Balance contact guard (P)   -JY     Position/Device Used, Standing Balance walker, front-wheeled (P)   -JY     Comment, Balance Pt stood at sink to brush teeth and wash face for 5 min, CGA (P)   -JY               User Key  (r) = Recorded By, (t) = Taken By, (c) = Cosigned By      Initials Name Provider Type    Kenroy Elizabeth, PTA Student PTA Student                   Goals/Plan    No documentation.                  Clinical Impression       Row Name 12/05/23 1100          Pain    Additional Documentation Pain Scale: FACES Pre/Post-Treatment (Group) (P)   -JY       Row Name 12/05/23 1100          Pain Scale: FACES Pre/Post-Treatment    Pain: FACES Scale, Pretreatment 2-->hurts little bit (P)   -JY     Posttreatment Pain Rating 2-->hurts little bit (P)   -JY     Pre/Posttreatment Pain Comment C/o dizziness and nausea. Pt felt like she \"might pass out.\" (P)   -JY       Row Name 12/05/23 1100          Plan of Care Review    Plan of Care Reviewed With patient (P)   -JY     Outcome Evaluation Pt agreeable to therapy, wishing to brush teeth and wash face at sink. Pt c/o dizziness sitting EOB and standing, needing 1 min to acclimate to upright pos. Pt was SBA for supine-sit, min A x 1 for sit-supine for BLE support. Pt performed STS to Rwx, CGA and ambulated 5' to sink. Pt stood for 5 min at sink to brush teeth and wash face, CGA. Pt ambulated an additional 10' into reyes c w/c follow before becoming dizzy and needing to sit in w/c. Pt describes dizziness as feeling like she might pass out. Pt returned to bed feeling nauseous, requesting nausea pill. PT took pt's BP, 168/82. RN notified.of BP reading and med request. (P)   -JY       Row Name 12/05/23 1100          Positioning and Restraints    Pre-Treatment Position in bed (P)   -JY     Post Treatment Position bed (P)   -JY     In Bed notified nsg;fowlers;exit alarm on;encouraged to call for assist;call light within reach (P)   -JY               User Key  (r) = " Recorded By, (t) = Taken By, (c) = Cosigned By      Initials Name Provider Type    Kenroy Elizabeth, PTA Student PTA Student                   Outcome Measures       Row Name 12/05/23 1105          How much help from another person do you currently need...    Turning from your back to your side while in flat bed without using bedrails? 3 (P)   -JY     Moving from lying on back to sitting on the side of a flat bed without bedrails? 3 (P)   -JY     Moving to and from a bed to a chair (including a wheelchair)? 3 (P)   -JY     Standing up from a chair using your arms (e.g., wheelchair, bedside chair)? 3 (P)   -JY     Climbing 3-5 steps with a railing? 2 (P)   -JY     To walk in hospital room? 3 (P)   -JY     AM-PAC 6 Clicks Score (PT) 17 (P)   -JY     Highest Level of Mobility Goal 5 --> Static standing (P)   -JY       Row Name 12/05/23 1105          Functional Assessment    Outcome Measure Options AM-PAC 6 Clicks Basic Mobility (PT) (P)   -JY               User Key  (r) = Recorded By, (t) = Taken By, (c) = Cosigned By      Initials Name Provider Type    Kenroy Elizabeth, PTA Student PTA Student                                 Physical Therapy Education       Title: PT OT SLP Therapies (Done)       Topic: Physical Therapy (Done)       Point: Mobility training (Done)       Learning Progress Summary             Patient Acceptance, E, DU,VU by DRE at 12/5/2023 1105    Acceptance, E,TB, VU by TAYLER at 12/4/2023 0500    Acceptance, E,TB,D, VU,NR by  at 12/3/2023 1331    Acceptance, E,TB, VU by MT at 12/3/2023 0459    Acceptance, E,TB, VU by MT at 12/2/2023 0446    Acceptance, E,TB, VU by JS at 12/1/2023 0520    Acceptance, E,TB,D, VU by  at 11/30/2023 0951    Acceptance, E,TB,D, VU,DU,NR by  at 11/30/2023 0523    Acceptance, E, DU,VU by DRE at 11/29/2023 1353    Acceptance, E,TB,D, VU,NR by PH at 11/27/2023 1207    Acceptance, E,TB,D, VU,NR by PH at 11/24/2023 0908    Acceptance, E,D, VU,NR by MS at 11/22/2023 1517     Acceptance, E,D, VU,NR by MS at 11/21/2023 1125                         Point: Home exercise program (Done)       Learning Progress Summary             Patient Acceptance, E,TB, VU by JS at 12/4/2023 0500    Acceptance, E,TB, VU by MT at 12/3/2023 0459    Acceptance, E,TB, VU by MT at 12/2/2023 0446    Acceptance, E,TB, VU by JS at 12/1/2023 0520    Acceptance, E,TB,D, VU by  at 11/30/2023 0951    Acceptance, E,TB,D, VU,DU,NR by  at 11/30/2023 0523    Acceptance, E, DU,VU by JY at 11/29/2023 1353    Acceptance, E,TB,D, VU,NR by PH at 11/27/2023 1207    Acceptance, E,TB,D, VU,NR by PH at 11/24/2023 0908    Acceptance, E,D, VU,NR by MS at 11/22/2023 1519    Acceptance, E,D, VU,NR by MS at 11/21/2023 1125                         Point: Body mechanics (Done)       Learning Progress Summary             Patient Acceptance, E, DU,VU by JY at 12/5/2023 1105    Acceptance, E,TB, VU by JS at 12/4/2023 0500    Acceptance, E,TB,D, VU,NR by  at 12/3/2023 1331    Acceptance, E,TB, VU by MT at 12/3/2023 0459    Acceptance, E,TB, VU by MT at 12/2/2023 0446    Acceptance, E,TB, VU by JS at 12/1/2023 0520    Acceptance, E,TB,D, VU by PH at 11/30/2023 0951    Acceptance, E,TB,D, VU,DU,NR by  at 11/30/2023 0523    Acceptance, E, DU,VU by JY at 11/29/2023 1353    Acceptance, E,TB,D, VU,NR by PH at 11/27/2023 1207    Acceptance, E,TB,D, VU,NR by PH at 11/24/2023 0908    Acceptance, E,D, VU,NR by MS at 11/22/2023 1519    Acceptance, E,D, VU,NR by MS at 11/21/2023 1125                         Point: Precautions (Done)       Learning Progress Summary             Patient Acceptance, E, DU,VU by DRE at 12/5/2023 1105    Acceptance, E,TB, VU by JS at 12/4/2023 0500    Acceptance, E,TB,D, VU,NR by  at 12/3/2023 1331    Acceptance, E,TB, VU by MT at 12/3/2023 0459    Acceptance, E,TB, VU by MT at 12/2/2023 0446    Acceptance, E,TB, VU by JS at 12/1/2023 0520    Acceptance, E,TB,D, VU by  at 11/30/2023 0951    Acceptance, E,TB,D,  VU,DU,NR by  at 11/30/2023 0523    Acceptance, E, DU,VU by J at 11/29/2023 1353    Acceptance, E,TB,D, VU,NR by  at 11/27/2023 1207    Acceptance, E,TB,D, VU,NR by  at 11/24/2023 0908    Acceptance, E,D, VU,NR by MS at 11/22/2023 1519    Acceptance, E,D, VU,NR by MS at 11/21/2023 1125                                         User Key       Initials Effective Dates Name Provider Type Discipline    MS 06/16/21 -  Miguel Beasley, PT Physical Therapist PT    MT 06/16/21 -  Aileen Lezama, RN Registered Nurse Nurse     06/16/21 -  Yanira Hinds RN Registered Nurse Nurse     10/01/20 -  Isis Fitzpatrick RN Registered Nurse Nurse     06/16/21 -  Radha Rosen, PTA Physical Therapist Assistant PT     04/08/22 -  Codi Hinds, PT Physical Therapist PT     11/08/23 -  Kenroy Deleon PTA Student PTA Student PT                  PT Recommendation and Plan     Plan of Care Reviewed With: (P) patient  Outcome Evaluation: (P) Pt agreeable to therapy, wishing to brush teeth and wash face at sink. Pt c/o dizziness sitting EOB and standing, needing 1 min to acclimate to upright pos. Pt was SBA for supine-sit, min A x 1 for sit-supine for BLE support. Pt performed STS to Rwx, CGA and ambulated 5' to sink. Pt stood for 5 min at sink to brush teeth and wash face, CGA. Pt ambulated an additional 10' into reyes c w/c follow before becoming dizzy and needing to sit in w/c. Pt describes dizziness as feeling like she might pass out. Pt returned to bed feeling nauseous, requesting nausea pill. PT took pt's BP, 168/82. RN notified.of BP reading and med request.     Time Calculation:         PT Charges       Row Name 12/05/23 1104             Time Calculation    Start Time 1029 (P)   -JY      Stop Time 1051 (P)   -JY      Time Calculation (min) 22 min (P)   -JY      PT Received On 12/05/23 (P)   -JY      PT - Next Appointment 12/06/23 (P)   -DRE         Time Calculation- PT    Total Timed Code Minutes- PT 22 minute(s)  (P)   -JY         Timed Charges    13785 - PT Therapeutic Activity Minutes 22 (P)   -JY         Total Minutes    Timed Charges Total Minutes 22 (P)   -JY       Total Minutes 22 (P)   -JY                User Key  (r) = Recorded By, (t) = Taken By, (c) = Cosigned By      Initials Name Provider Type    Kenroy Elizabeth PTA Student PTA Student                  Therapy Charges for Today       Code Description Service Date Service Provider Modifiers Qty    [id number] HC PT THERAPEUTIC ACT EA 15 MIN 12/5/2023 Kenroy Deleon PTA Student GP 1            PT G-Codes  Outcome Measure Options: (P) AM-PAC 6 Clicks Basic Mobility (PT)  AM-PAC 6 Clicks Score (PT): (P) 17       Kenroy Deleon PTA Student  12/5/2023

## 2023-12-05 NOTE — PLAN OF CARE
Goal Outcome Evaluation:  Plan of Care Reviewed With: (P) patient           Outcome Evaluation: (P) Pt agreeable to therapy, wishing to brush teeth and wash face at sink. Pt c/o dizziness sitting EOB and standing, needing 1 min to acclimate to upright pos. Pt was SBA for supine-sit, min A x 1 for sit-supine for BLE support. Pt performed STS to Rwx, CGA and ambulated 5' to sink. Pt stood for 5 min at sink to brush teeth and wash face, CGA. Pt ambulated an additional 10' into reyes c w/c follow before becoming dizzy and needing to sit in w/c. Pt describes dizziness as feeling like she might pass out. Pt returned to bed feeling nauseous, requesting nausea pill. PT took pt's BP, 168/82. RN notified.of BP reading and med request.

## 2023-12-05 NOTE — PROGRESS NOTES
"[facility] Clinical Pharmacy Services: Total Parental Nutrition Initial Consult    Indication: Prolonged Paralytic Ileus  Route: central  Type: standard    Relevant clinical data and objective history reviewed:  67 y.o. female 154.9 cm (61\") 48.8 kg (107 lb 9.4 oz)    Results from last 7 days   Lab Units 12/05/23  0753 12/04/23  0513   SODIUM mmol/L 133* 132*   POTASSIUM mmol/L 3.6 3.5   CHLORIDE mmol/L 104 103   CO2 mmol/L 11.9* 14.4*   BUN mg/dL 23 29*   CREATININE mg/dL 0.89 1.27*   CALCIUM mg/dL 8.5* 8.5*   ALBUMIN g/dL  --  2.0*   BILIRUBIN mg/dL  --  0.6   ALK PHOS U/L  --  125*   ALT (SGPT) U/L  --  73*   AST (SGOT) U/L  --  75*   GLUCOSE mg/dL 84 76   MAGNESIUM mg/dL 1.7 1.8   PHOSPHORUS mg/dL 2.3* 3.3        Estimated Creatinine Clearance: 47.3 mL/min (by C-G formula based on SCr of 0.89 mg/dL).    Active fluid orders: NS @ 75ml/hr    Dietary Orders (From admission, onward)       Start     Ordered    12/02/23 0857  Diet: Liquid Diets; Clear Liquid; Fluid Consistency: Thin (IDDSI 0)  Diet Effective Now        References:    Diet Order Crosswalk   Question Answer Comment   Diets: Liquid Diets    Liquid Diet: Clear Liquid    Fluid Consistency: Thin (IDDSI 0)        12/02/23 0856                  Assessment    Note pt was previously on TPN at goal but was stopped when she removed her PICC line. Orders to replace PICC line today and resume TPN    Goal   Protein: 70 grams/day   Dextrose: 1200 Kcal/day   Lipids: 100 ml of 20% lipid infusion 7 days/week    Plan  Will start TPN below goal and will taper up as appropriate. Will initiate TPN with the following macros:   Protein/Dextrose/Lipids: 70g/900kcal/100mL   Volume: 1800 ml (75 mL/hr over 24 hrs daily)    Based on the above labs, will add the following electrolytes/additives to the TPN.    Sodium Chloride: 40 mEq   Sodium Acetate: 60 mEq   Sodium Phosphate: 0 mEq   Potassium Chloride: 50 mEq   Potassium Acetate: 0 mEq   Potassium Phosphate: 22 mEq   Calcium " Gluconate: 9 mEq   Magnesium Sulfate: 10 mEq   MVI for TPN   Trace Elements              Labs to be ordered: BMP, Mg, phos, TG in AM    Pharmacy will continue to follow.     Margie Knott, PharmD  Clinical Pharmacist

## 2023-12-05 NOTE — PROGRESS NOTES
"The patient is in better spirits and seen today.  She is smiling but continues to complain of feeling \"rough\".  Continuing current treatment for now.  "

## 2023-12-05 NOTE — PROGRESS NOTES
ID NOTE    CC: f/u leukocytosis and abdominal abscesses following abdominal surgery    Subj: No fever. Epistaxis better. She reports nausea and poor food intake.     Medications:    Current Facility-Administered Medications:     ALPRAZolam (XANAX) tablet 0.5 mg, 0.5 mg, Oral, Daily, Ranjeet Salinas MD, 0.5 mg at 12/04/23 0825    ALPRAZolam (XANAX) tablet 0.5 mg, 0.5 mg, Oral, BID PRN, Triston Malcolm III, MD, 0.5 mg at 12/03/23 0137    [Held by provider] Enoxaparin Sodium (LOVENOX) syringe 30 mg, 30 mg, Subcutaneous, Q24H, Ranjeet Salinas MD, 30 mg at 12/03/23 2042    HYDROmorphone (DILAUDID) injection 0.5 mg, 0.5 mg, Intravenous, Q2H PRN, Ranjeet Salinas MD    Methocarbamol (ROBAXIN) injection 500 mg, 500 mg, Intravenous, Q6H PRN, Ranjeet Salinas MD    metoprolol tartrate (LOPRESSOR) tablet 12.5 mg, 12.5 mg, Oral, Q12H, Ranjeet Salinas MD, 12.5 mg at 12/05/23 0911    micafungin sodium (MYCAMINE) 100 mg in sodium chloride 0.9 % 100 mL IVPB-VTB, 100 mg, Intravenous, Q24H, Ranjeet Salinas MD, 100 mg at 12/04/23 1752    [DISCONTINUED] ondansetron (ZOFRAN) tablet 4 mg, 4 mg, Oral, Q6H PRN **OR** ondansetron (ZOFRAN) injection 4 mg, 4 mg, Intravenous, Q6H PRN, Ranjeet Salinas MD, 4 mg at 12/05/23 1106    oxymetazoline (AFRIN) nasal spray 2 spray, 2 spray, Each Nare, BID, Natalio Collins MD, 2 spray at 12/05/23 0915    pantoprazole (PROTONIX) injection 40 mg, 40 mg, Intravenous, Q AM, Ranjeet Salinas MD, 40 mg at 12/05/23 1106    PARoxetine (PAXIL) tablet 40 mg, 40 mg, Oral, Daily, Triston Malcolm III, MD, 40 mg at 12/05/23 0911    piperacillin-tazobactam (ZOSYN) 3.375 g in iso-osmotic dextrose 50 ml (premix), 3.375 g, Intravenous, Q8H, Ranjeet Salinas MD, 3.375 g at 12/05/23 1111    prochlorperazine (COMPAZINE) injection 5 mg, 5 mg, Intravenous, Q6H PRN, Ranjeet Salinas MD, 5 mg at 12/05/23 8753     "QUEtiapine fumarate ER (SEROquel XR) tablet 100 mg, 100 mg, Oral, Nightly, Ranjeet Salinas MD, 100 mg at 12/03/23 2042    sodium chloride 0.9 % flush 10 mL, 10 mL, Intravenous, Q12H, Ranjeet Salinas MD, 10 mL at 12/05/23 0911    sodium chloride 0.9 % flush 10 mL, 10 mL, Intravenous, Q12H, Ranjeet Salinas MD, 10 mL at 12/05/23 0911    sodium chloride 0.9 % flush 10 mL, 10 mL, Intravenous, PRN, Ranjeet Salinas MD    sodium chloride 0.9 % flush 20 mL, 20 mL, Intravenous, PRN, Ranjeet Salinas MD    sodium chloride 0.9 % infusion, 75 mL/hr, Intravenous, Continuous, Ranjeet Salinas MD, Last Rate: 75 mL/hr at 12/05/23 1108, 75 mL/hr at 12/05/23 1108    sodium chloride nasal spray 2 spray, 2 spray, Each Nare, PRN, Natalio Collins MD      Objective   Vital Signs   Temp:  [97.2 °F (36.2 °C)-98.8 °F (37.1 °C)] 98.8 °F (37.1 °C)  Heart Rate:  [] 120  Resp:  [16] 16  BP: (139-170)/(69-87) 167/80    Physical Exam:   General: awake, alert, very nice  Eyes: no scleral icterus  Cardiovascular: NR  Respiratory: normal work of breathing on RA  GI: not distended; bandaged; drains in place  :  no Hodge catheter  Psych: calm and pleasant    Labs:   CBC, BMP, and blood cultures reviewed today  Lab Results   Component Value Date    WBC 24.31 (H) 12/05/2023    HGB 7.1 (L) 12/05/2023    HCT 21.8 (L) 12/05/2023    MCV 93.6 12/05/2023     (H) 12/05/2023       Lab Results   Component Value Date    GLUCOSE 84 12/05/2023    BUN 23 12/05/2023    CREATININE 0.89 12/05/2023    EGFRIFNONA 78 12/13/2021    EGFRIFAFRI 90 12/13/2021    BCR 25.8 (H) 12/05/2023    CO2 11.9 (L) 12/05/2023    CALCIUM 8.5 (L) 12/05/2023    PROTENTOTREF 7.3 08/24/2023    ALBUMIN 2.0 (L) 12/04/2023    LABIL2 1.7 08/24/2023    AST 75 (H) 12/04/2023    ALT 73 (H) 12/04/2023     Procal 2.6    Microbiology:  11/27 BCx: negative  12/1 BCx: negative to date    Prior Radiology:  12/1/23 CT A/P:  \"Status post " "small bowel-small bowel and ileocolic anastomoses.  Midline   cutaneous skin staples.  Multiple surgical drains which terminate in the   pelvis.  Small locules of free air are present in the pelvis, which may   be postoperative in etiology.      Evaluation for postoperative collection in the pelvis is limited due to   lack of oral contrast.  It is difficult to discern fluid from within   bowel loop compared to fluid between bowel loops.      Suspected developing collection abscess in the RIGHT lower quadrant   measuring approximately 8.0 x 4.0 cm.  Evaluation following initiation of   oral contrast recommended to opacify the bowel loops within the pelvis.      Additional collection is present in the retroperitoneum measuring   approximately 11.1 cm superior-inferior by 2.6 cm mid-lateral.  See   series 3 image 89 and series 2 image 52.  Developing retroperitoneal   abscess cannot be excluded.     Surgical reevaluation recommended. \"    ASSESSMENT/PLAN:  Leukocytosis  Epistaxis  S/p colectomy for colonic inertia  Post-operative leak and abscesses  History of multiple abdominal surgeries due to diverticular disease  Recently on TPN  Anemia    She is afebrile and hemodynamically stable. WBC still hovering in the mid-low 20s. Once safe to able to tolerate oral contrast, the plan is to get a CT scan to assess the fluid collections for possibility of IR-guided drainage.     Creatinine now normalized.     Continue empiric Zosyn and micafungin w/ duration TBD.     ID will follow.     "

## 2023-12-05 NOTE — PROGRESS NOTES
Colorectal & General Surgery  Progress Note    Patient: Martina Hardwick  YOB: 1955  MRN: [id number]      Assessment  Martina Hardwick is a 67 y.o. female with colonic inertia who is now postoperative day 15 from exploratory laparotomy, enterolysis, and subtotal colectomy complicated by leak from small bowel enterotomy and now postoperative day 8 from reopening of recent laparotomy and small bowel resection.    Leukocytosis is stable.  Intra-abdominal infection is certainly her source.  Continue Zosyn and micafungin.  Appreciate assistance from Dr. Turk.  I discussed with him today, and we both feel it is safe to place PICC and restart TPN given her poor nutritional status.  I have ordered a CT scan of the abdomen pelvis.  She will try to drink as much of the oral contrast that she possibly can.    MERCEDEZ is improving.  Continue hydration via maintenance intravenous fluids.  Okay to discontinue them once TPN is started.    Hypophosphatemia today.  Repleted with 15 mmol intravenously.    From a nutritional standpoint, I have advanced her to full liquid diet.  Nutrition drinks.    Mood and affect drastically improved.  Appreciate assistance from psychiatry.    Persistent epistaxis resolving.  Continue Afrin and saline nasal sprays.  Greatly appreciate Dr. Collins's assistance.    Subjective  No acute events.  Abdominal pain much better.  Tolerating some liquids.  Having bowel function.  No more nosebleeds.  Seems much much happier today.    Objective    Vitals:    12/05/23 1500   BP: 151/65   Pulse: 90   Resp: 16   Temp: 98.2 °F (36.8 °C)   SpO2: 100%       Physical Exam  Constitutional: Frail, comfortable  Neck: Supple, trachea midline  Respiratory: No increased work of breathing, Symmetric excursion  Cardiovascular: Well pefursed, no jugular venous distention evident   Abdominal: Soft, non-tender, minimally distended, incision healing well, drains with minimal output.  Skin: Warm, dry, no rash on  visualized skin surfaces  Psychiatric: Alert and oriented ×3, normal affect     Laboratory Results  I have personally reviewed BMP with creatinine 0.89, magnesium 1.7, phosphorus 2.3, potassium 3.6.  CBC with WBC 24, hemoglobin 7.1, platelets 805.    Radiology  None to review         Osvaldo Salinas MD  Colorectal & General Surgery  [facility]    [address], [address]  [place], [place], [address]  P: [phone]  F: [phone]

## 2023-12-06 LAB
ABO GROUP BLD: NORMAL
ANION GAP SERPL CALCULATED.3IONS-SCNC: 8 MMOL/L (ref 5–15)
BACTERIA SPEC AEROBE CULT: NORMAL
BACTERIA SPEC AEROBE CULT: NORMAL
BLD GP AB SCN SERPL QL: NEGATIVE
BUN SERPL-MCNC: 13 MG/DL (ref 8–23)
BUN/CREAT SERPL: 18.1 (ref 7–25)
CALCIUM SPEC-SCNC: 8.3 MG/DL (ref 8.6–10.5)
CHLORIDE SERPL-SCNC: 104 MMOL/L (ref 98–107)
CO2 SERPL-SCNC: 20 MMOL/L (ref 22–29)
CREAT SERPL-MCNC: 0.72 MG/DL (ref 0.57–1)
DEPRECATED RDW RBC AUTO: 49 FL (ref 37–54)
DPYD GENE MUT ANL BLD/T: NEGATIVE
EGFRCR SERPLBLD CKD-EPI 2021: 91.8 ML/MIN/1.73
ERYTHROCYTE [DISTWIDTH] IN BLOOD BY AUTOMATED COUNT: 14.5 % (ref 12.3–15.4)
FUNGITELL VALUE: <31.25 PG/ML
GLUCOSE SERPL-MCNC: 184 MG/DL (ref 65–99)
HCT VFR BLD AUTO: 18 % (ref 34–46.6)
HGB BLD-MCNC: 5.8 G/DL (ref 12–15.9)
IMP & REVIEW OF LAB RESULTS: NORMAL
INR PPP: 1.1 (ref 0.9–1.1)
Lab: NORMAL
Lab: NORMAL
MAGNESIUM SERPL-MCNC: 1.5 MG/DL (ref 1.6–2.4)
MCH RBC QN AUTO: 30.1 PG (ref 26.6–33)
MCHC RBC AUTO-ENTMCNC: 32.2 G/DL (ref 31.5–35.7)
MCV RBC AUTO: 93.3 FL (ref 79–97)
PATHOLOGIST NAME: NORMAL
PHOSPHATE SERPL-MCNC: 1.2 MG/DL (ref 2.5–4.5)
PLATELET # BLD AUTO: 623 10*3/MM3 (ref 140–450)
PMV BLD AUTO: 9.3 FL (ref 6–12)
POTASSIUM SERPL-SCNC: 3.9 MMOL/L (ref 3.5–5.2)
PROTHROMBIN TIME: 14.3 SECONDS (ref 11.7–14.2)
RBC # BLD AUTO: 1.93 10*6/MM3 (ref 3.77–5.28)
REFERENCE VALUE: NORMAL
RH BLD: POSITIVE
SODIUM SERPL-SCNC: 132 MMOL/L (ref 136–145)
T&S EXPIRATION DATE: NORMAL
TRIGL SERPL-MCNC: 145 MG/DL (ref 0–150)
WBC NRBC COR # BLD AUTO: 15.68 10*3/MM3 (ref 3.4–10.8)

## 2023-12-06 PROCEDURE — 25010000002 ONDANSETRON PER 1 MG: Performed by: SURGERY

## 2023-12-06 PROCEDURE — 86900 BLOOD TYPING SEROLOGIC ABO: CPT

## 2023-12-06 PROCEDURE — 36430 TRANSFUSION BLD/BLD COMPNT: CPT

## 2023-12-06 PROCEDURE — 86923 COMPATIBILITY TEST ELECTRIC: CPT

## 2023-12-06 PROCEDURE — 84100 ASSAY OF PHOSPHORUS: CPT | Performed by: SURGERY

## 2023-12-06 PROCEDURE — 25010000002 MAGNESIUM SULFATE 4 GM/100ML SOLUTION: Performed by: SURGERY

## 2023-12-06 PROCEDURE — P9016 RBC LEUKOCYTES REDUCED: HCPCS

## 2023-12-06 PROCEDURE — 25010000002 POTASSIUM CHLORIDE PER 2 MEQ OF POTASSIUM: Performed by: SURGERY

## 2023-12-06 PROCEDURE — 86901 BLOOD TYPING SEROLOGIC RH(D): CPT | Performed by: SURGERY

## 2023-12-06 PROCEDURE — 86900 BLOOD TYPING SEROLOGIC ABO: CPT | Performed by: SURGERY

## 2023-12-06 PROCEDURE — 85027 COMPLETE CBC AUTOMATED: CPT | Performed by: SURGERY

## 2023-12-06 PROCEDURE — 80048 BASIC METABOLIC PNL TOTAL CA: CPT | Performed by: SURGERY

## 2023-12-06 PROCEDURE — 99232 SBSQ HOSP IP/OBS MODERATE 35: CPT | Performed by: INTERNAL MEDICINE

## 2023-12-06 PROCEDURE — 99024 POSTOP FOLLOW-UP VISIT: CPT | Performed by: SURGERY

## 2023-12-06 PROCEDURE — 25010000002 CALCIUM GLUCONATE PER 10 ML: Performed by: SURGERY

## 2023-12-06 PROCEDURE — 25010000002 MICAFUNGIN SODIUM 100 MG RECONSTITUTED SOLUTION 1 EACH VIAL: Performed by: SURGERY

## 2023-12-06 PROCEDURE — 86850 RBC ANTIBODY SCREEN: CPT | Performed by: SURGERY

## 2023-12-06 PROCEDURE — 25010000002 PROCHLORPERAZINE 10 MG/2ML SOLUTION: Performed by: SURGERY

## 2023-12-06 PROCEDURE — 83735 ASSAY OF MAGNESIUM: CPT | Performed by: SURGERY

## 2023-12-06 PROCEDURE — 25810000003 SODIUM CHLORIDE 0.9 % SOLUTION: Performed by: SURGERY

## 2023-12-06 PROCEDURE — 25010000002 PIPERACILLIN SOD-TAZOBACTAM PER 1 G: Performed by: SURGERY

## 2023-12-06 PROCEDURE — 25010000002 MAGNESIUM SULFATE PER 500 MG OF MAGNESIUM: Performed by: SURGERY

## 2023-12-06 PROCEDURE — 25010000002 HYDROMORPHONE PER 4 MG: Performed by: SURGERY

## 2023-12-06 PROCEDURE — 84478 ASSAY OF TRIGLYCERIDES: CPT | Performed by: SURGERY

## 2023-12-06 PROCEDURE — 85610 PROTHROMBIN TIME: CPT | Performed by: SURGERY

## 2023-12-06 RX ORDER — FENTANYL/ROPIVACAINE/NS/PF 2-625MCG/1
15 PLASTIC BAG, INJECTION (ML) EPIDURAL
Qty: 500 ML | Refills: 0 | Status: DISPENSED | OUTPATIENT
Start: 2023-12-06 | End: 2023-12-06

## 2023-12-06 RX ORDER — MAGNESIUM SULFATE HEPTAHYDRATE 40 MG/ML
4 INJECTION, SOLUTION INTRAVENOUS ONCE
Status: COMPLETED | OUTPATIENT
Start: 2023-12-06 | End: 2023-12-06

## 2023-12-06 RX ADMIN — ONDANSETRON 4 MG: 2 INJECTION INTRAMUSCULAR; INTRAVENOUS at 11:37

## 2023-12-06 RX ADMIN — PROCHLORPERAZINE EDISYLATE 5 MG: 5 INJECTION INTRAMUSCULAR; INTRAVENOUS at 20:23

## 2023-12-06 RX ADMIN — SODIUM ACETATE: 164 INJECTION, SOLUTION, CONCENTRATE INTRAVENOUS at 18:04

## 2023-12-06 RX ADMIN — OXYMETAZOLINE HYDROCHLORIDE 2 SPRAY: 0.05 SPRAY NASAL at 20:24

## 2023-12-06 RX ADMIN — OXYMETAZOLINE HYDROCHLORIDE 2 SPRAY: 0.05 SPRAY NASAL at 08:20

## 2023-12-06 RX ADMIN — PIPERACILLIN SODIUM AND TAZOBACTAM SODIUM 3.38 G: 3; .375 INJECTION, SOLUTION INTRAVENOUS at 11:26

## 2023-12-06 RX ADMIN — MAGNESIUM SULFATE HEPTAHYDRATE 4 G: 40 INJECTION, SOLUTION INTRAVENOUS at 16:06

## 2023-12-06 RX ADMIN — I.V. FAT EMULSION 20 G: 20 EMULSION INTRAVENOUS at 18:16

## 2023-12-06 RX ADMIN — POTASSIUM PHOSPHATE, MONOBASIC POTASSIUM PHOSPHATE, DIBASIC 15 MMOL: 224; 236 INJECTION, SOLUTION, CONCENTRATE INTRAVENOUS at 17:51

## 2023-12-06 RX ADMIN — QUETIAPINE FUMARATE 100 MG: 50 TABLET, EXTENDED RELEASE ORAL at 20:21

## 2023-12-06 RX ADMIN — PANTOPRAZOLE SODIUM 40 MG: 40 INJECTION, POWDER, FOR SOLUTION INTRAVENOUS at 05:00

## 2023-12-06 RX ADMIN — METOPROLOL TARTRATE 12.5 MG: 25 TABLET, FILM COATED ORAL at 08:18

## 2023-12-06 RX ADMIN — PAROXETINE HYDROCHLORIDE 40 MG: 20 TABLET, FILM COATED ORAL at 08:19

## 2023-12-06 RX ADMIN — Medication 10 ML: at 20:38

## 2023-12-06 RX ADMIN — Medication 10 ML: at 20:37

## 2023-12-06 RX ADMIN — Medication 10 ML: at 08:19

## 2023-12-06 RX ADMIN — ALPRAZOLAM 0.5 MG: 0.5 TABLET ORAL at 20:21

## 2023-12-06 RX ADMIN — Medication 10 ML: at 08:24

## 2023-12-06 RX ADMIN — HYDROMORPHONE HYDROCHLORIDE 0.5 MG: 1 INJECTION, SOLUTION INTRAMUSCULAR; INTRAVENOUS; SUBCUTANEOUS at 20:23

## 2023-12-06 RX ADMIN — MICAFUNGIN SODIUM 100 MG: 100 INJECTION, POWDER, LYOPHILIZED, FOR SOLUTION INTRAVENOUS at 20:36

## 2023-12-06 RX ADMIN — PIPERACILLIN SODIUM AND TAZOBACTAM SODIUM 3.38 G: 3; .375 INJECTION, SOLUTION INTRAVENOUS at 05:00

## 2023-12-06 RX ADMIN — METOPROLOL TARTRATE 12.5 MG: 25 TABLET, FILM COATED ORAL at 20:22

## 2023-12-06 RX ADMIN — HYDROMORPHONE HYDROCHLORIDE 0.5 MG: 1 INJECTION, SOLUTION INTRAMUSCULAR; INTRAVENOUS; SUBCUTANEOUS at 14:11

## 2023-12-06 RX ADMIN — PIPERACILLIN SODIUM AND TAZOBACTAM SODIUM 3.38 G: 3; .375 INJECTION, SOLUTION INTRAVENOUS at 20:20

## 2023-12-06 NOTE — SIGNIFICANT NOTE
12/06/23 0918   OTHER   Discipline physical therapy assistant   Rehab Time/Intention   Session Not Performed unable to treat, medical status change  (PT will hold today w/ pt's HGB at 5.8. Notified RN. PT will follow up tomorrow.)   Recommendation   PT - Next Appointment 12/07/23

## 2023-12-06 NOTE — PLAN OF CARE
Problem: Adult Inpatient Plan of Care  Goal: Absence of Hospital-Acquired Illness or Injury  Intervention: Identify and Manage Fall Risk  Recent Flowsheet Documentation  Taken 12/6/2023 1245 by Chelsea Fofana RN  Safety Promotion/Fall Prevention:   lighting adjusted   assistive device/personal items within reach   safety round/check completed  Taken 12/6/2023 0826 by Chelsea Fofana RN  Safety Promotion/Fall Prevention:   fall prevention program maintained   safety round/check completed  Intervention: Prevent Skin Injury  Recent Flowsheet Documentation  Taken 12/6/2023 1245 by Chelsea Fofana RN  Body Position: supine  Taken 12/6/2023 0826 by Chelsea Fofana RN  Body Position: sitting up in bed  Goal: Optimal Comfort and Wellbeing  Intervention: Monitor Pain and Promote Comfort  Recent Flowsheet Documentation  Taken 12/6/2023 1410 by Chelsea Fofana RN  Pain Management Interventions: see MAR  Intervention: Provide Person-Centered Care  Recent Flowsheet Documentation  Taken 12/6/2023 0826 by Chelsea Fofana RN  Trust Relationship/Rapport: care explained     Problem: Pain Acute  Goal: Acceptable Pain Control and Functional Ability  Intervention: Develop Pain Management Plan  Recent Flowsheet Documentation  Taken 12/6/2023 1410 by Chelsea Fofana RN  Pain Management Interventions: see MAR     Problem: Fall Injury Risk  Goal: Absence of Fall and Fall-Related Injury  Intervention: Promote Injury-Free Environment  Recent Flowsheet Documentation  Taken 12/6/2023 1245 by Chelsea Fofana RN  Safety Promotion/Fall Prevention:   lighting adjusted   assistive device/personal items within reach   safety round/check completed  Taken 12/6/2023 0826 by Chelsea Fofana RN  Safety Promotion/Fall Prevention:   fall prevention program maintained   safety round/check completed     Problem: Skin Injury Risk Increased  Goal: Skin Health and Integrity  Intervention: Optimize Skin Protection  Recent Flowsheet  Documentation  Taken 12/6/2023 1245 by Chelsea Fofana, RN  Head of Bed (HOB) Positioning: HOB at 30-45 degrees  Taken 12/6/2023 0826 by Chelsea Fofana RN  Head of Bed (HOB) Positioning: HOB at 60 degrees     Problem: Adult Inpatient Plan of Care  Goal: Absence of Hospital-Acquired Illness or Injury  Intervention: Identify and Manage Fall Risk  Recent Flowsheet Documentation  Taken 12/6/2023 1245 by Chelsea Fofana RN  Safety Promotion/Fall Prevention:   lighting adjusted   assistive device/personal items within reach   safety round/check completed  Taken 12/6/2023 0826 by Chelsea Fofana RN  Safety Promotion/Fall Prevention:   fall prevention program maintained   safety round/check completed   Goal Outcome Evaluation:

## 2023-12-06 NOTE — PROGRESS NOTES
"The patient is in somewhat better spirits again today.  She is smiling but continues to complain that she feels \"rough\".  No changes in current treatment or plan at this point.  "

## 2023-12-06 NOTE — PROGRESS NOTES
"Pharmacy to dose TPN - Daily Progress Note  Patient: Martina Hardwick  MRN#: [id number]  Attending: No name on file  Admission Date: 1120    TPN # 2 per Dr Salinas  Indication:Prolonged Paralytic Ileus     Principal Problem:    Colonic inertia  Active Problems:    Severe malnutrition    Subjective/Objective  67 y.o. female 154.9 cm (61\") 48.8 kg (107 lb 9.4 oz)  Results from last 7 days   Lab Units 23  0620 23  0753 23  0513   SODIUM mmol/L 132*   < > 132*   POTASSIUM mmol/L 3.9   < > 3.5   CHLORIDE mmol/L 104   < > 103   CO2 mmol/L 20.0*   < > 14.4*   BUN mg/dL 13   < > 29*   CREATININE mg/dL 0.72   < > 1.27*   CALCIUM mg/dL 8.3*   < > 8.5*   ALBUMIN g/dL  --   --  2.0*   BILIRUBIN mg/dL  --   --  0.6   ALK PHOS U/L  --   --  125*   ALT (SGPT) U/L  --   --  73*   AST (SGOT) U/L  --   --  75*   GLUCOSE mg/dL 184*   < > 76   MAGNESIUM mg/dL 1.5*   < > 1.8   PHOSPHORUS mg/dL 1.2*   < > 3.3   TRIGLYCERIDES mg/dL 145  --   --     < > = values in this interval not displayed.      Corrected Ca = 9.9     I/O last 3 completed shifts:  In: 480 [P.O.:480]  Out: 5.5 [Drains:5.5]    Diet Orders (active) (From admission, onward)       Start     Ordered    23 1800  Dietary Nutrition Supplements Boost Plus (Ensure Enlive, Ensure Plus)  Daily With Lunch & Dinner       23 1636    23 163  Diet: Liquid Diets; Full Liquid; Fluid Consistency: Thin (IDDSI 0)  Diet Effective Now         23 1636                  Additional insulin administration while previous TPN infusin units  Additional electrolyte administration while previous TPN infusing: 15 mmol Kphos yesterday and 30 mmol today  Acid suppression: pantoprazole 40 mg IV  Stool in last 24 hours:  small amount    Daily Assessment  Current macronutrients: protein 70 gm/day, dextrose 33787 kcal/day, lipids 100mL Fluid rate: 75 ml/hr    Plan    Will advance TPN to goal. TPN with the following macros:              Protein/Dextrose/Lipids: " 70g/1200kcal/100mL              Volume: 1800 ml (75 mL/hr over 24 hrs daily)     Based on the above labs, will add the following electrolytes/additives to the TPN.               Sodium Chloride: 40 mEq              Sodium Acetate: 80 mEq              Sodium Phosphate: 20 mEq              Potassium Chloride: 50 mEq              Potassium Acetate: 0 mEq              Potassium Phosphate: 22 mEq              Calcium Gluconate: 9 mEq              Magnesium Sulfate: 20 mEq              MVI for TPN              Trace Elements              Labs to be ordered: BMP, Mg, phos  Submitted by: Lowell Manriquez [facility]

## 2023-12-06 NOTE — PROGRESS NOTES
ID NOTE    CC: f/u leukocytosis and abdominal abscesses following abdominal surgery    Subj: No fever. S/P PICC line and started on TPN.     Medications:    Current Facility-Administered Medications:     Adult Central 2-in-1 TPN, , Intravenous, Continuous, Ranjeet Salinas MD, Last Rate: 75 mL/hr at 12/05/23 1744, New Bag at 12/05/23 1744    ALPRAZolam (XANAX) tablet 0.5 mg, 0.5 mg, Oral, Daily, Ranjeet Salinas MD, 0.5 mg at 12/05/23 2120    ALPRAZolam (XANAX) tablet 0.5 mg, 0.5 mg, Oral, BID PRN, Triston Malcolm III, MD, 0.5 mg at 12/03/23 0137    [Held by provider] Enoxaparin Sodium (LOVENOX) syringe 30 mg, 30 mg, Subcutaneous, Q24H, Ranjeet Salinas MD, 30 mg at 12/03/23 2042    Fat Emulsion Plant Based (INTRALIPID,LIPOSYN) 20 % infusion 20 g, 100 mL, Intravenous, Q24H (TPN), Ranjeet Salinas MD, Last Rate: 8.33 mL/hr at 12/05/23 1744, 20 g at 12/05/23 1744    HYDROmorphone (DILAUDID) injection 0.5 mg, 0.5 mg, Intravenous, Q4H PRN, Ranjeet Salinas MD, 0.5 mg at 12/05/23 2128    Methocarbamol (ROBAXIN) injection 500 mg, 500 mg, Intravenous, Q6H PRN, Ranjeet Salinas MD    metoprolol tartrate (LOPRESSOR) tablet 12.5 mg, 12.5 mg, Oral, Q12H, Ranjeet Salinas MD, 12.5 mg at 12/06/23 0818    micafungin sodium (MYCAMINE) 100 mg in sodium chloride 0.9 % 100 mL IVPB-VTB, 100 mg, Intravenous, Q24H, Ranjeet Salinas MD, 100 mg at 12/05/23 2124    [DISCONTINUED] ondansetron (ZOFRAN) tablet 4 mg, 4 mg, Oral, Q6H PRN **OR** ondansetron (ZOFRAN) injection 4 mg, 4 mg, Intravenous, Q6H PRN, Ranjeet Salinas MD, 4 mg at 12/05/23 1801    oxymetazoline (AFRIN) nasal spray 2 spray, 2 spray, Each Nare, BID, Natalio Collins MD, 2 spray at 12/06/23 0820    pantoprazole (PROTONIX) injection 40 mg, 40 mg, Intravenous, Q AM, Ranjeet Salinas MD, 40 mg at 12/06/23 0500    PARoxetine (PAXIL) tablet 40 mg, 40 mg, Oral, Daily, [facility],  Triston Quijano III, MD, 40 mg at 12/06/23 0819    Pharmacy to Dose TPN, , Does not apply, Continuous PRN, Ranjeet Salinas MD    piperacillin-tazobactam (ZOSYN) 3.375 g in iso-osmotic dextrose 50 ml (premix), 3.375 g, Intravenous, Q8H, Ranjeet Salinas MD, 3.375 g at 12/06/23 0500    potassium phosphate 15 mmol in 0.9% normal saline 250 mL IVPB, 15 mmol, Intravenous, Q3H, Ranjeet Salinas MD    prochlorperazine (COMPAZINE) injection 5 mg, 5 mg, Intravenous, Q6H PRN, Ranjeet Salinas MD, 5 mg at 12/05/23 2127    QUEtiapine fumarate ER (SEROquel XR) tablet 100 mg, 100 mg, Oral, Nightly, Ranjeet Salinas MD, 100 mg at 12/05/23 2123    sodium chloride 0.9 % flush 10 mL, 10 mL, Intravenous, Q12H, Ranjeet Salinas MD, 10 mL at 12/06/23 0819    sodium chloride 0.9 % flush 10 mL, 10 mL, Intravenous, Q12H, Ranjeet Salinas MD, 10 mL at 12/06/23 0824    sodium chloride nasal spray 2 spray, 2 spray, Each Nare, PRN, Natalio Collins MD      Objective   Vital Signs   Temp:  [98.2 °F (36.8 °C)-98.6 °F (37 °C)] 98.6 °F (37 °C)  Heart Rate:  [] 95  Resp:  [16] 16  BP: (126-172)/(65-88) 134/72    Physical Exam:   General: awake, alert, very nice  Eyes: no scleral icterus  Cardiovascular: NR  Respiratory: normal work of breathing  GI: not distended; bandaged; dry  :  no Hodge catheter  Psych: calm and pleasant  Vasc: RUE PICC w/o erythema    Labs:   CBC, BMP, and blood cultures reviewed today  Lab Results   Component Value Date    WBC 15.68 (H) 12/06/2023    HGB 5.8 (C) 12/06/2023    HCT 18.0 (C) 12/06/2023    MCV 93.3 12/06/2023     (H) 12/06/2023       Lab Results   Component Value Date    GLUCOSE 184 (H) 12/06/2023    BUN 13 12/06/2023    CREATININE 0.72 12/06/2023    EGFRIFNONA 78 12/13/2021    EGFRIFAFRI 90 12/13/2021    BCR 18.1 12/06/2023    CO2 20.0 (L) 12/06/2023    CALCIUM 8.3 (L) 12/06/2023    PROTENTOTREF 7.3 08/24/2023    ALBUMIN 2.0 (L)  "12/04/2023    LABIL2 1.7 08/24/2023    AST 75 (H) 12/04/2023    ALT 73 (H) 12/04/2023     Procal 2.6    Microbiology:  11/27 BCx: negative  12/1 BCx: negative, final    Prior Radiology:  12/1/23 CT A/P:  \"Status post small bowel-small bowel and ileocolic anastomoses.  Midline   cutaneous skin staples.  Multiple surgical drains which terminate in the   pelvis.  Small locules of free air are present in the pelvis, which may   be postoperative in etiology.      Evaluation for postoperative collection in the pelvis is limited due to   lack of oral contrast.  It is difficult to discern fluid from within   bowel loop compared to fluid between bowel loops.      Suspected developing collection abscess in the RIGHT lower quadrant   measuring approximately 8.0 x 4.0 cm.  Evaluation following initiation of   oral contrast recommended to opacify the bowel loops within the pelvis.      Additional collection is present in the retroperitoneum measuring   approximately 11.1 cm superior-inferior by 2.6 cm mid-lateral.  See   series 3 image 89 and series 2 image 52.  Developing retroperitoneal   abscess cannot be excluded.     Surgical reevaluation recommended. \"    ASSESSMENT/PLAN:  Leukocytosis - better  Epistaxis  S/p colectomy for colonic inertia  Post-operative leak and abscesses  History of multiple abdominal surgeries due to diverticular disease  On TPN  Anemia    She is afebrile and hemodynamically stable. WBC improved today. Plan is for CT A/P once she can tolerate enough oral contrast to get it done. I will follow-up the results.     Continue empiric Zosyn and micafungin w/ duration TBD.     ID will follow.     "

## 2023-12-06 NOTE — PROGRESS NOTES
Nutrition Services    Patient Name:  Martina Hardwick  YOB: 1955  MRN: [id number]  Admit Date:  11/20/2023    Assessment Date:  12/06/23    CLINICAL NUTRITION    Comments: Follow up for TPN  TPN Day: 2  Indication: Prolonged Paralytic Ileus   Principal Problem: Colonic inertia  Active Problem: Severe malnutrition   Labs: Na 132, Glu 184, Platelets 623, Phos 1.2, Mg 1.5  Last BM: 12/5  Daily Assessment  Current macronutrients: protein 70 gm/day, dextrose 900 kcal/day, lipids 100mL Fluid rate: 75 ml/hr  Plan    PharmD advancing TPN to goal. TPN with the following macros:              Protein/Dextrose/Lipids: 70g/1200kcal/100mL              Volume: 1800 ml (75 mL/hr over 24 hrs daily)    TPN follow up completed. Visited pt at bedside. Tolerating full liquids but overall not eating much. She dislikes the hospital food. Drank half of her Ensure at lunch. TPN running with protein at goal and calories being titrated up to goal per PharmD.     REC:  Continue to advance TPN to goal macros per PharmD  Advance diet as tolerated and once medically appropriate per MD   Will continue to encourage and monitor PO/ONS intake    RD to continue to monitor per protocol.     Encounter Information         Reason For Encounter Follow up for TPN    Current Issues Needs alternate route of nutrition (TPN)      Estimated Requirements            Weight used  46.6 kg     Calories  8947-5980 (30 kcal/kg, 35 kcal/kg)    Protein  56-70 (1.2 - 1.5 gm/kg)    Fluid  1 mL/kcal     Current Nutrition Orders & Evaluation of Intake       Oral Nutrition     Current PO Diet Adult Central 2-in-1 TPN  Diet: Liquid Diets; Full Liquid; Fluid Consistency: Thin (IDDSI 0)  Adult Central 2-in-1 TPN   Supplement Boost Plus, BID L/D   PO Evaluation     Trending % PO Intake 25% of lunch     Factors Affecting Intake  abdominal pain, altered GI function, decreased appetite, nausea      Parenteral Nutrition      TPN Route PICC   TPN Rate (mL/hr) 75 mL/hr   "  Current TPN Order        Dextrose (kcal) 900 kcal        Amino Acid (gm) 70 gm (280 kcal)       Lipid Concentration 20%       Lipid Volume/Frequency  100 mL, daily (200 kcal)   MVI Frequency  Per PharmD   Trace Element Frequency  Per PharmD   Total # Days on TPN 2   Propofol Rate/Kcal    TPN Current Provision          Calories 85% of needs met        Protein (gm) 100% of needs met        Fluid (mL)    TPN Needs Evaluation meeting needs for protein, not meeting needs for calories   TPN Changes other: PharmD advancing to goal     Anthropometrics          Height    Weight Height: 154.9 cm (61\")  Weight: 48.8 kg (107 lb 9.4 oz) (12/04/23 1731)    BMI kg/m2 Body mass index is 20.33 kg/m².  Normal/Healthy (18.4 - 24.9)    Weight trend Stable per EMR. Pt endorses 26 lb wt loss x 3 yrs      Labs        Pertinent Labs Reviewed, listed below     Results from last 7 days   Lab Units 12/06/23  0620 12/05/23  0753 12/04/23  0513 12/03/23  0411 12/02/23  0638   SODIUM mmol/L 132* 133* 132* 134* 134*   POTASSIUM mmol/L 3.9 3.6 3.5 3.7 3.8   CHLORIDE mmol/L 104 104 103 102 101   CO2 mmol/L 20.0* 11.9* 14.4* 16.5* 23.3   BUN mg/dL 13 23 29* 41* 41*   CREATININE mg/dL 0.72 0.89 1.27* 1.83* 1.00   CALCIUM mg/dL 8.3* 8.5* 8.5* 8.5* 8.3*   BILIRUBIN mg/dL  --   --  0.6 0.8 0.6   ALK PHOS U/L  --   --  125* 145* 153*   ALT (SGPT) U/L  --   --  73* 75* 87*   AST (SGOT) U/L  --   --  75* 77* 67*   GLUCOSE mg/dL 184* 84 76 82 96     Results from last 7 days   Lab Units 12/06/23  0620 12/05/23  0753 12/04/23  0513   MAGNESIUM mg/dL 1.5* 1.7 1.8   PHOSPHORUS mg/dL 1.2* 2.3* 3.3   HEMOGLOBIN g/dL 5.8* 7.1* 7.4*   HEMATOCRIT % 18.0* 21.8* 23.7*   WBC 10*3/mm3 15.68* 24.31* 24.31*   TRIGLYCERIDES mg/dL 145  --   --    ALBUMIN g/dL  --   --  2.0*     Results from last 7 days   Lab Units 12/06/23  0620 12/05/23  0753 12/04/23  0513 12/03/23  0411 12/02/23  0638   INR  1.10  --   --   --   --    PLATELETS 10*3/mm3 623* 805* 751* 580* 521*     No " "results found for: \"COVID19\"  Lab Results   Component Value Date    HGBA1C 5.3 06/20/2022          Medications            Scheduled Medications ALPRAZolam, 0.5 mg, Oral, Daily  [Held by provider] enoxaparin, 30 mg, Subcutaneous, Q24H  Fat Emulsion Plant Based, 100 mL, Intravenous, Q24H (TPN)  magnesium sulfate, 4 g, Intravenous, Once  metoprolol tartrate, 12.5 mg, Oral, Q12H  micafungin (MYCAMINE) IV, 100 mg, Intravenous, Q24H  oxymetazoline, 2 spray, Each Nare, BID  pantoprazole, 40 mg, Intravenous, Q AM  PARoxetine, 40 mg, Oral, Daily  piperacillin-tazobactam, 3.375 g, Intravenous, Q8H  potassium phosphate, 15 mmol, Intravenous, Q3H  QUEtiapine fumarate ER, 100 mg, Oral, Nightly  sodium chloride, 10 mL, Intravenous, Q12H  sodium chloride, 10 mL, Intravenous, Q12H        Infusions Adult Central 2-in-1 TPN, , Last Rate: 75 mL/hr at 12/05/23 1744  Adult Central 2-in-1 TPN,   Pharmacy to Dose TPN,         PRN Medications   ALPRAZolam    HYDROmorphone    Methocarbamol    [DISCONTINUED] ondansetron **OR** ondansetron    Pharmacy to Dose TPN    prochlorperazine    sodium chloride     Physical Findings              General Findings alert, oriented, room air   Oral/Mouth Cavity WDL   Edema  no edema   Gastrointestinal fecal incontinence, nausea, last bowel movement: 12/5   Skin  bruising, pale, surgical incision: abdomen incision    Tubes/Drains/Lines Drain medial RLQ and medial LLQ, PICC     NFPE See Malnutrition Severity Assessment, Date completed 11/21      Malnutrition Severity Assessment       Patient meets criteria for : Severe Malnutrition (Pt meets ASPEN/AND criteria for nutrition dx of severe malnutrition of acute disease related to energy intake, muscle wasting, and fat loss.)       NUTRITION INTERVENTION / PLAN OF CARE  Intervention Goal         Intervention Goal(s) Maintain nutrition status, Reduce/improve symptoms, Meet estimated needs, Disease management/therapy, Increase intake, Tolerate TF/PN at goal, " Appropriate weight gain, and PO intake goal %: 75%     Nutrition Intervention         RD Action Await initiation/advancement of PO diet, Encourage intake, Continue to monitor, and Care plan reviewed     Prescription         Diet Prescription ADAT     Supplement Prescription Boost Plus BID L/D   EN/PN Prescription    Parenteral Prescription:      TPN Route PICC   TPN Rate (mL/hr)  75 mL/hr (1800 mL total)   TPN Recommendation:         Dextrose (kcal) 1200       Amino Acid (gm) 70 gm (280 kcal)       Lipid Concentration 20%       Lipid Volume/Frequency  100 mL, daily (200 kcal)    Propofol Rate/Kcal     TPN Provision:  1680 kcal, 70 gm protein        Calories 100% needs met        Protein  100% needs met        Fluid 1680 mL      New Prescription Ordered? Continue same per protocol, No changes at this time   --  Monitor/Evaluation        Monitor Per protocol, PO intake, Supplement intake, Pertinent labs, PN delivery/tolerance, GI status, Symptoms, POC/GOC   Discharge Needs Pending clinical course   Education Will instruct as appropriate       Electronically signed by:  Susanna Salas Dietitian Intern   12/06/23 14:53 EST

## 2023-12-06 NOTE — PROGRESS NOTES
Colorectal & General Surgery  Progress Note    Patient: Martina Hardwick  YOB: 1955  MRN: [id number]      Assessment  Martina Hardwick is a 67 y.o. female with colonic inertia and history of multiple abdominal operations who is now postoperative day 16 from exploratory laparotomy, enterolysis, and subtotal colectomy complicated by leak from small bowel enterotomy and now postoperative day 9 from reopening of recent laparotomy and small bowel resection.     Leukocytosis significantly down trended to 16 today from 24.  Persistent concern for intra-abdominal abscess.  CT scan of the abdomen pelvis pending.  Appreciate Dr. Turk's assistance.  Continue Zosyn and micafungin for now.    From a nutritional standpoint, TPN is running.  She is tolerating full liquid diet but not eating very much.  Having good bowel function.    Anemia this morning worsened with hemoglobin 5.8.  She is receiving 2 units of packed red blood cells.  I do not suspect any ongoing hemorrhage, this is likely delusional and due to her poor physiologic and nutritional status.    Significant hypophosphatemia.  Repleted with 30 mmol of potassium phosphate.    Significant hypomagnesemia.  Repleted intravenously.    Subjective  No acute events overnight.  Pain better controlled.  Tolerating full liquid diet.  Having some bowel function.  No more epistaxis.    Objective    Vitals:    12/06/23 1231   BP: 147/99   Pulse: 90   Resp: 18   Temp: 99.1 °F (37.3 °C)   SpO2:        Physical Exam  Constitutional: Frail, no acute distress  Neck: Supple, trachea midline  Respiratory: No increased work of breathing, Symmetric excursion  Cardiovascular: Well pefursed, no jugular venous distention evident   Abdominal: Incision intact without erythema, drains minimal output and serosanguineous . soft, mildly tender, non-distended  Skin: Warm, dry, no rash on visualized skin surfaces  Psychiatric: Alert and oriented ×3, normal affect     Laboratory  Results  I have personally reviewed CMP with creatinine 0.72, bicarb 20, magnesium 1.5, phosphorus 1.2, INR 1.1, WBC 15, hemoglobin 5.8, hematocrit 18, platelet 623.    Radiology  None to review         Osvaldo Salinas MD  Colorectal & General Surgery  [facility]    [address], [address]  [place], [place], [address]  P: [phone]  F: [phone]

## 2023-12-07 ENCOUNTER — APPOINTMENT (OUTPATIENT)
Dept: CT IMAGING | Facility: HOSPITAL | Age: 68
DRG: 329 | End: 2023-12-07
Payer: MEDICARE

## 2023-12-07 LAB
ANION GAP SERPL CALCULATED.3IONS-SCNC: 8 MMOL/L (ref 5–15)
BH BB BLOOD EXPIRATION DATE: NORMAL
BH BB BLOOD EXPIRATION DATE: NORMAL
BH BB BLOOD TYPE BARCODE: 6200
BH BB BLOOD TYPE BARCODE: 6200
BH BB DISPENSE STATUS: NORMAL
BH BB DISPENSE STATUS: NORMAL
BH BB PRODUCT CODE: NORMAL
BH BB PRODUCT CODE: NORMAL
BH BB UNIT NUMBER: NORMAL
BH BB UNIT NUMBER: NORMAL
BUN SERPL-MCNC: 11 MG/DL (ref 8–23)
BUN/CREAT SERPL: 17.2 (ref 7–25)
CALCIUM SPEC-SCNC: 8.2 MG/DL (ref 8.6–10.5)
CHLORIDE SERPL-SCNC: 100 MMOL/L (ref 98–107)
CO2 SERPL-SCNC: 22 MMOL/L (ref 22–29)
CREAT SERPL-MCNC: 0.64 MG/DL (ref 0.57–1)
CROSSMATCH INTERPRETATION: NORMAL
CROSSMATCH INTERPRETATION: NORMAL
DEPRECATED RDW RBC AUTO: 49.7 FL (ref 37–54)
EGFRCR SERPLBLD CKD-EPI 2021: 97 ML/MIN/1.73
ERYTHROCYTE [DISTWIDTH] IN BLOOD BY AUTOMATED COUNT: 15.1 % (ref 12.3–15.4)
GLUCOSE SERPL-MCNC: 165 MG/DL (ref 65–99)
HCT VFR BLD AUTO: 30.2 % (ref 34–46.6)
HGB BLD-MCNC: 10.2 G/DL (ref 12–15.9)
MAGNESIUM SERPL-MCNC: 2.1 MG/DL (ref 1.6–2.4)
MCH RBC QN AUTO: 30.9 PG (ref 26.6–33)
MCHC RBC AUTO-ENTMCNC: 33.8 G/DL (ref 31.5–35.7)
MCV RBC AUTO: 91.5 FL (ref 79–97)
PHOSPHATE SERPL-MCNC: 2.1 MG/DL (ref 2.5–4.5)
PLATELET # BLD AUTO: 550 10*3/MM3 (ref 140–450)
PMV BLD AUTO: 9 FL (ref 6–12)
POTASSIUM SERPL-SCNC: 4.1 MMOL/L (ref 3.5–5.2)
RBC # BLD AUTO: 3.3 10*6/MM3 (ref 3.77–5.28)
SODIUM SERPL-SCNC: 130 MMOL/L (ref 136–145)
UNIT  ABO: NORMAL
UNIT  ABO: NORMAL
UNIT  RH: NORMAL
UNIT  RH: NORMAL
WBC NRBC COR # BLD AUTO: 14.12 10*3/MM3 (ref 3.4–10.8)

## 2023-12-07 PROCEDURE — 83735 ASSAY OF MAGNESIUM: CPT | Performed by: SURGERY

## 2023-12-07 PROCEDURE — 80048 BASIC METABOLIC PNL TOTAL CA: CPT | Performed by: SURGERY

## 2023-12-07 PROCEDURE — 25010000002 CALCIUM GLUCONATE PER 10 ML: Performed by: SURGERY

## 2023-12-07 PROCEDURE — 85027 COMPLETE CBC AUTOMATED: CPT | Performed by: SURGERY

## 2023-12-07 PROCEDURE — 25010000002 FLUCONAZOLE PER 200 MG: Performed by: INTERNAL MEDICINE

## 2023-12-07 PROCEDURE — 25010000002 MAGNESIUM SULFATE PER 500 MG OF MAGNESIUM: Performed by: SURGERY

## 2023-12-07 PROCEDURE — 25010000002 PIPERACILLIN SOD-TAZOBACTAM PER 1 G: Performed by: INTERNAL MEDICINE

## 2023-12-07 PROCEDURE — 25010000002 HYDROMORPHONE PER 4 MG: Performed by: SURGERY

## 2023-12-07 PROCEDURE — 74177 CT ABD & PELVIS W/CONTRAST: CPT

## 2023-12-07 PROCEDURE — 25810000003 SODIUM CHLORIDE 0.9 % SOLUTION: Performed by: SURGERY

## 2023-12-07 PROCEDURE — 99232 SBSQ HOSP IP/OBS MODERATE 35: CPT | Performed by: INTERNAL MEDICINE

## 2023-12-07 PROCEDURE — 84100 ASSAY OF PHOSPHORUS: CPT | Performed by: SURGERY

## 2023-12-07 PROCEDURE — 99024 POSTOP FOLLOW-UP VISIT: CPT | Performed by: SURGERY

## 2023-12-07 PROCEDURE — 25010000002 ONDANSETRON PER 1 MG: Performed by: SURGERY

## 2023-12-07 PROCEDURE — 25010000002 PIPERACILLIN SOD-TAZOBACTAM PER 1 G: Performed by: SURGERY

## 2023-12-07 PROCEDURE — 25010000002 POTASSIUM CHLORIDE PER 2 MEQ OF POTASSIUM: Performed by: SURGERY

## 2023-12-07 PROCEDURE — 97530 THERAPEUTIC ACTIVITIES: CPT

## 2023-12-07 PROCEDURE — 25510000001 IOPAMIDOL 61 % SOLUTION: Performed by: SURGERY

## 2023-12-07 RX ORDER — HYDRALAZINE HYDROCHLORIDE 20 MG/ML
10 INJECTION INTRAMUSCULAR; INTRAVENOUS EVERY 4 HOURS PRN
Status: DISCONTINUED | OUTPATIENT
Start: 2023-12-07 | End: 2023-12-13

## 2023-12-07 RX ORDER — FLUCONAZOLE 2 MG/ML
400 INJECTION, SOLUTION INTRAVENOUS EVERY 24 HOURS
Status: DISCONTINUED | OUTPATIENT
Start: 2023-12-07 | End: 2023-12-11

## 2023-12-07 RX ORDER — LABETALOL HYDROCHLORIDE 5 MG/ML
10 INJECTION, SOLUTION INTRAVENOUS
Status: DISCONTINUED | OUTPATIENT
Start: 2023-12-07 | End: 2023-12-13

## 2023-12-07 RX ORDER — OXYMETAZOLINE HYDROCHLORIDE 0.05 G/100ML
2 SPRAY NASAL 2 TIMES DAILY
Status: DISPENSED | OUTPATIENT
Start: 2023-12-07 | End: 2023-12-10

## 2023-12-07 RX ORDER — AMLODIPINE BESYLATE 2.5 MG/1
2.5 TABLET ORAL
Status: DISCONTINUED | OUTPATIENT
Start: 2023-12-07 | End: 2023-12-20 | Stop reason: HOSPADM

## 2023-12-07 RX ORDER — FENTANYL/ROPIVACAINE/NS/PF 2-625MCG/1
15 PLASTIC BAG, INJECTION (ML) EPIDURAL ONCE
Status: COMPLETED | OUTPATIENT
Start: 2023-12-07 | End: 2023-12-07

## 2023-12-07 RX ADMIN — Medication 10 ML: at 22:33

## 2023-12-07 RX ADMIN — I.V. FAT EMULSION 20 G: 20 EMULSION INTRAVENOUS at 17:19

## 2023-12-07 RX ADMIN — OXYMETAZOLINE HYDROCHLORIDE 2 SPRAY: 0.05 SPRAY NASAL at 08:08

## 2023-12-07 RX ADMIN — Medication 10 ML: at 08:09

## 2023-12-07 RX ADMIN — AMLODIPINE BESYLATE 2.5 MG: 2.5 TABLET ORAL at 17:20

## 2023-12-07 RX ADMIN — HYDROMORPHONE HYDROCHLORIDE 0.5 MG: 1 INJECTION, SOLUTION INTRAMUSCULAR; INTRAVENOUS; SUBCUTANEOUS at 17:20

## 2023-12-07 RX ADMIN — HYDROMORPHONE HYDROCHLORIDE 0.5 MG: 1 INJECTION, SOLUTION INTRAMUSCULAR; INTRAVENOUS; SUBCUTANEOUS at 13:10

## 2023-12-07 RX ADMIN — POTASSIUM PHOSPHATE, MONOBASIC POTASSIUM PHOSPHATE, DIBASIC 15 MMOL: 224; 236 INJECTION, SOLUTION, CONCENTRATE INTRAVENOUS at 14:07

## 2023-12-07 RX ADMIN — ONDANSETRON 4 MG: 2 INJECTION INTRAMUSCULAR; INTRAVENOUS at 17:20

## 2023-12-07 RX ADMIN — PIPERACILLIN SODIUM AND TAZOBACTAM SODIUM 3.38 G: 3; .375 INJECTION, SOLUTION INTRAVENOUS at 11:11

## 2023-12-07 RX ADMIN — Medication 10 ML: at 22:35

## 2023-12-07 RX ADMIN — METOPROLOL TARTRATE 12.5 MG: 25 TABLET, FILM COATED ORAL at 08:08

## 2023-12-07 RX ADMIN — PIPERACILLIN SODIUM AND TAZOBACTAM SODIUM 3.38 G: 3; .375 INJECTION, SOLUTION INTRAVENOUS at 18:44

## 2023-12-07 RX ADMIN — POTASSIUM PHOSPHATE, MONOBASIC POTASSIUM PHOSPHATE, DIBASIC: 224; 236 INJECTION, SOLUTION, CONCENTRATE INTRAVENOUS at 17:20

## 2023-12-07 RX ADMIN — PIPERACILLIN SODIUM AND TAZOBACTAM SODIUM 3.38 G: 3; .375 INJECTION, SOLUTION INTRAVENOUS at 04:02

## 2023-12-07 RX ADMIN — IOPAMIDOL 85 ML: 612 INJECTION, SOLUTION INTRAVENOUS at 10:04

## 2023-12-07 RX ADMIN — PANTOPRAZOLE SODIUM 40 MG: 40 INJECTION, POWDER, FOR SOLUTION INTRAVENOUS at 05:00

## 2023-12-07 RX ADMIN — HYDROMORPHONE HYDROCHLORIDE 0.5 MG: 1 INJECTION, SOLUTION INTRAMUSCULAR; INTRAVENOUS; SUBCUTANEOUS at 22:34

## 2023-12-07 RX ADMIN — FLUCONAZOLE 400 MG: 2 INJECTION, SOLUTION INTRAVENOUS at 17:20

## 2023-12-07 RX ADMIN — METOPROLOL TARTRATE 12.5 MG: 25 TABLET, FILM COATED ORAL at 22:34

## 2023-12-07 RX ADMIN — PAROXETINE HYDROCHLORIDE 40 MG: 20 TABLET, FILM COATED ORAL at 08:08

## 2023-12-07 RX ADMIN — METOPROLOL TARTRATE 5 MG: 1 INJECTION, SOLUTION INTRAVENOUS at 16:21

## 2023-12-07 RX ADMIN — QUETIAPINE FUMARATE 100 MG: 50 TABLET, EXTENDED RELEASE ORAL at 22:34

## 2023-12-07 RX ADMIN — ALPRAZOLAM 0.5 MG: 0.5 TABLET ORAL at 22:36

## 2023-12-07 RX ADMIN — HYDROMORPHONE HYDROCHLORIDE 0.5 MG: 1 INJECTION, SOLUTION INTRAMUSCULAR; INTRAVENOUS; SUBCUTANEOUS at 08:08

## 2023-12-07 NOTE — PROGRESS NOTES
Colorectal & General Surgery  Progress Note    Patient: Martina Hardwick  YOB: 1955  MRN: [id number]      Assessment  Martina Hardwick is a 67 y.o. female with chronic nursing history multiple abdominal operations who is now postoperative day 17 from exploratory laparotomy, enterolysis, subtotal colectomy complicated by leak for small bowel enterotomy and now postoperative day 9 from reopening of recent laparotomy and small bowel resection.    CT scan of the abdomen pelvis today demonstrates a well-circumscribed retroperitoneal abscess on the right.  This is amenable to percutaneous drainage in my opinion, so  I have asked for interventional radiology to place a drain.  She does have some free fluid in her abdomen.  Her right sided drain is bilious and low-volume today.  Left-sided drain with basically no output.  In the context of her improving leukocytosis and overall clinical status, I suspect that this is a well-controlled enteric leak.  I will make her n.p.o. with sips and chips, continue TPN, continue Zosyn.  Defer to Dr. Turk regarding fluconazole.    Anemia responded appropriately to 2 units of packed red blood cells.    Significant hypophosphatemia again.  Repleted with 15 mmol of potassium phosphate.    Regarding her epistaxis, she did have 1 episode this morning, but was not much blood.  I have restarted the Afrin nasal spray.    Subjective  No acute events.    Objective    Vitals:    12/07/23 1340   BP:    Pulse: 82   Resp: 16   Temp: 98.6 °F (37 °C)   SpO2: 97%       Physical Exam  Constitutional: Well-developed well-nourished, no acute distress  Neck: Supple, trachea midline  Respiratory: No increased work of breathing, Symmetric excursion  Cardiovascular: Well pefursed, no jugular venous distention evident   Abdominal: Soft, nontender, mildly distended.  Incision intact without any bilious staining on the 2 areas that are open and without erythema.  Right lower quadrant drain bilious  and low output.  Left lower quadrant drain serosanguineous and basically no output.  Skin: Warm, dry, no rash on visualized skin surfaces  Psychiatric: Alert and oriented ×3, normal affect     Laboratory Results  I have personally reviewed CBC with WBC 14, hemoglobin 10, platelets 550.  BMP with creatinine 0.64, calcium 8.2, phosphorus 2.1.    Radiology  I have personally reviewed CT scan of the abdomen pelvis demonstrates an obvious right retroperitoneal abscess.  There is some free fluid in the abdomen with no obvious loculated abscesses.  No pneumoperitoneum.         Osvaldo Salinas MD  Colorectal & General Surgery  [facility]    [address], [address]  [place], [address]  P: [phone]  F: [phone]

## 2023-12-07 NOTE — PROGRESS NOTES
ID NOTE    CC: f/u leukocytosis and abdominal abscesses following abdominal surgery    Subj: No fever. WBC trending down. Hgb trending up after transfusion. Some abdominal pain today. Plans for CT scan soon.     Medications:    Current Facility-Administered Medications:     Adult Central 2-in-1 TPN, , Intravenous, Continuous, Ranjeet Salinas MD, Last Rate: 75 mL/hr at 12/06/23 1804, New Bag at 12/06/23 1804    ALPRAZolam (XANAX) tablet 0.5 mg, 0.5 mg, Oral, Daily, Ranjeet Salinas MD, 0.5 mg at 12/06/23 2021    ALPRAZolam (XANAX) tablet 0.5 mg, 0.5 mg, Oral, BID PRN, Triston Malcolm III, MD, 0.5 mg at 12/03/23 0137    [Held by provider] Enoxaparin Sodium (LOVENOX) syringe 30 mg, 30 mg, Subcutaneous, Q24H, Ranjeet Salinas MD, 30 mg at 12/03/23 2042    Fat Emulsion Plant Based (INTRALIPID,LIPOSYN) 20 % infusion 20 g, 100 mL, Intravenous, Q24H (TPN), Ranjeet Salinas MD, Last Rate: 8.33 mL/hr at 12/06/23 1816, 20 g at 12/06/23 1816    HYDROmorphone (DILAUDID) injection 0.5 mg, 0.5 mg, Intravenous, Q4H PRN, Ranjeet Salinas MD, 0.5 mg at 12/07/23 0808    Methocarbamol (ROBAXIN) injection 500 mg, 500 mg, Intravenous, Q6H PRN, Ranjeet Salinas MD    metoprolol tartrate (LOPRESSOR) tablet 12.5 mg, 12.5 mg, Oral, Q12H, Ranjeet Salinas MD, 12.5 mg at 12/07/23 0808    micafungin sodium (MYCAMINE) 100 mg in sodium chloride 0.9 % 100 mL IVPB-VTB, 100 mg, Intravenous, Q24H, Ranjeet Salinas MD, 100 mg at 12/06/23 2036    [DISCONTINUED] ondansetron (ZOFRAN) tablet 4 mg, 4 mg, Oral, Q6H PRN **OR** ondansetron (ZOFRAN) injection 4 mg, 4 mg, Intravenous, Q6H PRN, Ranjeet Salinas MD, 4 mg at 12/06/23 1137    oxymetazoline (AFRIN) nasal spray 2 spray, 2 spray, Each Nare, BID, Natalio Collins MD, 2 spray at 12/07/23 0808    pantoprazole (PROTONIX) injection 40 mg, 40 mg, Intravenous, Q AM, Ranjeet Salinas MD, 40 mg at 12/07/23 0500     "PARoxetine (PAXIL) tablet 40 mg, 40 mg, Oral, Daily, Triston Malcolm III, MD, 40 mg at 12/07/23 0808    Pharmacy to Dose TPN, , Does not apply, Continuous PRN, Ranjeet Salinas MD    piperacillin-tazobactam (ZOSYN) 3.375 g in iso-osmotic dextrose 50 ml (premix), 3.375 g, Intravenous, Q8H, Ranjeet Salinas MD, 3.375 g at 12/07/23 0402    prochlorperazine (COMPAZINE) injection 5 mg, 5 mg, Intravenous, Q6H PRN, Ranjeet Salinas MD, 5 mg at 12/06/23 2023    QUEtiapine fumarate ER (SEROquel XR) tablet 100 mg, 100 mg, Oral, Nightly, Ranjeet Salinas MD, 100 mg at 12/06/23 2021    sodium chloride 0.9 % flush 10 mL, 10 mL, Intravenous, Q12H, Ranjeet Salinas MD, 10 mL at 12/07/23 0809    sodium chloride 0.9 % flush 10 mL, 10 mL, Intravenous, Q12H, Ranjeet Salinas MD, 10 mL at 12/07/23 0809    sodium chloride nasal spray 2 spray, 2 spray, Each Nare, PRN, Natalio Collins MD      Objective   Vital Signs   Temp:  [98.1 °F (36.7 °C)-99.1 °F (37.3 °C)] 98.8 °F (37.1 °C)  Heart Rate:  [80-98] 93  Resp:  [16-21] 16  BP: (117-170)/(59-99) 170/76    Physical Exam:   General: awake, alert, very nice, in bed  Eyes: no scleral icterus  Cardiovascular: NR  Respiratory: normal work of breathing  GI: not distended; bandaged  :  no Hodge catheter  Psych: calm and pleasant  Vasc: RUE PICC w/o erythema    Labs:   CBC, K, and Crt reviewed today  Lab Results   Component Value Date    WBC 14.12 (H) 12/07/2023    HGB 10.2 (L) 12/07/2023    HCT 30.2 (L) 12/07/2023    MCV 91.5 12/07/2023     (H) 12/07/2023     Lab Results   Component Value Date    K 4.1 12/07/2023     Lab Results   Component Value Date    CREATININE 0.64 12/07/2023     Procal 2.6    Microbiology:  11/27 BCx: negative  12/1 BCx: negative, final    Prior Radiology:  12/1/23 CT A/P:  \"Status post small bowel-small bowel and ileocolic anastomoses.  Midline   cutaneous skin staples.  Multiple surgical drains " "which terminate in the   pelvis.  Small locules of free air are present in the pelvis, which may   be postoperative in etiology.      Evaluation for postoperative collection in the pelvis is limited due to   lack of oral contrast.  It is difficult to discern fluid from within   bowel loop compared to fluid between bowel loops.      Suspected developing collection abscess in the RIGHT lower quadrant   measuring approximately 8.0 x 4.0 cm.  Evaluation following initiation of   oral contrast recommended to opacify the bowel loops within the pelvis.      Additional collection is present in the retroperitoneum measuring   approximately 11.1 cm superior-inferior by 2.6 cm mid-lateral.  See   series 3 image 89 and series 2 image 52.  Developing retroperitoneal   abscess cannot be excluded.     Surgical reevaluation recommended. \"    ASSESSMENT/PLAN:  Leukocytosis - better  Epistaxis  S/p colectomy for colonic inertia  Post-operative leak and abscesses  History of multiple abdominal surgeries due to diverticular disease  On TPN  Anemia    She is afebrile and hemodynamically stable. WBC improved today. Plan is for CT A/P once she can tolerate enough oral contrast to get it done. I will follow-up the results.     Continue empiric Zosyn. Change micafungin to fluconazole since no resistant yeast have been identified. Duration TBD.     ID will follow.     "

## 2023-12-07 NOTE — PLAN OF CARE
Problem: Adult Inpatient Plan of Care  Goal: Plan of Care Review  Outcome: Ongoing, Progressing  Flowsheets (Taken 12/7/2023 1820)  Progress: improving  Plan of Care Reviewed With:   patient   spouse  Outcome Evaluation: Pt complains of increasing pain during shift, medicated x3. Pts wound dressigs and drains remain in tact. No further concerns.     Problem: Adult Inpatient Plan of Care  Goal: Absence of Hospital-Acquired Illness or Injury  Outcome: Ongoing, Progressing  Intervention: Identify and Manage Fall Risk  Recent Flowsheet Documentation  Taken 12/7/2023 1819 by Claudia Whitaker RN  Safety Promotion/Fall Prevention:   activity supervised   assistive device/personal items within reach   clutter free environment maintained   fall prevention program maintained   room organization consistent   safety round/check completed  Taken 12/7/2023 1625 by Claudia Whitaker RN  Safety Promotion/Fall Prevention:   activity supervised   assistive device/personal items within reach   clutter free environment maintained   fall prevention program maintained   room organization consistent   safety round/check completed  Taken 12/7/2023 1440 by Claudia Whitaker RN  Safety Promotion/Fall Prevention:   activity supervised   clutter free environment maintained   assistive device/personal items within reach   fall prevention program maintained   room organization consistent   safety round/check completed  Taken 12/7/2023 1237 by Claudia Whitaker RN  Safety Promotion/Fall Prevention:   activity supervised   assistive device/personal items within reach   clutter free environment maintained   fall prevention program maintained   room organization consistent   safety round/check completed  Taken 12/7/2023 1019 by Claudia Whitaker RN  Safety Promotion/Fall Prevention:   activity supervised   assistive device/personal items within reach   clutter free environment maintained   fall prevention program maintained   room organization  consistent   safety round/check completed  Taken 12/7/2023 0810 by Claudia Whitaker RN  Safety Promotion/Fall Prevention:   activity supervised   assistive device/personal items within reach   clutter free environment maintained   fall prevention program maintained   room organization consistent   safety round/check completed  Intervention: Prevent Skin Injury  Recent Flowsheet Documentation  Taken 12/7/2023 1819 by Claudia Whitaker RN  Body Position:   position changed independently   foot of bed elevated  Taken 12/7/2023 1625 by Claudia Whitaker RN  Body Position:   position changed independently   foot of bed elevated  Taken 12/7/2023 1440 by Claudia Whitaker RN  Body Position:   position changed independently   foot of bed elevated  Taken 12/7/2023 1237 by Claudia Whitaker RN  Body Position:   position changed independently   foot of bed elevated  Taken 12/7/2023 1019 by Claudia Whitaker RN  Body Position:   position changed independently   foot of bed elevated  Taken 12/7/2023 0810 by Claudia Whitaker RN  Body Position:   position changed independently   foot of bed elevated  Skin Protection:   adhesive use limited   incontinence pads utilized   skin-to-device areas padded   tubing/devices free from skin contact  Intervention: Prevent and Manage VTE (Venous Thromboembolism) Risk  Recent Flowsheet Documentation  Taken 12/7/2023 0810 by Claudia Whitaker RN  Activity Management: activity encouraged  Range of Motion:   active ROM (range of motion) encouraged   ROM (range of motion) performed     Problem: Adult Inpatient Plan of Care  Goal: Optimal Comfort and Wellbeing  Outcome: Ongoing, Progressing  Intervention: Monitor Pain and Promote Comfort  Recent Flowsheet Documentation  Taken 12/7/2023 0810 by Claudia Whitaker RN  Pain Management Interventions:   see MAR   quiet environment facilitated   pillow support provided   position adjusted   care clustered  Intervention: Provide Person-Centered  Care  Recent Flowsheet Documentation  Taken 12/7/2023 0810 by Claudia Whitaker RN  Trust Relationship/Rapport:   care explained   choices provided   questions answered   questions encouraged     Problem: Pain Acute  Goal: Acceptable Pain Control and Functional Ability  Outcome: Ongoing, Progressing  Intervention: Prevent or Manage Pain  Recent Flowsheet Documentation  Taken 12/7/2023 1819 by Claudia Whitaker RN  Medication Review/Management: medications reviewed  Taken 12/7/2023 1625 by Claudia Whitaker RN  Medication Review/Management: medications reviewed  Taken 12/7/2023 1440 by Claudia Whitaker RN  Medication Review/Management: medications reviewed  Taken 12/7/2023 1237 by Claudia Whitaker RN  Medication Review/Management: medications reviewed  Taken 12/7/2023 1019 by Claudia Whitaker RN  Medication Review/Management: medications reviewed  Taken 12/7/2023 0810 by Claudia Whitaker RN  Medication Review/Management: medications reviewed  Intervention: Develop Pain Management Plan  Recent Flowsheet Documentation  Taken 12/7/2023 0810 by Claudia Whitaker RN  Pain Management Interventions:   see MAR   quiet environment facilitated   pillow support provided   position adjusted   care clustered  Intervention: Optimize Psychosocial Wellbeing  Recent Flowsheet Documentation  Taken 12/7/2023 0810 by Claudia Whitaker RN  Diversional Activities: television     Problem: Skin Injury Risk Increased  Goal: Skin Health and Integrity  Outcome: Ongoing, Progressing  Intervention: Optimize Skin Protection  Recent Flowsheet Documentation  Taken 12/7/2023 1819 by Claudia Whitaker RN  Head of Bed (HOB) Positioning: HOB elevated  Taken 12/7/2023 1625 by Claudia Whitaker RN  Head of Bed (HOB) Positioning: HOB elevated  Taken 12/7/2023 1440 by Claudia Whitaker RN  Head of Bed (HOB) Positioning: HOB elevated  Taken 12/7/2023 1237 by Claudia Whitaker RN  Head of Bed (HOB) Positioning: HOB elevated  Taken 12/7/2023 1019 by  Claudia Whitaker RN  Head of Bed ([facility]) Positioning: HOB elevated  Taken 12/7/2023 0810 by Claudia Whitaker RN  Pressure Reduction Techniques:   frequent weight shift encouraged   weight shift assistance provided  Head of Bed (HOB) Positioning: [facility] elevated  Pressure Reduction Devices: positioning supports utilized  Skin Protection:   adhesive use limited   incontinence pads utilized   skin-to-device areas padded   tubing/devices free from skin contact     Problem: Parenteral Nutrition  Goal: Effective Intravenous Nutrition Therapy Delivery  Outcome: Ongoing, Progressing  Intervention: Optimize Intravenous Nutrition Delivery  Recent Flowsheet Documentation  Taken 12/7/2023 1819 by Claudia Whitaker RN  Medication Review/Management: medications reviewed  Taken 12/7/2023 1625 by Claudia Whitaker RN  Medication Review/Management: medications reviewed  Taken 12/7/2023 1440 by Claudia Whitaker RN  Medication Review/Management: medications reviewed  Taken 12/7/2023 1237 by Claudia Whitaker RN  Medication Review/Management: medications reviewed  Taken 12/7/2023 1019 by Claudia Whitaker RN  Medication Review/Management: medications reviewed  Taken 12/7/2023 0810 by Claudia Whitaker RN  Medication Review/Management: medications reviewed     Problem: Fall Injury Risk  Goal: Absence of Fall and Fall-Related Injury  Outcome: Ongoing, Progressing  Intervention: Identify and Manage Contributors  Recent Flowsheet Documentation  Taken 12/7/2023 1819 by Claudia Whitaker RN  Medication Review/Management: medications reviewed  Taken 12/7/2023 1625 by Claudia Whitaker RN  Medication Review/Management: medications reviewed  Taken 12/7/2023 1440 by Claudia Whitaker RN  Medication Review/Management: medications reviewed  Taken 12/7/2023 1237 by Claudia Whitaker RN  Medication Review/Management: medications reviewed  Taken 12/7/2023 1019 by Claudia Whitaker RN  Medication Review/Management: medications reviewed  Taken 12/7/2023  0810 by Claudia Whitaker RN  Medication Review/Management: medications reviewed  Self-Care Promotion: independence encouraged  Intervention: Promote Injury-Free Environment  Recent Flowsheet Documentation  Taken 12/7/2023 1819 by Claudia Whitaker, RN  Safety Promotion/Fall Prevention:   activity supervised   assistive device/personal items within reach   clutter free environment maintained   fall prevention program maintained   room organization consistent   safety round/check completed  Taken 12/7/2023 1625 by Claudia Whitaker, RN  Safety Promotion/Fall Prevention:   activity supervised   assistive device/personal items within reach   clutter free environment maintained   fall prevention program maintained   room organization consistent   safety round/check completed  Taken 12/7/2023 1440 by Claudia Whitaker RN  Safety Promotion/Fall Prevention:   activity supervised   clutter free environment maintained   assistive device/personal items within reach   fall prevention program maintained   room organization consistent   safety round/check completed  Taken 12/7/2023 1237 by Claudia Whitaker RN  Safety Promotion/Fall Prevention:   activity supervised   assistive device/personal items within reach   clutter free environment maintained   fall prevention program maintained   room organization consistent   safety round/check completed  Taken 12/7/2023 1019 by Claudia Whitaker, RN  Safety Promotion/Fall Prevention:   activity supervised   assistive device/personal items within reach   clutter free environment maintained   fall prevention program maintained   room organization consistent   safety round/check completed  Taken 12/7/2023 0810 by Claudia Whitaker, RN  Safety Promotion/Fall Prevention:   activity supervised   assistive device/personal items within reach   clutter free environment maintained   fall prevention program maintained   room organization consistent   safety round/check completed   Goal Outcome  Evaluation:  Plan of Care Reviewed With: patient, spouse        Progress: improving  Outcome Evaluation: Pt complains of increasing pain during shift, medicated x3. Pts wound dressigs and drains remain in tact. No further concerns.

## 2023-12-07 NOTE — PLAN OF CARE
Goal Outcome Evaluation:  Plan of Care Reviewed With: patient, family           Outcome Evaluation: Martina Hardwick is a 67 year old female seen for PT treatment session this afternoon. Pt. reports 5/10 abdominal pain however is agreeable to PT with encouragement. Pt. performs supine>sit with CGA, verbal cueing provided to facilitate log roll technique to spare abdomen. Pt. performs 5x STS from EOB with no AD and CGA, demo'ing good eccentric control when returning to sit. Pt. able to take slow, shuffled steps along EOB however endurance limiting this date and pt. only able to maintain for ~5 ft. before returning to sitting. Pt. requiring Min A to return to supine, with most assistance provided for elevating BLE. Pt. left with all needs met and within reach. PT recommending SNF at d/c.      Anticipated Discharge Disposition (PT): skilled nursing facility

## 2023-12-07 NOTE — NURSING NOTE
Abdominal dressing soiled with urine. Provided sharad care, hygiene, and fresh linens. Removed soiled abdominal dressing and applied new dressing according to orders. Patient tolerated well and denied any complaints

## 2023-12-07 NOTE — PROGRESS NOTES
The patient seems to be in the somewhat lower spirits today complaining of worsening pain.  There has been no recurrence of any paranoia though the patient does continue to report some frustration with her arduous hospital course.  For now, I would recommend continuation of Seroquel XR as it is probably helping the patient's sleep but would ultimately look to discontinue this medication as I do not believe the patient is exhibiting any psychotic symptoms at this point.

## 2023-12-07 NOTE — THERAPY TREATMENT NOTE
Patient Name: Martina Hardwick  : 1955    MRN: [id number]                              Today's Date: 2023       Admit Date: 2023    Visit Dx:     ICD-10-CM ICD-9-CM   1. 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    Colonic inertia    Severe malnutrition     Past Medical History:   Diagnosis Date    Arthritis     Autoantibody titer positive     Goodwin esophagus     Bloating     Cataract     BILAT    Chronic abdominal pain     Chronic constipation     Encounter for preventive health examination     Endometriosis     Gastritis     Gastrointestinal hypomotility     GERD (gastroesophageal reflux disease)     Headache, tension-type     Hypertension     Hyponatremia     Insomnia     Intestinal autonomic neuropathy     Irritable bowel syndrome     Migraine     Mixed anxiety and depressive disorder     Moderate malnutrition     Noninfectious gastroenteritis     OP (osteoporosis)     Osteopenia     Osteoporosis     PONV (postoperative nausea and vomiting) 2018    Psoriasis     KNEES, ELBOWS BILAT AND SCALP    Seasonal allergies     Visceral hyperalgesia      Past Surgical History:   Procedure Laterality Date    APPENDECTOMY      CHOLECYSTECTOMY N/A 2018    Procedure: CHOLECYSTECTOMY LAPAROSCOPIC converted to open procedure;  Surgeon: Hernan Hinds MD;  Location: [facility];  Service: General    COLON RESECTION N/A 2023    Procedure: OPEN SUBTOTAL COLECTOMY AND  ADESIOLYSIS;  Surgeon: Ranjeet Salinas MD;  Location: [facility] MAIN OR;  Service: General;  Laterality: N/A;    COLON SURGERY      STATES TOTAL OF 3 COLON SURGERY    COLONOSCOPY      COLONOSCOPY N/A 12/12/2018    Procedure: COLONOSCOPY;  Surgeon: Darien Ruff MD;  Location: [facility] OR;  Service: Gastroenterology    COLONOSCOPY N/A 10/13/2023    Procedure: COLONOSCOPY TO CECUM;  Surgeon: Ranjeet Salinas MD;  Location: [facility] ENDOSCOPY;  Service: General;  Laterality: N/A;  PREOP/ CHRONIC CONSTIPATION- POSTOP/ NORMAL    DIAGNOSTIC LAPAROSCOPY  1990    DILATATION AND CURETTAGE  1985    ENDOSCOPY N/A 05/13/2016    Procedure: ESOPHAGOGASTRODUODENOSCOPY ;  Surgeon: Darien Ruff MD;  Location: [facility] OR;  Service:     ENDOSCOPY N/A 05/01/2023    Procedure: ESOPHAGOGASTRODUODENOSCOPY WITH BIOPSY;  Surgeon: Darien Ruff MD;  Location: [facility] OR;  Service: Gastroenterology;  Laterality: N/A;  Mild Thrush; Gastritis; Esophagitis- thrush and reflux; Biopsies- duodenal, gastric, esophagus    EXPLORATORY LAPAROTOMY N/A 11/27/2023    Procedure: exploratory laparotomy, small bowel resection;  Surgeon: Ranjeet Salinas MD;  Location: [facility] MAIN OR;  Service: General;  Laterality: N/A;    HYSTERECTOMY      REVISION / TAKEDOWN COLOSTOMY        General Information       Row Name 12/07/23 1415          Physical Therapy Time and Intention    Document Type therapy note (daily note)  -ER     Mode of Treatment individual therapy;physical therapy  -ER       Row Name 12/07/23 1415          General Information    Patient Profile Reviewed yes  -ER     Existing Precautions/Restrictions fall  -ER       Row Name 12/07/23 1415          Cognition    Orientation Status (Cognition) oriented x 3  -ER       Row Name 12/07/23 1415          Safety Issues, Functional Mobility    Impairments Affecting Function (Mobility) endurance/activity tolerance;strength;balance;pain  -ER                User Key  (r) = Recorded By, (t) = Taken By, (c) = Cosigned By      Initials Name Provider Type    ER Milka Nunez PT Physical Therapist                   Mobility       Row Name 12/07/23 1415          Bed Mobility    Bed Mobility supine-sit;sit-supine  -ER     Supine-Sit Rowdy (Bed Mobility) standby assist;verbal cues  -ER     Sit-Supine Rowdy (Bed Mobility) minimum assist (75% patient effort);1 person assist  -ER     Assistive Device (Bed Mobility) head of bed elevated  -ER     Comment, (Bed Mobility) log roll, min A for BLE to supine  -ER       Row Name 12/07/23 1415          Sit-Stand Transfer    Sit-Stand Rowdy (Transfers) contact guard  -ER     Assistive Device (Sit-Stand Transfers) other (see comments)  no AD  -ER     Comment, (Sit-Stand Transfer) 5x STS from bed in lowered position, no AD  -ER       Row Name 12/07/23 1415          Gait/Stairs (Locomotion)    Rowdy Level (Gait) contact guard  -ER     Assistive Device (Gait) walker, front-wheeled  -ER     Patient was able to Ambulate yes  -ER     Distance in Feet (Gait) 5'  -ER     Deviations/Abnormal Patterns (Gait) stacy decreased;stride length decreased;gait speed decreased  -ER     Bilateral Gait Deviations forward flexed posture;weight shift ability decreased  -ER     Comment, (Gait/Stairs) pt. able to take small shuffled steps laterally along EOB ~5ft total with CGA. Pt. with decreased endurance limiting further mobility  -ER               User Key  (r) = Recorded By, (t) = Taken By, (c) = Cosigned By      Initials Name Provider Type    ER Milka Nunez PT Physical Therapist                   Obj/Interventions       Row Name 12/07/23 1416          Balance    Balance Assessment sitting static balance;sitting dynamic balance;sit to stand dynamic balance;standing static balance;standing dynamic balance  -ER     Static Sitting Balance standby assist  -ER     Dynamic Sitting Balance supervision;contact guard  -ER     Position,  Sitting Balance sitting edge of bed;unsupported  -ER     Sit to Stand Dynamic Balance contact guard  -ER     Static Standing Balance contact guard  -ER     Dynamic Standing Balance contact guard;minimal assist  -ER     Position/Device Used, Standing Balance other (see comments)  no AD  -ER               User Key  (r) = Recorded By, (t) = Taken By, (c) = Cosigned By      Initials Name Provider Type    ER Mayra, Milka, PT Physical Therapist                   Goals/Plan    No documentation.                  Clinical Impression       Row Name 12/07/23 1417          Pain    Pretreatment Pain Rating 5/10  -ER     Posttreatment Pain Rating 5/10  -ER     Pain Location generalized  -ER     Pain Location - abdomen  -ER     Pain Intervention(s) Rest;Repositioned;Ambulation/increased activity  -ER       Row Name 12/07/23 1417          Plan of Care Review    Plan of Care Reviewed With patient;family  -ER     Outcome Evaluation Martina Hardwick is a 67 year old female seen for PT treatment session this afternoon. Pt. reports 5/10 abdominal pain however is agreeable to PT with encouragement. Pt. performs supine>sit with CGA, verbal cueing provided to facilitate log roll technique to spare abdomen. Pt. performs 5x STS from EOB with no AD and CGA, demo'ing good eccentric control when returning to sit. Pt. able to take slow, shuffled steps along EOB however endurance limiting this date and pt. only able to maintain for ~5 ft. before returning to sitting. Pt. requiring Min A to return to supine, with most assistance provided for elevating BLE. Pt. left with all needs met and within reach. PT recommending SNF at d/c.  -ER       Row Name 12/07/23 1417          Therapy Assessment/Plan (PT)    Rehab Potential (PT) good, to achieve stated therapy goals  -ER     Criteria for Skilled Interventions Met (PT) yes  -ER     Therapy Frequency (PT) 6 times/wk  -ER       Row Name 12/07/23 1417          Positioning and Restraints    Pre-Treatment Position  in bed  -ER     Post Treatment Position bed  -ER     In Bed with nsg;call light within reach;encouraged to call for assist;exit alarm on  -ER               User Key  (r) = Recorded By, (t) = Taken By, (c) = Cosigned By      Initials Name Provider Type    Milka Card PT Physical Therapist                   Outcome Measures       Row Name 12/07/23 1419 12/07/23 0810       How much help from another person do you currently need...    Turning from your back to your side while in flat bed without using bedrails? 3  -ER 3  -KH    Moving from lying on back to sitting on the side of a flat bed without bedrails? 3  -ER 3  -KH    Moving to and from a bed to a chair (including a wheelchair)? 3  -ER 3  -KH    Standing up from a chair using your arms (e.g., wheelchair, bedside chair)? 3  -ER 3  -KH    Climbing 3-5 steps with a railing? 2  -ER 2  -KH    To walk in hospital room? 3  -ER 3  -KH    AM-PAC 6 Clicks Score (PT) 17  -ER 17  -KH    Highest Level of Mobility Goal 5 --> Static standing  -ER 5 --> Static standing  -KH      Row Name 12/07/23 1419          Functional Assessment    Outcome Measure Options AM-PAC 6 Clicks Basic Mobility (PT)  -ER               User Key  (r) = Recorded By, (t) = Taken By, (c) = Cosigned By      Initials Name Provider Type    Claudia Carter RN Registered Nurse    Milka Card PT Physical Therapist                                 Physical Therapy Education       Title: PT OT SLP Therapies (Done)       Topic: Physical Therapy (Done)       Point: Mobility training (Done)       Learning Progress Summary             Patient Acceptance, E, VU by ER at 12/7/2023 1420    Acceptance, E, DU,VU by DRE at 12/5/2023 1105    Acceptance, E,TB, VU by JS at 12/4/2023 0500    Acceptance, E,TB,D, VU,NR by  at 12/3/2023 1331    Acceptance, E,TB, VU by MT at 12/3/2023 0459    Acceptance, E,TB, VU by MT at 12/2/2023 0446    Acceptance, E,TB, VU by JS at 12/1/2023 0520    Acceptance, E,TB,D, VU by PH at  11/30/2023 0951    Acceptance, E,TB,D, VU,DU,NR by  at 11/30/2023 0523    Acceptance, E, DU,VU by JY at 11/29/2023 1353    Acceptance, E,TB,D, VU,NR by PH at 11/27/2023 1207    Acceptance, E,TB,D, VU,NR by PH at 11/24/2023 0908    Acceptance, E,D, VU,NR by MS at 11/22/2023 1519    Acceptance, E,D, VU,NR by MS at 11/21/2023 1125                         Point: Home exercise program (Done)       Learning Progress Summary             Patient Acceptance, E, VU by ER at 12/7/2023 1420    Acceptance, E,TB, VU by JS at 12/4/2023 0500    Acceptance, E,TB, VU by MT at 12/3/2023 0459    Acceptance, E,TB, VU by MT at 12/2/2023 0446    Acceptance, E,TB, VU by JS at 12/1/2023 0520    Acceptance, E,TB,D, VU by  at 11/30/2023 0951    Acceptance, E,TB,D, VU,DU,NR by  at 11/30/2023 0523    Acceptance, E, DU,VU by JY at 11/29/2023 1353    Acceptance, E,TB,D, VU,NR by PH at 11/27/2023 1207    Acceptance, E,TB,D, VU,NR by PH at 11/24/2023 0908    Acceptance, E,D, VU,NR by MS at 11/22/2023 1519    Acceptance, E,D, VU,NR by MS at 11/21/2023 1125                         Point: Body mechanics (Done)       Learning Progress Summary             Patient Acceptance, E, VU by ER at 12/7/2023 1420    Acceptance, E, DU,VU by JY at 12/5/2023 1105    Acceptance, E,TB, VU by JS at 12/4/2023 0500    Acceptance, E,TB,D, VU,NR by  at 12/3/2023 1331    Acceptance, E,TB, VU by MT at 12/3/2023 0459    Acceptance, E,TB, VU by MT at 12/2/2023 0446    Acceptance, E,TB, VU by JS at 12/1/2023 0520    Acceptance, E,TB,D, VU by PH at 11/30/2023 0951    Acceptance, E,TB,D, VU,DU,NR by AH at 11/30/2023 0523    Acceptance, E, DU,VU by JY at 11/29/2023 1353    Acceptance, E,TB,D, VU,NR by PH at 11/27/2023 1207    Acceptance, E,TB,D, VU,NR by PH at 11/24/2023 0908    Acceptance, E,D, VU,NR by MS at 11/22/2023 1519    Acceptance, E,D, VU,NR by MS at 11/21/2023 1125                         Point: Precautions (Done)       Learning Progress Summary              Patient Acceptance, E, VU by ER at 12/7/2023 1420    Acceptance, E, DU,VU by JY at 12/5/2023 1105    Acceptance, E,TB, VU by  at 12/4/2023 0500    Acceptance, E,TB,D, VU,NR by  at 12/3/2023 1331    Acceptance, E,TB, VU by MT at 12/3/2023 0459    Acceptance, E,TB, VU by MT at 12/2/2023 0446    Acceptance, E,TB, VU by  at 12/1/2023 0520    Acceptance, E,TB,D, VU by  at 11/30/2023 0951    Acceptance, E,TB,D, VU,DU,NR by  at 11/30/2023 0523    Acceptance, E, DU,VU by J at 11/29/2023 1353    Acceptance, E,TB,D, VU,NR by  at 11/27/2023 1207    Acceptance, E,TB,D, VU,NR by  at 11/24/2023 0908    Acceptance, E,D, VU,NR by MS at 11/22/2023 1519    Acceptance, E,D, VU,NR by MS at 11/21/2023 1125                                         User Key       Initials Effective Dates Name Provider Type Discipline    MS 06/16/21 -  Miguel Beasley, PT Physical Therapist PT    MT 06/16/21 -  Aileen Lezama, RN Registered Nurse Nurse     06/16/21 -  Yanira Hinds, RN Registered Nurse Nurse     10/01/20 -  Isis Fitzpatrick, RN Registered Nurse Nurse     06/16/21 -  Radha Rosen, PTA Physical Therapist Assistant PT     04/08/22 -  Codi Hinds, PT Physical Therapist PT    ER 10/15/23 -  Milka Nunez, PT Physical Therapist PT    JY 11/08/23 -  Kenroy Deleon PTA Student PTA Student PT                  PT Recommendation and Plan     Plan of Care Reviewed With: patient, family  Outcome Evaluation: Martina Hardwick is a 67 year old female seen for PT treatment session this afternoon. Pt. reports 5/10 abdominal pain however is agreeable to PT with encouragement. Pt. performs supine>sit with CGA, verbal cueing provided to facilitate log roll technique to spare abdomen. Pt. performs 5x STS from EOB with no AD and CGA, demo'ing good eccentric control when returning to sit. Pt. able to take slow, shuffled steps along EOB however endurance limiting this date and pt. only able to maintain for ~5 ft. before returning to  sitting. Pt. requiring Min A to return to supine, with most assistance provided for elevating BLE. Pt. left with all needs met and within reach. PT recommending SNF at d/c.     Time Calculation:         PT Charges       Row Name 12/07/23 1420             Time Calculation    Start Time 1354  -ER      Stop Time 1410  -ER      Time Calculation (min) 16 min  -ER      PT Received On 12/07/23  -ER      PT - Next Appointment 12/08/23  -ER         Time Calculation- PT    Total Timed Code Minutes- PT 16 minute(s)  -ER         Timed Charges    52349 - PT Therapeutic Activity Minutes 16  -ER         Total Minutes    Timed Charges Total Minutes 16  -ER       Total Minutes 16  -ER                User Key  (r) = Recorded By, (t) = Taken By, (c) = Cosigned By      Initials Name Provider Type    ER Milka Nunez PT Physical Therapist                  Therapy Charges for Today       Code Description Service Date Service Provider Modifiers Qty    [id number] HC PT THERAPEUTIC ACT EA 15 MIN 12/7/2023 iMlka Nunez, PT GP 1            PT G-Codes  Outcome Measure Options: AM-PAC 6 Clicks Basic Mobility (PT)  AM-PAC 6 Clicks Score (PT): 17  PT Discharge Summary  Anticipated Discharge Disposition (PT): skilled nursing facility    Milka Nunez PT  12/7/2023

## 2023-12-07 NOTE — PLAN OF CARE
Problem: Adult Inpatient Plan of Care  Goal: Absence of Hospital-Acquired Illness or Injury  Intervention: Prevent Skin Injury  Description: Perform a screening for skin injury risk, such as pressure or moisture associated skin damage on admission and at regular intervals throughout hospital stay.  Keep all areas of skin (especially folds) clean and dry.  Maintain adequate skin hydration.  Relieve and redistribute pressure and protect bony prominences; implement measures based on patient-specific risk factors.  Match turning and repositioning schedule to clinical condition.  Encourage weight shift frequently; assist with reposition if unable to complete independently.  Float heels off bed; avoid pressure on the Achilles tendon.  Keep skin free from extended contact with medical devices.  Encourage functional activity and mobility, as early as tolerated.  Use aids (e.g., slide boards, mechanical lift) during transfer.  Recent Flowsheet Documentation  Taken 12/6/2023 2004 by Idania Mcclain RN  Body Position: position changed independently  Skin Protection:   adhesive use limited   incontinence pads utilized   skin sealant/moisture barrier applied   Goal Outcome Evaluation:

## 2023-12-08 ENCOUNTER — APPOINTMENT (OUTPATIENT)
Dept: CT IMAGING | Facility: HOSPITAL | Age: 68
DRG: 329 | End: 2023-12-08
Payer: MEDICARE

## 2023-12-08 LAB
ALBUMIN SERPL-MCNC: 2.8 G/DL (ref 3.5–5.2)
ALBUMIN/GLOB SERPL: 0.8 G/DL
ALP SERPL-CCNC: 194 U/L (ref 39–117)
ALT SERPL W P-5'-P-CCNC: 115 U/L (ref 1–33)
ANION GAP SERPL CALCULATED.3IONS-SCNC: 9.9 MMOL/L (ref 5–15)
AST SERPL-CCNC: 96 U/L (ref 1–32)
BILIRUB SERPL-MCNC: 0.6 MG/DL (ref 0–1.2)
BUN SERPL-MCNC: 12 MG/DL (ref 8–23)
BUN/CREAT SERPL: 20.7 (ref 7–25)
CALCIUM SPEC-SCNC: 9 MG/DL (ref 8.6–10.5)
CHLORIDE SERPL-SCNC: 91 MMOL/L (ref 98–107)
CO2 SERPL-SCNC: 27.1 MMOL/L (ref 22–29)
CREAT SERPL-MCNC: 0.58 MG/DL (ref 0.57–1)
DEPRECATED RDW RBC AUTO: 46.1 FL (ref 37–54)
EGFRCR SERPLBLD CKD-EPI 2021: 98.7 ML/MIN/1.73
ERYTHROCYTE [DISTWIDTH] IN BLOOD BY AUTOMATED COUNT: 14.7 % (ref 12.3–15.4)
GLOBULIN UR ELPH-MCNC: 3.5 GM/DL
GLUCOSE BLDC GLUCOMTR-MCNC: 130 MG/DL (ref 70–130)
GLUCOSE BLDC GLUCOMTR-MCNC: 135 MG/DL (ref 70–130)
GLUCOSE SERPL-MCNC: 144 MG/DL (ref 65–99)
HCT VFR BLD AUTO: 34.8 % (ref 34–46.6)
HGB BLD-MCNC: 11.3 G/DL (ref 12–15.9)
MAGNESIUM SERPL-MCNC: 2 MG/DL (ref 1.6–2.4)
MCH RBC QN AUTO: 28.7 PG (ref 26.6–33)
MCHC RBC AUTO-ENTMCNC: 32.5 G/DL (ref 31.5–35.7)
MCV RBC AUTO: 88.3 FL (ref 79–97)
PHOSPHATE SERPL-MCNC: 3.2 MG/DL (ref 2.5–4.5)
PLATELET # BLD AUTO: 676 10*3/MM3 (ref 140–450)
PMV BLD AUTO: 8.7 FL (ref 6–12)
POTASSIUM SERPL-SCNC: 4.1 MMOL/L (ref 3.5–5.2)
PROT SERPL-MCNC: 6.3 G/DL (ref 6–8.5)
RBC # BLD AUTO: 3.94 10*6/MM3 (ref 3.77–5.28)
SODIUM SERPL-SCNC: 128 MMOL/L (ref 136–145)
WBC NRBC COR # BLD AUTO: 16.02 10*3/MM3 (ref 3.4–10.8)

## 2023-12-08 PROCEDURE — 25010000002 FENTANYL CITRATE (PF) 50 MCG/ML SOLUTION: Performed by: RADIOLOGY

## 2023-12-08 PROCEDURE — 25010000002 LIDOCAINE 1 % SOLUTION: Performed by: RADIOLOGY

## 2023-12-08 PROCEDURE — 87205 SMEAR GRAM STAIN: CPT | Performed by: SURGERY

## 2023-12-08 PROCEDURE — 84100 ASSAY OF PHOSPHORUS: CPT | Performed by: SURGERY

## 2023-12-08 PROCEDURE — 99232 SBSQ HOSP IP/OBS MODERATE 35: CPT | Performed by: INTERNAL MEDICINE

## 2023-12-08 PROCEDURE — 25010000002 CALCIUM GLUCONATE PER 10 ML: Performed by: SURGERY

## 2023-12-08 PROCEDURE — 82948 REAGENT STRIP/BLOOD GLUCOSE: CPT

## 2023-12-08 PROCEDURE — 87186 SC STD MICRODIL/AGAR DIL: CPT | Performed by: SURGERY

## 2023-12-08 PROCEDURE — 80053 COMPREHEN METABOLIC PANEL: CPT | Performed by: SURGERY

## 2023-12-08 PROCEDURE — 83735 ASSAY OF MAGNESIUM: CPT | Performed by: SURGERY

## 2023-12-08 PROCEDURE — 99024 POSTOP FOLLOW-UP VISIT: CPT | Performed by: SURGERY

## 2023-12-08 PROCEDURE — 85027 COMPLETE CBC AUTOMATED: CPT | Performed by: SURGERY

## 2023-12-08 PROCEDURE — 25010000002 MAGNESIUM SULFATE PER 500 MG OF MAGNESIUM: Performed by: SURGERY

## 2023-12-08 PROCEDURE — 25010000002 FLUCONAZOLE PER 200 MG: Performed by: INTERNAL MEDICINE

## 2023-12-08 PROCEDURE — 25010000002 POTASSIUM CHLORIDE PER 2 MEQ OF POTASSIUM: Performed by: SURGERY

## 2023-12-08 PROCEDURE — 25010000002 HYDROMORPHONE PER 4 MG: Performed by: SURGERY

## 2023-12-08 PROCEDURE — 25010000002 MIDAZOLAM PER 1 MG: Performed by: RADIOLOGY

## 2023-12-08 PROCEDURE — 49406 IMAGE CATH FLUID PERI/RETRO: CPT

## 2023-12-08 PROCEDURE — 87077 CULTURE AEROBIC IDENTIFY: CPT | Performed by: SURGERY

## 2023-12-08 PROCEDURE — 87070 CULTURE OTHR SPECIMN AEROBIC: CPT | Performed by: SURGERY

## 2023-12-08 PROCEDURE — 87102 FUNGUS ISOLATION CULTURE: CPT | Performed by: SURGERY

## 2023-12-08 PROCEDURE — 0W9G30Z DRAINAGE OF PERITONEAL CAVITY WITH DRAINAGE DEVICE, PERCUTANEOUS APPROACH: ICD-10-PCS | Performed by: RADIOLOGY

## 2023-12-08 PROCEDURE — 87075 CULTR BACTERIA EXCEPT BLOOD: CPT | Performed by: SURGERY

## 2023-12-08 PROCEDURE — 25010000002 PIPERACILLIN SOD-TAZOBACTAM PER 1 G: Performed by: INTERNAL MEDICINE

## 2023-12-08 RX ORDER — SODIUM CHLORIDE 9 MG/ML
INJECTION, SOLUTION INTRAVENOUS CONTINUOUS PRN
Status: COMPLETED | OUTPATIENT
Start: 2023-12-08 | End: 2023-12-08

## 2023-12-08 RX ORDER — LIDOCAINE HYDROCHLORIDE 10 MG/ML
20 INJECTION, SOLUTION INFILTRATION; PERINEURAL ONCE
Status: COMPLETED | OUTPATIENT
Start: 2023-12-08 | End: 2023-12-08

## 2023-12-08 RX ORDER — FENTANYL CITRATE 50 UG/ML
INJECTION, SOLUTION INTRAMUSCULAR; INTRAVENOUS AS NEEDED
Status: COMPLETED | OUTPATIENT
Start: 2023-12-08 | End: 2023-12-08

## 2023-12-08 RX ORDER — MIDAZOLAM HYDROCHLORIDE 1 MG/ML
INJECTION INTRAMUSCULAR; INTRAVENOUS AS NEEDED
Status: COMPLETED | OUTPATIENT
Start: 2023-12-08 | End: 2023-12-08

## 2023-12-08 RX ADMIN — FENTANYL CITRATE 50 MCG: 50 INJECTION, SOLUTION INTRAMUSCULAR; INTRAVENOUS at 11:24

## 2023-12-08 RX ADMIN — Medication 10 ML: at 20:04

## 2023-12-08 RX ADMIN — Medication 10 ML: at 09:02

## 2023-12-08 RX ADMIN — PAROXETINE HYDROCHLORIDE 40 MG: 20 TABLET, FILM COATED ORAL at 09:00

## 2023-12-08 RX ADMIN — AMLODIPINE BESYLATE 2.5 MG: 2.5 TABLET ORAL at 09:00

## 2023-12-08 RX ADMIN — LIDOCAINE HYDROCHLORIDE 20 ML: 10 INJECTION, SOLUTION INFILTRATION; PERINEURAL at 11:25

## 2023-12-08 RX ADMIN — HYDROMORPHONE HYDROCHLORIDE 0.5 MG: 1 INJECTION, SOLUTION INTRAMUSCULAR; INTRAVENOUS; SUBCUTANEOUS at 20:09

## 2023-12-08 RX ADMIN — SODIUM CHLORIDE 25 ML/HR: 9 INJECTION, SOLUTION INTRAVENOUS at 11:16

## 2023-12-08 RX ADMIN — PIPERACILLIN SODIUM AND TAZOBACTAM SODIUM 3.38 G: 3; .375 INJECTION, SOLUTION INTRAVENOUS at 18:56

## 2023-12-08 RX ADMIN — METOPROLOL TARTRATE 12.5 MG: 25 TABLET, FILM COATED ORAL at 20:04

## 2023-12-08 RX ADMIN — PIPERACILLIN SODIUM AND TAZOBACTAM SODIUM 3.38 G: 3; .375 INJECTION, SOLUTION INTRAVENOUS at 02:31

## 2023-12-08 RX ADMIN — MIDAZOLAM 1 MG: 1 INJECTION INTRAMUSCULAR; INTRAVENOUS at 11:23

## 2023-12-08 RX ADMIN — HYDROMORPHONE HYDROCHLORIDE 0.5 MG: 1 INJECTION, SOLUTION INTRAMUSCULAR; INTRAVENOUS; SUBCUTANEOUS at 02:32

## 2023-12-08 RX ADMIN — FLUCONAZOLE 400 MG: 2 INJECTION, SOLUTION INTRAVENOUS at 18:21

## 2023-12-08 RX ADMIN — POTASSIUM PHOSPHATE, MONOBASIC POTASSIUM PHOSPHATE, DIBASIC: 224; 236 INJECTION, SOLUTION, CONCENTRATE INTRAVENOUS at 18:22

## 2023-12-08 RX ADMIN — SMOFLIPID 100 ML: 6; 6; 5; 3 INJECTION, EMULSION INTRAVENOUS at 18:22

## 2023-12-08 RX ADMIN — QUETIAPINE FUMARATE 100 MG: 50 TABLET, EXTENDED RELEASE ORAL at 20:03

## 2023-12-08 RX ADMIN — PANTOPRAZOLE SODIUM 40 MG: 40 INJECTION, POWDER, FOR SOLUTION INTRAVENOUS at 06:09

## 2023-12-08 RX ADMIN — OXYMETAZOLINE HYDROCHLORIDE 2 SPRAY: 0.05 SPRAY NASAL at 20:05

## 2023-12-08 RX ADMIN — HYDROMORPHONE HYDROCHLORIDE 0.5 MG: 1 INJECTION, SOLUTION INTRAMUSCULAR; INTRAVENOUS; SUBCUTANEOUS at 14:43

## 2023-12-08 RX ADMIN — METOPROLOL TARTRATE 12.5 MG: 25 TABLET, FILM COATED ORAL at 09:00

## 2023-12-08 RX ADMIN — OXYMETAZOLINE HYDROCHLORIDE 2 SPRAY: 0.05 SPRAY NASAL at 09:01

## 2023-12-08 RX ADMIN — ALPRAZOLAM 0.5 MG: 0.5 TABLET ORAL at 20:03

## 2023-12-08 NOTE — PROGRESS NOTES
Nutrition Services    Patient Name:  Martina Hardwick  YOB: 1955  MRN: [id number]  Admit Date:  11/20/2023    Assessment Date:  12/08/23    CLINICAL NUTRITION    Comments: Follow up for TPN    TPN Day: 4  Indication: Prolonged Paralytic Ileus, severe malnutrition  Route: central  Type: standard  Labs: Na 128, Cl 91, Glu 144, Platelets 676,   Last BM: 12/8  Daily Assessment              Protein/Dextrose/Lipids: 70g/1200kcal/100mL              Volume: 1800 ml (75 mL/hr over 24 hrs daily  Plan              AA: 70 g/day              Dextrose: 1200 kcal/day              Lipids: 200 kcal qMon/Wed/Fri              Total: 1680 kcal/day (w/ lipids), 1480 kcal/day                TPN follow up completed. Currently NPO. TPN running with protein and calories at goal. PharmD changing lipids to M/W/F schedule.     REC:  Continue same TPN regimen at goal per PharmD  Advance diet as tolerated and once medically appropriate per MD     RD to continue to monitor per protocol.     Encounter Information         Reason For Encounter Follow up for TPN    Current Issues Needs alternate route of nutrition (TPN)      Estimated Requirements            Weight used  46.6 kg     Calories  0723-2070 (30 kcal/kg, 35 kcal/kg)    Protein  56-70 (1.2 - 1.5 gm/kg)    Fluid  1 mL/kcal     Current Nutrition Orders & Evaluation of Intake       Oral Nutrition     Current PO Diet Adult Central 2-in-1 TPN  NPO Diet NPO Type: Sips with Meds  Adult Central 2-in-1 TPN   Supplement    PO Evaluation     Trending % PO Intake NPO    Factors Affecting Intake  abdominal pain, altered GI function, decreased appetite, nausea      Parenteral Nutrition      TPN Route PICC   TPN Rate (mL/hr) 75 mL/hr    Current TPN Order        Dextrose (kcal) 1200 kcal        Amino Acid (gm) 70 gm (280 kcal)       Lipid Concentration 20%       Lipid Volume/Frequency  100 mL, daily (200 kcal)   MVI Frequency  Per PharmD   Trace Element Frequency  Per PharmD   Total #  "Days on TPN 4   Propofol Rate/Kcal    TPN Current Provision          Calories 100% of needs met        Protein (gm) 100% of needs met        Fluid (mL)    TPN Needs Evaluation meeting needs for protein, not meeting needs for calories   TPN Changes other: PharmD changing lipids to M/W/F schedule      Anthropometrics          Height    Weight Height: 154.9 cm (61\")  Weight: 48.8 kg (107 lb 9.4 oz) (12/04/23 1731)    BMI kg/m2 Body mass index is 20.33 kg/m².  Normal/Healthy (18.4 - 24.9)    Weight trend Stable per EMR. Pt endorses 26 lb wt loss x 3 yrs      Labs        Pertinent Labs Reviewed, listed below     Results from last 7 days   Lab Units 12/08/23  0744 12/07/23  0845 12/06/23  0620 12/05/23  0753 12/04/23  0513 12/03/23  0411   SODIUM mmol/L 128* 130* 132*   < > 132* 134*   POTASSIUM mmol/L 4.1 4.1 3.9   < > 3.5 3.7   CHLORIDE mmol/L 91* 100 104   < > 103 102   CO2 mmol/L 27.1 22.0 20.0*   < > 14.4* 16.5*   BUN mg/dL 12 11 13   < > 29* 41*   CREATININE mg/dL 0.58 0.64 0.72   < > 1.27* 1.83*   CALCIUM mg/dL 9.0 8.2* 8.3*   < > 8.5* 8.5*   BILIRUBIN mg/dL 0.6  --   --   --  0.6 0.8   ALK PHOS U/L 194*  --   --   --  125* 145*   ALT (SGPT) U/L 115*  --   --   --  73* 75*   AST (SGOT) U/L 96*  --   --   --  75* 77*   GLUCOSE mg/dL 144* 165* 184*   < > 76 82    < > = values in this interval not displayed.     Results from last 7 days   Lab Units 12/08/23  0744 12/07/23 0845 12/06/23 0620   MAGNESIUM mg/dL 2.0 2.1 1.5*   PHOSPHORUS mg/dL 3.2 2.1* 1.2*   HEMOGLOBIN g/dL 11.3* 10.2* 5.8*   HEMATOCRIT % 34.8 30.2* 18.0*   WBC 10*3/mm3 16.02* 14.12* 15.68*   TRIGLYCERIDES mg/dL  --   --  145   ALBUMIN g/dL 2.8*  --   --      Results from last 7 days   Lab Units 12/08/23  0744 12/07/23  0845 12/06/23  0620 12/05/23  0753 12/04/23  0513   INR   --   --  1.10  --   --    PLATELETS 10*3/mm3 676* 550* 623* 805* 751*     No results found for: \"COVID19\"  Lab Results   Component Value Date    HGBA1C 5.3 06/20/2022      "     Medications            Scheduled Medications ALPRAZolam, 0.5 mg, Oral, Daily  amLODIPine, 2.5 mg, Oral, Q24H  [Held by provider] enoxaparin, 30 mg, Subcutaneous, Q24H  Fat Emul Fish Oil/Plant Based, 100 mL, Intravenous, Once per day on Mon Wed Fri  fluconazole, 400 mg, Intravenous, Q24H  metoprolol tartrate, 12.5 mg, Oral, Q12H  oxymetazoline, 2 spray, Each Nare, BID  pantoprazole, 40 mg, Intravenous, Q AM  PARoxetine, 40 mg, Oral, Daily  piperacillin-tazobactam, 3.375 g, Intravenous, Q8H  QUEtiapine fumarate ER, 100 mg, Oral, Nightly  sodium chloride, 10 mL, Intravenous, Q12H  sodium chloride, 10 mL, Intravenous, Q12H        Infusions Adult Central 2-in-1 TPN, , Last Rate: 75 mL/hr at 12/07/23 1720  Adult Central 2-in-1 TPN,   Pharmacy to Dose TPN,         PRN Medications   ALPRAZolam    hydrALAZINE    HYDROmorphone    labetalol    Methocarbamol    metoprolol tartrate    [DISCONTINUED] ondansetron **OR** ondansetron    Pharmacy to Dose TPN    prochlorperazine    sodium chloride     Physical Findings              General Findings alert, oriented, room air   Oral/Mouth Cavity WDL   Edema  no edema   Gastrointestinal hypoactive, nausea, last bowel movement: 12/8   Skin  bruising, pale, surgical incision: midline abdomen incision    Tubes/Drains/Lines Drain medial RLQ and medial LLQ, drain right posterior back, PICC     NFPE See Malnutrition Severity Assessment, Date completed 11/21      Malnutrition Severity Assessment       Patient meets criteria for : Severe Malnutrition (Pt meets ASPEN/AND criteria for nutrition dx of severe malnutrition of acute disease related to energy intake, muscle wasting, and fat loss.)       NUTRITION INTERVENTION / PLAN OF CARE  Intervention Goal         Intervention Goal(s) Maintain nutrition status, Reduce/improve symptoms, Meet estimated needs, Initiate feeding/diet, Advance diet, Maintain TF/PN, Transition PN to PO, and Appropriate weight gain     Nutrition Intervention         RD  Action Await initiation/advancement of PO diet, Continue to monitor, and Care plan reviewed     Prescription         Diet Prescription ADAT     Supplement Prescription    EN/PN Prescription    Parenteral Prescription:      TPN Route PICC   TPN Rate (mL/hr)  75 mL/hr (1800 mL total)   TPN Recommendation:         Dextrose (kcal) 1200       Amino Acid (gm) 70 gm (280 kcal)       Lipid Concentration 20%       Lipid Volume/Frequency  100 mL, daily (200 kcal)    Propofol Rate/Kcal     TPN Provision:  1680 kcal, 70 gm protein        Calories 100% needs met        Protein  100% needs met        Fluid 1680 mL      New Prescription Ordered? Continue same per protocol, No changes at this time   --  Monitor/Evaluation        Monitor Per protocol, Pertinent labs, PN delivery/tolerance, GI status, Symptoms, POC/GOC   Discharge Needs Pending clinical course   Education Will instruct as appropriate       Electronically signed by:  Susanna Salas Dietitian Intern   12/08/23 13:39 EST

## 2023-12-08 NOTE — PROGRESS NOTES
The patient is unavailable for interview today.  She has been stable for some time and has exhibited no further psychosis.  Would recommend continuation of current treatment for now.

## 2023-12-08 NOTE — PLAN OF CARE
Problem: Adult Inpatient Plan of Care  Goal: Plan of Care Review  Outcome: Ongoing, Progressing  Flowsheets (Taken 12/8/2023 1848)  Progress: improving  Plan of Care Reviewed With:   patient   spouse  Outcome Evaluation: pt stable since arriving back to floor from procedure. Medicated for pain x1. No concerns or complaints noted.     Problem: Adult Inpatient Plan of Care  Goal: Absence of Hospital-Acquired Illness or Injury  Outcome: Ongoing, Progressing  Intervention: Identify and Manage Fall Risk  Recent Flowsheet Documentation  Taken 12/8/2023 1330 by Claudia Whitaker RN  Safety Promotion/Fall Prevention:   activity supervised   assistive device/personal items within reach   clutter free environment maintained   fall prevention program maintained   room organization consistent   safety round/check completed  Intervention: Prevent Skin Injury  Recent Flowsheet Documentation  Taken 12/8/2023 1330 by Claudia Whitaker RN  Body Position:   position changed independently   foot of bed elevated  Skin Protection:   adhesive use limited   incontinence pads utilized   skin sealant/moisture barrier applied   tubing/devices free from skin contact   skin-to-device areas padded  Intervention: Prevent and Manage VTE (Venous Thromboembolism) Risk  Recent Flowsheet Documentation  Taken 12/8/2023 1330 by Claudia Whitaker RN  Activity Management: activity encouraged  Range of Motion:   active ROM (range of motion) encouraged   ROM (range of motion) performed     Problem: Adult Inpatient Plan of Care  Goal: Optimal Comfort and Wellbeing  Outcome: Ongoing, Progressing  Intervention: Monitor Pain and Promote Comfort  Recent Flowsheet Documentation  Taken 12/8/2023 1330 by Claudia Whitaker RN  Pain Management Interventions:   see MAR   quiet environment facilitated   position adjusted   pillow support provided   care clustered  Intervention: Provide Person-Centered Care  Recent Flowsheet Documentation  Taken 12/8/2023 1330 by  Claudia Whitaker RN  Trust Relationship/Rapport:   care explained   choices provided   questions answered   questions encouraged     Problem: Pain Acute  Goal: Acceptable Pain Control and Functional Ability  Outcome: Ongoing, Progressing  Intervention: Prevent or Manage Pain  Recent Flowsheet Documentation  Taken 12/8/2023 1330 by Claudia Whitaker RN  Medication Review/Management: medications reviewed  Intervention: Develop Pain Management Plan  Recent Flowsheet Documentation  Taken 12/8/2023 1330 by Claudia Whitaker RN  Pain Management Interventions:   see MAR   quiet environment facilitated   position adjusted   pillow support provided   care clustered  Intervention: Optimize Psychosocial Wellbeing  Recent Flowsheet Documentation  Taken 12/8/2023 1330 by Claudia Whitaker RN  Diversional Activities:   television   smartphone     Problem: Skin Injury Risk Increased  Goal: Skin Health and Integrity  Outcome: Ongoing, Progressing  Intervention: Optimize Skin Protection  Recent Flowsheet Documentation  Taken 12/8/2023 1330 by Claudia Whitaker RN  Pressure Reduction Techniques:   frequent weight shift encouraged   weight shift assistance provided  Head of Bed (HOB) Positioning: HOB elevated  Pressure Reduction Devices: positioning supports utilized  Skin Protection:   adhesive use limited   incontinence pads utilized   skin sealant/moisture barrier applied   tubing/devices free from skin contact   skin-to-device areas padded     Problem: Parenteral Nutrition  Goal: Effective Intravenous Nutrition Therapy Delivery  Outcome: Ongoing, Progressing  Intervention: Optimize Intravenous Nutrition Delivery  Recent Flowsheet Documentation  Taken 12/8/2023 1330 by Claudia Whitaker RN  Medication Review/Management: medications reviewed     Problem: Fall Injury Risk  Goal: Absence of Fall and Fall-Related Injury  Outcome: Ongoing, Progressing  Intervention: Identify and Manage Contributors  Recent Flowsheet  Documentation  Taken 12/8/2023 1330 by Claudia Whitaker, RN  Medication Review/Management: medications reviewed  Self-Care Promotion: independence encouraged  Intervention: Promote Injury-Free Environment  Recent Flowsheet Documentation  Taken 12/8/2023 1330 by Claudia Whitaker, RN  Safety Promotion/Fall Prevention:   activity supervised   assistive device/personal items within reach   clutter free environment maintained   fall prevention program maintained   room organization consistent   safety round/check completed   Goal Outcome Evaluation:  Plan of Care Reviewed With: patient, spouse        Progress: improving  Outcome Evaluation: pt stable since arriving back to floor from procedure. Medicated for pain x1. No concerns or complaints noted.

## 2023-12-08 NOTE — CASE MANAGEMENT/SOCIAL WORK
Continued Stay Note  [facility]     Patient Name: Martina Hardwick  MRN: [id number]  Today's Date: 12/8/2023    Admit Date: 11/20/2023    Plan: Home with spouse   Discharge Plan       Row Name 12/08/23 1418       Plan    Plan Home with spouse    Patient/Family in Agreement with Plan yes    Plan Comments Discussed pt case with primary RN, chart reviewed, pt may need TPN, and or IV antibiotics at dc, new drain placed today, CCP will continue to follow for evolving needs, not ready for dc this weekend. -Shavon LOPEZ                   Discharge Codes    No documentation.                 Expected Discharge Date and Time       Expected Discharge Date Expected Discharge Time    Dec 9, 2023               Shavon Bhakta RN

## 2023-12-08 NOTE — POST-PROCEDURE NOTE
POST PROCEDURE NOTE    Procedure: ct right retroperitoneal drain    Pre-Procedure Diagnosis: retroperitoneal abscess    Post-procedure Diagnosis: same    Findings: placement of 8 Macedonian right retroperitoneal abscess drain    Complications: no immediate    Blood loss: none    Specimen Removed: 10 mL pus, sent for requested labs    Disposition:   Transfer back to inpatient room

## 2023-12-08 NOTE — SIGNIFICANT NOTE
12/08/23 1058   OTHER   Discipline physical therapist   Rehab Time/Intention   Session Not Performed patient unavailable for treatment  (Off the floor for percutaneous drain placement. Will follow up as time allows)   Recommendation   PT - Next Appointment 12/09/23

## 2023-12-08 NOTE — PROGRESS NOTES
ID NOTE    CC: f/u leukocytosis and abdominal abscesses following abdominal surgery    Subj: No fever. CT w/ RP abscess. Going to IR today.     Medications:    Current Facility-Administered Medications:     Adult Central 2-in-1 TPN, , Intravenous, Continuous, Ranjeet Salinas MD, Last Rate: 75 mL/hr at 12/07/23 1720, New Bag at 12/07/23 1720    ALPRAZolam (XANAX) tablet 0.5 mg, 0.5 mg, Oral, Daily, Ranjeet Salinas MD, 0.5 mg at 12/07/23 2236    ALPRAZolam (XANAX) tablet 0.5 mg, 0.5 mg, Oral, BID PRN, Triston Malcolm III, MD, 0.5 mg at 12/03/23 0137    amLODIPine (NORVASC) tablet 2.5 mg, 2.5 mg, Oral, Q24H, Ranjeet Salinas MD, 2.5 mg at 12/07/23 1720    [Held by provider] Enoxaparin Sodium (LOVENOX) syringe 30 mg, 30 mg, Subcutaneous, Q24H, Ranjeet Salinas MD, 30 mg at 12/03/23 2042    Fat Emulsion Plant Based (INTRALIPID,LIPOSYN) 20 % infusion 20 g, 100 mL, Intravenous, Q24H (TPN), Ranjeet Salinas MD, Last Rate: 8.33 mL/hr at 12/07/23 1719, 20 g at 12/07/23 1719    fluconazole (DIFLUCAN) IVPB 400 mg, 400 mg, Intravenous, Q24H, Massimo Turk MD, Last Rate: 100 mL/hr at 12/07/23 1720, 400 mg at 12/07/23 1720    hydrALAZINE (APRESOLINE) injection 10 mg, 10 mg, Intravenous, Q4H PRN, James Kebede MD    HYDROmorphone (DILAUDID) injection 0.5 mg, 0.5 mg, Intravenous, Q4H PRN, Ranjeet Salinas MD, 0.5 mg at 12/08/23 0232    labetalol (NORMODYNE,TRANDATE) injection 10 mg, 10 mg, Intravenous, Q2H PRN, James Kebede MD    Methocarbamol (ROBAXIN) injection 500 mg, 500 mg, Intravenous, Q6H PRN, Ranjeet Salinas MD    metoprolol tartrate (LOPRESSOR) injection 5 mg, 5 mg, Intravenous, Q6H PRN, Ranjeet Salinas MD, 5 mg at 12/07/23 1621    metoprolol tartrate (LOPRESSOR) tablet 12.5 mg, 12.5 mg, Oral, Q12H, Ranjeet Salinas MD, 12.5 mg at 12/07/23 2234    [DISCONTINUED] ondansetron (ZOFRAN) tablet 4 mg, 4 mg, Oral, Q6H PRN  **OR** ondansetron (ZOFRAN) injection 4 mg, 4 mg, Intravenous, Q6H PRN, Ranjeet Salinas MD, 4 mg at 12/07/23 1720    oxymetazoline (AFRIN) nasal spray 2 spray, 2 spray, Each Nare, BID, Ranjeet Salinas MD    pantoprazole (PROTONIX) injection 40 mg, 40 mg, Intravenous, Q AM, Ranjeet Salinas MD, 40 mg at 12/08/23 0609    PARoxetine (PAXIL) tablet 40 mg, 40 mg, Oral, Daily, Triston Malcolm III, MD, 40 mg at 12/07/23 0808    Pharmacy to Dose TPN, , Does not apply, Continuous PRN, Ranjeet Salinas MD    piperacillin-tazobactam (ZOSYN) 3.375 g in iso-osmotic dextrose 50 ml (premix), 3.375 g, Intravenous, Q8H, Massimo Turk MD, 3.375 g at 12/08/23 0231    prochlorperazine (COMPAZINE) injection 5 mg, 5 mg, Intravenous, Q6H PRN, Ranjeet Salinas MD, 5 mg at 12/06/23 2023    QUEtiapine fumarate ER (SEROquel XR) tablet 100 mg, 100 mg, Oral, Nightly, Ranjeet Salinas MD, 100 mg at 12/07/23 2234    sodium chloride 0.9 % flush 10 mL, 10 mL, Intravenous, Q12H, Ranjeet Salinas MD, 10 mL at 12/07/23 2235    sodium chloride 0.9 % flush 10 mL, 10 mL, Intravenous, Q12H, Ranjeet Salinas MD, 10 mL at 12/07/23 2233    sodium chloride nasal spray 2 spray, 2 spray, Each Nare, PRN, Natalio Collins MD      Objective   Vital Signs   Temp:  [98.1 °F (36.7 °C)-99 °F (37.2 °C)] 99 °F (37.2 °C)  Heart Rate:  [76-87] 85  Resp:  [16] 16  BP: (149-198)/() 172/92    Physical Exam:   General: NAD  Eyes: no scleral icterus  Cardiovascular: NR  Respiratory: normal work of breathing  GI: not distended  :  no Hodge catheter  Vasc: RUE PICC w/o erythema    Labs:   CBC, K, and Crt reviewed today  Lab Results   Component Value Date    WBC 16.02 (H) 12/08/2023    HGB 11.3 (L) 12/08/2023    HCT 34.8 12/08/2023    MCV 88.3 12/08/2023     (H) 12/08/2023     Lab Results   Component Value Date    K 4.1 12/08/2023     Lab Results   Component Value Date  "   CREATININE 0.58 12/08/2023     Procal 2.6    Microbiology:  11/27 BCx: negative  12/1 BCx: negative, final    New Radiology:  12/7/23 CT A/P:  \"1. Abscess posterior to the right kidney      2. Small amount of residual fluid in the right flank status post drainage      3. Additional probable fluid collections in the abdomen      4. Moderate size right and newly seen small left-sided pleural effusions \"    ASSESSMENT/PLAN:  Leukocytosis - better  Epistaxis  S/p colectomy for colonic inertia  Post-operative leak and abscesses  History of multiple abdominal surgeries due to diverticular disease  On TPN  Anemia    She is afebrile and hemodynamically stable. WBC about the same today. CT yesterday showed a right sided abscess posterior to the kidney and additional probable fluid collections in the abdomen. The plan is to go to IR today for drainage. I will follow-up the procedure note and culture results.     Continue empiric Zosyn and fluconazole with duration TBD.     Check CBC and CMP in the AM for monitoring.     ID will follow.     "

## 2023-12-08 NOTE — PLAN OF CARE
Goal Outcome Evaluation:  Plan of Care Reviewed With: patient        Progress: no change  Outcome Evaluation:     A&O x4.     BP elevated tonight.   Scheduled meds given and has since improved.     SR/ST on telemetry. Room air.     PICC line in place to the right arm.   TPN infusing.       C/O pain to the abdomen.   PRN pain medications given with relief.       Abdominal dressing changed.       Plans for procedure today to drain abcess / free fluid.   NPO      JPx2 (RLQ/LLQ).   New split gauze applied.       Still having intermittent nose bleeding.       Incontinent care provided.          Will continue to monitor.

## 2023-12-08 NOTE — PROGRESS NOTES
Colorectal & General Surgery  Progress Note    Patient: Martina Hardwick  YOB: 1955  MRN: [id number]      Assessment  Martina Hardwick is a 68 y.o. female with colonic inertia and history of multiple abdominal operations now postoperative day 18 from exploratory laparotomy with enterolysis and subtotal colectomy that was complicated by leak for small bowel enterotomy and now postoperative day 10 from reopening of recent laparotomy with small bowel resection.    Intra-abdominal abscess posterior to the right kidney drained today in interventional radiology.  Awaiting cultures.    Right lower quadrant surgical drain is bilious, consistent with enteric leak.  Appears to be well-controlled and low output.  Will leave drain in place and keep her n.p.o. for now.  If she continues to be low output, we will plan for slowly initiating diet again.    Appreciate assistance of Dr. Turk with infectious disease.  Continue Zosyn and fluconazole for now.    Hemoglobin stable today.    No further epistaxis, continue Afrin    Continue TPN    Subjective  No acute events overnight.  Pain well-controlled.  Tolerated IR procedure well today.  Asking for something to wet her lips.    Objective    Vitals:    12/08/23 1325   BP: 139/76   Pulse: 81   Resp: 18   Temp: 98.4 °F (36.9 °C)   SpO2: 97%       Physical Exam  Constitutional: Well-developed well-nourished, no acute distress  Neck: Supple, trachea midline  Respiratory: No increased work of breathing, Symmetric excursion  Cardiovascular: Well pefursed, no jugular venous distention evident   Abdominal: Right lower quadrant surgical drain bilious.  Left lower quadrant surgical drain with minimal output.  New IR drain in right retroperitoneum purulent.  Abdomen soft, incision without erythema, mildly tender, mildly distended.  Soft, non-tender, non-distended  Skin: Warm, dry, no rash on visualized skin surfaces  Psychiatric: Alert and oriented ×3, normal affect      Laboratory Results  I have personally reviewed CBC with WBC 16, hemoglobin 11, platelets 676.  BMP with creatinine 0.58, albumin 2.8, AST 96, , total bilirubin 0.6.         Osvaldo Salinas MD  Colorectal & General Surgery  [facility]    [address], [address]  [place], [place], [address]  P: [phone]  F: [phone]

## 2023-12-08 NOTE — PROGRESS NOTES
[facility] Clinical Pharmacy Services: TPN Daily Progress Note    TPN Day # 4 (Dr. Salinas)   Indication: Prolonged Paralytic Ileus, severe malnutrition  Route: central  Type: standard    Subjective/Objective  Results from last 7 days   Lab Units 23  0744 23  0845 23  0620   SODIUM mmol/L 128*   < > 132*   POTASSIUM mmol/L 4.1   < > 3.9   CHLORIDE mmol/L 91*   < > 104   CO2 mmol/L 27.1   < > 20.0*   BUN mg/dL 12   < > 13   CREATININE mg/dL 0.58   < > 0.72   CALCIUM mg/dL 9.0   < > 8.3*   ALBUMIN g/dL 2.8*  --   --    BILIRUBIN mg/dL 0.6  --   --    ALK PHOS U/L 194*  --   --    ALT (SGPT) U/L 115*  --   --    AST (SGOT) U/L 96*  --   --    GLUCOSE mg/dL 144*   < > 184*   MAGNESIUM mg/dL 2.0   < > 1.5*   PHOSPHORUS mg/dL 3.2   < > 1.2*   TRIGLYCERIDES mg/dL  --   --  145    < > = values in this interval not displayed.      Corrected calcium: 9.96    Diet Orders (active) (From admission, onward)       Start     Ordered    23  NPO Diet NPO Type: Sips with Meds  Diet Effective Now         23 1420             Additional insulin administration while previous TPN infusin units  Additional electrolyte administration while previous TPN infusing: Potassium phosphate 15 mmol x 1  Acid suppression: Pantoprazole 40 mg IV qAM    Dietician TPN Goal Formula:  AA: 70 g/day  Dextrose: 1200 kcal/day  Lipids: 100 kcal/day  Total: 1580 kcal/day    Current TPN Assessment on 23:  All glucose readings have been below 200. Continue at goal dextrose and and accuchecks. SCr and BUN have also been stable. Will continue current AA at goal as well.  Sodium and chloride continue to drop. Phosphorus was low yesterday, but the patient received replacement yesterday. Will change the electrolytes in today's TPN  Last triglyceride level was 145 on . AST/ALT continue to be elevated. Change to SMOF lipids qMon/Wed/Fri.    Current TPN Plan for on 23  Continue TPN at goal AA and dextrose and change  lipids to Mon/Wed/Fri-running at 75 mL/hr    AA: 70 g/day  Dextrose: 1200 kcal/day  Lipids: 200 kcal qMon/Wed/Fri  Total: 1680 kcal/day (w/ lipids), 1480 kcal/day    Change electrolytes/additives in today's TPN to the following:    Sodium Chloride: 70 mEq  Sodium Acetate: 50 mEq  Sodium Phosphate: 20 mEq  Potassium Chloride: 50 mEq  Potassium Phosphate: 30 mEq  Calcium Gluconate: 9 mEq  Magnesium Sulfate: 20 mEq  Multivitamins: 10 mL  Trace Elements: 1 mL    Order CMP, accuchecks, phosphorus, and magnesium    Jackie Fragoso Pharm.D., BCPS   Clinical Pharmacist

## 2023-12-09 LAB
ALBUMIN SERPL-MCNC: 2.3 G/DL (ref 3.5–5.2)
ALBUMIN/GLOB SERPL: 0.7 G/DL
ALP SERPL-CCNC: 176 U/L (ref 39–117)
ALT SERPL W P-5'-P-CCNC: 173 U/L (ref 1–33)
ANION GAP SERPL CALCULATED.3IONS-SCNC: 8.1 MMOL/L (ref 5–15)
AST SERPL-CCNC: 171 U/L (ref 1–32)
BASOPHILS # BLD MANUAL: 0.28 10*3/MM3 (ref 0–0.2)
BASOPHILS NFR BLD MANUAL: 2 % (ref 0–1.5)
BILIRUB SERPL-MCNC: 0.5 MG/DL (ref 0–1.2)
BUN SERPL-MCNC: 19 MG/DL (ref 8–23)
BUN/CREAT SERPL: 29.2 (ref 7–25)
CALCIUM SPEC-SCNC: 9 MG/DL (ref 8.6–10.5)
CHLORIDE SERPL-SCNC: 97 MMOL/L (ref 98–107)
CO2 SERPL-SCNC: 27.9 MMOL/L (ref 22–29)
CREAT SERPL-MCNC: 0.65 MG/DL (ref 0.57–1)
DEPRECATED RDW RBC AUTO: 47.2 FL (ref 37–54)
EGFRCR SERPLBLD CKD-EPI 2021: 96 ML/MIN/1.73
EOSINOPHIL # BLD MANUAL: 0.42 10*3/MM3 (ref 0–0.4)
EOSINOPHIL NFR BLD MANUAL: 3 % (ref 0.3–6.2)
ERYTHROCYTE [DISTWIDTH] IN BLOOD BY AUTOMATED COUNT: 14.7 % (ref 12.3–15.4)
GLOBULIN UR ELPH-MCNC: 3.3 GM/DL
GLUCOSE BLDC GLUCOMTR-MCNC: 114 MG/DL (ref 70–130)
GLUCOSE BLDC GLUCOMTR-MCNC: 134 MG/DL (ref 70–130)
GLUCOSE BLDC GLUCOMTR-MCNC: 136 MG/DL (ref 70–130)
GLUCOSE SERPL-MCNC: 136 MG/DL (ref 65–99)
HCT VFR BLD AUTO: 28.9 % (ref 34–46.6)
HGB BLD-MCNC: 9.8 G/DL (ref 12–15.9)
LYMPHOCYTES # BLD MANUAL: 1.26 10*3/MM3 (ref 0.7–3.1)
LYMPHOCYTES NFR BLD MANUAL: 8.1 % (ref 5–12)
MAGNESIUM SERPL-MCNC: 2.3 MG/DL (ref 1.6–2.4)
MCH RBC QN AUTO: 30.6 PG (ref 26.6–33)
MCHC RBC AUTO-ENTMCNC: 33.9 G/DL (ref 31.5–35.7)
MCV RBC AUTO: 90.3 FL (ref 79–97)
MONOCYTES # BLD: 1.12 10*3/MM3 (ref 0.1–0.9)
NEUTROPHILS # BLD AUTO: 10.78 10*3/MM3 (ref 1.7–7)
NEUTROPHILS NFR BLD MANUAL: 77.8 % (ref 42.7–76)
PHOSPHATE SERPL-MCNC: 4 MG/DL (ref 2.5–4.5)
PLAT MORPH BLD: NORMAL
PLATELET # BLD AUTO: 575 10*3/MM3 (ref 140–450)
PMV BLD AUTO: 9.1 FL (ref 6–12)
POTASSIUM SERPL-SCNC: 4.7 MMOL/L (ref 3.5–5.2)
PROT SERPL-MCNC: 5.6 G/DL (ref 6–8.5)
RBC # BLD AUTO: 3.2 10*6/MM3 (ref 3.77–5.28)
RBC MORPH BLD: NORMAL
SODIUM SERPL-SCNC: 133 MMOL/L (ref 136–145)
VARIANT LYMPHS NFR BLD MANUAL: 9.1 % (ref 19.6–45.3)
WBC MORPH BLD: NORMAL
WBC NRBC COR # BLD AUTO: 13.85 10*3/MM3 (ref 3.4–10.8)

## 2023-12-09 PROCEDURE — 85007 BL SMEAR W/DIFF WBC COUNT: CPT | Performed by: INTERNAL MEDICINE

## 2023-12-09 PROCEDURE — 25010000002 POTASSIUM CHLORIDE PER 2 MEQ OF POTASSIUM: Performed by: SURGERY

## 2023-12-09 PROCEDURE — 85025 COMPLETE CBC W/AUTO DIFF WBC: CPT | Performed by: INTERNAL MEDICINE

## 2023-12-09 PROCEDURE — 25010000002 HYDROMORPHONE PER 4 MG: Performed by: SURGERY

## 2023-12-09 PROCEDURE — 25010000002 PIPERACILLIN SOD-TAZOBACTAM PER 1 G: Performed by: INTERNAL MEDICINE

## 2023-12-09 PROCEDURE — 99024 POSTOP FOLLOW-UP VISIT: CPT | Performed by: SURGERY

## 2023-12-09 PROCEDURE — 25010000002 FLUCONAZOLE PER 200 MG: Performed by: INTERNAL MEDICINE

## 2023-12-09 PROCEDURE — 25010000002 MAGNESIUM SULFATE PER 500 MG OF MAGNESIUM: Performed by: SURGERY

## 2023-12-09 PROCEDURE — 84100 ASSAY OF PHOSPHORUS: CPT | Performed by: SURGERY

## 2023-12-09 PROCEDURE — 25010000002 CALCIUM GLUCONATE PER 10 ML: Performed by: SURGERY

## 2023-12-09 PROCEDURE — 80053 COMPREHEN METABOLIC PANEL: CPT | Performed by: SURGERY

## 2023-12-09 PROCEDURE — 82948 REAGENT STRIP/BLOOD GLUCOSE: CPT

## 2023-12-09 PROCEDURE — 83735 ASSAY OF MAGNESIUM: CPT | Performed by: SURGERY

## 2023-12-09 RX ADMIN — PIPERACILLIN SODIUM AND TAZOBACTAM SODIUM 3.38 G: 3; .375 INJECTION, SOLUTION INTRAVENOUS at 18:32

## 2023-12-09 RX ADMIN — OXYMETAZOLINE HYDROCHLORIDE 2 SPRAY: 0.05 SPRAY NASAL at 08:25

## 2023-12-09 RX ADMIN — HYDROMORPHONE HYDROCHLORIDE 0.5 MG: 1 INJECTION, SOLUTION INTRAMUSCULAR; INTRAVENOUS; SUBCUTANEOUS at 18:19

## 2023-12-09 RX ADMIN — Medication 10 ML: at 08:29

## 2023-12-09 RX ADMIN — PANTOPRAZOLE SODIUM 40 MG: 40 INJECTION, POWDER, FOR SOLUTION INTRAVENOUS at 05:39

## 2023-12-09 RX ADMIN — METOPROLOL TARTRATE 12.5 MG: 25 TABLET, FILM COATED ORAL at 08:24

## 2023-12-09 RX ADMIN — POTASSIUM PHOSPHATE, MONOBASIC POTASSIUM PHOSPHATE, DIBASIC: 224; 236 INJECTION, SOLUTION, CONCENTRATE INTRAVENOUS at 18:22

## 2023-12-09 RX ADMIN — FLUCONAZOLE 400 MG: 2 INJECTION, SOLUTION INTRAVENOUS at 17:15

## 2023-12-09 RX ADMIN — PAROXETINE HYDROCHLORIDE 40 MG: 20 TABLET, FILM COATED ORAL at 08:25

## 2023-12-09 RX ADMIN — METOPROLOL TARTRATE 12.5 MG: 25 TABLET, FILM COATED ORAL at 21:21

## 2023-12-09 RX ADMIN — PIPERACILLIN SODIUM AND TAZOBACTAM SODIUM 3.38 G: 3; .375 INJECTION, SOLUTION INTRAVENOUS at 11:32

## 2023-12-09 RX ADMIN — PIPERACILLIN SODIUM AND TAZOBACTAM SODIUM 3.38 G: 3; .375 INJECTION, SOLUTION INTRAVENOUS at 03:51

## 2023-12-09 RX ADMIN — Medication 10 ML: at 21:22

## 2023-12-09 RX ADMIN — HYDROMORPHONE HYDROCHLORIDE 0.5 MG: 1 INJECTION, SOLUTION INTRAMUSCULAR; INTRAVENOUS; SUBCUTANEOUS at 11:29

## 2023-12-09 RX ADMIN — AMLODIPINE BESYLATE 2.5 MG: 2.5 TABLET ORAL at 08:24

## 2023-12-09 RX ADMIN — Medication 10 ML: at 22:45

## 2023-12-09 RX ADMIN — QUETIAPINE FUMARATE 100 MG: 50 TABLET, EXTENDED RELEASE ORAL at 21:22

## 2023-12-09 RX ADMIN — ALPRAZOLAM 0.5 MG: 0.5 TABLET ORAL at 21:22

## 2023-12-09 RX ADMIN — HYDROMORPHONE HYDROCHLORIDE 0.5 MG: 1 INJECTION, SOLUTION INTRAMUSCULAR; INTRAVENOUS; SUBCUTANEOUS at 03:50

## 2023-12-09 NOTE — PROGRESS NOTES
"[facility] Clinical Pharmacy Services: Total Parental Nutrition Initial Consult    TPN Day #5  Indication: Prolonged Paralytic Ileus, severe malnutrition  Route: central  Type: standard    Relevant clinical data and objective history reviewed:  68 y.o. female 154.9 cm (61\") 48.8 kg (107 lb 9.4 oz)    Results from last 7 days   Lab Units 23  0612 23  0845 23  0620   SODIUM mmol/L 133*   < > 132*   POTASSIUM mmol/L 4.7   < > 3.9   CHLORIDE mmol/L 97*   < > 104   CO2 mmol/L 27.9   < > 20.0*   BUN mg/dL 19   < > 13   CREATININE mg/dL 0.65   < > 0.72   CALCIUM mg/dL 9.0   < > 8.3*   ALBUMIN g/dL 2.3*   < >  --    BILIRUBIN mg/dL 0.5   < >  --    ALK PHOS U/L 176*   < >  --    ALT (SGPT) U/L 173*   < >  --    AST (SGOT) U/L 171*   < >  --    GLUCOSE mg/dL 136*   < > 184*   MAGNESIUM mg/dL 2.3   < > 1.5*   PHOSPHORUS mg/dL 4.0   < > 1.2*   TRIGLYCERIDES mg/dL  --   --  145    < > = values in this interval not displayed.        Estimated Creatinine Clearance: 63.8 mL/min (by C-G formula based on SCr of 0.65 mg/dL).    Active fluid orders: None    Dietary Orders (From admission, onward)       Start     Ordered    23  NPO Diet NPO Type: Sips with Meds  Diet Effective Now        Question:  NPO Type  Answer:  Sips with Meds    23                  Assessment  Additional insulin administration while previous TPN infusin units  Additional electrolyte administration while previous TPN infusing: None  Acid suppression: pantoprazole 40 mg IV qAM    Glucose readings continue to consistently remain below 200. Will continue at goal dextrose. Scr and BUN also normal. Will continue amino acids at goal also.  Na has improved from 128-> 133 (which seems to be around what her baseline has been during this admission). Cl still slightly low, but increased from 91 to 97 today. Will adjust NaCl up slightly to 80 mEq and decrease sodium acetate to 40 mEq.   AST and ALT continue to increase. Last TG " level was 145 on 12/6. Will continue with change to SMOF lipids on MWF.     Goal   Protein: 70 gram/day   Dextrose: 1200 Kcal/day   Lipids: 200 kcal  Total: 1580 kCal/day    Plan     Protein/Dextrose/Lipids: 70g /1200kcal /100 mL MWF   Volume: 1800 ml (75 mL/hr over 24 hrs daily)    Based on the above labs, will add the following electrolytes/additives to the TPN.    Sodium Chloride: 80 mEq (increased)   Sodium Acetate: 40 mEq (decreased)   Sodium Phosphate: 20 mEq   Potassium Chloride: 50 mEq   Potassium Acetate: 0 mEq   Potassium Phosphate: 30 mEq   Calcium Gluconate: 9 mEq   Magnesium Sulfate: 20 mEq   MVI for TPN   Trace Elements    Labs to be ordered: Daily CMP, accuchecks, phos and magnesium. Weekly TG    Pharmacy will continue to follow.     Piotr Serrano [facility]  Clinical Pharmacist

## 2023-12-09 NOTE — PLAN OF CARE
Goal Outcome Evaluation:  Plan of Care Reviewed With: patient        Progress: improving  Outcome Evaluation:     A&O x4. VSS. SR on telemetry. Room air.     C/O pain to the abdomen and right side of back.   PRN pain medication given with relief.       PICC line to the right arm.   TPN infusing.   Q6 accuchecks.       Dressing changed to the abdomen.    [place] present    MARY x3.   RLQ.   LLQ.   And Right back drain.          Will continue to monitor

## 2023-12-09 NOTE — PLAN OF CARE
Problem: Adult Inpatient Plan of Care  Goal: Optimal Comfort and Wellbeing  Outcome: Ongoing, Progressing  Intervention: Monitor Pain and Promote Comfort  Flowsheets (Taken 12/9/2023 1024)  Pain Management Interventions: position adjusted   Goal Outcome Evaluation:

## 2023-12-09 NOTE — PROGRESS NOTES
"Patient is seen, evaluated, and chart reviewed. Discussed with staff.      Staff reports that patient has been cooperative and compliant with medications.  No behavioral issues.    On examination, patient is found lying in bed.  She is alert and fully oriented.  Patient slept okay last night.  Mood is \"okay.\"  Affect congruent.  No SI/HI/AVH.  Thought processes are goal directed.  Judgment and insight are fair.    No medication side effects.  She has tolerated Seroquel XR.  No further psychosis.  Continue treatment plan.  "

## 2023-12-09 NOTE — PROGRESS NOTES
Postoperative day 19 from exploratory laparotomy with enterolysis and subtotal colectomy that was complicated by leak for small bowel enterotomy  postoperative day 11 from reopening of recent laparotomy with small bowel resection.   Intra-abdominal abscess posterior to the right kidney drained 12/9/2023 in interventional radiology.      S: No events overnight.  Intermittent episodes of abdominal pain    O:   Vitals:    12/08/23 1925 12/08/23 2306 12/09/23 0730 12/09/23 1345   BP: 137/74 115/58 122/64 101/50   BP Location: Left arm Left arm Left arm Left arm   Patient Position: Lying Lying Lying Lying   Pulse: 74 80 82 72   Resp: 18 18 18 18   Temp: 99.3 °F (37.4 °C) 98.1 °F (36.7 °C) 98.4 °F (36.9 °C) 98.2 °F (36.8 °C)   TempSrc: Oral Oral Oral Oral   SpO2: 99% 97% 99% 100%   Weight:       Height:          Drain #1 40 cc bilious, there is serosanguineous fluid at the proximal aspect of the drain  Drain #2 10 cc purulent  Drain #3 7 cc serosanguineous  Alert, chronically ill-appearing, no distress  Breathing comfortable  Abdomen soft, nontender nontender  Midline incision healing well    White blood cell count 13.8 from 16, downtrending, hemoglobin 9.8, platelets 575  Mild elevation of LFTs    Assessment and plan    68-year-old female with complicated subtotal colectomy now with controlled enteric leak.  There has not been much output from the drain and the output at the tube proximally is serosanguineous and nonbilious.  She has purulent fluid coming from the newly placed IR drain    I will start him on clear liquid diet and see how she does  Continue antibiotics as per ID recommendations  Out of bed to chair ambulate  SCDs and Lovenox  Continued depression, will need to watch LFTs closely    Hasmukh Siu MD, FACS  General, Minimally Invasive and Endoscopic Surgery  [facility]    [address] [place] [address], [address]  [place], [place], [address]  P: [phone]  F: [phone]

## 2023-12-10 LAB
ALBUMIN SERPL-MCNC: 2.4 G/DL (ref 3.5–5.2)
ALBUMIN/GLOB SERPL: 0.8 G/DL
ALP SERPL-CCNC: 207 U/L (ref 39–117)
ALT SERPL W P-5'-P-CCNC: 209 U/L (ref 1–33)
ANION GAP SERPL CALCULATED.3IONS-SCNC: 10 MMOL/L (ref 5–15)
AST SERPL-CCNC: 164 U/L (ref 1–32)
BASOPHILS # BLD AUTO: 0.1 10*3/MM3 (ref 0–0.2)
BASOPHILS NFR BLD AUTO: 0.4 % (ref 0–1.5)
BILIRUB SERPL-MCNC: 0.7 MG/DL (ref 0–1.2)
BUN SERPL-MCNC: 22 MG/DL (ref 8–23)
BUN/CREAT SERPL: 24.4 (ref 7–25)
CALCIUM SPEC-SCNC: 9.1 MG/DL (ref 8.6–10.5)
CHLORIDE SERPL-SCNC: 95 MMOL/L (ref 98–107)
CO2 SERPL-SCNC: 25 MMOL/L (ref 22–29)
CREAT SERPL-MCNC: 0.9 MG/DL (ref 0.57–1)
CRP SERPL-MCNC: 7.72 MG/DL (ref 0–0.5)
DEPRECATED RDW RBC AUTO: 48.5 FL (ref 37–54)
EGFRCR SERPLBLD CKD-EPI 2021: 69.8 ML/MIN/1.73
EOSINOPHIL # BLD AUTO: 0.27 10*3/MM3 (ref 0–0.4)
EOSINOPHIL NFR BLD AUTO: 1.1 % (ref 0.3–6.2)
ERYTHROCYTE [DISTWIDTH] IN BLOOD BY AUTOMATED COUNT: 14.7 % (ref 12.3–15.4)
GLOBULIN UR ELPH-MCNC: 3.2 GM/DL
GLUCOSE BLDC GLUCOMTR-MCNC: 116 MG/DL (ref 70–130)
GLUCOSE BLDC GLUCOMTR-MCNC: 121 MG/DL (ref 70–130)
GLUCOSE BLDC GLUCOMTR-MCNC: 133 MG/DL (ref 70–130)
GLUCOSE BLDC GLUCOMTR-MCNC: 135 MG/DL (ref 70–130)
GLUCOSE SERPL-MCNC: 122 MG/DL (ref 65–99)
HCT VFR BLD AUTO: 24.7 % (ref 34–46.6)
HGB BLD-MCNC: 8.3 G/DL (ref 12–15.9)
IMM GRANULOCYTES # BLD AUTO: 1.1 10*3/MM3 (ref 0–0.05)
IMM GRANULOCYTES NFR BLD AUTO: 4.6 % (ref 0–0.5)
LYMPHOCYTES # BLD AUTO: 1.36 10*3/MM3 (ref 0.7–3.1)
LYMPHOCYTES NFR BLD AUTO: 5.7 % (ref 19.6–45.3)
MAGNESIUM SERPL-MCNC: 2.1 MG/DL (ref 1.6–2.4)
MCH RBC QN AUTO: 30.9 PG (ref 26.6–33)
MCHC RBC AUTO-ENTMCNC: 33.6 G/DL (ref 31.5–35.7)
MCV RBC AUTO: 91.8 FL (ref 79–97)
MONOCYTES # BLD AUTO: 2.11 10*3/MM3 (ref 0.1–0.9)
MONOCYTES NFR BLD AUTO: 8.8 % (ref 5–12)
NEUTROPHILS NFR BLD AUTO: 19.03 10*3/MM3 (ref 1.7–7)
NEUTROPHILS NFR BLD AUTO: 79.4 % (ref 42.7–76)
NRBC BLD AUTO-RTO: 0 /100 WBC (ref 0–0.2)
PHOSPHATE SERPL-MCNC: 4.1 MG/DL (ref 2.5–4.5)
PLATELET # BLD AUTO: 550 10*3/MM3 (ref 140–450)
PMV BLD AUTO: 8.9 FL (ref 6–12)
POTASSIUM SERPL-SCNC: 4.8 MMOL/L (ref 3.5–5.2)
PROT SERPL-MCNC: 5.6 G/DL (ref 6–8.5)
RBC # BLD AUTO: 2.69 10*6/MM3 (ref 3.77–5.28)
SODIUM SERPL-SCNC: 130 MMOL/L (ref 136–145)
WBC NRBC COR # BLD AUTO: 23.97 10*3/MM3 (ref 3.4–10.8)

## 2023-12-10 PROCEDURE — 85025 COMPLETE CBC W/AUTO DIFF WBC: CPT | Performed by: SURGERY

## 2023-12-10 PROCEDURE — 86140 C-REACTIVE PROTEIN: CPT | Performed by: INTERNAL MEDICINE

## 2023-12-10 PROCEDURE — 80053 COMPREHEN METABOLIC PANEL: CPT | Performed by: SURGERY

## 2023-12-10 PROCEDURE — 84100 ASSAY OF PHOSPHORUS: CPT | Performed by: SURGERY

## 2023-12-10 PROCEDURE — 25010000002 CALCIUM GLUCONATE PER 10 ML: Performed by: SURGERY

## 2023-12-10 PROCEDURE — 82948 REAGENT STRIP/BLOOD GLUCOSE: CPT

## 2023-12-10 PROCEDURE — 25010000002 FLUCONAZOLE PER 200 MG: Performed by: INTERNAL MEDICINE

## 2023-12-10 PROCEDURE — 99024 POSTOP FOLLOW-UP VISIT: CPT | Performed by: SURGERY

## 2023-12-10 PROCEDURE — 25010000002 PIPERACILLIN SOD-TAZOBACTAM PER 1 G: Performed by: INTERNAL MEDICINE

## 2023-12-10 PROCEDURE — 25010000002 HYDROMORPHONE PER 4 MG: Performed by: SURGERY

## 2023-12-10 PROCEDURE — 83735 ASSAY OF MAGNESIUM: CPT | Performed by: SURGERY

## 2023-12-10 PROCEDURE — 25010000002 MAGNESIUM SULFATE PER 500 MG OF MAGNESIUM: Performed by: SURGERY

## 2023-12-10 PROCEDURE — 25010000002 POTASSIUM CHLORIDE PER 2 MEQ OF POTASSIUM: Performed by: SURGERY

## 2023-12-10 PROCEDURE — 97110 THERAPEUTIC EXERCISES: CPT

## 2023-12-10 PROCEDURE — 99232 SBSQ HOSP IP/OBS MODERATE 35: CPT | Performed by: INTERNAL MEDICINE

## 2023-12-10 RX ADMIN — HYDROMORPHONE HYDROCHLORIDE 0.5 MG: 1 INJECTION, SOLUTION INTRAMUSCULAR; INTRAVENOUS; SUBCUTANEOUS at 05:07

## 2023-12-10 RX ADMIN — QUETIAPINE FUMARATE 100 MG: 50 TABLET, EXTENDED RELEASE ORAL at 20:45

## 2023-12-10 RX ADMIN — Medication 10 ML: at 09:45

## 2023-12-10 RX ADMIN — FLUCONAZOLE 400 MG: 2 INJECTION, SOLUTION INTRAVENOUS at 17:58

## 2023-12-10 RX ADMIN — METOPROLOL TARTRATE 12.5 MG: 25 TABLET, FILM COATED ORAL at 20:45

## 2023-12-10 RX ADMIN — PIPERACILLIN SODIUM AND TAZOBACTAM SODIUM 3.38 G: 3; .375 INJECTION, SOLUTION INTRAVENOUS at 03:58

## 2023-12-10 RX ADMIN — AMLODIPINE BESYLATE 2.5 MG: 2.5 TABLET ORAL at 09:42

## 2023-12-10 RX ADMIN — PANTOPRAZOLE SODIUM 40 MG: 40 INJECTION, POWDER, FOR SOLUTION INTRAVENOUS at 05:07

## 2023-12-10 RX ADMIN — OXYMETAZOLINE HYDROCHLORIDE 2 SPRAY: 0.05 SPRAY NASAL at 09:44

## 2023-12-10 RX ADMIN — PAROXETINE HYDROCHLORIDE 40 MG: 20 TABLET, FILM COATED ORAL at 09:42

## 2023-12-10 RX ADMIN — Medication 10 ML: at 09:46

## 2023-12-10 RX ADMIN — Medication 10 ML: at 20:45

## 2023-12-10 RX ADMIN — HYDROMORPHONE HYDROCHLORIDE 0.5 MG: 1 INJECTION, SOLUTION INTRAMUSCULAR; INTRAVENOUS; SUBCUTANEOUS at 16:28

## 2023-12-10 RX ADMIN — HYDROMORPHONE HYDROCHLORIDE 0.5 MG: 1 INJECTION, SOLUTION INTRAMUSCULAR; INTRAVENOUS; SUBCUTANEOUS at 11:55

## 2023-12-10 RX ADMIN — PIPERACILLIN SODIUM AND TAZOBACTAM SODIUM 3.38 G: 3; .375 INJECTION, SOLUTION INTRAVENOUS at 11:36

## 2023-12-10 RX ADMIN — METOPROLOL TARTRATE 12.5 MG: 25 TABLET, FILM COATED ORAL at 09:42

## 2023-12-10 RX ADMIN — POTASSIUM PHOSPHATE, MONOBASIC POTASSIUM PHOSPHATE, DIBASIC: 224; 236 INJECTION, SOLUTION, CONCENTRATE INTRAVENOUS at 18:01

## 2023-12-10 RX ADMIN — PIPERACILLIN SODIUM AND TAZOBACTAM SODIUM 3.38 G: 3; .375 INJECTION, SOLUTION INTRAVENOUS at 19:04

## 2023-12-10 RX ADMIN — HYDROMORPHONE HYDROCHLORIDE 0.5 MG: 1 INJECTION, SOLUTION INTRAMUSCULAR; INTRAVENOUS; SUBCUTANEOUS at 20:54

## 2023-12-10 RX ADMIN — HYDROMORPHONE HYDROCHLORIDE 0.5 MG: 1 INJECTION, SOLUTION INTRAMUSCULAR; INTRAVENOUS; SUBCUTANEOUS at 00:43

## 2023-12-10 RX ADMIN — ALPRAZOLAM 0.5 MG: 0.5 TABLET ORAL at 20:45

## 2023-12-10 NOTE — PROGRESS NOTES
"[facility] Clinical Pharmacy Services: Total Parental Nutrition Initial Consult    TPN Day #6  Indication: Prolonged Paralytic Ileus, severe malnutrition  Route: central  Type: standard    Relevant clinical data and objective history reviewed:  68 y.o. female 154.9 cm (61\") 48.8 kg (107 lb 9.4 oz)    Results from last 7 days   Lab Units 12/10/23  0710 23  0845 23  0620   SODIUM mmol/L 130*   < > 132*   POTASSIUM mmol/L 4.8   < > 3.9   CHLORIDE mmol/L 95*   < > 104   CO2 mmol/L 25.0   < > 20.0*   BUN mg/dL 22   < > 13   CREATININE mg/dL 0.90   < > 0.72   CALCIUM mg/dL 9.1   < > 8.3*   ALBUMIN g/dL 2.4*   < >  --    BILIRUBIN mg/dL 0.7   < >  --    ALK PHOS U/L 207*   < >  --    ALT (SGPT) U/L 209*   < >  --    AST (SGOT) U/L 164*   < >  --    GLUCOSE mg/dL 122*   < > 184*   MAGNESIUM mg/dL 2.1   < > 1.5*   PHOSPHORUS mg/dL 4.1   < > 1.2*   TRIGLYCERIDES mg/dL  --   --  145    < > = values in this interval not displayed.        Estimated Creatinine Clearance: 46.1 mL/min (by C-G formula based on SCr of 0.9 mg/dL).    Active fluid orders: None    Dietary Orders (From admission, onward)       Start     Ordered    23 1415  Diet: Liquid Diets; Clear Liquid; Fluid Consistency: Thin (IDDSI 0)  Diet Effective Now        References:    Diet Order Crosswalk   Question Answer Comment   Diets: Liquid Diets    Liquid Diet: Clear Liquid    Fluid Consistency: Thin (IDDSI 0)        23 1414                  Assessment  Additional insulin administration while previous TPN infusin units  Additional electrolyte administration while previous TPN infusing: None  Acid suppression: pantoprazole 40 mg IV qAM    Glucose readings continue to remain below 200 (114-136 range in past day). However, will be slightly decreasing dextrose today (see #3 below). Creatinine increased slightly today and BUN is still normal. Will continue amino acids at goal.   Na and Cl both dropped slightly (133->130 and 97-> 95 " respectively). Will increase Sodium Chloride slightly today from 80 mEq to 90 mEq. Potassium has continue to trend slowly upwards over the past week, so will decrease Potassium phosphate slightly to 25 mEq from 30 mEq.   AST has seemed to plateau today but ALT and Alk Phos both continue to rise. Lipids were previously changed from daily Intralipid to SmofLipids MWF. She is not scheduled to receive SmoFlipids today, so can re-evaluate tomorrow based on clinical picture and labs if lipids should continue. Her glucose infusion rate is currently 5 mg/kg/min which rates greater than 4 mg/kg/min can theoretically cause hepatic steatosis. Although not necessarily the cause of her increased liver labs, will decrease dextrose slightly today to 1000 kcal for a glucose infusion rate of ~4 mg/kg/min. If labs continue to consistently remain elevated with no identifiable cause, could potentially try removing trace elements next.     Goal              Protein: 70 gram/day              Dextrose: 1200 Kcal/day              Lipids: 200 kcal  Total: 1580 kCal/day    Plan     Protein/Dextrose/Lipids: 70g/1000kCal/ 100 mL MWF    Volume: 1800 ml (75 mL/hr over 24 hrs daily)    Based on the above labs, will add the following electrolytes/additives to the TPN.    Sodium Chloride: 90 mEq (increased)   Sodium Acetate: 40 mEq   Sodium Phosphate: 20 mEq   Potassium Chloride: 50 mEq   Potassium Acetate: 0 mEq   Potassium Phosphate: 25 mEq (decreased slightly)   Calcium Gluconate: 9 mEq   Magnesium Sulfate: 20 mEq   MVI for TPN   Trace Elements    Labs to be ordered: Daily CMP, accuchecks, phos and magnesium. Weekly TG (due 12/13)    Pharmacy will continue to follow.     Piotr Serrano [facility]  Clinical Pharmacist

## 2023-12-10 NOTE — PROGRESS NOTES
Postoperative day 20 from exploratory laparotomy with enterolysis and subtotal colectomy that was complicated by leak for small bowel enterotomy  postoperative day 12 from reopening of recent laparotomy with small bowel resection.   Intra-abdominal abscess posterior to the right kidney drained 12/9/2023 in interventional radiology.     S: No events overnight.  Tolerating clear liquid diet.  Continues to have abdominal pain, low-grade temp yesterday    O:   Vitals:    12/09/23 2321 12/10/23 0407 12/10/23 0725 12/10/23 0754   BP: 125/72 128/62 111/53    BP Location: Left arm Left arm Left arm    Patient Position: Lying Lying Lying    Pulse: 90 90 84    Resp: 18  18    Temp: 99.1 °F (37.3 °C) 99.7 °F (37.6 °C) 100 °F (37.8 °C)    TempSrc:  Oral Oral Oral   SpO2: 98% 99% 100%    Weight:       Height:          Drain #1 30 cc bilious fluid  Drain # 2 20cc purulent fluid  Drain # 3 1 cc serosanguineous  Alert, ill-appearing, no distress  Breathing comfortable  Abdomen soft, nontender nondistended    Platelets 450  White blood cell count 23.9, hemoglobin 8.3   LFTs stable    Assessment and plan    Postoperative day 20 from exploratory laparotomy with enterolysis and subtotal colectomy that was complicated by leak for small bowel enterotomy  postoperative day 12 from reopening of recent laparotomy with small bowel resection.   Intra-abdominal abscess posterior to the right kidney drained 12/9/2023 in interventional radiology.    -She had elevation of the white blood cell count yesterday after being started on clear liquid diet.  Her abdominal exam is benign.  Her drains have not increased output  - Continue clear liquid diet  - Continue antibiotics   - Continue drain in place, if no changes in the next 24 hours then left-sided drain can be removed tomorrow  -  CBC on a.m.  -  continued TPN

## 2023-12-10 NOTE — PROGRESS NOTES
ID NOTE    CC: f/u leukocytosis and abdominal abscesses following abdominal surgery    Subj: IR drain on 12/8 with pus evacuated. Culture with a GPC and GNR. WBC higher today but no new clinical symptoms.     Medications:    Current Facility-Administered Medications:     Adult Central 2-in-1 TPN, , Intravenous, Continuous, Ranjeet Salinas MD, Last Rate: 75 mL/hr at 12/09/23 1822, New Bag at 12/09/23 1822    Adult Central 2-in-1 TPN, , Intravenous, Continuous, Ranjeet Salinas MD    ALPRAZolam (XANAX) tablet 0.5 mg, 0.5 mg, Oral, Daily, Ranjeet Salinas MD, 0.5 mg at 12/09/23 2122    amLODIPine (NORVASC) tablet 2.5 mg, 2.5 mg, Oral, Q24H, Ranjeet Salinas MD, 2.5 mg at 12/10/23 0942    [Held by provider] Enoxaparin Sodium (LOVENOX) syringe 30 mg, 30 mg, Subcutaneous, Q24H, Ranjeet Salinas MD, 30 mg at 12/03/23 2042    Fat Emul Fish Oil/Plant Based (SMOFLIPID) 20 % emulsion 100 mL, 100 mL, Intravenous, Once per day on Mon Wed Fri, Ranjeet Salinas MD, Last Rate: 8.33 mL/hr at 12/08/23 1822, 100 mL at 12/08/23 1822    fluconazole (DIFLUCAN) IVPB 400 mg, 400 mg, Intravenous, Q24H, Massimo Turk MD, Last Rate: 100 mL/hr at 12/09/23 1715, 400 mg at 12/09/23 1715    hydrALAZINE (APRESOLINE) injection 10 mg, 10 mg, Intravenous, Q4H PRN, James Kebede MD    HYDROmorphone (DILAUDID) injection 0.5 mg, 0.5 mg, Intravenous, Q4H PRN, Ranjeet Salinas MD, 0.5 mg at 12/10/23 1155    labetalol (NORMODYNE,TRANDATE) injection 10 mg, 10 mg, Intravenous, Q2H PRN, James Kebede MD    Methocarbamol (ROBAXIN) injection 500 mg, 500 mg, Intravenous, Q6H PRN, Ranjeet Salinas MD    metoprolol tartrate (LOPRESSOR) injection 5 mg, 5 mg, Intravenous, Q6H PRN, Ranjeet Salinas MD, 5 mg at 12/07/23 1621    metoprolol tartrate (LOPRESSOR) tablet 12.5 mg, 12.5 mg, Oral, Q12H, Ranjeet Salinas MD, 12.5 mg at 12/10/23 0942    [DISCONTINUED]  ondansetron (ZOFRAN) tablet 4 mg, 4 mg, Oral, Q6H PRN **OR** ondansetron (ZOFRAN) injection 4 mg, 4 mg, Intravenous, Q6H PRN, Ranjeet Salinas MD, 4 mg at 12/07/23 1720    oxymetazoline (AFRIN) nasal spray 2 spray, 2 spray, Each Nare, BID, Ranjeet Salinas MD, 2 spray at 12/10/23 0944    pantoprazole (PROTONIX) injection 40 mg, 40 mg, Intravenous, Q AM, Ranjeet Salinas MD, 40 mg at 12/10/23 0507    PARoxetine (PAXIL) tablet 40 mg, 40 mg, Oral, Daily, Triston Malcolm III, MD, 40 mg at 12/10/23 0942    Pharmacy to Dose TPN, , Does not apply, Continuous PRN, Ranjeet Salinas MD    piperacillin-tazobactam (ZOSYN) 3.375 g in iso-osmotic dextrose 50 ml (premix), 3.375 g, Intravenous, Q8H, Massimo Turk MD, 3.375 g at 12/10/23 1136    prochlorperazine (COMPAZINE) injection 5 mg, 5 mg, Intravenous, Q6H PRN, Ranjeet Salinas MD, 5 mg at 12/06/23 2023    QUEtiapine fumarate ER (SEROquel XR) tablet 100 mg, 100 mg, Oral, Nightly, Ranjeet Salinas MD, 100 mg at 12/09/23 2122    sodium chloride 0.9 % flush 10 mL, 10 mL, Intravenous, Q12H, Ranjeet Salinas MD, 10 mL at 12/10/23 0946    sodium chloride 0.9 % flush 10 mL, 10 mL, Intravenous, Q12H, Ranjeet Salinas MD, 10 mL at 12/10/23 0945    sodium chloride nasal spray 2 spray, 2 spray, Each Nare, PRN, Natalio Collins MD      Objective   Vital Signs   Temp:  [99.1 °F (37.3 °C)-100 °F (37.8 °C)] 100 °F (37.8 °C)  Heart Rate:  [84-90] 84  Resp:  [18] 18  BP: (110-128)/(53-72) 111/53    Physical Exam:   General: NAD, very nice  Eyes: no scleral icterus  Cardiovascular: NR  Respiratory: normal work of breathing  GI: not distended; multiple drains  :  no Hodge catheter  Vasc: RUE PICC w/o erythema    Labs:   CBC, BMP, and body fluid culture reviewed today  Lab Results   Component Value Date    WBC 23.97 (H) 12/10/2023    HGB 8.3 (L) 12/10/2023    HCT 24.7 (L) 12/10/2023    MCV 91.8  "12/10/2023     (H) 12/10/2023     Lab Results   Component Value Date    GLUCOSE 122 (H) 12/10/2023    CALCIUM 9.1 12/10/2023     (L) 12/10/2023    K 4.8 12/10/2023    CO2 25.0 12/10/2023    CL 95 (L) 12/10/2023    BUN 22 12/10/2023    CREATININE 0.90 12/10/2023    EGFRRESULT 75 08/24/2023    EGFR 69.8 12/10/2023    BCR 24.4 12/10/2023    ANIONGAP 10.0 12/10/2023     Lab Results   Component Value Date     (H) 12/10/2023     Microbiology:  11/27 BCx: negative  12/1 BCx: negative  12/8 RP Abscess Cx: light GNR and light GPC    Prior Radiology:  12/7/23 CT A/P:  \"1. Abscess posterior to the right kidney      2. Small amount of residual fluid in the right flank status post drainage      3. Additional probable fluid collections in the abdomen      4. Moderate size right and newly seen small left-sided pleural effusions \"    ASSESSMENT/PLAN:  Leukocytosis - worse today  Epistaxis  S/p colectomy for colonic inertia  Post-operative leak and abscesses  History of multiple abdominal surgeries due to diverticular disease  On TPN  Anemia  Retroperitoneal abscess    WBC higher today but no fever and no new symptoms to report. I am awaiting the final result of her body fluid culture. For now, continue empiric Zosyn and fluconazole with duration TBD.     Check CBC and CMP in the AM for monitoring.I'll get a baseline CRP as well.     ID will follow.     "

## 2023-12-10 NOTE — PLAN OF CARE
Goal Outcome Evaluation:  Plan of Care Reviewed With: patient        Progress: no change  Outcome Evaluation:     A&O x4. VSS. SR on telemetry. Room air.       C/O abdominal and back pain.   PRN pain medication given.     IV abx given.       PICC line to right arm.   TPN infusing.   Q6 accuchecks.       JPx3.   RLQ- bilious output.   Right back drain. - purulent / brown output.   LLQ- very little serosanguinous output (mostly just in tubing).       Q2 turning.         Will continue to monitor.

## 2023-12-10 NOTE — THERAPY TREATMENT NOTE
Patient Name: Martina Hardwick  : 1955    MRN: [id number]                              Today's Date: 12/10/2023       Admit Date: 2023    Visit Dx:     ICD-10-CM ICD-9-CM   1. 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    Colonic inertia    Severe malnutrition     Past Medical History:   Diagnosis Date    Arthritis     Autoantibody titer positive     Goodwin esophagus     Bloating     Cataract     BILAT    Chronic abdominal pain     Chronic constipation     Encounter for preventive health examination     Endometriosis     Gastritis     Gastrointestinal hypomotility     GERD (gastroesophageal reflux disease)     Headache, tension-type     Hypertension     Hyponatremia     Insomnia     Intestinal autonomic neuropathy     Irritable bowel syndrome     Migraine     Mixed anxiety and depressive disorder     Moderate malnutrition     Noninfectious gastroenteritis     OP (osteoporosis)     Osteopenia     Osteoporosis     PONV (postoperative nausea and vomiting) 2018    Psoriasis     KNEES, ELBOWS BILAT AND SCALP    Seasonal allergies     Visceral hyperalgesia      Past Surgical History:   Procedure Laterality Date    APPENDECTOMY      CHOLECYSTECTOMY N/A 2018    Procedure: CHOLECYSTECTOMY LAPAROSCOPIC converted to open procedure;  Surgeon: Hernan Hinds MD;  Location: [facility];  Service: General    COLON RESECTION N/A 2023    Procedure: OPEN SUBTOTAL COLECTOMY AND  ADESIOLYSIS;  Surgeon: Ranjeet Salinas MD;  Location: [facility] MAIN OR;  Service: General;  Laterality: N/A;    COLON SURGERY      STATES TOTAL OF 3 COLON SURGERY    COLONOSCOPY      COLONOSCOPY N/A 12/12/2018    Procedure: COLONOSCOPY;  Surgeon: Darien Ruff MD;  Location: [facility];  Service: Gastroenterology    COLONOSCOPY N/A 10/13/2023    Procedure: COLONOSCOPY TO CECUM;  Surgeon: Ranjeet Salinas MD;  Location: [facility] ENDOSCOPY;  Service: General;  Laterality: N/A;  PREOP/ CHRONIC CONSTIPATION- POSTOP/ NORMAL    DIAGNOSTIC LAPAROSCOPY  1990    DILATATION AND CURETTAGE  1985    ENDOSCOPY N/A 05/13/2016    Procedure: ESOPHAGOGASTRODUODENOSCOPY ;  Surgeon: Darien Ruff MD;  Location: [facility] OR;  Service:     ENDOSCOPY N/A 05/01/2023    Procedure: ESOPHAGOGASTRODUODENOSCOPY WITH BIOPSY;  Surgeon: Darien Ruff MD;  Location: [facility] OR;  Service: Gastroenterology;  Laterality: N/A;  Mild Thrush; Gastritis; Esophagitis- thrush and reflux; Biopsies- duodenal, gastric, esophagus    EXPLORATORY LAPAROTOMY N/A 11/27/2023    Procedure: exploratory laparotomy, small bowel resection;  Surgeon: Ranjeet Salinas MD;  Location: [facility] MAIN OR;  Service: General;  Laterality: N/A;    HYSTERECTOMY      REVISION / TAKEDOWN COLOSTOMY        General Information       Row Name 12/10/23 1440          Physical Therapy Time and Intention    Document Type therapy note (daily note)  -CS     Mode of Treatment individual therapy;physical therapy  -CS       Row Name 12/10/23 1440          General Information    Existing Precautions/Restrictions fall  -CS       Row Name 12/10/23 1440          Cognition    Orientation Status (Cognition) oriented x 3  -CS       Row Name 12/10/23 1440          Safety Issues, Functional Mobility    Impairments Affecting Function (Mobility) endurance/activity tolerance;strength;balance;pain  -CS               User Key  (r) = Recorded By, (t) =  Taken By, (c) = Cosigned By      Initials Name Provider Type    Triston Hall, PT Physical Therapist                   Mobility       Row Name 12/10/23 1440          Bed Mobility    Bed Mobility supine-sit;sit-supine  -CS     Scooting/Bridging [place] (Bed Mobility) standby assist  -CS     Supine-Sit [place] (Bed Mobility) standby assist;verbal cues  -CS     Sit-Supine [place] (Bed Mobility) minimum assist (75% patient effort)  -CS     Assistive Device (Bed Mobility) head of bed elevated  -CS       Row Name 12/10/23 1440          Sit-Stand Transfer    Sit-Stand [place] (Transfers) contact guard  -CS     Comment, (Sit-Stand Transfer) 5xSTS  -CS       Row Name 12/10/23 1440          Gait/Stairs (Locomotion)    [place] Level (Gait) contact guard  -CS     Distance in Feet (Gait) 20' side steps and fwd steps  -CS     Deviations/Abnormal Patterns (Gait) stacy decreased;stride length decreased;gait speed decreased  -CS     Bilateral Gait Deviations forward flexed posture;weight shift ability decreased  -CS     [place] Level (Stairs) unable to assess  -CS               User Key  (r) = Recorded By, (t) = Taken By, (c) = Cosigned By      Initials Name Provider Type    Triston Hall, PT Physical Therapist                   Obj/Interventions    No documentation.                  Goals/Plan    No documentation.                  Clinical Impression       Row Name 12/10/23 1443          Pain    Pain Location - abdomen  -CS     Pain Intervention(s) Ambulation/increased activity;Repositioned  -CS     Additional Documentation Pain Scale: FACES Pre/Post-Treatment (Group)  -       Row Name 12/10/23 1443          Pain Scale: FACES Pre/Post-Treatment    Pain: FACES Scale, Pretreatment 2-->hurts little bit  -CS     Posttreatment Pain Rating 2-->hurts little bit  -CS       Row Name 12/10/23 1443          Plan of Care Review    Plan of Care Reviewed With patient  -CS       Row Name 12/10/23 1443           Therapy Assessment/Plan (PT)    Therapy Frequency (PT) 6 times/wk  -       Row Name 12/10/23 1443          Positioning and Restraints    Pre-Treatment Position in bed  -CS     Post Treatment Position bed  -CS     In Bed supine;encouraged to call for assist;call light within reach;exit alarm on  -CS               User Key  (r) = Recorded By, (t) = Taken By, (c) = Cosigned By      Initials Name Provider Type    Triston Hall, PT Physical Therapist                   Outcome Measures       Row Name 12/10/23 1444 12/10/23 0922       How much help from another person do you currently need...    Turning from your back to your side while in flat bed without using bedrails? 4  -CS 4  -SM    Moving from lying on back to sitting on the side of a flat bed without bedrails? 3  -CS 3  -SM    Moving to and from a bed to a chair (including a wheelchair)? 3  -CS 3  -SM    Standing up from a chair using your arms (e.g., wheelchair, bedside chair)? 3  -CS 3  -SM    Climbing 3-5 steps with a railing? 2  -CS 2  -SM    To walk in hospital room? 3  -CS 3  -SM    AM-PAC 6 Clicks Score (PT) 18  -CS 18  -SM    Highest Level of Mobility Goal 6 --> Walk 10 steps or more  -CS 6 --> Walk 10 steps or more  -SM      Row Name 12/10/23 1444          Functional Assessment    Outcome Measure Options AM-PAC 6 Clicks Basic Mobility (PT)  -CS               User Key  (r) = Recorded By, (t) = Taken By, (c) = Cosigned By      Initials Name Provider Type    Stephanie Rai RN Registered Nurse    Triston Hall, PT Physical Therapist                                 Physical Therapy Education       Title: PT OT SLP Therapies (Done)       Topic: Physical Therapy (Done)       Point: Mobility training (Done)       Learning Progress Summary             Patient Acceptance, E,TB, VU by CS at 12/10/2023 1444    Acceptance, E, VU by ER at 12/7/2023 1420    Acceptance, E, DU,VU by JY at 12/5/2023 1105    Acceptance, E,TB, VU by JS at  12/4/2023 0500    Acceptance, E,TB,D, VU,NR by  at 12/3/2023 1331    Acceptance, E,TB, VU by MT at 12/3/2023 0459    Acceptance, E,TB, VU by MT at 12/2/2023 0446    Acceptance, E,TB, VU by JS at 12/1/2023 0520    Acceptance, E,TB,D, VU by PH at 11/30/2023 0951    Acceptance, E,TB,D, VU,DU,NR by  at 11/30/2023 0523    Acceptance, E, DU,VU by JY at 11/29/2023 1353    Acceptance, E,TB,D, VU,NR by PH at 11/27/2023 1207    Acceptance, E,TB,D, VU,NR by PH at 11/24/2023 0908    Acceptance, E,D, VU,NR by MS at 11/22/2023 1519    Acceptance, E,D, VU,NR by MS at 11/21/2023 1125                         Point: Home exercise program (Done)       Learning Progress Summary             Patient Acceptance, E,TB, VU by CS at 12/10/2023 1444    Acceptance, E, VU by ER at 12/7/2023 1420    Acceptance, E,TB, VU by JS at 12/4/2023 0500    Acceptance, E,TB, VU by MT at 12/3/2023 0459    Acceptance, E,TB, VU by MT at 12/2/2023 0446    Acceptance, E,TB, VU by JS at 12/1/2023 0520    Acceptance, E,TB,D, VU by PH at 11/30/2023 0951    Acceptance, E,TB,D, VU,DU,NR by  at 11/30/2023 0523    Acceptance, E, DU,VU by JY at 11/29/2023 1353    Acceptance, E,TB,D, VU,NR by PH at 11/27/2023 1207    Acceptance, E,TB,D, VU,NR by PH at 11/24/2023 0908    Acceptance, E,D, VU,NR by MS at 11/22/2023 1519    Acceptance, E,D, VU,NR by MS at 11/21/2023 1125                         Point: Body mechanics (Done)       Learning Progress Summary             Patient Acceptance, E,TB, VU by CS at 12/10/2023 1444    Acceptance, E, VU by ER at 12/7/2023 1420    Acceptance, E, DU,VU by JYOBANI at 12/5/2023 1105    Acceptance, E,TB, VU by JS at 12/4/2023 0500    Acceptance, E,TB,D, VU,NR by  at 12/3/2023 1331    Acceptance, E,TB, VU by MT at 12/3/2023 0459    Acceptance, E,TB, VU by MT at 12/2/2023 0446    Acceptance, E,TB, VU by JS at 12/1/2023 0520    Acceptance, E,TB,D, VU by PH at 11/30/2023 0951    Acceptance, E,TB,D, VU,DU,NR by  at 11/30/2023 0523     Acceptance, E, DU,VU by JY at 11/29/2023 1353    Acceptance, E,TB,D, VU,NR by PH at 11/27/2023 1207    Acceptance, E,TB,D, VU,NR by  at 11/24/2023 0908    Acceptance, E,D, VU,NR by MS at 11/22/2023 1519    Acceptance, E,D, VU,NR by MS at 11/21/2023 1125                         Point: Precautions (Done)       Learning Progress Summary             Patient Acceptance, E,TB, VU by CS at 12/10/2023 1444    Acceptance, E, VU by ER at 12/7/2023 1420    Acceptance, E, DU,VU by JY at 12/5/2023 1105    Acceptance, E,TB, VU by JS at 12/4/2023 0500    Acceptance, E,TB,D, VU,NR by  at 12/3/2023 1331    Acceptance, E,TB, VU by MT at 12/3/2023 0459    Acceptance, E,TB, VU by MT at 12/2/2023 0446    Acceptance, E,TB, VU by JS at 12/1/2023 0520    Acceptance, E,TB,D, VU by  at 11/30/2023 0951    Acceptance, E,TB,D, VU,DU,NR by  at 11/30/2023 0523    Acceptance, E, DU,VU by JY at 11/29/2023 1353    Acceptance, E,TB,D, VU,NR by  at 11/27/2023 1207    Acceptance, E,TB,D, VU,NR by  at 11/24/2023 0908    Acceptance, E,D, VU,NR by MS at 11/22/2023 1519    Acceptance, E,D, VU,NR by MS at 11/21/2023 1125                                         User Key       Initials Effective Dates Name Provider Type Discipline    MS 06/16/21 -  Miguel Beasley, PT Physical Therapist PT    MT 06/16/21 -  Aileen Lezama, RN Registered Nurse Nurse     06/16/21 -  Yanira Hinds, RN Registered Nurse Nurse     07/11/23 -  Triston Espitia, PT Physical Therapist PT    JS 10/01/20 -  Isis Fitzpatrick, RN Registered Nurse Nurse    PH 06/16/21 -  Radha Rosen, PTA Physical Therapist Assistant PT     04/08/22 -  Codi Hinds, PT Physical Therapist PT    ER 10/15/23 -  Milka Nunez, PT Physical Therapist PT    JY 11/08/23 -  Kenroy Deleon PTA Student PTA Student PT                  PT Recommendation and Plan     Plan of Care Reviewed With: patient     Time Calculation:         PT Charges       Row Name 12/10/23 5649             Time  Calculation    Start Time 1432  -CS      Stop Time 1446  -CS      Time Calculation (min) 14 min  -CS      PT Received On 12/10/23  -CS      PT - Next Appointment 12/11/23  -CS                User Key  (r) = Recorded By, (t) = Taken By, (c) = Cosigned By      Initials Name Provider Type    CS Triston Espitia, PT Physical Therapist                  Therapy Charges for Today       Code Description Service Date Service Provider Modifiers Qty    [id number] HC PT THER PROC EA 15 MIN 12/10/2023 Triston Espitia, PT GP 1            PT G-Codes  Outcome Measure Options: AM-PAC 6 Clicks Basic Mobility (PT)  AM-PAC 6 Clicks Score (PT): 18  PT Discharge Summary  Anticipated Discharge Disposition (PT): skilled nursing facility    Triston Espitia PT  12/10/2023

## 2023-12-10 NOTE — PLAN OF CARE
Goal Outcome Evaluation:  Plan of Care Reviewed With: patient         Pt able to walk more this visit and perform sit<>stand 5x. Tolerated well. Continuing as able.         Anticipated Discharge Disposition (PT): skilled nursing facility

## 2023-12-10 NOTE — PLAN OF CARE
Goal Outcome Evaluation:              Outcome Evaluation: Medicated prn for pain,  Up in chair.  IS encouraged.  Continue TPN.  Tolerating clear liquid diet.  Drsg changed to abdominal incision.  FAUSTINO.  DALJIT'd telemetry.

## 2023-12-11 LAB
ALBUMIN SERPL-MCNC: 2.3 G/DL (ref 3.5–5.2)
ALBUMIN/GLOB SERPL: 0.6 G/DL
ALP SERPL-CCNC: 189 U/L (ref 39–117)
ALT SERPL W P-5'-P-CCNC: 188 U/L (ref 1–33)
ANION GAP SERPL CALCULATED.3IONS-SCNC: 10.3 MMOL/L (ref 5–15)
AST SERPL-CCNC: 121 U/L (ref 1–32)
BACTERIA FLD CULT: ABNORMAL
BACTERIA FLD CULT: ABNORMAL
BILIRUB SERPL-MCNC: 0.5 MG/DL (ref 0–1.2)
BUN SERPL-MCNC: 22 MG/DL (ref 8–23)
BUN/CREAT SERPL: 30.1 (ref 7–25)
CALCIUM SPEC-SCNC: 9.6 MG/DL (ref 8.6–10.5)
CHLORIDE SERPL-SCNC: 96 MMOL/L (ref 98–107)
CO2 SERPL-SCNC: 24.7 MMOL/L (ref 22–29)
CREAT SERPL-MCNC: 0.73 MG/DL (ref 0.57–1)
DEPRECATED RDW RBC AUTO: 47.3 FL (ref 37–54)
EGFRCR SERPLBLD CKD-EPI 2021: 89.7 ML/MIN/1.73
ERYTHROCYTE [DISTWIDTH] IN BLOOD BY AUTOMATED COUNT: 14.4 % (ref 12.3–15.4)
GLOBULIN UR ELPH-MCNC: 3.6 GM/DL
GLUCOSE BLDC GLUCOMTR-MCNC: 136 MG/DL (ref 70–130)
GLUCOSE BLDC GLUCOMTR-MCNC: 139 MG/DL (ref 70–130)
GLUCOSE BLDC GLUCOMTR-MCNC: 149 MG/DL (ref 70–130)
GLUCOSE BLDC GLUCOMTR-MCNC: 166 MG/DL (ref 70–130)
GLUCOSE SERPL-MCNC: 139 MG/DL (ref 65–99)
GRAM STN SPEC: ABNORMAL
GRAM STN SPEC: ABNORMAL
HCT VFR BLD AUTO: 28.9 % (ref 34–46.6)
HGB BLD-MCNC: 9.6 G/DL (ref 12–15.9)
MAGNESIUM SERPL-MCNC: 2.2 MG/DL (ref 1.6–2.4)
MCH RBC QN AUTO: 30.5 PG (ref 26.6–33)
MCHC RBC AUTO-ENTMCNC: 33.2 G/DL (ref 31.5–35.7)
MCV RBC AUTO: 91.7 FL (ref 79–97)
PHOSPHATE SERPL-MCNC: 3.6 MG/DL (ref 2.5–4.5)
PLATELET # BLD AUTO: 452 10*3/MM3 (ref 140–450)
PMV BLD AUTO: 9 FL (ref 6–12)
POTASSIUM SERPL-SCNC: 4.7 MMOL/L (ref 3.5–5.2)
PROT SERPL-MCNC: 5.9 G/DL (ref 6–8.5)
RBC # BLD AUTO: 3.15 10*6/MM3 (ref 3.77–5.28)
SODIUM SERPL-SCNC: 131 MMOL/L (ref 136–145)
WBC NRBC COR # BLD AUTO: 14.46 10*3/MM3 (ref 3.4–10.8)

## 2023-12-11 PROCEDURE — 85027 COMPLETE CBC AUTOMATED: CPT | Performed by: INTERNAL MEDICINE

## 2023-12-11 PROCEDURE — 25010000002 PIPERACILLIN SOD-TAZOBACTAM PER 1 G: Performed by: SURGERY

## 2023-12-11 PROCEDURE — 25010000002 ONDANSETRON PER 1 MG: Performed by: SURGERY

## 2023-12-11 PROCEDURE — 83735 ASSAY OF MAGNESIUM: CPT | Performed by: SURGERY

## 2023-12-11 PROCEDURE — 25010000002 ENOXAPARIN PER 10 MG: Performed by: SURGERY

## 2023-12-11 PROCEDURE — 99232 SBSQ HOSP IP/OBS MODERATE 35: CPT | Performed by: INTERNAL MEDICINE

## 2023-12-11 PROCEDURE — 25010000002 MAGNESIUM SULFATE PER 500 MG OF MAGNESIUM: Performed by: SURGERY

## 2023-12-11 PROCEDURE — 80053 COMPREHEN METABOLIC PANEL: CPT | Performed by: SURGERY

## 2023-12-11 PROCEDURE — 82948 REAGENT STRIP/BLOOD GLUCOSE: CPT

## 2023-12-11 PROCEDURE — 25010000002 POTASSIUM CHLORIDE PER 2 MEQ OF POTASSIUM: Performed by: SURGERY

## 2023-12-11 PROCEDURE — 25010000002 HYDROMORPHONE PER 4 MG: Performed by: SURGERY

## 2023-12-11 PROCEDURE — 25010000002 PIPERACILLIN SOD-TAZOBACTAM PER 1 G: Performed by: INTERNAL MEDICINE

## 2023-12-11 PROCEDURE — 25010000002 CALCIUM GLUCONATE PER 10 ML: Performed by: SURGERY

## 2023-12-11 PROCEDURE — 84100 ASSAY OF PHOSPHORUS: CPT | Performed by: SURGERY

## 2023-12-11 PROCEDURE — 99024 POSTOP FOLLOW-UP VISIT: CPT | Performed by: SURGERY

## 2023-12-11 PROCEDURE — 97530 THERAPEUTIC ACTIVITIES: CPT

## 2023-12-11 RX ORDER — OXYCODONE HYDROCHLORIDE 5 MG/1
5 TABLET ORAL EVERY 4 HOURS PRN
Status: DISCONTINUED | OUTPATIENT
Start: 2023-12-11 | End: 2023-12-16

## 2023-12-11 RX ORDER — ENOXAPARIN SODIUM 100 MG/ML
20 INJECTION SUBCUTANEOUS EVERY 24 HOURS
Status: DISCONTINUED | OUTPATIENT
Start: 2023-12-11 | End: 2023-12-11

## 2023-12-11 RX ORDER — ENOXAPARIN SODIUM 100 MG/ML
30 INJECTION SUBCUTANEOUS EVERY 24 HOURS
Status: DISCONTINUED | OUTPATIENT
Start: 2023-12-11 | End: 2023-12-20 | Stop reason: HOSPADM

## 2023-12-11 RX ADMIN — HYDROMORPHONE HYDROCHLORIDE 0.5 MG: 1 INJECTION, SOLUTION INTRAMUSCULAR; INTRAVENOUS; SUBCUTANEOUS at 21:07

## 2023-12-11 RX ADMIN — Medication 10 ML: at 09:59

## 2023-12-11 RX ADMIN — PIPERACILLIN SODIUM AND TAZOBACTAM SODIUM 3.38 G: 3; .375 INJECTION, SOLUTION INTRAVENOUS at 03:20

## 2023-12-11 RX ADMIN — AMLODIPINE BESYLATE 2.5 MG: 2.5 TABLET ORAL at 09:58

## 2023-12-11 RX ADMIN — ONDANSETRON 4 MG: 2 INJECTION INTRAMUSCULAR; INTRAVENOUS at 11:45

## 2023-12-11 RX ADMIN — ENOXAPARIN SODIUM 30 MG: 100 INJECTION SUBCUTANEOUS at 21:00

## 2023-12-11 RX ADMIN — OXYCODONE HYDROCHLORIDE 5 MG: 5 TABLET ORAL at 11:45

## 2023-12-11 RX ADMIN — PANTOPRAZOLE SODIUM 40 MG: 40 INJECTION, POWDER, FOR SOLUTION INTRAVENOUS at 09:58

## 2023-12-11 RX ADMIN — METOPROLOL TARTRATE 12.5 MG: 25 TABLET, FILM COATED ORAL at 09:58

## 2023-12-11 RX ADMIN — Medication 10 ML: at 21:07

## 2023-12-11 RX ADMIN — HYDROMORPHONE HYDROCHLORIDE 0.5 MG: 1 INJECTION, SOLUTION INTRAMUSCULAR; INTRAVENOUS; SUBCUTANEOUS at 14:11

## 2023-12-11 RX ADMIN — PAROXETINE HYDROCHLORIDE 40 MG: 20 TABLET, FILM COATED ORAL at 09:58

## 2023-12-11 RX ADMIN — ONDANSETRON 4 MG: 2 INJECTION INTRAMUSCULAR; INTRAVENOUS at 21:07

## 2023-12-11 RX ADMIN — POTASSIUM PHOSPHATE, MONOBASIC POTASSIUM PHOSPHATE, DIBASIC: 224; 236 INJECTION, SOLUTION, CONCENTRATE INTRAVENOUS at 17:54

## 2023-12-11 RX ADMIN — PIPERACILLIN SODIUM AND TAZOBACTAM SODIUM 3.38 G: 3; .375 INJECTION, SOLUTION INTRAVENOUS at 10:26

## 2023-12-11 RX ADMIN — Medication 10 ML: at 10:19

## 2023-12-11 RX ADMIN — PIPERACILLIN SODIUM AND TAZOBACTAM SODIUM 3.38 G: 3; .375 INJECTION, SOLUTION INTRAVENOUS at 18:54

## 2023-12-11 RX ADMIN — HYDROMORPHONE HYDROCHLORIDE 0.5 MG: 1 INJECTION, SOLUTION INTRAMUSCULAR; INTRAVENOUS; SUBCUTANEOUS at 03:17

## 2023-12-11 NOTE — PLAN OF CARE
Goal Outcome Evaluation:  Plan of Care Reviewed With: patient        Progress: improving  Outcome Evaluation: Pt was in bed at beg of PT session and sat up to EOB w/ SBA. Pt stood then amb approx 170' req CGA then SBA and use of fww. Pt was slow although fairly steady w/ no overt LOB. Pt stood at sink approx 3 min w/ SBA. Pt returned to bed w/ improved SV and cues for sequencing. PT will prog as pt ramin.      Anticipated Discharge Disposition (PT): home with home health, home with assist, skilled nursing facility

## 2023-12-11 NOTE — PROGRESS NOTES
ID NOTE    CC: f/u leukocytosis and abdominal abscesses following abdominal surgery    Subj: No new symptoms. WBC better. Tolerating Zosyn.     Medications:    Current Facility-Administered Medications:     Adult Central 2-in-1 TPN, , Intravenous, Continuous, Ranjeet Salinas MD, Last Rate: 75 mL/hr at 12/10/23 1801, New Bag at 12/10/23 1801    Adult Central 2-in-1 TPN, , Intravenous, Continuous, Ranjeet Salinas MD    ALPRAZolam (XANAX) tablet 0.5 mg, 0.5 mg, Oral, Daily, Ranjeet Salinas MD, 0.5 mg at 12/10/23 2045    amLODIPine (NORVASC) tablet 2.5 mg, 2.5 mg, Oral, Q24H, Ranjeet Salinas MD, 2.5 mg at 12/10/23 0942    Enoxaparin Sodium (LOVENOX) syringe 20 mg, 20 mg, Subcutaneous, Q24H, Ranjeet Salinas MD    [START ON 12/12/2023] Fat Emul Fish Oil/Plant Based (SMOFLIPID) 20 % emulsion 100 mL, 100 mL, Intravenous, Once per day on Tue Thu Sat, Ranjeet Salinas MD    hydrALAZINE (APRESOLINE) injection 10 mg, 10 mg, Intravenous, Q4H PRN, James Kebede MD    HYDROmorphone (DILAUDID) injection 0.5 mg, 0.5 mg, Intravenous, Q4H PRN, Ranjeet Salinas MD, 0.5 mg at 12/11/23 0317    labetalol (NORMODYNE,TRANDATE) injection 10 mg, 10 mg, Intravenous, Q2H PRN, James Kebede MD    Methocarbamol (ROBAXIN) injection 500 mg, 500 mg, Intravenous, Q6H PRN, Ranjeet Salinas MD    metoprolol tartrate (LOPRESSOR) injection 5 mg, 5 mg, Intravenous, Q6H PRN, Ranjeet Salinas MD, 5 mg at 12/07/23 1621    metoprolol tartrate (LOPRESSOR) tablet 12.5 mg, 12.5 mg, Oral, Q12H, Ranjeet Salinas MD, 12.5 mg at 12/10/23 2045    [DISCONTINUED] ondansetron (ZOFRAN) tablet 4 mg, 4 mg, Oral, Q6H PRN **OR** ondansetron (ZOFRAN) injection 4 mg, 4 mg, Intravenous, Q6H PRN, Ranjeet Salinas MD, 4 mg at 12/07/23 1720    oxyCODONE (ROXICODONE) immediate release tablet 5 mg, 5 mg, Oral, Q4H PRN, Ranjeet Salinas MD    pantoprazole (PROTONIX) injection  40 mg, 40 mg, Intravenous, Q AM, Ranjeet Salinas MD, 40 mg at 12/10/23 0507    PARoxetine (PAXIL) tablet 40 mg, 40 mg, Oral, Daily, Triston Malcolm III, MD, 40 mg at 12/10/23 0942    Pharmacy to Dose TPN, , Does not apply, Continuous PRN, Ranjeet Salinas MD    piperacillin-tazobactam (ZOSYN) 3.375 g in iso-osmotic dextrose 50 ml (premix), 3.375 g, Intravenous, Q8H, Ranjeet Salinas MD, 3.375 g at 12/11/23 0320    prochlorperazine (COMPAZINE) injection 5 mg, 5 mg, Intravenous, Q6H PRN, Ranjeet Salinas MD, 5 mg at 12/06/23 2023    QUEtiapine fumarate ER (SEROquel XR) tablet 100 mg, 100 mg, Oral, Nightly, Ranjeet Salinas MD, 100 mg at 12/10/23 2045    sodium chloride 0.9 % flush 10 mL, 10 mL, Intravenous, Q12H, Ranjeet Salinas MD, 10 mL at 12/10/23 2045    sodium chloride 0.9 % flush 10 mL, 10 mL, Intravenous, Q12H, Ranjeet Salinas MD, 10 mL at 12/10/23 2045    sodium chloride nasal spray 2 spray, 2 spray, Each Nare, PRN, Natalio Collins MD      Objective   Vital Signs   Temp:  [98.2 °F (36.8 °C)-99.2 °F (37.3 °C)] 98.4 °F (36.9 °C)  Heart Rate:  [83-88] 84  Resp:  [18] 18  BP: ()/(50-82) 135/66    Physical Exam:   General: NAD, very nice  Eyes: no scleral icterus  Cardiovascular: NR  Respiratory: normal work of breathing  GI: not distended; multiple drains  :  no Hodge catheter  Vasc: RUE PICC w/o erythema    Labs:   CBC, BMP, and body fluid culture reviewed today  Lab Results   Component Value Date    WBC 14.46 (H) 12/11/2023    HGB 9.6 (L) 12/11/2023    HCT 28.9 (L) 12/11/2023    MCV 91.7 12/11/2023     (H) 12/11/2023     Lab Results   Component Value Date    GLUCOSE 139 (H) 12/11/2023    CALCIUM 9.6 12/11/2023     (L) 12/11/2023    K 4.7 12/11/2023    CO2 24.7 12/11/2023    CL 96 (L) 12/11/2023    BUN 22 12/11/2023    CREATININE 0.73 12/11/2023    EGFRRESULT 75 08/24/2023    EGFR 89.7 12/11/2023    BCR 30.1 (H)  "12/11/2023    ANIONGAP 10.3 12/11/2023     Lab Results   Component Value Date     (H) 12/11/2023     Lab Results   Component Value Date    CRP 7.72 (H) 12/10/2023       Microbiology:  11/27 BCx: negative  12/1 BCx: negative  12/8 RP Abscess Cx: light growth Klebsiella aerogenes and light growth Enterococcus faecalis    Prior Radiology:  12/7/23 CT A/P:  \"1. Abscess posterior to the right kidney      2. Small amount of residual fluid in the right flank status post drainage      3. Additional probable fluid collections in the abdomen      4. Moderate size right and newly seen small left-sided pleural effusions \"    ASSESSMENT/PLAN:  Leukocytosis - better today  Epistaxis  S/p colectomy for colonic inertia  Post-operative leak and abscesses  History of multiple abdominal surgeries due to diverticular disease  On TPN  Anemia  Retroperitoneal abscess    WBC improved today. No fever. There are several reasonable antibiotic regimens to target her cultures but for now I would like to continue her on Zosyn w/ duration TBD. Stop fluconazole.     ID will follow.     "

## 2023-12-11 NOTE — THERAPY TREATMENT NOTE
Patient Name: Martina Hardwick  : 1955    MRN: [id number]                              Today's Date: 2023       Admit Date: 2023    Visit Dx:     ICD-10-CM ICD-9-CM   1. 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    Colonic inertia    Severe malnutrition     Past Medical History:   Diagnosis Date    Arthritis     Autoantibody titer positive     Goodwin esophagus     Bloating     Cataract     BILAT    Chronic abdominal pain     Chronic constipation     Encounter for preventive health examination     Endometriosis     Gastritis     Gastrointestinal hypomotility     GERD (gastroesophageal reflux disease)     Headache, tension-type     Hypertension     Hyponatremia     Insomnia     Intestinal autonomic neuropathy     Irritable bowel syndrome     Migraine     Mixed anxiety and depressive disorder     Moderate malnutrition     Noninfectious gastroenteritis     OP (osteoporosis)     Osteopenia     Osteoporosis     PONV (postoperative nausea and vomiting) 2018    Psoriasis     KNEES, ELBOWS BILAT AND SCALP    Seasonal allergies     Visceral hyperalgesia      Past Surgical History:   Procedure Laterality Date    APPENDECTOMY      CHOLECYSTECTOMY N/A 2018    Procedure: CHOLECYSTECTOMY LAPAROSCOPIC converted to open procedure;  Surgeon: Hernan Hinds MD;  Location: [facility];  Service: General    COLON RESECTION N/A 2023    Procedure: OPEN SUBTOTAL COLECTOMY AND  ADESIOLYSIS;  Surgeon: Ranjeet Salinas MD;  Location: [facility] MAIN OR;  Service: General;  Laterality: N/A;    COLON SURGERY      STATES TOTAL OF 3 COLON SURGERY    COLONOSCOPY      COLONOSCOPY N/A 12/12/2018    Procedure: COLONOSCOPY;  Surgeon: Darien Ruff MD;  Location: [facility] OR;  Service: Gastroenterology    COLONOSCOPY N/A 10/13/2023    Procedure: COLONOSCOPY TO CECUM;  Surgeon: Ranjeet Salinas MD;  Location: [facility] ENDOSCOPY;  Service: General;  Laterality: N/A;  PREOP/ CHRONIC CONSTIPATION- POSTOP/ NORMAL    DIAGNOSTIC LAPAROSCOPY  1990    DILATATION AND CURETTAGE  1985    ENDOSCOPY N/A 05/13/2016    Procedure: ESOPHAGOGASTRODUODENOSCOPY ;  Surgeon: Darien Ruff MD;  Location: [facility] OR;  Service:     ENDOSCOPY N/A 05/01/2023    Procedure: ESOPHAGOGASTRODUODENOSCOPY WITH BIOPSY;  Surgeon: Darien Ruff MD;  Location: [facility] OR;  Service: Gastroenterology;  Laterality: N/A;  Mild Thrush; Gastritis; Esophagitis- thrush and reflux; Biopsies- duodenal, gastric, esophagus    EXPLORATORY LAPAROTOMY N/A 11/27/2023    Procedure: exploratory laparotomy, small bowel resection;  Surgeon: Ranjeet Salinas MD;  Location: [facility] MAIN OR;  Service: General;  Laterality: N/A;    HYSTERECTOMY      REVISION / TAKEDOWN COLOSTOMY        General Information       Row Name 12/11/23 1242          Physical Therapy Time and Intention    Document Type therapy note (daily note)  -PH     Mode of Treatment physical therapy  -PH       Row Name 12/11/23 1242          General Information    Existing Precautions/Restrictions fall  -PH     Barriers to Rehab medically complex  -PH       Row Name 12/11/23 1242          Cognition    Orientation Status (Cognition) oriented x 3  -PH       Row Name 12/11/23 1242          Safety Issues, Functional Mobility    Impairments Affecting Function (Mobility) endurance/activity tolerance;pain;strength;balance  -PH     Comment, Safety  Issues/Impairments (Mobility) gt belt and non skid socks donned  -PH               User Key  (r) = Recorded By, (t) = Taken By, (c) = Cosigned By      Initials Name Provider Type     Radha Rosen PTA Physical Therapist Assistant                   Mobility       Row Name 12/11/23 1243          Bed Mobility    Bed Mobility supine-sit  -PH     Supine-Sit [place] (Bed Mobility) standby assist  -PH     Sit-Supine [place] (Bed Mobility) supervision  -PH     Assistive Device (Bed Mobility) bed rails;head of bed elevated  -PH     Comment, (Bed Mobility) pt was able to return B LE to bed w/ cues for sequencing and no assist today.  -PH       Row Name 12/11/23 1243          Sit-Stand Transfer    Sit-Stand [place] (Transfers) contact guard  -PH     Assistive Device (Sit-Stand Transfers) walker, front-wheeled  -PH       Row Name 12/11/23 1243          Gait/Stairs (Locomotion)    [place] Level (Gait) contact guard;standby assist  -PH     Assistive Device (Gait) walker, front-wheeled  -PH     Distance in Feet (Gait) 170'  -PH     Deviations/Abnormal Patterns (Gait) stacy decreased;gait speed decreased;stride length decreased  -PH     Bilateral Gait Deviations forward flexed posture  -PH     [place] Level (Stairs) unable to assess  -PH     Comment, (Gait/Stairs) pt slow and shuffling although distance incr today w/ no overt LOB. pt denied dizziness when asked. Fairly steady  -PH               User Key  (r) = Recorded By, (t) = Taken By, (c) = Cosigned By      Initials Name Provider Type     Radha Rosen PTA Physical Therapist Assistant                   Obj/Interventions       Row Name 12/11/23 1244          Balance    Balance Assessment sitting static balance;standing static balance  -PH     Static Sitting Balance standby assist  -PH     Static Standing Balance contact guard;standby assist  -PH     Position/Device Used, Standing Balance walker, front-wheeled  -PH     Comment,  Balance pt stood at sink to brush her teeth for approx 3 min  -PH               User Key  (r) = Recorded By, (t) = Taken By, (c) = Cosigned By      Initials Name Provider Type    Radha Aguila PTA Physical Therapist Assistant                   Goals/Plan    No documentation.                  Clinical Impression       Row Name 12/11/23 1246          Pain    Posttreatment Pain Rating 3/10  -PH     Pain Location - abdomen  -PH     Pain Intervention(s) Ambulation/increased activity;Rest  -PH       Row Name 12/11/23 1246          Plan of Care Review    Plan of Care Reviewed With patient  -PH     Progress improving  -PH     Outcome Evaluation Pt was in bed at beg of PT session and sat up to EOB w/ SBA. Pt stood then amb approx 170' req CGA then SBA and use of fww. Pt was slow although fairly steady w/ no overt LOB. Pt stood at sink approx 3 min w/ SBA. Pt returned to bed w/ improved SV and cues for sequencing. PT will prog as pt ramin.  -PH       Row Name 12/11/23 1246          Vital Signs    O2 Delivery Pre Treatment room air  -PH     O2 Delivery Intra Treatment room air  -PH     O2 Delivery Post Treatment room air  -PH       Row Name 12/11/23 1246          Positioning and Restraints    Pre-Treatment Position in bed  -PH     Post Treatment Position bed  -PH     In Bed with nsg;call light within reach;encouraged to call for assist;exit alarm on;fowlers  pt declined sitting up in chair.  -PH               User Key  (r) = Recorded By, (t) = Taken By, (c) = Cosigned By      Initials Name Provider Type    Radha Aguila PTA Physical Therapist Assistant                   Outcome Measures       Row Name 12/11/23 1248          How much help from another person do you currently need...    Turning from your back to your side while in flat bed without using bedrails? 4  -PH     Moving from lying on back to sitting on the side of a flat bed without bedrails? 3  -PH     Moving to and from a bed to a chair  (including a wheelchair)? 3  -PH     Standing up from a chair using your arms (e.g., wheelchair, bedside chair)? 3  -PH     Climbing 3-5 steps with a railing? 2  -PH     To walk in hospital room? 3  -PH     AM-PAC 6 Clicks Score (PT) 18  -PH     Highest Level of Mobility Goal 6 --> Walk 10 steps or more  -PH       Row Name 12/11/23 1248          Functional Assessment    Outcome Measure Options AM-PAC 6 Clicks Basic Mobility (PT)  -PH               User Key  (r) = Recorded By, (t) = Taken By, (c) = Cosigned By      Initials Name Provider Type    Radha Aguila, PTA Physical Therapist Assistant                                 Physical Therapy Education       Title: PT OT SLP Therapies (Done)       Topic: Physical Therapy (Done)       Point: Mobility training (Done)       Learning Progress Summary             Patient Acceptance, E,TB, VU by  at 12/11/2023 1249    Acceptance, E,TB, VU by  at 12/10/2023 1444    Acceptance, E, VU by ER at 12/7/2023 1420    Acceptance, E, DU,VU by JY at 12/5/2023 1105    Acceptance, E,TB, VU by JS at 12/4/2023 0500    Acceptance, E,TB,D, VU,NR by  at 12/3/2023 1331    Acceptance, E,TB, VU by MT at 12/3/2023 0459    Acceptance, E,TB, VU by MT at 12/2/2023 0446    Acceptance, E,TB, VU by JS at 12/1/2023 0520    Acceptance, E,TB,D, VU by  at 11/30/2023 0951    Acceptance, E,TB,D, VU,DU,NR by  at 11/30/2023 0523    Acceptance, E, DU,VU by JY at 11/29/2023 1353    Acceptance, E,TB,D, VU,NR by  at 11/27/2023 1207    Acceptance, E,TB,D, VU,NR by  at 11/24/2023 0908    Acceptance, E,D, VU,NR by MS at 11/22/2023 1519    Acceptance, E,D, VU,NR by MS at 11/21/2023 1125                         Point: Home exercise program (Done)       Learning Progress Summary             Patient Acceptance, E,TB, VU by CS at 12/10/2023 1444    Acceptance, E, VU by ER at 12/7/2023 1420    Acceptance, E,TB, VU by JS at 12/4/2023 0500    Acceptance, E,TB, VU by MT at 12/3/2023 5068     Acceptance, E,TB, VU by MT at 12/2/2023 0446    Acceptance, E,TB, VU by JS at 12/1/2023 0520    Acceptance, E,TB,D, VU by PH at 11/30/2023 0951    Acceptance, E,TB,D, VU,DU,NR by  at 11/30/2023 0523    Acceptance, E, DU,VU by JY at 11/29/2023 1353    Acceptance, E,TB,D, VU,NR by PH at 11/27/2023 1207    Acceptance, E,TB,D, VU,NR by PH at 11/24/2023 0908    Acceptance, E,D, VU,NR by MS at 11/22/2023 1519    Acceptance, E,D, VU,NR by MS at 11/21/2023 1125                         Point: Body mechanics (Done)       Learning Progress Summary             Patient Acceptance, E,TB, VU by PH at 12/11/2023 1249    Acceptance, E,TB, VU by CS at 12/10/2023 1444    Acceptance, E, VU by ER at 12/7/2023 1420    Acceptance, E, DU,VU by JY at 12/5/2023 1105    Acceptance, E,TB, VU by JS at 12/4/2023 0500    Acceptance, E,TB,D, VU,NR by  at 12/3/2023 1331    Acceptance, E,TB, VU by MT at 12/3/2023 0459    Acceptance, E,TB, VU by MT at 12/2/2023 0446    Acceptance, E,TB, VU by JS at 12/1/2023 0520    Acceptance, E,TB,D, VU by PH at 11/30/2023 0951    Acceptance, E,TB,D, VU,DU,NR by  at 11/30/2023 0523    Acceptance, E, DU,VU by JY at 11/29/2023 1353    Acceptance, E,TB,D, VU,NR by PH at 11/27/2023 1207    Acceptance, E,TB,D, VU,NR by PH at 11/24/2023 0908    Acceptance, E,D, VU,NR by MS at 11/22/2023 1519    Acceptance, E,D, VU,NR by MS at 11/21/2023 1125                         Point: Precautions (Done)       Learning Progress Summary             Patient Acceptance, E,TB, VU by PH at 12/11/2023 1249    Acceptance, E,TB, VU by CS at 12/10/2023 1444    Acceptance, E, VU by ER at 12/7/2023 1420    Acceptance, E, DU,VU by JY at 12/5/2023 1105    Acceptance, E,TB, VU by JS at 12/4/2023 0500    Acceptance, E,TB,D, VU,NR by  at 12/3/2023 1331    Acceptance, E,TB, VU by MT at 12/3/2023 0459    Acceptance, E,TB, VU by MT at 12/2/2023 0446    Acceptance, E,TB, VU by JS at 12/1/2023 0520    Acceptance, E,TB,D, VU by PH at 11/30/2023  0951    Acceptance, E,TB,D, VU,DU,NR by  at 11/30/2023 0523    Acceptance, E, DU,VU by J at 11/29/2023 1353    Acceptance, E,TB,D, VU,NR by  at 11/27/2023 1207    Acceptance, E,TB,D, VU,NR by  at 11/24/2023 0908    Acceptance, E,D, VU,NR by MS at 11/22/2023 1519    Acceptance, E,D, VU,NR by MS at 11/21/2023 1125                                         User Key       Initials Effective Dates Name Provider Type Discipline    MS 06/16/21 -  Miguel Beasley, PT Physical Therapist PT    MT 06/16/21 -  Aileen Lezama, RN Registered Nurse Nurse     06/16/21 -  Yanira Hinds RN Registered Nurse Nurse     07/11/23 -  Triston Espitia, PT Physical Therapist PT    JS 10/01/20 -  Isis Fitzpatrick RN Registered Nurse Nurse     06/16/21 -  Radha Rosen, PTA Physical Therapist Assistant PT     04/08/22 -  Codi Hinds, PT Physical Therapist PT    ER 10/15/23 -  Milka Nunez, PT Physical Therapist PT    JY 11/08/23 -  Kenroy Deleon, PTA Student PTA Student PT                  PT Recommendation and Plan     Plan of Care Reviewed With: patient  Progress: improving  Outcome Evaluation: Pt was in bed at beg of PT session and sat up to EOB w/ SBA. Pt stood then amb approx 170' req CGA then SBA and use of fww. Pt was slow although fairly steady w/ no overt LOB. Pt stood at sink approx 3 min w/ SBA. Pt returned to bed w/ improved SV and cues for sequencing. PT will prog as pt ramin.     Time Calculation:         PT Charges       Row Name 12/11/23 1249             Time Calculation    Start Time 0938  -PH      Stop Time 1001  -PH      Time Calculation (min) 23 min  -PH      PT Received On 12/11/23  -PH      PT - Next Appointment 12/12/23  -PH         Timed Charges    21944 - PT Therapeutic Activity Minutes 23  -PH         Total Minutes    Timed Charges Total Minutes 23  -PH       Total Minutes 23  -PH                User Key  (r) = Recorded By, (t) = Taken By, (c) = Cosigned By      Initials Name Provider Type    PH  Radha Rosen PTA Physical Therapist Assistant                  Therapy Charges for Today       Code Description Service Date Service Provider Modifiers Qty    24379714206 HC PT THERAPEUTIC ACT EA 15 MIN 12/11/2023 Radha Rosen PTA GP 2            PT G-Codes  Outcome Measure Options: AM-PAC 6 Clicks Basic Mobility (PT)  AM-PAC 6 Clicks Score (PT): 18  PT Discharge Summary  Anticipated Discharge Disposition (PT): home with home health, home with assist, skilled nursing facility    Radha Rosen PTA  12/11/2023

## 2023-12-11 NOTE — PROGRESS NOTES
The patient seems brighter today and seems encouraged by her progressing medical course.  She seems stable with her currently prescribed medications.  From a psychiatric standpoint, the patient appears stable, and I will sign off.

## 2023-12-11 NOTE — PROGRESS NOTES
Colorectal & General Surgery  Progress Note    Patient: Martina Hardwick  YOB: 1955  MRN: [id number]      Assessment  Martina Hardwick is a 68 y.o. female with colonic inertia postoperative day 21 from exploratory laparotomy with enterolysis and subtotal colectomy that was complicated by leak from small bowel enterotomy and now postoperative day 13 from reopening of recent laparotomy with small bowel resection.    Leukocytosis is improving.  Retroperitoneal drain output is purulent.  Right lower quadrant abdominal drain is bilious.  Left lower quadrant abdominal drain is serosanguineous and minimal output, will remove today.  Continue Zosyn and fluconazole, appreciate assistance from Dr. Turk.    Hemoglobin stable.  Will restart low-dose pharmacologic DVT prophylaxis today.    Transaminitis fluctuating but relatively stable.  Will hopefully be able to get off TPN soon.  Switching to regular diet today.    Subjective  No acute events overnight.  Pain is much better controlled.  She is asking for regular food.  Spirits seem much better.    Objective    Vitals:    12/11/23 0745   BP: 135/66   Pulse: 84   Resp: 18   Temp: 98.4 °F (36.9 °C)   SpO2: 100%       Physical Exam  Constitutional: Well-developed well-nourished, no acute distress  Neck: Supple, trachea midline  Respiratory: No increased work of breathing, Symmetric excursion  Cardiovascular: Well pefursed, no jugular venous distention evident   Abdominal: Midline incision healing well.  No surrounding erythema.  Abdomen is soft, mildly tender, mildly distended.  IR drain is purulent.  Right lower quadrant drain is bilious and low-volume.  Left lower quadrant drain is serosanguineous and low-volume.  Skin: Warm, dry, no rash on visualized skin surfaces  Psychiatric: Alert and oriented ×3, normal affect     Laboratory Results  I have personally reviewed CBC with WBC 14, hemoglobin 9, platelets 452.  CMP with creatinine 0.73, alkaline phosphatase 189,  albumin 2.3, , , total bilirubin 0.5.    Radiology  None to review         Osvaldo Salinas MD  Colorectal & General Surgery  [facility], [place], operated by [facility]    [address], [address]  [place], [place], [address]  P: [phone]  F: [phone]

## 2023-12-11 NOTE — PROGRESS NOTES
Nutrition Services    Patient Name:  Martina Hardwick  YOB: 1955  MRN: [id number]  Admit Date:  11/20/2023    Assessment Date:  12/11/23    CLINICAL NUTRITION    Comments: Follow up for TPN    TPN Day: 7  Indication: Prolonged Paralytic Ileus, severe malnutrition  Route: central  Type: standard  Last BM: 12/8  Daily Assessment              Protein/Dextrose/Lipids: 70g/1000kcal/100mL              Volume: 1800 ml (75 mL/hr over 24 hrs daily)  Goal              AA: 70 g/day              Dextrose: 1200 kcal/day              Lipids: 200 kcal qMon/Wed/Fri              Total: 1680 kcal/day (w/ lipids), 1480 kcal/day                TPN follow up completed. Pt switched to a regular diet today. Hopeful to get off TPN soon per surgery.  Pt in bed at time of visit. Reports fair po intake. Ate 100% po for dinner per MAR review. TPN running w/ protein at goal but dextrose and lipids were adjusted to 1000kcal/100mL, SMOFLipid tues/thurs/sat per pharmacy r/t to pt's GIR and LFTs.    REC:  Continue TPN regimen at goal per PharmD  Monitor nutritionally pertinent labs  Advance diet as tolerated and once medically appropriate per MD     RD to continue to monitor per protocol.     Encounter Information         Reason For Encounter Follow up for TPN    Current Issues Needs alternate route of nutrition (TPN)      Estimated Requirements            Weight used  46.6 kg     Calories  1238-1732 (30 kcal/kg, 35 kcal/kg)    Protein  56-70 (1.2 - 1.5 gm/kg)    Fluid  1 mL/kcal     Current Nutrition Orders & Evaluation of Intake       Oral Nutrition     Current PO Diet Adult Central 2-in-1 TPN  Adult Central 2-in-1 TPN  Diet: Regular/House Diet; Texture: Mechanical Ground (NDD 2); Fluid Consistency: Thin (IDDSI 0)   Supplement    PO Evaluation     Trending % PO Intake NPO    Factors Affecting Intake  altered GI function, decreased appetite      Parenteral Nutrition      TPN Route PICC   TPN Rate (mL/hr) 75 mL/hr    Current TPN Order   "      Dextrose (kcal) 1200 kcal        Amino Acid (gm) 70 gm (280 kcal)       Lipid Concentration 20%       Lipid Volume/Frequency  100 mL, daily (200 kcal)   MVI Frequency  Per PharmD   Trace Element Frequency  Per PharmD   Total # Days on TPN 7   Propofol Rate/Kcal    TPN Current Provision          Calories 100% of needs met        Protein (gm) 100% of needs met        Fluid (mL)    TPN Needs Evaluation meeting needs for protein, not meeting needs for calories   TPN Changes other: PharmD adjusting lipids to Tu/Th/Sat schedule      Anthropometrics          Height    Weight Height: 154.9 cm (61\")  Weight: 48.8 kg (107 lb 9.4 oz) (12/04/23 1731)    BMI kg/m2 Body mass index is 20.33 kg/m².  Normal/Healthy (18.4 - 24.9)    Weight trend Stable per EMR. Pt endorses 26 lb wt loss x 3 yrs      Labs        Pertinent Labs Reviewed, listed below     Results from last 7 days   Lab Units 12/11/23  0623 12/10/23  0710 12/09/23  0612   SODIUM mmol/L 131* 130* 133*   POTASSIUM mmol/L 4.7 4.8 4.7   CHLORIDE mmol/L 96* 95* 97*   CO2 mmol/L 24.7 25.0 27.9   BUN mg/dL 22 22 19   CREATININE mg/dL 0.73 0.90 0.65   CALCIUM mg/dL 9.6 9.1 9.0   BILIRUBIN mg/dL 0.5 0.7 0.5   ALK PHOS U/L 189* 207* 176*   ALT (SGPT) U/L 188* 209* 173*   AST (SGOT) U/L 121* 164* 171*   GLUCOSE mg/dL 139* 122* 136*     Results from last 7 days   Lab Units 12/11/23  0623 12/10/23  0710 12/09/23  0612 12/07/23  0845 12/06/23  0620   MAGNESIUM mg/dL 2.2 2.1 2.3   < > 1.5*   PHOSPHORUS mg/dL 3.6 4.1 4.0   < > 1.2*   HEMOGLOBIN g/dL 9.6* 8.3* 9.8*   < > 5.8*   HEMATOCRIT % 28.9* 24.7* 28.9*   < > 18.0*   WBC 10*3/mm3 14.46* 23.97* 13.85*   < > 15.68*   TRIGLYCERIDES mg/dL  --   --   --   --  145   ALBUMIN g/dL 2.3* 2.4* 2.3*   < >  --     < > = values in this interval not displayed.     Results from last 7 days   Lab Units 12/11/23  0623 12/10/23  0710 12/09/23  0612 12/08/23  0744 12/07/23  0845 12/06/23  0620   INR   --   --   --   --   --  1.10   PLATELETS " "10*3/mm3 452* 550* 575* 676* 550* 623*     No results found for: \"COVID19\"  Lab Results   Component Value Date    HGBA1C 5.3 06/20/2022          Medications            Scheduled Medications ALPRAZolam, 0.5 mg, Oral, Daily  amLODIPine, 2.5 mg, Oral, Q24H  enoxaparin, 30 mg, Subcutaneous, Q24H  [START ON 12/12/2023] Fat Emul Fish Oil/Plant Based, 100 mL, Intravenous, Once per day on Tue Thu Sat  metoprolol tartrate, 12.5 mg, Oral, Q12H  pantoprazole, 40 mg, Intravenous, Q AM  PARoxetine, 40 mg, Oral, Daily  piperacillin-tazobactam, 3.375 g, Intravenous, Q8H  QUEtiapine fumarate ER, 100 mg, Oral, Nightly  sodium chloride, 10 mL, Intravenous, Q12H  sodium chloride, 10 mL, Intravenous, Q12H        Infusions Adult Central 2-in-1 TPN, , Last Rate: 75 mL/hr at 12/10/23 1801  Adult Central 2-in-1 TPN,   Pharmacy to Dose TPN,         PRN Medications   hydrALAZINE    HYDROmorphone    labetalol    Methocarbamol    metoprolol tartrate    [DISCONTINUED] ondansetron **OR** ondansetron    oxyCODONE    Pharmacy to Dose TPN    prochlorperazine    sodium chloride     Physical Findings              General Findings alert, oriented, room air   Oral/Mouth Cavity WDL   Edema  no edema   Gastrointestinal hypoactive, nausea, last bowel movement: 12/8   Skin  bruising, pale, surgical incision: midline abdomen incision    Tubes/Drains/Lines Drain medial RLQ and medial LLQ, drain right posterior back, PICC     NFPE See Malnutrition Severity Assessment, Date completed 11/21      Malnutrition Severity Assessment       Patient meets criteria for : Severe Malnutrition (Pt meets ASPEN/AND criteria for nutrition dx of severe malnutrition of acute disease related to energy intake, muscle wasting, and fat loss.)       NUTRITION INTERVENTION / PLAN OF CARE  Intervention Goal         Intervention Goal(s) Maintain nutrition status, Improved nutrition related labs, Meet estimated needs, Initiate feeding/diet, Tolerate PO , Increase intake, Advance diet, " Maintain TF/PN, Transition PN to PO, and No significant weight loss     Nutrition Intervention         RD Action Await initiation/advancement of PO diet, Encourage intake, and Continue to monitor     Prescription         Diet Prescription ADAT     Supplement Prescription    EN/PN Prescription    Parenteral Prescription:      TPN Route PICC   TPN Rate (mL/hr)  75 mL/hr (1800 mL total)   TPN Recommendation:         Dextrose (kcal) 1200       Amino Acid (gm) 70 gm (280 kcal)       Lipid Concentration 20%       Lipid Volume/Frequency  100 mL, daily (200 kcal)    Propofol Rate/Kcal     TPN Provision:  1680 kcal, 70 gm protein        Calories 100% needs met        Protein  100% needs met        Fluid 1680 mL      New Prescription Ordered? Continue same per protocol, No changes at this time   --  Monitor/Evaluation        Monitor Per protocol, PO intake, Pertinent labs, PN delivery/tolerance, GI status, Symptoms, POC/GOC   Discharge Needs Pending clinical course   Education Will instruct as appropriate       Electronically signed by:  Lin Garcia, Dietitic Intern   12/11/23 16:24 EST

## 2023-12-11 NOTE — PROGRESS NOTES
"[facility] Clinical Pharmacy Services: Total Parental Nutrition Initial Consult    TPN Day #7  Indication: Prolonged Paralytic Ileus, severe malnutrition  Route: central  Type: standard    Relevant clinical data and objective history reviewed:  68 y.o. female 154.9 cm (61\") 48.8 kg (107 lb 9.4 oz)    Results from last 7 days   Lab Units 23  0623 23  0845 23  0620   SODIUM mmol/L 131*   < > 132*   POTASSIUM mmol/L 4.7   < > 3.9   CHLORIDE mmol/L 96*   < > 104   CO2 mmol/L 24.7   < > 20.0*   BUN mg/dL 22   < > 13   CREATININE mg/dL 0.73   < > 0.72   CALCIUM mg/dL 9.6   < > 8.3*   ALBUMIN g/dL 2.3*   < >  --    BILIRUBIN mg/dL 0.5   < >  --    ALK PHOS U/L 189*   < >  --    ALT (SGPT) U/L 188*   < >  --    AST (SGOT) U/L 121*   < >  --    GLUCOSE mg/dL 139*   < > 184*   MAGNESIUM mg/dL 2.2   < > 1.5*   PHOSPHORUS mg/dL 3.6   < > 1.2*   TRIGLYCERIDES mg/dL  --   --  145    < > = values in this interval not displayed.        Estimated Creatinine Clearance: 56.8 mL/min (by C-G formula based on SCr of 0.73 mg/dL).    Active fluid orders: None    Dietary Orders (From admission, onward)       Start     Ordered    23 1415  Diet: Liquid Diets; Clear Liquid; Fluid Consistency: Thin (IDDSI 0)  Diet Effective Now        References:    Diet Order Crosswalk   Question Answer Comment   Diets: Liquid Diets    Liquid Diet: Clear Liquid    Fluid Consistency: Thin (IDDSI 0)        23 1414                  Assessment  Additional insulin administration while previous TPN infusin units  Additional electrolyte administration while previous TPN infusing: None  Acid suppression: pantoprazole 40 mg IV qAM    Na and chloride relatively the same. LFTs slightly better. TPN was modified yesterday to decrease glucose due to elevated glucose infusion rate which could be a potential factor for elevated LFTs. Lipids were also modified to SMOFlipid qMWF. I think it would be best to hold lipids for one more day to " allow a little more time for LFTs to correct. Continue other macro the same for today.    Goal              Protein: 70 gram/day              Dextrose: 1200 Kcal/day              Lipids: 200 kcal  Total: 1580 kCal/day    Plan     Protein/Dextrose/Lipids: 70g/1000kCal/ 100 mL adjusted lipids to Tues/Thurs/Sat in order to hold for one day longer   Volume: 1800 ml (75 mL/hr over 24 hrs daily)    Based on the above labs, will add the following electrolytes/additives to the TPN.    Sodium Chloride: 100 mEq (increased)   Sodium Acetate: 40 mEq   Sodium Phosphate: 20 mEq   Potassium Chloride: 50 mEq   Potassium Acetate: 0 mEq   Potassium Phosphate: 25 mEq    Calcium Gluconate: 9 mEq   Magnesium Sulfate: 20 mEq   MVI for TPN   Trace Elements    Labs to be ordered: Daily CMP, accuchecks, phos and magnesium. Weekly TG (due 12/13)    Pharmacy will continue to follow.     Prosper Eric [facility]  Clinical Pharmacist

## 2023-12-11 NOTE — PLAN OF CARE
Goal Outcome Evaluation:              Outcome Evaluation: Tolerating reg diet.  Only slight nausea today.  Medicated prn for pain.  Pt states no relief from oral pain med.  ContinueTPN. Encouraged IS and increased activity.

## 2023-12-11 NOTE — PLAN OF CARE
Problem: Adult Inpatient Plan of Care  Goal: Plan of Care Review  Outcome: Ongoing, Progressing  Flowsheets (Taken 12/11/2023 0522)  Plan of Care Reviewed With: patient     Problem: Adult Inpatient Plan of Care  Goal: Plan of Care Review  Outcome: Ongoing, Progressing  Flowsheets (Taken 12/11/2023 0522)  Plan of Care Reviewed With: patient  Goal: Patient-Specific Goal (Individualized)  Outcome: Ongoing, Progressing  Goal: Absence of Hospital-Acquired Illness or Injury  Outcome: Ongoing, Progressing  Intervention: Identify and Manage Fall Risk  Recent Flowsheet Documentation  Taken 12/11/2023 0443 by Edwin Salas RN  Safety Promotion/Fall Prevention:   safety round/check completed   room organization consistent   nonskid shoes/slippers when out of bed   elopement precautions   fall prevention program maintained   clutter free environment maintained   assistive device/personal items within reach   activity supervised  Taken 12/11/2023 0239 by Edwin Salas RN  Safety Promotion/Fall Prevention:   safety round/check completed   room organization consistent   nonskid shoes/slippers when out of bed   fall prevention program maintained   elopement precautions   assistive device/personal items within reach   clutter free environment maintained   activity supervised  Taken 12/11/2023 0051 by Edwin Salas RN  Safety Promotion/Fall Prevention:   safety round/check completed   room organization consistent   nonskid shoes/slippers when out of bed   fall prevention program maintained   elopement precautions   clutter free environment maintained   activity supervised   assistive device/personal items within reach  Taken 12/10/2023 2230 by Edwin Salas RN  Safety Promotion/Fall Prevention:   safety round/check completed   room organization consistent   nonskid shoes/slippers when out of bed   fall prevention program maintained   elopement precautions   clutter free environment maintained   assistive  device/personal items within reach   activity supervised  Taken 12/10/2023 2045 by Edwin Salas RN  Safety Promotion/Fall Prevention:   safety round/check completed   room organization consistent   nonskid shoes/slippers when out of bed   fall prevention program maintained   elopement precautions   clutter free environment maintained   assistive device/personal items within reach   activity supervised  Intervention: Prevent Skin Injury  Recent Flowsheet Documentation  Taken 12/11/2023 0443 by Edwin Salas RN  Body Position: position changed independently  Taken 12/11/2023 0239 by Edwin Salas RN  Body Position: position changed independently  Taken 12/11/2023 0051 by Edwin Salas RN  Body Position: position changed independently  Taken 12/10/2023 2230 by Edwin Salas RN  Body Position: position changed independently  Taken 12/10/2023 2045 by Edwin Salas RN  Body Position: position changed independently  Intervention: Prevent and Manage VTE (Venous Thromboembolism) Risk  Recent Flowsheet Documentation  Taken 12/11/2023 0443 by Edwin Salas RN  Activity Management: activity encouraged  Taken 12/11/2023 0239 by Edwin Salas RN  Activity Management: activity encouraged  Taken 12/11/2023 0051 by Edwin Salas RN  Activity Management: activity encouraged  Taken 12/10/2023 2230 by Edwin Salas RN  Activity Management: activity encouraged  Taken 12/10/2023 2045 by Edwin Salas RN  Activity Management: activity encouraged  Intervention: Prevent Infection  Recent Flowsheet Documentation  Taken 12/11/2023 0443 by Edwin Salas RN  Infection Prevention:   single patient room provided   rest/sleep promoted  Taken 12/11/2023 0239 by Edwin Salas RN  Infection Prevention:   single patient room provided   rest/sleep promoted  Taken 12/11/2023 0051 by Edwin Salas RN  Infection Prevention:   single patient room provided   rest/sleep promoted  Taken  12/10/2023 2230 by Edwin Salas, RN  Infection Prevention:   single patient room provided   rest/sleep promoted  Taken 12/10/2023 2045 by Edwin Salas RN  Infection Prevention:   single patient room provided   rest/sleep promoted  Goal: Optimal Comfort and Wellbeing  Outcome: Ongoing, Progressing  Goal: Readiness for Transition of Care  Outcome: Ongoing, Progressing   Goal Outcome Evaluation:  Plan of Care Reviewed With: patient

## 2023-12-12 LAB
ALBUMIN SERPL-MCNC: 3.1 G/DL (ref 3.5–5.2)
ALBUMIN/GLOB SERPL: 0.8 G/DL
ALP SERPL-CCNC: 258 U/L (ref 39–117)
ALT SERPL W P-5'-P-CCNC: 179 U/L (ref 1–33)
ANION GAP SERPL CALCULATED.3IONS-SCNC: 10 MMOL/L (ref 5–15)
AST SERPL-CCNC: 96 U/L (ref 1–32)
BILIRUB SERPL-MCNC: 0.7 MG/DL (ref 0–1.2)
BUN SERPL-MCNC: 20 MG/DL (ref 8–23)
BUN/CREAT SERPL: 25 (ref 7–25)
CALCIUM SPEC-SCNC: 10 MG/DL (ref 8.6–10.5)
CHLORIDE SERPL-SCNC: 92 MMOL/L (ref 98–107)
CO2 SERPL-SCNC: 26 MMOL/L (ref 22–29)
CREAT SERPL-MCNC: 0.8 MG/DL (ref 0.57–1)
DEPRECATED RDW RBC AUTO: 47.4 FL (ref 37–54)
EGFRCR SERPLBLD CKD-EPI 2021: 80.4 ML/MIN/1.73
ERYTHROCYTE [DISTWIDTH] IN BLOOD BY AUTOMATED COUNT: 14.7 % (ref 12.3–15.4)
GLOBULIN UR ELPH-MCNC: 3.7 GM/DL
GLUCOSE BLDC GLUCOMTR-MCNC: 146 MG/DL (ref 70–130)
GLUCOSE BLDC GLUCOMTR-MCNC: 153 MG/DL (ref 70–130)
GLUCOSE BLDC GLUCOMTR-MCNC: 161 MG/DL (ref 70–130)
GLUCOSE BLDC GLUCOMTR-MCNC: 169 MG/DL (ref 70–130)
GLUCOSE SERPL-MCNC: 169 MG/DL (ref 65–99)
HCT VFR BLD AUTO: 31.7 % (ref 34–46.6)
HGB BLD-MCNC: 10.8 G/DL (ref 12–15.9)
MAGNESIUM SERPL-MCNC: 2.2 MG/DL (ref 1.6–2.4)
MCH RBC QN AUTO: 30.7 PG (ref 26.6–33)
MCHC RBC AUTO-ENTMCNC: 34.1 G/DL (ref 31.5–35.7)
MCV RBC AUTO: 90.1 FL (ref 79–97)
PHOSPHATE SERPL-MCNC: 3.1 MG/DL (ref 2.5–4.5)
PLATELET # BLD AUTO: 535 10*3/MM3 (ref 140–450)
PMV BLD AUTO: 8.8 FL (ref 6–12)
POTASSIUM SERPL-SCNC: 4.5 MMOL/L (ref 3.5–5.2)
PROT SERPL-MCNC: 6.8 G/DL (ref 6–8.5)
RBC # BLD AUTO: 3.52 10*6/MM3 (ref 3.77–5.28)
SODIUM SERPL-SCNC: 128 MMOL/L (ref 136–145)
WBC NRBC COR # BLD AUTO: 17.29 10*3/MM3 (ref 3.4–10.8)

## 2023-12-12 PROCEDURE — 25010000002 CALCIUM GLUCONATE PER 10 ML: Performed by: SURGERY

## 2023-12-12 PROCEDURE — 25010000002 ONDANSETRON PER 1 MG: Performed by: SURGERY

## 2023-12-12 PROCEDURE — 25010000002 PROCHLORPERAZINE 10 MG/2ML SOLUTION: Performed by: SURGERY

## 2023-12-12 PROCEDURE — 82948 REAGENT STRIP/BLOOD GLUCOSE: CPT

## 2023-12-12 PROCEDURE — 83735 ASSAY OF MAGNESIUM: CPT | Performed by: SURGERY

## 2023-12-12 PROCEDURE — 25010000002 POTASSIUM CHLORIDE PER 2 MEQ OF POTASSIUM: Performed by: SURGERY

## 2023-12-12 PROCEDURE — 25010000002 MAGNESIUM SULFATE PER 500 MG OF MAGNESIUM: Performed by: SURGERY

## 2023-12-12 PROCEDURE — 99024 POSTOP FOLLOW-UP VISIT: CPT | Performed by: SURGERY

## 2023-12-12 PROCEDURE — 25010000002 HYDROMORPHONE PER 4 MG: Performed by: SURGERY

## 2023-12-12 PROCEDURE — 80053 COMPREHEN METABOLIC PANEL: CPT | Performed by: SURGERY

## 2023-12-12 PROCEDURE — 99232 SBSQ HOSP IP/OBS MODERATE 35: CPT | Performed by: INTERNAL MEDICINE

## 2023-12-12 PROCEDURE — 25010000002 PIPERACILLIN SOD-TAZOBACTAM PER 1 G: Performed by: SURGERY

## 2023-12-12 PROCEDURE — 85027 COMPLETE CBC AUTOMATED: CPT | Performed by: SURGERY

## 2023-12-12 PROCEDURE — 84100 ASSAY OF PHOSPHORUS: CPT | Performed by: SURGERY

## 2023-12-12 RX ADMIN — SMOFLIPID 100 ML: 6; 6; 5; 3 INJECTION, EMULSION INTRAVENOUS at 18:32

## 2023-12-12 RX ADMIN — METOPROLOL TARTRATE 12.5 MG: 25 TABLET, FILM COATED ORAL at 01:46

## 2023-12-12 RX ADMIN — ONDANSETRON 4 MG: 2 INJECTION INTRAMUSCULAR; INTRAVENOUS at 18:20

## 2023-12-12 RX ADMIN — HYDROMORPHONE HYDROCHLORIDE 0.5 MG: 1 INJECTION, SOLUTION INTRAMUSCULAR; INTRAVENOUS; SUBCUTANEOUS at 13:27

## 2023-12-12 RX ADMIN — HYDROMORPHONE HYDROCHLORIDE 0.5 MG: 1 INJECTION, SOLUTION INTRAMUSCULAR; INTRAVENOUS; SUBCUTANEOUS at 18:20

## 2023-12-12 RX ADMIN — POTASSIUM PHOSPHATE, MONOBASIC POTASSIUM PHOSPHATE, DIBASIC: 224; 236 INJECTION, SOLUTION, CONCENTRATE INTRAVENOUS at 18:16

## 2023-12-12 RX ADMIN — PIPERACILLIN SODIUM AND TAZOBACTAM SODIUM 3.38 G: 3; .375 INJECTION, SOLUTION INTRAVENOUS at 11:11

## 2023-12-12 RX ADMIN — AMLODIPINE BESYLATE 2.5 MG: 2.5 TABLET ORAL at 10:08

## 2023-12-12 RX ADMIN — Medication 10 ML: at 10:08

## 2023-12-12 RX ADMIN — PROCHLORPERAZINE EDISYLATE 5 MG: 5 INJECTION INTRAMUSCULAR; INTRAVENOUS at 08:15

## 2023-12-12 RX ADMIN — HYDROMORPHONE HYDROCHLORIDE 0.5 MG: 1 INJECTION, SOLUTION INTRAMUSCULAR; INTRAVENOUS; SUBCUTANEOUS at 08:15

## 2023-12-12 RX ADMIN — ALPRAZOLAM 0.5 MG: 0.5 TABLET ORAL at 20:33

## 2023-12-12 RX ADMIN — PROCHLORPERAZINE EDISYLATE 5 MG: 5 INJECTION INTRAMUSCULAR; INTRAVENOUS at 15:26

## 2023-12-12 RX ADMIN — HYDROMORPHONE HYDROCHLORIDE 0.5 MG: 1 INJECTION, SOLUTION INTRAMUSCULAR; INTRAVENOUS; SUBCUTANEOUS at 03:03

## 2023-12-12 RX ADMIN — PROCHLORPERAZINE EDISYLATE 5 MG: 5 INJECTION INTRAMUSCULAR; INTRAVENOUS at 22:34

## 2023-12-12 RX ADMIN — Medication 10 ML: at 22:36

## 2023-12-12 RX ADMIN — PROCHLORPERAZINE EDISYLATE 5 MG: 5 INJECTION INTRAMUSCULAR; INTRAVENOUS at 01:45

## 2023-12-12 RX ADMIN — METOPROLOL TARTRATE 12.5 MG: 25 TABLET, FILM COATED ORAL at 20:33

## 2023-12-12 RX ADMIN — HYDROMORPHONE HYDROCHLORIDE 0.5 MG: 1 INJECTION, SOLUTION INTRAMUSCULAR; INTRAVENOUS; SUBCUTANEOUS at 22:33

## 2023-12-12 RX ADMIN — PANTOPRAZOLE SODIUM 40 MG: 40 INJECTION, POWDER, FOR SOLUTION INTRAVENOUS at 08:15

## 2023-12-12 RX ADMIN — METOPROLOL TARTRATE 12.5 MG: 25 TABLET, FILM COATED ORAL at 10:08

## 2023-12-12 RX ADMIN — PIPERACILLIN SODIUM AND TAZOBACTAM SODIUM 3.38 G: 3; .375 INJECTION, SOLUTION INTRAVENOUS at 18:23

## 2023-12-12 RX ADMIN — PIPERACILLIN SODIUM AND TAZOBACTAM SODIUM 3.38 G: 3; .375 INJECTION, SOLUTION INTRAVENOUS at 03:03

## 2023-12-12 RX ADMIN — PAROXETINE HYDROCHLORIDE 40 MG: 20 TABLET, FILM COATED ORAL at 10:07

## 2023-12-12 RX ADMIN — ONDANSETRON 4 MG: 2 INJECTION INTRAMUSCULAR; INTRAVENOUS at 11:18

## 2023-12-12 RX ADMIN — QUETIAPINE FUMARATE 100 MG: 50 TABLET, EXTENDED RELEASE ORAL at 20:33

## 2023-12-12 RX ADMIN — Medication 10 ML: at 22:37

## 2023-12-12 NOTE — PROGRESS NOTES
Colorectal & General Surgery  Progress Note    Patient: Martina Hardwick  YOB: 1955  MRN: [id number]      Assessment  Martina Hardwick is a 68 y.o. 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    Leukocytosis is stable.  Retroperitoneal drain output continues to be purulent.  Right lower quadrant drain output continues to be bilious.  Suspect this is all contained leak.  Low output overall.  Will continue diet as tolerated.  From my standpoint, 14 days of Zosyn likely completes an appropriate course for her intra-abdominal contamination.  I think it is okay to discontinue Zosyn, but will discuss with Dr. Turk.  We will trend CBC and follow very closely.    Hemoglobin stable, DVT prophylaxis restarted yesterday.    Continue TPN.  Continue regular diet as tolerated but continued nausea is inhibiting that.    Subjective  Nauseated after eating well yesterday.  Having bowel function.    Objective    Vitals:    12/12/23 1007   BP:    Pulse:    Resp:    Temp: 99.1 °F (37.3 °C)   SpO2:        Physical Exam  Constitutional: Well-developed well-nourished, no acute distress  Neck: Supple, trachea midline  Respiratory: No increased work of breathing, Symmetric excursion  Cardiovascular: Well pefursed, no jugular venous distention evident   Abdominal: Soft, nontender, nondistended, percutaneous IR drain is purulent.  MARY drain is bilious with low volume.  Skin: Warm, dry, no rash on visualized skin surfaces  Psychiatric: Alert and oriented ×3, normal affect     Laboratory Results  I have personally reviewed CBC with WBC 17, hemoglobin 10, platelets 535.  CMP with creatinine 0.80, AST 96, , total bilirubin 0.7.    Radiology  None to review         Osvaldo Salinas MD  Colorectal & General  Surgery  [facility]    [address], [address]  [place], [place], [address]  P: [phone]  F: [phone]

## 2023-12-12 NOTE — SIGNIFICANT NOTE
12/12/23 0958   OTHER   Discipline physical therapist   Rehab Time/Intention   Session Not Performed patient/family declined treatment  (Pt reports she isn't Feeling up to therapy this morning despite encouragement from PT.Doesn't want to get up to chair either. Pt had a rough night with nausea and didn't sleep. Will follow up tomorrow. Encouraged to ambulate as tolerated with nursing.)   Recommendation   PT - Next Appointment 12/13/23

## 2023-12-12 NOTE — PROGRESS NOTES
"[facility] Clinical Pharmacy Services: Total Parental Nutrition Initial Consult    TPN Day #8  Indication: Prolonged Paralytic Ileus, severe malnutrition  Route: central  Type: standard    Relevant clinical data and objective history reviewed:  68 y.o. female 154.9 cm (61\") 48.8 kg (107 lb 9.4 oz)    Results from last 7 days   Lab Units 23  0615 23  0845 23  0620   SODIUM mmol/L 128*   < > 132*   POTASSIUM mmol/L 4.5   < > 3.9   CHLORIDE mmol/L 92*   < > 104   CO2 mmol/L 26.0   < > 20.0*   BUN mg/dL 20   < > 13   CREATININE mg/dL 0.80   < > 0.72   CALCIUM mg/dL 10.0   < > 8.3*   ALBUMIN g/dL 3.1*   < >  --    BILIRUBIN mg/dL 0.7   < >  --    ALK PHOS U/L 258*   < >  --    ALT (SGPT) U/L 179*   < >  --    AST (SGOT) U/L 96*   < >  --    GLUCOSE mg/dL 169*   < > 184*   MAGNESIUM mg/dL 2.2   < > 1.5*   PHOSPHORUS mg/dL 3.1   < > 1.2*   TRIGLYCERIDES mg/dL  --   --  145    < > = values in this interval not displayed.        Estimated Creatinine Clearance: 51.9 mL/min (by C-G formula based on SCr of 0.8 mg/dL).    Active fluid orders: None    Dietary Orders (From admission, onward)       Start     Ordered    23 08  Diet: Regular/House Diet; Texture: Mechanical Ground (NDD 2); Fluid Consistency: Thin (IDDSI 0)  Diet Effective Now        References:    Diet Order Crosswalk   Question Answer Comment   Diets: Regular/House Diet    Texture: Mechanical Ground (NDD 2)    Fluid Consistency: Thin (IDDSI 0)        23 0838                  Assessment  Additional insulin administration while previous TPN infusin units  Additional electrolyte administration while previous TPN infusing: None  Acid suppression: pantoprazole 40 mg IV qAM    Na slightly down further . LFTs slightly better. TPN was modified 12/10 to decrease glucose due to elevated glucose infusion rate which could be a potential factor for elevated LFTs. Lipids were also modified to SMOFlipid q Tues/Thurs/Sat    Goal              " Protein: 70 gram/day              Dextrose: 1200 Kcal/day              Lipids: 200 kcal  Total: 1580 kCal/day    Plan     Protein/Dextrose/Lipids: 70g/1000kCal/ 100 mL adjusted lipids to Tues/Thurs/Sat    Volume: 1560 ml (65 mL/hr over 24 hrs daily- 30ml/kg/day)-  decreased to help with Na level some    Based on the above labs, will add the following electrolytes/additives to the TPN.    Sodium Chloride: 100 mEq    Sodium Acetate: 40 mEq   Sodium Phosphate: 30 mEq (increased from 20 mEq)   Potassium Chloride: 50 mEq   Potassium Acetate: 0 mEq   Potassium Phosphate: 25 mEq    Calcium Gluconate: 9 mEq   Magnesium Sulfate: 20 mEq   MVI for TPN   Trace Elements    Labs to be ordered: Daily CMP, accuchecks, phos and magnesium. Weekly TG (due 12/13)    Pharmacy will continue to follow.     Prosper Eric [facility]  Clinical Pharmacist

## 2023-12-12 NOTE — PLAN OF CARE
Problem: Adult Inpatient Plan of Care  Goal: Plan of Care Review  Outcome: Ongoing, Progressing  Goal: Patient-Specific Goal (Individualized)  Outcome: Ongoing, Progressing  Goal: Absence of Hospital-Acquired Illness or Injury  Outcome: Ongoing, Progressing  Intervention: Identify and Manage Fall Risk  Recent Flowsheet Documentation  Taken 12/12/2023 0452 by Edwin Salas RN  Safety Promotion/Fall Prevention:   safety round/check completed   room organization consistent   nonskid shoes/slippers when out of bed   fall prevention program maintained   elopement precautions   clutter free environment maintained   assistive device/personal items within reach   activity supervised  Taken 12/12/2023 0200 by Edwin Salas RN  Safety Promotion/Fall Prevention:   safety round/check completed   room organization consistent   nonskid shoes/slippers when out of bed   fall prevention program maintained   elopement precautions   activity supervised   clutter free environment maintained   assistive device/personal items within reach  Taken 12/12/2023 0032 by Edwin Salas RN  Safety Promotion/Fall Prevention:   safety round/check completed   room organization consistent   nonskid shoes/slippers when out of bed   clutter free environment maintained   assistive device/personal items within reach   activity supervised   elopement precautions   fall prevention program maintained  Taken 12/11/2023 2234 by Edwin Salas RN  Safety Promotion/Fall Prevention:   safety round/check completed   room organization consistent   nonskid shoes/slippers when out of bed   fall prevention program maintained   elopement precautions   activity supervised   assistive device/personal items within reach   clutter free environment maintained  Taken 12/11/2023 2033 by Edwin Salas RN  Safety Promotion/Fall Prevention:   safety round/check completed   room organization consistent   nonskid shoes/slippers when out of bed   fall  prevention program maintained   elopement precautions   clutter free environment maintained   assistive device/personal items within reach   activity supervised  Intervention: Prevent Skin Injury  Recent Flowsheet Documentation  Taken 12/12/2023 0452 by Edwin Salas RN  Body Position: position changed independently  Taken 12/12/2023 0200 by Edwin Salas RN  Body Position:   position changed independently   weight shifting   tilted  Taken 12/12/2023 0032 by Edwin Salas RN  Body Position:   position changed independently   weight shifting   tilted  Taken 12/11/2023 2234 by Edwin Salas RN  Body Position:   position changed independently   weight shifting  Taken 12/11/2023 2033 by Edwin Salas RN  Body Position: position changed independently  Intervention: Prevent and Manage VTE (Venous Thromboembolism) Risk  Recent Flowsheet Documentation  Taken 12/12/2023 0452 by Edwin Salas RN  Activity Management: activity encouraged  Taken 12/12/2023 0200 by Edwin Salas RN  Activity Management: activity encouraged  Taken 12/12/2023 0032 by Edwin Salas RN  Activity Management: activity encouraged  Taken 12/11/2023 2234 by Edwin Salas RN  Activity Management: activity encouraged  Taken 12/11/2023 2033 by Edwin Salas RN  Activity Management: activity encouraged  Intervention: Prevent Infection  Recent Flowsheet Documentation  Taken 12/12/2023 0452 by Edwin Salas RN  Infection Prevention:   single patient room provided   rest/sleep promoted  Taken 12/12/2023 0200 by Edwin Salas RN  Infection Prevention:   single patient room provided   rest/sleep promoted  Taken 12/12/2023 0032 by Edwin Salas RN  Infection Prevention:   single patient room provided   rest/sleep promoted  Taken 12/11/2023 2234 by Edwin Salas RN  Infection Prevention:   single patient room provided   rest/sleep promoted  Taken 12/11/2023 2033 by Edwin Salas  RN  Infection Prevention:   single patient room provided   rest/sleep promoted  Goal: Optimal Comfort and Wellbeing  Outcome: Ongoing, Progressing  Goal: Readiness for Transition of Care  Outcome: Ongoing, Progressing   Goal Outcome Evaluation:  Plan of Care Reviewed With: patient

## 2023-12-13 LAB
ALBUMIN SERPL-MCNC: 2.8 G/DL (ref 3.5–5.2)
ALBUMIN/GLOB SERPL: 0.6 G/DL
ALP SERPL-CCNC: 303 U/L (ref 39–117)
ALT SERPL W P-5'-P-CCNC: 163 U/L (ref 1–33)
ANION GAP SERPL CALCULATED.3IONS-SCNC: 11.3 MMOL/L (ref 5–15)
AST SERPL-CCNC: 77 U/L (ref 1–32)
BACTERIA SPEC ANAEROBE CULT: NORMAL
BILIRUB SERPL-MCNC: 0.8 MG/DL (ref 0–1.2)
BUN SERPL-MCNC: 16 MG/DL (ref 8–23)
BUN/CREAT SERPL: 21.3 (ref 7–25)
CALCIUM SPEC-SCNC: 10.2 MG/DL (ref 8.6–10.5)
CHLORIDE SERPL-SCNC: 92 MMOL/L (ref 98–107)
CO2 SERPL-SCNC: 25.7 MMOL/L (ref 22–29)
CREAT SERPL-MCNC: 0.75 MG/DL (ref 0.57–1)
EGFRCR SERPLBLD CKD-EPI 2021: 86.8 ML/MIN/1.73
FUNGUS WND CULT: ABNORMAL
GLOBULIN UR ELPH-MCNC: 4.5 GM/DL
GLUCOSE BLDC GLUCOMTR-MCNC: 146 MG/DL (ref 70–130)
GLUCOSE BLDC GLUCOMTR-MCNC: 157 MG/DL (ref 70–130)
GLUCOSE BLDC GLUCOMTR-MCNC: 162 MG/DL (ref 70–130)
GLUCOSE BLDC GLUCOMTR-MCNC: 163 MG/DL (ref 70–130)
GLUCOSE SERPL-MCNC: 155 MG/DL (ref 65–99)
MAGNESIUM SERPL-MCNC: 2.5 MG/DL (ref 1.6–2.4)
PHOSPHATE SERPL-MCNC: 3.2 MG/DL (ref 2.5–4.5)
POTASSIUM SERPL-SCNC: 5.4 MMOL/L (ref 3.5–5.2)
PROT SERPL-MCNC: 7.3 G/DL (ref 6–8.5)
SODIUM SERPL-SCNC: 129 MMOL/L (ref 136–145)
TRIGL SERPL-MCNC: 91 MG/DL (ref 0–150)

## 2023-12-13 PROCEDURE — 80053 COMPREHEN METABOLIC PANEL: CPT | Performed by: SURGERY

## 2023-12-13 PROCEDURE — 25010000002 HYDROMORPHONE PER 4 MG: Performed by: SURGERY

## 2023-12-13 PROCEDURE — 99232 SBSQ HOSP IP/OBS MODERATE 35: CPT | Performed by: INTERNAL MEDICINE

## 2023-12-13 PROCEDURE — 97110 THERAPEUTIC EXERCISES: CPT

## 2023-12-13 PROCEDURE — 99024 POSTOP FOLLOW-UP VISIT: CPT | Performed by: SURGERY

## 2023-12-13 PROCEDURE — 25010000002 PROCHLORPERAZINE 10 MG/2ML SOLUTION: Performed by: SURGERY

## 2023-12-13 PROCEDURE — 25010000002 PIPERACILLIN SOD-TAZOBACTAM PER 1 G: Performed by: SURGERY

## 2023-12-13 PROCEDURE — 82948 REAGENT STRIP/BLOOD GLUCOSE: CPT

## 2023-12-13 PROCEDURE — 84100 ASSAY OF PHOSPHORUS: CPT | Performed by: SURGERY

## 2023-12-13 PROCEDURE — 84478 ASSAY OF TRIGLYCERIDES: CPT | Performed by: SURGERY

## 2023-12-13 PROCEDURE — 25010000002 ENOXAPARIN PER 10 MG: Performed by: SURGERY

## 2023-12-13 PROCEDURE — 83735 ASSAY OF MAGNESIUM: CPT | Performed by: SURGERY

## 2023-12-13 PROCEDURE — 25010000002 MAGNESIUM SULFATE PER 500 MG OF MAGNESIUM: Performed by: SURGERY

## 2023-12-13 PROCEDURE — 25010000002 CALCIUM GLUCONATE PER 10 ML: Performed by: SURGERY

## 2023-12-13 RX ORDER — FAMOTIDINE 20 MG/1
20 TABLET, FILM COATED ORAL
Status: DISCONTINUED | OUTPATIENT
Start: 2023-12-13 | End: 2023-12-20 | Stop reason: HOSPADM

## 2023-12-13 RX ADMIN — HYDROMORPHONE HYDROCHLORIDE 0.5 MG: 1 INJECTION, SOLUTION INTRAMUSCULAR; INTRAVENOUS; SUBCUTANEOUS at 03:11

## 2023-12-13 RX ADMIN — HYDROMORPHONE HYDROCHLORIDE 0.5 MG: 1 INJECTION, SOLUTION INTRAMUSCULAR; INTRAVENOUS; SUBCUTANEOUS at 20:26

## 2023-12-13 RX ADMIN — PANTOPRAZOLE SODIUM 40 MG: 40 INJECTION, POWDER, FOR SOLUTION INTRAVENOUS at 07:44

## 2023-12-13 RX ADMIN — METOPROLOL TARTRATE 12.5 MG: 25 TABLET, FILM COATED ORAL at 09:11

## 2023-12-13 RX ADMIN — PIPERACILLIN SODIUM AND TAZOBACTAM SODIUM 3.38 G: 3; .375 INJECTION, SOLUTION INTRAVENOUS at 20:27

## 2023-12-13 RX ADMIN — ENOXAPARIN SODIUM 30 MG: 100 INJECTION SUBCUTANEOUS at 20:26

## 2023-12-13 RX ADMIN — QUETIAPINE FUMARATE 100 MG: 50 TABLET, EXTENDED RELEASE ORAL at 20:26

## 2023-12-13 RX ADMIN — HYDROMORPHONE HYDROCHLORIDE 0.5 MG: 1 INJECTION, SOLUTION INTRAMUSCULAR; INTRAVENOUS; SUBCUTANEOUS at 09:11

## 2023-12-13 RX ADMIN — Medication 10 ML: at 09:11

## 2023-12-13 RX ADMIN — PROCHLORPERAZINE EDISYLATE 5 MG: 5 INJECTION INTRAMUSCULAR; INTRAVENOUS at 20:25

## 2023-12-13 RX ADMIN — ALPRAZOLAM 0.5 MG: 0.5 TABLET ORAL at 20:27

## 2023-12-13 RX ADMIN — SODIUM PHOSPHATE, MONOBASIC, MONOHYDRATE AND SODIUM PHOSPHATE, DIBASIC, ANHYDROUS: 142; 276 INJECTION, SOLUTION INTRAVENOUS at 17:14

## 2023-12-13 RX ADMIN — PIPERACILLIN SODIUM AND TAZOBACTAM SODIUM 3.38 G: 3; .375 INJECTION, SOLUTION INTRAVENOUS at 13:55

## 2023-12-13 RX ADMIN — PROCHLORPERAZINE EDISYLATE 5 MG: 5 INJECTION INTRAMUSCULAR; INTRAVENOUS at 13:55

## 2023-12-13 RX ADMIN — PAROXETINE HYDROCHLORIDE 40 MG: 20 TABLET, FILM COATED ORAL at 09:11

## 2023-12-13 RX ADMIN — AMLODIPINE BESYLATE 2.5 MG: 2.5 TABLET ORAL at 09:11

## 2023-12-13 RX ADMIN — FAMOTIDINE 20 MG: 20 TABLET ORAL at 17:14

## 2023-12-13 RX ADMIN — PIPERACILLIN SODIUM AND TAZOBACTAM SODIUM 3.38 G: 3; .375 INJECTION, SOLUTION INTRAVENOUS at 04:30

## 2023-12-13 RX ADMIN — METOPROLOL TARTRATE 12.5 MG: 25 TABLET, FILM COATED ORAL at 20:26

## 2023-12-13 RX ADMIN — ENOXAPARIN SODIUM 30 MG: 100 INJECTION SUBCUTANEOUS at 00:10

## 2023-12-13 RX ADMIN — HYDROMORPHONE HYDROCHLORIDE 0.5 MG: 1 INJECTION, SOLUTION INTRAMUSCULAR; INTRAVENOUS; SUBCUTANEOUS at 13:55

## 2023-12-13 NOTE — PLAN OF CARE
Goal Outcome Evaluation:  Plan of Care Reviewed With: patient           Outcome Evaluation: Remainds on TPN anf Lipids tonight, treated for pain with Dilaudid as ordered, refuses to take pain pills states they do not work for her, abdominal incision looks good healing with staples intact, will continue to monitor.         Problem: Adult Inpatient Plan of Care  Goal: Plan of Care Review  Outcome: Ongoing, Progressing  Flowsheets (Taken 12/13/2023 0520)  Plan of Care Reviewed With: patient  Outcome Evaluation: Remainds on TPN anf Lipids tonight, treated for pain with Dilaudid as ordered, refuses to take pain pills states they do not work for her, abdominal incision looks good healing with staples intact, will continue to monitor.  Goal: Patient-Specific Goal (Individualized)  Outcome: Ongoing, Progressing  Goal: Absence of Hospital-Acquired Illness or Injury  Outcome: Ongoing, Progressing  Intervention: Identify and Manage Fall Risk  Recent Flowsheet Documentation  Taken 12/13/2023 0400 by Radha Pandey RN  Safety Promotion/Fall Prevention: safety round/check completed  Taken 12/13/2023 0200 by Radha Pandey RN  Safety Promotion/Fall Prevention: safety round/check completed  Taken 12/13/2023 0015 by Radha Pandey RN  Safety Promotion/Fall Prevention: safety round/check completed  Taken 12/12/2023 2200 by Radha Pandey RN  Safety Promotion/Fall Prevention: safety round/check completed  Taken 12/12/2023 2023 by Radha Pandey RN  Safety Promotion/Fall Prevention: safety round/check completed  Intervention: Prevent Infection  Recent Flowsheet Documentation  Taken 12/12/2023 2200 by Radha Pandey RN  Infection Prevention:   single patient room provided   rest/sleep promoted  Goal: Optimal Comfort and Wellbeing  Outcome: Ongoing, Progressing  Intervention: Monitor Pain and Promote Comfort  Recent Flowsheet Documentation  Taken 12/13/2023 0311 by Radha Pandey RN  Pain Management  Interventions: see MAR  Taken 12/12/2023 2023 by Radha Pandey, RN  Pain Management Interventions: see MAR  Intervention: Provide Person-Centered Care  Recent Flowsheet Documentation  Taken 12/12/2023 2023 by Radha Pandey, RN  Trust Relationship/Rapport:   care explained   choices provided   questions answered   reassurance provided  Goal: Readiness for Transition of Care  Outcome: Ongoing, Progressing

## 2023-12-13 NOTE — PROGRESS NOTES
Nutrition Services    Patient Name:  Martina Hardwick  YOB: 1955  MRN: [id number]  Admit Date:  11/20/2023    Assessment Date:  12/13/23    CLINICAL NUTRITION    Comments: Follow up for TPN    TPN Day: 9  Indication: Prolonged Paralytic Ileus, severe malnutrition  Route: central  Type: standard  Last BM: 12/8  Daily Assessment              Protein/Dextrose/Lipids: 70g/1000kcal/100mL Tues/Thurs/Sat               Volume: 1560 ml (65 mL/hr over 24 hrs daily) - PharmD decreased volume and rate to help with Na levels     Goal              Protein: 70 gram/day              Dextrose: 1200 Kcal/day              Lipids: 200 kcal  Total: 1580 kCal/day                TPN follow up completed. Visited pt at bedside. She endorses poor appetite. Eating 0% PO per EMR. C/o nausea and acid reflux. Encouraged pt to remain in upright position especially after eating. Pt was agreeable to try Ensure Compact.     REC:  Continue TPN regimen at goal per PharmD  Adding Ensure Compact vanilla TID for additional calories/protein and to support adequate PO intake and appropriate wt gn   Will continue to encourage and monitor PO/ONS intake     RD to continue to monitor per protocol.     Encounter Information         Reason For Encounter Follow up for TPN    Current Issues Needs alternate route of nutrition (TPN)      Estimated Requirements            Weight used  46.6 kg     Calories  8465-1027 (30 kcal/kg, 35 kcal/kg)    Protein  56-70 (1.2 - 1.5 gm/kg)    Fluid  1 mL/kcal     Current Nutrition Orders & Evaluation of Intake       Oral Nutrition     Current PO Diet Diet: Regular/House Diet; Texture: Mechanical Ground (NDD 2); Fluid Consistency: Thin (IDDSI 0)  Adult Central 2-in-1 TPN   Supplement    PO Evaluation     Trending % PO Intake 0%     Factors Affecting Intake  altered GI function, decreased appetite, nausea, Other:acid reflux       Parenteral Nutrition      TPN Route PICC   TPN Rate (mL/hr) 65 mL/hr    Current TPN Order   "      Dextrose (kcal) 1000 kcal        Amino Acid (gm) 70 gm (280 kcal)       Lipid Concentration 20%       Lipid Volume/Frequency  100 mL, 3 times weekly Tues/Thurs/Sat (200 kcal)   MVI Frequency  Per PharmD   Trace Element Frequency  Per PharmD   Total # Days on TPN 9   Propofol Rate/Kcal    TPN Current Provision          Calories 100% of needs met        Protein (gm) 100% of needs met        Fluid (mL)    TPN Needs Evaluation meeting needs for calories, meeting needs for protein   TPN Changes other: PharmD decreased volume and rate to 1560 mL @ 65 mL/hr to help with Na levels and adjusted lipids to Tues/Thurs/Sat      Anthropometrics          Height    Weight Height: 154.9 cm (61\")  Weight: 48.8 kg (107 lb 9.4 oz) (12/04/23 5981)    BMI kg/m2 Body mass index is 20.33 kg/m².  Normal/Healthy (18.4 - 24.9)    Weight trend Stable per EMR. Pt endorses 26 lb wt loss x 3 yrs      Labs        Pertinent Labs Reviewed, listed below     Results from last 7 days   Lab Units 12/13/23  0521 12/12/23  0615 12/11/23  0623   SODIUM mmol/L 129* 128* 131*   POTASSIUM mmol/L 5.4* 4.5 4.7   CHLORIDE mmol/L 92* 92* 96*   CO2 mmol/L 25.7 26.0 24.7   BUN mg/dL 16 20 22   CREATININE mg/dL 0.75 0.80 0.73   CALCIUM mg/dL 10.2 10.0 9.6   BILIRUBIN mg/dL 0.8 0.7 0.5   ALK PHOS U/L 303* 258* 189*   ALT (SGPT) U/L 163* 179* 188*   AST (SGOT) U/L 77* 96* 121*   GLUCOSE mg/dL 155* 169* 139*     Results from last 7 days   Lab Units 12/13/23  0521 12/12/23  0615 12/11/23  0623   MAGNESIUM mg/dL 2.5* 2.2 2.2   PHOSPHORUS mg/dL 3.2 3.1 3.6   HEMOGLOBIN g/dL  --  10.8* 9.6*   HEMATOCRIT %  --  31.7* 28.9*   WBC 10*3/mm3  --  17.29* 14.46*   TRIGLYCERIDES mg/dL 91  --   --    ALBUMIN g/dL 2.8* 3.1* 2.3*     Results from last 7 days   Lab Units 12/12/23  0615 12/11/23  0623 12/10/23  0710 12/09/23  0612 12/08/23  0744   PLATELETS 10*3/mm3 535* 452* 550* 575* 676*     No results found for: \"COVID19\"  Lab Results   Component Value Date    HGBA1C 5.3 " 06/20/2022          Medications            Scheduled Medications ALPRAZolam, 0.5 mg, Oral, Daily  amLODIPine, 2.5 mg, Oral, Q24H  enoxaparin, 30 mg, Subcutaneous, Q24H  Fat Emul Fish Oil/Plant Based, 100 mL, Intravenous, Once per day on Tue Thu Sat  metoprolol tartrate, 12.5 mg, Oral, Q12H  pantoprazole, 40 mg, Intravenous, Q AM  PARoxetine, 40 mg, Oral, Daily  piperacillin-tazobactam, 3.375 g, Intravenous, Q8H  QUEtiapine fumarate ER, 100 mg, Oral, Nightly  sodium chloride, 10 mL, Intravenous, Q12H  sodium chloride, 10 mL, Intravenous, Q12H        Infusions Adult Central 2-in-1 TPN, , Last Rate: 65 mL/hr at 12/12/23 1816  Pharmacy to Dose TPN,         PRN Medications   hydrALAZINE    HYDROmorphone    labetalol    Methocarbamol    metoprolol tartrate    [DISCONTINUED] ondansetron **OR** ondansetron    oxyCODONE    Pharmacy to Dose TPN    prochlorperazine    sodium chloride     Physical Findings              General Findings alert, oriented, room air, frail    Oral/Mouth Cavity WDL   Edema  no edema   Gastrointestinal hypoactive, fecal incontinence, abdominal pain, nausea, last bowel movement: 12/8   Skin  bruising, pale, surgical incision: midline abdomen incision    Tubes/Drains/Lines Drain medial RLQ, drain right posterior back, PICC     NFPE See Malnutrition Severity Assessment, Date completed 11/21      Malnutrition Severity Assessment       Patient meets criteria for : Severe Malnutrition (Pt meets ASPEN/AND criteria for nutrition dx of severe malnutrition of acute disease related to energy intake, muscle wasting, and fat loss.)       NUTRITION INTERVENTION / PLAN OF CARE  Intervention Goal         Intervention Goal(s) Maintain nutrition status, Improved nutrition related labs, Meet estimated needs, Increase intake, Accepts oral nutrition supplement, Maintain TF/PN, Transition PN to PO, and No significant weight loss     Nutrition Intervention         RD Action Await initiation/advancement of PO diet, Encourage  intake, and Continue to monitor     Prescription         Diet Prescription     Supplement Prescription Ensure Compact vanilla TID    EN/PN Prescription    Parenteral Prescription:      TPN Route PICC   TPN Rate (mL/hr)  75 mL/hr (1800 mL total)   TPN Recommendation:         Dextrose (kcal) 1200       Amino Acid (gm) 70 gm (280 kcal)       Lipid Concentration 20%       Lipid Volume/Frequency  100 mL, daily (200 kcal)    Propofol Rate/Kcal     TPN Provision:  1680 kcal, 70 gm protein        Calories 100% needs met        Protein  100% needs met        Fluid 1680 mL      New Prescription Ordered? Yes   --  Monitor/Evaluation        Monitor Per protocol, PO intake, Supplement intake, Pertinent labs, PN delivery/tolerance, GI status, Symptoms, POC/GOC   Discharge Needs Pending clinical course   Education Will instruct as appropriate       Electronically signed by:  Susanna Salas RD  12/13/23 10:39 EST

## 2023-12-13 NOTE — PROGRESS NOTES
"[facility] Clinical Pharmacy Services: Total Parental Nutrition Initial Consult    TPN Day #8  Indication: Prolonged Paralytic Ileus, severe malnutrition  Route: central  Type: standard    Relevant clinical data and objective history reviewed:  68 y.o. female 154.9 cm (61\") 48.8 kg (107 lb 9.4 oz)    Results from last 7 days   Lab Units 23  0521   SODIUM mmol/L 129*   POTASSIUM mmol/L 5.4*   CHLORIDE mmol/L 92*   CO2 mmol/L 25.7   BUN mg/dL 16   CREATININE mg/dL 0.75   CALCIUM mg/dL 10.2   ALBUMIN g/dL 2.8*   BILIRUBIN mg/dL 0.8   ALK PHOS U/L 303*   ALT (SGPT) U/L 163*   AST (SGOT) U/L 77*   GLUCOSE mg/dL 155*   MAGNESIUM mg/dL 2.5*   PHOSPHORUS mg/dL 3.2   TRIGLYCERIDES mg/dL 91        Estimated Creatinine Clearance: 51.9 mL/min (by C-G formula based on SCr of 0.8 mg/dL).    Active fluid orders: None    Dietary Orders (From admission, onward)       Start     Ordered    23 0839  Diet: Regular/House Diet; Texture: Mechanical Ground (NDD 2); Fluid Consistency: Thin (IDDSI 0)  Diet Effective Now        References:    Diet Order Crosswalk   Question Answer Comment   Diets: Regular/House Diet    Texture: Mechanical Ground (NDD 2)    Fluid Consistency: Thin (IDDSI 0)        23 0838                  Assessment  Additional insulin administration while previous TPN infusin units  Additional electrolyte administration while previous TPN infusing: None  Acid suppression: pantoprazole 40 mg IV qAM    Na at 129. LFTs slightly better; K high at 5.4 and Mg high at 2.5 therefore will take KCl & Kphos out of today's TPN and decrease Mg to 10meq from 20 meq. TPN was modified 12/10 to decrease glucose due to elevated glucose infusion rate which could be a potential factor for elevated LFTs. Lipids were also modified to SMOFlipid q Tues/Thurs/Sat. Triglycerides fine at 91 mg/dl.     Goal              Protein: 70 gram/day              Dextrose: 1200 Kcal/day              Lipids: 200 kcal  Total: 1580 " kCal/day    Plan     Protein/Dextrose/Lipids: 70g/1000kCal/ 100 mL adjusted lipids to Tues/Thurs/Sat    Volume: 1560 ml (65 mL/hr over 24 hrs daily- 30ml/kg/day)-  decreased to help with Na level some    Based on the above labs, will add the following electrolytes/additives to the TPN.    Sodium Chloride: 100 mEq    Sodium Acetate: 40 mEq   Sodium Phosphate: 30 mEq    Potassium Chloride: 0 mEq   Potassium Acetate: 0 mEq   Potassium Phosphate: 0 mEq    Calcium Gluconate: 9 mEq   Magnesium Sulfate: 10 mEq   MVI for TPN   Trace Elements    Labs to be ordered: Daily CMP, accuchecks, phos and magnesium. Weekly TG     Pharmacy will continue to follow.     Prosper Horowitz [facility]  Clinical Pharmacist

## 2023-12-13 NOTE — PLAN OF CARE
Goal Outcome Evaluation:     Remains on TPN. Poor appetite, eating 0% PO. C/o nausea and heartburn. Adding Ensure Compact vanilla TID. Will continue to monitor PO intake and tolerance.

## 2023-12-13 NOTE — PLAN OF CARE
Goal Outcome Evaluation:  Plan of Care Reviewed With: patient           Outcome Evaluation: Patient agreeable to skilled PT all long-term goals ongoing.  Patient ambulated short household distances contact-guard rolling walker assist to manage equipment gross weakness.  Anticipate patient will need placement at discharge.      Anticipated Discharge Disposition (PT): skilled nursing facility

## 2023-12-13 NOTE — PROGRESS NOTES
Colorectal & General Surgery  Progress Note    Patient: Martina Hardwick  YOB: 1955  MRN: [id number]      Assessment  Martina Hardwick is a 68 y.o. 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    Retroperitoneal abscess continues to drain well, output decreasing but still purulent.  Continue Zosyn until drain removed +3 days per Dr. Turk's recommendations.    Right lower quadrant surgical drain with bilious output, low volume.  Managing as an expectant enterocutaneous fistula.  Given low volume output, will continue with enteral nutrition.    From a nutritional standpoint, she is tolerating a small amount of regular food and her appetite is slowly increasing.  I am hopeful that we can get her off of TPN in the coming days as she begins to tolerate more food.    From an analgesic standpoint, she is utilizing less and less IV pain medication.  Hopefully in the next couple days, we can transition slowly to an oral regimen that works for her.    Persistent nausea that is responsive to Compazine.  I have added Pepcid as well for some reflux.    Continue pharmacologic DVT prophylaxis.    Subjective  No acute events overnight.  Some mild nausea and reflux this morning.  Is tolerating the small amount of food that she is eating.  Pain seems to be well-controlled.    Objective    Vitals:    12/13/23 0730   BP: 165/85   Pulse: 91   Resp: 16   Temp: 98.8 °F (37.1 °C)   SpO2:        Physical Exam  Constitutional: Well-developed well-nourished, no acute distress  Neck: Supple, trachea midline  Respiratory: No increased work of breathing, Symmetric excursion  Cardiovascular: Well pefursed, no jugular venous distention evident   Abdominal: Midline incision healthy appearing.  Right lower  quadrant surgical drain is bilious and low-volume.  Retroperitoneal drain is purulent and low-volume.  Soft, non-tender, non-distended  Skin: Warm, dry, no rash on visualized skin surfaces  Psychiatric: Alert and oriented ×3, normal affect     Laboratory Results  I have personally reviewed CMP with creatinine 0.75, potassium 5.4, hemolyzed, magnesium 2.5, AST 77, , total bilirubin 0.8.    Radiology  None to review         Ovsaldo Salinas MD  Colorectal & General Surgery  [facility]    [address] [place] [address], [address]  [place], [place], [address]  P: [phone]  F: [phone]

## 2023-12-13 NOTE — PLAN OF CARE
Problem: Adult Inpatient Plan of Care  Goal: Absence of Hospital-Acquired Illness or Injury  Intervention: Identify and Manage Fall Risk  Recent Flowsheet Documentation  Taken 12/12/2023 1800 by Claudia Alvarez RN  Safety Promotion/Fall Prevention: safety round/check completed  Taken 12/12/2023 1600 by Claudia Alvarez RN  Safety Promotion/Fall Prevention: safety round/check completed  Taken 12/12/2023 1400 by Claudia Alvarez RN  Safety Promotion/Fall Prevention: safety round/check completed  Taken 12/12/2023 1200 by Claudia Alvarez RN  Safety Promotion/Fall Prevention: safety round/check completed  Taken 12/12/2023 1000 by Claudia Alvarez RN  Safety Promotion/Fall Prevention: safety round/check completed  Taken 12/12/2023 0800 by Claudia Alvarez RN  Safety Promotion/Fall Prevention: safety round/check completed  Intervention: Prevent Skin Injury  Recent Flowsheet Documentation  Taken 12/12/2023 1800 by Claudia Alvarez RN  Body Position: position changed independently  Taken 12/12/2023 1600 by Claudia Alvarez RN  Body Position: position changed independently  Taken 12/12/2023 1400 by Claudia Alvarez RN  Body Position: position changed independently  Skin Protection:   adhesive use limited   tubing/devices free from skin contact  Taken 12/12/2023 1200 by Claudia Alvarez RN  Body Position: position changed independently  Taken 12/12/2023 1000 by Claudia Alvarez RN  Body Position: position changed independently  Taken 12/12/2023 0800 by Claudia Alvarez RN  Body Position: position changed independently  Skin Protection:   adhesive use limited   tubing/devices free from skin contact  Intervention: Prevent and Manage VTE (Venous Thromboembolism) Risk  Recent Flowsheet Documentation  Taken 12/12/2023 1800 by Claudia Alvarez RN  Activity Management: activity encouraged  Taken 12/12/2023 1600 by Claudia Alvarez RN  Activity Management: activity encouraged  Taken 12/12/2023 1400 by Claudia Alvarez RN  Activity Management:  activity encouraged  VTE Prevention/Management: (Lovenox) other (see comments)  Range of Motion: active ROM (range of motion) encouraged  Taken 12/12/2023 1200 by Claudia Alvarez RN  Activity Management: activity minimized  Taken 12/12/2023 1000 by Claudia Alvarez RN  Activity Management: activity encouraged  Taken 12/12/2023 0800 by Claudia Alvarez RN  Activity Management: activity encouraged  VTE Prevention/Management: (Lovenox) other (see comments)  Range of Motion: active ROM (range of motion) encouraged     Problem: Fall Injury Risk  Goal: Absence of Fall and Fall-Related Injury  Intervention: Promote Injury-Free Environment  Recent Flowsheet Documentation  Taken 12/12/2023 1800 by Claudia Alvarez RN  Safety Promotion/Fall Prevention: safety round/check completed  Taken 12/12/2023 1600 by Claudia Alvarez RN  Safety Promotion/Fall Prevention: safety round/check completed  Taken 12/12/2023 1400 by Claudia Alvarez RN  Safety Promotion/Fall Prevention: safety round/check completed  Taken 12/12/2023 1200 by Claudia Alvarez RN  Safety Promotion/Fall Prevention: safety round/check completed  Taken 12/12/2023 1000 by Claudia Alvarez RN  Safety Promotion/Fall Prevention: safety round/check completed  Taken 12/12/2023 0800 by Claudia Alvarez RN  Safety Promotion/Fall Prevention: safety round/check completed     Problem: Skin Injury Risk Increased  Goal: Skin Health and Integrity  Intervention: Optimize Skin Protection  Recent Flowsheet Documentation  Taken 12/12/2023 1400 by Claudia Alvarez RN  Pressure Reduction Techniques: frequent weight shift encouraged  Pressure Reduction Devices: alternating pressure pump (ADD)  Skin Protection:   adhesive use limited   tubing/devices free from skin contact  Taken 12/12/2023 0800 by Claudia Alvarez RN  Pressure Reduction Techniques: frequent weight shift encouraged  Pressure Reduction Devices: alternating pressure pump (ADD)  Skin Protection:   adhesive use limited   tubing/devices  free from skin contact     Problem: Parenteral Nutrition  Goal: Effective Intravenous Nutrition Therapy Delivery  Intervention: Optimize Nutrition, Fluid and Electrolyte Intake  Recent Flowsheet Documentation  Taken 12/12/2023 0800 by Claudia Alvarez RN  Glycemic Management: blood glucose monitored     Problem: Fall Injury Risk  Goal: Absence of Fall and Fall-Related Injury  Intervention: Promote Injury-Free Environment  Recent Flowsheet Documentation  Taken 12/12/2023 1800 by Claudia Alvarez RN  Safety Promotion/Fall Prevention: safety round/check completed  Taken 12/12/2023 1600 by Claudia Alvarez RN  Safety Promotion/Fall Prevention: safety round/check completed  Taken 12/12/2023 1400 by Claudia Alvarez RN  Safety Promotion/Fall Prevention: safety round/check completed  Taken 12/12/2023 1200 by Claudia Alvarez RN  Safety Promotion/Fall Prevention: safety round/check completed  Taken 12/12/2023 1000 by Claudia Alvarez RN  Safety Promotion/Fall Prevention: safety round/check completed  Taken 12/12/2023 0800 by Claudia Alvarez RN  Safety Promotion/Fall Prevention: safety round/check completed   Goal Outcome Evaluation:   Vital signs stable this shift; pts BP and temp elevated at beginning of shift but resolved with scheduled and PRN medications; MARY drains emptied and volume recorded; blood glucose monitored; PICC dressing changed this shift; continue TPN and IV abx per order; PRN pain and nausea meds given; bed locked and in low position; bed alarm set; call light in reach; plan of care continues.

## 2023-12-13 NOTE — THERAPY TREATMENT NOTE
Acute Care - Physical Therapy Treatment Note  [facility]     Patient Name: Martina Hardwick  : 1955  MRN: [id number]  Today's Date: 2023      Visit Dx:     ICD-10-CM ICD-9-CM   1. 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    Colonic inertia    Severe malnutrition     Past Medical History:   Diagnosis Date    Arthritis     Autoantibody titer positive     Goodwin esophagus     Bloating     Cataract     BILAT    Chronic abdominal pain     Chronic constipation     Encounter for preventive health examination     Endometriosis     Gastritis     Gastrointestinal hypomotility     GERD (gastroesophageal reflux disease)     Headache, tension-type     Hypertension     Hyponatremia     Insomnia     Intestinal autonomic neuropathy     Irritable bowel syndrome     Migraine     Mixed anxiety and depressive disorder     Moderate malnutrition     Noninfectious gastroenteritis     OP (osteoporosis)     Osteopenia     Osteoporosis     PONV (postoperative nausea and vomiting) 2018    Psoriasis     KNEES, ELBOWS BILAT AND SCALP    Seasonal allergies     Visceral hyperalgesia      Past Surgical History:   Procedure Laterality Date    APPENDECTOMY      CHOLECYSTECTOMY N/A 2018    Procedure: CHOLECYSTECTOMY LAPAROSCOPIC converted to open procedure;  Surgeon: Hernan Hinds MD;  Location: [facility] OR;  Service: General    COLON RESECTION N/A 2023    Procedure: OPEN SUBTOTAL COLECTOMY  AND ADESIOLYSIS;  Surgeon: Ranjeet Salinas MD;  Location: [facility] MAIN OR;  Service: General;  Laterality: N/A;    COLON SURGERY      STATES TOTAL OF 3 COLON SURGERY    COLONOSCOPY      COLONOSCOPY N/A 12/12/2018    Procedure: COLONOSCOPY;  Surgeon: Darien Ruff MD;  Location: [facility] OR;  Service: Gastroenterology    COLONOSCOPY N/A 10/13/2023    Procedure: COLONOSCOPY TO CECUM;  Surgeon: Ranjeet Salinas MD;  Location: [facility] ENDOSCOPY;  Service: General;  Laterality: N/A;  PREOP/ CHRONIC CONSTIPATION- POSTOP/ NORMAL    DIAGNOSTIC LAPAROSCOPY  1990    DILATATION AND CURETTAGE  1985    ENDOSCOPY N/A 05/13/2016    Procedure: ESOPHAGOGASTRODUODENOSCOPY ;  Surgeon: Darien Ruff MD;  Location: [facility] OR;  Service:     ENDOSCOPY N/A 05/01/2023    Procedure: ESOPHAGOGASTRODUODENOSCOPY WITH BIOPSY;  Surgeon: Darien Ruff MD;  Location: [facility] OR;  Service: Gastroenterology;  Laterality: N/A;  Mild Thrush; Gastritis; Esophagitis- thrush and reflux; Biopsies- duodenal, gastric, esophagus    EXPLORATORY LAPAROTOMY N/A 11/27/2023    Procedure: exploratory laparotomy, small bowel resection;  Surgeon: Ranjeet Salinas MD;  Location: [facility] MAIN OR;  Service: General;  Laterality: N/A;    HYSTERECTOMY      REVISION / TAKEDOWN COLOSTOMY       PT Assessment (last 12 hours)       PT Evaluation and Treatment       Row Name 12/13/23 1500          Physical Therapy Time and Intention    Subjective Information complains of;weakness  -     Document Type therapy note (daily note)  -     Mode of Treatment individual therapy;physical therapy  -     Patient Effort good  -     Symptoms Noted During/After Treatment none  -       Row Name 12/13/23 1500          General Information    Patient Profile Reviewed yes  -     Existing Precautions/Restrictions fall;other (see comments)  abdominal drains  -     Barriers to Rehab medically complex  -       Row Name  12/13/23 1500          Pain    Pretreatment Pain Rating 0/10 - no pain  -     Posttreatment Pain Rating 0/10 - no pain  -LH       Row Name 12/13/23 1500          Cognition    Affect/Mental Status (Cognition) flat/blunted affect  -     Orientation Status (Cognition) oriented x 3  -[facility] Name 12/13/23 1500          Range of Motion Comprehensive    Comment, General Range of Motion Within functional BLEs  -LH       Row Name 12/13/23 1500          Strength Comprehensive (MMT)    Comment, General Manual Muscle Testing (MMT) Assessment Lower extremities at least 3 out of 5 generalized weakness  -LH       Row Name 12/13/23 1500          Bed Mobility    Supine-Sit [place] (Bed Mobility) standby assist  -     Assistive Device (Bed Mobility) bed rails;head of bed elevated  -     Comment, (Bed Mobility) Increased time to complete task  -LH       Row Name 12/13/23 1500          Transfers    Transfers sit-stand transfer;stand-sit transfer;toilet transfer  -LH       Row Name 12/13/23 1500          Sit-Stand Transfer    Sit-Stand [place] (Transfers) contact guard  [facility]     Assistive Device (Sit-Stand Transfers) walker, front-wheeled  -LH       Row Name 12/13/23 1500          Stand-Sit Transfer    Stand-Sit [place] (Transfers) contact guard  -LH     Assistive Device (Stand-Sit Transfers) walker, front-wheeled  -LH       Row Name 12/13/23 1500          Toilet Transfer    Type (Toilet Transfer) sit-stand;stand-sit  -     [place] Level (Toilet Transfer) contact guard  -LH     Assistive Device (Toilet Transfer) walker, front-wheeled;commode;grab bars/safety frame  -LH       Row Name 12/13/23 1500          Gait/Stairs (Locomotion)    [place] Level (Gait) contact guard  -LH     Assistive Device (Gait) walker, front-wheeled  -     Distance in Feet (Gait) 30  -     Pattern (Gait) step-to;step-through  -     Deviations/Abnormal Patterns (Gait) bilateral deviations;stacy decreased  -      Bilateral Gait Deviations forward flexed posture;heel strike decreased  -     Comment, (Gait/Stairs) Ambulated to door to bathroom and around bed to chair  -       Row Name             Wound 11/27/23 2038 midline abdomen Incision    Wound - Properties Group Placement Date: 11/27/23  -PG Placement Time: 2038 -PG Orientation: midline  -PG Location: abdomen  -PG Primary Wound Type: Incision  -PG    Retired Wound - Properties Group Placement Date: 11/27/23  -PG Placement Time: 2038 -PG Orientation: midline  -PG Location: abdomen  -PG Primary Wound Type: Incision  -PG    Retired Wound - Properties Group Date first assessed: 11/27/23  -PG Time first assessed: 2038 -PG Location: abdomen  -PG Primary Wound Type: Incision  -PG      Row Name 12/13/23 1500          Plan of Care Review    Plan of Care Reviewed With patient  -     Outcome Evaluation Patient agreeable to skilled PT all long-term goals ongoing.  Patient ambulated short household distances contact-guard rolling walker assist to manage equipment gross weakness.  Anticipate patient will need placement at discharge.  -       Row Name 12/13/23 1500          Positioning and Restraints    Pre-Treatment Position in bed  -     Post Treatment Position chair  -     In Chair reclined;call light within reach;encouraged to call for assist;exit alarm on  -[facility] Name 12/13/23 1500          Therapy Assessment/Plan (PT)    Rehab Potential (PT) good, to achieve stated therapy goals  -     Therapy Frequency (PT) 6 times/wk  -       Row Name 12/13/23 1500          Physical Therapy Goals    Bed Mobility Goal Selection (PT) bed mobility, PT goal 1  -     Transfer Goal Selection (PT) transfer, PT goal 1  -     Gait Training Goal Selection (PT) gait training, PT goal 1  -[facility] Name 12/13/23 1500          Bed Mobility Goal 1 (PT)    Time Frame (Bed Mobility Goal 1, PT) 2 weeks  -     Progress/Outcomes (Bed Mobility Goal 1, PT) goal ongoing  -        Row Name 12/13/23 1500          Transfer Goal 1 (PT)    Time Frame (Transfer Goal 1, PT) 2 weeks  -     Progress/Outcome (Transfer Goal 1, PT) goal ongoing  -       Row Name 12/13/23 1500          Gait Training Goal 1 (PT)    Time Frame (Gait Training Goal 1, PT) 2 weeks  -     Progress/Outcome (Gait Training Goal 1, PT) goal ongoing  -               User Key  (r) = Recorded By, (t) = Taken By, (c) = Cosigned By      Initials Name Provider Type     Maile Guerrero, PT Physical Therapist    Rosangela Solano, RN Registered Nurse                    Physical Therapy Education       Title: PT OT SLP Therapies (In Progress)       Topic: Physical Therapy (In Progress)       Point: Mobility training (In Progress)       Learning Progress Summary             Patient Acceptance, E, NR by  at 12/13/2023 1515    Acceptance, E,TB, VU by  at 12/11/2023 1249    Acceptance, E,TB, VU by  at 12/10/2023 1444    Acceptance, E, VU by  at 12/7/2023 1420    Acceptance, E, DU,VU by JY at 12/5/2023 1105    Acceptance, E,TB, VU by JS at 12/4/2023 0500    Acceptance, E,TB,D, VU,NR by  at 12/3/2023 1331    Acceptance, E,TB, VU by MT at 12/3/2023 0459    Acceptance, E,TB, VU by MT at 12/2/2023 0446    Acceptance, E,TB, VU by JS at 12/1/2023 0520    Acceptance, E,TB,D, VU by PH at 11/30/2023 0951    Acceptance, E,TB,D, VU,DU,NR by  at 11/30/2023 0523    Acceptance, E, DU,VU by JY at 11/29/2023 1353    Acceptance, E,TB,D, VU,NR by  at 11/27/2023 1207    Acceptance, E,TB,D, VU,NR by  at 11/24/2023 0908    Acceptance, E,D, VU,NR by MS at 11/22/2023 1519    Acceptance, E,D, VU,NR by MS at 11/21/2023 1125                         Point: Home exercise program (In Progress)       Learning Progress Summary             Patient Acceptance, E, NR by LH at 12/13/2023 1515    Acceptance, E,TB, VU by CS at 12/10/2023 1444    Acceptance, E, VU by ER at 12/7/2023 1420    Acceptance, E,TB, VU by JS at 12/4/2023 0500    Acceptance,  E,TB, VU by MT at 12/3/2023 0459    Acceptance, E,TB, VU by MT at 12/2/2023 0446    Acceptance, E,TB, VU by JS at 12/1/2023 0520    Acceptance, E,TB,D, VU by PH at 11/30/2023 0951    Acceptance, E,TB,D, VU,DU,NR by  at 11/30/2023 0523    Acceptance, E, DU,VU by JY at 11/29/2023 1353    Acceptance, E,TB,D, VU,NR by PH at 11/27/2023 1207    Acceptance, E,TB,D, VU,NR by PH at 11/24/2023 0908    Acceptance, E,D, VU,NR by MS at 11/22/2023 1519    Acceptance, E,D, VU,NR by MS at 11/21/2023 1125                         Point: Body mechanics (In Progress)       Learning Progress Summary             Patient Acceptance, E, NR by  at 12/13/2023 1515    Acceptance, E,TB, VU by PH at 12/11/2023 1249    Acceptance, E,TB, VU by  at 12/10/2023 1444    Acceptance, E, VU by  at 12/7/2023 1420    Acceptance, E, DU,VU by JY at 12/5/2023 1105    Acceptance, E,TB, VU by JS at 12/4/2023 0500    Acceptance, E,TB,D, VU,NR by  at 12/3/2023 1331    Acceptance, E,TB, VU by MT at 12/3/2023 0459    Acceptance, E,TB, VU by MT at 12/2/2023 0446    Acceptance, E,TB, VU by JS at 12/1/2023 0520    Acceptance, E,TB,D, VU by PH at 11/30/2023 0951    Acceptance, E,TB,D, VU,DU,NR by  at 11/30/2023 0523    Acceptance, E, DU,VU by JY at 11/29/2023 1353    Acceptance, E,TB,D, VU,NR by PH at 11/27/2023 1207    Acceptance, E,TB,D, VU,NR by PH at 11/24/2023 0908    Acceptance, E,D, VU,NR by MS at 11/22/2023 1519    Acceptance, E,D, VU,NR by MS at 11/21/2023 1125                         Point: Precautions (In Progress)       Learning Progress Summary             Patient Acceptance, E, NR by LH at 12/13/2023 1515    Acceptance, E,TB, VU by PH at 12/11/2023 1249    Acceptance, E,TB, VU by CS at 12/10/2023 1444    Acceptance, E, VU by ER at 12/7/2023 1420    Acceptance, E, DU,VU by JY at 12/5/2023 1105    Acceptance, E,TB, VU by JS at 12/4/2023 0500    Acceptance, E,TB,D, VU,NR by BH at 12/3/2023 1331    Acceptance, E,TB, VU by MT at 12/3/2023 0458     Acceptance, E,TB, VU by MT at 12/2/2023 0446    Acceptance, E,TB, VU by  at 12/1/2023 0520    Acceptance, E,TB,D, VU by  at 11/30/2023 0951    Acceptance, E,TB,D, VU,DU,NR by  at 11/30/2023 0523    Acceptance, E, DU,VU by J at 11/29/2023 1353    Acceptance, E,TB,D, VU,NR by  at 11/27/2023 1207    Acceptance, E,TB,D, VU,NR by  at 11/24/2023 0908    Acceptance, E,D, VU,NR by MS at 11/22/2023 1519    Acceptance, E,D, VU,NR by MS at 11/21/2023 1125                                         User Key       Initials Effective Dates Name Provider Type Discipline     06/16/21 -  Maile Guerrero, PT Physical Therapist PT    MS 06/16/21 -  Miguel Beasley, PT Physical Therapist PT    MT 06/16/21 -  Aileen Lezama, RN Registered Nurse Nurse     06/16/21 -  Yanira Hinds, RN Registered Nurse Nurse     07/11/23 -  Triston Espitia, PT Physical Therapist PT     10/01/20 -  Isis Fitzpatrick, RN Registered Nurse Nurse     06/16/21 -  Radha Rosen, PTA Physical Therapist Assistant PT     04/08/22 -  Codi Hinds, PT Physical Therapist PT    ER 10/15/23 -  Milka Nunez, PT Physical Therapist PT    J 11/08/23 -  Kenroy Deleon PTA Student PTA Student PT                  PT Recommendation and Plan  Anticipated Discharge Disposition (PT): skilled nursing facility  Therapy Frequency (PT): 6 times/wk  Plan of Care Reviewed With: patient  Outcome Evaluation: Patient agreeable to skilled PT all long-term goals ongoing.  Patient ambulated short household distances contact-guard rolling walker assist to manage equipment gross weakness.  Anticipate patient will need placement at discharge.   Outcome Measures       Row Name 12/13/23 1500             How much help from another person do you currently need...    Turning from your back to your side while in flat bed without using bedrails? 3  -LH      Moving from lying on back to sitting on the side of a flat bed without bedrails? 3  -LH      Moving to and from a bed to  a chair (including a wheelchair)? 3  -      Standing up from a chair using your arms (e.g., wheelchair, bedside chair)? 3  -      Climbing 3-5 steps with a railing? 3  -      To walk in hospital room? 3  -      AM-PAC 6 Clicks Score (PT) 18  -      Highest Level of Mobility Goal 6 --> Walk 10 steps or more  -         Functional Assessment    Outcome Measure Options AM-PAC 6 Clicks Basic Mobility (PT)  -                User Key  (r) = Recorded By, (t) = Taken By, (c) = Cosigned By      Initials Name Provider Type     Maile Guerrero, PT Physical Therapist                     Time Calculation:    PT Charges       Row Name 12/13/23 1517             Time Calculation    Start Time 1300  -      Stop Time 1315  -      Time Calculation (min) 15 min  -      PT Received On 12/13/23  -      PT - Next Appointment 12/14/23  -      PT Goal Re-Cert Due Date 12/27/23  -                User Key  (r) = Recorded By, (t) = Taken By, (c) = Cosigned By      Initials Name Provider Type     Maile Guerrero, PT Physical Therapist                  Therapy Charges for Today       Code Description Service Date Service Provider Modifiers Qty    80523791364 HC PT THER PROC EA 15 MIN 12/13/2023 Maile Guerrero, PT GP 1            PT G-Codes  Outcome Measure Options: AM-PAC 6 Clicks Basic Mobility (PT)  AM-PAC 6 Clicks Score (PT): 18    Maile Guerrero PT  12/13/2023

## 2023-12-13 NOTE — PROGRESS NOTES
"ID NOTE    CC: f/u leukocytosis and abdominal abscesses following abdominal surgery    Subj: No fever. Still w/ what she describes as an \"upset stomach.\" Tolerating Zosyn.     Medications:    Current Facility-Administered Medications:     Adult Central 2-in-1 TPN, , Intravenous, Continuous, Ranjeet Salinas MD, Last Rate: 65 mL/hr at 12/12/23 1816, New Bag at 12/12/23 1816    ALPRAZolam (XANAX) tablet 0.5 mg, 0.5 mg, Oral, Daily, Ranjeet Salinas MD, 0.5 mg at 12/12/23 2033    amLODIPine (NORVASC) tablet 2.5 mg, 2.5 mg, Oral, Q24H, Ranjeet Salinas MD, 2.5 mg at 12/13/23 0911    Enoxaparin Sodium (LOVENOX) syringe 30 mg, 30 mg, Subcutaneous, Q24H, Ranjeet Salinas MD, 30 mg at 12/13/23 0010    Fat Emul Fish Oil/Plant Based (SMOFLIPID) 20 % emulsion 100 mL, 100 mL, Intravenous, Once per day on Tue Thu Sat, Ranjeet Salinas MD, Last Rate: 8.33 mL/hr at 12/12/23 1832, 100 mL at 12/12/23 1832    hydrALAZINE (APRESOLINE) injection 10 mg, 10 mg, Intravenous, Q4H PRN, James Kebede MD    HYDROmorphone (DILAUDID) injection 0.5 mg, 0.5 mg, Intravenous, Q4H PRN, Ranjeet Salinas MD, 0.5 mg at 12/13/23 0911    labetalol (NORMODYNE,TRANDATE) injection 10 mg, 10 mg, Intravenous, Q2H PRN, James Kebede MD    Methocarbamol (ROBAXIN) injection 500 mg, 500 mg, Intravenous, Q6H PRN, Ranjeet Salinas MD    metoprolol tartrate (LOPRESSOR) injection 5 mg, 5 mg, Intravenous, Q6H PRN, Ranjeet Salinas MD, 5 mg at 12/07/23 1621    metoprolol tartrate (LOPRESSOR) tablet 12.5 mg, 12.5 mg, Oral, Q12H, Ranjeet Salinas MD, 12.5 mg at 12/13/23 0911    [DISCONTINUED] ondansetron (ZOFRAN) tablet 4 mg, 4 mg, Oral, Q6H PRN **OR** ondansetron (ZOFRAN) injection 4 mg, 4 mg, Intravenous, Q6H PRN, Ranjeet Salinas MD, 4 mg at 12/12/23 1820    oxyCODONE (ROXICODONE) immediate release tablet 5 mg, 5 mg, Oral, Q4H PRN, Ranjeet Salinas MD, 5 mg at 12/11/23 " 1145    pantoprazole (PROTONIX) injection 40 mg, 40 mg, Intravenous, Q AM, Ranjeet Salinas MD, 40 mg at 12/13/23 0744    PARoxetine (PAXIL) tablet 40 mg, 40 mg, Oral, Daily, Triston Malcolm III, MD, 40 mg at 12/13/23 0911    Pharmacy to Dose TPN, , Does not apply, Continuous PRN, Ranjeet Salinas MD    piperacillin-tazobactam (ZOSYN) 3.375 g in iso-osmotic dextrose 50 ml (premix), 3.375 g, Intravenous, Q8H, Ranjeet Salinas MD, 3.375 g at 12/13/23 0430    prochlorperazine (COMPAZINE) injection 5 mg, 5 mg, Intravenous, Q6H PRN, Ranjeet Salinas MD, 5 mg at 12/12/23 2234    QUEtiapine fumarate ER (SEROquel XR) tablet 100 mg, 100 mg, Oral, Nightly, Ranjeet Salinas MD, 100 mg at 12/12/23 2033    sodium chloride 0.9 % flush 10 mL, 10 mL, Intravenous, Q12H, Ranjeet Salinas MD, 10 mL at 12/13/23 0911    sodium chloride 0.9 % flush 10 mL, 10 mL, Intravenous, Q12H, Ranjeet Salinas MD, 10 mL at 12/13/23 0911    sodium chloride nasal spray 2 spray, 2 spray, Each Nare, PRN, Natalio Collins MD      Objective   Vital Signs   Temp:  [98.1 °F (36.7 °C)-99.1 °F (37.3 °C)] 98.8 °F (37.1 °C)  Heart Rate:  [78-92] 91  Resp:  [16] 16  BP: (149-166)/(79-85) 165/85    Physical Exam:   General: NAD, very nice, sitting up in bed  Eyes: no scleral icterus  Cardiovascular: NR  Respiratory: normal work of breathing; no wheezing  GI: not distended; multiple drains  :  no Hodge catheter  Vasc: RUE PICC w/o erythema    Labs:   K, Crt, and ALT reviewed today  Lab Results   Component Value Date    WBC 17.29 (H) 12/12/2023    HGB 10.8 (L) 12/12/2023    HCT 31.7 (L) 12/12/2023    MCV 90.1 12/12/2023     (H) 12/12/2023     Lab Results   Component Value Date    K 5.4 (H) 12/13/2023     Lab Results   Component Value Date    CREATININE 0.75 12/13/2023     Lab Results   Component Value Date     (H) 12/13/2023     Lab Results   Component Value Date    CRP 7.72 (H)  "12/10/2023     Microbiology:  11/27 BCx: negative  12/1 BCx: negative  12/8 RP Abscess Cx: light growth Klebsiella aerogenes and light growth Enterococcus faecalis    Prior Radiology:  12/7/23 CT A/P:  \"1. Abscess posterior to the right kidney      2. Small amount of residual fluid in the right flank status post drainage      3. Additional probable fluid collections in the abdomen      4. Moderate size right and newly seen small left-sided pleural effusions \"    ASSESSMENT/PLAN:  Leukocytosis  Epistaxis  S/p colectomy for colonic inertia  Post-operative leak and abscesses  History of multiple abdominal surgeries due to diverticular disease  On TPN  Anemia  Retroperitoneal abscess    My antibiotic plan is to keep her on Zosyn until the retroperitoneal/abscess drain is removed PLUS about 3 days following removal. D/w Dr Salinas. ID will follow.         "

## 2023-12-14 LAB
ALBUMIN SERPL-MCNC: 2.7 G/DL (ref 3.5–5.2)
ALBUMIN/GLOB SERPL: 0.7 G/DL
ALP SERPL-CCNC: 280 U/L (ref 39–117)
ALT SERPL W P-5'-P-CCNC: 171 U/L (ref 1–33)
ANION GAP SERPL CALCULATED.3IONS-SCNC: 8.9 MMOL/L (ref 5–15)
AST SERPL-CCNC: 108 U/L (ref 1–32)
BILIRUB SERPL-MCNC: 0.7 MG/DL (ref 0–1.2)
BUN SERPL-MCNC: 17 MG/DL (ref 8–23)
BUN/CREAT SERPL: 22.7 (ref 7–25)
CALCIUM SPEC-SCNC: 9.9 MG/DL (ref 8.6–10.5)
CHLORIDE SERPL-SCNC: 92 MMOL/L (ref 98–107)
CO2 SERPL-SCNC: 29.1 MMOL/L (ref 22–29)
CREAT SERPL-MCNC: 0.75 MG/DL (ref 0.57–1)
DEPRECATED RDW RBC AUTO: 47 FL (ref 37–54)
EGFRCR SERPLBLD CKD-EPI 2021: 86.8 ML/MIN/1.73
ERYTHROCYTE [DISTWIDTH] IN BLOOD BY AUTOMATED COUNT: 14.5 % (ref 12.3–15.4)
GLOBULIN UR ELPH-MCNC: 4 GM/DL
GLUCOSE BLDC GLUCOMTR-MCNC: 142 MG/DL (ref 70–130)
GLUCOSE BLDC GLUCOMTR-MCNC: 147 MG/DL (ref 70–130)
GLUCOSE BLDC GLUCOMTR-MCNC: 158 MG/DL (ref 70–130)
GLUCOSE BLDC GLUCOMTR-MCNC: 161 MG/DL (ref 70–130)
GLUCOSE SERPL-MCNC: 143 MG/DL (ref 65–99)
HCT VFR BLD AUTO: 28 % (ref 34–46.6)
HGB BLD-MCNC: 9.3 G/DL (ref 12–15.9)
MAGNESIUM SERPL-MCNC: 2.1 MG/DL (ref 1.6–2.4)
MCH RBC QN AUTO: 30 PG (ref 26.6–33)
MCHC RBC AUTO-ENTMCNC: 33.2 G/DL (ref 31.5–35.7)
MCV RBC AUTO: 90.3 FL (ref 79–97)
PHOSPHATE SERPL-MCNC: 3.4 MG/DL (ref 2.5–4.5)
PLATELET # BLD AUTO: 404 10*3/MM3 (ref 140–450)
PMV BLD AUTO: 8.9 FL (ref 6–12)
POTASSIUM SERPL-SCNC: 4 MMOL/L (ref 3.5–5.2)
PROT SERPL-MCNC: 6.7 G/DL (ref 6–8.5)
RBC # BLD AUTO: 3.1 10*6/MM3 (ref 3.77–5.28)
SODIUM SERPL-SCNC: 130 MMOL/L (ref 136–145)
WBC NRBC COR # BLD AUTO: 13.66 10*3/MM3 (ref 3.4–10.8)

## 2023-12-14 PROCEDURE — 25010000002 ENOXAPARIN PER 10 MG: Performed by: SURGERY

## 2023-12-14 PROCEDURE — 82948 REAGENT STRIP/BLOOD GLUCOSE: CPT

## 2023-12-14 PROCEDURE — 25010000002 CALCIUM GLUCONATE PER 10 ML: Performed by: SURGERY

## 2023-12-14 PROCEDURE — 25010000002 MAGNESIUM SULFATE PER 500 MG OF MAGNESIUM: Performed by: SURGERY

## 2023-12-14 PROCEDURE — 99024 POSTOP FOLLOW-UP VISIT: CPT | Performed by: SURGERY

## 2023-12-14 PROCEDURE — 85027 COMPLETE CBC AUTOMATED: CPT | Performed by: SURGERY

## 2023-12-14 PROCEDURE — 84100 ASSAY OF PHOSPHORUS: CPT | Performed by: SURGERY

## 2023-12-14 PROCEDURE — 97530 THERAPEUTIC ACTIVITIES: CPT | Performed by: PHYSICAL THERAPIST

## 2023-12-14 PROCEDURE — 25010000002 ONDANSETRON PER 1 MG: Performed by: SURGERY

## 2023-12-14 PROCEDURE — 99232 SBSQ HOSP IP/OBS MODERATE 35: CPT | Performed by: INTERNAL MEDICINE

## 2023-12-14 PROCEDURE — 25010000002 PROCHLORPERAZINE 10 MG/2ML SOLUTION: Performed by: SURGERY

## 2023-12-14 PROCEDURE — 80053 COMPREHEN METABOLIC PANEL: CPT | Performed by: SURGERY

## 2023-12-14 PROCEDURE — 25010000002 PIPERACILLIN SOD-TAZOBACTAM PER 1 G: Performed by: SURGERY

## 2023-12-14 PROCEDURE — 83735 ASSAY OF MAGNESIUM: CPT | Performed by: SURGERY

## 2023-12-14 PROCEDURE — 25010000002 POTASSIUM CHLORIDE PER 2 MEQ OF POTASSIUM: Performed by: SURGERY

## 2023-12-14 PROCEDURE — 25010000002 HYDROMORPHONE PER 4 MG: Performed by: SURGERY

## 2023-12-14 RX ADMIN — Medication 10 ML: at 21:12

## 2023-12-14 RX ADMIN — SMOFLIPID 100 ML: 6; 6; 5; 3 INJECTION, EMULSION INTRAVENOUS at 18:15

## 2023-12-14 RX ADMIN — PIPERACILLIN SODIUM AND TAZOBACTAM SODIUM 3.38 G: 3; .375 INJECTION, SOLUTION INTRAVENOUS at 04:30

## 2023-12-14 RX ADMIN — ONDANSETRON 4 MG: 2 INJECTION INTRAMUSCULAR; INTRAVENOUS at 00:33

## 2023-12-14 RX ADMIN — HYDROMORPHONE HYDROCHLORIDE 0.5 MG: 1 INJECTION, SOLUTION INTRAMUSCULAR; INTRAVENOUS; SUBCUTANEOUS at 23:53

## 2023-12-14 RX ADMIN — HYDROMORPHONE HYDROCHLORIDE 0.5 MG: 1 INJECTION, SOLUTION INTRAMUSCULAR; INTRAVENOUS; SUBCUTANEOUS at 14:17

## 2023-12-14 RX ADMIN — HYDROMORPHONE HYDROCHLORIDE 0.5 MG: 1 INJECTION, SOLUTION INTRAMUSCULAR; INTRAVENOUS; SUBCUTANEOUS at 04:31

## 2023-12-14 RX ADMIN — AMLODIPINE BESYLATE 2.5 MG: 2.5 TABLET ORAL at 08:29

## 2023-12-14 RX ADMIN — HYDROMORPHONE HYDROCHLORIDE 0.5 MG: 1 INJECTION, SOLUTION INTRAMUSCULAR; INTRAVENOUS; SUBCUTANEOUS at 00:33

## 2023-12-14 RX ADMIN — OXYCODONE HYDROCHLORIDE 5 MG: 5 TABLET ORAL at 21:11

## 2023-12-14 RX ADMIN — HYDROMORPHONE HYDROCHLORIDE 0.5 MG: 1 INJECTION, SOLUTION INTRAMUSCULAR; INTRAVENOUS; SUBCUTANEOUS at 18:19

## 2023-12-14 RX ADMIN — METOPROLOL TARTRATE 12.5 MG: 25 TABLET, FILM COATED ORAL at 21:09

## 2023-12-14 RX ADMIN — PAROXETINE HYDROCHLORIDE 40 MG: 20 TABLET, FILM COATED ORAL at 08:29

## 2023-12-14 RX ADMIN — PIPERACILLIN SODIUM AND TAZOBACTAM SODIUM 3.38 G: 3; .375 INJECTION, SOLUTION INTRAVENOUS at 10:55

## 2023-12-14 RX ADMIN — PIPERACILLIN SODIUM AND TAZOBACTAM SODIUM 3.38 G: 3; .375 INJECTION, SOLUTION INTRAVENOUS at 18:15

## 2023-12-14 RX ADMIN — ALPRAZOLAM 0.5 MG: 0.5 TABLET ORAL at 21:10

## 2023-12-14 RX ADMIN — FAMOTIDINE 20 MG: 20 TABLET ORAL at 08:30

## 2023-12-14 RX ADMIN — ENOXAPARIN SODIUM 30 MG: 100 INJECTION SUBCUTANEOUS at 21:09

## 2023-12-14 RX ADMIN — METOPROLOL TARTRATE 12.5 MG: 25 TABLET, FILM COATED ORAL at 08:30

## 2023-12-14 RX ADMIN — HYDROMORPHONE HYDROCHLORIDE 0.5 MG: 1 INJECTION, SOLUTION INTRAMUSCULAR; INTRAVENOUS; SUBCUTANEOUS at 08:30

## 2023-12-14 RX ADMIN — PROCHLORPERAZINE EDISYLATE 5 MG: 5 INJECTION INTRAMUSCULAR; INTRAVENOUS at 08:30

## 2023-12-14 RX ADMIN — POTASSIUM PHOSPHATE, MONOBASIC POTASSIUM PHOSPHATE, DIBASIC: 224; 236 INJECTION, SOLUTION, CONCENTRATE INTRAVENOUS at 18:09

## 2023-12-14 RX ADMIN — ONDANSETRON 4 MG: 2 INJECTION INTRAMUSCULAR; INTRAVENOUS at 17:27

## 2023-12-14 RX ADMIN — PANTOPRAZOLE SODIUM 40 MG: 40 INJECTION, POWDER, FOR SOLUTION INTRAVENOUS at 07:01

## 2023-12-14 RX ADMIN — FAMOTIDINE 20 MG: 20 TABLET ORAL at 17:27

## 2023-12-14 RX ADMIN — QUETIAPINE FUMARATE 100 MG: 50 TABLET, EXTENDED RELEASE ORAL at 21:11

## 2023-12-14 RX ADMIN — PROCHLORPERAZINE EDISYLATE 5 MG: 5 INJECTION INTRAMUSCULAR; INTRAVENOUS at 21:09

## 2023-12-14 NOTE — PROGRESS NOTES
ID NOTE    CC: f/u leukocytosis and abdominal abscesses following abdominal surgery    Subj: No fever. Less abdominal pain today. Tolerating small bits of food. Remains on TPN.   Tolerating Zosyn. She reports a normal BM.     Medications:    Current Facility-Administered Medications:     Adult Central 2-in-1 TPN, , Intravenous, Continuous, Ranjeet Salinas MD, Last Rate: 65 mL/hr at 12/13/23 1714, New Bag at 12/13/23 1714    ALPRAZolam (XANAX) tablet 0.5 mg, 0.5 mg, Oral, Daily, Ranjeet Salinas MD, 0.5 mg at 12/13/23 2027    amLODIPine (NORVASC) tablet 2.5 mg, 2.5 mg, Oral, Q24H, Ranjeet Salinas MD, 2.5 mg at 12/14/23 0829    Enoxaparin Sodium (LOVENOX) syringe 30 mg, 30 mg, Subcutaneous, Q24H, aRnjeet Salinas MD, 30 mg at 12/13/23 2026    famotidine (PEPCID) tablet 20 mg, 20 mg, Oral, BID AC, Ranjeet Salinas MD, 20 mg at 12/14/23 0830    Fat Emul Fish Oil/Plant Based (SMOFLIPID) 20 % emulsion 100 mL, 100 mL, Intravenous, Once per day on Tue Thu Sat, Ranjeet Salinas MD, Last Rate: 8.33 mL/hr at 12/12/23 1832, 100 mL at 12/12/23 1832    HYDROmorphone (DILAUDID) injection 0.5 mg, 0.5 mg, Intravenous, Q4H PRN, Ranjeet Salinas MD, 0.5 mg at 12/14/23 0830    Methocarbamol (ROBAXIN) injection 500 mg, 500 mg, Intravenous, Q6H PRN, Ranjeet Salinas MD    metoprolol tartrate (LOPRESSOR) tablet 12.5 mg, 12.5 mg, Oral, Q12H, Ranjeet Salinas MD, 12.5 mg at 12/14/23 0830    [DISCONTINUED] ondansetron (ZOFRAN) tablet 4 mg, 4 mg, Oral, Q6H PRN **OR** ondansetron (ZOFRAN) injection 4 mg, 4 mg, Intravenous, Q6H PRN, Ranjeet Salinas MD, 4 mg at 12/14/23 0033    oxyCODONE (ROXICODONE) immediate release tablet 5 mg, 5 mg, Oral, Q4H PRN, Ranjeet Salinas MD, 5 mg at 12/11/23 1145    pantoprazole (PROTONIX) injection 40 mg, 40 mg, Intravenous, Q AM, Ranjeet Salinas MD, 40 mg at 12/14/23 0701    PARoxetine (PAXIL) tablet 40 mg, 40 mg,  Oral, Daily, Triston Malcolm III, MD, 40 mg at 12/14/23 0829    Pharmacy to Dose TPN, , Does not apply, Continuous PRN, Ranjeet Salinas MD    piperacillin-tazobactam (ZOSYN) 3.375 g in iso-osmotic dextrose 50 ml (premix), 3.375 g, Intravenous, Q8H, Ranjeet Salinas MD, 3.375 g at 12/14/23 0430    prochlorperazine (COMPAZINE) injection 5 mg, 5 mg, Intravenous, Q6H PRN, Ranjeet Salinas MD, 5 mg at 12/14/23 0830    QUEtiapine fumarate ER (SEROquel XR) tablet 100 mg, 100 mg, Oral, Nightly, Ranjeet Salinas MD, 100 mg at 12/13/23 2026    sodium chloride 0.9 % flush 10 mL, 10 mL, Intravenous, Q12H, Ranjeet Salinas MD, 10 mL at 12/13/23 0911    sodium chloride 0.9 % flush 10 mL, 10 mL, Intravenous, Q12H, Ranjeet Salinas MD, 10 mL at 12/13/23 0911    sodium chloride nasal spray 2 spray, 2 spray, Each Nare, PRN, Natalio Collins MD      Objective   Vital Signs   Temp:  [98.1 °F (36.7 °C)-98.2 °F (36.8 °C)] 98.2 °F (36.8 °C)  Heart Rate:  [78-90] 90  Resp:  [16] 16  BP: (126-164)/(70-87) 150/70    Physical Exam:   General: NAD, very nice, sitting up in bed  Eyes: no scleral icterus  Cardiovascular: NR  Respiratory: normal work of breathing on RA  GI: not distended; multiple drains  :  no Hodge catheter  Vasc: RUE PICC w/o erythema    Labs:   CBC, K, Crt, and ALT reviewed today  Lab Results   Component Value Date    WBC 13.66 (H) 12/14/2023    HGB 9.3 (L) 12/14/2023    HCT 28.0 (L) 12/14/2023    MCV 90.3 12/14/2023     12/14/2023     Lab Results   Component Value Date    K 4.0 12/14/2023     Lab Results   Component Value Date    CREATININE 0.75 12/14/2023     Lab Results   Component Value Date     (H) 12/14/2023     Lab Results   Component Value Date    CRP 7.72 (H) 12/10/2023     Microbiology:  11/27 BCx: negative  12/1 BCx: negative  12/8 RP Abscess Cx: light growth Klebsiella aerogenes and light growth Enterococcus faecalis    Prior  "Radiology:  12/7/23 CT A/P:  \"1. Abscess posterior to the right kidney      2. Small amount of residual fluid in the right flank status post drainage      3. Additional probable fluid collections in the abdomen      4. Moderate size right and newly seen small left-sided pleural effusions \"    ASSESSMENT/PLAN:  Leukocytosis - better  Epistaxis - resolved  S/p colectomy for colonic inertia  Post-operative leak and abscesses  History of multiple abdominal surgeries due to diverticular disease  On TPN  Anemia  Retroperitoneal abscess    She is afebrile. Symptoms improving. WBC down to 13k today. As mentioned yesterday, my antibiotic plan is to keep her on Zosyn until the retroperitoneal/abscess drain is removed PLUS about 3 days following removal.  ID will follow.         "

## 2023-12-14 NOTE — PROGRESS NOTES
Colorectal & General Surgery  Progress Note    Patient: Martina Hardwick  YOB: 1955  MRN: [id number]      Assessment  Martina Hardwick is a 68 y.o. female with colonic inertia postoperative day 24 from exploratory laparotomy with enterolysis and subtotal colectomy that was complicated by leak from small bowel enterotomy and subsequently postoperative day 16 from reopening of recent laparotomy with small bowel resection.     Minimal drainage of retroperitoneal abscess drain.  Will plan for repeat CT scan tomorrow to ensure resolution of abscess prior to pulling drain.  Minimal output from MARY drain, though remains bilious.  Nutrition still poor.  Continue TPN.     Still requiring intermittent Dilaudid.    Continue pharmacologic DVT prophylaxis.    Subjective  No acute events.  Resting comfortably today.  Not eating much.    Objective    Vitals:    12/14/23 1430   BP: 121/68   Pulse: 83   Resp: 16   Temp: 98.4 °F (36.9 °C)   SpO2: 98%       Physical Exam  Neck: Supple, trachea midline  Respiratory: No increased work of breathing, Symmetric excursion  Cardiovascular: Well pefursed, no jugular venous distention evident   Abdominal: Soft, non-tender, non-distended, incision healing well, pigtail purulent, MARY bilious.  Skin: Warm, dry, no rash on visualized skin surfaces  Psychiatric: Alert and oriented ×3, normal affect     Laboratory Results  I have personally reviewed CBC with WBC 13, hemoglobin 9, platelets 4 4.  CMP with creatinine 0.75, alkaline phosphatase 280, albumin 2.7.  , , total bilirubin 0.7.    Radiology  None to review         Osvaldo Salinas MD  Colorectal & General Surgery  [facility], [place], [facility]    [address] [place] [address], [address]  [place], [place], [address]  P: [phone]  F: [phone]

## 2023-12-14 NOTE — PLAN OF CARE
Goal Outcome Evaluation:  Plan of Care Reviewed With: patient           Outcome Evaluation: Pt progessing well. Pt tolerated increased ambulation distance today. Ambulated 150 ft with Rwx and CGA. Gait was slow, but pt steady with Rwx.      Anticipated Discharge Disposition (PT): home with home health, home with assist, skilled nursing facility

## 2023-12-14 NOTE — THERAPY TREATMENT NOTE
Patient Name: Martina Hardwick  : 1955    MRN: [id number]                              Today's Date: 2023       Admit Date: 2023    Visit Dx:     ICD-10-CM ICD-9-CM   1. 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    Colonic inertia    Severe malnutrition     Past Medical History:   Diagnosis Date    Arthritis     Autoantibody titer positive     Goodwin esophagus     Bloating     Cataract     BILAT    Chronic abdominal pain     Chronic constipation     Encounter for preventive health examination     Endometriosis     Gastritis     Gastrointestinal hypomotility     GERD (gastroesophageal reflux disease)     Headache, tension-type     Hypertension     Hyponatremia     Insomnia     Intestinal autonomic neuropathy     Irritable bowel syndrome     Migraine     Mixed anxiety and depressive disorder     Moderate malnutrition     Noninfectious gastroenteritis     OP (osteoporosis)     Osteopenia     Osteoporosis     PONV (postoperative nausea and vomiting) 2018    Psoriasis     KNEES, ELBOWS BILAT AND SCALP    Seasonal allergies     Visceral hyperalgesia      Past Surgical History:   Procedure Laterality Date    APPENDECTOMY      CHOLECYSTECTOMY N/A 2018    Procedure: CHOLECYSTECTOMY LAPAROSCOPIC converted to open procedure;  Surgeon: Hernan Hinds MD;  Location: [facility];  Service: General    COLON RESECTION N/A 2023    Procedure: OPEN SUBTOTAL COLECTOMY AND  ADESIOLYSIS;  Surgeon: Ranjeet Salinas MD;  Location: [facility] MAIN OR;  Service: General;  Laterality: N/A;    COLON SURGERY      STATES TOTAL OF 3 COLON SURGERY    COLONOSCOPY      COLONOSCOPY N/A 12/12/2018    Procedure: COLONOSCOPY;  Surgeon: Darien Ruff MD;  Location: [facility] OR;  Service: Gastroenterology    COLONOSCOPY N/A 10/13/2023    Procedure: COLONOSCOPY TO CECUM;  Surgeon: Ranjeet Salinas MD;  Location: [facility] ENDOSCOPY;  Service: General;  Laterality: N/A;  PREOP/ CHRONIC CONSTIPATION- POSTOP/ NORMAL    DIAGNOSTIC LAPAROSCOPY  1990    DILATATION AND CURETTAGE  1985    ENDOSCOPY N/A 05/13/2016    Procedure: ESOPHAGOGASTRODUODENOSCOPY ;  Surgeon: Darien Ruff MD;  Location: [facility] OR;  Service:     ENDOSCOPY N/A 05/01/2023    Procedure: ESOPHAGOGASTRODUODENOSCOPY WITH BIOPSY;  Surgeon: Darien Ruff MD;  Location: [facility] OR;  Service: Gastroenterology;  Laterality: N/A;  Mild Thrush; Gastritis; Esophagitis- thrush and reflux; Biopsies- duodenal, gastric, esophagus    EXPLORATORY LAPAROTOMY N/A 11/27/2023    Procedure: exploratory laparotomy, small bowel resection;  Surgeon: Ranjeet Salinas MD;  Location: [facility] OR;  Service: General;  Laterality: N/A;    HYSTERECTOMY      REVISION / TAKEDOWN COLOSTOMY        General Information       Row Name 12/14/23 1328          Physical Therapy Time and Intention    Document Type therapy note (daily note)  -TAY     Mode of Treatment individual therapy;physical therapy  -TAY               User Key  (r) = Recorded By, (t) = Taken By, (c) = Cosigned By      Initials Name Provider Type    Yaneth Zamora, PT Physical Therapist                   Mobility       Row Name 12/14/23 1324          Bed Mobility    Supine-Sit Bimble (Bed Mobility) standby assist  -     Assistive Device (Bed Mobility) bed rails;head of bed elevated  -       Row Name 12/14/23 1328          Sit-Stand Transfer     Sit-Stand Gilbert (Transfers) contact guard  -     Assistive Device (Sit-Stand Transfers) walker, front-wheeled  -KH       Row Name 12/14/23 1328          Gait/Stairs (Locomotion)    Gilbert Level (Gait) contact guard  -KH     Assistive Device (Gait) walker, front-wheeled  -KH     Distance in Feet (Gait) 150 ft  -KH     Deviations/Abnormal Patterns (Gait) gait speed decreased  -KH     Bilateral Gait Deviations forward flexed posture;heel strike decreased  -               User Key  (r) = Recorded By, (t) = Taken By, (c) = Cosigned By      Initials Name Provider Type    Yaneth Zamora PT Physical Therapist                   Obj/Interventions       Row Name 12/14/23 1329          Motor Skills    Therapeutic Exercise --  BLE AP, LAQ x 10 reps  -               User Key  (r) = Recorded By, (t) = Taken By, (c) = Cosigned By      Initials Name Provider Type    Yaneth Zamora, JOE Physical Therapist                   Goals/Plan    No documentation.                  Clinical Impression       Row Name 12/14/23 1329          Pain    Pretreatment Pain Rating 0/10 - no pain  -     Posttreatment Pain Rating 0/10 - no pain  -       Row Name 12/14/23 1329          Plan of Care Review    Plan of Care Reviewed With patient  -     Outcome Evaluation Pt progessing well. Pt tolerated increased ambulation distance today. Ambulated 150 ft with Rwx and CGA. Gait was slow, but pt steady with Rwx.  -KH       Row Name 12/14/23 1329          Positioning and Restraints    Pre-Treatment Position in bed  -KH     Post Treatment Position chair  -KH     In Chair reclined;call light within reach;encouraged to call for assist;exit alarm on;notified nsg  -               User Key  (r) = Recorded By, (t) = Taken By, (c) = Cosigned By      Initials Name Provider Type    Yaneth Zamora PT Physical Therapist                   Outcome Measures       Row Name 12/14/23 1332 12/14/23 0800       How  much help from another person do you currently need...    Turning from your back to your side while in flat bed without using bedrails? 4  -KH 3  -RB    Moving from lying on back to sitting on the side of a flat bed without bedrails? 3  -KH 3  -RB    Moving to and from a bed to a chair (including a wheelchair)? 3  -KH 3  -RB    Standing up from a chair using your arms (e.g., wheelchair, bedside chair)? 3  -KH 3  -RB    Climbing 3-5 steps with a railing? 3  -KH 3  -RB    To walk in hospital room? 3  -KH 3  -RB    AM-PAC 6 Clicks Score (PT) 19  -KH 18  -RB    Highest Level of Mobility Goal 6 --> Walk 10 steps or more  -KH 6 --> Walk 10 steps or more  -RB      Row Name 12/14/23 1332          Functional Assessment    Outcome Measure Options AM-PAC 6 Clicks Basic Mobility (PT)  -               User Key  (r) = Recorded By, (t) = Taken By, (c) = Cosigned By      Initials Name Provider Type    Yaneth Zamora, PT Physical Therapist    Jimmie Mora RN Registered Nurse                                 Physical Therapy Education       Title: PT OT SLP Therapies (In Progress)       Topic: Physical Therapy (In Progress)       Point: Mobility training (In Progress)       Learning Progress Summary             Patient Acceptance, E, NR by  at 12/13/2023 1515    Acceptance, E,TB, VU by PH at 12/11/2023 1249    Acceptance, E,TB, VU by CS at 12/10/2023 1444    Acceptance, E, VU by ER at 12/7/2023 1420    Acceptance, E, DU,VU by DRE at 12/5/2023 1105    Acceptance, E,TB, VU by JS at 12/4/2023 0500    Acceptance, E,TB,D, VU,NR by  at 12/3/2023 1331    Acceptance, E,TB, VU by MT at 12/3/2023 0459    Acceptance, E,TB, VU by MT at 12/2/2023 0446    Acceptance, E,TB, VU by JS at 12/1/2023 0520    Acceptance, E,TB,D, VU by PH at 11/30/2023 0951    Acceptance, E,TB,D, VU,DU,NR by  at 11/30/2023 0523    Acceptance, MICHELLE MCELROY VU by JYOBANI at 11/29/2023 1353    Acceptance, HUNTER MCELROY D, VU, NR by  at 11/27/2023 1207    Acceptance,  E,TB,D, VU,NR by PH at 11/24/2023 0908    Acceptance, E,D, VU,NR by MS at 11/22/2023 1519    Acceptance, E,D, VU,NR by MS at 11/21/2023 1125                         Point: Home exercise program (In Progress)       Learning Progress Summary             Patient Acceptance, E, NR by  at 12/13/2023 1515    Acceptance, E,TB, VU by CS at 12/10/2023 1444    Acceptance, E, VU by ER at 12/7/2023 1420    Acceptance, E,TB, VU by JS at 12/4/2023 0500    Acceptance, E,TB, VU by MT at 12/3/2023 0459    Acceptance, E,TB, VU by MT at 12/2/2023 0446    Acceptance, E,TB, VU by JS at 12/1/2023 0520    Acceptance, E,TB,D, VU by PH at 11/30/2023 0951    Acceptance, E,TB,D, VU,DU,NR by  at 11/30/2023 0523    Acceptance, E, DU,VU by JY at 11/29/2023 1353    Acceptance, E,TB,D, VU,NR by PH at 11/27/2023 1207    Acceptance, E,TB,D, VU,NR by PH at 11/24/2023 0908    Acceptance, E,D, VU,NR by MS at 11/22/2023 1519    Acceptance, E,D, VU,NR by MS at 11/21/2023 1125                         Point: Body mechanics (In Progress)       Learning Progress Summary             Patient Acceptance, E, NR by  at 12/13/2023 1515    Acceptance, E,TB, VU by PH at 12/11/2023 1249    Acceptance, E,TB, VU by  at 12/10/2023 1444    Acceptance, E, VU by ER at 12/7/2023 1420    Acceptance, E, DU,VU by JY at 12/5/2023 1105    Acceptance, E,TB, VU by JS at 12/4/2023 0500    Acceptance, E,TB,D, VU,NR by  at 12/3/2023 1331    Acceptance, E,TB, VU by MT at 12/3/2023 0459    Acceptance, E,TB, VU by MT at 12/2/2023 0446    Acceptance, E,TB, VU by JS at 12/1/2023 0520    Acceptance, E,TB,D, VU by PH at 11/30/2023 0951    Acceptance, E,TB,D, VU,DU,NR by  at 11/30/2023 0523    Acceptance, E, DU,VU by JY at 11/29/2023 1353    Acceptance, E,TB,D, VU,NR by PH at 11/27/2023 1207    Acceptance, E,TB,D, VU,NR by PH at 11/24/2023 0908    Acceptance, E,D, VU,NR by MS at 11/22/2023 1519    Acceptance, E,D, VU,NR by MS at 11/21/2023 1125                         Point:  Precautions (In Progress)       Learning Progress Summary             Patient Acceptance, E, NR by  at 12/13/2023 1515    Acceptance, E,TB, VU by PH at 12/11/2023 1249    Acceptance, E,TB, VU by  at 12/10/2023 1444    Acceptance, E, VU by  at 12/7/2023 1420    Acceptance, E, DU,VU by JY at 12/5/2023 1105    Acceptance, E,TB, VU by JS at 12/4/2023 0500    Acceptance, E,TB,D, VU,NR by  at 12/3/2023 1331    Acceptance, E,TB, VU by MT at 12/3/2023 0459    Acceptance, E,TB, VU by MT at 12/2/2023 0446    Acceptance, E,TB, VU by  at 12/1/2023 0520    Acceptance, E,TB,D, VU by  at 11/30/2023 0951    Acceptance, E,TB,D, VU,DU,NR by  at 11/30/2023 0523    Acceptance, E, DU,VU by JY at 11/29/2023 1353    Acceptance, E,TB,D, VU,NR by  at 11/27/2023 1207    Acceptance, E,TB,D, VU,NR by  at 11/24/2023 0908    Acceptance, E,D, VU,NR by MS at 11/22/2023 1519    Acceptance, E,D, VU,NR by MS at 11/21/2023 1125                                         User Key       Initials Effective Dates Name Provider Type Discipline     06/16/21 -  Maile Guerrero, PT Physical Therapist PT    MS 06/16/21 -  Miguel Beasley, PT Physical Therapist PT    MT 06/16/21 -  Aileen Lezama, RN Registered Nurse Nurse     06/16/21 -  Yanira Hinds, RN Registered Nurse Nurse     07/11/23 -  Triston Espitia, PT Physical Therapist PT     10/01/20 -  Isis Fitzpatrick, RN Registered Nurse Nurse     06/16/21 -  Radha Rosen, PTA Physical Therapist Assistant PT     04/08/22 -  Codi Hinds, PT Physical Therapist PT    ER 10/15/23 -  Milka Nunez, PT Physical Therapist PT    JY 11/08/23 -  Kenroy Deleon, PTA Student PTA Student PT                  PT Recommendation and Plan  Planned Therapy Interventions (PT): balance training, bed mobility training, gait training, home exercise program, patient/family education, strengthening, transfer training  Plan of Care Reviewed With: patient  Outcome Evaluation: Pt progessing well. Pt  tolerated increased ambulation distance today. Ambulated 150 ft with Rwx and CGA. Gait was slow, but pt steady with Rwx.     Time Calculation:         PT Charges       Row Name 12/14/23 1333             Time Calculation    Start Time 1043  -      Stop Time 1055  -      Time Calculation (min) 12 min  -KH      PT Received On 12/14/23  -      PT - Next Appointment 12/15/23  -         Time Calculation- PT    Total Timed Code Minutes- PT 12 minute(s)  -KH         Timed Charges    22114 - PT Therapeutic Activity Minutes 12  -KH         Total Minutes    Timed Charges Total Minutes 12  -KH       Total Minutes 12  -KH                User Key  (r) = Recorded By, (t) = Taken By, (c) = Cosigned By      Initials Name Provider Type    Yaneth Zamora, PT Physical Therapist                  Therapy Charges for Today       Code Description Service Date Service Provider Modifiers Qty    [id number]  PT THERAPEUTIC ACT EA 15 MIN 12/14/2023 Yaneth Marti, PT GP 1            PT G-Codes  Outcome Measure Options: AM-PAC 6 Clicks Basic Mobility (PT)  AM-PAC 6 Clicks Score (PT): 19  PT Discharge Summary  Anticipated Discharge Disposition (PT): home with home health, home with assist, skilled nursing facility    Yaneth Marti, PT  12/14/2023

## 2023-12-14 NOTE — PROGRESS NOTES
"[facility] Clinical Pharmacy Services: Total Parental Nutrition Initial Consult    TPN Day #9  Indication: Prolonged Paralytic Ileus, severe malnutrition  Route: central  Type: standard    Relevant clinical data and objective history reviewed:  68 y.o. female 154.9 cm (61\") 48.8 kg (107 lb 9.4 oz)    Results from last 7 days   Lab Units 23  0541 23  0521   SODIUM mmol/L 130* 129*   POTASSIUM mmol/L 4.0 5.4*   CHLORIDE mmol/L 92* 92*   CO2 mmol/L 29.1* 25.7   BUN mg/dL 17 16   CREATININE mg/dL 0.75 0.75   CALCIUM mg/dL 9.9 10.2   ALBUMIN g/dL 2.7* 2.8*   BILIRUBIN mg/dL 0.7 0.8   ALK PHOS U/L 280* 303*   ALT (SGPT) U/L 171* 163*   AST (SGOT) U/L 108* 77*   GLUCOSE mg/dL 143* 155*   MAGNESIUM mg/dL 2.1 2.5*   PHOSPHORUS mg/dL 3.4 3.2   TRIGLYCERIDES mg/dL  --  91        Estimated Creatinine Clearance: 51.9 mL/min (by C-G formula based on SCr of 0.8 mg/dL).    Active fluid orders: None    Dietary Orders (From admission, onward)       Start     Ordered    23  Diet: Regular/House Diet; Texture: Mechanical Ground (NDD 2); Fluid Consistency: Thin (IDDSI 0)  Diet Effective Now        References:    Diet Order Crosswalk   Question Answer Comment   Diets: Regular/House Diet    Texture: Mechanical Ground (NDD 2)    Fluid Consistency: Thin (IDDSI 0)        23 0838                  Assessment  Additional insulin administration while previous TPN infusin units  Additional electrolyte administration while previous TPN infusing: None  Acid suppression: pantoprazole 40 mg IV qAM    Na at 130. LFTs slightly better; K therapeutic now at 4 and Mg therapeutic at 2.1; therefore will re-add KCl & Kphos into today's TPN and leave Mg at 10meq in today's TPN. TPN was modified 12/10 to decrease glucose due to elevated glucose infusion rate which could be a potential factor for elevated LFTs. Lipids were also modified to SMOFlipid q Tues/Thurs/Sat. Triglycerides fine at 91 mg/dl. Glucose starting to creep " up with checks-recommend to start sliding scale insulin.    Goal              Protein: 70 gram/day              Dextrose: 1200 Kcal/day              Lipids: 200 kcal  Total: 1580 kCal/day    Plan     Protein/Dextrose/Lipids: 70g/1000kCal/ 100 mL adjusted lipids to Tues/Thurs/Sat    Volume: 1560 ml (65 mL/hr over 24 hrs daily- 30ml/kg/day)-  decreased to help with Na level some    Based on the above labs, will add the following electrolytes/additives to the TPN.    Sodium Chloride: 100 mEq    Sodium Acetate: 40 mEq   Sodium Phosphate: 30 mEq    Potassium Chloride: 50 mEq   Potassium Acetate: 0 mEq   Potassium Phosphate: 22 mEq    Calcium Gluconate: 9 mEq   Magnesium Sulfate: 10 mEq   MVI for TPN   Trace Elements    Labs to be ordered: Daily CMP, accuchecks, phos and magnesium. Weekly TG (due 12/20)    Pharmacy will continue to follow.     Prosper Horowitz [facility]  Clinical Pharmacist

## 2023-12-15 ENCOUNTER — APPOINTMENT (OUTPATIENT)
Dept: CT IMAGING | Facility: HOSPITAL | Age: 68
DRG: 329 | End: 2023-12-15
Payer: MEDICARE

## 2023-12-15 LAB
ALBUMIN SERPL-MCNC: 2.6 G/DL (ref 3.5–5.2)
ALBUMIN/GLOB SERPL: 0.7 G/DL
ALP SERPL-CCNC: 280 U/L (ref 39–117)
ALT SERPL W P-5'-P-CCNC: 132 U/L (ref 1–33)
ANION GAP SERPL CALCULATED.3IONS-SCNC: 12 MMOL/L (ref 5–15)
AST SERPL-CCNC: 54 U/L (ref 1–32)
BILIRUB SERPL-MCNC: 0.6 MG/DL (ref 0–1.2)
BUN SERPL-MCNC: 14 MG/DL (ref 8–23)
BUN/CREAT SERPL: 22.6 (ref 7–25)
CALCIUM SPEC-SCNC: 9.6 MG/DL (ref 8.6–10.5)
CHLORIDE SERPL-SCNC: 93 MMOL/L (ref 98–107)
CO2 SERPL-SCNC: 26 MMOL/L (ref 22–29)
CREAT SERPL-MCNC: 0.62 MG/DL (ref 0.57–1)
EGFRCR SERPLBLD CKD-EPI 2021: 97.1 ML/MIN/1.73
GLOBULIN UR ELPH-MCNC: 4 GM/DL
GLUCOSE BLDC GLUCOMTR-MCNC: 126 MG/DL (ref 70–130)
GLUCOSE BLDC GLUCOMTR-MCNC: 141 MG/DL (ref 70–130)
GLUCOSE BLDC GLUCOMTR-MCNC: 147 MG/DL (ref 70–130)
GLUCOSE BLDC GLUCOMTR-MCNC: 151 MG/DL (ref 70–130)
GLUCOSE BLDC GLUCOMTR-MCNC: 158 MG/DL (ref 70–130)
GLUCOSE SERPL-MCNC: 138 MG/DL (ref 65–99)
MAGNESIUM SERPL-MCNC: 1.9 MG/DL (ref 1.6–2.4)
PHOSPHATE SERPL-MCNC: 3.1 MG/DL (ref 2.5–4.5)
POTASSIUM SERPL-SCNC: 3.1 MMOL/L (ref 3.5–5.2)
PROT SERPL-MCNC: 6.6 G/DL (ref 6–8.5)
SODIUM SERPL-SCNC: 131 MMOL/L (ref 136–145)

## 2023-12-15 PROCEDURE — 74177 CT ABD & PELVIS W/CONTRAST: CPT

## 2023-12-15 PROCEDURE — 25010000002 POTASSIUM CHLORIDE PER 2 MEQ OF POTASSIUM: Performed by: SURGERY

## 2023-12-15 PROCEDURE — 82948 REAGENT STRIP/BLOOD GLUCOSE: CPT

## 2023-12-15 PROCEDURE — 80053 COMPREHEN METABOLIC PANEL: CPT | Performed by: SURGERY

## 2023-12-15 PROCEDURE — 83735 ASSAY OF MAGNESIUM: CPT | Performed by: SURGERY

## 2023-12-15 PROCEDURE — 25010000002 ENOXAPARIN PER 10 MG: Performed by: SURGERY

## 2023-12-15 PROCEDURE — 25010000002 ONDANSETRON PER 1 MG: Performed by: SURGERY

## 2023-12-15 PROCEDURE — 99024 POSTOP FOLLOW-UP VISIT: CPT | Performed by: SURGERY

## 2023-12-15 PROCEDURE — 99232 SBSQ HOSP IP/OBS MODERATE 35: CPT | Performed by: INTERNAL MEDICINE

## 2023-12-15 PROCEDURE — 25010000002 MAGNESIUM SULFATE PER 500 MG OF MAGNESIUM: Performed by: SURGERY

## 2023-12-15 PROCEDURE — 25010000002 HYDROMORPHONE PER 4 MG: Performed by: SURGERY

## 2023-12-15 PROCEDURE — 84100 ASSAY OF PHOSPHORUS: CPT | Performed by: SURGERY

## 2023-12-15 PROCEDURE — 25010000002 POTASSIUM CHLORIDE 10 MEQ/100ML SOLUTION: Performed by: SURGERY

## 2023-12-15 PROCEDURE — 25010000002 PIPERACILLIN SOD-TAZOBACTAM PER 1 G: Performed by: SURGERY

## 2023-12-15 PROCEDURE — 25010000002 CALCIUM GLUCONATE PER 10 ML: Performed by: SURGERY

## 2023-12-15 PROCEDURE — 25510000001 IOPAMIDOL 61 % SOLUTION: Performed by: SURGERY

## 2023-12-15 PROCEDURE — 25010000002 PROCHLORPERAZINE 10 MG/2ML SOLUTION: Performed by: SURGERY

## 2023-12-15 RX ORDER — POTASSIUM CHLORIDE 7.45 MG/ML
10 INJECTION INTRAVENOUS
Status: COMPLETED | OUTPATIENT
Start: 2023-12-15 | End: 2023-12-16

## 2023-12-15 RX ORDER — POTASSIUM CHLORIDE 7.45 MG/ML
10 INJECTION INTRAVENOUS ONCE
Status: COMPLETED | OUTPATIENT
Start: 2023-12-15 | End: 2023-12-15

## 2023-12-15 RX ADMIN — Medication 10 ML: at 09:28

## 2023-12-15 RX ADMIN — ENOXAPARIN SODIUM 30 MG: 100 INJECTION SUBCUTANEOUS at 22:30

## 2023-12-15 RX ADMIN — PIPERACILLIN SODIUM AND TAZOBACTAM SODIUM 3.38 G: 3; .375 INJECTION, SOLUTION INTRAVENOUS at 04:28

## 2023-12-15 RX ADMIN — METOPROLOL TARTRATE 12.5 MG: 25 TABLET, FILM COATED ORAL at 09:28

## 2023-12-15 RX ADMIN — HYDROMORPHONE HYDROCHLORIDE 0.5 MG: 1 INJECTION, SOLUTION INTRAMUSCULAR; INTRAVENOUS; SUBCUTANEOUS at 06:13

## 2023-12-15 RX ADMIN — METOPROLOL TARTRATE 12.5 MG: 25 TABLET, FILM COATED ORAL at 22:28

## 2023-12-15 RX ADMIN — ONDANSETRON 4 MG: 2 INJECTION INTRAMUSCULAR; INTRAVENOUS at 23:43

## 2023-12-15 RX ADMIN — POTASSIUM CHLORIDE 10 MEQ: 7.46 INJECTION, SOLUTION INTRAVENOUS at 17:20

## 2023-12-15 RX ADMIN — PAROXETINE HYDROCHLORIDE 40 MG: 20 TABLET, FILM COATED ORAL at 09:28

## 2023-12-15 RX ADMIN — POTASSIUM CHLORIDE 10 MEQ: 7.46 INJECTION, SOLUTION INTRAVENOUS at 18:45

## 2023-12-15 RX ADMIN — FAMOTIDINE 20 MG: 20 TABLET ORAL at 06:42

## 2023-12-15 RX ADMIN — IOPAMIDOL 85 ML: 612 INJECTION, SOLUTION INTRAVENOUS at 15:10

## 2023-12-15 RX ADMIN — HYDROMORPHONE HYDROCHLORIDE 0.5 MG: 1 INJECTION, SOLUTION INTRAMUSCULAR; INTRAVENOUS; SUBCUTANEOUS at 19:41

## 2023-12-15 RX ADMIN — POTASSIUM PHOSPHATE, MONOBASIC POTASSIUM PHOSPHATE, DIBASIC: 224; 236 INJECTION, SOLUTION, CONCENTRATE INTRAVENOUS at 17:29

## 2023-12-15 RX ADMIN — PROCHLORPERAZINE EDISYLATE 5 MG: 5 INJECTION INTRAMUSCULAR; INTRAVENOUS at 06:11

## 2023-12-15 RX ADMIN — ALPRAZOLAM 0.5 MG: 0.5 TABLET ORAL at 22:28

## 2023-12-15 RX ADMIN — Medication 10 ML: at 22:33

## 2023-12-15 RX ADMIN — AMLODIPINE BESYLATE 2.5 MG: 2.5 TABLET ORAL at 09:28

## 2023-12-15 RX ADMIN — PANTOPRAZOLE SODIUM 40 MG: 40 INJECTION, POWDER, FOR SOLUTION INTRAVENOUS at 06:12

## 2023-12-15 RX ADMIN — POTASSIUM CHLORIDE 10 MEQ: 7.46 INJECTION, SOLUTION INTRAVENOUS at 20:10

## 2023-12-15 RX ADMIN — FAMOTIDINE 20 MG: 20 TABLET ORAL at 17:18

## 2023-12-15 RX ADMIN — QUETIAPINE FUMARATE 100 MG: 50 TABLET, EXTENDED RELEASE ORAL at 22:28

## 2023-12-15 RX ADMIN — POTASSIUM CHLORIDE 10 MEQ: 7.46 INJECTION, SOLUTION INTRAVENOUS at 23:43

## 2023-12-15 RX ADMIN — HYDROMORPHONE HYDROCHLORIDE 0.5 MG: 1 INJECTION, SOLUTION INTRAMUSCULAR; INTRAVENOUS; SUBCUTANEOUS at 14:20

## 2023-12-15 RX ADMIN — POTASSIUM CHLORIDE 10 MEQ: 7.46 INJECTION, SOLUTION INTRAVENOUS at 22:27

## 2023-12-15 NOTE — PLAN OF CARE
Goal Outcome Evaluation:      Remains on TPN. Still with poor appetite and ongoing nausea. Continue TPN and continue Ensure Compact vanilla TID. Will continue to monitor PO intake/tolerance and PN intake/delivery.

## 2023-12-15 NOTE — PROGRESS NOTES
"[facility] Clinical Pharmacy Services: Total Parental Nutrition Initial Consult    TPN Day #10  Indication: Prolonged Paralytic Ileus, severe malnutrition  Route: central  Type: standard    Relevant clinical data and objective history reviewed:  68 y.o. female 154.9 cm (61\") 48.8 kg (107 lb 9.4 oz)    Results from last 7 days   Lab Units 12/15/23  0543 23  0541 23  0521   SODIUM mmol/L 131*   < > 129*   POTASSIUM mmol/L 3.1*   < > 5.4*   CHLORIDE mmol/L 93*   < > 92*   CO2 mmol/L 26.0   < > 25.7   BUN mg/dL 14   < > 16   CREATININE mg/dL 0.62   < > 0.75   CALCIUM mg/dL 9.6   < > 10.2   ALBUMIN g/dL 2.6*   < > 2.8*   BILIRUBIN mg/dL 0.6   < > 0.8   ALK PHOS U/L 280*   < > 303*   ALT (SGPT) U/L 132*   < > 163*   AST (SGOT) U/L 54*   < > 77*   GLUCOSE mg/dL 138*   < > 155*   MAGNESIUM mg/dL 1.9   < > 2.5*   PHOSPHORUS mg/dL 3.1   < > 3.2   TRIGLYCERIDES mg/dL  --   --  91    < > = values in this interval not displayed.        Estimated Creatinine Clearance: 51.9 mL/min (by C-G formula based on SCr of 0.8 mg/dL).    Active fluid orders: None    Dietary Orders (From admission, onward)       Start     Ordered    23 0839  Diet: Regular/House Diet; Texture: Mechanical Ground (NDD 2); Fluid Consistency: Thin (IDDSI 0)  Diet Effective Now        References:    Diet Order Crosswalk   Question Answer Comment   Diets: Regular/House Diet    Texture: Mechanical Ground (NDD 2)    Fluid Consistency: Thin (IDDSI 0)        23 0838                  Assessment  Additional insulin administration while previous TPN infusin units  Additional electrolyte administration while previous TPN infusing: Recommended to MD we start KCL replacement  Acid suppression: pantoprazole 40 mg IV qAM    Na+ low at 131 and K+ is also low at 3.1; LFTs slightly better; therefore recommended to start KCL runs to increase the K+.(Easier to use protocol then to put in TPN) There is already 170 meq of Na+ in TPN so doubt we can " correct that. Will repeat same TPN as yesterday. Glucose still creeping up with checks-recommend to start sliding scale insulin.    Goal              Protein: 70 gram/day              Dextrose: 1200 Kcal/day              Lipids: 200 kcal  Total: 1580 kCal/day    Plan     Protein/Dextrose/Lipids: 70g/1000kCal/ 100 mL adjusted lipids to Tues/Thurs/Sat    Volume: 1560 ml (65 mL/hr over 24 hrs daily- 30ml/kg/day)-  decreased to help with Na level some    Based on the above labs, will add the following electrolytes/additives to the TPN.    Sodium Chloride: 100 mEq    Sodium Acetate: 40 mEq   Sodium Phosphate: 30 mEq    Potassium Chloride: 50 mEq   Potassium Acetate: 0 mEq   Potassium Phosphate: 22 mEq    Calcium Gluconate: 9 mEq   Magnesium Sulfate: 10 mEq   MVI for TPN   Trace Elements    Labs to be ordered: Daily CMP, accuchecks, phos and magnesium. Weekly TG (due 12/20)    Pharmacy will continue to follow.     Prosper Horowitz [facility]  Clinical Pharmacist

## 2023-12-15 NOTE — PROGRESS NOTES
ID NOTE    CC: f/u leukocytosis and abdominal abscesses following abdominal surgery    Subj: No fever. She has nausea. Going for CT today.     Medications:    Current Facility-Administered Medications:     Adult Central 2-in-1 TPN, , Intravenous, Continuous, Ranjeet Salinas MD, Last Rate: 65 mL/hr at 12/14/23 1809, New Bag at 12/14/23 1809    ALPRAZolam (XANAX) tablet 0.5 mg, 0.5 mg, Oral, Daily, Ranjeet Salinas MD, 0.5 mg at 12/14/23 2110    amLODIPine (NORVASC) tablet 2.5 mg, 2.5 mg, Oral, Q24H, Ranjeet Salinas MD, 2.5 mg at 12/15/23 0928    Enoxaparin Sodium (LOVENOX) syringe 30 mg, 30 mg, Subcutaneous, Q24H, Ranjeet Salinas MD, 30 mg at 12/14/23 2109    famotidine (PEPCID) tablet 20 mg, 20 mg, Oral, BID AC, Ranjeet Salinas MD, 20 mg at 12/15/23 0642    Fat Emul Fish Oil/Plant Based (SMOFLIPID) 20 % emulsion 100 mL, 100 mL, Intravenous, Once per day on Tue Thu Sat, Ranjeet Salinsa MD, Last Rate: 8.33 mL/hr at 12/14/23 1815, 100 mL at 12/14/23 1815    HYDROmorphone (DILAUDID) injection 0.5 mg, 0.5 mg, Intravenous, Q4H PRN, Ranjeet Salinas MD, 0.5 mg at 12/15/23 0613    Methocarbamol (ROBAXIN) injection 500 mg, 500 mg, Intravenous, Q6H PRN, Ranjeet Salinas MD    metoprolol tartrate (LOPRESSOR) tablet 12.5 mg, 12.5 mg, Oral, Q12H, Ranjeet Salinas MD, 12.5 mg at 12/15/23 0928    [DISCONTINUED] ondansetron (ZOFRAN) tablet 4 mg, 4 mg, Oral, Q6H PRN **OR** ondansetron (ZOFRAN) injection 4 mg, 4 mg, Intravenous, Q6H PRN, Ranjeet Salinas MD, 4 mg at 12/14/23 1727    oxyCODONE (ROXICODONE) immediate release tablet 5 mg, 5 mg, Oral, Q4H PRN, Ranjeet Salinas MD, 5 mg at 12/14/23 2111    pantoprazole (PROTONIX) injection 40 mg, 40 mg, Intravenous, Q AM, Ranjeet Salinas MD, 40 mg at 12/15/23 0612    PARoxetine (PAXIL) tablet 40 mg, 40 mg, Oral, Daily, Triston Malcolm III, MD, 40 mg at 12/15/23 0928    Pharmacy  "to Dose TPN, , Does not apply, Continuous PRN, Ranjeet Salinas MD    piperacillin-tazobactam (ZOSYN) 3.375 g in iso-osmotic dextrose 50 ml (premix), 3.375 g, Intravenous, Q8H, Ranjeet Salinas MD, 3.375 g at 12/15/23 0428    prochlorperazine (COMPAZINE) injection 5 mg, 5 mg, Intravenous, Q6H PRN, Ranjeet Salinas MD, 5 mg at 12/15/23 0611    QUEtiapine fumarate ER (SEROquel XR) tablet 100 mg, 100 mg, Oral, Nightly, Ranjeet Salinas MD, 100 mg at 12/14/23 2111    sodium chloride 0.9 % flush 10 mL, 10 mL, Intravenous, Q12H, Ranjeet Salinas MD, 10 mL at 12/15/23 0928    sodium chloride 0.9 % flush 10 mL, 10 mL, Intravenous, Q12H, Ranjeet Salinas MD, 10 mL at 12/15/23 0928    sodium chloride nasal spray 2 spray, 2 spray, Each Nare, PRN, Natalio Collins MD      Objective   Vital Signs   Temp:  [98.4 °F (36.9 °C)] 98.4 °F (36.9 °C)  Heart Rate:  [83-93] 87  Resp:  [16-18] 18  BP: (121-160)/(68-82) 157/70    Physical Exam:   General: NAD, very nice, sitting up in bed  Eyes: no scleral icterus  Cardiovascular: NR  Respiratory: normal work of breathing on ambient air  GI: not distended; 2 drains  :  no Hodge catheter  Vasc: RUE PICC w/o erythema    Labs:   K, Crt, and ALT reviewed today  Lab Results   Component Value Date    WBC 13.66 (H) 12/14/2023    HGB 9.3 (L) 12/14/2023    HCT 28.0 (L) 12/14/2023    MCV 90.3 12/14/2023     12/14/2023     Lab Results   Component Value Date    K 3.1 (L) 12/15/2023     Lab Results   Component Value Date    CREATININE 0.62 12/15/2023     Lab Results   Component Value Date     (H) 12/15/2023     Lab Results   Component Value Date    CRP 7.72 (H) 12/10/2023     Microbiology:  11/27 BCx: negative  12/1 BCx: negative  12/8 RP Abscess Cx: light growth Klebsiella aerogenes and light growth Enterococcus faecalis    Prior Radiology:  12/7/23 CT A/P:  \"1. Abscess posterior to the right kidney      2. Small amount of residual " "fluid in the right flank status post drainage      3. Additional probable fluid collections in the abdomen      4. Moderate size right and newly seen small left-sided pleural effusions \"    ASSESSMENT/PLAN:  Leukocytosis - better  Epistaxis - resolved  S/p colectomy for colonic inertia  Post-operative leak and abscesses  History of multiple abdominal surgeries due to diverticular disease  On TPN  Anemia  Retroperitoneal abscess    She is afebrile.  WBC down to 13k yesterday. Going for CT A/P. I will follow-up the results. I plan to continue Zosyn until the retroperitoneal/abscess drain is removed PLUS about 3 days following removal assuming a reasonable timeframe.  ID will follow.         "

## 2023-12-15 NOTE — PROGRESS NOTES
Nutrition Services    Patient Name:  Martina Hardwick  YOB: 1955  MRN: [id number]  Admit Date:  11/20/2023    Assessment Date:  12/15/23    CLINICAL NUTRITION    Comments: Follow up for TPN    TPN Day: 11  Indication: Prolonged Paralytic Ileus, severe malnutrition  Route: central  Type: standard  Last BM: 12/8  Labs: Na 131, K 3.1, Cl 93, Glu 151,   Daily Assessment              Protein/Dextrose/Lipids: 70g/1000kcal/100mL Tues/Thurs/Sat               Volume: 1560 ml (65 mL/hr over 24 hrs daily)     Goal              Protein: 70 gram/day              Dextrose: 1200 Kcal/day              Lipids: 200 kcal  Total: 1580 kCal/day                TPN follow up completed. Visited pt at bedside. Still with poor appetite and nausea. Eating 0% PO per EMR.     REC:  Continue TPN regimen at goal per PharmD  Continue Ensure Compact vanilla TID for additional calories/protein and to support adequate PO intake and appropriate wt gn   Will continue to encourage and monitor PO/ONS intake     RD to continue to monitor per protocol.     Encounter Information         Reason For Encounter Follow up for TPN    Current Issues Needs alternate route of nutrition (TPN)      Estimated Requirements            Weight used  46.6 kg     Calories  7720-8589 (30 kcal/kg, 35 kcal/kg)    Protein  56-70 (1.2 - 1.5 gm/kg)    Fluid  1 mL/kcal     Current Nutrition Orders & Evaluation of Intake       Oral Nutrition     Current PO Diet Diet: Regular/House Diet; Texture: Mechanical Ground (NDD 2); Fluid Consistency: Thin (IDDSI 0)  Adult Central 2-in-1 TPN   Supplement    PO Evaluation     Trending % PO Intake 0%     Factors Affecting Intake  altered GI function, decreased appetite, nausea      Parenteral Nutrition      TPN Route PICC   TPN Rate (mL/hr) 65 mL/hr (1590 mL)   Current TPN Order        Dextrose (kcal) 1000 kcal        Amino Acid (gm) 70 gm (280 kcal)       Lipid Concentration 20%       Lipid Volume/Frequency  100 mL, 3 times  "weekly Tues/Thurs/Sat (200 kcal)   MVI Frequency  Per PharmD   Trace Element Frequency  Per PharmD   Total # Days on TPN 11   Propofol Rate/Kcal    TPN Current Provision          Calories 100% of needs met        Protein (gm) 100% of needs met        Fluid (mL)    TPN Needs Evaluation meeting needs for calories, meeting needs for protein   TPN Changes none      Anthropometrics          Height    Weight Height: 154.9 cm (61\")  Weight: 48.8 kg (107 lb 9.4 oz) (12/04/23 1731)    BMI kg/m2 Body mass index is 20.33 kg/m².  Normal/Healthy (18.4 - 24.9)    Weight trend Stable per EMR. Pt endorses 26 lb wt loss x 3 yrs      Labs        Pertinent Labs Reviewed, listed below     Results from last 7 days   Lab Units 12/15/23  0543 12/14/23  0541 12/13/23  0521   SODIUM mmol/L 131* 130* 129*   POTASSIUM mmol/L 3.1* 4.0 5.4*   CHLORIDE mmol/L 93* 92* 92*   CO2 mmol/L 26.0 29.1* 25.7   BUN mg/dL 14 17 16   CREATININE mg/dL 0.62 0.75 0.75   CALCIUM mg/dL 9.6 9.9 10.2   BILIRUBIN mg/dL 0.6 0.7 0.8   ALK PHOS U/L 280* 280* 303*   ALT (SGPT) U/L 132* 171* 163*   AST (SGOT) U/L 54* 108* 77*   GLUCOSE mg/dL 138* 143* 155*     Results from last 7 days   Lab Units 12/15/23  0543 12/14/23  0541 12/13/23  0521   MAGNESIUM mg/dL 1.9 2.1 2.5*   PHOSPHORUS mg/dL 3.1 3.4 3.2   HEMOGLOBIN g/dL  --  9.3*  --    HEMATOCRIT %  --  28.0*  --    WBC 10*3/mm3  --  13.66*  --    TRIGLYCERIDES mg/dL  --   --  91   ALBUMIN g/dL 2.6* 2.7* 2.8*     Results from last 7 days   Lab Units 12/14/23  0541 12/12/23  0615 12/11/23  0623 12/10/23  0710 12/09/23  0612   PLATELETS 10*3/mm3 404 535* 452* 550* 575*     No results found for: \"COVID19\"  Lab Results   Component Value Date    HGBA1C 5.3 06/20/2022          Medications            Scheduled Medications ALPRAZolam, 0.5 mg, Oral, Daily  amLODIPine, 2.5 mg, Oral, Q24H  enoxaparin, 30 mg, Subcutaneous, Q24H  famotidine, 20 mg, Oral, BID AC  Fat Emul Fish Oil/Plant Based, 100 mL, Intravenous, Once per day on Tue " Thu Sat  metoprolol tartrate, 12.5 mg, Oral, Q12H  pantoprazole, 40 mg, Intravenous, Q AM  PARoxetine, 40 mg, Oral, Daily  piperacillin-tazobactam, 3.375 g, Intravenous, Q8H  QUEtiapine fumarate ER, 100 mg, Oral, Nightly  sodium chloride, 10 mL, Intravenous, Q12H  sodium chloride, 10 mL, Intravenous, Q12H        Infusions Adult Central 2-in-1 TPN, , Last Rate: 65 mL/hr at 12/14/23 1809  Pharmacy to Dose TPN,         PRN Medications   HYDROmorphone    Methocarbamol    [DISCONTINUED] ondansetron **OR** ondansetron    oxyCODONE    Pharmacy to Dose TPN    prochlorperazine    sodium chloride     Physical Findings              General Findings alert, oriented, room air, frail    Oral/Mouth Cavity WDL   Edema  no edema   Gastrointestinal hypoactive, fecal incontinence, nausea, last bowel movement: 12/8   Skin  bruising, surgical incision: midline abdomen incision    Tubes/Drains/Lines Drain medial RLQ, drain right posterior back, PICC     NFPE See Malnutrition Severity Assessment, Date completed 11/21      Malnutrition Severity Assessment       Patient meets criteria for : Severe Malnutrition (Pt meets ASPEN/AND criteria for nutrition dx of severe malnutrition of acute disease related to energy intake, muscle wasting, and fat loss.)       NUTRITION INTERVENTION / PLAN OF CARE  Intervention Goal         Intervention Goal(s) Maintain nutrition status, Improved nutrition related labs, Meet estimated needs, Increase intake, Maintain TF/PN, Transition PN to PO, No significant weight loss, and PO intake goal %: 75%     Nutrition Intervention         RD Action Encourage intake, Continue to monitor, and Care plan reviewed     Prescription         Diet Prescription     Supplement Prescription Ensure Compact vanilla TID    EN/PN Prescription    Parenteral Prescription:      TPN Route PICC   TPN Rate (mL/hr)  75 mL/hr (1800 mL total)   TPN Recommendation:         Dextrose (kcal) 1200       Amino Acid (gm) 70 gm (280 kcal)       Lipid  Concentration 20%       Lipid Volume/Frequency  100 mL, daily (200 kcal)    Propofol Rate/Kcal     TPN Provision:  1680 kcal, 70 gm protein        Calories 100% needs met        Protein  100% needs met        Fluid 1680 mL      New Prescription Ordered? Continue same per protocol, No changes at this time   --  Monitor/Evaluation        Monitor Per protocol, PO intake, Supplement intake, Pertinent labs, PN delivery/tolerance, GI status, Symptoms, POC/GOC   Discharge Needs Pending clinical course   Education Will instruct as appropriate       Electronically signed by:  Susanna Salas RD  12/15/23 09:03 EST

## 2023-12-15 NOTE — NURSING NOTE
"During assessment, patient reports nausea and requests nausea medication. Patient then states that she doesn't think it is time for her pain shot yet. Writer asks patient if she is in pain and patient responds, \"not really, not now\". Writer re-educates patient on her PRN pain medications and patient says that only the dilaudid works and that she only wants the dilaudid and asks writer to bring it in as soon as it is available for her to receive. Writer offers patient other pain medications and patient reluctantly accepts PO pain medicine.   "

## 2023-12-15 NOTE — PROGRESS NOTES
Colorectal & General Surgery  Progress Note    Patient: Martina Hardwick  YOB: 1955  MRN: [id number]      Assessment  Martina Hardwick is a 68 y.o. female with colonic inertia who is now postoperative day 25 from exploratory laparotomy with enterolysis and subtotal colectomy that was complicated by leak from small bowel enterotomy and subsequently postoperative day 17 from reopening of recent laparotomy and small bowel resection.    CT scan obtained today demonstrates resolution of the retroperitoneal abscess.  We will remove the retroperitoneal drain.    Surgical drain is controlling enteric leak well, no significant fluid collections in that area on CT scan.    Plan for Zosyn for 3 additional days.    From a nutritional standpoint, we will continue TPN as she is not taking much orally.    Over the weekend, plan to begin weaning her from intravenous pain medications.    Subjective  Continued pain and nausea.    Objective    Vitals:    12/15/23 1346   BP: 156/75   Pulse: 89   Resp: 18   Temp: 98.2 °F (36.8 °C)   SpO2: 90%       Physical Exam  Constitutional: Frail  Neck: Supple, trachea midline  Respiratory: No increased work of breathing, Symmetric excursion  Cardiovascular: Well pefursed, no jugular venous distention evident   Abdominal: Midline incision looks good, drain is bilious but low volume soft, non-tender, non-distended  Skin: Warm, dry, no rash on visualized skin surfaces  Psychiatric: Alert and oriented ×3, normal affect     Laboratory Results  I have personally reviewed CMP with creatinine 0.62, alkaline phosphatase 280, albumin 2.6.    Radiology  I have personally reviewed CT scan of the abdomen pelvis demonstrates resolution of the retroperitoneal fluid collection.  Questionable right lower quadrant fluid collection, difficult to evaluate with lack of oral contrast due to patient noncompliance.         Osvaldo Salinas MD  Colorectal & General Surgery  [facility]    [address], [address]  [place], [place], [address]  P: [phone]  F: [phone]

## 2023-12-15 NOTE — PROGRESS NOTES
"[facility] Clinical Pharmacy Services: Total Parental Nutrition Initial Consult    TPN Day #10  Indication: Prolonged Paralytic Ileus, severe malnutrition  Route: central  Type: standard    Relevant clinical data and objective history reviewed:  68 y.o. female 154.9 cm (61\") 48.8 kg (107 lb 9.4 oz)    Results from last 7 days   Lab Units 12/15/23  0543 23  0541 23  0521   SODIUM mmol/L 131*   < > 129*   POTASSIUM mmol/L 3.1*   < > 5.4*   CHLORIDE mmol/L 93*   < > 92*   CO2 mmol/L 26.0   < > 25.7   BUN mg/dL 14   < > 16   CREATININE mg/dL 0.62   < > 0.75   CALCIUM mg/dL 9.6   < > 10.2   ALBUMIN g/dL 2.6*   < > 2.8*   BILIRUBIN mg/dL 0.6   < > 0.8   ALK PHOS U/L 280*   < > 303*   ALT (SGPT) U/L 132*   < > 163*   AST (SGOT) U/L 54*   < > 77*   GLUCOSE mg/dL 138*   < > 155*   MAGNESIUM mg/dL 1.9   < > 2.5*   PHOSPHORUS mg/dL 3.1   < > 3.2   TRIGLYCERIDES mg/dL  --   --  91    < > = values in this interval not displayed.        Estimated Creatinine Clearance: 51.9 mL/min (by C-G formula based on SCr of 0.8 mg/dL).    Active fluid orders: None    Dietary Orders (From admission, onward)       Start     Ordered    23 0839  Diet: Regular/House Diet; Texture: Mechanical Ground (NDD 2); Fluid Consistency: Thin (IDDSI 0)  Diet Effective Now        References:    Diet Order Crosswalk   Question Answer Comment   Diets: Regular/House Diet    Texture: Mechanical Ground (NDD 2)    Fluid Consistency: Thin (IDDSI 0)        23 0838                  Assessment  Additional insulin administration while previous TPN infusin units  Additional electrolyte administration while previous TPN infusing: Recommended to MD we start KCL replacement  Acid suppression: pantoprazole 40 mg IV qAM    Na+ low at 131 and K+ is also low at 3.1; LFTs slightly better; therefore recommended to start KCL runs to increase the K+.(Easier to use protocol then to put in TPN) There is already 170 meq of Na+ in TPN so doubt we can " correct that. Will repeat same TPN as yesterday. Glucose still creeping up with checks-recommend to start sliding scale insulin.    Goal              Protein: 70 gram/day              Dextrose: 1200 Kcal/day              Lipids: 200 kcal  Total: 1580 kCal/day    Plan     Protein/Dextrose/Lipids: 70g/1000kCal/ 100 mL adjusted lipids to Tues/Thurs/Sat    Volume: 1560 ml (65 mL/hr over 24 hrs daily- 30ml/kg/day)-  decreased to help with Na level some    Based on the above labs, will add the following electrolytes/additives to the TPN.    Sodium Chloride: 100 mEq    Sodium Acetate: 40 mEq   Sodium Phosphate: 30 mEq    Potassium Chloride: 50 mEq   Potassium Acetate: 0 mEq   Potassium Phosphate: 22 mEq    Calcium Gluconate: 9 mEq   Magnesium Sulfate: 10 mEq   MVI for TPN   Trace Elements    Labs to be ordered: Daily CMP, accuchecks, phos and magnesium. Weekly TG (due 12/20)    Pharmacy will continue to follow.     Prosper Horowitz [facility]  Clinical Pharmacist

## 2023-12-15 NOTE — PLAN OF CARE
Problem: Adult Inpatient Plan of Care  Goal: Absence of Hospital-Acquired Illness or Injury  Intervention: Identify and Manage Fall Risk  Recent Flowsheet Documentation  Taken 12/15/2023 1800 by Claudia Alvarez RN  Safety Promotion/Fall Prevention: safety round/check completed  Taken 12/15/2023 1600 by Claudia Alvarez RN  Safety Promotion/Fall Prevention: safety round/check completed  Taken 12/15/2023 1400 by Claudia Alvarez RN  Safety Promotion/Fall Prevention: safety round/check completed  Taken 12/15/2023 1200 by Claudia Alvarez RN  Safety Promotion/Fall Prevention: safety round/check completed  Taken 12/15/2023 1000 by Claudia Alvarez RN  Safety Promotion/Fall Prevention: safety round/check completed  Taken 12/15/2023 0800 by Claudia Alvarez RN  Safety Promotion/Fall Prevention: safety round/check completed  Intervention: Prevent Skin Injury  Recent Flowsheet Documentation  Taken 12/15/2023 1800 by Claudia Alvarez RN  Body Position: position changed independently  Taken 12/15/2023 1600 by Claudia Alvarez RN  Body Position: position changed independently  Taken 12/15/2023 1400 by Claudia Alvarez RN  Body Position: position changed independently  Skin Protection:   adhesive use limited   tubing/devices free from skin contact  Taken 12/15/2023 1200 by Claudia Alvarez RN  Body Position: position changed independently  Taken 12/15/2023 1000 by Claudia Alvarez RN  Body Position: position changed independently  Taken 12/15/2023 0800 by Claudia Alvarez RN  Body Position: position changed independently  Skin Protection:   adhesive use limited   tubing/devices free from skin contact  Intervention: Prevent and Manage VTE (Venous Thromboembolism) Risk  Recent Flowsheet Documentation  Taken 12/15/2023 1800 by Claudia Alvarez RN  Activity Management: activity encouraged  Taken 12/15/2023 1600 by Claudia Alvarez RN  Activity Management: activity encouraged  Taken 12/15/2023 1400 by Claudia Alvarez RN  Activity Management:  activity encouraged  Range of Motion: active ROM (range of motion) encouraged  Taken 12/15/2023 1200 by Claudia Alvarez RN  Activity Management: activity encouraged  Taken 12/15/2023 1000 by Claudia Alvarez RN  Activity Management: activity encouraged  Taken 12/15/2023 0800 by Claudia Alvarez RN  Activity Management: activity encouraged  VTE Prevention/Management: (Lovenox) other (see comments)  Range of Motion: active ROM (range of motion) encouraged     Problem: Fall Injury Risk  Goal: Absence of Fall and Fall-Related Injury  Intervention: Promote Injury-Free Environment  Recent Flowsheet Documentation  Taken 12/15/2023 1800 by Claudia Alvarez RN  Safety Promotion/Fall Prevention: safety round/check completed  Taken 12/15/2023 1600 by Claudia Alvarez RN  Safety Promotion/Fall Prevention: safety round/check completed  Taken 12/15/2023 1400 by Claudia Alvarez RN  Safety Promotion/Fall Prevention: safety round/check completed  Taken 12/15/2023 1200 by Claudia Alvarez RN  Safety Promotion/Fall Prevention: safety round/check completed  Taken 12/15/2023 1000 by Claudia Alvarez RN  Safety Promotion/Fall Prevention: safety round/check completed  Taken 12/15/2023 0800 by Claudia Alvarez RN  Safety Promotion/Fall Prevention: safety round/check completed     Problem: Skin Injury Risk Increased  Goal: Skin Health and Integrity  Intervention: Optimize Skin Protection  Recent Flowsheet Documentation  Taken 12/15/2023 1400 by Claudia Alvarez RN  Pressure Reduction Techniques: frequent weight shift encouraged  Pressure Reduction Devices: positioning supports utilized  Skin Protection:   adhesive use limited   tubing/devices free from skin contact  Taken 12/15/2023 0800 by Claudia Alvarez RN  Pressure Reduction Techniques: frequent weight shift encouraged  Pressure Reduction Devices: positioning supports utilized  Skin Protection:   adhesive use limited   tubing/devices free from skin contact     Problem: Parenteral  Nutrition  Goal: Effective Intravenous Nutrition Therapy Delivery  Intervention: Optimize Nutrition, Fluid and Electrolyte Intake  Recent Flowsheet Documentation  Taken 12/15/2023 1400 by Claudia Alvarez RN  Glycemic Management: blood glucose monitored  Taken 12/15/2023 0800 by Claudia Alvarez RN  Glycemic Management: blood glucose monitored  Goal: Effective Intravenous Nutrition Therapy Delivery  Intervention: Optimize Nutrition, Fluid and Electrolyte Intake  Recent Flowsheet Documentation  Taken 12/15/2023 1400 by Claudia Alvarez RN  Glycemic Management: blood glucose monitored  Taken 12/15/2023 0800 by Claudia Alvarez RN  Glycemic Management: blood glucose monitored     Problem: Fall Injury Risk  Goal: Absence of Fall and Fall-Related Injury  Intervention: Promote Injury-Free Environment  Recent Flowsheet Documentation  Taken 12/15/2023 1800 by Claudia Alvarez RN  Safety Promotion/Fall Prevention: safety round/check completed  Taken 12/15/2023 1600 by Claudia Alvarez RN  Safety Promotion/Fall Prevention: safety round/check completed  Taken 12/15/2023 1400 by Claudia Alvarez RN  Safety Promotion/Fall Prevention: safety round/check completed  Taken 12/15/2023 1200 by Claudia Alvarez RN  Safety Promotion/Fall Prevention: safety round/check completed  Taken 12/15/2023 1000 by Claudia Alvarez RN  Safety Promotion/Fall Prevention: safety round/check completed  Taken 12/15/2023 0800 by Claudia Alvarez RN  Safety Promotion/Fall Prevention: safety round/check completed     Problem: Parenteral Nutrition  Goal: Effective Intravenous Nutrition Therapy Delivery  Intervention: Optimize Nutrition, Fluid and Electrolyte Intake  Recent Flowsheet Documentation  Taken 12/15/2023 1400 by Claudia Alvarez RN  Glycemic Management: blood glucose monitored  Taken 12/15/2023 0800 by Claudia Alvarez RN  Glycemic Management: blood glucose monitored   Goal Outcome Evaluation:   VSS on room air; pt still eating very little PO; pt down for CT  of abd this shift; potassium replaced IV; continue TPN; wound care preformed to midline abd incision this shift; dilaudid administered x1; plan of care continues.

## 2023-12-16 LAB
ALBUMIN SERPL-MCNC: 2.9 G/DL (ref 3.5–5.2)
ALBUMIN/GLOB SERPL: 0.8 G/DL
ALP SERPL-CCNC: 261 U/L (ref 39–117)
ALT SERPL W P-5'-P-CCNC: 103 U/L (ref 1–33)
ANION GAP SERPL CALCULATED.3IONS-SCNC: 10.1 MMOL/L (ref 5–15)
AST SERPL-CCNC: 34 U/L (ref 1–32)
BILIRUB SERPL-MCNC: 0.4 MG/DL (ref 0–1.2)
BUN SERPL-MCNC: 14 MG/DL (ref 8–23)
BUN/CREAT SERPL: 24.6 (ref 7–25)
CALCIUM SPEC-SCNC: 9.8 MG/DL (ref 8.6–10.5)
CHLORIDE SERPL-SCNC: 96 MMOL/L (ref 98–107)
CO2 SERPL-SCNC: 24.9 MMOL/L (ref 22–29)
CREAT SERPL-MCNC: 0.57 MG/DL (ref 0.57–1)
EGFRCR SERPLBLD CKD-EPI 2021: 99.1 ML/MIN/1.73
GLOBULIN UR ELPH-MCNC: 3.7 GM/DL
GLUCOSE BLDC GLUCOMTR-MCNC: 127 MG/DL (ref 70–130)
GLUCOSE BLDC GLUCOMTR-MCNC: 142 MG/DL (ref 70–130)
GLUCOSE BLDC GLUCOMTR-MCNC: 146 MG/DL (ref 70–130)
GLUCOSE BLDC GLUCOMTR-MCNC: 158 MG/DL (ref 70–130)
GLUCOSE SERPL-MCNC: 143 MG/DL (ref 65–99)
MAGNESIUM SERPL-MCNC: 2.1 MG/DL (ref 1.6–2.4)
PHOSPHATE SERPL-MCNC: 3.6 MG/DL (ref 2.5–4.5)
POTASSIUM SERPL-SCNC: 3.8 MMOL/L (ref 3.5–5.2)
PROT SERPL-MCNC: 6.6 G/DL (ref 6–8.5)
SODIUM SERPL-SCNC: 131 MMOL/L (ref 136–145)

## 2023-12-16 PROCEDURE — 99024 POSTOP FOLLOW-UP VISIT: CPT | Performed by: SURGERY

## 2023-12-16 PROCEDURE — 25010000002 HYDROMORPHONE PER 4 MG: Performed by: SURGERY

## 2023-12-16 PROCEDURE — 25010000002 ENOXAPARIN PER 10 MG: Performed by: SURGERY

## 2023-12-16 PROCEDURE — 25010000002 CALCIUM GLUCONATE PER 10 ML: Performed by: SURGERY

## 2023-12-16 PROCEDURE — 25010000002 HYDROMORPHONE 1 MG/ML SOLUTION: Performed by: SURGERY

## 2023-12-16 PROCEDURE — 83735 ASSAY OF MAGNESIUM: CPT | Performed by: SURGERY

## 2023-12-16 PROCEDURE — 80053 COMPREHEN METABOLIC PANEL: CPT | Performed by: SURGERY

## 2023-12-16 PROCEDURE — 82948 REAGENT STRIP/BLOOD GLUCOSE: CPT

## 2023-12-16 PROCEDURE — 84100 ASSAY OF PHOSPHORUS: CPT | Performed by: SURGERY

## 2023-12-16 PROCEDURE — 25010000002 MAGNESIUM SULFATE PER 500 MG OF MAGNESIUM: Performed by: SURGERY

## 2023-12-16 PROCEDURE — 25010000002 POTASSIUM CHLORIDE PER 2 MEQ OF POTASSIUM: Performed by: SURGERY

## 2023-12-16 RX ORDER — HYDROMORPHONE HYDROCHLORIDE 1 MG/ML
0.5 INJECTION, SOLUTION INTRAMUSCULAR; INTRAVENOUS; SUBCUTANEOUS DAILY PRN
Status: DISCONTINUED | OUTPATIENT
Start: 2023-12-16 | End: 2023-12-19

## 2023-12-16 RX ORDER — OXYCODONE HYDROCHLORIDE 10 MG/1
10 TABLET ORAL EVERY 4 HOURS PRN
Status: DISCONTINUED | OUTPATIENT
Start: 2023-12-16 | End: 2023-12-20 | Stop reason: HOSPADM

## 2023-12-16 RX ADMIN — FAMOTIDINE 20 MG: 20 TABLET ORAL at 08:51

## 2023-12-16 RX ADMIN — HYDROMORPHONE HYDROCHLORIDE 0.5 MG: 1 INJECTION, SOLUTION INTRAMUSCULAR; INTRAVENOUS; SUBCUTANEOUS at 01:16

## 2023-12-16 RX ADMIN — QUETIAPINE FUMARATE 100 MG: 50 TABLET, EXTENDED RELEASE ORAL at 20:53

## 2023-12-16 RX ADMIN — Medication 10 ML: at 08:52

## 2023-12-16 RX ADMIN — OXYCODONE HYDROCHLORIDE 10 MG: 10 TABLET ORAL at 16:08

## 2023-12-16 RX ADMIN — ENOXAPARIN SODIUM 30 MG: 100 INJECTION SUBCUTANEOUS at 20:54

## 2023-12-16 RX ADMIN — SMOFLIPID 100 ML: 6; 6; 5; 3 INJECTION, EMULSION INTRAVENOUS at 18:25

## 2023-12-16 RX ADMIN — PANTOPRAZOLE SODIUM 40 MG: 40 INJECTION, POWDER, FOR SOLUTION INTRAVENOUS at 06:27

## 2023-12-16 RX ADMIN — AMLODIPINE BESYLATE 2.5 MG: 2.5 TABLET ORAL at 08:51

## 2023-12-16 RX ADMIN — POTASSIUM PHOSPHATE, MONOBASIC POTASSIUM PHOSPHATE, DIBASIC: 224; 236 INJECTION, SOLUTION, CONCENTRATE INTRAVENOUS at 18:24

## 2023-12-16 RX ADMIN — HYDROMORPHONE HYDROCHLORIDE 0.5 MG: 1 INJECTION, SOLUTION INTRAMUSCULAR; INTRAVENOUS; SUBCUTANEOUS at 11:37

## 2023-12-16 RX ADMIN — METOPROLOL TARTRATE 12.5 MG: 25 TABLET, FILM COATED ORAL at 20:53

## 2023-12-16 RX ADMIN — HYDROMORPHONE HYDROCHLORIDE 0.5 MG: 1 INJECTION, SOLUTION INTRAMUSCULAR; INTRAVENOUS; SUBCUTANEOUS at 22:00

## 2023-12-16 RX ADMIN — METOPROLOL TARTRATE 12.5 MG: 25 TABLET, FILM COATED ORAL at 08:52

## 2023-12-16 RX ADMIN — PAROXETINE HYDROCHLORIDE 40 MG: 20 TABLET, FILM COATED ORAL at 08:51

## 2023-12-16 RX ADMIN — FAMOTIDINE 20 MG: 20 TABLET ORAL at 18:25

## 2023-12-16 RX ADMIN — ALPRAZOLAM 0.5 MG: 0.5 TABLET ORAL at 20:53

## 2023-12-16 NOTE — PROGRESS NOTES
"[facility] Clinical Pharmacy Services: Total Parental Nutrition Initial Consult    TPN Day #12  Indication: Prolonged Paralytic Ileus, severe malnutrition  Route: central  Type: standard    Relevant clinical data and objective history reviewed:  68 y.o. female 154.9 cm (61\") 48.8 kg (107 lb 9.4 oz)    Results from last 7 days   Lab Units 23  0920 23  0541 23  0521   SODIUM mmol/L 131*   < > 129*   POTASSIUM mmol/L 3.8   < > 5.4*   CHLORIDE mmol/L 96*   < > 92*   CO2 mmol/L 24.9   < > 25.7   BUN mg/dL 14   < > 16   CREATININE mg/dL 0.57   < > 0.75   CALCIUM mg/dL 9.8   < > 10.2   ALBUMIN g/dL 2.9*   < > 2.8*   BILIRUBIN mg/dL 0.4   < > 0.8   ALK PHOS U/L 261*   < > 303*   ALT (SGPT) U/L 103*   < > 163*   AST (SGOT) U/L 34*   < > 77*   GLUCOSE mg/dL 143*   < > 155*   MAGNESIUM mg/dL 2.1   < > 2.5*   PHOSPHORUS mg/dL 3.6   < > 3.2   TRIGLYCERIDES mg/dL  --   --  91    < > = values in this interval not displayed.        Estimated Creatinine Clearance: 72.8 mL/min (by C-G formula based on SCr of 0.57 mg/dL).    Active fluid orders: None    Dietary Orders (From admission, onward)       Start     Ordered    23 1200  Dietary Nutrition Supplements Ensure Compact; vanilla  Daily With Breakfast, Lunch & Dinner      Question Answer Comment   Select Supplement: Ensure Compact    Flavor: vanilla        23 1140    23 0839  Diet: Regular/House Diet; Texture: Mechanical Ground (NDD 2); Fluid Consistency: Thin (IDDSI 0)  Diet Effective Now        References:    Diet Order Crosswalk   Question Answer Comment   Diets: Regular/House Diet    Texture: Mechanical Ground (NDD 2)    Fluid Consistency: Thin (IDDSI 0)        23 0838                  Assessment  Additional insulin administration while previous TPN infusin units  Additional electrolyte administration while previous TPN infusinmEq KCL  Acid suppression: pantoprazole 40 mg IV qAM    Na+ remains low at 131.  K substantially " improved - 50mEq KCL given exogenously 12/15.  Cl improving.  LFTs improving.  Continue KCL 50mEq in TPN; replace via protocol if needed.  (Note was recently hyperkalemic so reluctant to increase aggressively in TPN.)   Glucose still creeping up with checks-recommend to start sliding scale insulin.  Increase dextrose from 1000 to 1200 kcal/day to meet macronutrient goals.    Goal              Protein: 70 gram/day              Dextrose: 1200 Kcal/day              Lipids: 200 kcal  Total: 1580 kCal/day    Plan     Protein/Dextrose/Lipids: 70g/1200kCal/ 100 mL adjusted lipids to Tues/Thurs/Sat    Volume: 1560 ml (65 mL/hr over 24 hrs daily- 30ml/kg/day) -  decreased to help with Na level some    Based on the above labs, will add the following electrolytes/additives to the TPN.    Sodium Chloride: 100 mEq    Sodium Acetate: 40 mEq   Sodium Phosphate: 30 mEq    Potassium Chloride: 50 mEq   Potassium Acetate: 0 mEq   Potassium Phosphate: 22 mEq    Calcium Gluconate: 9 mEq   Magnesium Sulfate: 10 mEq   MVI for TPN   Trace Elements    Labs to be ordered: Daily CMP, accuchecks, phos and magnesium. Weekly TG (due 12/20)    Pharmacy will continue to follow.     Sheryl Jeong, PharmD  Clinical Pharmacist

## 2023-12-16 NOTE — PROGRESS NOTES
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     Retroperitoneal drain removed today.  Continue surgical drain to bulb suction.  It is less bilious and lower output.    Final date of Zosyn will be Tuesday, December 19.  Appreciate Dr. Turk's assistance.    Decreasing IV Dilaudid availability to once a day.  Increasing dose of oxycodone.    Eating better, continue TPN for now.    Subjective  Feeling better.  Eating more.  Pain seems to be decreasing.    Objective    Vitals:    12/16/23 0745   BP: 159/79   Pulse: 89   Resp: 16   Temp: 99 °F (37.2 °C)   SpO2: 98%       Physical Exam  Constitutional: Well-developed well-nourished, no acute distress  Neck: Supple, trachea midline  Respiratory: No increased work of breathing, Symmetric excursion  Cardiovascular: Well pefursed, no jugular venous distention evident   Abdominal: Drain is less output and more purulent in appearance.  Soft, non-tender, non-distended  Skin: Warm, dry, no rash on visualized skin surfaces  Psychiatric: Alert and oriented ×3, normal affect     Laboratory Results  I have personally reviewed potassium 3.8, creatinine 0.57, albumin 3.9.  AST 34, , total bilirubin 0.4.    Radiology  None to review         Osvaldo Salinas MD  Colorectal & General Surgery  [facility]    [address] [place] [address], [address]  [place], [place], [address]  P: [phone]  F: [phone]

## 2023-12-17 LAB
ALBUMIN SERPL-MCNC: 2.8 G/DL (ref 3.5–5.2)
ALBUMIN/GLOB SERPL: 0.7 G/DL
ALP SERPL-CCNC: 233 U/L (ref 39–117)
ALT SERPL W P-5'-P-CCNC: 94 U/L (ref 1–33)
ANION GAP SERPL CALCULATED.3IONS-SCNC: 11 MMOL/L (ref 5–15)
AST SERPL-CCNC: 49 U/L (ref 1–32)
BILIRUB SERPL-MCNC: 0.4 MG/DL (ref 0–1.2)
BUN SERPL-MCNC: 20 MG/DL (ref 8–23)
BUN/CREAT SERPL: 29 (ref 7–25)
CALCIUM SPEC-SCNC: 10 MG/DL (ref 8.6–10.5)
CHLORIDE SERPL-SCNC: 97 MMOL/L (ref 98–107)
CO2 SERPL-SCNC: 25 MMOL/L (ref 22–29)
CREAT SERPL-MCNC: 0.69 MG/DL (ref 0.57–1)
DEPRECATED RDW RBC AUTO: 48.4 FL (ref 37–54)
EGFRCR SERPLBLD CKD-EPI 2021: 94.7 ML/MIN/1.73
ERYTHROCYTE [DISTWIDTH] IN BLOOD BY AUTOMATED COUNT: 14.6 % (ref 12.3–15.4)
GLOBULIN UR ELPH-MCNC: 3.8 GM/DL
GLUCOSE BLDC GLUCOMTR-MCNC: 137 MG/DL (ref 70–130)
GLUCOSE BLDC GLUCOMTR-MCNC: 145 MG/DL (ref 70–130)
GLUCOSE BLDC GLUCOMTR-MCNC: 149 MG/DL (ref 70–130)
GLUCOSE BLDC GLUCOMTR-MCNC: 157 MG/DL (ref 70–130)
GLUCOSE SERPL-MCNC: 116 MG/DL (ref 65–99)
HCT VFR BLD AUTO: 28.6 % (ref 34–46.6)
HGB BLD-MCNC: 9.4 G/DL (ref 12–15.9)
MAGNESIUM SERPL-MCNC: 2.1 MG/DL (ref 1.6–2.4)
MCH RBC QN AUTO: 30 PG (ref 26.6–33)
MCHC RBC AUTO-ENTMCNC: 32.9 G/DL (ref 31.5–35.7)
MCV RBC AUTO: 91.4 FL (ref 79–97)
PHOSPHATE SERPL-MCNC: 4 MG/DL (ref 2.5–4.5)
PLATELET # BLD AUTO: 394 10*3/MM3 (ref 140–450)
PMV BLD AUTO: 9.3 FL (ref 6–12)
POTASSIUM SERPL-SCNC: 4.6 MMOL/L (ref 3.5–5.2)
PROT SERPL-MCNC: 6.6 G/DL (ref 6–8.5)
RBC # BLD AUTO: 3.13 10*6/MM3 (ref 3.77–5.28)
SODIUM SERPL-SCNC: 133 MMOL/L (ref 136–145)
WBC NRBC COR # BLD AUTO: 10.16 10*3/MM3 (ref 3.4–10.8)

## 2023-12-17 PROCEDURE — 25010000002 PIPERACILLIN SOD-TAZOBACTAM PER 1 G: Performed by: SURGERY

## 2023-12-17 PROCEDURE — 85027 COMPLETE CBC AUTOMATED: CPT | Performed by: SURGERY

## 2023-12-17 PROCEDURE — 25010000002 MAGNESIUM SULFATE PER 500 MG OF MAGNESIUM: Performed by: SURGERY

## 2023-12-17 PROCEDURE — 25010000002 POTASSIUM CHLORIDE PER 2 MEQ OF POTASSIUM: Performed by: SURGERY

## 2023-12-17 PROCEDURE — 25010000002 ENOXAPARIN PER 10 MG: Performed by: SURGERY

## 2023-12-17 PROCEDURE — 99024 POSTOP FOLLOW-UP VISIT: CPT | Performed by: SURGERY

## 2023-12-17 PROCEDURE — 25010000002 HYDROMORPHONE PER 4 MG: Performed by: SURGERY

## 2023-12-17 PROCEDURE — 84100 ASSAY OF PHOSPHORUS: CPT | Performed by: SURGERY

## 2023-12-17 PROCEDURE — 82948 REAGENT STRIP/BLOOD GLUCOSE: CPT

## 2023-12-17 PROCEDURE — 25010000002 ONDANSETRON PER 1 MG: Performed by: SURGERY

## 2023-12-17 PROCEDURE — 83735 ASSAY OF MAGNESIUM: CPT | Performed by: SURGERY

## 2023-12-17 PROCEDURE — 80053 COMPREHEN METABOLIC PANEL: CPT | Performed by: SURGERY

## 2023-12-17 PROCEDURE — 25010000002 CALCIUM GLUCONATE PER 10 ML: Performed by: SURGERY

## 2023-12-17 RX ORDER — QUETIAPINE FUMARATE 50 MG/1
50 TABLET, EXTENDED RELEASE ORAL NIGHTLY
Status: DISCONTINUED | OUTPATIENT
Start: 2023-12-17 | End: 2023-12-19

## 2023-12-17 RX ORDER — PANTOPRAZOLE SODIUM 40 MG/1
40 TABLET, DELAYED RELEASE ORAL
Status: DISCONTINUED | OUTPATIENT
Start: 2023-12-18 | End: 2023-12-20 | Stop reason: HOSPADM

## 2023-12-17 RX ADMIN — Medication 10 ML: at 08:43

## 2023-12-17 RX ADMIN — FAMOTIDINE 20 MG: 20 TABLET ORAL at 08:43

## 2023-12-17 RX ADMIN — PAROXETINE HYDROCHLORIDE 40 MG: 20 TABLET, FILM COATED ORAL at 08:43

## 2023-12-17 RX ADMIN — Medication 10 ML: at 21:00

## 2023-12-17 RX ADMIN — PIPERACILLIN SODIUM AND TAZOBACTAM SODIUM 3.38 G: 3; .375 INJECTION, SOLUTION INTRAVENOUS at 16:38

## 2023-12-17 RX ADMIN — FAMOTIDINE 20 MG: 20 TABLET ORAL at 16:38

## 2023-12-17 RX ADMIN — QUETIAPINE FUMARATE 50 MG: 50 TABLET, EXTENDED RELEASE ORAL at 20:58

## 2023-12-17 RX ADMIN — ONDANSETRON 4 MG: 2 INJECTION INTRAMUSCULAR; INTRAVENOUS at 16:38

## 2023-12-17 RX ADMIN — METOPROLOL TARTRATE 12.5 MG: 25 TABLET, FILM COATED ORAL at 08:43

## 2023-12-17 RX ADMIN — PIPERACILLIN SODIUM AND TAZOBACTAM SODIUM 3.38 G: 3; .375 INJECTION, SOLUTION INTRAVENOUS at 10:41

## 2023-12-17 RX ADMIN — HYDROMORPHONE HYDROCHLORIDE 0.5 MG: 1 INJECTION, SOLUTION INTRAMUSCULAR; INTRAVENOUS; SUBCUTANEOUS at 20:58

## 2023-12-17 RX ADMIN — AMLODIPINE BESYLATE 2.5 MG: 2.5 TABLET ORAL at 08:43

## 2023-12-17 RX ADMIN — OXYCODONE HYDROCHLORIDE 10 MG: 10 TABLET ORAL at 09:33

## 2023-12-17 RX ADMIN — ENOXAPARIN SODIUM 30 MG: 100 INJECTION SUBCUTANEOUS at 20:58

## 2023-12-17 RX ADMIN — METOPROLOL TARTRATE 12.5 MG: 25 TABLET, FILM COATED ORAL at 20:58

## 2023-12-17 RX ADMIN — SODIUM PHOSPHATE, MONOBASIC, MONOHYDRATE AND SODIUM PHOSPHATE, DIBASIC, ANHYDROUS: 142; 276 INJECTION, SOLUTION INTRAVENOUS at 17:39

## 2023-12-17 RX ADMIN — ALPRAZOLAM 0.5 MG: 0.5 TABLET ORAL at 20:59

## 2023-12-17 RX ADMIN — PANTOPRAZOLE SODIUM 40 MG: 40 INJECTION, POWDER, FOR SOLUTION INTRAVENOUS at 05:46

## 2023-12-17 NOTE — PROGRESS NOTES
"[facility] Clinical Pharmacy Services: Total Parental Nutrition Initial Consult    TPN Day #13  Indication: Prolonged Paralytic Ileus, severe malnutrition  Route: central  Type: standard    Relevant clinical data and objective history reviewed:  68 y.o. female 154.9 cm (61\") 48.8 kg (107 lb 9.4 oz)    Results from last 7 days   Lab Units 23  0906 23  0541 23  0521   SODIUM mmol/L 133*   < > 129*   POTASSIUM mmol/L 4.6   < > 5.4*   CHLORIDE mmol/L 97*   < > 92*   CO2 mmol/L 25.0   < > 25.7   BUN mg/dL 20   < > 16   CREATININE mg/dL 0.69   < > 0.75   CALCIUM mg/dL 10.0   < > 10.2   ALBUMIN g/dL 2.8*   < > 2.8*   BILIRUBIN mg/dL 0.4   < > 0.8   ALK PHOS U/L 233*   < > 303*   ALT (SGPT) U/L 94*   < > 163*   AST (SGOT) U/L 49*   < > 77*   GLUCOSE mg/dL 116*   < > 155*   MAGNESIUM mg/dL 2.1   < > 2.5*   PHOSPHORUS mg/dL 4.0   < > 3.2   TRIGLYCERIDES mg/dL  --   --  91    < > = values in this interval not displayed.        Estimated Creatinine Clearance: 60.1 mL/min (by C-G formula based on SCr of 0.69 mg/dL).    Active fluid orders: None    Dietary Orders (From admission, onward)       Start     Ordered    23 1200  Dietary Nutrition Supplements Ensure Compact; vanilla  Daily With Breakfast, Lunch & Dinner      Question Answer Comment   Select Supplement: Ensure Compact    Flavor: vanilla        23 1140    23 0839  Diet: Regular/House Diet; Texture: Mechanical Ground (NDD 2); Fluid Consistency: Thin (IDDSI 0)  Diet Effective Now        References:    Diet Order Crosswalk   Question Answer Comment   Diets: Regular/House Diet    Texture: Mechanical Ground (NDD 2)    Fluid Consistency: Thin (IDDSI 0)        23 0838                  Assessment  Additional insulin administration while previous TPN infusin units  Additional electrolyte administration while previous TPN infusinmEq KCL  Acid suppression: pantoprazole 40 mg IV qAM    Na+ improving to 133.  K fully corrected to " 4.6 without additional supplementation last 24 hours.  Cl improving.  Based on K trend, continue KCL 50mEq in TPN but will hold Kphos in TPN for now to prevent hyperkalemia.  Phos WNL and trending up.  Continue macronutrients at goal.  Note pt is eating better - about 25% of meals yesterday.    Goal              Protein: 70 gram/day              Dextrose: 1200 Kcal/day              Lipids: 200 kcal              Total: 1580 kCal/day    Plan     Protein/Dextrose/Lipids: 70g/1200kCal/ 100 mL adjusted lipids to Tues/Thurs/Sat    Volume: 1560 ml (65 mL/hr over 24 hrs daily- 30ml/kg/day) -  decreased to help with Na level some    Based on the above labs, will add the following electrolytes/additives to the TPN.    Sodium Chloride: 100 mEq    Sodium Acetate: 40 mEq   Sodium Phosphate: 30 mEq    Potassium Chloride: 50 mEq   Potassium Acetate: 0 mEq   Potassium Phosphate: 0 mEq    Calcium Gluconate: 9 mEq   Magnesium Sulfate: 10 mEq   MVI for TPN   Trace Elements    Labs to be ordered: Daily CMP, accuchecks, phos and magnesium. Weekly TG (due 12/20)    Pharmacy will continue to follow.     Sheryl Jeong, PharmD  Clinical Pharmacist        None

## 2023-12-17 NOTE — PLAN OF CARE
Problem: Adult Inpatient Plan of Care  Goal: Absence of Hospital-Acquired Illness or Injury  Intervention: Identify and Manage Fall Risk  Recent Flowsheet Documentation  Taken 12/16/2023 1800 by Claudia Alvarez RN  Safety Promotion/Fall Prevention: safety round/check completed  Taken 12/16/2023 1600 by Claudia Alvarez RN  Safety Promotion/Fall Prevention: safety round/check completed  Taken 12/16/2023 1400 by Claudia Alvarez RN  Safety Promotion/Fall Prevention: safety round/check completed  Taken 12/16/2023 1200 by Claudia Alvarez RN  Safety Promotion/Fall Prevention: safety round/check completed  Taken 12/16/2023 1000 by Claudia Alvarez RN  Safety Promotion/Fall Prevention: safety round/check completed  Taken 12/16/2023 0800 by Claudia Alvarez RN  Safety Promotion/Fall Prevention: safety round/check completed  Intervention: Prevent Skin Injury  Recent Flowsheet Documentation  Taken 12/16/2023 1800 by Claudia Alvarez RN  Body Position: position changed independently  Taken 12/16/2023 1600 by Claudia Alvarez RN  Body Position: position changed independently  Taken 12/16/2023 1400 by Claudia Alvarez RN  Body Position: position changed independently  Taken 12/16/2023 1200 by Claudia Alvarez RN  Body Position: position changed independently  Taken 12/16/2023 1000 by Claudia Alvarez RN  Body Position: position changed independently  Taken 12/16/2023 0800 by Claudia Alvarez RN  Body Position: position changed independently  Skin Protection:   adhesive use limited   tubing/devices free from skin contact  Intervention: Prevent and Manage VTE (Venous Thromboembolism) Risk  Recent Flowsheet Documentation  Taken 12/16/2023 1800 by Claudia Alvarez RN  Activity Management: activity encouraged  Taken 12/16/2023 1600 by Claudia Alvarez RN  Activity Management: activity encouraged  Taken 12/16/2023 1400 by Claudia Alvarez RN  Activity Management: activity encouraged  VTE Prevention/Management: (Lovenox) other (see  comments)  Range of Motion: active ROM (range of motion) encouraged  Taken 12/16/2023 1200 by Claudia Alvarez RN  Activity Management: activity encouraged  Taken 12/16/2023 1000 by Claudia Alvarez RN  Activity Management: activity encouraged  Taken 12/16/2023 0800 by Claudia Alvarez RN  Activity Management: activity encouraged  VTE Prevention/Management: (Lovenox) other (see comments)  Range of Motion: active ROM (range of motion) encouraged     Problem: Fall Injury Risk  Goal: Absence of Fall and Fall-Related Injury  Intervention: Promote Injury-Free Environment  Recent Flowsheet Documentation  Taken 12/16/2023 1800 by Claudia Alvarez RN  Safety Promotion/Fall Prevention: safety round/check completed  Taken 12/16/2023 1600 by Claudia Alvarez RN  Safety Promotion/Fall Prevention: safety round/check completed  Taken 12/16/2023 1400 by Claudia Alvarez RN  Safety Promotion/Fall Prevention: safety round/check completed  Taken 12/16/2023 1200 by Claudia Alvarez RN  Safety Promotion/Fall Prevention: safety round/check completed  Taken 12/16/2023 1000 by Claudia Alvarez RN  Safety Promotion/Fall Prevention: safety round/check completed  Taken 12/16/2023 0800 by Claudia Alvraez RN  Safety Promotion/Fall Prevention: safety round/check completed     Problem: Skin Injury Risk Increased  Goal: Skin Health and Integrity  Intervention: Optimize Skin Protection  Recent Flowsheet Documentation  Taken 12/16/2023 0800 by Claudia Alvarez RN  Pressure Reduction Techniques: frequent weight shift encouraged  Pressure Reduction Devices: positioning supports utilized  Skin Protection:   adhesive use limited   tubing/devices free from skin contact     Problem: Parenteral Nutrition  Goal: Effective Intravenous Nutrition Therapy Delivery  Intervention: Optimize Nutrition, Fluid and Electrolyte Intake  Recent Flowsheet Documentation  Taken 12/16/2023 1400 by Claudia Alvarez RN  Glycemic Management: blood glucose monitored  Taken 12/16/2023  0800 by Claudia Alvarez RN  Glycemic Management: blood glucose monitored  Goal: Effective Intravenous Nutrition Therapy Delivery  Intervention: Optimize Nutrition, Fluid and Electrolyte Intake  Recent Flowsheet Documentation  Taken 12/16/2023 1400 by Claudia Alvarez RN  Glycemic Management: blood glucose monitored  Taken 12/16/2023 0800 by Claudia Alvarez RN  Glycemic Management: blood glucose monitored     Problem: Fall Injury Risk  Goal: Absence of Fall and Fall-Related Injury  Intervention: Promote Injury-Free Environment  Recent Flowsheet Documentation  Taken 12/16/2023 1800 by Claudia Alvarez RN  Safety Promotion/Fall Prevention: safety round/check completed  Taken 12/16/2023 1600 by Claudia Alvarez RN  Safety Promotion/Fall Prevention: safety round/check completed  Taken 12/16/2023 1400 by Claudia Alvarez RN  Safety Promotion/Fall Prevention: safety round/check completed  Taken 12/16/2023 1200 by Claudia Alvarez RN  Safety Promotion/Fall Prevention: safety round/check completed  Taken 12/16/2023 1000 by Claudia Alvarez RN  Safety Promotion/Fall Prevention: safety round/check completed  Taken 12/16/2023 0800 by Claduia Alvarez RN  Safety Promotion/Fall Prevention: safety round/check completed     Problem: Parenteral Nutrition  Goal: Effective Intravenous Nutrition Therapy Delivery  Intervention: Optimize Nutrition, Fluid and Electrolyte Intake  Recent Flowsheet Documentation  Taken 12/16/2023 1400 by Claudia Alvarez RN  Glycemic Management: blood glucose monitored  Taken 12/16/2023 0800 by Claudia Alvarez RN  Glycemic Management: blood glucose monitored   Goal Outcome Evaluation:   VSS on room air; pt ambulated in [place] using walker with  this shift; posterior drain pulled by Dr. Salinas; pts appetite still not great but increasing; weaning pain meds per order; bed locked and in low position; call light in reach; plan of care continues.

## 2023-12-17 NOTE — PROGRESS NOTES
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      White blood cell count is normal.  Drain with very minimal output that appears relatively purulent still.  No bilious output in the drain.    Tolerating regular diet.  Eating more and more every day.  Plan to half the rate of TPN starting tomorrow.    Continue to decrease IV Dilaudid availability and hopefully off of that by early this week.    Zosyn was automatically discontinued in epic.  I restarted it today.  Last day will be Tuesday, December 19.  Appreciate assistance from Dr. Turk.    We should begin discharge planning.  Hopefully home by the end of the week.    Subjective  Feels much better today.  Eating, pain is improved.  Having bowel function.    Objective    Vitals:    12/17/23 0810   BP: 139/65   Pulse: 95   Resp: 18   Temp: 98.6 °F (37 °C)   SpO2: 99%       Physical Exam  Constitutional: Well-developed well-nourished, no acute distress  Neck: Supple, trachea midline  Respiratory: No increased work of breathing, Symmetric excursion  Cardiovascular: Well pefursed, no jugular venous distention evident   Abdominal: Midline incision looks good, drain is purulent but very very low output.  Soft, non-tender, non-distended  Skin: Warm, dry, no rash on visualized skin surfaces  Psychiatric: Alert and oriented ×3, normal affect     Laboratory Results  I have personally reviewed CMP with creatinine 0.69, albumin 2.8, AST 49, ALT 94, total bilirubin 0.4.  CBC with WBC 10, hemoglobin 9, platelets 394.    Radiology  None to review         Osvaldo Salinas MD  Colorectal  & General Surgery  [facility]    [address], [address]  [place], [place], [address]  P: [phone]  F: [phone]

## 2023-12-18 LAB
ALBUMIN SERPL-MCNC: 2.5 G/DL (ref 3.5–5.2)
ALBUMIN/GLOB SERPL: 0.6 G/DL
ALP SERPL-CCNC: 185 U/L (ref 39–117)
ALT SERPL W P-5'-P-CCNC: 74 U/L (ref 1–33)
ANION GAP SERPL CALCULATED.3IONS-SCNC: 9.7 MMOL/L (ref 5–15)
AST SERPL-CCNC: 31 U/L (ref 1–32)
BILIRUB SERPL-MCNC: 0.4 MG/DL (ref 0–1.2)
BUN SERPL-MCNC: 19 MG/DL (ref 8–23)
BUN/CREAT SERPL: 27.5 (ref 7–25)
CALCIUM SPEC-SCNC: 9.6 MG/DL (ref 8.6–10.5)
CHLORIDE SERPL-SCNC: 98 MMOL/L (ref 98–107)
CO2 SERPL-SCNC: 24.3 MMOL/L (ref 22–29)
CREAT SERPL-MCNC: 0.69 MG/DL (ref 0.57–1)
EGFRCR SERPLBLD CKD-EPI 2021: 94.7 ML/MIN/1.73
GLOBULIN UR ELPH-MCNC: 3.9 GM/DL
GLUCOSE BLDC GLUCOMTR-MCNC: 119 MG/DL (ref 70–130)
GLUCOSE BLDC GLUCOMTR-MCNC: 144 MG/DL (ref 70–130)
GLUCOSE BLDC GLUCOMTR-MCNC: 149 MG/DL (ref 70–130)
GLUCOSE BLDC GLUCOMTR-MCNC: 158 MG/DL (ref 70–130)
GLUCOSE SERPL-MCNC: 138 MG/DL (ref 65–99)
MAGNESIUM SERPL-MCNC: 2.1 MG/DL (ref 1.6–2.4)
PHOSPHATE SERPL-MCNC: 3.9 MG/DL (ref 2.5–4.5)
POTASSIUM SERPL-SCNC: 3.7 MMOL/L (ref 3.5–5.2)
PROT SERPL-MCNC: 6.4 G/DL (ref 6–8.5)
SODIUM SERPL-SCNC: 132 MMOL/L (ref 136–145)

## 2023-12-18 PROCEDURE — 99232 SBSQ HOSP IP/OBS MODERATE 35: CPT | Performed by: INTERNAL MEDICINE

## 2023-12-18 PROCEDURE — 97530 THERAPEUTIC ACTIVITIES: CPT

## 2023-12-18 PROCEDURE — 25010000002 ENOXAPARIN PER 10 MG: Performed by: SURGERY

## 2023-12-18 PROCEDURE — 83735 ASSAY OF MAGNESIUM: CPT | Performed by: SURGERY

## 2023-12-18 PROCEDURE — 25010000002 PIPERACILLIN SOD-TAZOBACTAM PER 1 G: Performed by: SURGERY

## 2023-12-18 PROCEDURE — 82948 REAGENT STRIP/BLOOD GLUCOSE: CPT

## 2023-12-18 PROCEDURE — 80053 COMPREHEN METABOLIC PANEL: CPT | Performed by: SURGERY

## 2023-12-18 PROCEDURE — 97110 THERAPEUTIC EXERCISES: CPT

## 2023-12-18 PROCEDURE — 25010000002 PIPERACILLIN SOD-TAZOBACTAM PER 1 G: Performed by: INTERNAL MEDICINE

## 2023-12-18 PROCEDURE — 99024 POSTOP FOLLOW-UP VISIT: CPT | Performed by: SURGERY

## 2023-12-18 PROCEDURE — 25010000002 MAGNESIUM SULFATE PER 500 MG OF MAGNESIUM: Performed by: SURGERY

## 2023-12-18 PROCEDURE — 25010000002 HYDROMORPHONE PER 4 MG: Performed by: SURGERY

## 2023-12-18 PROCEDURE — 84100 ASSAY OF PHOSPHORUS: CPT | Performed by: SURGERY

## 2023-12-18 PROCEDURE — 25010000002 POTASSIUM CHLORIDE PER 2 MEQ OF POTASSIUM: Performed by: SURGERY

## 2023-12-18 PROCEDURE — 25010000002 CALCIUM GLUCONATE PER 10 ML: Performed by: SURGERY

## 2023-12-18 RX ADMIN — FAMOTIDINE 20 MG: 20 TABLET ORAL at 08:42

## 2023-12-18 RX ADMIN — SODIUM PHOSPHATE, MONOBASIC, MONOHYDRATE AND SODIUM PHOSPHATE, DIBASIC, ANHYDROUS: 142; 276 INJECTION, SOLUTION INTRAVENOUS at 17:07

## 2023-12-18 RX ADMIN — HYDROMORPHONE HYDROCHLORIDE 0.5 MG: 1 INJECTION, SOLUTION INTRAMUSCULAR; INTRAVENOUS; SUBCUTANEOUS at 20:59

## 2023-12-18 RX ADMIN — Medication 10 ML: at 08:43

## 2023-12-18 RX ADMIN — Medication 10 ML: at 20:05

## 2023-12-18 RX ADMIN — OXYCODONE HYDROCHLORIDE 10 MG: 10 TABLET ORAL at 02:13

## 2023-12-18 RX ADMIN — ALPRAZOLAM 0.5 MG: 0.5 TABLET ORAL at 21:00

## 2023-12-18 RX ADMIN — METOPROLOL TARTRATE 12.5 MG: 25 TABLET, FILM COATED ORAL at 08:42

## 2023-12-18 RX ADMIN — PANTOPRAZOLE SODIUM 40 MG: 40 TABLET, DELAYED RELEASE ORAL at 08:42

## 2023-12-18 RX ADMIN — PIPERACILLIN SODIUM AND TAZOBACTAM SODIUM 3.38 G: 3; .375 INJECTION, SOLUTION INTRAVENOUS at 08:42

## 2023-12-18 RX ADMIN — AMLODIPINE BESYLATE 2.5 MG: 2.5 TABLET ORAL at 08:42

## 2023-12-18 RX ADMIN — PIPERACILLIN SODIUM AND TAZOBACTAM SODIUM 3.38 G: 3; .375 INJECTION, SOLUTION INTRAVENOUS at 00:31

## 2023-12-18 RX ADMIN — QUETIAPINE FUMARATE 50 MG: 50 TABLET, EXTENDED RELEASE ORAL at 20:05

## 2023-12-18 RX ADMIN — PIPERACILLIN SODIUM AND TAZOBACTAM SODIUM 3.38 G: 3; .375 INJECTION, SOLUTION INTRAVENOUS at 16:10

## 2023-12-18 RX ADMIN — OXYCODONE HYDROCHLORIDE 10 MG: 10 TABLET ORAL at 14:35

## 2023-12-18 RX ADMIN — ENOXAPARIN SODIUM 30 MG: 100 INJECTION SUBCUTANEOUS at 20:04

## 2023-12-18 RX ADMIN — PAROXETINE HYDROCHLORIDE 40 MG: 20 TABLET, FILM COATED ORAL at 08:42

## 2023-12-18 RX ADMIN — METOPROLOL TARTRATE 12.5 MG: 25 TABLET, FILM COATED ORAL at 21:00

## 2023-12-18 RX ADMIN — FAMOTIDINE 20 MG: 20 TABLET ORAL at 17:07

## 2023-12-18 NOTE — CASE MANAGEMENT/SOCIAL WORK
Continued Stay Note  [facility]     Patient Name: Martina Hardwick  MRN: [id number]  Today's Date: 12/18/2023    Admit Date: 11/20/2023    Plan: Home with , and walker   Discharge Plan       Row Name 12/18/23 1151       Plan    Plan Home with , and walker    Patient/Family in Agreement with Plan yes    Plan Comments Spoke with PT who recommended rolling walker for pt, orders received and entered, referral to [employer] as pt has no preference for DME, accepted and walker delivered to pt bedside. -Shavon LOPEZ                   Discharge Codes    No documentation.                 Expected Discharge Date and Time       Expected Discharge Date Expected Discharge Time    Dec 18, 2023               Shavon Bhakta RN

## 2023-12-18 NOTE — PROGRESS NOTES
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    Drain with minimal output and no longer bilious, only purulent.    Tolerating more and more diet.  Half rate of TPN starting today.    Tomorrow will be last day of Zosyn.    Hopefully home by the end of the week.    Subjective  Resting comfortably this morning.    Objective    Vitals:    12/17/23 1914   BP: 136/70   Pulse: 86   Resp: 18   Temp: 98.6 °F (37 °C)   SpO2: 100%       Physical Exam  Constitutional: Well-developed well-nourished, no acute distress  Neck: Supple, trachea midline  Respiratory: No increased work of breathing, Symmetric excursion  Cardiovascular: Well pefursed, no jugular venous distention evident   Abdominal: Drain purulent but very low output.  Midline incision intact without significant erythema.  Soft, non-tender, non-distended  Skin: Warm, dry, no rash on visualized skin surfaces  Psychiatric: Alert and oriented ×3, normal affect     Laboratory Results  I have personally reviewed CMP with creatinine 0.69, albumin 2.5.    Radiology  None to review         Osvaldo Salinas MD  Colorectal & General Surgery  [facility]    [address], [address]  [place], [place], [address]  P: [phone]  F: [phone]

## 2023-12-18 NOTE — PLAN OF CARE
Goal Outcome Evaluation:  Plan of Care Reviewed With: patient        Progress: improving  Outcome Evaluation: Pt tolerated increased activity and gait distance 300ft supervision rwx, ashlieode tsf supervision/mod I. Anticipate HHC at [place], pt would benefit from rwx at [place]- [facility] please order.      Anticipated Discharge Disposition (PT): home with assist, home with 24/7 care, home with home health

## 2023-12-18 NOTE — THERAPY TREATMENT NOTE
Acute Care - Physical Therapy Treatment Note  [facility]     Patient Name: Martina Hardwick  : 1955  MRN: [id number]  Today's Date: 2023      Visit Dx:     ICD-10-CM ICD-9-CM   1. 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    Colonic inertia    Severe malnutrition     Past Medical History:   Diagnosis Date    Arthritis     Autoantibody titer positive     Goodwin esophagus     Bloating     Cataract     BILAT    Chronic abdominal pain     Chronic constipation     Encounter for preventive health examination     Endometriosis     Gastritis     Gastrointestinal hypomotility     GERD (gastroesophageal reflux disease)     Headache, tension-type     Hypertension     Hyponatremia     Insomnia     Intestinal autonomic neuropathy     Irritable bowel syndrome     Migraine     Mixed anxiety and depressive disorder     Moderate malnutrition     Noninfectious gastroenteritis     OP (osteoporosis)     Osteopenia     Osteoporosis     PONV (postoperative nausea and vomiting) 2018    Psoriasis     KNEES, ELBOWS BILAT AND SCALP    Seasonal allergies     Visceral hyperalgesia      Past Surgical History:   Procedure Laterality Date    APPENDECTOMY      CHOLECYSTECTOMY N/A 2018    Procedure: CHOLECYSTECTOMY LAPAROSCOPIC converted to open procedure;  Surgeon: Hernan Hinds MD;  Location: [facility] OR;  Service: General    COLON RESECTION N/A 2023    Procedure: OPEN SUBTOTAL COLECTOMY  AND ADESIOLYSIS;  Surgeon: Ranjeet Salinas MD;  Location: [facility] MAIN OR;  Service: General;  Laterality: N/A;    COLON SURGERY      STATES TOTAL OF 3 COLON SURGERY    COLONOSCOPY      COLONOSCOPY N/A 12/12/2018    Procedure: COLONOSCOPY;  Surgeon: Darien Ruff MD;  Location: [facility] OR;  Service: Gastroenterology    COLONOSCOPY N/A 10/13/2023    Procedure: COLONOSCOPY TO CECUM;  Surgeon: Ranjeet Salinas MD;  Location: [facility] ENDOSCOPY;  Service: General;  Laterality: N/A;  PREOP/ CHRONIC CONSTIPATION- POSTOP/ NORMAL    DIAGNOSTIC LAPAROSCOPY  1990    DILATATION AND CURETTAGE  1985    ENDOSCOPY N/A 05/13/2016    Procedure: ESOPHAGOGASTRODUODENOSCOPY ;  Surgeon: Darien Ruff MD;  Location: [facility] OR;  Service:     ENDOSCOPY N/A 05/01/2023    Procedure: ESOPHAGOGASTRODUODENOSCOPY WITH BIOPSY;  Surgeon: Darien Ruff MD;  Location: [facility] OR;  Service: Gastroenterology;  Laterality: N/A;  Mild Thrush; Gastritis; Esophagitis- thrush and reflux; Biopsies- duodenal, gastric, esophagus    EXPLORATORY LAPAROTOMY N/A 11/27/2023    Procedure: exploratory laparotomy, small bowel resection;  Surgeon: Ranjeet Salinas MD;  Location: [facility] MAIN OR;  Service: General;  Laterality: N/A;    HYSTERECTOMY      REVISION / TAKEDOWN COLOSTOMY       PT Assessment (last 12 hours)       PT Evaluation and Treatment       Row Name 12/18/23 1100          Physical Therapy Time and Intention    Subjective Information no complaints  -     Document Type therapy note (daily note)  -     Mode of Treatment individual therapy;physical therapy  -     Patient Effort excellent  -     Symptoms Noted During/After Treatment none  -       Row Name 12/18/23 1100          General Information    Prior Level of Function independent:  -     Existing Precautions/Restrictions fall  abd drains  -     Barriers to Rehab medically complex  -       Row Name 12/18/23 1100          Pain     Pretreatment Pain Rating 0/10 - no pain  -     Posttreatment Pain Rating 0/10 - no pain  -       Row Name 12/18/23 1100          Cognition    Orientation Status (Cognition) oriented x 3  -     Follows Commands (Cognition) WFL  -       Row Name 12/18/23 1100          Bed Mobility    Supine-Sit [place] (Bed Mobility) modified independence  -     Sit-Supine [place] (Bed Mobility) modified independence  -       Row Name 12/18/23 1100          Sit-Stand Transfer    Sit-Stand [place] (Transfers) supervision  -     Assistive Device (Sit-Stand Transfers) walker, front-wheeled  -       Row Name 12/18/23 1100          Stand-Sit Transfer    Stand-Sit [place] (Transfers) supervision  -     Assistive Device (Stand-Sit Transfers) walker, front-wheeled  -[facility] Name 12/18/23 1100          Toilet Transfer    Type (Toilet Transfer) stand-sit;sit-stand;stand pivot/stand step  -     [place] Level (Toilet Transfer) supervision  -     Assistive Device (Toilet Transfer) walker, front-wheeled;commode;grab bars/safety frame  -[facility] Name 12/18/23 1100          Gait/Stairs (Locomotion)    [place] Level (Gait) supervision  -     Assistive Device (Gait) walker, front-wheeled  -     Distance in Feet (Gait) 300  -     Pattern (Gait) step-through  -     Deviations/Abnormal Patterns (Gait) stacy decreased  -     Bilateral Gait Deviations forward flexed posture;heel strike decreased  -[facility] Name 12/18/23 1100          Balance    Static Sitting Balance independent  -     Position, Sitting Balance unsupported;sitting edge of bed;sitting in chair  -     Static Standing Balance standby assist  -       Row Name             Wound 11/27/23 2038 midline abdomen Incision    Wound - Properties Group Placement Date: 11/27/23  -PG Placement Time: 2038 -PG Orientation: midline  -PG Location: abdomen  -PG Primary Wound Type: Incision  -PG    Retired Wound - Properties  Group Placement Date: 11/27/23  -PG Placement Time: 2038 -PG Orientation: midline  -PG Location: abdomen  -PG Primary Wound Type: Incision  -PG    Retired Wound - Properties Group Date first assessed: 11/27/23  -PG Time first assessed: 2038 -PG Location: abdomen  -PG Primary Wound Type: Incision  -PG      Row Name 12/18/23 1100          Plan of Care Review    Plan of Care Reviewed With patient  -     Progress improving  -     Outcome Evaluation Pt tolerated increased activity and gait distance 300ft supervision rwx, commode tsf supervision/mod I. Anticipate HHC at [place], [place] would benefit [place] rwx at [place]- [facility] please order.  -       Row Name 12/18/23 1100          Positioning and Restraints    Pre-Treatment Position in bed  -     Post Treatment Position chair  -     In Chair reclined;call light within reach;encouraged to call for assist;exit alarm on  -       Row Name 12/18/23 1100          Therapy Assessment/Plan (PT)    Therapy Frequency (PT) 3 times/wk  -       Row Name 12/18/23 1100          Bed Mobility Goal 1 (PT)    Progress/Outcomes (Bed Mobility Goal 1, PT) goal met  -       Row Name 12/18/23 1100          Transfer Goal 1 (PT)    Progress/Outcome (Transfer Goal 1, PT) goal met  -       Row Name 12/18/23 1100          Gait Training Goal 1 (PT)    Activity/Assistive Device (Gait Training Goal 1, PT) gait (walking locomotion)  -     [place] Level (Gait Training Goal 1, PT) independent  -     Distance (Gait Training Goal 1, PT) 450  -     Time Frame (Gait Training Goal 1, PT) 1 week  -               User Key  (r) = Recorded By, (t) = Taken By, (c) = Cosigned By      Initials Name Provider Type     Maile Guerrero, PT Physical Therapist    Rosangela Solano, RN Registered Nurse                    Physical Therapy Education       Title: PT OT SLP Therapies (Done)       Topic: Physical Therapy (Done)       Point: Mobility training (Done)       Learning Progress Summary              Patient Acceptance, E, VU,DU,NR by  at 12/18/2023 1142    Acceptance, E,TB, VU by JL at 12/17/2023 0617    Acceptance, E, NR by  at 12/13/2023 1515    Acceptance, E,TB, VU by PH at 12/11/2023 1249    Acceptance, E,TB, VU by CS at 12/10/2023 1444    Acceptance, E, VU by ER at 12/7/2023 1420    Acceptance, E, DU,VU by JY at 12/5/2023 1105    Acceptance, E,TB, VU by JS at 12/4/2023 0500    Acceptance, E,TB,D, VU,NR by  at 12/3/2023 1331    Acceptance, E,TB, VU by MT at 12/3/2023 0459    Acceptance, E,TB, VU by MT at 12/2/2023 0446    Acceptance, E,TB, VU by JS at 12/1/2023 0520    Acceptance, E,TB,D, VU by PH at 11/30/2023 0951    Acceptance, E,TB,D, VU,DU,NR by  at 11/30/2023 0523    Acceptance, E, DU,VU by JY at 11/29/2023 1353    Acceptance, E,TB,D, VU,NR by PH at 11/27/2023 1207    Acceptance, E,TB,D, VU,NR by PH at 11/24/2023 0908    Acceptance, E,D, VU,NR by MS at 11/22/2023 1519    Acceptance, E,D, VU,NR by MS at 11/21/2023 1125                         Point: Home exercise program (Done)       Learning Progress Summary             Patient Acceptance, E, VU,DU,NR by  at 12/18/2023 1142    Acceptance, E,TB, VU by JL at 12/17/2023 0617    Acceptance, E, NR by  at 12/13/2023 1515    Acceptance, E,TB, VU by CS at 12/10/2023 1444    Acceptance, E, VU by ER at 12/7/2023 1420    Acceptance, E,TB, VU by JS at 12/4/2023 0500    Acceptance, E,TB, VU by MT at 12/3/2023 0459    Acceptance, E,TB, VU by MT at 12/2/2023 0446    Acceptance, E,TB, VU by JS at 12/1/2023 0520    Acceptance, E,TB,D, VU by PH at 11/30/2023 0951    Acceptance, E,TB,D, VU,DU,NR by AH at 11/30/2023 0523    Acceptance, E, DU,VU by JY at 11/29/2023 1353    Acceptance, E,TB,D, VU,NR by PH at 11/27/2023 1207    Acceptance, E,TB,D, VU,NR by PH at 11/24/2023 0908    Acceptance, E,D, VU,NR by MS at 11/22/2023 1519    Acceptance, E,D, VU,NR by MS at 11/21/2023 1125                         Point: Body mechanics (Done)       Learning Progress  Summary             Patient Acceptance, E, VU,DU,NR by  at 12/18/2023 1142    Acceptance, E,TB, VU by JL at 12/17/2023 0617    Acceptance, E, NR by  at 12/13/2023 1515    Acceptance, E,TB, VU by PH at 12/11/2023 1249    Acceptance, E,TB, VU by CS at 12/10/2023 1444    Acceptance, E, VU by ER at 12/7/2023 1420    Acceptance, E, DU,VU by JY at 12/5/2023 1105    Acceptance, E,TB, VU by JS at 12/4/2023 0500    Acceptance, E,TB,D, VU,NR by  at 12/3/2023 1331    Acceptance, E,TB, VU by MT at 12/3/2023 0459    Acceptance, E,TB, VU by MT at 12/2/2023 0446    Acceptance, E,TB, VU by JS at 12/1/2023 0520    Acceptance, E,TB,D, VU by PH at 11/30/2023 0951    Acceptance, E,TB,D, VU,DU,NR by  at 11/30/2023 0523    Acceptance, E, DU,VU by JY at 11/29/2023 1353    Acceptance, E,TB,D, VU,NR by PH at 11/27/2023 1207    Acceptance, E,TB,D, VU,NR by PH at 11/24/2023 0908    Acceptance, E,D, VU,NR by MS at 11/22/2023 1519    Acceptance, E,D, VU,NR by MS at 11/21/2023 1125                         Point: Precautions (Done)       Learning Progress Summary             Patient Acceptance, E, VU,DU,NR by  at 12/18/2023 1142    Acceptance, E,TB, VU by JL at 12/17/2023 0617    Acceptance, E, NR by  at 12/13/2023 1515    Acceptance, E,TB, VU by PH at 12/11/2023 1249    Acceptance, E,TB, VU by CS at 12/10/2023 1444    Acceptance, E, VU by ER at 12/7/2023 1420    Acceptance, E, DU,VU by JY at 12/5/2023 1105    Acceptance, E,TB, VU by JS at 12/4/2023 0500    Acceptance, E,TB,D, VU,NR by  at 12/3/2023 1331    Acceptance, E,TB, VU by MT at 12/3/2023 0459    Acceptance, E,TB, VU by MT at 12/2/2023 0446    Acceptance, E,TB, VU by JS at 12/1/2023 0520    Acceptance, E,TB,D, VU by PH at 11/30/2023 0951    Acceptance, E,TB,D, VU,DU,NR by  at 11/30/2023 0523    Acceptance, E, DU,VU by JY at 11/29/2023 1353    Acceptance, E,TB,D, VU,NR by PH at 11/27/2023 1207    Acceptance, E,TB,D, VU,NR by PH at 11/24/2023 0908    Acceptance, E,D, VU,NR  by MS at 11/22/2023 1519    Acceptance, E,D, VU,NR by MS at 11/21/2023 1125                                         User Key       Initials Effective Dates Name Provider Type Discipline     06/16/21 -  Maile Guerrero, PT Physical Therapist PT    MS 06/16/21 -  Miguel Beasley, PT Physical Therapist PT    MT 06/16/21 -  Aileen Lezama, RN Registered Nurse Nurse     06/16/21 -  Yanira Hinds, RN Registered Nurse Nurse     07/11/23 -  Triston Espitia, PT Physical Therapist PT    JS 10/01/20 -  Isis Fitzpatrick, RN Registered Nurse Nurse    PH 06/16/21 -  Radha Rosen, PTA Physical Therapist Assistant PT    BH 04/08/22 -  Codi Hinds, PT Physical Therapist PT    ER 10/15/23 -  Milka Nunez, PT Physical Therapist PT    JL 09/07/23 -  Karen Darby RN Registered Nurse Nurse    DRE 11/08/23 -  Kenroy Deleon, PTA Student PTA Student PT                  PT Recommendation and Plan  Anticipated Discharge Disposition (PT): home with assist, home with 24/7 care, home with home health  Therapy Frequency (PT): 3 times/wk  Plan of Care Reviewed With: patient  Progress: improving  Outcome Evaluation: Pt tolerated increased activity and gait distance 300ft supervision rwx, commode tsf supervision/mod I. Anticipate HHC at [place], pt would benefit from rwx at [place]- [facility] please order.   Outcome Measures       Row Name 12/18/23 1100             How much help from another person do you currently need...    Turning from your back to your side while in flat bed without using bedrails? 4  -LH      Moving from lying on back to sitting on the side of a flat bed without bedrails? 4  -LH      Moving to and from a bed to a chair (including a wheelchair)? 4  -LH      Standing up from a chair using your arms (e.g., wheelchair, bedside chair)? 4  -LH      Climbing 3-5 steps with a railing? 3  -LH      To walk in hospital room? 3  -LH      AM-PAC 6 Clicks Score (PT) 22  -LH      Highest Level of Mobility Goal 7 --> Walk 25 feet or more   -                User Key  (r) = Recorded By, (t) = Taken By, (c) = Cosigned By      Initials Name Provider Type     Maile Guerrero, PT Physical Therapist                     Time Calculation:    PT Charges       Row Name 12/18/23 1142             Time Calculation    Start Time 1050  -      Stop Time 1115  -      Time Calculation (min) 25 min  -      PT Received On 12/18/23  -      PT - Next Appointment 12/20/23  -                User Key  (r) = Recorded By, (t) = Taken By, (c) = Cosigned By      Initials Name Provider Type     Maile Guerrero, PT Physical Therapist                  Therapy Charges for Today       Code Description Service Date Service Provider Modifiers Qty    01902953674 HC PT THER PROC EA 15 MIN 12/18/2023 Maile Guerrero, PT GP 1    09201993302 HC PT THERAPEUTIC ACT EA 15 MIN 12/18/2023 Maile Guerrero, PT GP 1            PT G-Codes  Outcome Measure Options: AM-PAC 6 Clicks Basic Mobility (PT)  AM-PAC 6 Clicks Score (PT): 22    Maile Guerrero, PT  12/18/2023

## 2023-12-18 NOTE — DISCHARGE PLACEMENT REQUEST
"Martina Smith (68 y.o. Female)       Date of Birth   1955    Social Security Number       Address   [address] [address]    Home Phone   [phone]    MRN   [id number]       Bahai   Hindu    Marital Status                               Admission Date   11/20/23    Admission Type   Elective    Admitting Provider   Ranjeet Salinas MD    Attending Provider   Ranjeet Salinas MD    Department, Room/Bed   [address], [address]       Discharge Date       Discharge Disposition       Discharge Destination                                 Attending Provider: Ranjeet Salinas MD    Allergies: Hydrocodone, Sulfa Antibiotics    Isolation: None   Infection: None   Code Status: CPR    Ht: 154.9 cm (61\")   Wt: 48.8 kg (107 lb 9.4 oz)    Admission Cmt: None   Principal Problem: Colonic inertia [K59.9]                   Active Insurance as of 11/20/2023       Primary Coverage       Payor Plan Insurance Group Employer/Plan Group    MEDICARE MEDICARE A & B        Payor Plan Address Payor Plan Phone Number Payor Plan Fax Number Effective Dates    [address] [phone]  12/1/2020 - None Entered    [facility] [id number]         Subscriber Name Subscriber Birth Date Member ID       MARTINA SMITH 1955 [id number]               Secondary Coverage       Payor Plan Insurance Group Employer/Plan Group    AETNA COMMERCIAL AETNA  [employer] SU[employer]        Payor Plan Address Payor Plan Phone Number Payor Plan Fax Number Effective Dates    [address] [phone]  12/1/2020 - None Entered    [facility] [id number]         Subscriber Name Subscriber Birth Date Member ID       MARTINA SMITH 1955 [id number]                     Emergency Contacts        (Rel.) Home Phone Work Phone Mobile Phone    López Smith (Spouse) -- -- [phone]    Ruben Zhou (Brother) [phone] -- [phone]                "

## 2023-12-18 NOTE — PROGRESS NOTES
ID NOTE    CC: f/u leukocytosis and abdominal abscesses following abdominal surgery    Subj: No fever. Feeling better. RP drain is out.     Medications:    Current Facility-Administered Medications:     Adult Central 2-in-1 TPN, , Intravenous, Continuous, Ranjeet Salinas MD, Last Rate: 65 mL/hr at 12/17/23 1739, New Bag at 12/17/23 1739    ALPRAZolam (XANAX) tablet 0.5 mg, 0.5 mg, Oral, Daily, Ranjeet Salinas MD, 0.5 mg at 12/17/23 2059    amLODIPine (NORVASC) tablet 2.5 mg, 2.5 mg, Oral, Q24H, Ranjeet Salinas MD, 2.5 mg at 12/18/23 0842    Calcium Replacement - Follow Nurse / BPA Driven Protocol, , Does not apply, PRN, Ranjeet Salinas MD    Enoxaparin Sodium (LOVENOX) syringe 30 mg, 30 mg, Subcutaneous, Q24H, Ranjeet Salinas MD, 30 mg at 12/17/23 2058    famotidine (PEPCID) tablet 20 mg, 20 mg, Oral, BID AC, Ranjeet Salinas MD, 20 mg at 12/18/23 0842    Fat Emul Fish Oil/Plant Based (SMOFLIPID) 20 % emulsion 100 mL, 100 mL, Intravenous, Once per day on Tue Thu Sat, Ranjeet Salinas MD, Last Rate: 8.33 mL/hr at 12/16/23 1825, 100 mL at 12/16/23 1825    HYDROmorphone (DILAUDID) injection 0.5 mg, 0.5 mg, Intravenous, Daily PRN, Ranjeet Salinas MD, 0.5 mg at 12/17/23 2058    Magnesium Standard Dose Replacement - Follow Nurse / BPA Driven Protocol, , Does not apply, PRN, Ranjeet Salinas MD    metoprolol tartrate (LOPRESSOR) tablet 12.5 mg, 12.5 mg, Oral, Q12H, Ranjeet Salinas MD, 12.5 mg at 12/18/23 0842    [DISCONTINUED] ondansetron (ZOFRAN) tablet 4 mg, 4 mg, Oral, Q6H PRN **OR** ondansetron (ZOFRAN) injection 4 mg, 4 mg, Intravenous, Q6H PRN, Ranjeet Salinas MD, 4 mg at 12/17/23 1638    oxyCODONE (ROXICODONE) immediate release tablet 10 mg, 10 mg, Oral, Q4H PRN, Ranjeet Salinas MD, 10 mg at 12/18/23 0213    pantoprazole (PROTONIX) EC tablet 40 mg, 40 mg, Oral, Q AM, Ranjeet Salinas MD, 40 mg at 12/18/23  0842    PARoxetine (PAXIL) tablet 40 mg, 40 mg, Oral, Daily, Triston Malcolm III, MD, 40 mg at 12/18/23 0842    Pharmacy to Dose TPN, , Does not apply, Continuous PRN, Ranjeet Salinas MD    Phosphorus Replacement - Follow Nurse / BPA Driven Protocol, , Does not apply, PRN, Ranjeet Salinas MD    piperacillin-tazobactam (ZOSYN) 3.375 g in iso-osmotic dextrose 50 ml (premix), 3.375 g, Intravenous, Q8H, Ranjeet Salinas MD, 3.375 g at 12/18/23 0842    Potassium Replacement - Follow Nurse / BPA Driven Protocol, , Does not apply, PRN, Ranjeet Salinas MD    prochlorperazine (COMPAZINE) injection 5 mg, 5 mg, Intravenous, Q6H PRN, Ranjeet Salinas MD, 5 mg at 12/15/23 0611    QUEtiapine fumarate ER (SEROquel XR) tablet 50 mg, 50 mg, Oral, Nightly, Ranjeet Salinas MD, 50 mg at 12/17/23 2058    sodium chloride 0.9 % flush 10 mL, 10 mL, Intravenous, Q12H, Ranjeet Salinas MD, 10 mL at 12/18/23 0843    sodium chloride 0.9 % flush 10 mL, 10 mL, Intravenous, Q12H, Ranjeet Salinas MD, 10 mL at 12/18/23 0843    sodium chloride nasal spray 2 spray, 2 spray, Each Nare, PRN, Natalio Collins MD      Objective   Vital Signs   Temp:  [98.1 °F (36.7 °C)-98.6 °F (37 °C)] 98.2 °F (36.8 °C)  Heart Rate:  [85-90] 90  Resp:  [18] 18  BP: (122-136)/(70) 122/70    Physical Exam:   General: NAD, very nice, sitting up in bed  Eyes: no scleral icterus  Cardiovascular: NR  Respiratory: normal work of breathing on RA  GI: not distended; RP drain removed; 1 drain remaining  :  no Hodge catheter  Vasc: RUE PICC w/o erythema    Labs:   CBC, K, Crt, and ALT reviewed today  Lab Results   Component Value Date    WBC 10.16 12/17/2023    HGB 9.4 (L) 12/17/2023    HCT 28.6 (L) 12/17/2023    MCV 91.4 12/17/2023     12/17/2023     Lab Results   Component Value Date    K 3.7 12/18/2023     Lab Results   Component Value Date    CREATININE 0.69 12/18/2023     Lab Results  "  Component Value Date    ALT 74 (H) 12/18/2023     Lab Results   Component Value Date    CRP 7.72 (H) 12/10/2023     Microbiology:  11/27 BCx: negative  12/1 BCx: negative  12/8 RP Abscess Cx: light growth Klebsiella aerogenes and light growth Enterococcus faecalis    New Radiology:  12/15/23 CT A/P:  \"1.  Decreased size of a trace residual collection posterior to the right   kidney, status post placement of a drainage catheter. A suspected area   and fluid containing collection in the right lower quadrant is similar   to minimally improved. Evaluation for additional intra-abdominal or   pelvic collections remains limited due to lack of oral contrast.   2.  Wall thickening and mucosal hyperenhancement of portions of air and   fluid-filled stomach and small bowel. Findings could reflect a   nonspecific gastroenteritis in the appropriate clinical setting.   3.  Decreased small right pleural effusion. \"    ASSESSMENT/PLAN:  Leukocytosis - resolved  Epistaxis - resolved  S/p colectomy for colonic inertia  Post-operative leak and abscesses  History of multiple abdominal surgeries due to diverticular disease  On TPN  Anemia  Retroperitoneal abscess - better    CT on 12/15/23 was improved. RP drain was removed. WBC normal  yesterday. No fever. I have set her Zosyn to stop tomorrow at 2359. Thank you for allowing me to be involved in the care of this patient. Infectious diseases will sign off at this time with antibiotics plan in place, but please call me at [phone] if any further ID questions or new ID concerns.          "

## 2023-12-18 NOTE — PROGRESS NOTES
"[facility] Clinical Pharmacy Services: Total Parental Nutrition Initial Consult    TPN Day # 14  Indication: Prolonged Paralytic Ileus, severe malnutrition  Route: central  Type: standard    Relevant clinical data and objective history reviewed:  68 y.o. female 154.9 cm (61\") 48.8 kg (107 lb 9.4 oz)    Results from last 7 days   Lab Units 23  0541 23  0541 23  0521   SODIUM mmol/L 132*   < > 129*   POTASSIUM mmol/L 3.7   < > 5.4*   CHLORIDE mmol/L 98   < > 92*   CO2 mmol/L 24.3   < > 25.7   BUN mg/dL 19   < > 16   CREATININE mg/dL 0.69   < > 0.75   CALCIUM mg/dL 9.6   < > 10.2   ALBUMIN g/dL 2.5*   < > 2.8*   BILIRUBIN mg/dL 0.4   < > 0.8   ALK PHOS U/L 185*   < > 303*   ALT (SGPT) U/L 74*   < > 163*   AST (SGOT) U/L 31   < > 77*   GLUCOSE mg/dL 138*   < > 155*   MAGNESIUM mg/dL 2.1   < > 2.5*   PHOSPHORUS mg/dL 3.9   < > 3.2   TRIGLYCERIDES mg/dL  --   --  91    < > = values in this interval not displayed.        Estimated Creatinine Clearance: 60.1 mL/min (by C-G formula based on SCr of 0.69 mg/dL).    Active fluid orders: None    Dietary Orders (From admission, onward)       Start     Ordered    23 1200  Dietary Nutrition Supplements Ensure Compact; vanilla  Daily With Breakfast, Lunch & Dinner      Question Answer Comment   Select Supplement: Ensure Compact    Flavor: vanilla        23 1140    23 0839  Diet: Regular/House Diet; Texture: Mechanical Ground (NDD 2); Fluid Consistency: Thin (IDDSI 0)  Diet Effective Now        References:    Diet Order [place]   Question Answer Comment   Diets: Regular/House Diet    Texture: Mechanical Ground (NDD 2)    Fluid Consistency: Thin (IDDSI 0)        23 0838                  Assessment  Additional insulin administration while previous TPN infusin units  Additional electrolyte administration while previous TPN infusing: none  Acid suppression: pantoprazole 40 mg PO qAM + famotidine 20 mg BID before meals    Na stable ~132. " K decreased to 3.7 and CL within normal range.   AST/ALT and Alk Phos continue to trend down.   Starting to taper TPN off today as patient's PO intake continues to improve per Dr. Salinas. Will aim to cut TPN rate in half today   Plan  Will adjust macro nutrients to match half-rate TPN. Will adjust amino acids to 50g and dextrose to 800 kcal.TPN to run at 40 mL/hr to account for total volume of sterile water needed to pump TPN and solubility concentrations.      Protein/Dextrose/Lipids: 50g /800 kcal/100 mL Tu/Th/Sat    Volume: 960 mL (40 mL/hr over 24 hrs daily)    Will not adjust electrolytes in TPN today.   Based on the above labs, will add the following electrolytes/additives to the TPN.    Sodium Chloride: 100 mEq   Sodium Acetate: 40 mEq   Sodium Phosphate: 30 mEq   Potassium Chloride: 50 mEq   Potassium Acetate: 0 mEq   Potassium Phosphate: 0 mEq   Calcium Gluconate: 9 mEq   Magnesium Sulfate: 10 mEq   MVI for TPN   Trace Elements    Labs to be ordered: Daily CMP, accuchecks, phos and magnesium. Weekly TG (due 12/20).     Pharmacy will continue to follow.     Piotr Serrano [facility]  Clinical Pharmacist

## 2023-12-18 NOTE — PLAN OF CARE
Problem: Adult Inpatient Plan of Care  Goal: Plan of Care Review  Outcome: Ongoing, Progressing  Goal: Patient-Specific Goal (Individualized)  Outcome: Ongoing, Progressing  Goal: Absence of Hospital-Acquired Illness or Injury  Outcome: Ongoing, Progressing  Intervention: Identify and Manage Fall Risk  Recent Flowsheet Documentation  Taken 12/18/2023 0610 by Edwin Salas RN  Safety Promotion/Fall Prevention:   safety round/check completed   room organization consistent   nonskid shoes/slippers when out of bed   fall prevention program maintained   elopement precautions   clutter free environment maintained   assistive device/personal items within reach   activity supervised  Taken 12/18/2023 0401 by Edwin Salas RN  Safety Promotion/Fall Prevention:   safety round/check completed   room organization consistent   nonskid shoes/slippers when out of bed   assistive device/personal items within reach   clutter free environment maintained   fall prevention program maintained   activity supervised   elopement precautions  Taken 12/18/2023 0243 by Edwin Salas RN  Safety Promotion/Fall Prevention:   safety round/check completed   room organization consistent   nonskid shoes/slippers when out of bed   assistive device/personal items within reach   activity supervised   clutter free environment maintained   fall prevention program maintained   elopement precautions  Taken 12/18/2023 0031 by Edwin Salas RN  Safety Promotion/Fall Prevention:   safety round/check completed   room organization consistent   nonskid shoes/slippers when out of bed   elopement precautions   clutter free environment maintained   assistive device/personal items within reach   activity supervised  Taken 12/17/2023 2207 by Edwin Salas RN  Safety Promotion/Fall Prevention:   safety round/check completed   room organization consistent   nonskid shoes/slippers when out of bed   fall prevention program maintained    elopement precautions   clutter free environment maintained   assistive device/personal items within reach   activity supervised  Taken 12/17/2023 2059 by Edwin Salas RN  Safety Promotion/Fall Prevention:   safety round/check completed   room organization consistent   nonskid shoes/slippers when out of bed   fall prevention program maintained   elopement precautions   clutter free environment maintained   assistive device/personal items within reach   activity supervised  Intervention: Prevent Skin Injury  Recent Flowsheet Documentation  Taken 12/18/2023 0610 by Edwin Salas RN  Body Position: position changed independently  Taken 12/18/2023 0401 by Edwin Salas RN  Body Position: position changed independently  Taken 12/18/2023 0243 by Edwin Salas RN  Body Position:   position changed independently   weight shifting  Taken 12/18/2023 0031 by Edwin Salas RN  Body Position: position changed independently  Taken 12/17/2023 2207 by Edwin Salas RN  Body Position:   weight shifting   position changed independently  Taken 12/17/2023 2059 by Edwin Salas RN  Body Position: position changed independently  Intervention: Prevent and Manage VTE (Venous Thromboembolism) Risk  Recent Flowsheet Documentation  Taken 12/18/2023 0610 by Edwin Salas RN  Activity Management: activity encouraged  Taken 12/18/2023 0401 by Edwin Salas RN  Activity Management: activity encouraged  Taken 12/18/2023 0243 by Edwin Salas RN  Activity Management: activity encouraged  Taken 12/18/2023 0031 by Edwin Salas RN  Activity Management: activity encouraged  Taken 12/17/2023 2207 by Edwin Salas RN  Activity Management: activity encouraged  Taken 12/17/2023 2059 by Edwin Salas RN  Activity Management:   activity encouraged   up ad montserrat  Intervention: Prevent Infection  Recent Flowsheet Documentation  Taken 12/17/2023 2059 by Edwin Salas RN  Infection  Prevention:   single patient room provided   rest/sleep promoted  Goal: Optimal Comfort and Wellbeing  Outcome: Ongoing, Progressing  Goal: Readiness for Transition of Care  Outcome: Ongoing, Progressing   Goal Outcome Evaluation:  Plan of Care Reviewed With: patient

## 2023-12-19 LAB
ALBUMIN SERPL-MCNC: 2.7 G/DL (ref 3.5–5.2)
ALBUMIN/GLOB SERPL: 0.8 G/DL
ALP SERPL-CCNC: 177 U/L (ref 39–117)
ALT SERPL W P-5'-P-CCNC: 67 U/L (ref 1–33)
ANION GAP SERPL CALCULATED.3IONS-SCNC: 9.6 MMOL/L (ref 5–15)
AST SERPL-CCNC: 34 U/L (ref 1–32)
BILIRUB SERPL-MCNC: 0.4 MG/DL (ref 0–1.2)
BUN SERPL-MCNC: 20 MG/DL (ref 8–23)
BUN/CREAT SERPL: 24.1 (ref 7–25)
CALCIUM SPEC-SCNC: 9.7 MG/DL (ref 8.6–10.5)
CHLORIDE SERPL-SCNC: 97 MMOL/L (ref 98–107)
CO2 SERPL-SCNC: 27.4 MMOL/L (ref 22–29)
CREAT SERPL-MCNC: 0.83 MG/DL (ref 0.57–1)
DEPRECATED RDW RBC AUTO: 46 FL (ref 37–54)
EGFRCR SERPLBLD CKD-EPI 2021: 76.9 ML/MIN/1.73
ERYTHROCYTE [DISTWIDTH] IN BLOOD BY AUTOMATED COUNT: 14.4 % (ref 12.3–15.4)
GLOBULIN UR ELPH-MCNC: 3.6 GM/DL
GLUCOSE BLDC GLUCOMTR-MCNC: 113 MG/DL (ref 70–130)
GLUCOSE BLDC GLUCOMTR-MCNC: 127 MG/DL (ref 70–130)
GLUCOSE BLDC GLUCOMTR-MCNC: 132 MG/DL (ref 70–130)
GLUCOSE BLDC GLUCOMTR-MCNC: 150 MG/DL (ref 70–130)
GLUCOSE SERPL-MCNC: 123 MG/DL (ref 65–99)
HCT VFR BLD AUTO: 25.3 % (ref 34–46.6)
HGB BLD-MCNC: 8.5 G/DL (ref 12–15.9)
MAGNESIUM SERPL-MCNC: 2.3 MG/DL (ref 1.6–2.4)
MCH RBC QN AUTO: 30.4 PG (ref 26.6–33)
MCHC RBC AUTO-ENTMCNC: 33.6 G/DL (ref 31.5–35.7)
MCV RBC AUTO: 90.4 FL (ref 79–97)
PHOSPHATE SERPL-MCNC: 4.1 MG/DL (ref 2.5–4.5)
PLATELET # BLD AUTO: 361 10*3/MM3 (ref 140–450)
PMV BLD AUTO: 9.4 FL (ref 6–12)
POTASSIUM SERPL-SCNC: 4.4 MMOL/L (ref 3.5–5.2)
PROT SERPL-MCNC: 6.3 G/DL (ref 6–8.5)
RBC # BLD AUTO: 2.8 10*6/MM3 (ref 3.77–5.28)
SODIUM SERPL-SCNC: 134 MMOL/L (ref 136–145)
WBC NRBC COR # BLD AUTO: 10.44 10*3/MM3 (ref 3.4–10.8)

## 2023-12-19 PROCEDURE — 85027 COMPLETE CBC AUTOMATED: CPT | Performed by: SURGERY

## 2023-12-19 PROCEDURE — 25010000002 PIPERACILLIN SOD-TAZOBACTAM PER 1 G: Performed by: INTERNAL MEDICINE

## 2023-12-19 PROCEDURE — 99024 POSTOP FOLLOW-UP VISIT: CPT | Performed by: SURGERY

## 2023-12-19 PROCEDURE — 25010000002 ONDANSETRON PER 1 MG: Performed by: SURGERY

## 2023-12-19 PROCEDURE — 80053 COMPREHEN METABOLIC PANEL: CPT | Performed by: SURGERY

## 2023-12-19 PROCEDURE — 25010000002 ENOXAPARIN PER 10 MG: Performed by: SURGERY

## 2023-12-19 PROCEDURE — 83735 ASSAY OF MAGNESIUM: CPT | Performed by: SURGERY

## 2023-12-19 PROCEDURE — 82948 REAGENT STRIP/BLOOD GLUCOSE: CPT

## 2023-12-19 PROCEDURE — 84100 ASSAY OF PHOSPHORUS: CPT | Performed by: SURGERY

## 2023-12-19 PROCEDURE — 63710000001 ONDANSETRON ODT 4 MG TABLET DISPERSIBLE: Performed by: SURGERY

## 2023-12-19 RX ORDER — ONDANSETRON 4 MG/1
4 TABLET, ORALLY DISINTEGRATING ORAL EVERY 6 HOURS PRN
Status: DISCONTINUED | OUTPATIENT
Start: 2023-12-19 | End: 2023-12-20 | Stop reason: HOSPADM

## 2023-12-19 RX ORDER — PROCHLORPERAZINE MALEATE 5 MG/1
5 TABLET ORAL EVERY 6 HOURS PRN
Status: DISCONTINUED | OUTPATIENT
Start: 2023-12-19 | End: 2023-12-20 | Stop reason: HOSPADM

## 2023-12-19 RX ADMIN — OXYCODONE HYDROCHLORIDE 10 MG: 10 TABLET ORAL at 20:36

## 2023-12-19 RX ADMIN — OXYCODONE HYDROCHLORIDE 10 MG: 10 TABLET ORAL at 13:46

## 2023-12-19 RX ADMIN — FAMOTIDINE 20 MG: 20 TABLET ORAL at 09:54

## 2023-12-19 RX ADMIN — OXYCODONE HYDROCHLORIDE 10 MG: 10 TABLET ORAL at 01:23

## 2023-12-19 RX ADMIN — METOPROLOL TARTRATE 12.5 MG: 25 TABLET, FILM COATED ORAL at 09:55

## 2023-12-19 RX ADMIN — METOPROLOL TARTRATE 12.5 MG: 25 TABLET, FILM COATED ORAL at 20:31

## 2023-12-19 RX ADMIN — PIPERACILLIN SODIUM AND TAZOBACTAM SODIUM 3.38 G: 3; .375 INJECTION, SOLUTION INTRAVENOUS at 01:00

## 2023-12-19 RX ADMIN — Medication 10 ML: at 09:59

## 2023-12-19 RX ADMIN — PAROXETINE HYDROCHLORIDE 40 MG: 20 TABLET, FILM COATED ORAL at 09:54

## 2023-12-19 RX ADMIN — ONDANSETRON 4 MG: 2 INJECTION INTRAMUSCULAR; INTRAVENOUS at 11:08

## 2023-12-19 RX ADMIN — AMLODIPINE BESYLATE 2.5 MG: 2.5 TABLET ORAL at 09:54

## 2023-12-19 RX ADMIN — FAMOTIDINE 20 MG: 20 TABLET ORAL at 17:18

## 2023-12-19 RX ADMIN — ONDANSETRON 4 MG: 4 TABLET, ORALLY DISINTEGRATING ORAL at 20:31

## 2023-12-19 RX ADMIN — Medication 10 ML: at 20:47

## 2023-12-19 RX ADMIN — PIPERACILLIN SODIUM AND TAZOBACTAM SODIUM 3.38 G: 3; .375 INJECTION, SOLUTION INTRAVENOUS at 17:18

## 2023-12-19 RX ADMIN — PIPERACILLIN SODIUM AND TAZOBACTAM SODIUM 3.38 G: 3; .375 INJECTION, SOLUTION INTRAVENOUS at 09:55

## 2023-12-19 RX ADMIN — Medication 10 ML: at 09:58

## 2023-12-19 RX ADMIN — ALPRAZOLAM 0.5 MG: 0.5 TABLET ORAL at 20:31

## 2023-12-19 RX ADMIN — ENOXAPARIN SODIUM 30 MG: 100 INJECTION SUBCUTANEOUS at 20:31

## 2023-12-19 RX ADMIN — PANTOPRAZOLE SODIUM 40 MG: 40 TABLET, DELAYED RELEASE ORAL at 09:54

## 2023-12-19 NOTE — PROGRESS NOTES
Nutrition Services    Patient Name:  Martina Hardwick  YOB: 1955  MRN: [id number]  Admit Date:  11/20/2023    Assessment Date:  12/19/23    CLINICAL NUTRITION    Comments: Follow up for TPN    TPN Day: 15  Indication: Prolonged Paralytic Ileus, severe malnutrition  Route: central  Type: standard  Last BM: 12/8  Labs: Na 131, K 3.1, Cl 93, Glu 151,   Daily Assessment              Protein/Dextrose/Lipids: 50 g/800 kcal/100mL Tues/Thurs/Sat               Volume: 960 ml (40 mL/hr over 24 hrs daily)     Goal              Protein: 70 gram/day              Dextrose: 1200 Kcal/day              Lipids: 200 kcal    Total: 1580 kCal/day                TPN follow up completed. Pt in bed, w/ family at bedside. Planning on stopping the TPN today per surgery. TPN still infusing during visit. Family reports that TPN will be stopped after abx infusion. Reports improving, but still poor appetite. Per chart review, pt eating 25-50% of meals and drinking some ONS. Pt states that she doesn't like the vanilla flavor and prefers the yong flavor. Surgery plans to d/c pt on Thursday if she does well after the TPN stops infusing.     REC:  Discont TPN per MD.  Continue Ensure Compact TID for additional calories/protein and to support adequate PO intake and appropriate wt gn   Change ensure compact flavor from van to yong   Will continue to encourage and monitor PO/ONS intake     RD to continue to monitor per protocol.     Encounter Information         Reason For Encounter Follow up for TPN    Current Issues Needs alternate route of nutrition (TPN)      Estimated Requirements            Weight used  46.6 kg     Calories  1077-3548 (30 kcal/kg, 35 kcal/kg)    Protein  56-70 (1.2 - 1.5 gm/kg)    Fluid  1 mL/kcal     Current Nutrition Orders & Evaluation of Intake       Oral Nutrition     Current PO Diet Diet: Regular/House Diet; Texture: Mechanical Ground (NDD 2); Fluid Consistency: Thin (IDDSI 0)  Adult Central 2-in-1 TPN  "  Supplement    PO Evaluation     Trending % PO Intake 25-50%    Factors Affecting Intake  altered GI function, decreased appetite, nausea      Parenteral Nutrition      TPN Route PICC   TPN Rate (mL/hr) 40 mL/hr (960 mL)   Current TPN Order        Dextrose (kcal) 800 kcal        Amino Acid (gm) 50 gm (170 kcal)       Lipid Concentration 20%       Lipid Volume/Frequency  100 mL, 3 times weekly Tues/Thurs/Sat (200 kcal)   MVI Frequency  Per PharmD   Trace Element Frequency  Per PharmD   Total # Days on TPN 15   Propofol Rate/Kcal    TPN Current Provision          Calories 57% of needs met        Protein (gm) 89% of needs met        Fluid (mL)    TPN Needs Evaluation not meeting needs for calories or protein   TPN Changes discontinued, rate decreased      Anthropometrics          Height    Weight Height: 154.9 cm (61\")  Weight: 48.8 kg (107 lb 9.4 oz) (12/04/23 1731)    BMI kg/m2 Body mass index is 20.33 kg/m².  Normal/Healthy (18.4 - 24.9)    Weight trend Stable per EMR. Pt endorses 26 lb wt loss x 3 yrs      Labs        Pertinent Labs Reviewed, listed below     Results from last 7 days   Lab Units 12/19/23 0526 12/18/23  0541 12/17/23  0906   SODIUM mmol/L 134* 132* 133*   POTASSIUM mmol/L 4.4 3.7 4.6   CHLORIDE mmol/L 97* 98 97*   CO2 mmol/L 27.4 24.3 25.0   BUN mg/dL 20 19 20   CREATININE mg/dL 0.83 0.69 0.69   CALCIUM mg/dL 9.7 9.6 10.0   BILIRUBIN mg/dL 0.4 0.4 0.4   ALK PHOS U/L 177* 185* 233*   ALT (SGPT) U/L 67* 74* 94*   AST (SGOT) U/L 34* 31 49*   GLUCOSE mg/dL 123* 138* 116*     Results from last 7 days   Lab Units 12/19/23  0526 12/18/23  0541 12/17/23  0906 12/14/23  0541 12/13/23  0521   MAGNESIUM mg/dL 2.3 2.1 2.1   < > 2.5*   PHOSPHORUS mg/dL 4.1 3.9 4.0   < > 3.2   HEMOGLOBIN g/dL 8.5*  --  9.4*   < >  --    HEMATOCRIT % 25.3*  --  28.6*   < >  --    WBC 10*3/mm3 10.44  --  10.16   < >  --    TRIGLYCERIDES mg/dL  --   --   --   --  91   ALBUMIN g/dL 2.7* 2.5* 2.8*   < > 2.8*    < > = values in this " "interval not displayed.     Results from last 7 days   Lab Units 12/19/23  0526 12/17/23  0906 12/14/23  0541   PLATELETS 10*3/mm3 361 394 404     No results found for: \"COVID19\"  Lab Results   Component Value Date    HGBA1C 5.3 06/20/2022          Medications            Scheduled Medications ALPRAZolam, 0.5 mg, Oral, Daily  amLODIPine, 2.5 mg, Oral, Q24H  enoxaparin, 30 mg, Subcutaneous, Q24H  famotidine, 20 mg, Oral, BID AC  Fat Emul Fish Oil/Plant Based, 100 mL, Intravenous, Once per day on Tue Thu Sat  metoprolol tartrate, 12.5 mg, Oral, Q12H  pantoprazole, 40 mg, Oral, Q AM  PARoxetine, 40 mg, Oral, Daily  piperacillin-tazobactam, 3.375 g, Intravenous, Q8H  sodium chloride, 10 mL, Intravenous, Q12H  sodium chloride, 10 mL, Intravenous, Q12H        Infusions Adult Central 2-in-1 TPN, , Last Rate: 40 mL/hr at 12/18/23 1707        PRN Medications   ondansetron ODT    oxyCODONE    prochlorperazine    sodium chloride     Physical Findings              General Findings alert, oriented, room air, frail    Oral/Mouth Cavity WDL   Edema  no edema   Gastrointestinal hypoactive, fecal incontinence, nausea, last bowel movement: 12/8   Skin  bruising, surgical incision: midline abdomen incision    Tubes/Drains/Lines Drain medial RLQ, drain right posterior back, PICC     NFPE See Malnutrition Severity Assessment, Date completed 11/21      Malnutrition Severity Assessment       Patient meets criteria for : Severe Malnutrition (Pt meets ASPEN/AND criteria for nutrition dx of severe malnutrition of acute disease related to energy intake, muscle wasting, and fat loss.)       NUTRITION INTERVENTION / PLAN OF CARE  Intervention Goal         Intervention Goal(s) Maintain nutrition status, Improved nutrition related labs, Meet estimated needs, Increase intake, Accepts oral nutrition supplement, Advance diet, Transition PN to PO, No significant weight loss, and PO intake goal %: 75%     Nutrition Intervention         RD Action " Encourage intake, Continue to monitor, and Care plan reviewed     Prescription         Diet Prescription     Supplement Prescription Ensure Compact vanilla TID    EN/PN Prescription    Parenteral Prescription:      TPN Route PICC   TPN Rate (mL/hr)  75 mL/hr (1800 mL total)   TPN Recommendation:         Dextrose (kcal) 1200       Amino Acid (gm) 70 gm (280 kcal)       Lipid Concentration 20%       Lipid Volume/Frequency  100 mL, daily (200 kcal)    Propofol Rate/Kcal     TPN Provision:  1680 kcal, 70 gm protein        Calories 100% needs met        Protein  100% needs met        Fluid 1680 mL      New Prescription Ordered? Continue same per protocol, No changes at this time   --  Monitor/Evaluation        Monitor Per protocol, PO intake, Supplement intake, Pertinent labs, PN delivery/tolerance, GI status, Symptoms, POC/GOC   Discharge Needs Pending clinical course   Education Will instruct as appropriate       Electronically signed by:  Lin Garcia RD  12/19/23 14:54 EST

## 2023-12-19 NOTE — PLAN OF CARE
Goal Outcome Evaluation:              Outcome Evaluation: TPN stopped after today's dose. Pain controlled.  States brief, intermittent  episodes of right lower abdominal pain. No emesis.  Reports 4 small loose stools today.  Up ad montserrat.

## 2023-12-19 NOTE — PLAN OF CARE
Problem: Adult Inpatient Plan of Care  Goal: Plan of Care Review  Outcome: Ongoing, Progressing  Goal: Patient-Specific Goal (Individualized)  Outcome: Ongoing, Progressing  Goal: Absence of Hospital-Acquired Illness or Injury  Outcome: Ongoing, Progressing  Intervention: Identify and Manage Fall Risk  Recent Flowsheet Documentation  Taken 12/19/2023 0616 by Edwin Salas RN  Safety Promotion/Fall Prevention:   safety round/check completed   room organization consistent   nonskid shoes/slippers when out of bed   elopement precautions   clutter free environment maintained   assistive device/personal items within reach   activity supervised  Taken 12/19/2023 0421 by Edwin Salas RN  Safety Promotion/Fall Prevention:   safety round/check completed   room organization consistent   nonskid shoes/slippers when out of bed   fall prevention program maintained   elopement precautions   clutter free environment maintained   assistive device/personal items within reach   activity supervised  Taken 12/19/2023 0201 by Edwin Salas RN  Safety Promotion/Fall Prevention:   safety round/check completed   room organization consistent   nonskid shoes/slippers when out of bed   fall prevention program maintained   elopement precautions   assistive device/personal items within reach   activity supervised   clutter free environment maintained  Taken 12/18/2023 2004 by Edwin Salas RN  Safety Promotion/Fall Prevention:   safety round/check completed   room organization consistent   nonskid shoes/slippers when out of bed   fall prevention program maintained   elopement precautions   clutter free environment maintained   assistive device/personal items within reach   activity supervised  Intervention: Prevent Skin Injury  Recent Flowsheet Documentation  Taken 12/19/2023 0616 by Edwin Salas RN  Body Position: position changed independently  Taken 12/19/2023 0421 by Edwin Salas RN  Body Position: weight  shifting  Taken 12/19/2023 0201 by Edwin Salas RN  Body Position:   weight shifting   position changed independently  Taken 12/18/2023 2207 by Edwin Salas RN  Body Position: position changed independently  Taken 12/18/2023 2004 by Edwin Salas RN  Body Position:   position changed independently   weight shifting  Intervention: Prevent and Manage VTE (Venous Thromboembolism) Risk  Recent Flowsheet Documentation  Taken 12/19/2023 0616 by Edwin Salas RN  Activity Management: activity encouraged  Taken 12/19/2023 0421 by Edwin Salas RN  Activity Management: activity encouraged  Taken 12/19/2023 0201 by Edwin Salas RN  Activity Management:   activity encouraged   up ad montserrat  Taken 12/18/2023 2207 by Edwin Salas RN  Activity Management:   activity encouraged   up ad montserrat  Taken 12/18/2023 2004 by Edwin Salas RN  Activity Management:   up ad montserrat   activity encouraged  Intervention: Prevent Infection  Recent Flowsheet Documentation  Taken 12/19/2023 0616 by Edwin Salas RN  Infection Prevention:   single patient room provided   rest/sleep promoted  Taken 12/19/2023 0421 by Edwin Salas RN  Infection Prevention:   single patient room provided   rest/sleep promoted  Taken 12/19/2023 0201 by Edwin Salas RN  Infection Prevention:   single patient room provided   rest/sleep promoted  Taken 12/19/2023 0043 by Edwin Salas RN  Infection Prevention:   single patient room provided   rest/sleep promoted  Taken 12/18/2023 2207 by Edwin Salas RN  Infection Prevention:   single patient room provided   rest/sleep promoted  Taken 12/18/2023 2004 by Edwin Salas RN  Infection Prevention:   single patient room provided   rest/sleep promoted  Goal: Optimal Comfort and Wellbeing  Outcome: Ongoing, Progressing  Goal: Readiness for Transition of Care  Outcome: Ongoing, Progressing   Goal Outcome Evaluation:  Plan of Care Reviewed With: patient

## 2023-12-19 NOTE — PROGRESS NOTES
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    She continues to progress very well.  We will stop TPN.  Will stop all intravenous medications, including antibiotics.  If she does well for the rest of today and tomorrow, plan for discharge home on Thursday.    Subjective  No acute events or night.  Looks well, feels well, eating much better, pain better controlled.  Biggest complaint is that she has significant urgency which she has to urinate and defecate.    Objective    Vitals:    12/19/23 0740   BP: 116/60   Pulse: 87   Resp: 18   Temp: 98.1 °F (36.7 °C)   SpO2: 99%       Physical Exam  Constitutional: Well-developed well-nourished, no acute distress  Neck: Supple, trachea midline  Respiratory: No increased work of breathing, Symmetric excursion  Cardiovascular: Well pefursed, no jugular venous distention evident   Abdominal: Midline incision intact with staples and 2 small areas of packing that are granulating well.  Drain is purulent and low-volume.  Skin: Warm, dry, no rash on visualized skin surfaces  Psychiatric: Alert and oriented ×3, normal affect     Laboratory Results  I have personally reviewed CBC with WBC 10, hemoglobin 8, platelets 361.  CMP with creatinine 0.3, albumin 2.7, bilirubin 0.4.         Osvaldo Salinas MD  Colorectal & General Surgery  [facility]    [address], [address]  [place], [place], [address]  P: [phone]  F: [phone]

## 2023-12-20 ENCOUNTER — TELEPHONE (OUTPATIENT)
Dept: SURGERY | Facility: CLINIC | Age: 68
End: 2023-12-20
Payer: MEDICARE

## 2023-12-20 ENCOUNTER — READMISSION MANAGEMENT (OUTPATIENT)
Dept: CALL CENTER | Facility: HOSPITAL | Age: 68
End: 2023-12-20
Payer: MEDICARE

## 2023-12-20 VITALS
TEMPERATURE: 98.2 F | HEIGHT: 61 IN | HEART RATE: 76 BPM | WEIGHT: 107.58 LBS | SYSTOLIC BLOOD PRESSURE: 111 MMHG | RESPIRATION RATE: 18 BRPM | BODY MASS INDEX: 20.31 KG/M2 | OXYGEN SATURATION: 100 % | DIASTOLIC BLOOD PRESSURE: 56 MMHG

## 2023-12-20 PROCEDURE — 97110 THERAPEUTIC EXERCISES: CPT | Performed by: PHYSICAL THERAPIST

## 2023-12-20 PROCEDURE — 99024 POSTOP FOLLOW-UP VISIT: CPT | Performed by: SURGERY

## 2023-12-20 RX ORDER — PROCHLORPERAZINE MALEATE 5 MG/1
5 TABLET ORAL EVERY 6 HOURS PRN
Qty: 30 TABLET | Refills: 0 | Status: SHIPPED | OUTPATIENT
Start: 2023-12-20

## 2023-12-20 RX ORDER — OXYCODONE HYDROCHLORIDE 5 MG/1
5-10 TABLET ORAL EVERY 8 HOURS PRN
Qty: 30 TABLET | Refills: 0 | Status: SHIPPED | OUTPATIENT
Start: 2023-12-20 | End: 2023-12-27 | Stop reason: SDUPTHER

## 2023-12-20 RX ADMIN — Medication 10 ML: at 09:12

## 2023-12-20 RX ADMIN — OXYCODONE HYDROCHLORIDE 10 MG: 10 TABLET ORAL at 10:30

## 2023-12-20 RX ADMIN — METOPROLOL TARTRATE 12.5 MG: 25 TABLET, FILM COATED ORAL at 09:12

## 2023-12-20 RX ADMIN — PAROXETINE HYDROCHLORIDE 40 MG: 20 TABLET, FILM COATED ORAL at 09:12

## 2023-12-20 RX ADMIN — ALPRAZOLAM 0.5 MG: 0.5 TABLET ORAL at 10:31

## 2023-12-20 RX ADMIN — PANTOPRAZOLE SODIUM 40 MG: 40 TABLET, DELAYED RELEASE ORAL at 06:28

## 2023-12-20 RX ADMIN — FAMOTIDINE 20 MG: 20 TABLET ORAL at 06:28

## 2023-12-20 RX ADMIN — AMLODIPINE BESYLATE 2.5 MG: 2.5 TABLET ORAL at 09:12

## 2023-12-20 NOTE — OUTREACH NOTE
Prep Survey      Flowsheet Row Responses   [facility] facility patient discharged from? [facility]   Is LACE score < 7 ? No   Eligibility [facility]   Date of Admission 11/20/23   Date of Discharge 12/20/23   Discharge Disposition Home or Self Care   Discharge diagnosis Colonic inertia, OPEN SUBTOTAL COLECTOMY AND ADESIOLYSIS on 11/20, exp lap & small bowel resection on 11/27   Does the patient have one of the following disease processes/diagnoses(primary or secondary)? General Surgery   Does the patient have Home health ordered? No   Is there a DME ordered? Yes   What DME was ordered? walker fro [employer]   Prep survey completed? Yes            Damaris PINO - Registered Nurse

## 2023-12-20 NOTE — PLAN OF CARE
Goal Outcome Evaluation:  Plan of Care Reviewed With: patient        Progress: improving  Outcome Evaluation: Possible DC soon.  MD note says Thursday.  MARY with thick/tan fluid.  Tolerating diet in small amounts.  Up ad montserrat to [facility].  Right PICC in use.

## 2023-12-20 NOTE — THERAPY TREATMENT NOTE
Patient Name: Martina Hardwick  : 1955    MRN: [id number]                              Today's Date: 2023       Admit Date: 2023    Visit Dx:     ICD-10-CM ICD-9-CM   1. Severe malnutrition  E43 261   2. Colonic inertia  K59.9 564.89   3. Chronic abdominal pain  R10.9 789.00    G89.29 338.29     Patient Active Problem List   Diagnosis    Migraines    Depression with anxiety    Allergic rhinitis    Primary insomnia    GERD (gastroesophageal reflux disease)    Essential hypertension    Routine health maintenance    Family history of colonic polyps    Psoriasis    Age-related osteoporosis without current pathological fracture    Adhesion of abdominal wall    Chronic abdominal pain    Hyponatremia    Generalized abdominal pain    Gastrointestinal hypomotility    Abnormal celiac antibody panel    Goodwin's esophagus without dysplasia    Colonic inertia    Severe malnutrition     Past Medical History:   Diagnosis Date    Arthritis     Autoantibody titer positive     Goodwin esophagus     Bloating     Cataract     BILAT    Chronic abdominal pain     Chronic constipation     Encounter for preventive health examination     Endometriosis     Gastritis     Gastrointestinal hypomotility     GERD (gastroesophageal reflux disease)     Headache, tension-type     Hypertension     Hyponatremia     Insomnia     Intestinal autonomic neuropathy     Irritable bowel syndrome     Migraine     Mixed anxiety and depressive disorder     Moderate malnutrition     Noninfectious gastroenteritis     OP (osteoporosis)     Osteopenia     Osteoporosis     PONV (postoperative nausea and vomiting) 2018    Psoriasis     KNEES, ELBOWS BILAT AND SCALP    Seasonal allergies     Visceral hyperalgesia      Past Surgical History:   Procedure Laterality Date    APPENDECTOMY      CHOLECYSTECTOMY N/A 2018    Procedure: CHOLECYSTECTOMY LAPAROSCOPIC converted to open procedure;  Surgeon: Hernan Hinds MD;  Location: [facility] OR;   Service: General    COLON RESECTION N/A 11/20/2023    Procedure: OPEN SUBTOTAL COLECTOMY AND ADESIOLYSIS;  Surgeon: Ranjeet Salinas MD;  Location: [facility] MAIN OR;  Service: General;  Laterality: N/A;    COLON SURGERY      STATES TOTAL OF 3 COLON SURGERY    COLONOSCOPY      COLONOSCOPY N/A 12/12/2018    Procedure: COLONOSCOPY;  Surgeon: Darien Ruff MD;  Location: [facility] OR;  Service: Gastroenterology    COLONOSCOPY N/A 10/13/2023    Procedure: COLONOSCOPY TO CECUM;  Surgeon: Ranjeet Salinas MD;  Location: [facility] ENDOSCOPY;  Service: General;  Laterality: N/A;  PREOP/ CHRONIC CONSTIPATION- POSTOP/ NORMAL    DIAGNOSTIC LAPAROSCOPY  1990    DILATATION AND CURETTAGE  1985    ENDOSCOPY N/A 05/13/2016    Procedure: ESOPHAGOGASTRODUODENOSCOPY ;  Surgeon: Darien Ruff MD;  Location: [facility] OR;  Service:     ENDOSCOPY N/A 05/01/2023    Procedure: ESOPHAGOGASTRODUODENOSCOPY WITH BIOPSY;  Surgeon: Darien Ruff MD;  Location: [facility] OR;  Service: Gastroenterology;  Laterality: N/A;  Mild Thrush; Gastritis; Esophagitis- thrush and reflux; Biopsies- duodenal, gastric, esophagus    EXPLORATORY LAPAROTOMY N/A 11/27/2023    Procedure: exploratory laparotomy, small bowel resection;  Surgeon: Ranjeet Salinas MD;  Location: [facility] MAIN OR;  Service: General;  Laterality: N/A;    HYSTERECTOMY      REVISION / TAKEDOWN COLOSTOMY        General Information       Row Name 12/20/23 8369          Physical Therapy Time and Intention    Document Type therapy note (daily note)  -TAY     Mode of Treatment individual therapy;physical therapy  -TAY               User Key  (r) = Recorded By, (t) = Taken By, (c) = Cosigned By      Initials Name Provider Type    Yaneth Zamora, PT Physical Therapist                   Mobility       Row Name 12/20/23 4691          Bed Mobility    Supine-Sit [place] (Bed Mobility) modified independence  -TAY     Sit-Supine [place]  (Bed Mobility) modified independence  -       Row Name 12/20/23 1356          Sit-Stand Transfer    Sit-Stand [place] (Transfers) supervision  -     Assistive Device (Sit-Stand Transfers) walker, front-wheeled  -       Row Name 12/20/23 1356          Gait/Stairs (Locomotion)    [place] Level (Gait) supervision  -     Assistive Device (Gait) walker, front-wheeled  -KH     Distance in Feet (Gait) 150 ft  -KH     Deviations/Abnormal Patterns (Gait) gait speed decreased  -     [place] Level (Stairs) contact guard  -     Handrail Location (Stairs) both sides  -KH     Number of Steps (Stairs) 3  -KH     Ascending Technique (Stairs) step-to-step  -     Descending Technique (Stairs) step-to-step  -               User Key  (r) = Recorded By, (t) = Taken By, (c) = Cosigned By      Initials Name Provider Type    Yaneth Zamora, PT Physical Therapist                   Obj/Interventions    No documentation.                  Goals/Plan    No documentation.                  Clinical Impression       Row Name 12/20/23 1401          Pain    Pretreatment Pain Rating 0/10 - no pain  -     Posttreatment Pain Rating 0/10 - no pain  -       Row Name 12/20/23 1401          Plan of Care Review    Outcome Evaluation Pt is progressing well. Plans to d/c home tomorrow. Has Rwx now. Ambualting well with rwx and supervision. Able to ascend and descend 3 stairs today. Pt is safe to d/c home when medically stable. PT will sign off. encouraged pt to continue ambulating multiple times per day with nursing while admitted.  -       Row Name 12/20/23 1401          Positioning and Restraints    Pre-Treatment Position in bed  -     Post Treatment Position other  -     Other Position --  standing at bedside. Planning to use BSC which she has been doing ad montserrat  -               User Key  (r) = Recorded By, (t) = Taken By, (c) = Cosigned By      Initials Name Provider Type    Yaneth Zamora,  PT Physical Therapist                   Outcome Measures       Row Name 12/20/23 1404 12/20/23 0800       How much help from another person do you currently need...    Turning from your back to your side while in flat bed without using bedrails? 4  -KH 4  -KW    Moving from lying on back to sitting on the side of a flat bed without bedrails? 4  -KH 4  -KW    Moving to and from a bed to a chair (including a wheelchair)? 4  -KH 4  -KW    Standing up from a chair using your arms (e.g., wheelchair, bedside chair)? 4  -KH 4  -KW    Climbing 3-5 steps with a railing? 3  -KH 2  -KW    To walk in hospital room? 4  -KH 3  -KW    AM-PAC 6 Clicks Score (PT) 23  -KH 21  -KW    Highest Level of Mobility Goal 7 --> Walk 25 feet or more  -KH 6 --> Walk 10 steps or more  -KW      Row Name 12/20/23 1404          Functional Assessment    Outcome Measure Options AM-PAC 6 Clicks Basic Mobility (PT)  -KH               User Key  (r) = Recorded By, (t) = Taken By, (c) = Cosigned By      Initials Name Provider Type    Yaneth Zamora, PT Physical Therapist    Claudia Sumner, RN Registered Nurse                                 Physical Therapy Education       Title: PT OT SLP Therapies (Done)       Topic: Physical Therapy (Done)       Point: Mobility training (Done)       Learning Progress Summary             Patient Acceptance, E, VU,DU,NR by  at 12/18/2023 1142    Acceptance, E,TB, VU by LISSA at 12/17/2023 0617    Acceptance, E, NR by  at 12/13/2023 1515    Acceptance, E,TB, VU by PH at 12/11/2023 1249    Acceptance, E,TB, VU by CS at 12/10/2023 1444    Acceptance, E, VU by ER at 12/7/2023 1420    Acceptance, E, DU,VU by JYOBANI at 12/5/2023 1105    Acceptance, E,TB, VU by JS at 12/4/2023 0500    Acceptance, E,TB,D, VU,NR by  at 12/3/2023 1331    Acceptance, E,TB, VU by MT at 12/3/2023 0459    Acceptance, E,TB, VU by MT at 12/2/2023 0446    Acceptance, HUNTER MCELROY VU by JS at 12/1/2023 0520    Acceptance, E,JUVE HARRISON VU by PH at  11/30/2023 0951    Acceptance, E,TB,D, VU,DU,NR by  at 11/30/2023 0523    Acceptance, E, DU,VU by JY at 11/29/2023 1353    Acceptance, E,TB,D, VU,NR by PH at 11/27/2023 1207    Acceptance, E,TB,D, VU,NR by PH at 11/24/2023 0908    Acceptance, E,D, VU,NR by MS at 11/22/2023 1519    Acceptance, E,D, VU,NR by MS at 11/21/2023 1125                         Point: Home exercise program (Done)       Learning Progress Summary             Patient Acceptance, E, VU,DU,NR by  at 12/18/2023 1142    Acceptance, E,TB, VU by JL at 12/17/2023 0617    Acceptance, E, NR by  at 12/13/2023 1515    Acceptance, E,TB, VU by  at 12/10/2023 1444    Acceptance, E, VU by ER at 12/7/2023 1420    Acceptance, E,TB, VU by JS at 12/4/2023 0500    Acceptance, E,TB, VU by MT at 12/3/2023 0459    Acceptance, E,TB, VU by MT at 12/2/2023 0446    Acceptance, E,TB, VU by JS at 12/1/2023 0520    Acceptance, E,TB,D, VU by PH at 11/30/2023 0951    Acceptance, E,TB,D, VU,DU,NR by  at 11/30/2023 0523    Acceptance, E, DU,VU by JY at 11/29/2023 1353    Acceptance, E,TB,D, VU,NR by PH at 11/27/2023 1207    Acceptance, E,TB,D, VU,NR by PH at 11/24/2023 0908    Acceptance, E,D, VU,NR by MS at 11/22/2023 1519    Acceptance, E,D, VU,NR by MS at 11/21/2023 1125                         Point: Body mechanics (Done)       Learning Progress Summary             Patient Acceptance, E, VU,DU,NR by  at 12/18/2023 1142    Acceptance, E,TB, VU by JL at 12/17/2023 0617    Acceptance, E, NR by LH at 12/13/2023 1515    Acceptance, E,TB, VU by PH at 12/11/2023 1249    Acceptance, E,TB, VU by CS at 12/10/2023 1444    Acceptance, E, VU by ER at 12/7/2023 1420    Acceptance, E, DU,VU by JY at 12/5/2023 1105    Acceptance, E,TB, VU by JS at 12/4/2023 0500    Acceptance, E,TB,D, VU,NR by  at 12/3/2023 1331    Acceptance, E,TB, VU by MT at 12/3/2023 0459    Acceptance, E,TB, VU by MT at 12/2/2023 0446    Acceptance, E,TB, VU by JS at 12/1/2023 0520    Acceptance, E,TB,D,  VU by PH at 11/30/2023 0951    Acceptance, E,TB,D, VU,DU,NR by  at 11/30/2023 0523    Acceptance, E, DU,VU by JY at 11/29/2023 1353    Acceptance, E,TB,D, VU,NR by PH at 11/27/2023 1207    Acceptance, E,TB,D, VU,NR by PH at 11/24/2023 0908    Acceptance, E,D, VU,NR by MS at 11/22/2023 1519    Acceptance, E,D, VU,NR by MS at 11/21/2023 1125                         Point: Precautions (Done)       Learning Progress Summary             Patient Acceptance, E, VU,DU,NR by  at 12/18/2023 1142    Acceptance, E,TB, VU by  at 12/17/2023 0617    Acceptance, E, NR by  at 12/13/2023 1515    Acceptance, E,TB, VU by PH at 12/11/2023 1249    Acceptance, E,TB, VU by  at 12/10/2023 1444    Acceptance, E, VU by  at 12/7/2023 1420    Acceptance, E, DU,VU by JY at 12/5/2023 1105    Acceptance, E,TB, VU by JS at 12/4/2023 0500    Acceptance, E,TB,D, VU,NR by  at 12/3/2023 1331    Acceptance, E,TB, VU by MT at 12/3/2023 0459    Acceptance, E,TB, VU by MT at 12/2/2023 0446    Acceptance, E,TB, VU by JS at 12/1/2023 0520    Acceptance, E,TB,D, VU by PH at 11/30/2023 0951    Acceptance, E,TB,D, VU,DU,NR by  at 11/30/2023 0523    Acceptance, E, DU,VU by JY at 11/29/2023 1353    Acceptance, E,TB,D, VU,NR by PH at 11/27/2023 1207    Acceptance, E,TB,D, VU,NR by PH at 11/24/2023 0908    Acceptance, E,D, VU,NR by MS at 11/22/2023 1519    Acceptance, E,D, VU,NR by MS at 11/21/2023 1125                                         User Key       Initials Effective Dates Name Provider Type Discipline     06/16/21 -  Maile Guerrero, PT Physical Therapist PT    MS 06/16/21 -  Miguel Beasley, PT Physical Therapist PT    MT 06/16/21 -  Aileen Lezama, RN Registered Nurse Nurse     06/16/21 -  Yanira Hinds, RN Registered Nurse Nurse     07/11/23 -  Triston Espitia, PT Physical Therapist PT     10/01/20 -  Isis Fitzpatrick, RN Registered Nurse Nurse    PH 06/16/21 -  Radha Rosen, RITU Physical Therapist Assistant PT      04/08/22 -  Codi Hinds, PT Physical Therapist PT    ER 10/15/23 -  Milka Nunez, PT Physical Therapist PT    JL 09/07/23 -  Karen Darby, RN Registered Nurse Nurse    JY 11/08/23 -  Kenroy Deleon, PTA Student PTA Student PT                  PT Recommendation and Plan  Planned Therapy Interventions (PT): balance training, bed mobility training, gait training, home exercise program, patient/family education, strengthening, transfer training  Plan of Care Reviewed With: patient  Outcome Evaluation: Pt is progressing well. Plans to d/c home tomorrow. Has Rwx now. Ambualting well with rwx and supervision. Able to ascend and descend 3 stairs today. Pt is safe to d/c home when medically stable. PT will sign off. encouraged pt to continue ambulating multiple times per day with nursing while admitted.     Time Calculation:         PT Charges       Row Name 12/20/23 1404             Time Calculation    Start Time 1340  -KH      Stop Time 1355  -KH      Time Calculation (min) 15 min  -KH      PT Received On 12/20/23  -         Time Calculation- PT    Total Timed Code Minutes- PT 15 minute(s)  -KH         Timed Charges    99098 - PT Therapeutic Exercise Minutes 15  -KH         Total Minutes    Timed Charges Total Minutes 15  -KH       Total Minutes 15  -KH                User Key  (r) = Recorded By, (t) = Taken By, (c) = Cosigned By      Initials Name Provider Type    Yaneth Zamora, JOE Physical Therapist                  Therapy Charges for Today       Code Description Service Date Service Provider Modifiers Qty    55710699388 HC PT THER PROC EA 15 MIN 12/20/2023 Yaneth Marti, PT GP 1            PT G-Codes  Outcome Measure Options: AM-PAC 6 Clicks Basic Mobility (PT)  AM-PAC 6 Clicks Score (PT): 23  PT Discharge Summary  Anticipated Discharge Disposition (PT): home with assist    Yaneth Marti, JOE  12/20/2023

## 2023-12-20 NOTE — TELEPHONE ENCOUNTER
Attempted to contact patient for a post op appointment for [facility] location . Left voicemail to call back.

## 2023-12-20 NOTE — PLAN OF CARE
Goal Outcome Evaluation:  Plan of Care Reviewed With: patient           Outcome Evaluation: Pt is progressing well. Plans to d/c home tomorrow. Has Rwx now. Ambualting well with rwx and supervision. Able to ascend and descend 3 stairs today. Pt is safe to d/c home when medically stable. PT will sign off. encouraged pt to continue ambulating multiple times per day with nursing while admitted.      Anticipated Discharge Disposition (PT): home with assist

## 2023-12-20 NOTE — PLAN OF CARE
Problem: Adult Inpatient Plan of Care  Goal: Absence of Hospital-Acquired Illness or Injury  Intervention: Identify and Manage Fall Risk  Recent Flowsheet Documentation  Taken 12/20/2023 1600 by Claudia Alvarez RN  Safety Promotion/Fall Prevention: safety round/check completed  Taken 12/20/2023 1400 by Claudia Alvarez RN  Safety Promotion/Fall Prevention: safety round/check completed  Taken 12/20/2023 1200 by Claudia Alvarez RN  Safety Promotion/Fall Prevention: safety round/check completed  Taken 12/20/2023 1000 by Claudia Alvarez RN  Safety Promotion/Fall Prevention: safety round/check completed  Taken 12/20/2023 0800 by Claudia Alvarez RN  Safety Promotion/Fall Prevention: safety round/check completed  Intervention: Prevent Skin Injury  Recent Flowsheet Documentation  Taken 12/20/2023 1600 by Claudia Alvarez RN  Body Position: position changed independently  Taken 12/20/2023 1400 by Claudia Alvarez RN  Body Position: position changed independently  Taken 12/20/2023 1200 by Claudia Alvarez RN  Body Position: position changed independently  Taken 12/20/2023 1000 by Claudia Alvarez RN  Body Position: position changed independently  Taken 12/20/2023 0800 by Claudia Alvarez RN  Body Position: position changed independently  Skin Protection:   adhesive use limited   tubing/devices free from skin contact  Intervention: Prevent and Manage VTE (Venous Thromboembolism) Risk  Recent Flowsheet Documentation  Taken 12/20/2023 1600 by Claudia Alvarez RN  Activity Management: up ad montserrat  Taken 12/20/2023 1400 by Claudia Alvarez RN  Activity Management: up ad montserrat  Taken 12/20/2023 1200 by Claudia Alvarez RN  Activity Management: up ad montserrat  Taken 12/20/2023 1000 by Claudia Alvarez RN  Activity Management: up ad montserrat  Taken 12/20/2023 0800 by Claudia Alvarez RN  Activity Management: up ad montserrat  VTE Prevention/Management: (Lovenox) other (see comments)  Range of Motion: active ROM (range of motion) encouraged     Problem: Fall  Injury Risk  Goal: Absence of Fall and Fall-Related Injury  Intervention: Promote Injury-Free Environment  Recent Flowsheet Documentation  Taken 12/20/2023 1600 by Claudia Alvarez RN  Safety Promotion/Fall Prevention: safety round/check completed  Taken 12/20/2023 1400 by Claudia Alvarez RN  Safety Promotion/Fall Prevention: safety round/check completed  Taken 12/20/2023 1200 by Claudia Alvarez RN  Safety Promotion/Fall Prevention: safety round/check completed  Taken 12/20/2023 1000 by Claudia Alvarez RN  Safety Promotion/Fall Prevention: safety round/check completed  Taken 12/20/2023 0800 by Claudia Alvarez RN  Safety Promotion/Fall Prevention: safety round/check completed     Problem: Skin Injury Risk Increased  Goal: Skin Health and Integrity  Intervention: Optimize Skin Protection  Recent Flowsheet Documentation  Taken 12/20/2023 0800 by Claudia Alvarez RN  Pressure Reduction Techniques: frequent weight shift encouraged  Pressure Reduction Devices: positioning supports utilized  Skin Protection:   adhesive use limited   tubing/devices free from skin contact     Problem: Fall Injury Risk  Goal: Absence of Fall and Fall-Related Injury  Intervention: Promote Injury-Free Environment  Recent Flowsheet Documentation  Taken 12/20/2023 1600 by Claudia Alvarez RN  Safety Promotion/Fall Prevention: safety round/check completed  Taken 12/20/2023 1400 by Claudia Alvarez RN  Safety Promotion/Fall Prevention: safety round/check completed  Taken 12/20/2023 1200 by Claudia Alvarez RN  Safety Promotion/Fall Prevention: safety round/check completed  Taken 12/20/2023 1000 by Claudia Alvarez RN  Safety Promotion/Fall Prevention: safety round/check completed  Taken 12/20/2023 0800 by Claudia Avlarez RN  Safety Promotion/Fall Prevention: safety round/check completed   Goal Outcome Evaluation:   VSS on room air; pt dcd wit abd MARY drain per order; PICC line removed prior to DC per order; pt dcd with meds to beds; dc paperwork reviewed  with pt; pt had no further questions; pt dcd home with .

## 2023-12-20 NOTE — DISCHARGE SUMMARY
Date of Discharge:  12/20/2023    Discharge Diagnosis: Colonic inertia    Presenting Problem/History of Present Illness  Active Hospital Problems    Diagnosis  POA    **Colonic inertia [K59.9]  Unknown    Severe malnutrition [E43]  Yes      Resolved Hospital Problems   No resolved problems to display.      Hospital Course  Patient is a 68 y.o. female presented with colonic inertia for elective subtotal colectomy that required conversion to an open procedure due to terrible intra-abdominal adhesions.  This was complicated by a leaking enterotomy that necessitated takeback postop day 7.  At that time, she underwent a small bowel resection.  She subsequently required an interventional radiology placed percutaneous drain into retroperitoneum.  She did develop bilious output from her surgical drain, which slowly decreased in volume over time.  Currently, there is purulent output but no bilious output from the remaining drain.  Her hospital course was arduous, but she is no longer on TPN, tolerating regular diet, having bowel function, pain well-controlled without the use of intravenous analgesia, and down to 1 surgical drain that is only purulent with very low output..      Procedures Performed    Procedure(s):  OPEN SUBTOTAL COLECTOMY AND ADESIOLYSIS  -------------------    Procedure(s):  exploratory laparotomy, small bowel resection  -------------------       Consults:   Consults       Date and Time Order Name Status Description    12/4/2023  8:37 AM Inpatient ENT Consult Completed     12/1/2023  9:55 AM Inpatient Infectious Diseases Consult Completed     12/1/2023  9:55 AM Inpatient Psychiatrist Consult Completed     11/25/2023  8:05 PM Inpatient Cardiology Consult Completed             Pertinent Test Results: Benign pathology    Condition on Discharge: Good and improving    Vital Signs  Temp:  [97.7 °F (36.5 °C)-98.2 °F (36.8 °C)] 98.2 °F (36.8 °C)  Heart Rate:  [75-85] 76  Resp:  [18] 18  BP: (111-131)/(56-65)  111/56    Physical Exam:  Constitutional: Resting comfortably, no acute distress  Neck: Supple, trachea midline  Respiratory: No increased work of breathing, Symmetric excursion  Cardiovascular: Well pefursed, no jugular venous distention evident   Abdominal: Drain is purulent, midline incision healing very well soft, non-tender, non-distended  Lymphatics: No cervical or suprascapular adenopathy  Skin: Warm, dry, no rash on visualized skin surfaces  Musculoskeletal: Symmetric strength, no obvious gross abnormalities  Psychiatric: Alert and oriented ×3, normal affect      Discharge Disposition  Home or Self Care    Discharge Medications     Discharge Medications        New Medications        Instructions Start Date   metoprolol tartrate 25 MG tablet  Commonly known as: LOPRESSOR   12.5 mg, Oral, Every 12 Hours Scheduled      oxyCODONE 5 MG immediate release tablet  Commonly known as: ROXICODONE   Take 1-2 tabs every 8 hours as needed for pain      prochlorperazine 5 MG tablet  Commonly known as: COMPAZINE   5 mg, Oral, Every 6 Hours PRN             Changes to Medications        Instructions Start Date   ALPRAZolam 0.5 MG tablet  Commonly known as: XANAX  What changed: See the new instructions.   TAKE 1 TO 2 TABLETS BY MOUTH TWICE DAILY AS NEEDED FOR ANXIETY      amitriptyline 10 MG tablet  Commonly known as: ELAVIL  What changed: how much to take   10 mg, Oral, Nightly      amLODIPine 2.5 MG tablet  Commonly known as: NORVASC  What changed:   when to take this  additional instructions   Take 1 tablet by mouth once daily      clotrimazole-betamethasone 1-0.05 % cream  Commonly known as: Lotrisone  What changed: additional instructions   1 application , Topical, 2 Times Daily      famotidine 40 MG tablet  Commonly known as: PEPCID  What changed: when to take this   40 mg, Oral, 2 Times Daily, With lunch and at bedtime      pantoprazole 40 MG EC tablet  Commonly known as: PROTONIX  What changed:   how to take this  when  to take this   Take 1 tablet by mouth twice daily      SUMAtriptan 100 MG tablet  Commonly known as: IMITREX  What changed: when to take this   100 mg, Oral, Once As Needed, Take one tablet at onset of headache. May repeat dose one time in 2 hours.             Continue These Medications        Instructions Start Date   CLARITIN PO   10 mg, Oral, Daily      clobetasol 0.05 % cream  Commonly known as: TEMOVATE   1 application , Topical, As Needed      fluticasone 50 MCG/ACT nasal spray  Commonly known as: FLONASE   2 sprays, Nasal, As Needed      MULTI-VITAMIN GUMMIES PO   2 each, Oral, Daily, HOLD FOR SURGERY      PARoxetine 40 MG tablet  Commonly known as: PAXIL   40 mg, Oral, Every Morning      vitamin B-12 2500 MCG sublingual tablet tablet  Commonly known as: CYANOCOBALAMIN   5,000 mcg, Sublingual, Weekly, SATURDAY      zolpidem 10 MG tablet  Commonly known as: AMBIEN   10 mg, Oral, Nightly             Stop These Medications      aluminum-magnesium hydroxide-simethicone 400-400-40 MG/5ML suspension  Commonly known as: MAALOX MAX     BENEFIBER DRINK MIX PO     lisinopril 30 MG tablet  Commonly known as: PRINIVIL,ZESTRIL              Discharge Diet: As tolerated    Activity at Discharge: No lifting more than 10 pounds    Follow-up Appointments  Future Appointments   Date Time Provider Department Center   1/8/2024  1:30 PM Skylar Coleman APRN [facility]   1/18/2024  1:00 PM Ildefonso Dubois MD [facility]         Test Results Pending at Discharge  Pending Labs       Order Current Status    Fungus Culture - Tissue, Abdomen Preliminary result             Ranjeet Salinas MD  12/20/23  16:02 EST    Time: Discharge 10 min

## 2023-12-20 NOTE — DISCHARGE INSTRUCTIONS
POST OP RECOMMENDATIONS  Dr. Osvaldo Salinas  [phone]  Discharge Instructions    Activity  You should get up and move about several times daily to reduce the risk of developing a blood clot in your legs.   No lifting more than 10 pounds for 4 weeks  Diet  As tolerated  Drink nutritional supplements like Ensure or boost as tolerated  Dressings  The glue on your incisions will fall off on its own in 1-2 weeks.  You do not need to pack any of your incisions  Bathing  It's ok to shower anytime.   You can wash your incisions with warm soapy water very gently and pat dry  Pain Management  Unless you have been told otherwise, it's ok to take Tylenol 1000 mg every 6 hours as needed.  I have provided you a prescription for a narcotic pain medication. Take this as needed.   Please call the office if you have intense pain that is not relieved.   Blood clot prevention  You should be up walking multiple times each day to prevent blood clots from forming.   Driving  Do not drive while taking narcotic pain medications.   Before driving, make sure that you are able to move and rotate appropriately to keep you and the rest of us safe on the road.   Follow up appointment  My office will call you with a follow up appointment if you do not have one already. It will be in 2-3 weeks.   If you do not hear from my office in 1 week, please call [phone] to ask about scheduling.   Remember to contact me for any of the following:   Fever > 101 degrees  Severe pain that cannot be controlled by taking your pain pills  Severe nausea or vomiting that cannot be controlled by taking your nausea pills  Significant bleeding of your incisions  Drainage that has a bad smell or is yellow or green in appearance  Any other questions or concerns   Drain management  Strip the drain twice a day.  Please record the output.

## 2023-12-21 ENCOUNTER — TRANSITIONAL CARE MANAGEMENT TELEPHONE ENCOUNTER (OUTPATIENT)
Dept: CALL CENTER | Facility: HOSPITAL | Age: 68
End: 2023-12-21
Payer: MEDICARE

## 2023-12-21 NOTE — OUTREACH NOTE
Call Center TCM Note      Flowsheet Row Responses   [facility] patient discharged from? [place]   Does the patient have one of the following disease processes/diagnoses(primary or secondary)? General Surgery   TCM attempt successful? Yes   Call start time 1047   Call end time 1050   Discharge diagnosis Colonic inertia, OPEN SUBTOTAL COLECTOMY AND ADESIOLYSIS on 11/20, exp lap & small bowel resection on 11/27   Meds reviewed with patient/caregiver? Yes   Is the patient having any side effects they believe may be caused by any medication additions or changes? No   Does the patient have all medications related to this admission filled (includes all antibiotics, pain medications, etc.) Yes   Is the patient taking all medications as directed (includes completed medication regime)? Yes   Does the patient have an appointment with their PCP within 7-14 days of discharge? No   Nursing Interventions Patient desires to follow up with specialty only, Routed TCM call to PCP office   Has home health visited the patient within 72 hours of discharge? N/A   What DME was ordered? walker antony Rotisaiah   Has all DME been delivered? Yes   Psychosocial issues? No   Did the patient receive a copy of their discharge instructions? Yes   Nursing interventions Reviewed instructions with patient   What is the patient's perception of their health status since discharge? Same   Is the patient /caregiver able to teach back basic post-op care? Continue use of incentive spirometry at least 1 week post discharge, Take showers only when approved by MD-sponge bathe until then, Do not remove steri-strips, Practice 'cough and deep breath', Lifting as instructed by MD in discharge instructions, No tub bath, swimming, or hot tub until instructed by MD, Drive as instructed by MD in discharge instructions, Keep incision areas clean,dry and protected   Is the patient/caregiver able to teach back signs and symptoms of incisional infection? Increased  redness, swelling or pain at the incisonal site, Pus or odor from incision, Fever, Increased drainage or bleeding, Incisional warmth   Is the patient/caregiver able to teach back steps to recovery at home? Set small, achievable goals for return to baseline health, Practice good oral hygiene, Eat a well-balance diet, Rest and rebuild strength, gradually increase activity, Weigh daily, Make a list of questions for surgeon's appointment   If the patient is a current smoker, are they able to teach back resources for cessation? Not a smoker   Is the patient/caregiver able to teach back the hierarchy of who to call/visit for symptoms/problems? PCP, Specialist, Home health nurse, Urgent Care, ED, 911 Yes   TCM call completed? Yes   Wrap up additional comments D/C DX: Colonic inertia, OPEN SUBTOTAL COLECTOMY AND ADESIOLYSIS on 11/20, exp lap & small bowel resection on 11/27 - This nice lady pretty worn out after month long admit, but doing ok Home with one surgical drain still in place. New rx's Metoprolol tartrate, Oxycodone, Prochlorperazine in place, Changes to other meds understood. Walker in place. FIrst POST OP with surgeon is 12/27/2023. Pt will keep sched ov with PCP Dr Dubois on 01/18/2024.   Call end time 1050            Lillian Rae MA    12/21/2023, 10:54 EST

## 2023-12-21 NOTE — CASE MANAGEMENT/SOCIAL WORK
Case Management Discharge Note      Final Note: home no needs    Provided Post Acute Provider List?: N/A  Provided Post Acute Provider Quality & Resource List?: N/A    Selected Continued Care - Discharged on 12/20/2023 Admission date: 11/20/2023 - Discharge disposition: Home or Self Care      Destination    No services have been selected for the patient.                Durable Medical Equipment    No services have been selected for the patient.                Dialysis/Infusion    No services have been selected for the patient.                Home Medical Care    No services have been selected for the patient.                Therapy    No services have been selected for the patient.                Community Resources    No services have been selected for the patient.                Community & DME    No services have been selected for the patient.                    Transportation Services  Private: Car    Final Discharge Disposition Code: 01 - home or self-care

## 2023-12-27 ENCOUNTER — OFFICE VISIT (OUTPATIENT)
Dept: SURGERY | Facility: CLINIC | Age: 68
End: 2023-12-27
Payer: MEDICARE

## 2023-12-27 VITALS
HEIGHT: 61 IN | SYSTOLIC BLOOD PRESSURE: 108 MMHG | WEIGHT: 86.8 LBS | BODY MASS INDEX: 16.39 KG/M2 | DIASTOLIC BLOOD PRESSURE: 62 MMHG

## 2023-12-27 DIAGNOSIS — K59.9 COLONIC INERTIA: Primary | ICD-10-CM

## 2023-12-27 PROCEDURE — 99024 POSTOP FOLLOW-UP VISIT: CPT | Performed by: SURGERY

## 2023-12-27 PROCEDURE — 3074F SYST BP LT 130 MM HG: CPT | Performed by: SURGERY

## 2023-12-27 PROCEDURE — 1159F MED LIST DOCD IN RCRD: CPT | Performed by: SURGERY

## 2023-12-27 PROCEDURE — 1160F RVW MEDS BY RX/DR IN RCRD: CPT | Performed by: SURGERY

## 2023-12-27 PROCEDURE — 3078F DIAST BP <80 MM HG: CPT | Performed by: SURGERY

## 2023-12-27 RX ORDER — OXYCODONE HYDROCHLORIDE 5 MG/1
5 TABLET ORAL EVERY 8 HOURS PRN
Qty: 30 TABLET | Refills: 0 | Status: SHIPPED | OUTPATIENT
Start: 2023-12-27 | End: 2024-12-26

## 2023-12-27 NOTE — PROGRESS NOTES
Colorectal & General Surgery  Post - Op    Patient: Martina Hardwick  YOB: 1955  MRN: [id number]      Assessment  Martina Hardwick is a 68 y.o. female with colonic inertia who is now status post exploratory laparotomy, significant enterolysis, subtotal colectomy with ileocolostomy creation that was complicated by leak from enterotomy requiring reopening of recent laparotomy and small bowel resection.    She looks great today.  Her pain seems to be pretty well-controlled and she is trying to wean herself off of oxycodone appropriately.  Nausea is brought on by the pain and overall seems to be in a positive trajectory.  She is eating well and gaining more strength and energy around the house.  Her drain has very little output if any.  It was removed in the office today.  Her staples were removed in the office.    I have refilled her oxycodone as she continues to wean off of it.  I will see her back in the office in 1 month.  They will call if they have any problems or concerns in the interim.    History of Present Illness   Martina Hardwick is a 68 y.o. female with colonic inertia who is following up today after a long hospital stay.  Overall, she feels much better.  She does have some pain in her abdomen as well as up in her rib cage at times.  She is eating well and having bowel movements, alternating diarrhea and formed stool.    Vital Signs  Vitals:    12/27/23 1015   BP: 108/62        Physical Exam  Constitutional: Resting comfortably, no acute distress  Neck: Supple, trachea midline  Respiratory: No increased work of breathing, Symmetric excursion  Cardiovascular: Well pefursed, no jugular venous distention evident   Abdominal: Drain with very little output, appears serosanguineous.  Incision healing well.  Abdomen is soft, non-tender, non-distended  Lymphatics: No cervical or suprascapular adenopathy  Skin: Warm, dry, no rash on visualized skin surfaces  Musculoskeletal: Symmetric strength, no obvious  gross abnormalities  Psychiatric: Alert and oriented ×3, normal affect   BMI is below normal parameters (malnutrition). Recommendations: treating the underlying disease process    Osvaldo Salinas MD  Colorectal & General Surgery  [facility]    [address], [address]  [place], [place], [address]  P: [phone]  F: [phone]

## 2023-12-28 ENCOUNTER — PATIENT ROUNDING (BHMG ONLY) (OUTPATIENT)
Dept: SURGERY | Facility: CLINIC | Age: 68
End: 2023-12-28
Payer: MEDICARE

## 2023-12-28 NOTE — PROGRESS NOTES
December 28, 2023      I am calling to officially welcome you to our practice and ask about your recent visit. Is this a good time to talk? No. Left voicemail to call back.

## 2024-01-07 LAB — FUNGUS WND CULT: ABNORMAL

## 2024-01-08 ENCOUNTER — HOSPITAL ENCOUNTER (EMERGENCY)
Facility: HOSPITAL | Age: 69
Discharge: HOME | End: 2024-01-08
Attending: EMERGENCY MEDICINE | Admitting: EMERGENCY MEDICINE
Payer: MEDICARE

## 2024-01-08 ENCOUNTER — APPOINTMENT (OUTPATIENT)
Dept: GENERAL RADIOLOGY | Facility: HOSPITAL | Age: 69
End: 2024-01-08
Payer: MEDICARE

## 2024-01-08 ENCOUNTER — NURSE TRIAGE (OUTPATIENT)
Dept: CALL CENTER | Facility: HOSPITAL | Age: 69
End: 2024-01-08
Payer: MEDICARE

## 2024-01-08 VITALS
HEART RATE: 90 BPM | SYSTOLIC BLOOD PRESSURE: 169 MMHG | WEIGHT: 97 LBS | HEIGHT: 60 IN | BODY MASS INDEX: 19.04 KG/M2 | DIASTOLIC BLOOD PRESSURE: 96 MMHG | TEMPERATURE: 98 F | RESPIRATION RATE: 18 BRPM | OXYGEN SATURATION: 100 %

## 2024-01-08 DIAGNOSIS — R53.1 GENERALIZED WEAKNESS: Primary | ICD-10-CM

## 2024-01-08 LAB
ALBUMIN SERPL-MCNC: 3.5 G/DL (ref 3.5–5.2)
ALBUMIN/GLOB SERPL: 1.2 G/DL
ALP SERPL-CCNC: 115 U/L (ref 39–117)
ALT SERPL W P-5'-P-CCNC: 23 U/L (ref 1–33)
ANION GAP SERPL CALCULATED.3IONS-SCNC: 7.1 MMOL/L (ref 5–15)
AST SERPL-CCNC: 30 U/L (ref 1–32)
BACTERIA UR QL AUTO: NORMAL /HPF
BASOPHILS # BLD AUTO: 0.03 10*3/MM3 (ref 0–0.2)
BASOPHILS NFR BLD AUTO: 0.5 % (ref 0–1.5)
BILIRUB SERPL-MCNC: 0.4 MG/DL (ref 0–1.2)
BILIRUB UR QL STRIP: NEGATIVE
BUN SERPL-MCNC: 8 MG/DL (ref 8–23)
BUN/CREAT SERPL: 9.9 (ref 7–25)
CALCIUM SPEC-SCNC: 9.8 MG/DL (ref 8.6–10.5)
CHLORIDE SERPL-SCNC: 98 MMOL/L (ref 98–107)
CLARITY UR: CLEAR
CO2 SERPL-SCNC: 29.9 MMOL/L (ref 22–29)
COLOR UR: YELLOW
CREAT SERPL-MCNC: 0.81 MG/DL (ref 0.57–1)
DEPRECATED RDW RBC AUTO: 52.2 FL (ref 37–54)
EGFRCR SERPLBLD CKD-EPI 2021: 79.2 ML/MIN/1.73
EOSINOPHIL # BLD AUTO: 0.06 10*3/MM3 (ref 0–0.4)
EOSINOPHIL NFR BLD AUTO: 1 % (ref 0.3–6.2)
ERYTHROCYTE [DISTWIDTH] IN BLOOD BY AUTOMATED COUNT: 14.8 % (ref 12.3–15.4)
GLOBULIN UR ELPH-MCNC: 3 GM/DL
GLUCOSE SERPL-MCNC: 102 MG/DL (ref 65–99)
GLUCOSE UR STRIP-MCNC: NEGATIVE MG/DL
HCT VFR BLD AUTO: 34.1 % (ref 34–46.6)
HGB BLD-MCNC: 10.9 G/DL (ref 12–15.9)
HGB UR QL STRIP.AUTO: ABNORMAL
HYALINE CASTS UR QL AUTO: NORMAL /LPF
IMM GRANULOCYTES # BLD AUTO: 0.01 10*3/MM3 (ref 0–0.05)
IMM GRANULOCYTES NFR BLD AUTO: 0.2 % (ref 0–0.5)
KETONES UR QL STRIP: NEGATIVE
LEUKOCYTE ESTERASE UR QL STRIP.AUTO: NEGATIVE
LYMPHOCYTES # BLD AUTO: 0.83 10*3/MM3 (ref 0.7–3.1)
LYMPHOCYTES NFR BLD AUTO: 14.1 % (ref 19.6–45.3)
MCH RBC QN AUTO: 30.1 PG (ref 26.6–33)
MCHC RBC AUTO-ENTMCNC: 32 G/DL (ref 31.5–35.7)
MCV RBC AUTO: 94.2 FL (ref 79–97)
MONOCYTES # BLD AUTO: 0.43 10*3/MM3 (ref 0.1–0.9)
MONOCYTES NFR BLD AUTO: 7.3 % (ref 5–12)
NEUTROPHILS NFR BLD AUTO: 4.51 10*3/MM3 (ref 1.7–7)
NEUTROPHILS NFR BLD AUTO: 76.9 % (ref 42.7–76)
NITRITE UR QL STRIP: NEGATIVE
PH UR STRIP.AUTO: 6 [PH] (ref 4.5–8)
PLATELET # BLD AUTO: 240 10*3/MM3 (ref 140–450)
PMV BLD AUTO: 9.2 FL (ref 6–12)
POTASSIUM SERPL-SCNC: 4.2 MMOL/L (ref 3.5–5.2)
PROT SERPL-MCNC: 6.5 G/DL (ref 6–8.5)
PROT UR QL STRIP: NEGATIVE
RBC # BLD AUTO: 3.62 10*6/MM3 (ref 3.77–5.28)
RBC # UR STRIP: NORMAL /HPF
REF LAB TEST METHOD: NORMAL
SODIUM SERPL-SCNC: 135 MMOL/L (ref 136–145)
SP GR UR STRIP: 1.01 (ref 1–1.03)
SQUAMOUS #/AREA URNS HPF: NORMAL /HPF
TROPONIN T SERPL HS-MCNC: 20 NG/L
TROPONIN T SERPL HS-MCNC: 31 NG/L
UROBILINOGEN UR QL STRIP: ABNORMAL
WBC # UR STRIP: NORMAL /HPF
WBC NRBC COR # BLD AUTO: 5.87 10*3/MM3 (ref 3.4–10.8)

## 2024-01-08 PROCEDURE — 84484 ASSAY OF TROPONIN QUANT: CPT | Performed by: EMERGENCY MEDICINE

## 2024-01-08 PROCEDURE — 81001 URINALYSIS AUTO W/SCOPE: CPT | Performed by: EMERGENCY MEDICINE

## 2024-01-08 PROCEDURE — 74022 RADEX COMPL AQT ABD SERIES: CPT

## 2024-01-08 PROCEDURE — 80053 COMPREHEN METABOLIC PANEL: CPT | Performed by: EMERGENCY MEDICINE

## 2024-01-08 PROCEDURE — 36415 COLL VENOUS BLD VENIPUNCTURE: CPT

## 2024-01-08 PROCEDURE — 25810000003 LACTATED RINGERS SOLUTION: Performed by: EMERGENCY MEDICINE

## 2024-01-08 PROCEDURE — 85025 COMPLETE CBC W/AUTO DIFF WBC: CPT | Performed by: EMERGENCY MEDICINE

## 2024-01-08 PROCEDURE — 93005 ELECTROCARDIOGRAM TRACING: CPT | Performed by: EMERGENCY MEDICINE

## 2024-01-08 PROCEDURE — 99284 EMERGENCY DEPT VISIT MOD MDM: CPT

## 2024-01-08 RX ORDER — SODIUM CHLORIDE 0.9 % (FLUSH) 0.9 %
10 SYRINGE (ML) INJECTION AS NEEDED
Status: DISCONTINUED | OUTPATIENT
Start: 2024-01-08 | End: 2024-01-08 | Stop reason: HOSPADM

## 2024-01-08 RX ADMIN — SODIUM CHLORIDE, POTASSIUM CHLORIDE, SODIUM LACTATE AND CALCIUM CHLORIDE 1000 ML: 600; 310; 30; 20 INJECTION, SOLUTION INTRAVENOUS at 17:39

## 2024-01-08 NOTE — TELEPHONE ENCOUNTER
Caller reports recent one month stay in hospital with 2 colon surgeries. This stay was from 11/20/2023 to 12/20/2023. Was seen for f/u by surgeon on 12/27/2023 with no complications.Has f/u with Dr. Dubois on 01/18/2024. Reports over the last week she has progressively become more weak and lost appetite, not eating or drinking as usual.Denies N/V, SOA or chest pain. Denies any blood in stool.   States HR is running slightly elevated at  113, denies any skipped beats or chest pain.    This RN spoke with medical staff at PCP office, reviewed above information. States no appointments available for next several days so also receommended for patient to go to ED for further evaluation and treatment and to keep appointment on 01/18/2024.    Caller instructed. Verbalized understanding. Spouse to take to ED.    Reason for Disposition   Drinking very little and dehydration suspected (e.g., no urine > 12 hours, very dry mouth, very lightheaded)    Additional Information   Negative: SEVERE difficulty breathing (e.g., struggling for each breath, speaks in single words)   Negative: Shock suspected (e.g., cold/pale/clammy skin, too weak to stand, low BP, rapid pulse)   Negative: Difficult to awaken or acting confused (e.g., disoriented, slurred speech)   Negative: Fainted > 15 minutes ago and still feels too weak or dizzy to stand   Negative: SEVERE weakness (i.e., unable to walk or barely able to walk, requires support) and new-onset or worsening   Negative: Sounds like a life-threatening emergency to the triager   Negative: Weakness of the face, arm or leg on one side of the body   Negative: Has diabetes mellitus and weakness from low blood sugar (i.e., < 60 mg/dL or 3.5 mmol/L)   Negative: Recent heat exposure, suspected cause of weakness   Negative: Vomiting is main symptom   Negative: Diarrhea is main symptom   Negative: Difficulty breathing   Negative: Heart beating < 50 beats per minute OR > 140 beats per minute    "Negative: Extra heartbeats, irregular heart beating, or heart is beating very fast (i.e., 'palpitations')   Negative: Follows large amount of bleeding (e.g., from vomiting, rectum, vagina) (Exception: Small transient weakness from sight of a small amount blood.)   Negative: Black or tarry bowel movements   Negative: MODERATE weakness or fatigue from poor fluid intake with no improvement after 2 hours of rest and fluids   Negative: Patient sounds very sick or weak to the triager    Answer Assessment - Initial Assessment Questions  1. DESCRIPTION: \"Describe how you are feeling.\"      Very weak  2. SEVERITY: \"How bad is it?\"  \"Can you stand and walk?\"    - MILD (0-3): Feels weak or tired, but does not interfere with work, school or normal activities.    - MODERATE (4-7): Able to stand and walk; weakness interferes with work, school, or normal activities.    - SEVERE (8-10): Unable to stand or walk; unable to do usual activities.      Moderate  3. ONSET: \"When did these symptoms begin?\" (e.g., hours, days, weeks, months)      Progressively worsening over the last week.  4. CAUSE: \"What do you think is causing the weakness or fatigue?\" (e.g., not drinking enough fluids, medical problem, trouble sleeping)      States is not eating or drinkins as usual. Just lost appetite  5. NEW MEDICINES:  \"Have you started on any new medicines recently?\" (e.g., opioid pain medicines, benzodiazepines, muscle relaxants, antidepressants, antihistamines, neuroleptics, beta blockers)      Denies  6. OTHER SYMPTOMS: \"Do you have any other symptoms?\" (e.g., chest pain, fever, cough, SOB, vomiting, diarrhea, bleeding, other areas of pain)     Denies fever, SOA, N/V. States HR is slightly elevated at 113 and BP has intermittently been higher than usual and than goes back down.  7. PREGNANCY: \"Is there any chance you are pregnant?\" \"When was your last menstrual period?\"      N/A    Protocols used: Weakness (Generalized) and Fatigue-ADULT-[place]    "

## 2024-01-08 NOTE — ED PROVIDER NOTES
Subjective   History of Present Illness  Martina Hadrwick is a 68-year-old white female who presents secondary to generalized weakness and weight loss.  Patient states she underwent partial colon resection in November 2023.  She had a second surgery 1 week later due to adhesions.  Patient states since that time her weight has progressively decreased.  She had been sick for 3 years prior to surgery.  She states her normal weight is 116 pounds.  She currently weighs 83 pounds.  Patient has been experiencing palpitations with any type of exertion.  Her heart rate up to 113.  Patient's blood pressure has also been a bit elevated.  She has had chronic abdominal pain since the surgery.  Patient contacted her PCP today.  They were unable to schedule an appointment for several weeks.  Thus it was recommended patient come to the ED for evaluation.    History provided by:  Patient      Review of Systems    Past Medical History:   Diagnosis Date    Arthritis     Autoantibody titer positive     Goodwin esophagus     Bloating     Cataract     BILAT    Chronic abdominal pain     Chronic constipation     Encounter for preventive health examination     Endometriosis     Gastritis     Gastrointestinal hypomotility     GERD (gastroesophageal reflux disease)     Headache, tension-type     Hypertension     Hyponatremia     Insomnia     Intestinal autonomic neuropathy     Irritable bowel syndrome     Migraine     Mixed anxiety and depressive disorder     Moderate malnutrition     Noninfectious gastroenteritis     OP (osteoporosis)     Osteopenia     Osteoporosis     PONV (postoperative nausea and vomiting) 08/17/2018    Psoriasis     KNEES, ELBOWS BILAT AND SCALP    Seasonal allergies     Visceral hyperalgesia        Allergies   Allergen Reactions    Hydrocodone Nausea And Vomiting    Sulfa Antibiotics Nausea And Vomiting       Past Surgical History:   Procedure Laterality Date    APPENDECTOMY      CHOLECYSTECTOMY N/A 08/17/2018     Procedure: CHOLECYSTECTOMY LAPAROSCOPIC converted to open procedure;  Surgeon: Hernan Hinds MD;  [facility];  Service: General    COLON RESECTION N/A 11/20/2023    Procedure: OPEN SUBTOTAL COLECTOMY AND ADESIOLYSIS;  Surgeon: Ranjeet Salinas MD;  [facility];  Service: General;  Laterality: N/A;    COLON SURGERY      STATES TOTAL OF 3 COLON SURGERY    COLONOSCOPY      COLONOSCOPY N/A 12/12/2018    Procedure: COLONOSCOPY;  Surgeon: Darien Ruff MD;  Location:  [facility] OR;  Service: Gastroenterology    COLONOSCOPY N/A 10/13/2023    Procedure: COLONOSCOPY TO CECUM;  Surgeon: Ranjeet Salinas MD;  [facility];  Service: General;  Laterality: N/A;  PREOP/ CHRONIC CONSTIPATION- POSTOP/ NORMAL    DIAGNOSTIC LAPAROSCOPY  1990    DILATATION AND CURETTAGE  1985    ENDOSCOPY N/A 05/13/2016    Procedure: ESOPHAGOGASTRODUODENOSCOPY ;  Surgeon: Darien Ruff MD;  Location: [facility];  Service:     ENDOSCOPY N/A 05/01/2023    Procedure: ESOPHAGOGASTRODUODENOSCOPY WITH BIOPSY;  Surgeon: Darien Ruff MD;  Location: [facility];  Service: Gastroenterology;  Laterality: N/A;  Mild Thrush; Gastritis; Esophagitis- thrush and reflux; Biopsies- duodenal, gastric, esophagus    EXPLORATORY LAPAROTOMY N/A 11/27/2023    Procedure: exploratory laparotomy, small bowel resection;  Surgeon: Ranjeet Salinas MD;  Location: [facility] MAIN OR;  Service: General;  Laterality: N/A;    HYSTERECTOMY      REVISION / TAKEDOWN COLOSTOMY         Family History   Problem Relation Age of Onset    Hyperlipidemia Mother     Macular degeneration Mother     Hearing loss Mother     Arthritis Mother     Vision loss Mother     Hypertension Mother     Heart disease Father         Had 5 bypass surgery    Hyperlipidemia Father     Cancer Father         Prostate and bladder    Depression Father     Stroke Father     Hypertension Father     Depression Sister     COPD  Sister     Hyperlipidemia Sister     Arthritis Sister     Hyperlipidemia Brother     Depression Brother     Kidney disease Brother     Arthritis Brother     Hypertension Brother     Breast cancer Maternal Aunt     Breast cancer Cousin     Breast cancer Cousin     Colon cancer Neg Hx     Colon polyps Neg Hx     Malig Hyperthermia Neg Hx        Social History     Socioeconomic History    Marital status:    Tobacco Use    Smoking status: Never     Passive exposure: Never    Smokeless tobacco: Never   Vaping Use    Vaping Use: Never used   Substance and Sexual Activity    Alcohol use: Not Currently    Drug use: No    Sexual activity: Not Currently     Partners: Male     Birth control/protection: Post-menopausal, Hysterectomy           Objective   Physical Exam  Vitals and nursing note reviewed.   Constitutional:       General: She is not in acute distress.     Appearance: Normal appearance. She is well-developed. She is ill-appearing (Chronic). She is not toxic-appearing or diaphoretic.      Comments: 68-year-old white female laying in bed.  Patient appears a bit frail and chronically ill.  However she is in no acute distress.  Vital signs are unremarkable.  Patient friendly and cooperative.  Spouse is at bedside.   HENT:      Head: Normocephalic and atraumatic.      Right Ear: Tympanic membrane, ear canal and external ear normal.      Left Ear: Tympanic membrane, ear canal and external ear normal.      Nose: Nose normal.      Mouth/Throat:      Mouth: Mucous membranes are moist.      Pharynx: Oropharynx is clear.   Eyes:      Extraocular Movements: Extraocular movements intact.      Conjunctiva/sclera: Conjunctivae normal.      Pupils: Pupils are equal, round, and reactive to light.   Cardiovascular:      Rate and Rhythm: Normal rate and regular rhythm.      Pulses: Normal pulses.      Heart sounds: Normal heart sounds. No murmur heard.     No friction rub. No gallop.   Pulmonary:      Effort: Pulmonary effort  is normal.      Breath sounds: Normal breath sounds.   Abdominal:      General: Bowel sounds are normal. There is no distension.      Palpations: Abdomen is soft. There is no mass.      Tenderness: There is no abdominal tenderness. There is no guarding or rebound. Negative signs include Collins's sign, Rovsing's sign and McBurney's sign.      Hernia: No hernia is present.       Musculoskeletal:         General: Normal range of motion.      Cervical back: Normal range of motion and neck supple.   Skin:     General: Skin is warm and dry.      Capillary Refill: Capillary refill takes less than 2 seconds.   Neurological:      General: No focal deficit present.      Mental Status: She is alert and oriented to person, place, and time.      Deep Tendon Reflexes: Reflexes are normal and symmetric.   Psychiatric:         Mood and Affect: Mood normal.         Behavior: Behavior normal.         Procedures       EKG 12-lead  Date 1/8/2024  Time 16: 26  Normal sinus rhythm.  Normal rate.  Normal axis.  Normal intervals.  Normal ST segments.  Normal T waves.  Normal EKG.      ED Course  ED Course as of 01/09/24 0024   Mon Jan 08, 2024   1640 EKG is unremarkable.  Patient's symptoms all seem chronic since colon surgery in November.  Obtaining full set of labs, abdominal series and orthostatics. [SS]   1728 Hemoglobin(!): 10.9 [SS]   1728 Hematocrit: 34.1 [SS]   1728 CBC shows mild anemia with hemoglobin 10.9.  Hematocrit normal at 34.1.  CBC otherwise unremarkable.  CMP unremarkable.  High sensitive troponin mildly elevated at 31.  Will obtain 2-hour repeat. [SS]   1808 Acute abdominal series unremarkable. [SS]   1825 Discussed with patient and spouse all results thus far as well as plan to obtain repeat troponin.  IV fluids infusing. [SS]   1902 Repeat troponin is 20.  Obviously this is not consistent with ACS.  Strongly encourage patient to increase her caloric intake via boost shakes, Ensure shakes, protein shakes, protein bars.   Also encourage patient to take multivitamin. [SS]   1910 IV fluids almost completed.  Discussed at length with patient and spouse all results, diagnoses, treatment and follow-up.  Will DC home. [SS]      ED Course User Index  [SS] John Mcfadden MD                                 Labs Reviewed   COMPREHENSIVE METABOLIC PANEL - Abnormal; Notable for the following components:       Result Value    Glucose 102 (*)     Sodium 135 (*)     CO2 29.9 (*)     All other components within normal limits    Narrative:     GFR Normal >60  Chronic Kidney Disease <60  Kidney Failure <15     URINALYSIS W/ MICROSCOPIC IF INDICATED (NO CULTURE) - Abnormal; Notable for the following components:    Blood, UA Trace (*)     All other components within normal limits   SINGLE HSTROPONIN T - Abnormal; Notable for the following components:    HS Troponin T 31 (*)     All other components within normal limits    Narrative:     High Sensitive Troponin T Reference Range:  <14.0 ng/L- Negative Female for AMI  <22.0 ng/L- Negative Male for AMI  >=14 - Abnormal Female indicating possible myocardial injury.  >=22 - Abnormal Male indicating possible myocardial injury.   Clinicians would have to utilize clinical acumen, EKG, Troponin, and serial changes to determine if it is an Acute Myocardial Infarction or myocardial injury due to an underlying chronic condition.        CBC WITH AUTO DIFFERENTIAL - Abnormal; Notable for the following components:    RBC 3.62 (*)     Hemoglobin 10.9 (*)     Neutrophil % 76.9 (*)     Lymphocyte % 14.1 (*)     All other components within normal limits   SINGLE HSTROPONIN T - Abnormal; Notable for the following components:    HS Troponin T 20 (*)     All other components within normal limits    Narrative:     High Sensitive Troponin T Reference Range:  <14.0 ng/L- Negative Female for AMI  <22.0 ng/L- Negative Male for AMI  >=14 - Abnormal Female indicating possible myocardial injury.  >=22 - Abnormal Male indicating  possible myocardial injury.   Clinicians would have to utilize clinical acumen, EKG, Troponin, and serial changes to determine if it is an Acute Myocardial Infarction or myocardial injury due to an underlying chronic condition.        URINALYSIS, MICROSCOPIC ONLY   CBC AND DIFFERENTIAL    Narrative:     The following orders were created for panel order CBC & Differential.  Procedure                               Abnormality         Status                     ---------                               -----------         ------                     CBC Auto Differential[[id number]]        Abnormal            Final result                 Please view results for these tests on the individual orders.     XR Abdomen 2+ VW with Chest 1 VW    Result Date: 1/8/2024  Narrative: Exam: Abdomen flat and upright with PA chest 124 INDICATION: Weakness weight-loss for appetite since colon resection in November 23. FINDINGS: Heart size normal. Lungs clear. Bones normal. Bowel gas pattern normal. Postoperative changes are present pelvis. Bones are unremarkable.     Impression: No active disease. Normal bowel gas pattern Signer Name: Logan Rizvi MD Signed: 1/8/2024 5:53 PM EST [facility]    CT Abdomen Pelvis With Contrast    Result Date: 12/15/2023  Narrative: CT ABDOMEN PELVIS W CONTRAST-  HISTORY: Evaluate intra-abdominal abscess, post drain placement.  TECHNIQUE:  CT of the abdomen and pelvis was performed following the administration of intravenous contrast. Radiation dose reduction techniques were utilized, including automated exposure control and exposure modulation based on body size.  COMPARISON: CT abdomen and pelvis 12/7/2023  FINDINGS:  Heart size is normal. There is no pericardial effusion. There is a decreased small right pleural effusion. There is improved aeration of the adjacent right lower lobe. The left pleural space is clear. The liver is normal in size. No suspicious focal intrahepatic lesion  is identified. The gallbladder is surgically absent. The spleen is normal in size. The pancreas is within normal limits. The adrenal glands are within normal limits. The kidneys are within normal limits. There is mild calcific atherosclerosis. There is an anterior right lower quadrant approach surgical drain with the tip terminating in the anterior right lower quadrant, similar to prior. A previously noted anterior left lower quadrant postsurgical drain has been removed. There is a new dorsal right upper quadrant approach pigtail chest tube, which terminates within the previously noted peripherally enhancing air and fluid containing collection dorsal to the right kidney, which has significantly decreased in size and conspicuity, measuring approximately 2.6 x 0.6 cm, previously 5.6 x 1.6 cm when remeasured. A suspected area and fluid containing collection in the right lower quadrant is grossly similar to minimally improved. Evaluation for additional intra-abdominal or pelvic collections remains limited due to lack of oral contrast. There are postsurgical changes from colonic resection with multiple anastomoses. The stomach and portions of the small bowel are mildly thick-walled with mucosal hyperenhancement and are filled with either fluid or air. The bladder is well distended. Air within the bladder lumen likely reflects recent instrumentation. The uterus is not seen. There are surgical staples overlying the midline anterior lower abdominal and pelvic wall. There is degenerative disc disease. There is osseous demineralization. Patchy sclerosis in the T11, T12, and L5 vertebral bodies is grossly similar.       Impression:  1.  Decreased size of a trace residual collection posterior to the right kidney, status post placement of a drainage catheter. A suspected area and fluid containing collection in the right lower quadrant is similar to minimally improved. Evaluation for additional intra-abdominal or pelvic  collections remains limited due to lack of oral contrast. 2.  Wall thickening and mucosal hyperenhancement of portions of air and fluid-filled stomach and small bowel. Findings could reflect a nonspecific gastroenteritis in the appropriate clinical setting. 3.  Decreased small right pleural effusion. 4.  Additional findings, as above.  This report was finalized on 12/15/2023 3:40 PM by Dr. Sharifa Weiss M.D on Workstation: [id number]       My differential diagnosis for abdominal pain includes but is not limited to:  Gastritis, gastroenteritis, peptic ulcer disease, GERD, esophageal perforation, acute appendicitis, mesenteric adenitis, Meckel’s diverticulum, epiploic appendagitis, diverticulitis, colon cancer, ulcerative colitis, Crohn’s disease, intussusception, small bowel obstruction, adhesions, ischemic bowel, perforated viscus, ileus, obstipation, biliary colic, cholecystitis, cholelithiasis, Cabrera-Jose Angel Ralf, hepatitis, pancreatitis, common bile duct obstruction, cholangitis, bile leak, splenic trauma, splenic rupture, splenic infarction, splenic abscess, abdominal wall hematoma, abdominal wall abscess, intra-abdominal abscess, ascites, spontaneous bacterial peritonitis, hernia, UTI, cystitis, prostatitis, ureterolithiasis, urinary obstruction, AAA, myocardial infarction, pneumonia, cancer, porphyria, DKA, medications, sickle cell, viral syndrome, zoster    My differential diagnosis includes but is not limited to generalized weakness, electrolyte abnormality, CVA, TIA, Bell's palsy, acute MI, GI bleed, urinary tract infection, systemic infections including sepsis, alcohol abuse, drug abuse including prescription and street drug.    My differential diagnosis for palpitations includes but is not limited to sinus tachycardia, SVT, PACs, atrial fibrillation, atrial flutter, PVCs, V. fib, V. tach, ACS, toxins, drug abuse, electrolyte abnormalities and anxiety attacks.              Medical Decision Making  Problems  Addressed:  Generalized weakness: complicated acute illness or injury    Amount and/or Complexity of Data Reviewed  Labs: ordered. Decision-making details documented in ED Course.  Radiology: ordered.  ECG/medicine tests: ordered.    Risk  Prescription drug management.        Final diagnoses:   Generalized weakness       ED Disposition  ED Disposition       ED Disposition   Discharge    Condition   --    Comment   --               Ildefonso Dubois MD  [address] [facility] [facility] [address]  [place] [address]  [phone]    Schedule an appointment as soon as possible for a visit in 1 week  Sooner if needed         Medication List        Changed      ALPRAZolam 0.5 MG tablet  Commonly known as: XANAX  TAKE 1 TO 2 TABLETS BY MOUTH TWICE DAILY AS NEEDED FOR ANXIETY  What changed: See the new instructions.     amLODIPine 2.5 MG tablet  Commonly known as: NORVASC  Take 1 tablet by mouth once daily  What changed:   when to take this  additional instructions     pantoprazole 40 MG EC tablet  Commonly known as: PROTONIX  Take 1 tablet by mouth twice daily  What changed:   how to take this  when to take this     SUMAtriptan 100 MG tablet  Commonly known as: IMITREX  Take 1 tablet by mouth 1 (One) Time As Needed for Migraine for up to 1 dose. Take one tablet at onset of headache. May repeat dose one time in 2 hours.  What changed: when to take this                 John Mcfadden MD  01/08/24 1932       John Mcfadden MD  01/09/24 0024

## 2024-01-09 LAB
QT INTERVAL: 343 MS
QTC INTERVAL: 441 MS

## 2024-01-09 NOTE — ED NOTES
Pt asked MD to evaluate 2 possible overgrown sutures from her surgery in November. No notable active infection in the area. ER MD will eval prior to dc.

## 2024-01-09 NOTE — DISCHARGE INSTRUCTIONS
Recommend increasing calorie intake to over 2000 odin daily.  Recommend boost shakes, Ensure shakes, protein shakes, protein bars in addition to well-balanced diet.  Recommend following up with your PCP as above.  Discuss possible medication to help increase appetite.  Return to ED for medical emergencies.

## 2024-01-11 ENCOUNTER — PATIENT OUTREACH (OUTPATIENT)
Dept: CASE MANAGEMENT | Facility: OTHER | Age: 69
End: 2024-01-11
Payer: MEDICARE

## 2024-01-11 DIAGNOSIS — F41.0 PANIC DISORDER: ICD-10-CM

## 2024-01-11 NOTE — TELEPHONE ENCOUNTER
Urine Toxicology Performed in Last 12 Months    No Benzodiazepines on Active Med List     Rx Refill Note  Requested Prescriptions     Pending Prescriptions Disp Refills    ALPRAZolam (XANAX) 0.5 MG tablet 90 tablet 2      Last office visit with prescribing clinician: 10/16/2023   Last telemedicine visit with prescribing clinician: Visit date not found   Next office visit with prescribing clinician: 1/18/2024                         Would you like a call back once the refill request has been completed: [] Yes [] No    If the office needs to give you a call back, can they leave a voicemail: [] Yes [] No    Brianda Maloney CMA  01/11/24, 12:01 EST

## 2024-01-11 NOTE — TELEPHONE ENCOUNTER
Caller: Martina Hardwick    Relationship: Self    Best call back number: [phone]     Requested Prescriptions:   Requested Prescriptions     Pending Prescriptions Disp Refills    ALPRAZolam (XANAX) 0.5 MG tablet 90 tablet 2        [facility] be [id number] - [place], [place] - [address] AT [facility] [address] - [phone]  - [phone] FX     Last office visit with prescribing clinician: 10/16/2023   Last telemedicine visit with prescribing clinician: Visit date not found   Next office visit with prescribing clinician: 1/18/2024     Additional details provided by patient: PATIENT IS CHANGING PHARMACY DUE TO INSURANCE AND IS NEEDING A NEW PRESCRIPTION SENT IN FOR THIS MEDICATION.     PATIENT IS OUT OF THIS MEDICATION    Does the patient have less than a 3 day supply:  [x] Yes  [] No    Would you like a call back once the refill request has been completed: [] Yes [x] No    If the office needs to give you a call back, can they leave a voicemail: [x] Yes [] No    Lexie Wilkinson Rep   01/11/24 11:28 EST

## 2024-01-11 NOTE — TELEPHONE ENCOUNTER
"Patient states that she has upped her dose of Linzess to bid x 5 days now, as the once daily dosing not helpful with constipation.  RLQ pain is increased along with nausea.  Her BMs just consist of \"water and little hard shauna\".  She never feels empty.  Last colonoscopy was 12/12/2018.  I noticed that she had bladder prolapse into the deep pelvis on her 3/8/21 CT and asked if anyone was addressing that.  She states that Dr. Rizzo, UroGyn, has evaluated her and has her scheduled next week for what sounds like Urodynamics.  I advised that she should proceed with that, as the prolapse may be encroaching on her bowels and affecting her BMs.  I advised her to have Dr. Rizzo forward notes/results to you.  Agree? Back to [place]. Thanks.  " [phone]  [phone]

## 2024-01-11 NOTE — OUTREACH NOTE
AMBULATORY CASE MANAGEMENT NOTE    Name and Relationship of Patient/Support Person: Martina Hardwick M - Self  Patient Outreach  RN-ELIOT outpatient with patient. Discussed 1/8/24 ED visit regarding generalized weakness . Patient treated and discharged. Patient states to be compliant with ED recommendations; states to continue with weakness and states to have 1/18/24. Patient states to be struggling to gain weight; states to tolerate 2 cans of ensure daily; and some solid foods. Patient states to episodes of SOB with exertion alleviated with rest; states no difficulty with chest pain or fever. Patient ambulating without assistive device and states no difficulty falls. Patient states to be compliant with medications; medical appointments; and monitoring of blood pressure. Reviewed with patient ED AVS recommendations; education; role of RN-ACM and HRCM case management services. Patient verbalized understanding. Patient states to appreciate outreach. No further questions voiced at this time.   Adult Patient Profile  Questions/Answers      Flowsheet Row Most Recent Value   Symptoms/Conditions Managed at Home cardiovascular, other (see comments)  [malnutrition]   Cardiovascular Symptoms/Conditions hypertension   Cardiovascular Management Strategies medication therapy, other (see comments)  [Physician follow up]   Barriers to Taking Medication as Prescribed none   Equipment Currently Used at Home bp cuff   Primary Source of Support/Comfort spouse   Name of Support/Comfort Primary Source Patient states to receiving assistance from spouse as needed.   People in Home spouse        Social Work Assessment  Questions/Answers      Flowsheet Row Most Recent Value   People in Home spouse   Functional Status Comments Patient states to be independent with ADL's,  light meal preparation,  ambulting without assistive device and receiving assistance with transportation.   Equipment Currently Used at Home bp cuff        Send  Education  Questions/Answers      Flowsheet Row Most Recent Value   Other Patient Education/Resources  24/7 [facility] Nurse Call Line, MyChart   24/7 Nurse Call Line Education Method Verbal   [facility]bal        SDOH updated and reviewed with the patient during this program:  --     Food Insecurity: No Food Insecurity (1/11/2024)    Hunger Vital Sign     Worried About Running Out of Food in the Last Year: Never true     Ran Out of Food in the Last Year: Never true      --     Transportation Needs: No Transportation Needs (1/11/2024)    PRAPARE - Transportation     Lack of Transportation (Medical): No     Lack of Transportation (Non-Medical): No         Education Documentation  Unresolved/Worsening Symptoms, taught by Maris Ocampo RN at 1/11/2024  2:48 PM.  Learner: Patient  Readiness: Acceptance  Method: Explanation  Response: Verbalizes Understanding    Diet Adjustments, taught by Maris Ocampo RN at 1/11/2024  2:48 PM.  Learner: Patient  Readiness: Acceptance  Method: Explanation  Response: Verbalizes Understanding    Unresolved/Worsening Symptoms, taught by Maris Ocampo RN at 1/11/2024  2:48 PM.  Learner: Patient  Readiness: Acceptance  Method: Explanation  Response: Verbalizes Understanding    Provider Follow-Up, taught by Maris Ocampo RN at 1/11/2024  2:48 PM.  Learner: Patient  Readiness: Acceptance  Method: Explanation  Response: Verbalizes Understanding    Medication Management, taught by Maris Ocampo RN at 1/11/2024  2:48 PM.  Learner: Patient  Readiness: Acceptance  Method: Explanation  Response: Verbalizes Understanding    Blood Pressure Monitoring, taught by Maris Ocampo RN at 1/11/2024  2:48 PM.  Learner: Patient  Readiness: Acceptance  Method: Explanation  Response: Verbalizes Understanding          Maris GALLARDO  Ambulatory Case Management    1/11/2024, 14:49 EST

## 2024-01-17 RX ORDER — ALPRAZOLAM 0.5 MG/1
0.5 TABLET ORAL 2 TIMES DAILY
Qty: 90 TABLET | Refills: 2 | Status: SHIPPED | OUTPATIENT
Start: 2024-01-17

## 2024-01-18 ENCOUNTER — OFFICE VISIT (OUTPATIENT)
Dept: INTERNAL MEDICINE | Facility: CLINIC | Age: 69
End: 2024-01-18
Payer: MEDICARE

## 2024-01-18 VITALS
SYSTOLIC BLOOD PRESSURE: 122 MMHG | HEART RATE: 93 BPM | TEMPERATURE: 98.4 F | OXYGEN SATURATION: 100 % | WEIGHT: 86.8 LBS | DIASTOLIC BLOOD PRESSURE: 66 MMHG | HEIGHT: 60 IN | BODY MASS INDEX: 17.04 KG/M2

## 2024-01-18 DIAGNOSIS — I10 ESSENTIAL HYPERTENSION: ICD-10-CM

## 2024-01-18 DIAGNOSIS — E87.1 HYPONATREMIA: ICD-10-CM

## 2024-01-18 DIAGNOSIS — F51.01 PRIMARY INSOMNIA: ICD-10-CM

## 2024-01-18 DIAGNOSIS — Z00.00 ROUTINE HEALTH MAINTENANCE: Primary | ICD-10-CM

## 2024-01-18 DIAGNOSIS — M81.0 AGE-RELATED OSTEOPOROSIS WITHOUT CURRENT PATHOLOGICAL FRACTURE: ICD-10-CM

## 2024-01-18 DIAGNOSIS — R10.9 CHRONIC ABDOMINAL PAIN: ICD-10-CM

## 2024-01-18 DIAGNOSIS — F41.8 DEPRESSION WITH ANXIETY: ICD-10-CM

## 2024-01-18 DIAGNOSIS — G89.29 CHRONIC ABDOMINAL PAIN: ICD-10-CM

## 2024-01-18 DIAGNOSIS — J30.89 NON-SEASONAL ALLERGIC RHINITIS, UNSPECIFIED TRIGGER: ICD-10-CM

## 2024-01-18 DIAGNOSIS — G43.909 MIGRAINE WITHOUT STATUS MIGRAINOSUS, NOT INTRACTABLE, UNSPECIFIED MIGRAINE TYPE: ICD-10-CM

## 2024-01-18 DIAGNOSIS — K21.9 GASTROESOPHAGEAL REFLUX DISEASE WITHOUT ESOPHAGITIS: ICD-10-CM

## 2024-01-18 RX ORDER — METOPROLOL SUCCINATE 25 MG/1
25 TABLET, EXTENDED RELEASE ORAL DAILY
Qty: 90 TABLET | Refills: 1 | Status: SHIPPED | OUTPATIENT
Start: 2024-01-18

## 2024-01-18 NOTE — PROGRESS NOTES
"      Subjective   Subjective     Martina Hardwick is a 68 y.o. female, who presents with a chief complaint of   Chief Complaint   Patient presents with    chronic abdominal pain     3 mo f/u    Wound Check     Check sutures on abdomin      Hypertension  Associated symptoms include anxiety and headaches.   Heartburn  She complains of abdominal pain.   Anxiety  Symptoms include insomnia.     1. HTN.  Tolerates medication.  Takes amlodipine 2.5 daily and metoprolol 25 mg daily.  Home blood pressures running 110s-130s/60s-70s.  Denies dizziness and chest pain.    2. Depression and anxiety.  She takes paroxetine and prn alprazolam.  Seeing a counselor at [facility].  Denies SI.    3. Headaches.  She takes sumatriptan for abortive treatment and this is effective.     4. Chronic abdominal pain, colonic inertia, adhesions.  Dr. Salinas did subtotal colectomy on 11/20/2023 \"that required conversion to an open procedure due to terrible intra-abdominal adhesions.  This was complicated by a leaking enterotomy that necessitated takeback postop day 7.  At that time, she underwent a small bowel resection.  She subsequently required an interventional radiology placed percutaneous drain into retroperitoneum.\"  She was was discharged 1 month later on 12/20/2023 after weaning off TPN and onto a regular diet.  She presented back to the ER on 1/9/24 with generalized weakness, poor appetite, and weight loss. She had workup and was treated with IV fluids and prescribed outpatient calorie boosters such as Ensure.    The following portions of the patient's history were reviewed and updated as appropriate: allergies, current medications, past family history, past medical history, past social history, past surgical history and problem list.    Allergies: Hydrocodone and Sulfa antibiotics    Review of Systems   Constitutional: Negative.    Eyes: Negative.    Respiratory: Negative.    Cardiovascular: Negative.    Gastrointestinal: Positive " for abdominal pain and constipation.   Endocrine: Negative.    Genitourinary: Negative.    Musculoskeletal: Positive for arthralgias.   Allergic/Immunologic: Positive for environmental allergies.   Neurological: Positive for headaches.   Hematological: Negative.    Psychiatric/Behavioral: The patient has insomnia.      Objective     Wt Readings from Last 3 Encounters:   01/18/24 39.4 kg (86 lb 12.8 oz)   01/08/24 44 kg (97 lb)   12/27/23 39.4 kg (86 lb 12.8 oz)     Temp Readings from Last 3 Encounters:   01/18/24 98.4 °F (36.9 °C) (Infrared)   01/08/24 98 °F (36.7 °C)   12/20/23 98.2 °F (36.8 °C) (Oral)     BP Readings from Last 3 Encounters:   01/18/24 122/66   01/08/24 169/96   12/27/23 108/62     Pulse Readings from Last 3 Encounters:   01/18/24 93   01/08/24 90   12/20/23 76     Body mass index is 16.95 kg/m².  SpO2 Readings from Last 3 Encounters:   01/15/18 96%   10/12/17 98%   08/10/17 99%     Physical Exam   Constitutional: She is oriented to person, place, and time. She appears well-developed.   HENT:   Head: Normocephalic and atraumatic.   Mouth/Throat: Mucous membranes are moist.   Eyes: Conjunctivae are normal.   Neck: No thyromegaly present.   Cardiovascular: Normal rate, regular rhythm and normal heart sounds.   Pulmonary/Chest: Effort normal and breath sounds normal.   Abdominal: Soft. Normal appearance and bowel sounds are normal.   Musculoskeletal: Normal range of motion.      Right lower leg: No edema.      Left lower leg: No edema.   Neurological: She is alert and oriented to person, place, and time.   Skin: Skin is warm and dry. No rash noted.   Psychiatric: Her behavior is normal. Mood normal.   Nursing note and vitals reviewed.      Assessment/Plan   Martina was seen today for hypertension, insomnia and heartburn.    Diagnoses and all orders for this visit:      Essential hypertension    Routine health maintenance    Migraine without status migrainosus, not intractable, unspecified migraine  type    Gastroesophageal reflux disease without esophagitis    Depression with anxiety    Primary insomnia    Chronic constipation    Chronic abdominal pain    Adhesions    Family history of colonic polyps    Non-seasonal allergic rhinitis, unspecified trigger    Osteoporosis    Hyponatremia      1. Routine health maint.  Mammogram UTD.  Colonoscopy UTD.  Hysterectomy done for non-cancerous reasons.  Covid-19 vaccines done.  Pneumovax UTD.  Shingrix at pharmacy.      2. HTN.  Controlled.  Continue amlodipine 2.5 mg daily and metoprolol 25 mg daily.  Last labs reviewed.    3. Migraines.  Doing okay with these.  Continue sumatriptan for abortive treatment.      4. GERD.  Continue pantoprazole, famotidine, and lifestyle measures.  She sees GI.    5. Depression with anxiety.  Doing okay with paroxetine 40 mg daily.  On prn alprazolam.  Med management agreement and Joby UTD.  Continue counseling.    6. Insomnia.  Zolpidem is effective.  Med management agreement and Joby UTD.    7. Chronic constipation/colonic hypomotility/abdominal pain.  Recovering from subtotal colectomy and small bowel resection.  Hospital records reviewed.      8. LORRIE.  Controlled with fluticasone nasal spray and loratadine.    9. Osteoporosis.  Didn't tolerate bisphosphonates previously (Dr. Ghotra).  Continue calcium, vitamin D and weight-bearing exercise.  Discuss ordering Prolia next time.  We need to get a handle on her abdominal pain before adding more medication into the mix.    10. Chronic hyponatremia.  Improved last time.   Seeing nephrology, Dr. Stephen Jalloh.  Stable.    Outpatient Medications Prior to Visit   Medication Sig Dispense Refill    ALPRAZolam (XANAX) 0.5 MG tablet Take 1 tablet by mouth 2 (Two) Times a Day. 90 tablet 2    amLODIPine (NORVASC) 2.5 MG tablet Take 1 tablet by mouth once daily (Patient taking differently: Take 1 tablet by mouth Every Night. PT STATES MANY DAYS SHE ALSO TAKES A DOSE DURING THE DAY D/T INCREASED BP  D/T CHRONIC PAIN) 90 tablet 0    clobetasol (TEMOVATE) 0.05 % cream Apply 1 application  topically to the appropriate area as directed As Needed (VAGINAL).      famotidine (PEPCID) 40 MG tablet Take 1 tablet by mouth 2 (Two) Times a Day. With lunch and at bedtime 180 tablet 3    fluticasone (FLONASE) 50 MCG/ACT nasal spray 2 sprays into the nostril(s) as directed by provider As Needed.      Loratadine (CLARITIN PO) Take 10 mg by mouth Daily.      Multiple Vitamins-Minerals (MULTI-VITAMIN GUMMIES PO) Take 2 each by mouth Daily.      oxyCODONE (Roxicodone) 5 MG immediate release tablet Take 1 tablet by mouth Every 8 (Eight) Hours As Needed for Moderate Pain or Severe Pain. 30 tablet 0    pantoprazole (PROTONIX) 40 MG EC tablet Take 1 tablet by mouth twice daily (Patient taking differently: 1 tablet Daily.) 60 tablet 5    PARoxetine (PAXIL) 40 MG tablet Take 1 tablet by mouth Every Morning. 30 tablet 5    prochlorperazine (COMPAZINE) 5 MG tablet Take 1 tablet by mouth Every 6 (Six) Hours As Needed for Nausea or Vomiting. 30 tablet 0    SUMAtriptan (IMITREX) 100 MG tablet Take 1 tablet by mouth 1 (One) Time As Needed for Migraine for up to 1 dose. Take one tablet at onset of headache. May repeat dose one time in 2 hours. (Patient taking differently: Take 1 tablet by mouth As Needed for Migraine. Take one tablet at onset of headache. May repeat dose one time in 2 hours.) 27 tablet 3    vitamin B-12 (CYANOCOBALAMIN) 2500 MCG sublingual tablet tablet Place 5,000 mcg under the tongue 1 (One) Time Per Week. SATURDAY      zolpidem (AMBIEN) 10 MG tablet Take 1 tablet by mouth Every Night. 90 tablet 1    amitriptyline (ELAVIL) 10 MG tablet Take 1 tablet by mouth Every Night. (Patient not taking: Reported on 12/27/2023) 30 tablet 2    clotrimazole-betamethasone (Lotrisone) 1-0.05 % cream Apply 1 application  topically to the appropriate area as directed 2 (Two) Times a Day. (Patient not taking: Reported on 1/18/2024) 30 g 1     metoprolol tartrate (LOPRESSOR) 25 MG tablet Take 0.5 tablets by mouth Every 12 (Twelve) Hours for 30 days. 30 tablet 0     No facility-administered medications prior to visit.     New Medications Ordered This Visit   Medications    metoprolol succinate XL (Toprol XL) 25 MG 24 hr tablet     Sig: Take 1 tablet by mouth Daily.     Dispense:  90 tablet     Refill:  1     [unfilled]  Medications Discontinued During This Encounter   Medication Reason    amitriptyline (ELAVIL) 10 MG tablet     clotrimazole-betamethasone (Lotrisone) 1-0.05 % cream     metoprolol tartrate (LOPRESSOR) 25 MG tablet          Return in about 3 months (around 4/18/2024).

## 2024-01-23 DIAGNOSIS — K21.9 GASTROESOPHAGEAL REFLUX DISEASE WITHOUT ESOPHAGITIS: ICD-10-CM

## 2024-01-23 NOTE — TELEPHONE ENCOUNTER
No osteoporosis diagnosis on problem list     Rx Refill Note  Requested Prescriptions     Pending Prescriptions Disp Refills    pantoprazole (PROTONIX) 40 MG EC tablet 60 tablet 5     Sig: Take 1 tablet by mouth 2 (Two) Times a Day.      Last office visit with prescribing clinician: 1/18/2024   Last telemedicine visit with prescribing clinician: Visit date not found   Next office visit with prescribing clinician: 4/18/2024                         Would you like a call back once the refill request has been completed: [] Yes [] No    If the office needs to give you a call back, can they leave a voicemail: [] Yes [] No    Felicia Arciniega, PCT  01/23/24, 13:41 EST

## 2024-01-24 ENCOUNTER — OFFICE VISIT (OUTPATIENT)
Dept: SURGERY | Facility: CLINIC | Age: 69
End: 2024-01-24
Payer: MEDICARE

## 2024-01-24 VITALS
BODY MASS INDEX: 16.96 KG/M2 | SYSTOLIC BLOOD PRESSURE: 130 MMHG | DIASTOLIC BLOOD PRESSURE: 88 MMHG | WEIGHT: 86.4 LBS | HEIGHT: 60 IN

## 2024-01-24 DIAGNOSIS — K59.9 COLONIC INERTIA: Primary | ICD-10-CM

## 2024-01-24 PROCEDURE — 1160F RVW MEDS BY RX/DR IN RCRD: CPT | Performed by: SURGERY

## 2024-01-24 PROCEDURE — 1159F MED LIST DOCD IN RCRD: CPT | Performed by: SURGERY

## 2024-01-24 PROCEDURE — 3075F SYST BP GE 130 - 139MM HG: CPT | Performed by: SURGERY

## 2024-01-24 PROCEDURE — 3079F DIAST BP 80-89 MM HG: CPT | Performed by: SURGERY

## 2024-01-24 PROCEDURE — 99024 POSTOP FOLLOW-UP VISIT: CPT | Performed by: SURGERY

## 2024-01-24 RX ORDER — PANTOPRAZOLE SODIUM 40 MG/1
40 TABLET, DELAYED RELEASE ORAL 2 TIMES DAILY
Qty: 60 TABLET | Refills: 5 | Status: SHIPPED | OUTPATIENT
Start: 2024-01-24

## 2024-01-24 NOTE — PROGRESS NOTES
Colorectal & General Surgery  Follow - Up    Patient: Martina Hardwick  YOB: 1955  MRN: [id number]      Assessment  Martina Hardwick is a 68 y.o. female with history of colonic inertia multiple abdominal operations who follows up today after undergoing subtotal colectomy complicated by leak from enterotomy in late 2023.  Overall, she continues to improve.  Her weight is stable and she is tolerating her diet.  She does have continued chronic abdominal pain that is slowly improving.  She has decreased the amount of narcotics that she is taking significantly.  Bowel function waxes and wanes between liquid and solids.  I advised her to add as needed MiraLAX to her regimen of Benefiber.    Plan  Will plan to refill her narcotic prescription with tramadol if needed when she runs out of oxycodone  Will plan to see her back in the office in 1 month  Adding MiraLAX as needed    Chief Complaint: Follow-up regarding colonic inertia    History of Present Illness   Martina Hardwick is a 68 y.o. female who had a very difficult hospitalization after undergoing subtotal colectomy for colonic inertia back in the fall.  Overall, she feels better.  She is slowly slowly improving.  She is eating more and is no longer losing weight.  She has bowel movements that are sometimes partially solid and sometimes liquidy.  She complains of abdominal pain chronically, some days are worse than others.    Past Medical History   Past Medical History:   Diagnosis Date    Allergic 1994    Arthritis     Autoantibody titer positive     Goodwin esophagus     Bloating     Cataract     BILAT    Cholelithiasis removed 2018    Chronic abdominal pain     Chronic constipation     Encounter for preventive health examination     Endometriosis     Gastritis     Gastrointestinal hypomotility     GERD (gastroesophageal reflux disease)     Headache, tension-type     Hypertension     Hyponatremia     Insomnia     Intestinal autonomic neuropathy     Irritable  bowel syndrome     Migraine     Mixed anxiety and depressive disorder     Moderate malnutrition     Noninfectious gastroenteritis     OP (osteoporosis)     Osteopenia     Osteoporosis     PONV (postoperative nausea and vomiting) 08/17/2018    Psoriasis     KNEES, ELBOWS BILAT AND SCALP    Seasonal allergies     Visceral hyperalgesia         Past Surgical History   Past Surgical History:   Procedure Laterality Date    APPENDECTOMY      CHOLECYSTECTOMY N/A 08/17/2018    Procedure: CHOLECYSTECTOMY LAPAROSCOPIC converted to open procedure;  Surgeon: Hernan Hinds MD;  Location: [facility] OR;  Service: General    COLON RESECTION N/A 11/20/2023    Procedure: OPEN SUBTOTAL COLECTOMY AND ADESIOLYSIS;  Surgeon: Ranjeet Salinas MD;  Location: [facility] MAIN OR;  Service: General;  Laterality: N/A;    COLON SURGERY      STATES TOTAL OF 3 COLON SURGERY    COLONOSCOPY      COLONOSCOPY N/A 12/12/2018    Procedure: COLONOSCOPY;  Surgeon: Darien Ruff MD;  Location: [facility] OR;  Service: Gastroenterology    COLONOSCOPY N/A 10/13/2023    Procedure: COLONOSCOPY TO CECUM;  Surgeon: Ranjeet Salinas MD;  Location: [facility] ENDOSCOPY;  Service: General;  Laterality: N/A;  PREOP/ CHRONIC CONSTIPATION- POSTOP/ NORMAL    DIAGNOSTIC LAPAROSCOPY  1990    DILATATION AND CURETTAGE  1985    ENDOSCOPY N/A 05/13/2016    Procedure: ESOPHAGOGASTRODUODENOSCOPY ;  Surgeon: Darien Ruff MD;  Location: [facility] OR;  Service:     ENDOSCOPY N/A 05/01/2023    Procedure: ESOPHAGOGASTRODUODENOSCOPY WITH BIOPSY;  Surgeon: Darien Ruff MD;  Location: [facility] OR;  Service: Gastroenterology;  Laterality: N/A;  Mild Thrush; Gastritis; Esophagitis- thrush and reflux; Biopsies- duodenal, gastric, esophagus    EXPLORATORY LAPAROTOMY N/A 11/27/2023    Procedure: exploratory laparotomy, small bowel resection;  Surgeon: Ranjeet Salinas MD;  Location: [facility] MAIN OR;  Service: General;  Laterality: N/A;     HYSTERECTOMY      REVISION / TAKEDOWN COLOSTOMY      SMALL INTESTINE SURGERY         Social History  Social History     Socioeconomic History    Marital status:    Tobacco Use    Smoking status: Never     Passive exposure: Never    Smokeless tobacco: Never   Vaping Use    Vaping Use: Never used   Substance and Sexual Activity    Alcohol use: Not Currently    Drug use: No    Sexual activity: Not Currently     Partners: Male     Birth control/protection: Post-menopausal, Hysterectomy       Family History  Family History   Problem Relation Age of Onset    Hyperlipidemia Mother     Macular degeneration Mother     Hearing loss Mother     Arthritis Mother     Vision loss Mother     Hypertension Mother     Heart disease Father         Had 5 bypass surgery    Hyperlipidemia Father     Cancer Father         Prostate and bladder    Depression Father     Stroke Father     Hypertension Father     Depression Sister     COPD Sister     Hyperlipidemia Sister     Arthritis Sister     Hyperlipidemia Brother     Depression Brother     Kidney disease Brother     Arthritis Brother     Hypertension Brother     Breast cancer Maternal Aunt     Breast cancer Cousin     Breast cancer Cousin     Colon cancer Neg Hx     Colon polyps Neg Hx     Malig Hyperthermia Neg Hx        Colorectal cancer family history: None    Review of Systems  Negative except as documented in the HPI.     Allergies  Allergies   Allergen Reactions    Hydrocodone Nausea And Vomiting    Sulfa Antibiotics Nausea And Vomiting       Medications    Current Outpatient Medications:     ALPRAZolam (XANAX) 0.5 MG tablet, Take 1 tablet by mouth 2 (Two) Times a Day., Disp: 90 tablet, Rfl: 2    amLODIPine (NORVASC) 2.5 MG tablet, Take 1 tablet by mouth once daily (Patient taking differently: Take 1 tablet by mouth Every Night. PT STATES MANY DAYS SHE ALSO TAKES A DOSE DURING THE DAY D/T INCREASED BP D/T CHRONIC PAIN), Disp: 90 tablet, Rfl: 0    clobetasol (TEMOVATE) 0.05 %  cream, Apply 1 application  topically to the appropriate area as directed As Needed (VAGINAL)., Disp: , Rfl:     famotidine (PEPCID) 40 MG tablet, Take 1 tablet by mouth 2 (Two) Times a Day. With lunch and at bedtime, Disp: 180 tablet, Rfl: 3    fluticasone (FLONASE) 50 MCG/ACT nasal spray, 2 sprays into the nostril(s) as directed by provider As Needed., Disp: , Rfl:     Loratadine (CLARITIN PO), Take 10 mg by mouth Daily., Disp: , Rfl:     metoprolol succinate XL (Toprol XL) 25 MG 24 hr tablet, Take 1 tablet by mouth Daily., Disp: 90 tablet, Rfl: 1    Multiple Vitamins-Minerals (MULTI-VITAMIN GUMMIES PO), Take 2 each by mouth Daily., Disp: , Rfl:     oxyCODONE (Roxicodone) 5 MG immediate release tablet, Take 1 tablet by mouth Every 8 (Eight) Hours As Needed for Moderate Pain or Severe Pain., Disp: 30 tablet, Rfl: 0    pantoprazole (PROTONIX) 40 MG EC tablet, Take 1 tablet by mouth 2 (Two) Times a Day., Disp: 60 tablet, Rfl: 5    PARoxetine (PAXIL) 40 MG tablet, Take 1 tablet by mouth Every Morning., Disp: 30 tablet, Rfl: 5    prochlorperazine (COMPAZINE) 5 MG tablet, Take 1 tablet by mouth Every 6 (Six) Hours As Needed for Nausea or Vomiting., Disp: 30 tablet, Rfl: 0    SUMAtriptan (IMITREX) 100 MG tablet, Take 1 tablet by mouth 1 (One) Time As Needed for Migraine for up to 1 dose. Take one tablet at onset of headache. May repeat dose one time in 2 hours. (Patient taking differently: Take 1 tablet by mouth As Needed for Migraine. Take one tablet at onset of headache. May repeat dose one time in 2 hours.), Disp: 27 tablet, Rfl: 3    vitamin B-12 (CYANOCOBALAMIN) 2500 MCG sublingual tablet tablet, Place 5,000 mcg under the tongue 1 (One) Time Per Week. SATURDAY, Disp: , Rfl:     zolpidem (AMBIEN) 10 MG tablet, Take 1 tablet by mouth Every Night., Disp: 90 tablet, Rfl: 1    Vital Signs  Vitals:    01/24/24 1125   BP: 130/88        Physical Exam  Constitutional: Resting comfortably, no acute distress  Neck: Supple,  trachea midline  Respiratory: No increased work of breathing, Symmetric excursion  Cardiovascular: Well pefursed, no jugular venous distention evident   Abdominal: Incision healing well in the midline.  Small openings where fascial sutures have come through.  The sutures were trimmed during physical exam.  Otherwise, soft, non-tender, non-distended  Lymphatics: No cervical or suprascapular adenopathy  Skin: Warm, dry, no rash on visualized skin surfaces  Musculoskeletal: Symmetric strength, no obvious gross abnormalities  Psychiatric: Alert and oriented ×3, normal affect   BMI is below normal parameters (malnutrition). Recommendations: treating the underlying disease process         Osvaldo Salinas MD  Colorectal & General Surgery  [facility]    [address], [address]  [place], [place], [address]  P: [phone]  F: [phone]

## 2024-01-29 DIAGNOSIS — K59.9 COLONIC INERTIA: Primary | ICD-10-CM

## 2024-01-29 RX ORDER — TRAMADOL HYDROCHLORIDE 50 MG/1
50 TABLET ORAL EVERY 8 HOURS PRN
Qty: 30 TABLET | Refills: 0 | Status: SHIPPED | OUTPATIENT
Start: 2024-01-29 | End: 2025-01-28

## 2024-02-08 ENCOUNTER — TELEPHONE (OUTPATIENT)
Dept: SURGERY | Facility: CLINIC | Age: 69
End: 2024-02-08
Payer: MEDICARE

## 2024-02-08 DIAGNOSIS — K59.9 COLONIC INERTIA: Primary | ICD-10-CM

## 2024-02-08 RX ORDER — AMOXICILLIN AND CLAVULANATE POTASSIUM 875; 125 MG/1; MG/1
1 TABLET, FILM COATED ORAL 2 TIMES DAILY
Qty: 14 TABLET | Refills: 0 | Status: SHIPPED | OUTPATIENT
Start: 2024-02-08 | End: 2024-02-15

## 2024-02-08 NOTE — TELEPHONE ENCOUNTER
SPOKE TO PT REGARDING CT ABD/PELVIS SCHEDULED ON 2/12 @ 12:30 PM, ARRIVAL 12:15 PM FOR QUAN. INSTRUCTED PT TO BE NPO 4 HRS PRIOR TO TESTING. PT'S ALSO SCHEDULED TO FOLLOW-UP WITH DR RHODES ON 2/14 @ 11 AM IN THE [place] OFFICE.

## 2024-02-09 ENCOUNTER — PATIENT OUTREACH (OUTPATIENT)
Dept: CASE MANAGEMENT | Facility: OTHER | Age: 69
End: 2024-02-09
Payer: MEDICARE

## 2024-02-09 NOTE — OUTREACH NOTE
AMBULATORY CASE MANAGEMENT NOTE    Name and Relationship of Patient/Support Person: Martina Hardwick M - Self    Patient Outreach  RN-ACM outreach with patient. Patient states to have 1/24/24 surgery appointment and currently doing dressing changes to midline incision site; taking antibiotic as directed as she states to have had drainage from midline incision site. Patient states to be scheduled for 2/12/24 CT scan and 2/14/24 surgery appointment for follow up and recommendations. Patient states no difficulty with fever and states to have episodes of abdominal pain; has decrease in appetite but tolerating 3 cans of high calorie Boost daily. Patient states to get hungry; attempts to eat but states to get full fast. Patient states improvement in sleeping and no difficulty with constipation; chest pain or SOB. Patient states to be compliant with medications; medical appointments and monitoring of blood pressure.Reviewed with patient education and verbalized understanding. Patient states to appreciate outreach and requests additional outreach. No further questions voiced at this time. Additional outreach scheduled.   Education Documentation  Unresolved/Worsening Symptoms, taught by Maris Ocampo RN at 2/9/2024 12:45 PM.  Learner: Patient  Readiness: Acceptance  Method: Explanation  Response: Verbalizes Understanding    Wound Care, taught by Maris Ocampo RN at 2/9/2024 12:45 PM.  Learner: Patient  Readiness: Acceptance  Method: Explanation  Response: Verbalizes Understanding    Provider Follow-Up, taught by Maris Ocampo RN at 2/9/2024 12:45 PM.  Learner: Patient  Readiness: Acceptance  Method: Explanation  Response: Verbalizes Understanding    Medication Management, taught by Maris Ocampo RN at 2/9/2024 12:45 PM.  Learner: Patient  Readiness: Acceptance  Method: Explanation  Response: Verbalizes Understanding    Signs/Symptoms, taught by Maris Ocampo RN at 2/9/2024 12:45 PM.  Learner:  Patient  Readiness: Acceptance  Method: Explanation  Response: Verbalizes Understanding    Unresolved/Worsening Symptoms, taught by Maris Ocampo RN at 2/9/2024 12:45 PM.  Learner: Patient  Readiness: Acceptance  Method: Explanation  Response: Verbalizes Understanding    Provider Follow-Up, taught by Maris Ocampo RN at 2/9/2024 12:45 PM.  Learner: Patient  Readiness: Acceptance  Method: Explanation  Response: Verbalizes Understanding    Blood Pressure Monitoring, taught by Maris Ocampo RN at 2/9/2024 12:45 PM.  Learner: Patient  Readiness: Acceptance  Method: Explanation  Response: Verbalizes Understanding          Maris GALLARDO  Ambulatory Case Management    2/9/2024, 12:45 EST

## 2024-02-12 ENCOUNTER — HOSPITAL ENCOUNTER (OUTPATIENT)
Dept: CT IMAGING | Facility: HOSPITAL | Age: 69
Discharge: HOME | End: 2024-02-12
Admitting: SURGERY
Payer: MEDICARE

## 2024-02-12 DIAGNOSIS — K59.9 COLONIC INERTIA: ICD-10-CM

## 2024-02-12 PROCEDURE — 74177 CT ABD & PELVIS W/CONTRAST: CPT

## 2024-02-12 PROCEDURE — 25510000001 IOPAMIDOL PER 1 ML: Performed by: SURGERY

## 2024-02-12 RX ADMIN — IOPAMIDOL 86 ML: 755 INJECTION, SOLUTION INTRAVENOUS at 13:05

## 2024-02-14 ENCOUNTER — OFFICE VISIT (OUTPATIENT)
Dept: SURGERY | Facility: CLINIC | Age: 69
End: 2024-02-14
Payer: MEDICARE

## 2024-02-14 VITALS
WEIGHT: 88.1 LBS | BODY MASS INDEX: 17.3 KG/M2 | HEIGHT: 60 IN | DIASTOLIC BLOOD PRESSURE: 82 MMHG | SYSTOLIC BLOOD PRESSURE: 124 MMHG

## 2024-02-14 DIAGNOSIS — K59.9 COLONIC INERTIA: Primary | ICD-10-CM

## 2024-03-04 RX ORDER — PAROXETINE HYDROCHLORIDE 40 MG/1
40 TABLET, FILM COATED ORAL EVERY MORNING
Qty: 90 TABLET | Refills: 3 | Status: SHIPPED | OUTPATIENT
Start: 2024-03-04

## 2024-03-04 RX ORDER — FAMOTIDINE 40 MG/1
40 TABLET, FILM COATED ORAL 2 TIMES DAILY
Qty: 180 TABLET | Refills: 3 | Status: SHIPPED | OUTPATIENT
Start: 2024-03-04

## 2024-03-04 NOTE — TELEPHONE ENCOUNTER
PHQ2 or PHQ9 on record in past 12 months     Rx Refill Note  Requested Prescriptions     Pending Prescriptions Disp Refills    famotidine (PEPCID) 40 MG tablet 180 tablet 3     Sig: Take 1 tablet by mouth 2 (Two) Times a Day. With lunch and at bedtime    PARoxetine (PAXIL) 40 MG tablet 30 tablet 5     Sig: Take 1 tablet by mouth Every Morning.      Last office visit with prescribing clinician: 1/18/2024   Last telemedicine visit with prescribing clinician: Visit date not found   Next office visit with prescribing clinician: 4/18/2024                         Would you like a call back once the refill request has been completed: [] Yes [] No    If the office needs to give you a call back, can they leave a voicemail: [] Yes [] No    Brianda Maloney CMA  03/04/24, 09:57 EST     Received EMG reported from [facility].    Copy given to provider for review and sent to scanning

## 2024-03-06 DIAGNOSIS — I10 ESSENTIAL HYPERTENSION: ICD-10-CM

## 2024-03-06 RX ORDER — AMLODIPINE BESYLATE 2.5 MG/1
2.5 TABLET ORAL DAILY
Qty: 90 TABLET | Refills: 0 | Status: SHIPPED | OUTPATIENT
Start: 2024-03-06

## 2024-03-06 NOTE — TELEPHONE ENCOUNTER
Rx Refill Note  Requested Prescriptions     Pending Prescriptions Disp Refills    amLODIPine (NORVASC) 2.5 MG tablet 90 tablet 0     Sig: Take 1 tablet by mouth Daily.      Last office visit with prescribing clinician: 1/18/2024   Last telemedicine visit with prescribing clinician: Visit date not found   Next office visit with prescribing clinician: 4/18/2024                         Would you like a call back once the refill request has been completed: [] Yes [] No    If the office needs to give you a call back, can they leave a voicemail: [] Yes [] No    Brianda Maloney CMA  03/06/24, 13:23 EST

## 2024-03-11 ENCOUNTER — PATIENT OUTREACH (OUTPATIENT)
Dept: CASE MANAGEMENT | Facility: OTHER | Age: 69
End: 2024-03-11
Payer: MEDICARE

## 2024-03-11 NOTE — OUTREACH NOTE
AMBULATORY CASE MANAGEMENT NOTE    Name and Relationship of Patient/Support Person: Martina Hardwick - Self    Patient Outreach  RN-ACM outreach with patient. Patient states to be compliant with medications; medical appointments; dressing change to midline incision and monitoring of blood pressure. Patient states blood pressure values are WNL's. Patient states to have episodes of acid reflux; episodes of diarrhea/ constipation; episodes of migraine headaches and difficulty sleeping. Patient voiced intent to contact GI regarding GERD and availability of alternative supplemental drinks. Patient states to currently weigh 92 lbs. Reviewed with patient education and verbalized understanding. Patient states to appreciate outreach and requests additional outreach. No further questions voiced at this time.       Education Documentation  Unresolved/Worsening Symptoms, taught by Maris Ocampo RN at 3/11/2024  2:58 PM.  Learner: Patient  Readiness: Acceptance  Method: Explanation  Response: Verbalizes Understanding    Medication Management, taught by Maris Ocampo RN at 3/11/2024  2:58 PM.  Learner: Patient  Readiness: Acceptance  Method: Explanation  Response: Verbalizes Understanding    Diet Modification, taught by Maris Ocampo RN at 3/11/2024  2:58 PM.  Learner: Patient  Readiness: Acceptance  Method: Explanation  Response: Verbalizes Understanding    When to Seek Medical Attention, taught by Maris Ocampo RN at 3/11/2024  2:58 PM.  Learner: Patient  Readiness: Acceptance  Method: Explanation  Response: Verbalizes Understanding    Adherence to Disease Management Plan, taught by Maris Ocampo RN at 3/11/2024  2:58 PM.  Learner: Patient  Readiness: Acceptance  Method: Explanation  Response: Verbalizes Understanding    Provider Follow-Up, taught by Maris Ocampo RN at 3/11/2024  2:58 PM.  Learner: Patient  Readiness: Acceptance  Method: Explanation  Response: Verbalizes  Understanding    Dressing/Incision Care, taught by Maris Ocampo, RN at 3/11/2024  2:58 PM.  Learner: Patient  Readiness: Acceptance  Method: Explanation  Response: Verbalizes Understanding    Diet Adjustments, taught by Maris [place], SHEA at 3/11/2024  2:58 PM.  Learner: Patient  Readiness: Acceptance  Method: Explanation  Response: Verbalizes Understanding          Maris GALLARDO  Ambulatory Case Management    3/11/2024, 14:59 EDT

## 2024-03-20 ENCOUNTER — OFFICE VISIT (OUTPATIENT)
Dept: SURGERY | Facility: CLINIC | Age: 69
End: 2024-03-20
Payer: MEDICARE

## 2024-03-20 VITALS
WEIGHT: 94.3 LBS | BODY MASS INDEX: 18.51 KG/M2 | HEIGHT: 60 IN | DIASTOLIC BLOOD PRESSURE: 82 MMHG | SYSTOLIC BLOOD PRESSURE: 122 MMHG

## 2024-03-20 DIAGNOSIS — K59.9 COLONIC INERTIA: ICD-10-CM

## 2024-03-20 DIAGNOSIS — F41.8 DEPRESSION WITH ANXIETY: Primary | ICD-10-CM

## 2024-03-20 DIAGNOSIS — K21.9 GASTROESOPHAGEAL REFLUX DISEASE WITHOUT ESOPHAGITIS: ICD-10-CM

## 2024-03-20 NOTE — PROGRESS NOTES
Colorectal & General Surgery  Follow - Up    Patient: Martina Hardwick  YOB: 1955  MRN: [id number]      Assessment  Martina Hardwick is a 68 y.o. female with history of colonic inertia status post subtotal colectomy that was complicated by enterotomy earlier this year.  She presents in follow-up today and overall, continues to look better and better with each visit.  She is gaining weight.    Gastroesophageal reflux disease-she is on Pepcid and Protonix but continues to have significant problems with reflux and heartburn.  She has some sucralfate at home and I have advised her to try that over the next week.  She has an appointment with our gastroenterology colleagues here at [facility] in about a week and a half.  I have recommended that she try that until that appointment.  If it is still no better, then perhaps they can get her onto a different regimen with a more aggressive acid suppressant.    Colonic inertia-having bowel movements but still liquidy with some small solid pieces at times.  She has chronic abdominal pain from her adhesive disease.  I think it is in relative  steady state at this point and will likely a true steady state.  Ncontinue to slowly improve over the next couple months until it reaches    Lastly, she asked for referral to the psychiatrist that saw her in the hospital as she valued their relationship.  I have placed referral for depression and anxiety.    She is welcome to follow-up with me anytime.  She will call when she needs me.      Chief Complaint: Follow-up after surgery    History of Present Illness   Martina Hardwick is a 68 y.o. female who presents in follow-up today.  She reports having slightly improved bowel function though it is still liquidy and only has some solid components at times.  She feels that sometimes she cannot get gas  or stool to come out.  She also complains of significant heartburn.  She has gained some weight but still has difficulty with oral  intake.    Past Medical History   Past Medical History:   Diagnosis Date    Allergic 1994    Arthritis     Autoantibody titer positive     Goodwin esophagus     Bloating     Cataract     BILAT    Cholelithiasis removed 2018    Chronic abdominal pain     Chronic constipation     Encounter for preventive health examination     Endometriosis     Gastritis     Gastrointestinal hypomotility     GERD (gastroesophageal reflux disease)     Headache, tension-type     Hypertension     Hyponatremia     Insomnia     Intestinal autonomic neuropathy     Irritable bowel syndrome     Migraine     Mixed anxiety and depressive disorder     Moderate malnutrition     Noninfectious gastroenteritis     OP (osteoporosis)     Osteopenia     Osteoporosis     PONV (postoperative nausea and vomiting) 08/17/2018    Psoriasis     KNEES, ELBOWS BILAT AND SCALP    Seasonal allergies     Visceral hyperalgesia         Past Surgical History   Past Surgical History:   Procedure Laterality Date    APPENDECTOMY      CHOLECYSTECTOMY N/A 08/17/2018    Procedure: CHOLECYSTECTOMY LAPAROSCOPIC converted to open procedure;  Surgeon: Hernan Hinds MD;  Location: [facility] OR;  Service: General    COLON RESECTION N/A 11/20/2023    Procedure: OPEN SUBTOTAL COLECTOMY AND ADESIOLYSIS;  Surgeon: Ranjeet Salinas MD;  Location: [facility] MAIN OR;  Service: General;  Laterality: N/A;    COLON SURGERY      STATES TOTAL OF 3 COLON SURGERY    COLONOSCOPY      COLONOSCOPY N/A 12/12/2018    Procedure: COLONOSCOPY;  Surgeon: Darien Ruff MD;  Location: [facility] OR;  Service: Gastroenterology    COLONOSCOPY N/A 10/13/2023    Procedure: COLONOSCOPY TO CECUM;  Surgeon: Ranjeet Salinas MD;  Location: [facility] ENDOSCOPY;  Service: General;  Laterality: N/A;  PREOP/ CHRONIC CONSTIPATION- POSTOP/ NORMAL    DIAGNOSTIC LAPAROSCOPY  1990    DILATATION AND CURETTAGE  1985    ENDOSCOPY N/A 05/13/2016    Procedure: ESOPHAGOGASTRODUODENOSCOPY ;  Surgeon:  Darien Ruff MD;  Location:  [facility] OR;  Service:     ENDOSCOPY N/A 05/01/2023    Procedure: ESOPHAGOGASTRODUODENOSCOPY WITH BIOPSY;  Surgeon: Darien Ruff MD;  Location:  [facility] OR;  Service: Gastroenterology;  Laterality: N/A;  Mild Thrush; Gastritis; Esophagitis- thrush and reflux; Biopsies- duodenal, gastric, esophagus    EXPLORATORY LAPAROTOMY N/A 11/27/2023    Procedure: exploratory laparotomy, small bowel resection;  Surgeon: Ranjeet Salinas MD;  Location: [facility] MAIN OR;  Service: General;  Laterality: N/A;    HYSTERECTOMY      REVISION / TAKEDOWN COLOSTOMY      SMALL INTESTINE SURGERY         Social History  Social History     Socioeconomic History    Marital status:    Tobacco Use    Smoking status: Never     Passive exposure: Never    Smokeless tobacco: Never   Vaping Use    Vaping status: Never Used   Substance and Sexual Activity    Alcohol use: Not Currently    Drug use: No    Sexual activity: Not Currently     Partners: Male     Birth control/protection: Post-menopausal, Hysterectomy       Family History  Family History   Problem Relation Age of Onset    Hyperlipidemia Mother     Macular degeneration Mother     Hearing loss Mother     Arthritis Mother     Vision loss Mother     Hypertension Mother     Heart disease Father         Had 5 bypass surgery    Hyperlipidemia Father     Cancer Father         Prostate and bladder    Depression Father     Stroke Father     Hypertension Father     Depression Sister     COPD Sister     Hyperlipidemia Sister     Arthritis Sister     Hyperlipidemia Brother     Depression Brother     Kidney disease Brother     Arthritis Brother     Hypertension Brother     Breast cancer Maternal Aunt     Breast cancer Cousin     Breast cancer Cousin     Colon cancer Neg Hx     Colon polyps Neg Hx     Malig Hyperthermia Neg Hx      Review of Systems  Negative except as documented in the HPI.     Allergies  Allergies   Allergen Reactions     Hydrocodone Nausea And Vomiting    Sulfa Antibiotics Nausea And Vomiting       Medications    Current Outpatient Medications:     ALPRAZolam (XANAX) 0.5 MG tablet, Take 1 tablet by mouth 2 (Two) Times a Day., Disp: 90 tablet, Rfl: 2    amLODIPine (NORVASC) 2.5 MG tablet, Take 1 tablet by mouth Daily., Disp: 90 tablet, Rfl: 0    clobetasol (TEMOVATE) 0.05 % cream, Apply 1 Application topically to the appropriate area as directed As Needed (VAGINAL)., Disp: , Rfl:     famotidine (PEPCID) 40 MG tablet, Take 1 tablet by mouth 2 (Two) Times a Day. With lunch and at bedtime, Disp: 180 tablet, Rfl: 3    fluticasone (FLONASE) 50 MCG/ACT nasal spray, 2 sprays into the nostril(s) as directed by provider As Needed., Disp: , Rfl:     Loratadine (CLARITIN PO), Take 10 mg by mouth Daily., Disp: , Rfl:     metoprolol succinate XL (Toprol XL) 25 MG 24 hr tablet, Take 1 tablet by mouth Daily., Disp: 90 tablet, Rfl: 1    Multiple Vitamins-Minerals (MULTI-VITAMIN GUMMIES PO), Take 2 each by mouth Daily., Disp: , Rfl:     pantoprazole (PROTONIX) 40 MG EC tablet, Take 1 tablet by mouth 2 (Two) Times a Day., Disp: 60 tablet, Rfl: 5    PARoxetine (PAXIL) 40 MG tablet, Take 1 tablet by mouth Every Morning., Disp: 90 tablet, Rfl: 3    prochlorperazine (COMPAZINE) 5 MG tablet, Take 1 tablet by mouth Every 6 (Six) Hours As Needed for Nausea or Vomiting., Disp: 30 tablet, Rfl: 0    SUMAtriptan (IMITREX) 100 MG tablet, Take 1 tablet by mouth 1 (One) Time As Needed for Migraine for up to 1 dose. Take one tablet at onset of headache. May repeat dose one time in 2 hours. (Patient taking differently: Take 1 tablet by mouth As Needed for Migraine. Take one tablet at onset of headache. May repeat dose one time in 2 hours.), Disp: 27 tablet, Rfl: 3    vitamin B-12 (CYANOCOBALAMIN) 2500 MCG sublingual tablet tablet, Place 5,000 mcg under the tongue 1 (One) Time Per Week. SATURDAY, Disp: , Rfl:     zolpidem (AMBIEN) 10 MG tablet, Take 1 tablet by mouth  Every Night., Disp: 90 tablet, Rfl: 1    Vital Signs  Vitals:    03/20/24 1310   BP: 122/82        Physical Exam  Constitutional: Resting comfortably, no acute distress  Neck: Supple, trachea midline  Respiratory: No increased work of breathing, Symmetric excursion  Cardiovascular: Well pefursed, no jugular venous distention evident   Abdominal: Midline incision healing well with 1 tiny sinus tract remaining that has very little drainage.  Soft, non-tender, non-distended  Lymphatics: No cervical or suprascapular adenopathy  Skin: Warm, dry, no rash on visualized skin surfaces  Musculoskeletal: Symmetric strength, no obvious gross abnormalities  Psychiatric: Alert and oriented ×3, normal affect      I spent 32 minutes caring for Martina on this date of service. This time includes time spent by me in the following activities:preparing for the visit, performing a medically appropriate examination and/or evaluation , counseling and educating the patient/family/caregiver, ordering medications, tests, or procedures, referring and communicating with other health care professionals , documenting information in the medical record, and care coordination     Osvaldo Salinas MD  Colorectal & General Surgery  [facility]    [address] [place] [address], [address]  [place], [place], [address]  P: [phone]  F: [phone]

## 2024-03-22 ENCOUNTER — TRANSCRIBE ORDERS (OUTPATIENT)
Dept: ADMINISTRATIVE | Facility: HOSPITAL | Age: 69
End: 2024-03-22
Payer: MEDICARE

## 2024-03-22 ENCOUNTER — LAB (OUTPATIENT)
Dept: LAB | Facility: HOSPITAL | Age: 69
End: 2024-03-22
Payer: MEDICARE

## 2024-03-22 ENCOUNTER — TELEPHONE (OUTPATIENT)
Dept: SURGERY | Facility: CLINIC | Age: 69
End: 2024-03-22
Payer: MEDICARE

## 2024-03-22 DIAGNOSIS — E87.1 HYPOSMOLALITY SYNDROME: Primary | ICD-10-CM

## 2024-03-22 DIAGNOSIS — E87.1 HYPOSMOLALITY SYNDROME: ICD-10-CM

## 2024-03-22 DIAGNOSIS — I10 ESSENTIAL HYPERTENSION, MALIGNANT: ICD-10-CM

## 2024-03-22 LAB
CREAT UR-MCNC: 29.1 MG/DL
PROT ?TM UR-MCNC: 4.7 MG/DL
PROT/CREAT UR: 161.5 MG/G CREA (ref 0–200)

## 2024-03-22 PROCEDURE — 84156 ASSAY OF PROTEIN URINE: CPT

## 2024-03-22 PROCEDURE — 82570 ASSAY OF URINE CREATININE: CPT

## 2024-04-01 ENCOUNTER — OFFICE VISIT (OUTPATIENT)
Dept: GASTROENTEROLOGY | Facility: CLINIC | Age: 69
End: 2024-04-01
Payer: MEDICARE

## 2024-04-01 VITALS
WEIGHT: 95.6 LBS | BODY MASS INDEX: 18.77 KG/M2 | DIASTOLIC BLOOD PRESSURE: 64 MMHG | SYSTOLIC BLOOD PRESSURE: 112 MMHG | HEIGHT: 60 IN

## 2024-04-01 DIAGNOSIS — G89.29 CHRONIC ABDOMINAL PAIN: ICD-10-CM

## 2024-04-01 DIAGNOSIS — R10.84 GENERALIZED ABDOMINAL PAIN: ICD-10-CM

## 2024-04-01 DIAGNOSIS — K66.0 INTRA-ABDOMINAL ADHESIONS: ICD-10-CM

## 2024-04-01 DIAGNOSIS — R10.9 CHRONIC ABDOMINAL PAIN: ICD-10-CM

## 2024-04-01 DIAGNOSIS — K22.70 BARRETT'S ESOPHAGUS WITHOUT DYSPLASIA: ICD-10-CM

## 2024-04-01 DIAGNOSIS — K21.00 GASTROESOPHAGEAL REFLUX DISEASE WITH ESOPHAGITIS WITHOUT HEMORRHAGE: Primary | ICD-10-CM

## 2024-04-01 RX ORDER — VONOPRAZAN FUMARATE 26.72 MG/1
20 TABLET ORAL DAILY
Qty: 30 TABLET | Refills: 2 | COMMUNITY
Start: 2024-04-01 | End: 2024-04-02 | Stop reason: SDUPTHER

## 2024-04-01 RX ORDER — TRAMADOL HYDROCHLORIDE 50 MG/1
50 TABLET ORAL AS NEEDED
COMMUNITY
Start: 2024-01-29

## 2024-04-01 RX ORDER — CHLORDIAZEPOXIDE HYDROCHLORIDE AND CLIDINIUM BROMIDE 5; 2.5 MG/1; MG/1
1 CAPSULE ORAL
Qty: 90 CAPSULE | Refills: 1 | Status: SHIPPED | OUTPATIENT
Start: 2024-04-01

## 2024-04-01 RX ORDER — POLYETHYLENE GLYCOL 3350 17 G/17G
17 POWDER, FOR SOLUTION ORAL AS NEEDED
COMMUNITY

## 2024-04-01 NOTE — PATIENT INSTRUCTIONS
Start voquezna daily  Stop pantoprazole  Continue famotidine  Sucralfate 4 times a day-Breakfast, lunch, bedtime, dinner  Start miralax at night

## 2024-04-01 NOTE — PROGRESS NOTES
PATIENT INFORMATION  Martina Hardwick       - 1955    CHIEF COMPLAINT  Chief Complaint   Patient presents with    Abdominal Pain    Diarrhea    Nausea    Constipation       HISTORY OF PRESENT ILLNESS    Here today for GERD follow-up    GERD: Difficult to control. BID pantoprazole and famotidine. Tightening in chest even when drinking, noticed before surgeries but worse now, takes a few minutes until relaxes. No specific foods, slowing chewing down. No appetite. Stomach growls, but not feeling like eating. Difficulty with sucralfate, has to smash and mix with water. PRN mylanta helps some. Epigastric pain most days.    Difficulty with depression, paxil, seeing new psychiatrist soon. Will try librax, reviewed not to take with xanax, reviewed good RX if needed.    Sub total colectomy for colonic inertia. Improved abdominal pain, weight stable. Significant adhesive disease. Miralax PRN when no BM. Bowels moving most days. Still small pieces often, feels like she needs to go more, no relief. Pain worse since surgery.     2023 for weight loss positive for normal duodenum, chemical gastropathy, reflux with Barretts and candida esophagitis. Negative for celiac, HP, dysplasia.    Abdominal Pain  Associated symptoms include constipation, diarrhea, headaches and nausea. Pertinent negatives include no vomiting.   Diarrhea   Associated symptoms include abdominal pain and headaches. Pertinent negatives include no vomiting.   Nausea  Associated symptoms include abdominal pain, headaches and nausea. Pertinent negatives include no vomiting.   Constipation  Associated symptoms include abdominal pain, diarrhea and nausea. Pertinent negatives include no vomiting.       REVIEWED PERTINENT RESULTS/ LABS  Lab Results   Component Value Date    CASEREPORT  2023     Surgical Pathology Report                         Case: [id number]                                  Authorizing Provider:  Ranjeet Salinas,    Collected:           11/27/2023 09:12 PM                                 MD                                                                           Ordering Location:     [facility]  Received:            11/28/2023 07:05 AM                                 MAIN OR                                                                      Pathologist:           Margie Kenny MD                                                          Specimen:    Small Intestine, small bowel resection for permanent                                       FINALDX  11/27/2023     1. Small Bowel, Resection:   A. Grossly identified anastomosis with ulceration and necrosis.   B. Serositis and serosal adhesions, which extend to the surgical margins of resection.   B. Two benign lymph nodes.       Lab Results   Component Value Date    HGB 10.9 (L) 01/08/2024    MCV 94.2 01/08/2024     01/08/2024    ALT 23 01/08/2024    AST 30 01/08/2024    HGBA1C 5.3 06/20/2022    INR 1.10 12/06/2023    TRIG 91 12/13/2023    FERRITIN 30 08/24/2023      No results found.    REVIEW OF SYSTEMS  Review of Systems   Constitutional:  Positive for appetite change.   HENT:  Positive for trouble swallowing.    Eyes: Negative.    Respiratory:  Positive for chest tightness.    Cardiovascular: Negative.    Gastrointestinal:  Positive for abdominal pain, constipation, diarrhea and nausea. Negative for vomiting.        Goodwin's   Endocrine: Negative.    Genitourinary: Negative.    Musculoskeletal: Negative.    Skin: Negative.    Allergic/Immunologic: Negative.    Neurological:  Positive for headaches.   Hematological:  Bruises/bleeds easily.   Psychiatric/Behavioral: Negative.           ACTIVE PROBLEMS  Patient Active Problem List    Diagnosis     Severe malnutrition [E43]     Colonic inertia [K59.9]     Goodwin's esophagus without dysplasia [K22.70]     Abnormal celiac antibody panel [R89.4]     Gastrointestinal hypomotility [K31.89]     Generalized  abdominal pain [R10.84]     Chronic abdominal pain [R10.9, G89.29]     Hyponatremia [E87.1]     Adhesion of abdominal wall [K66.0]     Age-related osteoporosis without current pathological fracture [M81.0]     Psoriasis [L40.9]     Family history of colonic polyps [Z83.719]     Routine health maintenance [Z00.00]     Essential hypertension [I10]     GERD (gastroesophageal reflux disease) [K21.9]     Allergic rhinitis [J30.9]     Primary insomnia [F51.01]     Migraines [G43.909]     Depression with anxiety [F41.8]          PAST MEDICAL HISTORY  Past Medical History:   Diagnosis Date    Allergic 1994    Arthritis     Autoantibody titer positive     Goodwin esophagus     Bloating     Cataract     BILAT    Cholelithiasis removed 2018    Chronic abdominal pain     Chronic constipation     Encounter for preventive health examination     Endometriosis     Gastritis     Gastrointestinal hypomotility     GERD (gastroesophageal reflux disease)     Headache, tension-type     Hypertension     Hyponatremia     Insomnia     Intestinal autonomic neuropathy     Irritable bowel syndrome     Migraine     Mixed anxiety and depressive disorder     Moderate malnutrition     Noninfectious gastroenteritis     OP (osteoporosis)     Osteopenia     Osteoporosis     PONV (postoperative nausea and vomiting) 08/17/2018    Psoriasis     KNEES, ELBOWS BILAT AND SCALP    Seasonal allergies     Visceral hyperalgesia          SURGICAL HISTORY  Past Surgical History:   Procedure Laterality Date    ABDOMINAL SURGERY  1994 2018 2023    APPENDECTOMY      CHOLECYSTECTOMY N/A 08/17/2018    Procedure: CHOLECYSTECTOMY LAPAROSCOPIC converted to open procedure;  Surgeon: Hernan Hinds MD;  Location:  [facility] OR;  Service: General    COLON RESECTION N/A 11/20/2023    Procedure: OPEN SUBTOTAL COLECTOMY AND ADESIOLYSIS;  Surgeon: Ranjeet Salinas MD;  Location: [facility] MAIN OR;  Service: General;  Laterality: N/A;    COLON SURGERY      STATES TOTAL OF 3  COLON SURGERY    COLONOSCOPY      COLONOSCOPY N/A 12/12/2018    Procedure: COLONOSCOPY;  Surgeon: Darien Ruff MD;  Location: [facility] OR;  Service: Gastroenterology    COLONOSCOPY N/A 10/13/2023    Procedure: COLONOSCOPY TO CECUM;  Surgeon: Ranjeet Salinas MD;  Location: [facility] ENDOSCOPY;  Service: General;  Laterality: N/A;  PREOP/ CHRONIC CONSTIPATION- POSTOP/ NORMAL    DIAGNOSTIC LAPAROSCOPY  1990    DILATATION AND CURETTAGE  1985    ENDOSCOPY N/A 05/13/2016    Procedure: ESOPHAGOGASTRODUODENOSCOPY ;  Surgeon: Darien Ruff MD;  Location: [facility] OR;  Service:     ENDOSCOPY N/A 05/01/2023    Procedure: ESOPHAGOGASTRODUODENOSCOPY WITH BIOPSY;  Surgeon: Darien Ruff MD;  Location: [facility] OR;  Service: Gastroenterology;  Laterality: N/A;  Mild Thrush; Gastritis; Esophagitis- thrush and reflux; Biopsies- duodenal, gastric, esophagus    EXPLORATORY LAPAROTOMY N/A 11/27/2023    Procedure: exploratory laparotomy, small bowel resection;  Surgeon: Ranjeet Salinas MD;  Location: [facility] MAIN OR;  Service: General;  Laterality: N/A;    HYSTERECTOMY      REVISION / TAKEDOWN COLOSTOMY      SMALL INTESTINE SURGERY      UPPER GASTROINTESTINAL ENDOSCOPY  2023         FAMILY HISTORY  Family History   Problem Relation Age of Onset    Hyperlipidemia Mother     Macular degeneration Mother     Hearing loss Mother     Arthritis Mother     Vision loss Mother     Hypertension Mother     Heart disease Father         Had 5 bypass surgery    Hyperlipidemia Father     Cancer Father         Prostate and bladder    Depression Father     Stroke Father     Hypertension Father     Depression Sister     COPD Sister     Hyperlipidemia Sister     Arthritis Sister     Hyperlipidemia Brother     Depression Brother     Kidney disease Brother     Arthritis Brother     Hypertension Brother     Breast cancer Maternal Aunt     Breast cancer Cousin     Breast cancer Cousin     Colon cancer Neg Hx      Colon polyps Neg Hx     Malig Hyperthermia Neg Hx          SOCIAL HISTORY  Social History     Occupational History    Not on file   Tobacco Use    Smoking status: Never     Passive exposure: Never    Smokeless tobacco: Never   Vaping Use    Vaping status: Never Used   Substance and Sexual Activity    Alcohol use: Not Currently    Drug use: No    Sexual activity: Not Currently     Partners: Male     Birth control/protection: Post-menopausal, Hysterectomy         CURRENT MEDICATIONS    Current Outpatient Medications:     ALPRAZolam (XANAX) 0.5 MG tablet, Take 1 tablet by mouth 2 (Two) Times a Day., Disp: 90 tablet, Rfl: 2    amLODIPine (NORVASC) 2.5 MG tablet, Take 1 tablet by mouth Daily., Disp: 90 tablet, Rfl: 0    clobetasol (TEMOVATE) 0.05 % cream, Apply 1 Application topically to the appropriate area as directed As Needed (VAGINAL)., Disp: , Rfl:     famotidine (PEPCID) 40 MG tablet, Take 1 tablet by mouth 2 (Two) Times a Day. With lunch and at bedtime, Disp: 180 tablet, Rfl: 3    fluticasone (FLONASE) 50 MCG/ACT nasal spray, 2 sprays into the nostril(s) as directed by provider As Needed., Disp: , Rfl:     Loratadine (CLARITIN PO), Take 10 mg by mouth Daily., Disp: , Rfl:     metoprolol succinate XL (Toprol XL) 25 MG 24 hr tablet, Take 1 tablet by mouth Daily., Disp: 90 tablet, Rfl: 1    Multiple Vitamins-Minerals (MULTI-VITAMIN GUMMIES PO), Take 2 each by mouth Daily., Disp: , Rfl:     PARoxetine (PAXIL) 40 MG tablet, Take 1 tablet by mouth Every Morning., Disp: 90 tablet, Rfl: 3    polyethylene glycol (MIRALAX) 17 GM/SCOOP powder, Take 17 g by mouth As Needed., Disp: , Rfl:     prochlorperazine (COMPAZINE) 5 MG tablet, Take 1 tablet by mouth Every 6 (Six) Hours As Needed for Nausea or Vomiting., Disp: 30 tablet, Rfl: 0    SUMAtriptan (IMITREX) 100 MG tablet, Take 1 tablet by mouth 1 (One) Time As Needed for Migraine for up to 1 dose. Take one tablet at onset of headache. May repeat dose one time in 2 hours.  "(Patient taking differently: Take 1 tablet by mouth As Needed for Migraine. Take one tablet at onset of headache. May repeat dose one time in 2 hours.), Disp: 27 tablet, Rfl: 3    vitamin B-12 (CYANOCOBALAMIN) 2500 MCG sublingual tablet tablet, Place 5,000 mcg under the tongue 1 (One) Time Per Week. SATURDAY, Disp: , Rfl:     Vonoprazan Fumarate (Voquezna) 20 MG tablet, Take 1 tablet by mouth Daily., Disp: 30 tablet, Rfl: 2    Wheat Dextrin (BENEFIBER DRINK MIX PO), Take  by mouth Daily., Disp: , Rfl:     zolpidem (AMBIEN) 10 MG tablet, Take 1 tablet by mouth Every Night., Disp: 90 tablet, Rfl: 1    clidinium-chlordiazePOXIDE (Librax) 5-2.5 MG per capsule, Take 1 capsule by mouth 3 (Three) Times a Day With Meals., Disp: 90 capsule, Rfl: 1    traMADol (ULTRAM) 50 MG tablet, Take 1 tablet by mouth As Needed. (Patient not taking: Reported on 4/1/2024), Disp: , Rfl:     ALLERGIES  Hydrocodone and Sulfa antibiotics    VITALS  Vitals:    04/01/24 1457   BP: 112/64   BP Location: Left arm   Patient Position: Sitting   Cuff Size: Adult   Weight: 43.4 kg (95 lb 9.6 oz)   Height: 152.4 cm (60\")       PHYSICAL EXAM  Debilities/Disabilities Identified: None  Emotional Behavior: Appropriate  Wt Readings from Last 3 Encounters:   04/01/24 43.4 kg (95 lb 9.6 oz)   03/20/24 42.8 kg (94 lb 4.8 oz)   02/14/24 40 kg (88 lb 1.6 oz)     Ht Readings from Last 1 Encounters:   04/01/24 152.4 cm (60\")     Body mass index is 18.67 kg/m².  Physical Exam  Constitutional:       General: She is not in acute distress.     Appearance: Normal appearance. She is not ill-appearing.   HENT:      Head: Normocephalic and atraumatic.      Mouth/Throat:      Mouth: Mucous membranes are moist.      Pharynx: No posterior oropharyngeal erythema.   Eyes:      General: No scleral icterus.  Cardiovascular:      Rate and Rhythm: Normal rate and regular rhythm.      Heart sounds: Normal heart sounds.   Pulmonary:      Effort: Pulmonary effort is normal.      " Breath sounds: Normal breath sounds.   Abdominal:      General: Abdomen is flat. Bowel sounds are normal. There is no distension.      Palpations: Abdomen is soft. There is no mass.      Tenderness: There is generalized no abdominal tenderness. There is no guarding or rebound. Negative signs include Collins's sign.      Hernia: No hernia is present.   Musculoskeletal:      Cervical back: Neck supple.   Skin:     General: Skin is warm.      Capillary Refill: Capillary refill takes less than 2 seconds.   Neurological:      General: No focal deficit present.      Mental Status: She is alert and oriented to person, place, and time.   Psychiatric:         Mood and Affect: Mood normal.         Behavior: Behavior normal.         Thought Content: Thought content normal.         Judgment: Judgment normal.       CLINICAL DATA REVIEWED   reviewed previous lab results and integrated with today's visit, reviewed notes from other physicians and/or last GI encounter, reviewed previous endoscopy results and available photos, reviewed surgical pathology results from previous biopsies    ASSESSMENT  Diagnoses and all orders for this visit:    Gastroesophageal reflux disease with esophagitis without hemorrhage  -     Discontinue: Vonoprazan Fumarate (Voquezna) 20 MG tablet; Take 1 tablet by mouth Daily.  -     Vonoprazan Fumarate (Voquezna) 20 MG tablet; Take 1 tablet by mouth Daily.    Goodwin's esophagus without dysplasia  -     Discontinue: Vonoprazan Fumarate (Voquezna) 20 MG tablet; Take 1 tablet by mouth Daily.  -     Vonoprazan Fumarate (Voquezna) 20 MG tablet; Take 1 tablet by mouth Daily.    Chronic abdominal pain    Generalized abdominal pain  -     clidinium-chlordiazePOXIDE (Librax) 5-2.5 MG per capsule; Take 1 capsule by mouth 3 (Three) Times a Day With Meals.    Other orders  -     traMADol (ULTRAM) 50 MG tablet; Take 1 tablet by mouth As Needed. (Patient not taking: Reported on 4/1/2024)  -     polyethylene glycol  (MIRALAX) 17 GM/SCOOP powder; Take 17 g by mouth As Needed.  -     Wheat Dextrin (BENEFIBER DRINK MIX PO); Take  by mouth Daily.          PLAN    Sucralfate QID  Start voquezna,   Miralax at night    Return in about 2 months (around 6/1/2024).    I have discussed the above plan with the patient.  They verbalize understanding and are in agreement with the plan.  They have been advised to contact the office for any questions, concerns, or changes related to their health.

## 2024-04-02 ENCOUNTER — TELEPHONE (OUTPATIENT)
Dept: GASTROENTEROLOGY | Facility: CLINIC | Age: 69
End: 2024-04-02
Payer: MEDICARE

## 2024-04-02 RX ORDER — VONOPRAZAN FUMARATE 26.72 MG/1
20 TABLET ORAL DAILY
Qty: 30 TABLET | Refills: 2 | Status: SHIPPED | OUTPATIENT
Start: 2024-04-02

## 2024-04-05 ENCOUNTER — TELEPHONE (OUTPATIENT)
Dept: GASTROENTEROLOGY | Facility: CLINIC | Age: 69
End: 2024-04-05
Payer: MEDICARE

## 2024-04-05 NOTE — TELEPHONE ENCOUNTER
"PA for Chlordiazepoxide-clidinium has been sent through [facility].     PA has been denied.     Denied. This drug used for GENERALIZED ABDOMINAL PAIN is not an approved use. Medicare Part D rules states the drug must be used for a \"medically-accepted indication\". A \"medically-accepted indication\" means a use that is approved by the Food and Drug Administration (FDA), or a use supported by specific resources. These are the [facility] Drug Information and DRUGDEX Information System. Therefore, this drug cannot be covered under your Medicare Part D benefit.  "

## 2024-04-08 ENCOUNTER — TELEPHONE (OUTPATIENT)
Dept: GASTROENTEROLOGY | Facility: CLINIC | Age: 69
End: 2024-04-08

## 2024-04-08 ENCOUNTER — PATIENT ROUNDING (BHMG ONLY) (OUTPATIENT)
Dept: GASTROENTEROLOGY | Facility: CLINIC | Age: 69
End: 2024-04-08
Payer: MEDICARE

## 2024-04-09 DIAGNOSIS — F51.01 PRIMARY INSOMNIA: ICD-10-CM

## 2024-04-09 NOTE — TELEPHONE ENCOUNTER
Rx Refill Note  Requested Prescriptions     Pending Prescriptions Disp Refills    zolpidem (AMBIEN) 10 MG tablet 90 tablet 1     Sig: Take 1 tablet by mouth Every Night.      Last office visit with prescribing clinician: 1/18/2024   Last telemedicine visit with prescribing clinician: Visit date not found   Next office visit with prescribing clinician: 4/18/2024                         Would you like a call back once the refill request has been completed: [] Yes [] No    If the office needs to give you a call back, can they leave a voicemail: [] Yes [] No    Cristina Ulloa MA  04/09/24, 10:27 EDT

## 2024-04-10 ENCOUNTER — PATIENT OUTREACH (OUTPATIENT)
Dept: CASE MANAGEMENT | Facility: OTHER | Age: 69
End: 2024-04-10
Payer: MEDICARE

## 2024-04-10 RX ORDER — ZOLPIDEM TARTRATE 10 MG/1
10 TABLET ORAL NIGHTLY
Qty: 90 TABLET | Refills: 1 | Status: SHIPPED | OUTPATIENT
Start: 2024-04-10

## 2024-04-10 NOTE — OUTREACH NOTE
AMBULATORY CASE MANAGEMENT NOTE    Name and Relationship of Patient/Support Person: Martina Hardwick M - Self    Patient Outreach  RN-ACM outreach with patient. Patient states to be having difficulty with constipation; decrease in appetite and abdominal discomfort.RN-ACM encouraged patient to contact physician. Patient currently taking Miralax. Patient states to be having difficulty obtaining medications per GI and voiced intent to contact GI office for follow up and recommendations. Patient voiced intent to contact Dr. Salinas  (Colon and Rectal Surgeon) for follow up and recommendations. Patient states no difficulty with fever; chest pain; SOB or sleeping. Reviewed with patient education and verbalized understanding. Patient states to appreciate outreach and requests additional outreach.Additional outreach scheduled. No further questions voiced at this time.       Education Documentation  Unresolved/Worsening Symptoms, taught by Maris Ocampo RN at 4/10/2024  4:33 PM.  Learner: Patient  Readiness: Acceptance  Method: Explanation  Response: Verbalizes Understanding    Medication Management, taught by Maris Ocampo RN at 4/10/2024  4:33 PM.  Learner: Patient  Readiness: Acceptance  Method: Explanation  Response: Verbalizes Understanding    Regular Elimination, taught by Maris Ocampo RN at 4/10/2024  4:33 PM.  Learner: Patient  Readiness: Acceptance  Method: Explanation  Response: Verbalizes Understanding    When to Seek Medical Attention, taught by Maris Ocampo RN at 4/10/2024  4:33 PM.  Learner: Patient  Readiness: Acceptance  Method: Explanation  Response: Verbalizes Understanding    Adherence to Disease Management Plan, taught by Maris Ocampo RN at 4/10/2024  4:33 PM.  Learner: Patient  Readiness: Acceptance  Method: Explanation  Response: Verbalizes Understanding          Maris GALLARDO  Ambulatory Case Management    4/10/2024, 16:33 EDT

## 2024-04-12 NOTE — PROGRESS NOTES
[facility]  [address] [address] [phone].    Before we get started may I verify your date of birth? 1955    I am calling to officially welcome you to our practice and ask about your recent visit. Is this a good time to talk? yes    Tell me about your visit with us. What things went well?  Very pleased, Skylar is a wonderful NP and I recommend her to everyone!       We're always looking for ways to make our patients' experiences even better. Do you have recommendations on ways we may improve?  no    Overall were you satisfied with your first visit to our practice? yes       I appreciate you taking the time to speak with me today. Is there anything else I can do for you? no      Thank you, and have a great day.

## 2024-04-18 ENCOUNTER — TELEPHONE (OUTPATIENT)
Dept: GASTROENTEROLOGY | Facility: CLINIC | Age: 69
End: 2024-04-18
Payer: MEDICARE

## 2024-04-18 ENCOUNTER — OFFICE VISIT (OUTPATIENT)
Dept: INTERNAL MEDICINE | Facility: CLINIC | Age: 69
End: 2024-04-18
Payer: MEDICARE

## 2024-04-18 VITALS
HEIGHT: 60 IN | BODY MASS INDEX: 18.49 KG/M2 | HEART RATE: 74 BPM | DIASTOLIC BLOOD PRESSURE: 82 MMHG | OXYGEN SATURATION: 99 % | SYSTOLIC BLOOD PRESSURE: 124 MMHG | WEIGHT: 94.2 LBS | TEMPERATURE: 98.6 F

## 2024-04-18 DIAGNOSIS — K21.9 GASTROESOPHAGEAL REFLUX DISEASE WITHOUT ESOPHAGITIS: ICD-10-CM

## 2024-04-18 DIAGNOSIS — F41.0 PANIC DISORDER: ICD-10-CM

## 2024-04-18 DIAGNOSIS — G43.909 MIGRAINE WITHOUT STATUS MIGRAINOSUS, NOT INTRACTABLE, UNSPECIFIED MIGRAINE TYPE: ICD-10-CM

## 2024-04-18 DIAGNOSIS — Z00.00 ROUTINE HEALTH MAINTENANCE: Primary | ICD-10-CM

## 2024-04-18 DIAGNOSIS — F41.8 DEPRESSION WITH ANXIETY: ICD-10-CM

## 2024-04-18 DIAGNOSIS — R73.09 ELEVATED GLUCOSE: ICD-10-CM

## 2024-04-18 DIAGNOSIS — E55.9 HYPOVITAMINOSIS D: ICD-10-CM

## 2024-04-18 DIAGNOSIS — R10.9 CHRONIC ABDOMINAL PAIN: ICD-10-CM

## 2024-04-18 DIAGNOSIS — F51.01 PRIMARY INSOMNIA: ICD-10-CM

## 2024-04-18 DIAGNOSIS — I10 ESSENTIAL HYPERTENSION: ICD-10-CM

## 2024-04-18 DIAGNOSIS — M81.0 AGE-RELATED OSTEOPOROSIS WITHOUT CURRENT PATHOLOGICAL FRACTURE: ICD-10-CM

## 2024-04-18 DIAGNOSIS — G89.29 CHRONIC ABDOMINAL PAIN: ICD-10-CM

## 2024-04-18 DIAGNOSIS — K59.9 COLONIC INERTIA: ICD-10-CM

## 2024-04-18 DIAGNOSIS — J30.89 NON-SEASONAL ALLERGIC RHINITIS, UNSPECIFIED TRIGGER: ICD-10-CM

## 2024-04-18 DIAGNOSIS — E87.1 HYPONATREMIA: ICD-10-CM

## 2024-04-18 RX ORDER — LAMOTRIGINE 25 MG/1
25 TABLET ORAL
COMMUNITY
Start: 2024-04-06

## 2024-04-18 RX ORDER — ESCITALOPRAM OXALATE 5 MG/1
5 TABLET ORAL DAILY
COMMUNITY
Start: 2024-04-07

## 2024-04-18 RX ORDER — ALPRAZOLAM 0.5 MG/1
0.5 TABLET ORAL 3 TIMES DAILY PRN
Qty: 90 TABLET | Refills: 2 | Status: SHIPPED | OUTPATIENT
Start: 2024-04-18

## 2024-04-18 RX ORDER — SUCRALFATE 1 G/1
1 TABLET ORAL 4 TIMES DAILY
COMMUNITY

## 2024-04-18 NOTE — TELEPHONE ENCOUNTER
Called [facility]:  pt came to office stated she hasn't heard from [facility] regarding Voquezna RX    Per Codi: medicare price copay 553.00 even though medicare approved prior auth she not eligible for 50.00 co pay    Will need to send to APRN for alternative

## 2024-04-18 NOTE — PROGRESS NOTES
"      Subjective   Subjective     Martina Hardwick is a 68 y.o. female, who presents with a chief complaint of   Chief Complaint   Patient presents with    Hypertension     3 month follow up    Abdominal Pain    Depression     Hypertension  Associated symptoms include anxiety and headaches.   Heartburn  She complains of abdominal pain.   Anxiety  Symptoms include insomnia.     1. HTN.  Tolerates medication.  Takes amlodipine 2.5 daily and metoprolol 25 mg daily.  Home blood pressures running 110s-130s/60s-70s.  Denies dizziness and chest pain.    2. Depression and anxiety.  She has established with Dr. Chavez, psychiatrist and recently started escitalopram and lamotrigine. Seeing a counselor at [facility].  Denies SI.    3. Headaches.  She takes sumatriptan for abortive treatment and this is effective.     4. Chronic abdominal pain, colonic inertia, adhesions.  Dr. Salinas did subtotal colectomy on 11/20/2023 \"that required conversion to an open procedure due to terrible intra-abdominal adhesions.  This was complicated by a leaking enterotomy that necessitated takeback postop day 7.  At that time, she underwent a small bowel resection.  She subsequently required an interventional radiology placed percutaneous drain into retroperitoneum.\"  She was was discharged 1 month later on 12/20/2023 after weaning off TPN and onto a regular diet.  She is taking high calorie Boost BID-TID.    The following portions of the patient's history were reviewed and updated as appropriate: allergies, current medications, past family history, past medical history, past social history, past surgical history and problem list.    Allergies: Hydrocodone and Sulfa antibiotics    Review of Systems   Constitutional: Negative.    Eyes: Negative.    Respiratory: Negative.    Cardiovascular: Negative.    Gastrointestinal: Positive for abdominal pain and constipation.   Endocrine: Negative.    Genitourinary: Negative.    Musculoskeletal: Positive " for arthralgias.   Allergic/Immunologic: Positive for environmental allergies.   Neurological: Positive for headaches.   Hematological: Negative.    Psychiatric/Behavioral: The patient has insomnia.      Objective     Wt Readings from Last 3 Encounters:   04/18/24 42.7 kg (94 lb 3.2 oz)   04/01/24 43.4 kg (95 lb 9.6 oz)   03/20/24 42.8 kg (94 lb 4.8 oz)     Temp Readings from Last 3 Encounters:   04/18/24 98.6 °F (37 °C) (Infrared)   01/18/24 98.4 °F (36.9 °C) (Infrared)   01/08/24 98 °F (36.7 °C)     BP Readings from Last 3 Encounters:   04/18/24 124/82   04/01/24 112/64   03/20/24 122/82     Pulse Readings from Last 3 Encounters:   04/18/24 74   01/18/24 93   01/08/24 90     Body mass index is 18.4 kg/m².  SpO2 Readings from Last 3 Encounters:   01/15/18 96%   10/12/17 98%   08/10/17 99%     Physical Exam   Constitutional: She is oriented to person, place, and time. She appears well-developed.   HENT:   Head: Normocephalic and atraumatic.   Mouth/Throat: Mucous membranes are moist.   Eyes: Conjunctivae are normal.   Neck: No thyromegaly present.   Cardiovascular: Normal rate, regular rhythm and normal heart sounds.   Pulmonary/Chest: Effort normal and breath sounds normal.   Abdominal: Soft. Normal appearance and bowel sounds are normal.   Musculoskeletal: Normal range of motion.      Right lower leg: No edema.      Left lower leg: No edema.   Neurological: She is alert and oriented to person, place, and time.   Skin: Skin is warm and dry. No rash noted.   Psychiatric: Her behavior is normal. Mood normal.   Nursing note and vitals reviewed.      Assessment/Plan   Martina was seen today for hypertension, insomnia and heartburn.    Diagnoses and all orders for this visit:      Essential hypertension    Routine health maintenance    Migraine without status migrainosus, not intractable, unspecified migraine type    Gastroesophageal reflux disease without esophagitis    Depression with anxiety    Primary  insomnia    Chronic constipation    Chronic abdominal pain    Adhesions    Family history of colonic polyps    Non-seasonal allergic rhinitis, unspecified trigger    Osteoporosis    Hyponatremia      1. Routine health maint.  Mammogram UTD.  Colonoscopy UTD.  Hysterectomy done for non-cancerous reasons.  Covid-19 vaccines done.  Pneumovax UTD.  Shingrix at pharmacy.      2. HTN.  Controlled.  Continue amlodipine 2.5 mg daily and metoprolol 25 mg daily.  Labs today.    3. Migraines.  Doing okay with these.  Continue sumatriptan for abortive treatment.      4. GERD.  Continue pantoprazole, famotidine, and lifestyle measures.  She sees GI.    5. Depression with anxiety.  Now seeing psychiatry and recently started escitalopram and lamotrigine.  On prn alprazolam.  Med management agreement and Joby UTD.  Continue counseling.    6. Insomnia.  Zolpidem is effective.  Med management agreement and Joby UTD.    7. Chronic constipation/colonic hypomotility/abdominal pain.  Recovering from subtotal colectomy and small bowel resection.  Pain management referral ordered.    8. LORRIE.  Controlled with fluticasone nasal spray and loratadine.    9. Osteoporosis.  Didn't tolerate bisphosphonates previously (Dr. Ghotra).  Continue calcium, vitamin D and weight-bearing exercise.  Discuss ordering Prolia.  We need to get a handle on her abdominal pain before adding more medication into the mix.  Recheck Dexa scan this fall with mammogram.    10. Chronic hyponatremia.  Improved last time.   Seeing nephrology, Dr. Stephen Jalloh.  Stable.    Outpatient Medications Prior to Visit   Medication Sig Dispense Refill    amLODIPine (NORVASC) 2.5 MG tablet Take 1 tablet by mouth Daily. 90 tablet 0    clobetasol (TEMOVATE) 0.05 % cream Apply 1 Application topically to the appropriate area as directed As Needed (VAGINAL).      escitalopram (LEXAPRO) 5 MG tablet Take 1 tablet by mouth Daily.      famotidine (PEPCID) 40 MG tablet Take 1 tablet by mouth 2  (Two) Times a Day. With lunch and at bedtime 180 tablet 3    fluticasone (FLONASE) 50 MCG/ACT nasal spray 2 sprays into the nostril(s) as directed by provider As Needed.      lamoTRIgine (LaMICtal) 25 MG tablet Take 1 tablet by mouth every night at bedtime.      Loratadine (CLARITIN PO) Take 10 mg by mouth Daily.      metoprolol succinate XL (Toprol XL) 25 MG 24 hr tablet Take 1 tablet by mouth Daily. 90 tablet 1    Multiple Vitamins-Minerals (MULTI-VITAMIN GUMMIES PO) Take 2 each by mouth Daily.      polyethylene glycol (MIRALAX) 17 GM/SCOOP powder Take 17 g by mouth As Needed.      prochlorperazine (COMPAZINE) 5 MG tablet Take 1 tablet by mouth Every 6 (Six) Hours As Needed for Nausea or Vomiting. 30 tablet 0    sucralfate (CARAFATE) 1 g tablet Take 1 tablet by mouth 4 (Four) Times a Day.      SUMAtriptan (IMITREX) 100 MG tablet Take 1 tablet by mouth 1 (One) Time As Needed for Migraine for up to 1 dose. Take one tablet at onset of headache. May repeat dose one time in 2 hours. (Patient taking differently: Take 1 tablet by mouth As Needed for Migraine. Take one tablet at onset of headache. May repeat dose one time in 2 hours.) 27 tablet 3    traMADol (ULTRAM) 50 MG tablet Take 1 tablet by mouth As Needed.      vitamin B-12 (CYANOCOBALAMIN) 2500 MCG sublingual tablet tablet Place 5,000 mcg under the tongue 1 (One) Time Per Week. SATURDAY      Vonoprazan Fumarate (Voquezna) 20 MG tablet Take 1 tablet by mouth Daily. 30 tablet 2    Wheat Dextrin (BENEFIBER DRINK MIX PO) Take  by mouth Daily.      zolpidem (AMBIEN) 10 MG tablet Take 1 tablet by mouth Every Night. 90 tablet 1    ALPRAZolam (XANAX) 0.5 MG tablet Take 1 tablet by mouth 2 (Two) Times a Day. 90 tablet 2    clidinium-chlordiazePOXIDE (Librax) 5-2.5 MG per capsule Take 1 capsule by mouth 3 (Three) Times a Day With Meals. 90 capsule 1    PARoxetine (PAXIL) 40 MG tablet Take 1 tablet by mouth Every Morning. 90 tablet 3     No facility-administered medications  prior to visit.     New Medications Ordered This Visit   Medications    ALPRAZolam (XANAX) 0.5 MG tablet     Sig: Take 1 tablet by mouth 3 (Three) Times a Day As Needed for Anxiety.     Dispense:  90 tablet     Refill:  2     [unfilled]  Medications Discontinued During This Encounter   Medication Reason    PARoxetine (PAXIL) 40 MG tablet Alternate therapy    ALPRAZolam (XANAX) 0.5 MG tablet Reorder         Return in about 4 months (around 8/18/2024) for Medicare Wellness.

## 2024-04-19 LAB
25(OH)D3+25(OH)D2 SERPL-MCNC: 53.7 NG/ML (ref 30–100)
ALBUMIN SERPL-MCNC: 4.7 G/DL (ref 3.5–5.2)
ALBUMIN/GLOB SERPL: 1.6 G/DL
ALP SERPL-CCNC: 152 U/L (ref 39–117)
ALT SERPL-CCNC: 18 U/L (ref 1–33)
AST SERPL-CCNC: 23 U/L (ref 1–32)
BASOPHILS # BLD AUTO: 0.03 10*3/MM3 (ref 0–0.2)
BASOPHILS NFR BLD AUTO: 0.5 % (ref 0–1.5)
BILIRUB SERPL-MCNC: 0.8 MG/DL (ref 0–1.2)
BUN SERPL-MCNC: 16 MG/DL (ref 8–23)
BUN/CREAT SERPL: 16.5 (ref 7–25)
CALCIUM SERPL-MCNC: 10.3 MG/DL (ref 8.6–10.5)
CHLORIDE SERPL-SCNC: 94 MMOL/L (ref 98–107)
CO2 SERPL-SCNC: 26.2 MMOL/L (ref 22–29)
CREAT SERPL-MCNC: 0.97 MG/DL (ref 0.57–1)
EGFRCR SERPLBLD CKD-EPI 2021: 63.8 ML/MIN/1.73
EOSINOPHIL # BLD AUTO: 0.01 10*3/MM3 (ref 0–0.4)
EOSINOPHIL NFR BLD AUTO: 0.2 % (ref 0.3–6.2)
ERYTHROCYTE [DISTWIDTH] IN BLOOD BY AUTOMATED COUNT: 12.1 % (ref 12.3–15.4)
GLOBULIN SER CALC-MCNC: 3 GM/DL
GLUCOSE SERPL-MCNC: 85 MG/DL (ref 65–99)
HBA1C MFR BLD: 5 % (ref 4.8–5.6)
HCT VFR BLD AUTO: 41.2 % (ref 34–46.6)
HGB BLD-MCNC: 13.6 G/DL (ref 12–15.9)
IMM GRANULOCYTES # BLD AUTO: 0.01 10*3/MM3 (ref 0–0.05)
IMM GRANULOCYTES NFR BLD AUTO: 0.2 % (ref 0–0.5)
LYMPHOCYTES # BLD AUTO: 1.08 10*3/MM3 (ref 0.7–3.1)
LYMPHOCYTES NFR BLD AUTO: 17.5 % (ref 19.6–45.3)
MCH RBC QN AUTO: 30.2 PG (ref 26.6–33)
MCHC RBC AUTO-ENTMCNC: 33 G/DL (ref 31.5–35.7)
MCV RBC AUTO: 91.4 FL (ref 79–97)
MONOCYTES # BLD AUTO: 0.39 10*3/MM3 (ref 0.1–0.9)
MONOCYTES NFR BLD AUTO: 6.3 % (ref 5–12)
NEUTROPHILS # BLD AUTO: 4.66 10*3/MM3 (ref 1.7–7)
NEUTROPHILS NFR BLD AUTO: 75.3 % (ref 42.7–76)
NRBC BLD AUTO-RTO: 0 /100 WBC (ref 0–0.2)
PLATELET # BLD AUTO: 193 10*3/MM3 (ref 140–450)
POTASSIUM SERPL-SCNC: 4.9 MMOL/L (ref 3.5–5.2)
PROT SERPL-MCNC: 7.7 G/DL (ref 6–8.5)
RBC # BLD AUTO: 4.51 10*6/MM3 (ref 3.77–5.28)
SODIUM SERPL-SCNC: 132 MMOL/L (ref 136–145)
TSH SERPL DL<=0.005 MIU/L-ACNC: 1.52 UIU/ML (ref 0.27–4.2)
VIT B12 SERPL-MCNC: 1340 PG/ML (ref 211–946)
WBC # BLD AUTO: 6.18 10*3/MM3 (ref 3.4–10.8)

## 2024-04-23 RX ORDER — FAMOTIDINE 40 MG/1
40 TABLET, FILM COATED ORAL 2 TIMES DAILY
Qty: 180 TABLET | Refills: 3 | Status: SHIPPED | OUTPATIENT
Start: 2024-04-23

## 2024-04-23 RX ORDER — OMEPRAZOLE 40 MG/1
40 CAPSULE, DELAYED RELEASE ORAL
Qty: 190 CAPSULE | Refills: 3 | Status: SHIPPED | OUTPATIENT
Start: 2024-04-23

## 2024-04-23 NOTE — TELEPHONE ENCOUNTER
Did the samples provide any relief? Will resume ppi and switch to omeprazole BID AC breakfast and dinner, famotidine lunch time and bedtime.     Called pt with recommendations: request for Omep[facility]ole to be sent to [facility]

## 2024-05-10 ENCOUNTER — PATIENT OUTREACH (OUTPATIENT)
Dept: CASE MANAGEMENT | Facility: OTHER | Age: 69
End: 2024-05-10
Payer: MEDICARE

## 2024-06-04 ENCOUNTER — TELEPHONE (OUTPATIENT)
Dept: GASTROENTEROLOGY | Facility: CLINIC | Age: 69
End: 2024-06-04

## 2024-06-04 ENCOUNTER — OFFICE VISIT (OUTPATIENT)
Dept: GASTROENTEROLOGY | Facility: CLINIC | Age: 69
End: 2024-06-04
Payer: MEDICARE

## 2024-06-04 VITALS
BODY MASS INDEX: 18.73 KG/M2 | HEIGHT: 60 IN | SYSTOLIC BLOOD PRESSURE: 106 MMHG | DIASTOLIC BLOOD PRESSURE: 64 MMHG | WEIGHT: 95.4 LBS

## 2024-06-04 DIAGNOSIS — K21.9 GASTROESOPHAGEAL REFLUX DISEASE WITHOUT ESOPHAGITIS: ICD-10-CM

## 2024-06-04 DIAGNOSIS — N73.6 PELVIC ADHESIONS: Primary | ICD-10-CM

## 2024-06-04 DIAGNOSIS — R10.84 GENERALIZED ABDOMINAL PAIN: ICD-10-CM

## 2024-06-04 RX ORDER — PROMETHAZINE HYDROCHLORIDE 25 MG/1
25 SUPPOSITORY RECTAL EVERY 6 HOURS PRN
Qty: 30 SUPPOSITORY | Refills: 1 | Status: SHIPPED | OUTPATIENT
Start: 2024-06-04

## 2024-06-04 RX ORDER — ESCITALOPRAM OXALATE 20 MG/1
20 TABLET ORAL DAILY
COMMUNITY

## 2024-06-04 RX ORDER — CHLORDIAZEPOXIDE HYDROCHLORIDE AND CLIDINIUM BROMIDE 5; 2.5 MG/1; MG/1
1 CAPSULE ORAL
Qty: 90 CAPSULE | Refills: 0 | Status: SHIPPED | OUTPATIENT
Start: 2024-06-04

## 2024-06-04 NOTE — PROGRESS NOTES
PATIENT INFORMATION  Martina Hardwick       - 1955    CHIEF COMPLAINT  Chief Complaint   Patient presents with   • Abdominal Pain   • Nausea       HISTORY OF PRESENT ILLNESS    IBS-C and GERD follow-up    CIC: Per LOV: Sub total colectomy for colonic inertia. Improved abdominal pain, weight stable. Significant adhesive disease. Miralax PRN when no BM. Bowels moving most days. Still small pieces often, feels like she needs to go more, no relief. Pain worse since surgery. Reviewed adding miralax back daily. Planned to try librax, but was not covered by insurance. TODAY: Has not been need miralax, stools have been soft. Does have more pain when bowels are not moving well.    GERD: Per LOV: GERD: Difficult to control. BID pantoprazole and famotidine. Tightening in chest even when drinking, noticed before surgeries but worse now, takes a few minutes until relaxes. No specific foods, slowing chewing down. No appetite. Stomach growls, but not feeling like eating. Difficulty with sucralfate, has to smash and mix with water. PRN mylanta helps some. Epigastric pain most days. Voquezna copay too much so switched to omeprazole BID AC. TODAY: Only getting omeprazole down once a day and sucralfate sticks even when crushed, will add to applesauce. Reviewed BID PPI AC. Has some good days and can eat and then will feel bad the next day.  feels like she is having more good days recently. No weight gain, but also no loss.    Patient starting to feel better with switch from Paxil to lexapro.      2023 for weight loss positive for normal duodenum, chemical gastropathy, reflux with Barretts and candida esophagitis. Negative for celiac, HP, dysplasia.    Abdominal Pain  Associated symptoms include nausea. Pertinent negatives include no constipation, diarrhea or vomiting.   Nausea  Associated symptoms include abdominal pain, fatigue and nausea. Pertinent negatives include no vomiting.       REVIEWED PERTINENT RESULTS/  LABS  Lab Results   Component Value Date    CASEREPORT  11/27/2023     Surgical Pathology Report                         Case: [id number]                                  Authorizing Provider:  Ranjeet Salinas,   Collected:           11/27/2023 09:12 PM                                 MD                                                                           Ordering Location:     [facility]  Received:            11/28/2023 07:05 AM                                 MAIN OR                                                                      Pathologist:           Margie Kenny MD                                                          Specimen:    Small Intestine, small bowel resection for permanent                                       FINALDX  11/27/2023     1. Small Bowel, Resection:   A. Grossly identified anastomosis with ulceration and necrosis.   B. Serositis and serosal adhesions, which extend to the surgical margins of resection.   B. Two benign lymph nodes.       Lab Results   Component Value Date    HGB 13.6 04/18/2024    MCV 91.4 04/18/2024     04/18/2024    ALT 18 04/18/2024    AST 23 04/18/2024    HGBA1C 5.00 04/18/2024    INR 1.10 12/06/2023    TRIG 91 12/13/2023    FERRITIN 30 08/24/2023      No results found.    REVIEW OF SYSTEMS  Review of Systems   Constitutional:  Positive for fatigue.   HENT: Negative.     Eyes: Negative.    Respiratory: Negative.     Cardiovascular: Negative.    Gastrointestinal:  Positive for abdominal pain and nausea. Negative for constipation, diarrhea, rectal pain and vomiting.   Endocrine: Negative.    Genitourinary: Negative.    Musculoskeletal: Negative.    Skin: Negative.    Allergic/Immunologic: Negative.    Neurological:  Positive for dizziness and light-headedness.   Hematological:  Bruises/bleeds easily.   Psychiatric/Behavioral:  The patient is nervous/anxious.          ACTIVE PROBLEMS  Patient Active Problem List    Diagnosis     • Severe malnutrition [E43]    • Colonic inertia [K59.9]    • Goodwin's esophagus without dysplasia [K22.70]    • Abnormal celiac antibody panel [R89.4]    • Gastrointestinal hypomotility [K31.89]    • Generalized abdominal pain [R10.84]    • Chronic abdominal pain [R10.9, G89.29]    • Hyponatremia [E87.1]    • Adhesion of abdominal wall [K66.0]    • Age-related osteoporosis without current pathological fracture [M81.0]    • Psoriasis [L40.9]    • Family history of colonic polyps [Z83.719]    • Routine health maintenance [Z00.00]    • Essential hypertension [I10]    • GERD (gastroesophageal reflux disease) [K21.9]    • Allergic rhinitis [J30.9]    • Primary insomnia [F51.01]    • Migraines [G43.909]    • Depression with anxiety [F41.8]          PAST MEDICAL HISTORY  Past Medical History:   Diagnosis Date   • Allergic 1994   • Arthritis    • Autoantibody titer positive    • Goodwin esophagus    • Bloating    • Cataract     BILAT   • Cholelithiasis removed 2018   • Chronic abdominal pain    • Chronic constipation    • Encounter for preventive health examination    • Endometriosis    • Gastritis    • Gastrointestinal hypomotility    • GERD (gastroesophageal reflux disease)    • Headache, tension-type    • Hypertension    • Hyponatremia    • Insomnia    • Intestinal autonomic neuropathy    • Irritable bowel syndrome    • Migraine    • Mixed anxiety and depressive disorder    • Moderate malnutrition    • Noninfectious gastroenteritis    • OP (osteoporosis)    • Osteopenia    • Osteoporosis    • PONV (postoperative nausea and vomiting) 08/17/2018   • Psoriasis     KNEES, ELBOWS BILAT AND SCALP   • Seasonal allergies    • Visceral hyperalgesia          SURGICAL HISTORY  Past Surgical History:   Procedure Laterality Date   • ABDOMINAL SURGERY  1994 2018 2023   • APPENDECTOMY     • CHOLECYSTECTOMY N/A 08/17/2018    Procedure: CHOLECYSTECTOMY LAPAROSCOPIC converted to open procedure;  Surgeon: Hernan Hinds MD;   Location:  [facility] OR;  Service: General   • COLON RESECTION N/A 11/20/2023    Procedure: OPEN SUBTOTAL COLECTOMY AND ADESIOLYSIS;  Surgeon: Ranjeet Salinas MD;  [facility] MA[facility];  Service: General;  Laterality: N/A;   • COLON SURGERY      STATES TOTAL OF 3 COLON SURGERY   • COLONOSCOPY     • COLONOSCOPY N/A 12/12/2018    Procedure: COLONOSCOPY;  Surgeon: Darien Ruff MD;  Location: [facility] OR;  Service: Gastroenterology   • COLONOSCOPY N/A 10/13/2023    Procedure: COLONOSCOPY TO CECUM;  Surgeon: Ranjeet Salinas MD;  Location: [facility] ENDOSCOPY;  Service: General;  Laterality: N/A;  PREOP/ CHRONIC CONSTIPATION- POSTOP/ NORMAL   • DIAGNOSTIC LAPAROSCOPY  1990   • DILATATION AND CURETTAGE  1985   • ENDOSCOPY N/A 05/13/2016    Procedure: ESOPHAGOGASTRODUODENOSCOPY ;  Surgeon: Darien Ruff MD;  Location: [facility] OR;  Service:    • ENDOSCOPY N/A 05/01/2023    Procedure: ESOPHAGOGASTRODUODENOSCOPY WITH BIOPSY;  Surgeon: Darien Ruff MD;  Location: [facility] OR;  Service: Gastroenterology;  Laterality: N/A;  Mild Thrush; Gastritis; Esophagitis- thrush and reflux; Biopsies- duodenal, gastric, esophagus   • EXPLORATORY LAPAROTOMY N/A 11/27/2023    Procedure: exploratory laparotomy, small bowel resection;  Surgeon: Ranjeet Salinas MD;  Location: [facility] MAIN OR;  Service: General;  Laterality: N/A;   • HYSTERECTOMY     • REVISION / TAKEDOWN COLOSTOMY     • SMALL INTESTINE SURGERY     • UPPER GASTROINTESTINAL ENDOSCOPY  2023         FAMILY HISTORY  Family History   Problem Relation Age of Onset   • Hyperlipidemia Mother    • Macular degeneration Mother    • Hearing loss Mother    • Arthritis Mother    • Vision loss Mother    • Hypertension Mother    • Heart disease Father         Had 5 bypass surgery   • Hyperlipidemia Father    • Cancer Father         Prostate and bladder   • Depression Father    • Stroke Father    • Hypertension Father    • Depression  Sister    • COPD Sister    • Hyperlipidemia Sister    • Arthritis Sister    • Hyperlipidemia Brother    • Depression Brother    • Kidney disease Brother    • Arthritis Brother    • Hypertension Brother    • Breast cancer Maternal Aunt    • Breast cancer Cousin    • Breast cancer Cousin    • Colon cancer Neg Hx    • Colon polyps Neg Hx    • Malig Hyperthermia Neg Hx          SOCIAL HISTORY  Social History     Occupational History   • Not on file   Tobacco Use   • Smoking status: Never     Passive exposure: Never   • Smokeless tobacco: Never   Vaping Use   • Vaping status: Never Used   Substance and Sexual Activity   • Alcohol use: Not Currently   • Drug use: No   • Sexual activity: Not Currently     Partners: Male     Birth control/protection: Post-menopausal, Hysterectomy         CURRENT MEDICATIONS    Current Outpatient Medications:   •  ALPRAZolam (XANAX) 0.5 MG tablet, Take 1 tablet by mouth 3 (Three) Times a Day As Needed for Anxiety., Disp: 90 tablet, Rfl: 2  •  amLODIPine (NORVASC) 2.5 MG tablet, Take 1 tablet by mouth Daily., Disp: 90 tablet, Rfl: 0  •  clobetasol (TEMOVATE) 0.05 % cream, Apply 1 Application topically to the appropriate area as directed As Needed (VAGINAL)., Disp: , Rfl:   •  escitalopram (LEXAPRO) 20 MG tablet, Take 1 tablet by mouth Daily., Disp: , Rfl:   •  famotidine (PEPCID) 40 MG tablet, Take 1 tablet by mouth 2 (Two) Times a Day. With lunch and at bedtime, Disp: 180 tablet, Rfl: 3  •  fluticasone (FLONASE) 50 MCG/ACT nasal spray, 2 sprays into the nostril(s) as directed by provider As Needed., Disp: , Rfl:   •  Loratadine (CLARITIN PO), Take 10 mg by mouth Daily., Disp: , Rfl:   •  metoprolol succinate XL (Toprol XL) 25 MG 24 hr tablet, Take 1 tablet by mouth Daily., Disp: 90 tablet, Rfl: 1  •  Multiple Vitamins-Minerals (MULTI-VITAMIN GUMMIES PO), Take 2 each by mouth Daily., Disp: , Rfl:   •  omeprazole (priLOSEC) 40 MG capsule, Take 1 capsule by mouth 2 (Two) Times a Day Before  "Meals. (Patient taking differently: Take 1 capsule by mouth Daily.), Disp: 190 capsule, Rfl: 3  •  polyethylene glycol (MIRALAX) 17 GM/SCOOP powder, Take 17 g by mouth As Needed., Disp: , Rfl:   •  prochlorperazine (COMPAZINE) 5 MG tablet, Take 1 tablet by mouth Every 6 (Six) Hours As Needed for Nausea or Vomiting., Disp: 30 tablet, Rfl: 0  •  SUMAtriptan (IMITREX) 100 MG tablet, Take 1 tablet by mouth 1 (One) Time As Needed for Migraine for up to 1 dose. Take one tablet at onset of headache. May repeat dose one time in 2 hours. (Patient taking differently: Take 1 tablet by mouth As Needed for Migraine. Take one tablet at onset of headache. May repeat dose one time in 2 hours.), Disp: 27 tablet, Rfl: 3  •  traMADol (ULTRAM) 50 MG tablet, Take 1 tablet by mouth As Needed., Disp: , Rfl:   •  vitamin B-12 (CYANOCOBALAMIN) 2500 MCG sublingual tablet tablet, Place 5,000 mcg under the tongue 1 (One) Time Per Week. SATURDAY, Disp: , Rfl:   •  Wheat Dextrin (BENEFIBER DRINK MIX PO), Take  by mouth Daily., Disp: , Rfl:   •  zolpidem (AMBIEN) 10 MG tablet, Take 1 tablet by mouth Every Night., Disp: 90 tablet, Rfl: 1  •  clidinium-chlordiazePOXIDE (Librax) 5-2.5 MG per capsule, Take 1 capsule by mouth 4 (Four) Times a Day Before Meals & at Bedtime As Needed for Cramping., Disp: 90 capsule, Rfl: 0  •  promethazine (PHENERGAN) 25 MG suppository, Insert 1 suppository into the rectum Every 6 (Six) Hours As Needed for Nausea or Vomiting., Disp: 30 suppository, Rfl: 1  •  sucralfate (CARAFATE) 1 g tablet, Take 1 tablet by mouth 4 (Four) Times a Day. (Patient not taking: Reported on 6/4/2024), Disp: , Rfl:     ALLERGIES  Hydrocodone and Sulfa antibiotics    VITALS  Vitals:    06/04/24 1330   BP: 106/64   BP Location: Left arm   Patient Position: Sitting   Cuff Size: Adult   Weight: 43.3 kg (95 lb 6.4 oz)   Height: 152.4 cm (60\")       PHYSICAL EXAM  Debilities/Disabilities Identified: None  Emotional Behavior: Appropriate  Wt " "Readings from Last 3 Encounters:   06/04/24 43.3 kg (95 lb 6.4 oz)   04/18/24 42.7 kg (94 lb 3.2 oz)   04/01/24 43.4 kg (95 lb 9.6 oz)     Ht Readings from Last 1 Encounters:   06/04/24 152.4 cm (60\")     Body mass index is 18.63 kg/m².  Physical Exam  Constitutional:       General: She is not in acute distress.     Appearance: Normal appearance. She is not ill-appearing.   HENT:      Head: Normocephalic and atraumatic.      Mouth/Throat:      Mouth: Mucous membranes are moist.      Pharynx: No posterior oropharyngeal erythema.   Eyes:      General: No scleral icterus.  Cardiovascular:      Rate and Rhythm: Normal rate and regular rhythm.      Heart sounds: Normal heart sounds.   Pulmonary:      Effort: Pulmonary effort is normal.      Breath sounds: Normal breath sounds.   Abdominal:      General: Abdomen is flat. Bowel sounds are normal. There is no distension.      Palpations: Abdomen is soft. There is no mass.      Tenderness: There is no abdominal tenderness in the right lower quadrant. There is no guarding or rebound. Negative signs include Collins's sign.      Hernia: No hernia is present.   Musculoskeletal:      Cervical back: Neck supple.   Skin:     General: Skin is warm.      Capillary Refill: Capillary refill takes less than 2 seconds.   Neurological:      General: No focal deficit present.      Mental Status: She is alert and oriented to person, place, and time.   Psychiatric:         Mood and Affect: Mood normal.         Behavior: Behavior normal.         Thought Content: Thought content normal.         Judgment: Judgment normal.       CLINICAL DATA REVIEWED   reviewed previous lab results and integrated with today's visit, reviewed notes from other physicians and/or last GI encounter, reviewed previous endoscopy results and available photos, reviewed surgical pathology results from previous biopsies    ASSESSMENT  Diagnoses and all orders for this visit:    Pelvic adhesions  -     " clidinium-chlordiazePOXIDE (Librax) 5-2.5 MG per capsule; Take 1 capsule by mouth 4 (Four) Times a Day Before Meals & at Bedtime As Needed for Cramping.    Gastroesophageal reflux disease without esophagitis    Generalized abdominal pain    Other orders  -     escitalopram (LEXAPRO) 20 MG tablet; Take 1 tablet by mouth Daily.  -     promethazine (PHENERGAN) 25 MG suppository; Insert 1 suppository into the rectum Every 6 (Six) Hours As Needed for Nausea or Vomiting.          PLAN    BID AC PPI  Sucralfate  PRN phenergan  Trial librax    No follow-ups on file.    I have discussed the above plan with the patient.  They verbalize understanding and are in agreement with the plan.  They have been advised to contact the office for any questions, concerns, or changes related to their health.

## 2024-06-04 NOTE — TELEPHONE ENCOUNTER
Received fax from pharmacy, drug plan does not cover Promethegan 25mg Suppositories. Will send to APRN to review.

## 2024-06-04 NOTE — TELEPHONE ENCOUNTER
Called [facility] :  Spoke with elaina with [facility]: Insurance $556.50 She not eligible for copay/ cash price

## 2024-06-10 ENCOUNTER — TELEPHONE (OUTPATIENT)
Dept: GASTROENTEROLOGY | Facility: CLINIC | Age: 69
End: 2024-06-10

## 2024-06-10 NOTE — TELEPHONE ENCOUNTER
Caller: Martina Hardwick    Relationship to patient: Self    Best call back number: [phone]    Patient is needing: PATIENT HAS QUESTIONS ABOUT MEDICATION PRESCRIBED.  STATES SHE WAS ONLY TOLD IT WOULD BE 50 DOLLARS FOR MEDICATION.  PATIENT NEEDS SOMEONE TO REACH OUT TO CLARIFY.  THANK YOU

## 2024-06-11 DIAGNOSIS — I10 ESSENTIAL HYPERTENSION: ICD-10-CM

## 2024-06-11 RX ORDER — AMLODIPINE BESYLATE 2.5 MG/1
2.5 TABLET ORAL DAILY
Qty: 90 TABLET | Refills: 0 | Status: SHIPPED | OUTPATIENT
Start: 2024-06-11

## 2024-06-11 NOTE — TELEPHONE ENCOUNTER
We will just have to keep with the omeprazole, but taking it twice a day. Hopefully there will be more options in the future of Voquezna.       LVM  with instructions

## 2024-06-11 NOTE — TELEPHONE ENCOUNTER
Previous tele:  Called [facility] :  Spoke with elaina with [facility]: Insurance $[phone] She not eligible for copay/ cash price       Pt is not eligible for copy assistance will need alternative

## 2024-07-16 DIAGNOSIS — I10 ESSENTIAL HYPERTENSION: ICD-10-CM

## 2024-07-16 RX ORDER — METOPROLOL SUCCINATE 25 MG/1
25 TABLET, EXTENDED RELEASE ORAL DAILY
Qty: 90 TABLET | Refills: 1 | Status: SHIPPED | OUTPATIENT
Start: 2024-07-16

## 2024-07-16 RX ORDER — AMLODIPINE BESYLATE 2.5 MG/1
2.5 TABLET ORAL DAILY
Qty: 90 TABLET | Refills: 0 | Status: SHIPPED | OUTPATIENT
Start: 2024-07-16

## 2024-07-16 NOTE — TELEPHONE ENCOUNTER
Rx Refill Note  Requested Prescriptions     Pending Prescriptions Disp Refills    metoprolol succinate XL (TOPROL-XL) 25 MG 24 hr tablet [Pharmacy Med Name: METOPROLOL ER SUCCINATE 25MG TABS] 90 tablet 1     Sig: Take 1 tablet by mouth Daily.    amLODIPine (NORVASC) 2.5 MG tablet [Pharmacy Med Name: AMLODIPINE BESYLATE 2.5MG TABLETS] 90 tablet 0     Sig: TAKE 1 TABLET BY MOUTH DAILY      Last office visit with prescribing clinician: 4/18/2024   Last telemedicine visit with prescribing clinician: Visit date not found   Next office visit with prescribing clinician: 8/29/2024                         Would you like a call back once the refill request has been completed: [] Yes [] No    If the office needs to give you a call back, can they leave a voicemail: [] Yes [] No    Cristina Ulloa MA  07/16/24, 16:06 EDT

## 2024-08-12 DIAGNOSIS — I10 ESSENTIAL HYPERTENSION: Primary | ICD-10-CM

## 2024-08-12 DIAGNOSIS — R10.9 CHRONIC ABDOMINAL PAIN: ICD-10-CM

## 2024-08-12 DIAGNOSIS — Z83.719 FAMILY HISTORY OF COLONIC POLYPS: ICD-10-CM

## 2024-08-12 DIAGNOSIS — I10 ESSENTIAL HYPERTENSION: ICD-10-CM

## 2024-08-12 DIAGNOSIS — K21.9 GASTROESOPHAGEAL REFLUX DISEASE WITHOUT ESOPHAGITIS: Primary | ICD-10-CM

## 2024-08-12 DIAGNOSIS — Z00.00 ROUTINE HEALTH MAINTENANCE: ICD-10-CM

## 2024-08-12 DIAGNOSIS — G89.29 CHRONIC ABDOMINAL PAIN: ICD-10-CM

## 2024-08-12 DIAGNOSIS — E55.9 HYPOVITAMINOSIS D: ICD-10-CM

## 2024-08-12 DIAGNOSIS — E87.1 HYPONATREMIA: ICD-10-CM

## 2024-08-22 ENCOUNTER — LAB (OUTPATIENT)
Dept: LAB | Facility: HOSPITAL | Age: 69
End: 2024-08-22
Payer: MEDICARE

## 2024-08-22 PROCEDURE — 80061 LIPID PANEL: CPT | Performed by: FAMILY MEDICINE

## 2024-08-22 PROCEDURE — 80053 COMPREHEN METABOLIC PANEL: CPT | Performed by: FAMILY MEDICINE

## 2024-08-22 PROCEDURE — 82607 VITAMIN B-12: CPT | Performed by: FAMILY MEDICINE

## 2024-08-22 PROCEDURE — 82306 VITAMIN D 25 HYDROXY: CPT | Performed by: FAMILY MEDICINE

## 2024-08-22 PROCEDURE — 84443 ASSAY THYROID STIM HORMONE: CPT | Performed by: FAMILY MEDICINE

## 2024-08-22 PROCEDURE — 85025 COMPLETE CBC W/AUTO DIFF WBC: CPT | Performed by: FAMILY MEDICINE

## 2024-08-29 ENCOUNTER — OFFICE VISIT (OUTPATIENT)
Dept: INTERNAL MEDICINE | Facility: CLINIC | Age: 69
End: 2024-08-29
Payer: MEDICARE

## 2024-08-29 VITALS
HEART RATE: 65 BPM | BODY MASS INDEX: 18.3 KG/M2 | HEIGHT: 60 IN | OXYGEN SATURATION: 98 % | TEMPERATURE: 97.8 F | WEIGHT: 93.2 LBS | SYSTOLIC BLOOD PRESSURE: 98 MMHG | DIASTOLIC BLOOD PRESSURE: 60 MMHG

## 2024-08-29 DIAGNOSIS — G89.29 CHRONIC ABDOMINAL PAIN: ICD-10-CM

## 2024-08-29 DIAGNOSIS — F51.01 PRIMARY INSOMNIA: ICD-10-CM

## 2024-08-29 DIAGNOSIS — Z12.31 ENCOUNTER FOR SCREENING MAMMOGRAM FOR MALIGNANT NEOPLASM OF BREAST: ICD-10-CM

## 2024-08-29 DIAGNOSIS — L40.9 PSORIASIS: ICD-10-CM

## 2024-08-29 DIAGNOSIS — K21.9 GASTROESOPHAGEAL REFLUX DISEASE WITHOUT ESOPHAGITIS: ICD-10-CM

## 2024-08-29 DIAGNOSIS — E87.1 HYPONATREMIA: ICD-10-CM

## 2024-08-29 DIAGNOSIS — J30.89 NON-SEASONAL ALLERGIC RHINITIS, UNSPECIFIED TRIGGER: ICD-10-CM

## 2024-08-29 DIAGNOSIS — G43.909 MIGRAINE WITHOUT STATUS MIGRAINOSUS, NOT INTRACTABLE, UNSPECIFIED MIGRAINE TYPE: ICD-10-CM

## 2024-08-29 DIAGNOSIS — E55.9 HYPOVITAMINOSIS D: ICD-10-CM

## 2024-08-29 DIAGNOSIS — F41.8 DEPRESSION WITH ANXIETY: ICD-10-CM

## 2024-08-29 DIAGNOSIS — I10 ESSENTIAL HYPERTENSION: ICD-10-CM

## 2024-08-29 DIAGNOSIS — R10.9 CHRONIC ABDOMINAL PAIN: ICD-10-CM

## 2024-08-29 DIAGNOSIS — Z00.00 MEDICARE ANNUAL WELLNESS VISIT, SUBSEQUENT: Primary | ICD-10-CM

## 2024-08-29 DIAGNOSIS — Z23 ENCOUNTER FOR ADMINISTRATION OF VACCINE: ICD-10-CM

## 2024-08-29 DIAGNOSIS — M81.0 AGE-RELATED OSTEOPOROSIS WITHOUT CURRENT PATHOLOGICAL FRACTURE: ICD-10-CM

## 2024-08-29 DIAGNOSIS — Z00.00 ROUTINE HEALTH MAINTENANCE: ICD-10-CM

## 2024-08-29 RX ORDER — BETAMETHASONE VALERATE 1.2 MG/G
1 CREAM TOPICAL 2 TIMES DAILY
Qty: 45 G | Refills: 1 | Status: SHIPPED | OUTPATIENT
Start: 2024-08-29

## 2024-08-29 RX ORDER — SUMATRIPTAN 100 MG/1
100 TABLET, FILM COATED ORAL ONCE AS NEEDED
Qty: 27 TABLET | Refills: 3 | Status: SHIPPED | OUTPATIENT
Start: 2024-08-29 | End: 2024-08-29 | Stop reason: SDUPTHER

## 2024-08-29 RX ORDER — BUPROPION HYDROCHLORIDE 300 MG/1
TABLET ORAL
COMMUNITY
Start: 2024-08-08

## 2024-08-29 RX ORDER — TRAMADOL HYDROCHLORIDE 50 MG/1
50 TABLET ORAL AS NEEDED
Qty: 30 TABLET | Refills: 0 | Status: SHIPPED | OUTPATIENT
Start: 2024-08-29

## 2024-08-29 RX ORDER — SUMATRIPTAN 100 MG/1
100 TABLET, FILM COATED ORAL ONCE AS NEEDED
Qty: 27 TABLET | Refills: 3 | Status: SHIPPED | OUTPATIENT
Start: 2024-08-29

## 2024-08-29 NOTE — PROGRESS NOTES
Subjective   The ABCs of the Annual Wellness Visit  Medicare Wellness Visit      Martina Hardwick is a 68 y.o. patient who presents for a Medicare Wellness Visit.    The following portions of the patient's history were reviewed and   updated as appropriate: allergies, current medications, past family history, past medical history, past social history, past surgical history, and problem list.    Compared to one year ago, the patient's physical   health is better.  Compared to one year ago, the patient's mental   health is better.    Recent Hospitalizations:  This patient has had a [facility] admission record on file within the last 365 days.  Current Medical Providers:  Patient Care Team:  Ildefonso Dubois MD as PCP - General (Family Medicine)  Miquel Benitez MD as Consulting Physician (Nephrology)  Skylar Coleman APRN as Nurse Practitioner (Gastroenterology)  Tuan Durand MD (Psychiatry)  Chandan Mejia MD (Pain Medicine)  Ranjeet Salinas MD as Surgeon (Colon and Rectal Surgery)    Outpatient Medications Prior to Visit   Medication Sig Dispense Refill    ALPRAZolam (XANAX) 0.5 MG tablet Take 1 tablet by mouth 3 (Three) Times a Day As Needed for Anxiety. 90 tablet 2    amLODIPine (NORVASC) 2.5 MG tablet TAKE 1 TABLET BY MOUTH DAILY 90 tablet 0    buPROPion XL (WELLBUTRIN XL) 300 MG 24 hr tablet take 1 tablet by mouth daily in the morning      clidinium-chlordiazePOXIDE (Librax) 5-2.5 MG per capsule Take 1 capsule by mouth 4 (Four) Times a Day Before Meals & at Bedtime As Needed for Cramping. 90 capsule 0    clobetasol (TEMOVATE) 0.05 % cream Apply 1 Application topically to the appropriate area as directed As Needed (VAGINAL).      escitalopram (LEXAPRO) 20 MG tablet Take 1 tablet by mouth Daily.      famotidine (PEPCID) 40 MG tablet Take 1 tablet by mouth 2 (Two) Times a Day. With lunch and at bedtime 180 tablet 3    fluticasone (FLONASE) 50 MCG/ACT nasal spray 2 sprays into  the nostril(s) as directed by provider As Needed.      Loratadine (CLARITIN PO) Take 10 mg by mouth Daily.      metoprolol succinate XL (TOPROL-XL) 25 MG 24 hr tablet TAKE 1 TABLET BY MOUTH DAILY 90 tablet 1    Multiple Vitamins-Minerals (MULTI-VITAMIN GUMMIES PO) Take 2 each by mouth Daily.      NON FORMULARY MELOXICAN/TOPIRAMATE/GABAPENTIN/KETAMINE/CLONIDINE/BACLOFEN  1/2 PUMPS TO THE AFFECTED AREA.      omeprazole (priLOSEC) 40 MG capsule Take 1 capsule by mouth 2 (Two) Times a Day Before Meals. (Patient taking differently: Take 1 capsule by mouth Daily.) 190 capsule 3    polyethylene glycol (MIRALAX) 17 GM/SCOOP powder Take 17 g by mouth As Needed.      prochlorperazine (COMPAZINE) 5 MG tablet Take 1 tablet by mouth Every 6 (Six) Hours As Needed for Nausea or Vomiting. 30 tablet 0    promethazine (PHENERGAN) 25 MG suppository Insert 1 suppository into the rectum Every 6 (Six) Hours As Needed for Nausea or Vomiting. 30 suppository 1    sucralfate (CARAFATE) 1 g tablet Take 1 tablet by mouth 4 (Four) Times a Day.      vitamin B-12 (CYANOCOBALAMIN) 2500 MCG sublingual tablet tablet Place 5,000 mcg under the tongue 1 (One) Time Per Week. SATURDAY      Wheat Dextrin (BENEFIBER DRINK MIX PO) Take  by mouth Daily.      zolpidem (AMBIEN) 10 MG tablet Take 1 tablet by mouth Every Night. 90 tablet 1    SUMAtriptan (IMITREX) 100 MG tablet Take 1 tablet by mouth 1 (One) Time As Needed for Migraine for up to 1 dose. Take one tablet at onset of headache. May repeat dose one time in 2 hours. (Patient taking differently: Take 1 tablet by mouth As Needed for Migraine. Take one tablet at onset of headache. May repeat dose one time in 2 hours.) 27 tablet 3    traMADol (ULTRAM) 50 MG tablet Take 1 tablet by mouth As Needed.       No facility-administered medications prior to visit.     Opioid medication/s are on active medication list.  and I have evaluated her active treatment plan and pain score trends (see table).  There were no  "vitals filed for this visit.  I have reviewed the chart for potential of high risk medication and harmful drug interactions in the elderly.        Aspirin is not on active medication list.  Aspirin use is not indicated based on review of current medical condition/s. Risk of harm outweighs potential benefits.  .    Patient Active Problem List   Diagnosis    Migraines    Depression with anxiety    Allergic rhinitis    Primary insomnia    GERD (gastroesophageal reflux disease)    Essential hypertension    Routine health maintenance    Family history of colonic polyps    Psoriasis    Age-related osteoporosis without current pathological fracture    Adhesion of abdominal wall    Chronic abdominal pain    Hyponatremia    Generalized abdominal pain    Gastrointestinal hypomotility    Abnormal celiac antibody panel    Goodwin's esophagus without dysplasia    Colonic inertia    Severe malnutrition     Advance Care Planning Advance Directive is on file.  ACP discussion was held with the patient during this visit. Patient has an advance directive in EMR which is still valid.             Objective   Vitals:    08/29/24 1333   BP: 98/60   BP Location: Left arm   Patient Position: Sitting   Cuff Size: Pediatric   Pulse: 65   Temp: 97.8 °F (36.6 °C)   TempSrc: Infrared   SpO2: 98%   Weight: 42.3 kg (93 lb 3.2 oz)   Height: 152.4 cm (60\")       Estimated body mass index is 18.2 kg/m² as calculated from the following:    Height as of this encounter: 152.4 cm (60\").    Weight as of this encounter: 42.3 kg (93 lb 3.2 oz).            Does the patient have evidence of cognitive impairment? No  Lab Results   Component Value Date    TRIG 89 08/22/2024    HDL 60 08/22/2024    LDL 91 08/22/2024    VLDL 16 08/22/2024                                                                                                Health  Risk Assessment    Smoking Status:  Social History     Tobacco Use   Smoking Status Never    Passive exposure: Never "   Smokeless Tobacco Never     Alcohol Consumption:  Social History     Substance and Sexual Activity   Alcohol Use Not Currently       Fall Risk Screen  MARALADI Fall Risk Assessment was completed, and patient is at LOW risk for falls.Assessment completed on:2024    Depression Screenin/29/2024     1:41 PM   PHQ-2/PHQ-9 Depression Screening   Little Interest or Pleasure in Doing Things 3-->nearly every day   Feeling Down, Depressed or Hopeless 3-->nearly every day   Trouble Falling or Staying Asleep, or Sleeping Too Much 3-->nearly every day   Feeling Tired or Having Little Energy 3-->nearly every day   Poor Appetite or Overeating 3-->nearly every day   Feeling Bad about Yourself - or that You are a Failure or Have Let Yourself or Your Family Down 0-->not at all   Trouble Concentrating on Things, Such as Reading the Newspaper or Watching Television 0-->not at all   Moving or Speaking So Slowly that Other People Could Have Noticed? Or the Opposite - Being So Fidgety 0-->not at all   Thoughts that You Would be Better Off Dead or of Hurting Yourself in Some Way 3-->nearly every day   PHQ-9: Brief Depression Severity Measure Score 18   If You Checked Off Any Problems, How Difficult Have These Problems Made It For You to Do Your Work, Take Care of Things at Home, or Get Along with Other People? somewhat difficult        Significant value     Health Habits and Functional and Cognitive Screenin/29/2024     1:33 PM   Functional & Cognitive Status   Do you have difficulty preparing food and eating? No   Do you have difficulty bathing yourself, getting dressed or grooming yourself? No   Do you have difficulty using the toilet? No   Do you have difficulty moving around from place to place? No   Do you have trouble with steps or getting out of a bed or a chair? No   Current Diet Unhealthy Diet   Dental Exam Not up to date   Eye Exam Not up to date   Exercise (times per week) 0 times per week   Current  Exercises Include No Regular Exercise   Do you need help using the phone?  No   Are you deaf or do you have serious difficulty hearing?  No   Do you need help to go to places out of walking distance? No   Do you need help shopping? No   Do you need help preparing meals?  No   Do you need help with housework?  No   Do you need help with laundry? No   Do you need help taking your medications? No   Do you need help managing money? No   Do you ever drive or ride in a car without wearing a seat belt? No   Have you felt unusual stress, anger or loneliness in the last month? No   Who do you live with? Spouse   If you need help, do you have trouble finding someone available to you? No   Have you been bothered in the last four weeks by sexual problems? No   Do you have difficulty concentrating, remembering or making decisions? No           Age-appropriate Screening Schedule:  Refer to the list below for future screening recommendations based on patient's age, sex and/or medical conditions. Orders for these recommended tests are listed in the plan section. The patient has been provided with a written plan.    Health Maintenance List  Health Maintenance   Topic Date Due    PAP SMEAR  Never done    ZOSTER VACCINE (2 of 3) 11/29/2016    Pneumococcal Vaccine 65+ (2 of 2 - PCV) 06/17/2022    DXA SCAN  07/12/2023    COVID-19 Vaccine (5 - 2023-24 season) 05/18/2024    INFLUENZA VACCINE  08/01/2024    TDAP/TD VACCINES (3 - Td or Tdap) 12/02/2024    LIPID PANEL  08/22/2025    ANNUAL WELLNESS VISIT  08/29/2025    MAMMOGRAM  10/27/2025    COLORECTAL CANCER SCREENING  10/13/2028    HEPATITIS C SCREENING  Completed                                                                                                                                                CMS Preventative Services Quick Reference  Risk Factors Identified During Encounter  Immunizations Discussed/Encouraged: Influenza and COVID19 at pharmacy.  Tdap today.    The above  "risks/problems have been discussed with the patient.  Pertinent information has been shared with the patient in the After Visit Summary.  An After Visit Summary and PPPS were made available to the patient.    Follow Up:   Next Medicare Wellness visit to be scheduled in 1 year.         Additional E&M Note during same encounter follows:  Patient has additional, significant, and separately identifiable condition(s)/problem(s) that require work above and beyond the Medicare Wellness Visit     Chief Complaint  Medicare Wellness-subsequent    Subjective   HPI  Martina is also being seen today for additional medical problem/s.                Objective   Vital Signs:  BP 98/60 (BP Location: Left arm, Patient Position: Sitting, Cuff Size: Pediatric)   Pulse 65   Temp 97.8 °F (36.6 °C) (Infrared)   Ht 152.4 cm (60\")   Wt 42.3 kg (93 lb 3.2 oz)   SpO2 98%   BMI 18.20 kg/m²   Physical Exam            Assessment and Plan               Medicare annual wellness visit, subsequent    Routine health maintenance    Essential hypertension    Migraine without status migrainosus, not intractable, unspecified migraine type            Gastroesophageal reflux disease without esophagitis    Depression with anxiety    Primary insomnia    Chronic abdominal pain    Non-seasonal allergic rhinitis, unspecified trigger    Age-related osteoporosis without current pathological fracture    Hyponatremia    Encounter for screening mammogram for malignant neoplasm of breast    Encounter for administration of vaccine    Psoriasis    Hypovitaminosis D      Orders Placed This Encounter   Procedures    Mammo screening digital tomosynthesis bilateral w CAD     Standing Status:   Future     Standing Expiration Date:   8/29/2025     Order Specific Question:   Reason for Exam:     Answer:   screening     Order Specific Question:   Release to patient     Answer:   Routine Release [[id number]]    DEXA Bone Density Axial     Standing Status:   Future     " Standing Expiration Date:   8/29/2025     Order Specific Question:   Reason for Exam:     Answer:   screening     Order Specific Question:   Release to patient     Answer:   Routine Release [[id number]]     Order Specific Question:   Reason for Exam:     Answer:   osteoporosis     Order Specific Question:   Is patient taking or have taken long term Glucocorticoid (steroids)?     Answer:   No     Order Specific Question:   Does the patient have rheumatoid arthritis?     Answer:   No     Order Specific Question:   Does the patient have secondary osteoporosis?     Answer:   No    Tdap Vaccine => 8yo IM (BOOSTRIX/ADACEL)       1. Routine health maint.  Mammogram UTD.  Colonoscopy UTD.  Hysterectomy done for non-cancerous reasons.  Covid-19 vaccines done.  Pneumovax UTD.  Shingrix at pharmacy.       2. HTN.  Controlled.  Continue amlodipine 2.5 mg daily and metoprolol 25 mg daily.  Labs reviewed.     3. Migraines.  Doing okay with these.  Continue sumatriptan for abortive treatment.       4. GERD.  Continue pantoprazole, famotidine, and lifestyle measures.  She sees GI.     5. Depression with anxiety.  Now seeing psychiatry and is doing quite well with escitalopram and bupropion XL.  On prn alprazolam.  Med management agreement and Joby UTD.  Continue counseling.     6. Insomnia.  Zolpidem is effective.  Med management agreement and Joby UTD.     7. Chronic abdominal pain.  S/p subtotal colectomy and small bowel resection.  She is considering another pain management procedure.  She takes rare tramadol and requests a refill.     8. LORRIE.  Controlled with fluticasone nasal spray and loratadine.     9. Osteoporosis.  Didn't tolerate bisphosphonates previously (Dr. Ghotra).  Continue calcium, vitamin D and weight-bearing exercise.  Discuss ordering Prolia.  We need to get a handle on her abdominal pain before adding more medication into the mix.  Recheck Dexa scan this fall with mammogram.     10. Chronic hyponatremia.   Seeing nephrology, Dr. Stephen Jalloh.  Stable.    New Medications Ordered This Visit   Medications    traMADol (ULTRAM) 50 MG tablet     Sig: Take 1 tablet by mouth As Needed for Moderate Pain.     Dispense:  30 tablet     Refill:  0    SUMAtriptan (IMITREX) 100 MG tablet     Sig: Take 1 tablet by mouth 1 (One) Time As Needed for Migraine. Take one tablet at onset of headache. May repeat dose one time in 2 hours.     Dispense:  27 tablet     Refill:  3    betamethasone valerate (VALISONE) 0.1 % cream     Sig: Apply 1 Application topically to the appropriate area as directed 2 (Two) Times a Day.     Dispense:  45 g     Refill:  1          Follow Up   No follow-ups on file.  Patient was given instructions and counseling regarding her condition or for health maintenance advice. Please see specific information pulled into the AVS if appropriate.

## 2024-09-04 ENCOUNTER — OFFICE VISIT (OUTPATIENT)
Dept: GASTROENTEROLOGY | Facility: CLINIC | Age: 69
End: 2024-09-04
Payer: MEDICARE

## 2024-09-04 VITALS
WEIGHT: 93.6 LBS | SYSTOLIC BLOOD PRESSURE: 110 MMHG | BODY MASS INDEX: 18.38 KG/M2 | DIASTOLIC BLOOD PRESSURE: 80 MMHG | HEIGHT: 60 IN

## 2024-09-04 DIAGNOSIS — G89.29 CHRONIC ABDOMINAL PAIN: ICD-10-CM

## 2024-09-04 DIAGNOSIS — R10.9 CHRONIC ABDOMINAL PAIN: ICD-10-CM

## 2024-09-04 DIAGNOSIS — K58.1 IRRITABLE BOWEL SYNDROME WITH CONSTIPATION: ICD-10-CM

## 2024-09-04 DIAGNOSIS — K21.9 GASTROESOPHAGEAL REFLUX DISEASE WITHOUT ESOPHAGITIS: Primary | ICD-10-CM

## 2024-09-04 DIAGNOSIS — K22.70 BARRETT'S ESOPHAGUS WITHOUT DYSPLASIA: ICD-10-CM

## 2024-09-04 DIAGNOSIS — K66.0 ADHESION OF ABDOMINAL WALL: ICD-10-CM

## 2024-09-04 DIAGNOSIS — B37.81 CANDIDAL ESOPHAGITIS: ICD-10-CM

## 2024-09-04 RX ORDER — TRAMADOL HYDROCHLORIDE 50 MG/1
50 TABLET ORAL AS NEEDED
Qty: 30 TABLET | Refills: 0 | Status: SHIPPED | OUTPATIENT
Start: 2024-09-04 | End: 2024-09-04 | Stop reason: SDUPTHER

## 2024-09-04 RX ORDER — TRAMADOL HYDROCHLORIDE 50 MG/1
50 TABLET ORAL EVERY 8 HOURS PRN
Qty: 30 TABLET | Refills: 1 | Status: SHIPPED | OUTPATIENT
Start: 2024-09-04

## 2024-09-04 RX ORDER — FLUCONAZOLE 100 MG/1
TABLET ORAL
Qty: 22 TABLET | Refills: 0 | Status: SHIPPED | OUTPATIENT
Start: 2024-09-04

## 2024-09-04 RX ORDER — PLECANATIDE 3 MG/1
1 TABLET ORAL DAILY
Qty: 30 TABLET | Refills: 11 | Status: SHIPPED | OUTPATIENT
Start: 2024-09-04

## 2024-09-04 RX ORDER — PLECANATIDE 3 MG/1
1 TABLET ORAL DAILY
Qty: 30 TABLET | Refills: 11 | COMMUNITY
Start: 2024-09-04 | End: 2024-09-04 | Stop reason: SDUPTHER

## 2024-09-04 NOTE — PROGRESS NOTES
PATIENT INFORMATION  Martina Hardwick       - 1955    CHIEF COMPLAINT  Chief Complaint   Patient presents with    Heartburn    Abdominal Pain    Pelvic adhesions       HISTORY OF PRESENT ILLNESS    Here today for IBS-C and GERD follow-up    IBS-C: Was not needing miralax at LOV, but reported more discomfort when bowels not moving well. Reviewed trialing librax for adhesion related pain.     TODAY: No relief with librax, taking tramadol sparingly because afraid of abusing, minimal relief with tramadol, but only taking when at worst, so encouraged. Bowels are struggling and constipated even with miralax every day, shauna and water. Linzess did not help in past, but prior to colostomy.    GERD: Reviewed resuming 2nd dose of PPI, taking sucralfate more regularly. TODAY: Tightness in throat, afraid food will stick, omeprazole twice a day. Stopped sucralfate because does not want to stay down. Lack of appetite. Apt to treat for recurrent yeast.     Reviewed to review psych about adjusting meds as feeling poorly controlled and would like something to help stimulate appetite.    Weight steady on    2023 EGD for weight loss positive for normal duodenum, chemical gastropathy, reflux with Barretts and candida esophagitis. Negative for celiac, HP, dysplasia.        REVIEWED PERTINENT RESULTS/ LABS  Lab Results   Component Value Date    CASEREPORT  2023     Surgical Pathology Report                         Case: [id number]                                  Authorizing Provider:  Ranjeet Salinas,   Collected:           2023 09:12 PM                                 MD                                                                           Ordering Location:     [facility]  Received:            2023 07:05 AM                                 MAIN OR                                                                      Pathologist:           Margie Kenny MD                                                           Specimen:    Small Intestine, small bowel resection for permanent                                       FINALDX  11/27/2023     1. Small Bowel, Resection:   A. Grossly identified anastomosis with ulceration and necrosis.   B. Serositis and serosal adhesions, which extend to the surgical margins of resection.   B. Two benign lymph nodes.       Lab Results   Component Value Date    HGB 13.4 08/22/2024    MCV 94.8 08/22/2024     08/22/2024    ALT 18 08/22/2024    AST 26 08/22/2024    HGBA1C 5.00 04/18/2024    INR 1.10 12/06/2023    TRIG 89 08/22/2024    FERRITIN 30 08/24/2023      No results found.    REVIEW OF SYSTEMS  Review of Systems   Constitutional:  Negative for activity change, chills, fever and unexpected weight change.   HENT:  Positive for trouble swallowing. Negative for congestion.    Eyes:  Negative for visual disturbance.   Respiratory:  Negative for shortness of breath.    Cardiovascular:  Negative for chest pain and palpitations.   Gastrointestinal:  Positive for abdominal distention, abdominal pain, constipation and diarrhea. Negative for blood in stool.        Reflux   Endocrine: Negative for cold intolerance and heat intolerance.   Genitourinary:  Negative for hematuria.   Musculoskeletal:  Negative for gait problem.   Skin:  Negative for color change.   Allergic/Immunologic: Negative for immunocompromised state.   Neurological:  Negative for weakness and light-headedness.   Hematological:  Negative for adenopathy.   Psychiatric/Behavioral:  Negative for sleep disturbance. The patient is not nervous/anxious.          ACTIVE PROBLEMS  Patient Active Problem List    Diagnosis     Severe malnutrition [E43]     Colonic inertia [K59.9]     Goodwin's esophagus without dysplasia [K22.70]     Abnormal celiac antibody panel [R89.4]     Gastrointestinal hypomotility [K31.89]     Generalized abdominal pain [R10.84]     Chronic abdominal pain [R10.9, G89.29]      Hyponatremia [E87.1]     Adhesion of abdominal wall [K66.0]     Age-related osteoporosis without current pathological fracture [M81.0]     Psoriasis [L40.9]     Family history of colonic polyps [Z83.719]     Routine health maintenance [Z00.00]     Essential hypertension [I10]     GERD (gastroesophageal reflux disease) [K21.9]     Allergic rhinitis [J30.9]     Primary insomnia [F51.01]     Migraines [G43.909]     Depression with anxiety [F41.8]          PAST MEDICAL HISTORY  Past Medical History:   Diagnosis Date    Allergic 1994    Arthritis     Autoantibody titer positive     Goodwin esophagus     Bloating     Cataract     BILAT    Cholelithiasis removed 2018    Chronic abdominal pain     Chronic constipation     Encounter for preventive health examination     Endometriosis     Gastritis     Gastrointestinal hypomotility     GERD (gastroesophageal reflux disease)     Headache, tension-type     Hypertension     Hyponatremia     Insomnia     Intestinal autonomic neuropathy     Irritable bowel syndrome     Migraine     Mixed anxiety and depressive disorder     Moderate malnutrition     Noninfectious gastroenteritis     OP (osteoporosis)     Osteopenia     Osteoporosis     PONV (postoperative nausea and vomiting) 08/17/2018    Psoriasis     KNEES, ELBOWS BILAT AND SCALP    Seasonal allergies     Visceral hyperalgesia          SURGICAL HISTORY  Past Surgical History:   Procedure Laterality Date    ABDOMINAL SURGERY  1994 2018 2023    APPENDECTOMY      CHOLECYSTECTOMY N/A 08/17/2018    Procedure: CHOLECYSTECTOMY LAPAROSCOPIC converted to open procedure;  Surgeon: Hernan Hinds MD;  Location: [facility] OR;  Service: General    COLON RESECTION N/A 11/20/2023    Procedure: OPEN SUBTOTAL COLECTOMY AND ADESIOLYSIS;  Surgeon: Ranjeet Salinas MD;  Location: [facility] OR;  Service: General;  Laterality: N/A;    COLON SURGERY      STATES TOTAL OF 3 COLON SURGERY    COLONOSCOPY      COLONOSCOPY N/A 12/12/2018     Procedure: COLONOSCOPY;  Surgeon: Darien Ruff MD;  Location: [facility] OR;  Service: Gastroenterology    COLONOSCOPY N/A 10/13/2023    Procedure: COLONOSCOPY TO CECUM;  Surgeon: Ranjeet Salinas MD;  Location: [facility] ENDOSCOPY;  Service: General;  Laterality: N/A;  PREOP/ CHRONIC CONSTIPATION- POSTOP/ NORMAL    DIAGNOSTIC LAPAROSCOPY  1990    DILATATION AND CURETTAGE  1985    ENDOSCOPY N/A 05/13/2016    Procedure: ESOPHAGOGASTRODUODENOSCOPY ;  Surgeon: Darien Ruff MD;  Location: [facility] OR;  Service:     ENDOSCOPY N/A 05/01/2023    Procedure: ESOPHAGOGASTRODUODENOSCOPY WITH BIOPSY;  Surgeon: Darien Ruff MD;  Location: [facility] OR;  Service: Gastroenterology;  Laterality: N/A;  Mild Thrush; Gastritis; Esophagitis- thrush and reflux; Biopsies- duodenal, gastric, esophagus    EXPLORATORY LAPAROTOMY N/A 11/27/2023    Procedure: exploratory laparotomy, small bowel resection;  Surgeon: Ranjeet Salinas MD;  Location: [facility] MAIN OR;  Service: General;  Laterality: N/A;    HYSTERECTOMY      REVISION / TAKEDOWN COLOSTOMY      SMALL INTESTINE SURGERY      UPPER GASTROINTESTINAL ENDOSCOPY  2023         FAMILY HISTORY  Family History   Problem Relation Age of Onset    Hyperlipidemia Mother     Macular degeneration Mother     Hearing loss Mother     Arthritis Mother     Vision loss Mother     Hypertension Mother     Heart disease Father         Had 5 bypass surgery    Hyperlipidemia Father     Cancer Father         Prostate and bladder    Depression Father     Stroke Father     Hypertension Father     Depression Sister     COPD Sister     Hyperlipidemia Sister     Arthritis Sister     Hypertension Sister     Irritable bowel syndrome Sister     Hyperlipidemia Brother     Depression Brother     Kidney disease Brother     Arthritis Brother     Hypertension Brother     Colon polyps Brother     Breast cancer Maternal Aunt     Breast cancer Cousin     Breast cancer Cousin      Colon cancer Neg Hx     Malig Hyperthermia Neg Hx          SOCIAL HISTORY  Social History     Occupational History    Not on file   Tobacco Use    Smoking status: Never     Passive exposure: Never    Smokeless tobacco: Never   Vaping Use    Vaping status: Never Used   Substance and Sexual Activity    Alcohol use: Not Currently    Drug use: No    Sexual activity: Not Currently     Partners: Male     Birth control/protection: Post-menopausal, Hysterectomy         CURRENT MEDICATIONS    Current Outpatient Medications:     ALPRAZolam (XANAX) 0.5 MG tablet, Take 1 tablet by mouth 3 (Three) Times a Day As Needed for Anxiety., Disp: 90 tablet, Rfl: 2    amLODIPine (NORVASC) 2.5 MG tablet, TAKE 1 TABLET BY MOUTH DAILY, Disp: 90 tablet, Rfl: 0    betamethasone valerate (VALISONE) 0.1 % cream, Apply 1 Application topically to the appropriate area as directed 2 (Two) Times a Day., Disp: 45 g, Rfl: 1    buPROPion XL (WELLBUTRIN XL) 300 MG 24 hr tablet, take 1 tablet by mouth daily in the morning, Disp: , Rfl:     clobetasol (TEMOVATE) 0.05 % cream, Apply 1 Application topically to the appropriate area as directed As Needed (VAGINAL)., Disp: , Rfl:     escitalopram (LEXAPRO) 20 MG tablet, Take 1 tablet by mouth Daily., Disp: , Rfl:     famotidine (PEPCID) 40 MG tablet, Take 1 tablet by mouth 2 (Two) Times a Day. With lunch and at bedtime, Disp: 180 tablet, Rfl: 3    Loratadine (CLARITIN PO), Take 10 mg by mouth Daily., Disp: , Rfl:     metoprolol succinate XL (TOPROL-XL) 25 MG 24 hr tablet, TAKE 1 TABLET BY MOUTH DAILY, Disp: 90 tablet, Rfl: 1    Multiple Vitamins-Minerals (MULTI-VITAMIN GUMMIES PO), Take 2 each by mouth Daily., Disp: , Rfl:     NON FORMULARY, MELOXICAN/TOPIRAMATE/GABAPENTIN/KETAMINE/CLONIDINE/BACLOFEN 1/2 PUMPS TO THE AFFECTED AREA., Disp: , Rfl:     omeprazole (priLOSEC) 40 MG capsule, Take 1 capsule by mouth 2 (Two) Times a Day Before Meals. (Patient taking differently: Take 1 capsule by mouth 2 (Two) Times a  "Day.), Disp: 190 capsule, Rfl: 3    Plecanatide (Trulance) 3 MG tablet, Take 1 tablet by mouth Daily., Disp: 30 tablet, Rfl: 11    polyethylene glycol (MIRALAX) 17 GM/SCOOP powder, Take 17 g by mouth As Needed., Disp: , Rfl:     prochlorperazine (COMPAZINE) 5 MG tablet, Take 1 tablet by mouth Every 6 (Six) Hours As Needed for Nausea or Vomiting., Disp: 30 tablet, Rfl: 0    promethazine (PHENERGAN) 25 MG suppository, Insert 1 suppository into the rectum Every 6 (Six) Hours As Needed for Nausea or Vomiting., Disp: 30 suppository, Rfl: 1    SUMAtriptan (IMITREX) 100 MG tablet, Take 1 tablet by mouth 1 (One) Time As Needed for Migraine. Take one tablet at onset of headache. May repeat dose one time in 2 hours., Disp: 27 tablet, Rfl: 3    vitamin B-12 (CYANOCOBALAMIN) 2500 MCG sublingual tablet tablet, Place 5,000 mcg under the tongue 1 (One) Time Per Week. SATURDAY, Disp: , Rfl:     Wheat Dextrin (BENEFIBER DRINK MIX PO), Take  by mouth Daily., Disp: , Rfl:     zolpidem (AMBIEN) 10 MG tablet, Take 1 tablet by mouth Every Night., Disp: 90 tablet, Rfl: 1    fluconazole (Diflucan) 100 MG tablet, Take 2 tablets on day 1 by mouth and then take 1 daily for the next 20 days, Disp: 22 tablet, Rfl: 0    traMADol (ULTRAM) 50 MG tablet, Take 1 tablet by mouth Every 8 (Eight) Hours As Needed for Moderate Pain., Disp: 30 tablet, Rfl: 1    ALLERGIES  Hydrocodone and Sulfa antibiotics    VITALS  Vitals:    09/04/24 1307   BP: 110/80   BP Location: Left arm   Patient Position: Sitting   Cuff Size: Pediatric   Weight: 42.5 kg (93 lb 9.6 oz)   Height: 152.4 cm (60\")       PHYSICAL EXAM  Debilities/Disabilities Identified: None  Emotional Behavior: Appropriate  Wt Readings from Last 3 Encounters:   09/04/24 42.5 kg (93 lb 9.6 oz)   08/29/24 42.3 kg (93 lb 3.2 oz)   06/04/24 43.3 kg (95 lb 6.4 oz)     Ht Readings from Last 1 Encounters:   09/04/24 152.4 cm (60\")     Body mass index is 18.28 kg/m².  Physical Exam  Constitutional:       " General: She is not in acute distress.     Appearance: Normal appearance. She is not ill-appearing.   HENT:      Head: Normocephalic and atraumatic.      Mouth/Throat:      Mouth: Mucous membranes are moist.      Pharynx: No posterior oropharyngeal erythema.   Eyes:      General: No scleral icterus.  Cardiovascular:      Rate and Rhythm: Normal rate and regular rhythm.      Heart sounds: Normal heart sounds.   Pulmonary:      Effort: Pulmonary effort is normal.      Breath sounds: Normal breath sounds.   Abdominal:      General: Abdomen is flat. Bowel sounds are normal. There is no distension.      Palpations: Abdomen is soft. There is no mass.      Tenderness: There is no abdominal tenderness. There is no guarding or rebound. Negative signs include Collins's sign.      Hernia: No hernia is present.   Musculoskeletal:      Cervical back: Neck supple.   Skin:     General: Skin is warm.      Capillary Refill: Capillary refill takes less than 2 seconds.   Neurological:      General: No focal deficit present.      Mental Status: She is alert and oriented to person, place, and time.   Psychiatric:         Mood and Affect: Mood normal.         Behavior: Behavior normal.         Thought Content: Thought content normal.         Judgment: Judgment normal.         CLINICAL DATA REVIEWED   reviewed previous lab results and integrated with today's visit, reviewed notes from other physicians and/or last GI encounter, reviewed previous endoscopy results and available photos, reviewed surgical pathology results from previous biopsies    ASSESSMENT  Diagnoses and all orders for this visit:    Gastroesophageal reflux disease without esophagitis    Goodwin's esophagus without dysplasia    Adhesion of abdominal wall    Irritable bowel syndrome with constipation    Candidal esophagitis    Other orders  -     fluconazole (Diflucan) 100 MG tablet; Take 2 tablets on day 1 by mouth and then take 1 daily for the next 20 days  -      Discontinue: Plecanatide (Trulance) 3 MG tablet; Take 1 tablet by mouth Daily.  -     Plecanatide (Trulance) 3 MG tablet; Take 1 tablet by mouth Daily.          PLAN    Will treat for presumed recurrent candida  Continue BID PPI  Abdominal pain was improved when bowels moving better, Will give Trulance samples to try and ok to take with miralax if needed  Review meds with psych and persistent depression/anxiety, also review trialing medication with appetite stimulation    Return in about 4 months (around 1/4/2025).    I have discussed the above plan with the patient.  They verbalize understanding and are in agreement with the plan.  They have been advised to contact the office for any questions, concerns, or changes related to their health.

## 2024-09-04 NOTE — TELEPHONE ENCOUNTER
Caller: Martina Hardwick    Relationship: Self    Best call back number: [phone]     What is the best time to reach you: ANY    Who are you requesting to speak with (clinical staff, provider,  specific staff member): CLINICAL    Do you know the name of the person who called: MARTINA    What was the call regarding: PATIENT IS CALLING TO CHECK ON THIS REQUEST. PATIENT STATES THAT PHARMACY IS NEEDING INFORMATION ON THIS REQUEST.    Is it okay if the provider responds through MyChart:

## 2024-09-04 NOTE — TELEPHONE ENCOUNTER
Caller: Martina Hardwick    Relationship: Self    Best call back number:     Requested Prescriptions:   Requested Prescriptions     Pending Prescriptions Disp Refills    traMADol (ULTRAM) 50 MG tablet 30 tablet 0     Sig: Take 1 tablet by mouth As Needed for Moderate Pain.        Pharmacy where request should be sent: [id number] - [place] [address] AT [facility] [address] - [phone] PH - [phone] FX     Last office visit with prescribing clinician: 8/29/2024   Last telemedicine visit with prescribing clinician: Visit date not found   Next office visit with prescribing clinician: 1/6/2025     Additional details provided by patient: PATIENT HAS BEEN WITHOUT THIS MEDICATION FOR A WEEK.     Does the patient have less than a 3 day supply:  [x] Yes  [] No      Lexie May Rep   09/04/24 11:11 EDT

## 2024-09-05 ENCOUNTER — TELEPHONE (OUTPATIENT)
Dept: GASTROENTEROLOGY | Facility: CLINIC | Age: 69
End: 2024-09-05
Payer: MEDICARE

## 2024-09-05 RX ORDER — TRAMADOL HYDROCHLORIDE 50 MG/1
50 TABLET ORAL EVERY 8 HOURS PRN
Qty: 30 TABLET | OUTPATIENT
Start: 2024-09-05

## 2024-09-09 ENCOUNTER — LAB (OUTPATIENT)
Dept: LAB | Facility: HOSPITAL | Age: 69
End: 2024-09-09
Payer: MEDICARE

## 2024-09-09 ENCOUNTER — TRANSCRIBE ORDERS (OUTPATIENT)
Dept: ADMINISTRATIVE | Facility: HOSPITAL | Age: 69
End: 2024-09-09
Payer: MEDICARE

## 2024-09-09 DIAGNOSIS — E87.1 HYPOSMOLALITY SYNDROME: Primary | ICD-10-CM

## 2024-09-09 DIAGNOSIS — E87.1 HYPOSMOLALITY SYNDROME: ICD-10-CM

## 2024-09-09 LAB
ALBUMIN SERPL-MCNC: 4.6 G/DL (ref 3.5–5.2)
ANION GAP SERPL CALCULATED.3IONS-SCNC: 13.4 MMOL/L (ref 5–15)
BUN SERPL-MCNC: 20 MG/DL (ref 8–23)
BUN/CREAT SERPL: 18.5 (ref 7–25)
CALCIUM SPEC-SCNC: 10.1 MG/DL (ref 8.6–10.5)
CHLORIDE SERPL-SCNC: 91 MMOL/L (ref 98–107)
CO2 SERPL-SCNC: 24.6 MMOL/L (ref 22–29)
CREAT SERPL-MCNC: 1.08 MG/DL (ref 0.57–1)
EGFRCR SERPLBLD CKD-EPI 2021: 56.1 ML/MIN/1.73
GLUCOSE SERPL-MCNC: 83 MG/DL (ref 65–99)
PHOSPHATE SERPL-MCNC: 3.9 MG/DL (ref 2.5–4.5)
POTASSIUM SERPL-SCNC: 4.6 MMOL/L (ref 3.5–5.2)
SODIUM SERPL-SCNC: 129 MMOL/L (ref 136–145)

## 2024-09-09 PROCEDURE — 36415 COLL VENOUS BLD VENIPUNCTURE: CPT

## 2024-09-09 PROCEDURE — 80069 RENAL FUNCTION PANEL: CPT

## 2024-09-10 ENCOUNTER — LAB (OUTPATIENT)
Dept: LAB | Facility: HOSPITAL | Age: 69
End: 2024-09-10
Payer: MEDICARE

## 2024-09-10 LAB
OSMOLALITY UR: 353 MOSM/KG
SODIUM UR-SCNC: <20 MMOL/L

## 2024-09-10 PROCEDURE — 83935 ASSAY OF URINE OSMOLALITY: CPT

## 2024-09-10 PROCEDURE — 84300 ASSAY OF URINE SODIUM: CPT

## 2024-10-02 DIAGNOSIS — F41.0 PANIC DISORDER: ICD-10-CM

## 2024-10-03 RX ORDER — ALPRAZOLAM 0.5 MG
0.5 TABLET ORAL 3 TIMES DAILY PRN
Qty: 90 TABLET | Refills: 0 | Status: SHIPPED | OUTPATIENT
Start: 2024-10-03

## 2024-10-03 NOTE — TELEPHONE ENCOUNTER
Urine Toxicology Performed in Last 12 Months    No Benzodiazepines on Active Med List       Rx Refill Note  Requested Prescriptions     Pending Prescriptions Disp Refills    ALPRAZolam (XANAX) 0.5 MG tablet [Pharmacy Med Name: ALPRAZOLAM 0.5MG TABLETS] 90 tablet      Sig: TAKE 1 TABLET BY MOUTH THREE TIMES DAILY AS NEEDED FOR ANXIETY      Last office visit with prescribing clinician: 8/29/2024   Last telemedicine visit with prescribing clinician: Visit date not found   Next office visit with prescribing clinician: 1/6/2025                         Would you like a call back once the refill request has been completed: [] Yes [] No    If the office needs to give you a call back, can they leave a voicemail: [] Yes [] No    Felicia Shelby MA  10/03/24, 13:14 EDT

## 2024-10-22 ENCOUNTER — PATIENT MESSAGE (OUTPATIENT)
Dept: INTERNAL MEDICINE | Facility: CLINIC | Age: 69
End: 2024-10-22
Payer: MEDICARE

## 2024-10-22 DIAGNOSIS — F51.01 PRIMARY INSOMNIA: ICD-10-CM

## 2024-10-22 RX ORDER — ZOLPIDEM TARTRATE 10 MG/1
10 TABLET ORAL NIGHTLY
Qty: 90 TABLET | Refills: 1 | Status: SHIPPED | OUTPATIENT
Start: 2024-10-22 | End: 2024-10-23 | Stop reason: SDUPTHER

## 2024-10-22 NOTE — TELEPHONE ENCOUNTER
Rx Refill Note  Requested Prescriptions     Pending Prescriptions Disp Refills    zolpidem (AMBIEN) 10 MG tablet 90 tablet 1     Sig: Take 1 tablet by mouth Every Night.      Last office visit with prescribing clinician: 8/29/2024   Last telemedicine visit with prescribing clinician: Visit date not found   Next office visit with prescribing clinician: 1/6/2025                         Would you like a call back once the refill request has been completed: [] Yes [] No    If the office needs to give you a call back, can they leave a voicemail: [] Yes [] No    Cristina Ulloa MA  10/22/24, 15:02 EDT

## 2024-10-23 ENCOUNTER — TELEPHONE (OUTPATIENT)
Dept: INTERNAL MEDICINE | Facility: CLINIC | Age: 69
End: 2024-10-23

## 2024-10-23 RX ORDER — ZOLPIDEM TARTRATE 10 MG/1
10 TABLET ORAL NIGHTLY
Qty: 90 TABLET | Refills: 0 | Status: SHIPPED | OUTPATIENT
Start: 2024-10-23

## 2024-10-28 ENCOUNTER — APPOINTMENT (OUTPATIENT)
Dept: BONE DENSITY | Facility: HOSPITAL | Age: 69
End: 2024-10-28
Payer: MEDICARE

## 2024-10-28 ENCOUNTER — HOSPITAL ENCOUNTER (OUTPATIENT)
Dept: MAMMOGRAPHY | Facility: HOSPITAL | Age: 69
Discharge: HOME | End: 2024-10-28
Payer: MEDICARE

## 2024-10-28 DIAGNOSIS — Z12.31 ENCOUNTER FOR SCREENING MAMMOGRAM FOR MALIGNANT NEOPLASM OF BREAST: ICD-10-CM

## 2024-10-28 DIAGNOSIS — M81.0 AGE-RELATED OSTEOPOROSIS WITHOUT CURRENT PATHOLOGICAL FRACTURE: ICD-10-CM

## 2024-10-28 PROCEDURE — 77063 BREAST TOMOSYNTHESIS BI: CPT | Performed by: RADIOLOGY

## 2024-10-28 PROCEDURE — 77067 SCR MAMMO BI INCL CAD: CPT | Performed by: RADIOLOGY

## 2024-10-28 PROCEDURE — 77067 SCR MAMMO BI INCL CAD: CPT

## 2024-10-28 PROCEDURE — 77080 DXA BONE DENSITY AXIAL: CPT

## 2024-10-28 PROCEDURE — 77063 BREAST TOMOSYNTHESIS BI: CPT

## 2024-11-14 DIAGNOSIS — F41.0 PANIC DISORDER: ICD-10-CM

## 2024-11-15 RX ORDER — ALPRAZOLAM 0.5 MG
0.5 TABLET ORAL 3 TIMES DAILY PRN
Qty: 90 TABLET | Refills: 0 | Status: SHIPPED | OUTPATIENT
Start: 2024-11-15

## 2024-12-02 DIAGNOSIS — G89.29 CHRONIC ABDOMINAL PAIN: ICD-10-CM

## 2024-12-02 DIAGNOSIS — R10.9 CHRONIC ABDOMINAL PAIN: ICD-10-CM

## 2024-12-02 DIAGNOSIS — G43.909 MIGRAINE WITHOUT STATUS MIGRAINOSUS, NOT INTRACTABLE, UNSPECIFIED MIGRAINE TYPE: ICD-10-CM

## 2024-12-03 RX ORDER — TRAMADOL HYDROCHLORIDE 50 MG/1
50 TABLET ORAL EVERY 8 HOURS PRN
Qty: 30 TABLET | OUTPATIENT
Start: 2024-12-03

## 2024-12-03 NOTE — TELEPHONE ENCOUNTER
Patient has  had a urine drug screen in the past 12 months    Appt with prescriber in the past 90 days    No benzodiazepines active on med list     Rx Refill Note  Requested Prescriptions     Pending Prescriptions Disp Refills    SUMAtriptan (IMITREX) 100 MG tablet 27 tablet 3     Sig: Take 1 tablet by mouth 1 (One) Time As Needed for Migraine. Take one tablet at onset of headache. May repeat dose one time in 2 hours.    traMADol (ULTRAM) 50 MG tablet 30 tablet 1     Sig: Take 1 tablet by mouth Every 8 (Eight) Hours As Needed for Moderate Pain.      Last office visit with prescribing clinician: 8/29/2024   Last telemedicine visit with prescribing clinician: Visit date not found   Next office visit with prescribing clinician: 1/6/2025                         Would you like a call back once the refill request has been completed: [] Yes [] No    If the office needs to give you a call back, can they leave a voicemail: [] Yes [] No    Cristina Ulloa MA  12/03/24, 17:10 EST

## 2024-12-04 RX ORDER — TRAMADOL HYDROCHLORIDE 50 MG/1
50 TABLET ORAL EVERY 8 HOURS PRN
Qty: 30 TABLET | Refills: 1 | Status: SHIPPED | OUTPATIENT
Start: 2024-12-04

## 2024-12-04 RX ORDER — SUMATRIPTAN SUCCINATE 100 MG/1
100 TABLET ORAL ONCE AS NEEDED
Qty: 27 TABLET | Refills: 3 | Status: SHIPPED | OUTPATIENT
Start: 2024-12-04

## 2024-12-23 ENCOUNTER — LAB (OUTPATIENT)
Dept: LAB | Facility: HOSPITAL | Age: 69
End: 2024-12-23
Payer: MEDICARE

## 2024-12-23 DIAGNOSIS — F41.0 PANIC DISORDER: ICD-10-CM

## 2024-12-23 PROCEDURE — 82607 VITAMIN B-12: CPT | Performed by: FAMILY MEDICINE

## 2024-12-23 PROCEDURE — 80061 LIPID PANEL: CPT | Performed by: FAMILY MEDICINE

## 2024-12-23 PROCEDURE — 82306 VITAMIN D 25 HYDROXY: CPT | Performed by: FAMILY MEDICINE

## 2024-12-23 PROCEDURE — 80053 COMPREHEN METABOLIC PANEL: CPT | Performed by: FAMILY MEDICINE

## 2024-12-23 PROCEDURE — 83036 HEMOGLOBIN GLYCOSYLATED A1C: CPT | Performed by: FAMILY MEDICINE

## 2024-12-23 PROCEDURE — 85027 COMPLETE CBC AUTOMATED: CPT | Performed by: FAMILY MEDICINE

## 2024-12-23 PROCEDURE — 84443 ASSAY THYROID STIM HORMONE: CPT | Performed by: FAMILY MEDICINE

## 2024-12-23 RX ORDER — ALPRAZOLAM 0.5 MG
0.5 TABLET ORAL 3 TIMES DAILY PRN
Qty: 90 TABLET | Refills: 1 | Status: SHIPPED | OUTPATIENT
Start: 2024-12-23

## 2024-12-23 NOTE — TELEPHONE ENCOUNTER
Pt has appt 1/06/2025    Controlled Substance Med Protocol [id number]/2024 01:36 PM   Protocol Details Urine Toxicology Performed in Last 12 Months    Recent Appt with me in Past 3 Months    No Benzodiazepines on Active Med List      Rx Refill Note  Requested Prescriptions     Pending Prescriptions Disp Refills    ALPRAZolam (XANAX) 0.5 MG tablet [Pharmacy Med Name: ALPRAZOLAM 0.5MG TABLETS] 90 tablet      Sig: TAKE 1 TABLET BY MOUTH THREE TIMES DAILY AS NEEDED FOR ANXIETY      Last office visit with prescribing clinician: Visit date not found   Last telemedicine visit with prescribing clinician: Visit date not found   Next office visit with prescribing clinician: Visit date not found                         Would you like a call back once the refill request has been completed: [] Yes [] No    If the office needs to give you a call back, can they leave a voicemail: [] Yes [] No    Delmy Chavez MA  12/23/24, 15:41 EST

## 2025-01-16 ENCOUNTER — OFFICE VISIT (OUTPATIENT)
Dept: INTERNAL MEDICINE | Facility: CLINIC | Age: 70
End: 2025-01-16
Payer: MEDICARE

## 2025-01-16 VITALS
HEIGHT: 60 IN | WEIGHT: 94 LBS | SYSTOLIC BLOOD PRESSURE: 118 MMHG | DIASTOLIC BLOOD PRESSURE: 68 MMHG | OXYGEN SATURATION: 98 % | TEMPERATURE: 98.6 F | BODY MASS INDEX: 18.46 KG/M2 | HEART RATE: 85 BPM

## 2025-01-16 DIAGNOSIS — E87.1 HYPONATREMIA: ICD-10-CM

## 2025-01-16 DIAGNOSIS — G43.909 MIGRAINE WITHOUT STATUS MIGRAINOSUS, NOT INTRACTABLE, UNSPECIFIED MIGRAINE TYPE: ICD-10-CM

## 2025-01-16 DIAGNOSIS — Z00.00 ROUTINE HEALTH MAINTENANCE: Primary | ICD-10-CM

## 2025-01-16 DIAGNOSIS — F41.8 DEPRESSION WITH ANXIETY: ICD-10-CM

## 2025-01-16 DIAGNOSIS — K21.9 GASTROESOPHAGEAL REFLUX DISEASE WITHOUT ESOPHAGITIS: ICD-10-CM

## 2025-01-16 DIAGNOSIS — F51.01 PRIMARY INSOMNIA: ICD-10-CM

## 2025-01-16 DIAGNOSIS — G89.29 CHRONIC ABDOMINAL PAIN: ICD-10-CM

## 2025-01-16 DIAGNOSIS — R10.9 CHRONIC ABDOMINAL PAIN: ICD-10-CM

## 2025-01-16 DIAGNOSIS — M81.0 AGE-RELATED OSTEOPOROSIS WITHOUT CURRENT PATHOLOGICAL FRACTURE: ICD-10-CM

## 2025-01-16 DIAGNOSIS — I10 ESSENTIAL HYPERTENSION: ICD-10-CM

## 2025-01-16 DIAGNOSIS — J30.89 NON-SEASONAL ALLERGIC RHINITIS, UNSPECIFIED TRIGGER: ICD-10-CM

## 2025-01-16 PROCEDURE — G2211 COMPLEX E/M VISIT ADD ON: HCPCS | Performed by: FAMILY MEDICINE

## 2025-01-16 PROCEDURE — 1125F AMNT PAIN NOTED PAIN PRSNT: CPT | Performed by: FAMILY MEDICINE

## 2025-01-16 PROCEDURE — 3074F SYST BP LT 130 MM HG: CPT | Performed by: FAMILY MEDICINE

## 2025-01-16 PROCEDURE — 1160F RVW MEDS BY RX/DR IN RCRD: CPT | Performed by: FAMILY MEDICINE

## 2025-01-16 PROCEDURE — 3078F DIAST BP <80 MM HG: CPT | Performed by: FAMILY MEDICINE

## 2025-01-16 PROCEDURE — 99214 OFFICE O/P EST MOD 30 MIN: CPT | Performed by: FAMILY MEDICINE

## 2025-01-16 PROCEDURE — 1159F MED LIST DOCD IN RCRD: CPT | Performed by: FAMILY MEDICINE

## 2025-01-16 RX ORDER — GABAPENTIN 300 MG/1
300 CAPSULE ORAL DAILY
Qty: 30 CAPSULE | Refills: 5 | Status: SHIPPED | OUTPATIENT
Start: 2025-01-16

## 2025-01-16 RX ORDER — VENLAFAXINE HYDROCHLORIDE 75 MG/1
CAPSULE, EXTENDED RELEASE ORAL
COMMUNITY
Start: 2024-12-24

## 2025-01-16 NOTE — PROGRESS NOTES
"      Subjective   Subjective     Martina Hardwick is a 69 y.o. female, who presents with a chief complaint of   Chief Complaint   Patient presents with    Depression     F/u dep and axiety    Hypertension     States it has been high at home     Hypertension  Associated symptoms include anxiety and headaches.   Heartburn  She complains of abdominal pain.   Anxiety  Symptoms include insomnia.     1. HTN.  Tolerates medication.  Takes amlodipine 2.5 daily and metoprolol 25 mg daily.  Home blood pressures running 110s-130s/60s-70s.  Occasionally she spikes to 160 systolic, especially if she has a headache or is feeling anxious.  Denies dizziness and chest pain.    2. Depression and anxiety.  She sees Dr. Tuan Durand, psychiatrist.  She says they have had difficulty finding effective medication and she recently had [employer] testing doing.  Seeing a counselor at [facility].  Denies SI.    3. Headaches.  She takes sumatriptan for abortive treatment and this is effective.     4. Chronic abdominal pain, colonic inertia, adhesions.  Dr. Salinas did subtotal colectomy on 11/20/2023 \"that required conversion to an open procedure due to terrible intra-abdominal adhesions.  This was complicated by a leaking enterotomy that necessitated takeback postop day 7.  At that time, she underwent a small bowel resection.  She subsequently required an interventional radiology placed percutaneous drain into retroperitoneum.\"  She was was discharged 1 month later on 12/20/2023 after weaning off TPN and onto a regular diet.  She is taking high calorie Boost BID.  She takes tramadol with gabapentin once daily and this has been the most effective regimen for her pain.    The following portions of the patient's history were reviewed and updated as appropriate: allergies, current medications, past family history, past medical history, past social history, past surgical history and problem list.    Allergies: Hydrocodone and Sulfa " antibiotics    Review of Systems   Constitutional: Negative.    Eyes: Negative.    Respiratory: Negative.    Cardiovascular: Negative.    Gastrointestinal: Positive for abdominal pain and constipation.   Endocrine: Negative.    Genitourinary: Negative.    Musculoskeletal: Positive for arthralgias.   Allergic/Immunologic: Positive for environmental allergies.   Neurological: Positive for headaches.   Hematological: Negative.    Psychiatric/Behavioral: The patient has insomnia.      Objective     Wt Readings from Last 3 Encounters:   01/16/25 42.6 kg (94 lb)   09/04/24 42.5 kg (93 lb 9.6 oz)   08/29/24 42.3 kg (93 lb 3.2 oz)     Temp Readings from Last 3 Encounters:   01/16/25 98.6 °F (37 °C) (Infrared)   08/29/24 97.8 °F (36.6 °C) (Infrared)   04/18/24 98.6 °F (37 °C) (Infrared)     BP Readings from Last 3 Encounters:   01/16/25 118/68   09/04/24 110/80   08/29/24 98/60     Pulse Readings from Last 3 Encounters:   01/16/25 85   08/29/24 65   04/18/24 74     Body mass index is 18.36 kg/m².  SpO2 Readings from Last 3 Encounters:   01/15/18 96%   10/12/17 98%   08/10/17 99%     Physical Exam   Constitutional: She is oriented to person, place, and time. She appears well-developed.   HENT:   Head: Normocephalic and atraumatic.   Mouth/Throat: Mucous membranes are moist.   Eyes: Conjunctivae are normal.   Neck: No thyromegaly present.   Cardiovascular: Normal rate, regular rhythm and normal heart sounds.   Pulmonary/Chest: Effort normal and breath sounds normal.   Abdominal: Soft. Normal appearance and bowel sounds are normal.   Musculoskeletal: Normal range of motion.      Right lower leg: No edema.      Left lower leg: No edema.   Neurological: She is alert and oriented to person, place, and time.   Skin: Skin is warm and dry. No rash noted.   Psychiatric: Her behavior is normal. Mood normal.   Nursing note and vitals reviewed.      Assessment/Plan   Martina was seen today for hypertension, insomnia and  heartburn.    Diagnoses and all orders for this visit:      Essential hypertension    Routine health maintenance    Migraine without status migrainosus, not intractable, unspecified migraine type    Gastroesophageal reflux disease without esophagitis    Depression with anxiety    Primary insomnia    Chronic constipation    Chronic abdominal pain    Adhesions    Family history of colonic polyps    Non-seasonal allergic rhinitis, unspecified trigger    Osteoporosis    Hyponatremia      1. Routine health maint.  Mammogram UTD.  Colonoscopy UTD.  Hysterectomy done for non-cancerous reasons.  Covid-19 vaccines done.  Pneumovax UTD.  Flu vaccine UTD.  Shingrix at pharmacy.       2. HTN.  Continue amlodipine 2.5 mg daily and metoprolol 25 mg daily.  Labs reviewed.  Monitor blood pressure at home occasionally.     3. Migraines.  Doing okay with these.  Continue sumatriptan for abortive treatment.       4. GERD.  Continue omeprazole, famotidine, and lifestyle measures.  She sees GI.     5. Depression with anxiety.  Now seeing psychiatry.  She recently had [employer] testing done; hasn't received results.  On prn alprazolam.  Med management agreement and Joby UTD.  Continue counseling.     6. Insomnia.  Zolpidem is effective.  Med management agreement and Joby UTD.     7. Chronic abdominal pain.  S/p subtotal colectomy and small bowel resection.  She is considering another pain management procedure.  She takes tramadol once daily with one gabapentin, which is a effective combination.    8. LORRIE.  Controlled with fluticasone nasal spray and loratadine.     9. Osteoporosis.  Didn't tolerate bisphosphonates previously (Dr. Ghotra).  Continue calcium, vitamin D and weight-bearing exercise.  Hesitant to start Prolia due to possible side effects.  Refer to osteoporosis specialist, Dr. Quinn.  Her bone density has decreased significantly since being ill with chronic abdominal pain.  She has restarted calcium supplement and agrees  to slowly increase her daily walking.     10. Chronic hyponatremia.  Seeing nephrology, Dr. Stephen Jalloh.  Stable.    Outpatient Medications Prior to Visit   Medication Sig Dispense Refill    ALPRAZolam (XANAX) 0.5 MG tablet TAKE 1 TABLET BY MOUTH THREE TIMES DAILY AS NEEDED FOR ANXIETY 90 tablet 1    amLODIPine (NORVASC) 2.5 MG tablet TAKE 1 TABLET BY MOUTH DAILY 90 tablet 0    betamethasone valerate (VALISONE) 0.1 % cream Apply 1 Application topically to the appropriate area as directed 2 (Two) Times a Day. 45 g 1    famotidine (PEPCID) 40 MG tablet Take 1 tablet by mouth 2 (Two) Times a Day. With lunch and at bedtime 180 tablet 3    Loratadine (CLARITIN PO) Take 10 mg by mouth Daily.      metoprolol succinate XL (TOPROL-XL) 25 MG 24 hr tablet TAKE 1 TABLET BY MOUTH DAILY 90 tablet 1    Multiple Vitamins-Minerals (MULTI-VITAMIN GUMMIES PO) Take 2 each by mouth Daily.      NON FORMULARY MELOXICAN/TOPIRAMATE/GABAPENTIN/KETAMINE/CLONIDINE/BACLOFEN  1/2 PUMPS TO THE AFFECTED AREA.      omeprazole (priLOSEC) 40 MG capsule Take 1 capsule by mouth 2 (Two) Times a Day Before Meals. (Patient taking differently: Take 1 capsule by mouth 2 (Two) Times a Day.) 190 capsule 3    polyethylene glycol (MIRALAX) 17 GM/SCOOP powder Take 17 g by mouth As Needed.      prochlorperazine (COMPAZINE) 5 MG tablet Take 1 tablet by mouth Every 6 (Six) Hours As Needed for Nausea or Vomiting. 30 tablet 0    promethazine (PHENERGAN) 25 MG suppository Insert 1 suppository into the rectum Every 6 (Six) Hours As Needed for Nausea or Vomiting. 30 suppository 1    SUMAtriptan (IMITREX) 100 MG tablet Take 1 tablet by mouth 1 (One) Time As Needed for Migraine. Take one tablet at onset of headache. May repeat dose one time in 2 hours. 27 tablet 3    traMADol (ULTRAM) 50 MG tablet Take 1 tablet by mouth Every 8 (Eight) Hours As Needed for Moderate Pain. 30 tablet 1    venlafaxine XR (EFFEXOR-XR) 75 MG 24 hr capsule TAKE 1 CAPSULE BY MOUTH DAILY FOR 1 WEEK.  INCREASE TO 2 CAPSULES DAILY      vitamin B-12 (CYANOCOBALAMIN) 2500 MCG sublingual tablet tablet Place 5,000 mcg under the tongue 1 (One) Time Per Week. SATURDAY      Wheat Dextrin (BENEFIBER DRINK MIX PO) Take  by mouth Daily.      zolpidem (AMBIEN) 10 MG tablet Take 1 tablet by mouth Every Night. 90 tablet 0    buPROPion XL (WELLBUTRIN XL) 300 MG 24 hr tablet take 1 tablet by mouth daily in the morning      clobetasol (TEMOVATE) 0.05 % cream Apply 1 Application topically to the appropriate area as directed As Needed (VAGINAL).      escitalopram (LEXAPRO) 20 MG tablet Take 1 tablet by mouth Daily.      fluconazole (Diflucan) 100 MG tablet Take 2 tablets on day 1 by mouth and then take 1 daily for the next 20 days 22 tablet 0    Plecanatide (Trulance) 3 MG tablet Take 1 tablet by mouth Daily. 30 tablet 11     No facility-administered medications prior to visit.     New Medications Ordered This Visit   Medications    gabapentin (NEURONTIN) 300 MG capsule     Sig: Take 1 capsule by mouth Daily.     Dispense:  30 capsule     Refill:  5       Medications Discontinued During This Encounter   Medication Reason    Plecanatide (Trulance) 3 MG tablet     fluconazole (Diflucan) 100 MG tablet     escitalopram (LEXAPRO) 20 MG tablet     clobetasol (TEMOVATE) 0.05 % cream     buPROPion XL (WELLBUTRIN XL) 300 MG 24 hr tablet            Return in about 6 months (around 7/16/2025) for Medicare Wellness.          Answers submitted by the patient for this visit:  Primary Reason for Visit (Submitted on 1/13/2025)  What is the primary reason for your visit?: High Blood Pressure  High Blood Pressure Questionnaire (Submitted on 1/13/2025)  Chief Complaint: Hypertension  Chronicity: chronic  Onset: more than 1 year ago  Progression since onset: worse  Condition status: resistant  anxiety: Yes  headaches: Yes  palpitations: Yes  shortness of breath: Yes  Agents associated with hypertension: no associated agents  CAD risks: family history,  post-menopausal state, sedentary lifestyle, stress  Compliance problems: no compliance problems

## 2025-01-20 ENCOUNTER — TELEPHONE (OUTPATIENT)
Dept: INTERNAL MEDICINE | Facility: CLINIC | Age: 70
End: 2025-01-20
Payer: MEDICARE

## 2025-01-20 DIAGNOSIS — F51.01 PRIMARY INSOMNIA: ICD-10-CM

## 2025-01-20 RX ORDER — ZOLPIDEM TARTRATE 10 MG/1
10 TABLET ORAL NIGHTLY
Qty: 90 TABLET | Refills: 1 | Status: SHIPPED | OUTPATIENT
Start: 2025-01-20

## 2025-01-20 NOTE — TELEPHONE ENCOUNTER
Rx Refill Note  Requested Prescriptions     Pending Prescriptions Disp Refills    zolpidem (AMBIEN) 10 MG tablet [Pharmacy Med Name: ZOLPIDEM TARTRATE 10 MG TABLET] 90 tablet      Sig: TAKE ONE TABLET BY MOUTH ONCE NIGHTLY      Last office visit with prescribing clinician: 01/16/2025  Last telemedicine visit with prescribing clinician: Visit date not found   Next office visit with prescribing clinician: Visit date not found                         Would you like a call back once the refill request has been completed: [] Yes [] No    If the office needs to give you a call back, can they leave a voicemail: [] Yes [] No    Bailey Morse MA  01/20/25, 10:22 EST

## 2025-01-20 NOTE — TELEPHONE ENCOUNTER
Caller: Martina Hardwick    Relationship: Self    Best call back number: [phone]     What orders are you requesting (i.e. lab or imaging): LABS    In what timeframe would the patient need to come in: WEEK PRIOR TO 9/3/25    Where will you receive your lab/imaging services: [facility]    Additional notes: PLEASE ADVISE WHEN ORDERS ARE IN.

## 2025-01-21 DIAGNOSIS — R10.84 GENERALIZED ABDOMINAL PAIN: ICD-10-CM

## 2025-01-21 DIAGNOSIS — E55.9 HYPOVITAMINOSIS D: ICD-10-CM

## 2025-01-21 DIAGNOSIS — E43 SEVERE MALNUTRITION: ICD-10-CM

## 2025-01-21 DIAGNOSIS — K21.9 GASTROESOPHAGEAL REFLUX DISEASE WITHOUT ESOPHAGITIS: ICD-10-CM

## 2025-01-21 DIAGNOSIS — I10 ESSENTIAL HYPERTENSION: Primary | ICD-10-CM

## 2025-01-21 DIAGNOSIS — E87.1 HYPONATREMIA: ICD-10-CM

## 2025-01-26 ENCOUNTER — PATIENT MESSAGE (OUTPATIENT)
Dept: INTERNAL MEDICINE | Facility: CLINIC | Age: 70
End: 2025-01-26
Payer: MEDICARE

## 2025-01-26 DIAGNOSIS — F41.0 PANIC DISORDER: ICD-10-CM

## 2025-01-27 ENCOUNTER — OFFICE VISIT (OUTPATIENT)
Dept: GASTROENTEROLOGY | Facility: CLINIC | Age: 70
End: 2025-01-27
Payer: MEDICARE

## 2025-01-27 VITALS
BODY MASS INDEX: 18.38 KG/M2 | SYSTOLIC BLOOD PRESSURE: 108 MMHG | HEIGHT: 60 IN | WEIGHT: 93.6 LBS | DIASTOLIC BLOOD PRESSURE: 58 MMHG

## 2025-01-27 DIAGNOSIS — K21.9 GASTROESOPHAGEAL REFLUX DISEASE WITHOUT ESOPHAGITIS: ICD-10-CM

## 2025-01-27 DIAGNOSIS — K22.70 BARRETT'S ESOPHAGUS WITHOUT DYSPLASIA: Primary | ICD-10-CM

## 2025-01-27 DIAGNOSIS — R10.84 GENERALIZED ABDOMINAL PAIN: ICD-10-CM

## 2025-01-27 PROCEDURE — 99214 OFFICE O/P EST MOD 30 MIN: CPT

## 2025-01-27 PROCEDURE — 1160F RVW MEDS BY RX/DR IN RCRD: CPT

## 2025-01-27 PROCEDURE — 3078F DIAST BP <80 MM HG: CPT

## 2025-01-27 PROCEDURE — 3074F SYST BP LT 130 MM HG: CPT

## 2025-01-27 PROCEDURE — 1159F MED LIST DOCD IN RCRD: CPT

## 2025-01-27 RX ORDER — ALPRAZOLAM 0.5 MG
0.5 TABLET ORAL 3 TIMES DAILY PRN
Qty: 90 TABLET | Refills: 1 | Status: SHIPPED | OUTPATIENT
Start: 2025-01-27

## 2025-01-27 RX ORDER — BUPROPION HYDROCHLORIDE 150 MG/1
150 TABLET ORAL DAILY
COMMUNITY

## 2025-01-27 NOTE — PROGRESS NOTES
PATIENT INFORMATION  Martina Hardwick       - 1955    CHIEF COMPLAINT  Chief Complaint   Patient presents with    Abdominal Pain    Constipation    Nausea    Dyspepsia        HISTORY OF PRESENT ILLNESS    Here today for IBS-C and GERD follow-up     IBS-C: Was not needing miralax at LOV, but reported more discomfort when bowels not moving well. Reviewed trialing librax for adhesion related pain.   Per LOV: No relief with librax, taking tramadol sparingly because afraid of abusing, minimal relief with tramadol, but only taking when at worst, so encouraged. Bowels are struggling and constipated even with miralax every day, shauna and water. Linzess did not help in past, but prior to colostomy.  Gave trulance samples and can take with miralax.    TODAY: Having more abdominal pain than she was. Bowels are moving daily and tiny shauna or water, miralax every day. Not skipping any days with no BM. Felt constipated several days ago.     GERD: Reviewed resuming 2nd dose of PPI, taking sucralfate more regularly. Per LOV: Tightness in throat, afraid food will stick, omeprazole twice a day. Stopped sucralfate because does not want to stay down. Lack of appetite. Apt to treat for recurrent yeast. Retreated for candida.     TODAY: Not feeling well, having an increase in HB, swallowing improved LOV after taking diflucan, pills can still be slow. Omeprazole before boost, 2nd dose    Psoriasis rash on abdomen and elbows     Reviewed to review psych about adjusting meds as feeling poorly controlled and would like something to help stimulate appetite.     Weight stable since August. Due to start prolia and anxious about it, reviewed benefits of treatment and weight risks vs benefits. Encouraged to stick with treatment.    Pt remains very anxious about risks of side effects. Reviewed with patient that despite medicare status she can apply for patient assistance programs with drug [employer].     2023 EGD for weight loss  positive for normal duodenum, chemical gastropathy, reflux with Barretts and candida esophagitis. Negative for celiac, HP, dysplasia.    Having difficulty managing antidepressants and adjusted recently.     Tramadol and gabapentin help with pain and has slowed things down.        REVIEWED PERTINENT RESULTS/ LABS  Lab Results   Component Value Date    CASEREPORT  11/27/2023     Surgical Pathology Report                         Case: [id number]                                  Authorizing Provider:  Ranjeet Salinas,   Collected:           11/27/2023 09:12 PM                                 MD                                                                           Ordering Location:     [facility]  Received:            11/28/2023 07:05 AM                                 MAIN OR                                                                      Pathologist:           Margie Kenny MD                                                          Specimen:    Small Intestine, small bowel resection for permanent                                       FINALDX  11/27/2023     1. Small Bowel, Resection:   A. Grossly identified anastomosis with ulceration and necrosis.   B. Serositis and serosal adhesions, which extend to the surgical margins of resection.   B. Two benign lymph nodes.       Lab Results   Component Value Date    HGB 13.8 12/23/2024    MCV 94.0 12/23/2024     12/23/2024    ALT 16 12/23/2024    AST 26 12/23/2024    HGBA1C 5.10 12/23/2024    INR 1.10 12/06/2023    TRIG 124 12/23/2024    FERRITIN 30 08/24/2023      No results found.    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]     Goodwin's esophagus without dysplasia [K22.70]     Abnormal celiac antibody panel [R89.4]     Gastrointestinal hypomotility [K31.89]     Generalized abdominal pain [R10.84]     Chronic  abdominal pain [R10.9, G89.29]     Hyponatremia [E87.1]     Adhesion of abdominal wall [K66.0]     Age-related osteoporosis without current pathological fracture [M81.0]     Psoriasis [L40.9]     Family history of colonic polyps [Z83.719]     Routine health maintenance [Z00.00]     Essential hypertension [I10]     GERD (gastroesophageal reflux disease) [K21.9]     Allergic rhinitis [J30.9]     Primary insomnia [F51.01]     Migraines [G43.909]     Depression with anxiety [F41.8]          PAST MEDICAL HISTORY  Past Medical History:   Diagnosis Date    Allergic 1994    Arthritis     Autoantibody titer positive     Goodwin esophagus     Bloating     Cataract     BILAT    Cholelithiasis removed 2018    Chronic abdominal pain     Chronic constipation     Encounter for preventive health examination     Endometriosis     Gastritis     Gastrointestinal hypomotility     GERD (gastroesophageal reflux disease)     Headache, tension-type     Hypertension     Hyponatremia     Insomnia     Intestinal autonomic neuropathy     Irritable bowel syndrome     Migraine     Mixed anxiety and depressive disorder     Moderate malnutrition     Noninfectious gastroenteritis     OP (osteoporosis)     Osteopenia     Osteoporosis     PONV (postoperative nausea and vomiting) 08/17/2018    Psoriasis     KNEES, ELBOWS BILAT AND SCALP    Seasonal allergies     Visceral hyperalgesia          SURGICAL HISTORY  Past Surgical History:   Procedure Laterality Date    ABDOMINAL SURGERY  1994 2018 2023    APPENDECTOMY      CHOLECYSTECTOMY N/A 08/17/2018    Procedure: CHOLECYSTECTOMY LAPAROSCOPIC converted to open procedure;  Surgeon: Hernan Hinds MD;  Location: [facility] OR;  Service: General    COLON RESECTION N/A 11/20/2023    Procedure: OPEN SUBTOTAL COLECTOMY AND ADESIOLYSIS;  Surgeon: Ranjeet Salinas MD;  Location: [facility] OR;  Service: General;  Laterality: N/A;    COLON SURGERY      STATES TOTAL OF 3 COLON SURGERY    COLONOSCOPY       COLONOSCOPY N/A 12/12/2018    Procedure: COLONOSCOPY;  Surgeon: Darien Ruff MD;  Location: [facility] OR;  Service: Gastroenterology    COLONOSCOPY N/A 10/13/2023    Procedure: COLONOSCOPY TO CECUM;  Surgeon: Ranjeet Salinas MD;  Location: [facility] ENDOSCOPY;  Service: General;  Laterality: N/A;  PREOP/ CHRONIC CONSTIPATION- POSTOP/ NORMAL    DIAGNOSTIC LAPAROSCOPY  1990    DILATATION AND CURETTAGE  1985    ENDOSCOPY N/A 05/13/2016    Procedure: ESOPHAGOGASTRODUODENOSCOPY ;  Surgeon: Darien Ruff MD;  Location: [facility] OR;  Service:     ENDOSCOPY N/A 05/01/2023    Procedure: ESOPHAGOGASTRODUODENOSCOPY WITH BIOPSY;  Surgeon: Darien Ruff MD;  Location: [facility] OR;  Service: Gastroenterology;  Laterality: N/A;  Mild Thrush; Gastritis; Esophagitis- thrush and reflux; Biopsies- duodenal, gastric, esophagus    EXPLORATORY LAPAROTOMY N/A 11/27/2023    Procedure: exploratory laparotomy, small bowel resection;  Surgeon: Ranjeet Salinas MD;  Location: [facility] MAIN OR;  Service: General;  Laterality: N/A;    HYSTERECTOMY      REVISION / TAKEDOWN COLOSTOMY      SMALL INTESTINE SURGERY      UPPER GASTROINTESTINAL ENDOSCOPY  2023         FAMILY HISTORY  Family History   Problem Relation Age of Onset    Hyperlipidemia Mother     Macular degeneration Mother     Hearing loss Mother     Arthritis Mother     Vision loss Mother     Hypertension Mother     Heart disease Father         Had 5 bypass surgery    Hyperlipidemia Father     Cancer Father         Prostate and bladder    Depression Father     Stroke Father     Hypertension Father     Depression Sister     COPD Sister     Hyperlipidemia Sister     Arthritis Sister     Hypertension Sister     Irritable bowel syndrome Sister     Hyperlipidemia Brother     Depression Brother     Kidney disease Brother     Arthritis Brother     Hypertension Brother     Colon polyps Brother     Breast cancer Maternal Aunt     Breast cancer Cousin      Breast cancer Cousin     Colon cancer Neg Hx     Malig Hyperthermia Neg Hx          SOCIAL HISTORY  Social History     Occupational History    Not on file   Tobacco Use    Smoking status: Never     Passive exposure: Never    Smokeless tobacco: Never   Vaping Use    Vaping status: Never Used   Substance and Sexual Activity    Alcohol use: Not Currently    Drug use: No    Sexual activity: Not Currently     Partners: Male     Birth control/protection: Post-menopausal, Hysterectomy         CURRENT MEDICATIONS    Current Outpatient Medications:     ALPRAZolam (XANAX) 0.5 MG tablet, Take 1 tablet by mouth 3 (Three) Times a Day As Needed for Anxiety. for anxiety, Disp: 90 tablet, Rfl: 1    amLODIPine (NORVASC) 2.5 MG tablet, TAKE 1 TABLET BY MOUTH DAILY, Disp: 90 tablet, Rfl: 0    betamethasone valerate (VALISONE) 0.1 % cream, Apply 1 Application topically to the appropriate area as directed 2 (Two) Times a Day., Disp: 45 g, Rfl: 1    buPROPion XL (WELLBUTRIN XL) 150 MG 24 hr tablet, Take 1 tablet by mouth Daily., Disp: , Rfl:     Calcium-Vitamin D-Vitamin K (VIACTIV PO), Take  by mouth Daily., Disp: , Rfl:     famotidine (PEPCID) 40 MG tablet, Take 1 tablet by mouth 2 (Two) Times a Day. With lunch and at bedtime, Disp: 180 tablet, Rfl: 3    gabapentin (NEURONTIN) 300 MG capsule, Take 1 capsule by mouth Daily., Disp: 30 capsule, Rfl: 5    Loratadine (CLARITIN PO), Take 10 mg by mouth Daily., Disp: , Rfl:     metoprolol succinate XL (TOPROL-XL) 25 MG 24 hr tablet, TAKE 1 TABLET BY MOUTH DAILY, Disp: 90 tablet, Rfl: 1    Multiple Vitamins-Minerals (MULTI-VITAMIN GUMMIES PO), Take 2 each by mouth Daily., Disp: , Rfl:     omeprazole (priLOSEC) 40 MG capsule, Take 1 capsule by mouth 2 (Two) Times a Day Before Meals. (Patient taking differently: Take 1 capsule by mouth 2 (Two) Times a Day.), Disp: 190 capsule, Rfl: 3    polyethylene glycol (MIRALAX) 17 GM/SCOOP powder, Take 17 g by mouth As Needed., Disp: , Rfl:      "prochlorperazine (COMPAZINE) 5 MG tablet, Take 1 tablet by mouth Every 6 (Six) Hours As Needed for Nausea or Vomiting., Disp: 30 tablet, Rfl: 0    promethazine (PHENERGAN) 25 MG suppository, Insert 1 suppository into the rectum Every 6 (Six) Hours As Needed for Nausea or Vomiting., Disp: 30 suppository, Rfl: 1    SUMAtriptan (IMITREX) 100 MG tablet, Take 1 tablet by mouth 1 (One) Time As Needed for Migraine. Take one tablet at onset of headache. May repeat dose one time in 2 hours., Disp: 27 tablet, Rfl: 3    traMADol (ULTRAM) 50 MG tablet, Take 1 tablet by mouth Every 8 (Eight) Hours As Needed for Moderate Pain., Disp: 30 tablet, Rfl: 1    vitamin B-12 (CYANOCOBALAMIN) 2500 MCG sublingual tablet tablet, Place 5,000 mcg under the tongue 1 (One) Time Per Week. SATURDAY, Disp: , Rfl:     Wheat Dextrin (BENEFIBER DRINK MIX PO), Take  by mouth Daily., Disp: , Rfl:     zolpidem (AMBIEN) 10 MG tablet, TAKE ONE TABLET BY MOUTH ONCE NIGHTLY, Disp: 90 tablet, Rfl: 1    linaclotide (LINZESS) 145 MCG capsule capsule, Take 1 capsule by mouth Every Morning Before Breakfast., Disp: 30 capsule, Rfl: 11    NON FORMULARY, MELOXICAN/TOPIRAMATE/GABAPENTIN/KETAMINE/CLONIDINE/BACLOFEN 1/2 PUMPS TO THE AFFECTED AREA., Disp: , Rfl:     ALLERGIES  Hydrocodone and Sulfa antibiotics    VITALS  Vitals:    01/27/25 1419   BP: 108/58   BP Location: Left arm   Patient Position: Sitting   Cuff Size: Adult   Weight: 42.5 kg (93 lb 9.6 oz)   Height: 152.4 cm (60\")       PHYSICAL EXAM  Debilities/Disabilities Identified: None  Emotional Behavior: Appropriate  Wt Readings from Last 3 Encounters:   01/27/25 42.5 kg (93 lb 9.6 oz)   01/16/25 42.6 kg (94 lb)   09/04/24 42.5 kg (93 lb 9.6 oz)     Ht Readings from Last 1 Encounters:   01/27/25 152.4 cm (60\")     Body mass index is 18.28 kg/m².  Physical Exam  Constitutional:       General: She is not in acute distress.     Appearance: Normal appearance. She is not ill-appearing.   HENT:      Head: " Normocephalic and atraumatic.      Mouth/Throat:      Mouth: Mucous membranes are moist.      Pharynx: No posterior oropharyngeal erythema.   Eyes:      General: No scleral icterus.  Cardiovascular:      Rate and Rhythm: Normal rate and regular rhythm.      Heart sounds: Normal heart sounds.   Pulmonary:      Effort: Pulmonary effort is normal.      Breath sounds: Normal breath sounds.   Abdominal:      General: Abdomen is flat. Bowel sounds are normal. There is no distension.      Palpations: Abdomen is soft. There is no mass.      Tenderness: There is generalized abdominal tenderness. There is no guarding or rebound. Negative signs include Collins's sign.      Hernia: No hernia is present.      Comments: Psoriasis rash on abdomen   Musculoskeletal:      Cervical back: Neck supple.   Skin:     General: Skin is warm.      Capillary Refill: Capillary refill takes less than 2 seconds.   Neurological:      General: No focal deficit present.      Mental Status: She is alert and oriented to person, place, and time.   Psychiatric:         Mood and Affect: Mood normal.         Behavior: Behavior normal.         Thought Content: Thought content normal.         Judgment: Judgment normal.         CLINICAL DATA REVIEWED   reviewed previous lab results and integrated with today's visit, reviewed notes from other physicians and/or last GI encounter, reviewed previous endoscopy results and available photos, reviewed surgical pathology results from previous biopsies    ASSESSMENT  Diagnoses and all orders for this visit:    Goodwin's esophagus without dysplasia    Generalized abdominal pain    Gastroesophageal reflux disease without esophagitis    Other orders  -     buPROPion XL (WELLBUTRIN XL) 150 MG 24 hr tablet; Take 1 tablet by mouth Daily.  -     Calcium-Vitamin D-Vitamin K (VIACTIV PO); Take  by mouth Daily.  -     linaclotide (LINZESS) 145 MCG capsule capsule; Take 1 capsule by mouth Every Morning Before  Breakfast.          PLAN    GERD: PPI BID AC, move pepcid to night  PRN AA  Will give linzess samples to try    Return in about 4 months (around 5/27/2025).    I have discussed the above plan with the patient.  They verbalize understanding and are in agreement with the plan.  They have been advised to contact the office for any questions, concerns, or changes related to their health.

## 2025-02-01 ENCOUNTER — HOSPITAL ENCOUNTER (EMERGENCY)
Facility: HOSPITAL | Age: 70
Discharge: HOME | End: 2025-02-01
Attending: STUDENT IN AN ORGANIZED HEALTH CARE EDUCATION/TRAINING PROGRAM
Payer: MEDICARE

## 2025-02-01 VITALS
HEIGHT: 60 IN | DIASTOLIC BLOOD PRESSURE: 71 MMHG | WEIGHT: 93.7 LBS | TEMPERATURE: 98.1 F | RESPIRATION RATE: 14 BRPM | SYSTOLIC BLOOD PRESSURE: 142 MMHG | OXYGEN SATURATION: 97 % | BODY MASS INDEX: 18.4 KG/M2 | HEART RATE: 67 BPM

## 2025-02-01 DIAGNOSIS — I10 HYPERTENSION, UNSPECIFIED TYPE: ICD-10-CM

## 2025-02-01 DIAGNOSIS — G89.29 OTHER CHRONIC PAIN: Primary | ICD-10-CM

## 2025-02-01 LAB
ALBUMIN SERPL-MCNC: 4.4 G/DL (ref 3.5–5.2)
ALBUMIN/GLOB SERPL: 1.5 G/DL
ALP SERPL-CCNC: 124 U/L (ref 39–117)
ALT SERPL W P-5'-P-CCNC: 20 U/L (ref 1–33)
ANION GAP SERPL CALCULATED.3IONS-SCNC: 11.6 MMOL/L (ref 5–15)
AST SERPL-CCNC: 28 U/L (ref 1–32)
BASOPHILS # BLD AUTO: 0 10*3/MM3 (ref 0–0.2)
BASOPHILS NFR BLD AUTO: 0 % (ref 0–1.5)
BILIRUB SERPL-MCNC: 0.6 MG/DL (ref 0–1.2)
BUN SERPL-MCNC: 12 MG/DL (ref 8–23)
BUN/CREAT SERPL: 14.6 (ref 7–25)
CALCIUM SPEC-SCNC: 9.7 MG/DL (ref 8.6–10.5)
CHLORIDE SERPL-SCNC: 97 MMOL/L (ref 98–107)
CO2 SERPL-SCNC: 25.4 MMOL/L (ref 22–29)
CREAT SERPL-MCNC: 0.82 MG/DL (ref 0.57–1)
DEPRECATED RDW RBC AUTO: 40.8 FL (ref 37–54)
EGFRCR SERPLBLD CKD-EPI 2021: 77.5 ML/MIN/1.73
EOSINOPHIL # BLD AUTO: 0 10*3/MM3 (ref 0–0.4)
EOSINOPHIL NFR BLD AUTO: 0 % (ref 0.3–6.2)
ERYTHROCYTE [DISTWIDTH] IN BLOOD BY AUTOMATED COUNT: 12.1 % (ref 12.3–15.4)
GLOBULIN UR ELPH-MCNC: 2.9 GM/DL
GLUCOSE SERPL-MCNC: 77 MG/DL (ref 65–99)
HCT VFR BLD AUTO: 40.4 % (ref 34–46.6)
HGB BLD-MCNC: 13.7 G/DL (ref 12–15.9)
IMM GRANULOCYTES # BLD AUTO: 0.01 10*3/MM3 (ref 0–0.05)
IMM GRANULOCYTES NFR BLD AUTO: 0.2 % (ref 0–0.5)
LIPASE SERPL-CCNC: 52 U/L (ref 13–60)
LYMPHOCYTES # BLD AUTO: 1.12 10*3/MM3 (ref 0.7–3.1)
LYMPHOCYTES NFR BLD AUTO: 25.7 % (ref 19.6–45.3)
MCH RBC QN AUTO: 31.2 PG (ref 26.6–33)
MCHC RBC AUTO-ENTMCNC: 33.9 G/DL (ref 31.5–35.7)
MCV RBC AUTO: 92 FL (ref 79–97)
MONOCYTES # BLD AUTO: 0.43 10*3/MM3 (ref 0.1–0.9)
MONOCYTES NFR BLD AUTO: 9.9 % (ref 5–12)
NEUTROPHILS NFR BLD AUTO: 2.8 10*3/MM3 (ref 1.7–7)
NEUTROPHILS NFR BLD AUTO: 64.2 % (ref 42.7–76)
NRBC BLD AUTO-RTO: 0 /100 WBC (ref 0–0.2)
PLATELET # BLD AUTO: 178 10*3/MM3 (ref 140–450)
PMV BLD AUTO: 10.6 FL (ref 6–12)
POTASSIUM SERPL-SCNC: 4 MMOL/L (ref 3.5–5.2)
PROT SERPL-MCNC: 7.3 G/DL (ref 6–8.5)
RBC # BLD AUTO: 4.39 10*6/MM3 (ref 3.77–5.28)
SODIUM SERPL-SCNC: 134 MMOL/L (ref 136–145)
TROPONIN T SERPL HS-MCNC: 11 NG/L
WBC NRBC COR # BLD AUTO: 4.36 10*3/MM3 (ref 3.4–10.8)

## 2025-02-01 PROCEDURE — 25010000002 KETOROLAC TROMETHAMINE PER 15 MG: Performed by: STUDENT IN AN ORGANIZED HEALTH CARE EDUCATION/TRAINING PROGRAM

## 2025-02-01 PROCEDURE — 93005 ELECTROCARDIOGRAM TRACING: CPT | Performed by: STUDENT IN AN ORGANIZED HEALTH CARE EDUCATION/TRAINING PROGRAM

## 2025-02-01 PROCEDURE — 84484 ASSAY OF TROPONIN QUANT: CPT | Performed by: STUDENT IN AN ORGANIZED HEALTH CARE EDUCATION/TRAINING PROGRAM

## 2025-02-01 PROCEDURE — 83690 ASSAY OF LIPASE: CPT | Performed by: STUDENT IN AN ORGANIZED HEALTH CARE EDUCATION/TRAINING PROGRAM

## 2025-02-01 PROCEDURE — 96372 THER/PROPH/DIAG INJ SC/IM: CPT

## 2025-02-01 PROCEDURE — 80053 COMPREHEN METABOLIC PANEL: CPT | Performed by: STUDENT IN AN ORGANIZED HEALTH CARE EDUCATION/TRAINING PROGRAM

## 2025-02-01 PROCEDURE — 99283 EMERGENCY DEPT VISIT LOW MDM: CPT | Performed by: STUDENT IN AN ORGANIZED HEALTH CARE EDUCATION/TRAINING PROGRAM

## 2025-02-01 PROCEDURE — 85025 COMPLETE CBC W/AUTO DIFF WBC: CPT | Performed by: STUDENT IN AN ORGANIZED HEALTH CARE EDUCATION/TRAINING PROGRAM

## 2025-02-01 PROCEDURE — 36415 COLL VENOUS BLD VENIPUNCTURE: CPT

## 2025-02-01 RX ORDER — KETOROLAC TROMETHAMINE 30 MG/ML
30 INJECTION, SOLUTION INTRAMUSCULAR; INTRAVENOUS ONCE
Status: COMPLETED | OUTPATIENT
Start: 2025-02-01 | End: 2025-02-01

## 2025-02-01 RX ADMIN — KETOROLAC TROMETHAMINE 30 MG: 30 INJECTION, SOLUTION INTRAMUSCULAR; INTRAVENOUS at 19:58

## 2025-02-02 LAB
QT INTERVAL: 401 MS
QTC INTERVAL: 417 MS

## 2025-02-02 NOTE — ED PROVIDER NOTES
Subjective   History of Present Illness  Pt is a 69 y.o. female with PMH as listed who presents for   Chief Complaint   Patient presents with    Hypertension       Patient is a 69-year-old female presents for hypertension at home, blood pressure was in the 180s systolic and she took an extra dose of her amlodipine but did not feel that it was coming down quickly enough and so came to the ED for further evaluation and management.  She has chronic pain related to prior surgeries as well as shortness of breath over the past month for which she is already been evaluated by her PCP, satting 100% on room air normal heart rate no chest pain no new nausea or vomiting though has some chronic nausea.  Lung sounds are clear.  Patient without any new cough rhinorrhea congestion URI symptoms.      Review of Systems    Past Medical History:   Diagnosis Date    Allergic 1994    Arthritis     Autoantibody titer positive     Goodwin esophagus     Bloating     Cataract     BILAT    Cholelithiasis removed 2018    Chronic abdominal pain     Chronic constipation     Encounter for preventive health examination     Endometriosis     Gastritis     Gastrointestinal hypomotility     GERD (gastroesophageal reflux disease)     Headache, tension-type     Hypertension     Hyponatremia     Insomnia     Intestinal autonomic neuropathy     Irritable bowel syndrome     Migraine     Mixed anxiety and depressive disorder     Moderate malnutrition     Noninfectious gastroenteritis     OP (osteoporosis)     Osteopenia     Osteoporosis     PONV (postoperative nausea and vomiting) 08/17/2018    Psoriasis     KNEES, ELBOWS BILAT AND SCALP    Seasonal allergies     Visceral hyperalgesia        Allergies   Allergen Reactions    Hydrocodone Nausea And Vomiting    Sulfa Antibiotics Nausea And Vomiting       Past Surgical History:   Procedure Laterality Date    ABDOMINAL SURGERY  1994 2018 2023    APPENDECTOMY      CHOLECYSTECTOMY N/A 08/17/2018    Procedure:  CHOLECYSTECTOMY LAPAROSCOPIC converted to open procedure;  Surgeon: Hernan Hinds MD;  Location:  [facility] OR;  Service: General    COLON RESECTION N/A 11/20/2023    Procedure: OPEN SUBTOTAL COLECTOMY AND ADESIOLYSIS;  Surgeon: Ranjeet Salinas MD;  Location[facility] MAIN OR;  Service: General;  Laterality: N/A;    COLON SURGERY      STATES TOTAL OF 3 COLON SURGERY    COLONOSCOPY      COLONOSCOPY N/A 12/12/2018    Procedure: COLONOSCOPY;  Surgeon: Darien Ruff MD;  Location:  [facility] OR;  Service: Gastroenterology    COLONOSCOPY N/A 10/13/2023    Procedure: COLONOSCOPY TO CECUM;  Surgeon: Ranjeet Salinas MD;  Location: [facility] ENDOSCOPY;  Service: General;  Laterality: N/A;  PREOP/ CHRONIC CONSTIPATION- POSTOP/ NORMAL    DIAGNOSTIC LAPAROSCOPY  1990    DILATATION AND CURETTAGE  1985    ENDOSCOPY N/A 05/13/2016    Procedure: ESOPHAGOGASTRODUODENOSCOPY ;  Surgeon: Darien Ruff MD;  Location:  [facility] OR;  Service:     ENDOSCOPY N/A 05/01/2023    Procedure: ESOPHAGOGASTRODUODENOSCOPY WITH BIOPSY;  Surgeon: Darien Ruff MD;  Location: [facility] OR;  Service: Gastroenterology;  Laterality: N/A;  Mild Thrush; Gastritis; Esophagitis- thrush and reflux; Biopsies- duodenal, gastric, esophagus    EXPLORATORY LAPAROTOMY N/A 11/27/2023    Procedure: exploratory laparotomy, small bowel resection;  Surgeon: Ranjeet Salinas MD;  Location: [facility] OR;  Service: General;  Laterality: N/A;    HYSTERECTOMY      REVISION / TAKEDOWN COLOSTOMY      SMALL INTESTINE SURGERY      UPPER GASTROINTESTINAL ENDOSCOPY  2023       Family History   Problem Relation Age of Onset    Hyperlipidemia Mother     Macular degeneration Mother     Hearing loss Mother     Arthritis Mother     Vision loss Mother     Hypertension Mother     Heart disease Father         Had 5 bypass surgery    Hyperlipidemia Father     Cancer Father         Prostate and bladder    Depression Father     Stroke  Father     Hypertension Father     Depression Sister     COPD Sister     Hyperlipidemia Sister     Arthritis Sister     Hypertension Sister     Irritable bowel syndrome Sister     Hyperlipidemia Brother     Depression Brother     Kidney disease Brother     Arthritis Brother     Hypertension Brother     Colon polyps Brother     Breast cancer Maternal Aunt     Breast cancer Cousin     Breast cancer Cousin     Colon cancer Neg Hx     Malig Hyperthermia Neg Hx        Social History     Socioeconomic History    Marital status:    Tobacco Use    Smoking status: Never     Passive exposure: Never    Smokeless tobacco: Never   Vaping Use    Vaping status: Never Used   Substance and Sexual Activity    Alcohol use: Not Currently    Drug use: No    Sexual activity: Not Currently     Partners: Male     Birth control/protection: Post-menopausal, Hysterectomy           Objective   Physical Exam  Constitutional:       Appearance: Normal appearance.   HENT:      Head: Normocephalic and atraumatic.      Mouth/Throat:      Mouth: Mucous membranes are moist.      Pharynx: Oropharynx is clear.   Eyes:      Conjunctiva/sclera: Conjunctivae normal.   Cardiovascular:      Rate and Rhythm: Normal rate and regular rhythm.      Pulses: Normal pulses.      Heart sounds: Normal heart sounds.   Pulmonary:      Effort: Pulmonary effort is normal. No respiratory distress.      Breath sounds: Normal breath sounds.   Abdominal:      General: Abdomen is flat.   Musculoskeletal:      Cervical back: Neck supple.   Skin:     General: Skin is warm and dry.   Neurological:      Mental Status: She is alert.   Psychiatric:         Mood and Affect: Mood normal.         Procedures           ED Course  ED Course as of 02/01/25 1949   Sat Feb 01, 2025 1907 Patient is a 69-year-old female presents for hypertension at home, blood pressure was in the 180s systolic and she took an extra dose of her amlodipine but did not feel that it was coming down  "quickly enough and so came to the ED for further evaluation and management.  She has chronic pain related to prior surgeries as well as shortness of breath over the past month for which she is already been evaluated by her PCP, satting 100% on room air normal heart rate no chest pain no new nausea or vomiting though has some chronic nausea.  Lung sounds are clear.  Patient without any new cough rhinorrhea congestion URI symptoms.  Will obtain CBC CMP troponin lipase and EKG to evaluate for any endorgan damage as result of patient's hypertension today, low suspicion for this.  Patient also requesting pain medication, allergic to hydrocodone medication for IV at this time we will treat her with home dose tramadol. [JF]   1947 Liver enzymes within normal limits as is creatinine and troponin.  CBC within normal limits.  EKG shows no acute findings.  Patient without any chest pain shortness of breath at the time of the ED or at any point during her episode tonight.  Will discharge with PCP follow-up for further outpatient management of her hypertension.  Patient is requesting a \"pain shot\" at time of discharge, patient states that she is able to take ibuprofen I do not see any contraindications to Toradol and her chart review, will give her shot of Toradol prior to discharge and she will take her tramadol when she goes home for further pain control and also given pain management referral information. [JF]      ED Course User Index  [JF] Allen Crespo MD                                                       Medical Decision Making  My differential diagnosis for hypertension includes but is not limited to essential hypertension, kidney disease, obstructive sleep apnea, thyroid disease, adrenal gland tumors, medications including over-the-counter cold medications and decongestants, antihistamines, cocaine, amphetamines, methamphetamines, other street drugs.        Problems Addressed:  Hypertension, unspecified type: " complicated acute illness or injury  Other chronic pain: complicated acute illness or injury    Amount and/or Complexity of Data Reviewed  Labs: ordered. Decision-making details documented in ED Course.  ECG/medicine tests: ordered.     Details: EKG  2/1/2025 at 1925  Rhythm sinus, rate 65  Borderline LAD, normal QT interval, baseline artifact present throughout, no obvious ST changes within limitations of artifact  EKG interpreted by me contemporaneously by me with care    Risk  Prescription drug management.        Final diagnoses:   Other chronic pain   Hypertension, unspecified type       ED Disposition  ED Disposition       ED Disposition   Discharge    Condition   Stable    Comment   --               Ildefonso Dubois MD  [address]  [facility] [id number] [phone]    Schedule an appointment as soon as possible for a visit in 2 days  For re-evaluation    Ulysses Lady Irish Mai MD  [address]  [facility] [address] [phone]    Call in 2 days  to establish care, For re-evaluation         Medication List        Changed      omeprazole 40 MG capsule  Commonly known as: priLOSEC  Take 1 capsule by mouth 2 (Two) Times a Day Before Meals.  What changed: when to take this                 Allen Crespo MD  02/01/25 [id number]

## 2025-02-04 RX ORDER — METOPROLOL SUCCINATE 25 MG/1
25 TABLET, EXTENDED RELEASE ORAL DAILY
Qty: 90 TABLET | Refills: 1 | Status: SHIPPED | OUTPATIENT
Start: 2025-02-04 | End: 2025-02-10 | Stop reason: SDUPTHER

## 2025-02-04 NOTE — TELEPHONE ENCOUNTER
Rx Refill Note  Requested Prescriptions     Pending Prescriptions Disp Refills    metoprolol succinate XL (TOPROL-XL) 25 MG 24 hr tablet [Pharmacy Med Name: METOPROLOL ER SUCCINATE 25MG TABS] 90 tablet 1     Sig: TAKE 1 TABLET BY MOUTH DAILY      Last office visit with prescribing clinician: 1/16/2025   Last telemedicine visit with prescribing clinician: Visit date not found   Next office visit with prescribing clinician: 9/3/2025                         Would you like a call back once the refill request has been completed: [] Yes [] No    If the office needs to give you a call back, can they leave a voicemail: [] Yes [] No    Fleicia Shelby MA  02/04/25, 11:45 EST

## 2025-02-05 ENCOUNTER — NURSE TRIAGE (OUTPATIENT)
Dept: CALL CENTER | Facility: HOSPITAL | Age: 70
End: 2025-02-05
Payer: MEDICARE

## 2025-02-05 NOTE — TELEPHONE ENCOUNTER
"Reason for Disposition  • Requesting regular office appointment    Additional Information  • Negative: [1] Caller is not with the adult (patient) AND [2] reporting urgent symptoms  • Negative: Lab result questions  • Negative: Medication questions  • Negative: Caller can't be reached by phone  • Negative: Caller has already spoken to PCP or another triager  • Negative: RN needs further essential information from caller in order to complete triage    Answer Assessment - Initial Assessment Questions  1. REASON FOR CALL or QUESTION: \"What is your reason for calling today?\" or \"How can I best help you?\" or \"What question do you have that I can help answer?\"      Patient in ER on Saturday for bp issues, was told to make a follow up appt for bp that is what she is calling for    Protocols used: Information Only Call-ADULT-    "

## 2025-02-10 ENCOUNTER — OFFICE VISIT (OUTPATIENT)
Dept: INTERNAL MEDICINE | Facility: CLINIC | Age: 70
End: 2025-02-10
Payer: MEDICARE

## 2025-02-10 ENCOUNTER — TRANSCRIBE ORDERS (OUTPATIENT)
Dept: ADMINISTRATIVE | Facility: HOSPITAL | Age: 70
End: 2025-02-10
Payer: MEDICARE

## 2025-02-10 ENCOUNTER — LAB (OUTPATIENT)
Dept: LAB | Facility: HOSPITAL | Age: 70
End: 2025-02-10
Payer: MEDICARE

## 2025-02-10 VITALS
HEART RATE: 66 BPM | SYSTOLIC BLOOD PRESSURE: 152 MMHG | TEMPERATURE: 97.3 F | BODY MASS INDEX: 17.67 KG/M2 | HEIGHT: 60 IN | WEIGHT: 90 LBS | DIASTOLIC BLOOD PRESSURE: 82 MMHG | OXYGEN SATURATION: 97 %

## 2025-02-10 DIAGNOSIS — M81.0 AGE-RELATED OSTEOPOROSIS WITHOUT CURRENT PATHOLOGICAL FRACTURE: ICD-10-CM

## 2025-02-10 DIAGNOSIS — M81.0 OSTEOPOROSIS, UNSPECIFIED OSTEOPOROSIS TYPE, UNSPECIFIED PATHOLOGICAL FRACTURE PRESENCE: Primary | ICD-10-CM

## 2025-02-10 DIAGNOSIS — Z53.20 OSTEOPOROSIS SCREENING DECLINED: ICD-10-CM

## 2025-02-10 DIAGNOSIS — M81.0 OSTEOPOROSIS, UNSPECIFIED OSTEOPOROSIS TYPE, UNSPECIFIED PATHOLOGICAL FRACTURE PRESENCE: ICD-10-CM

## 2025-02-10 DIAGNOSIS — I10 ESSENTIAL HYPERTENSION: Primary | ICD-10-CM

## 2025-02-10 DIAGNOSIS — F41.8 DEPRESSION WITH ANXIETY: ICD-10-CM

## 2025-02-10 LAB
BASOPHILS # BLD AUTO: 0 10*3/MM3 (ref 0–0.2)
BASOPHILS NFR BLD AUTO: 0 % (ref 0–1.5)
CALCIUM SPEC-SCNC: 9.7 MG/DL (ref 8.6–10.5)
DEPRECATED RDW RBC AUTO: 40.3 FL (ref 37–54)
EOSINOPHIL # BLD AUTO: 0 10*3/MM3 (ref 0–0.4)
EOSINOPHIL NFR BLD AUTO: 0 % (ref 0.3–6.2)
ERYTHROCYTE [DISTWIDTH] IN BLOOD BY AUTOMATED COUNT: 11.8 % (ref 12.3–15.4)
HCT VFR BLD AUTO: 39.8 % (ref 34–46.6)
HGB BLD-MCNC: 13.6 G/DL (ref 12–15.9)
IMM GRANULOCYTES # BLD AUTO: 0.01 10*3/MM3 (ref 0–0.05)
IMM GRANULOCYTES NFR BLD AUTO: 0.2 % (ref 0–0.5)
LYMPHOCYTES # BLD AUTO: 0.97 10*3/MM3 (ref 0.7–3.1)
LYMPHOCYTES NFR BLD AUTO: 19.6 % (ref 19.6–45.3)
MCH RBC QN AUTO: 31.6 PG (ref 26.6–33)
MCHC RBC AUTO-ENTMCNC: 34.2 G/DL (ref 31.5–35.7)
MCV RBC AUTO: 92.6 FL (ref 79–97)
MONOCYTES # BLD AUTO: 0.38 10*3/MM3 (ref 0.1–0.9)
MONOCYTES NFR BLD AUTO: 7.7 % (ref 5–12)
NEUTROPHILS NFR BLD AUTO: 3.6 10*3/MM3 (ref 1.7–7)
NEUTROPHILS NFR BLD AUTO: 72.5 % (ref 42.7–76)
NRBC BLD AUTO-RTO: 0 /100 WBC (ref 0–0.2)
PLATELET # BLD AUTO: 197 10*3/MM3 (ref 140–450)
PMV BLD AUTO: 10.3 FL (ref 6–12)
PTH-INTACT SERPL-MCNC: 41.8 PG/ML (ref 15–65)
RBC # BLD AUTO: 4.3 10*6/MM3 (ref 3.77–5.28)
WBC NRBC COR # BLD AUTO: 4.96 10*3/MM3 (ref 3.4–10.8)

## 2025-02-10 PROCEDURE — 1125F AMNT PAIN NOTED PAIN PRSNT: CPT | Performed by: FAMILY MEDICINE

## 2025-02-10 PROCEDURE — 86364 TISS TRNSGLTMNASE EA IG CLAS: CPT

## 2025-02-10 PROCEDURE — 85025 COMPLETE CBC W/AUTO DIFF WBC: CPT

## 2025-02-10 PROCEDURE — 82310 ASSAY OF CALCIUM: CPT

## 2025-02-10 PROCEDURE — 83970 ASSAY OF PARATHORMONE: CPT

## 2025-02-10 PROCEDURE — 84155 ASSAY OF PROTEIN SERUM: CPT

## 2025-02-10 PROCEDURE — 1159F MED LIST DOCD IN RCRD: CPT | Performed by: FAMILY MEDICINE

## 2025-02-10 PROCEDURE — 84165 PROTEIN E-PHORESIS SERUM: CPT

## 2025-02-10 PROCEDURE — 82784 ASSAY IGA/IGD/IGG/IGM EACH: CPT

## 2025-02-10 PROCEDURE — 99214 OFFICE O/P EST MOD 30 MIN: CPT | Performed by: FAMILY MEDICINE

## 2025-02-10 PROCEDURE — 3079F DIAST BP 80-89 MM HG: CPT | Performed by: FAMILY MEDICINE

## 2025-02-10 PROCEDURE — 1160F RVW MEDS BY RX/DR IN RCRD: CPT | Performed by: FAMILY MEDICINE

## 2025-02-10 PROCEDURE — 86231 EMA EACH IG CLASS: CPT

## 2025-02-10 PROCEDURE — 36415 COLL VENOUS BLD VENIPUNCTURE: CPT

## 2025-02-10 PROCEDURE — 3077F SYST BP >= 140 MM HG: CPT | Performed by: FAMILY MEDICINE

## 2025-02-10 RX ORDER — LORAZEPAM 1 MG/1
1 TABLET ORAL EVERY 8 HOURS PRN
Qty: 90 TABLET | Refills: 1 | Status: SHIPPED | OUTPATIENT
Start: 2025-02-10

## 2025-02-10 RX ORDER — AMLODIPINE BESYLATE 5 MG/1
5 TABLET ORAL DAILY
Qty: 90 TABLET | Refills: 1 | Status: SHIPPED | OUTPATIENT
Start: 2025-02-10

## 2025-02-10 RX ORDER — METOPROLOL SUCCINATE 25 MG/1
25 TABLET, EXTENDED RELEASE ORAL DAILY
Qty: 90 TABLET | Refills: 1 | Status: SHIPPED | OUTPATIENT
Start: 2025-02-10

## 2025-02-10 NOTE — PROGRESS NOTES
Subjective   Martina Hardwick is a 69 y.o. female presenting today for follow up of   Chief Complaint   Patient presents with    Hypertension     Went to ED 2/1 for htn, bp was 187/101 went she went in. She took a third bp pill before leaving so bp improved while at hospital       History of Present Illness     PT presents for ER follow-up.  She went to the ED on 2/1/2025 with elevated blood pressure to 187/101.  Labs and ECG were reassuring.  She had taken an extra 2.5 mg amlodipine and her BP came down on it's own.  She was given a dose of tramadol in the ER for her chronic pain.  Home blood pressures are running 160s systolic.  She takes amlodipine 2.5 mg daily and metoprolol XL 25 mg daily.  Anxiety.  She says her anxiety is uncontrolled the past few weeks.  She has f/u with her psychiatrist upcoming.  She takes bupropion for depression.   She says the alprazolam doesn't seem to be working; she has taken it for many years.  Osteoporosis.  She saw BROOKS Bueno with [facility]opedic osteoporosis [facility] and they are planning to start Tymlos.    Patient Active Problem List   Diagnosis    Migraines    Depression with anxiety    Allergic rhinitis    Primary insomnia    GERD (gastroesophageal reflux disease)    Essential hypertension    Routine health maintenance    Family history of colonic polyps    Psoriasis    Age-related osteoporosis without current pathological fracture    Adhesion of abdominal wall    Chronic abdominal pain    Hyponatremia    Generalized abdominal pain    Gastrointestinal hypomotility    Abnormal celiac antibody panel    Goodwin's esophagus without dysplasia    Colonic inertia    Severe malnutrition       Current Outpatient Medications on File Prior to Visit   Medication Sig    betamethasone valerate (VALISONE) 0.1 % cream Apply 1 Application topically to the appropriate area as directed 2 (Two) Times a Day.    buPROPion XL (WELLBUTRIN XL) 150 MG 24 hr tablet Take 1 tablet by mouth Daily.     Calcium-Vitamin D-Vitamin K (VIACTIV PO) Take  by mouth Daily.    gabapentin (NEURONTIN) 300 MG capsule Take 1 capsule by mouth Daily.    Loratadine (CLARITIN PO) Take 10 mg by mouth Daily.    Multiple Vitamins-Minerals (MULTI-VITAMIN GUMMIES PO) Take 2 each by mouth Daily.    NON FORMULARY MELOXICAN/TOPIRAMATE/GABAPENTIN/KETAMINE/CLONIDINE/BACLOFEN  1/2 PUMPS TO THE AFFECTED AREA.    omeprazole (priLOSEC) 40 MG capsule Take 1 capsule by mouth 2 (Two) Times a Day Before Meals. (Patient taking differently: Take 1 capsule by mouth 2 (Two) Times a Day.)    polyethylene glycol (MIRALAX) 17 GM/SCOOP powder Take 17 g by mouth As Needed.    promethazine (PHENERGAN) 25 MG suppository Insert 1 suppository into the rectum Every 6 (Six) Hours As Needed for Nausea or Vomiting.    SUMAtriptan (IMITREX) 100 MG tablet Take 1 tablet by mouth 1 (One) Time As Needed for Migraine. Take one tablet at onset of headache. May repeat dose one time in 2 hours.    traMADol (ULTRAM) 50 MG tablet Take 1 tablet by mouth Every 8 (Eight) Hours As Needed for Moderate Pain.    vitamin B-12 (CYANOCOBALAMIN) 2500 MCG sublingual tablet tablet Place 5,000 mcg under the tongue 1 (One) Time Per Week. SATURDAY    Wheat Dextrin (BENEFIBER DRINK MIX PO) Take  by mouth Daily.    zolpidem (AMBIEN) 10 MG tablet TAKE ONE TABLET BY MOUTH ONCE NIGHTLY    [DISCONTINUED] ALPRAZolam (XANAX) 0.5 MG tablet Take 1 tablet by mouth 3 (Three) Times a Day As Needed for Anxiety. for anxiety    [DISCONTINUED] amLODIPine (NORVASC) 2.5 MG tablet TAKE 1 TABLET BY MOUTH DAILY    [DISCONTINUED] metoprolol succinate XL (TOPROL-XL) 25 MG 24 hr tablet TAKE 1 TABLET BY MOUTH DAILY    famotidine (PEPCID) 40 MG tablet Take 1 tablet by mouth 2 (Two) Times a Day. With lunch and at bedtime    [DISCONTINUED] linaclotide (LINZESS) 145 MCG capsule capsule Take 1 capsule by mouth Every Morning Before Breakfast.    [DISCONTINUED] prochlorperazine (COMPAZINE) 5 MG tablet Take 1 tablet by mouth  "Every 6 (Six) Hours As Needed for Nausea or Vomiting. (Patient not taking: Reported on 2/10/2025)     No current facility-administered medications on file prior to visit.          The following portions of the patient's history were reviewed and updated as appropriate: allergies, current medications, past family history, past medical history, past social history, past surgical history and problem list.    Review of Systems    Objective   Vitals:    02/10/25 1327   BP: 152/82   BP Location: Left arm   Patient Position: Sitting   Cuff Size: Adult   Pulse: 66   Temp: 97.3 °F (36.3 °C)   TempSrc: Infrared   SpO2: 97%   Weight: 40.8 kg (90 lb)   Height: 152.4 cm (60\")       BP Readings from Last 3 Encounters:   02/10/25 152/82   02/01/25 142/71   01/27/25 108/58        Wt Readings from Last 3 Encounters:   02/10/25 40.8 kg (90 lb)   02/01/25 42.5 kg (93 lb 11.1 oz)   01/27/25 42.5 kg (93 lb 9.6 oz)        Body mass index is 17.58 kg/m².  Nursing notes and vitals reviewed.    Physical Exam    Recent Results (from the past 4 weeks)   Comprehensive Metabolic Panel    Collection Time: 02/01/25  7:17 PM    Specimen: Blood   Result Value Ref Range    Glucose 77 65 - 99 mg/dL    BUN 12 8 - 23 mg/dL    Creatinine 0.82 0.57 - 1.00 mg/dL    Sodium 134 (L) 136 - 145 mmol/L    Potassium 4.0 3.5 - 5.2 mmol/L    Chloride 97 (L) 98 - 107 mmol/L    CO2 25.4 22.0 - 29.0 mmol/L    Calcium 9.7 8.6 - 10.5 mg/dL    Total Protein 7.3 6.0 - 8.5 g/dL    Albumin 4.4 3.5 - 5.2 g/dL    ALT (SGPT) 20 1 - 33 U/L    AST (SGOT) 28 1 - 32 U/L    Alkaline Phosphatase 124 (H) 39 - 117 U/L    Total Bilirubin 0.6 0.0 - 1.2 mg/dL    Globulin 2.9 gm/dL    A/G Ratio 1.5 g/dL    BUN/Creatinine Ratio 14.6 7.0 - 25.0    Anion Gap 11.6 5.0 - 15.0 mmol/L    eGFR 77.5 >60.0 mL/min/1.73   High Sensitivity Troponin T    Collection Time: 02/01/25  7:17 PM    Specimen: Blood   Result Value Ref Range    HS Troponin T 11 <14 ng/L   Lipase    Collection Time: 02/01/25  " 7:17 PM    Specimen: Blood   Result Value Ref Range    Lipase 52 13 - 60 U/L   CBC Auto Differential    Collection Time: 02/01/25  7:17 PM    Specimen: Blood   Result Value Ref Range    WBC 4.36 3.40 - 10.80 10*3/mm3    RBC 4.39 3.77 - 5.28 10*6/mm3    Hemoglobin 13.7 12.0 - 15.9 g/dL    Hematocrit 40.4 34.0 - 46.6 %    MCV 92.0 79.0 - 97.0 fL    MCH 31.2 26.6 - 33.0 pg    MCHC 33.9 31.5 - 35.7 g/dL    RDW 12.1 (L) 12.3 - 15.4 %    RDW-SD 40.8 37.0 - 54.0 fl    MPV 10.6 6.0 - 12.0 fL    Platelets 178 140 - 450 10*3/mm3    Neutrophil % 64.2 42.7 - 76.0 %    Lymphocyte % 25.7 19.6 - 45.3 %    Monocyte % 9.9 5.0 - 12.0 %    Eosinophil % 0.0 (L) 0.3 - 6.2 %    Basophil % 0.0 0.0 - 1.5 %    Immature Grans % 0.2 0.0 - 0.5 %    Neutrophils, Absolute 2.80 1.70 - 7.00 10*3/mm3    Lymphocytes, Absolute 1.12 0.70 - 3.10 10*3/mm3    Monocytes, Absolute 0.43 0.10 - 0.90 10*3/mm3    Eosinophils, Absolute 0.00 0.00 - 0.40 10*3/mm3    Basophils, Absolute 0.00 0.00 - 0.20 10*3/mm3    Immature Grans, Absolute 0.01 0.00 - 0.05 10*3/mm3    nRBC 0.0 0.0 - 0.2 /100 WBC   ECG 12 Lead Other; HTN    Collection Time: 02/01/25  7:25 PM   Result Value Ref Range    QT Interval 401 ms    QTC Interval 417 ms         Assessment & Plan   Diagnoses and all orders for this visit:    1. Essential hypertension (Primary)  -     amLODIPine (NORVASC) 5 MG tablet; Take 1 tablet by mouth Daily.  Dispense: 90 tablet; Refill: 1    2. Depression with anxiety  -     LORazepam (Ativan) 1 MG tablet; Take 1 tablet by mouth Every 8 (Eight) Hours As Needed for Anxiety.  Dispense: 90 tablet; Refill: 1    3. Age-related osteoporosis without current pathological fracture    Other orders  -     metoprolol succinate XL (TOPROL-XL) 25 MG 24 hr tablet; Take 1 tablet by mouth Daily.  Dispense: 90 tablet; Refill: 1      HTN.  Not adequately controlled.  Options discussed. Continue metoprolol XL 25 mg daily.  Increase amlodipine from 2.5 to 5 mg daily.  Anticipatory guidance  given.  Monitor home blood pressures.  F/U 3 weeks.  Anxiety.  Uncontrolled.  She has upcoming appt with psych and I suggested she look at starting an SSRI in addition to the bupropion.  Change benzodiazepine from alprazolam to longer acting lorazepam, with the goal of eventually weaning off benzodiazepines altogether.  Osteoporosis.  Specialist note reviewed.  They are planning to start Tymlos.        Medications, including side effects, were discussed with the patient. Patient verbalized understanding.  The plan of care was discussed. All questions were answered. Patient verbalized understanding.      Return in about 3 weeks (around 3/3/2025).               DC instructions

## 2025-02-11 LAB
ALBUMIN SERPL ELPH-MCNC: 3.7 G/DL (ref 2.9–4.4)
ALBUMIN/GLOB SERPL: 1.1 {RATIO} (ref 0.7–1.7)
ALPHA1 GLOB SERPL ELPH-MCNC: 0.3 G/DL (ref 0–0.4)
ALPHA2 GLOB SERPL ELPH-MCNC: 0.9 G/DL (ref 0.4–1)
B-GLOBULIN SERPL ELPH-MCNC: 1 G/DL (ref 0.7–1.3)
ENDOMYSIUM IGA SER QL: NEGATIVE
GAMMA GLOB SERPL ELPH-MCNC: 1.1 G/DL (ref 0.4–1.8)
GLOBULIN SER CALC-MCNC: 3.4 G/DL (ref 2.2–3.9)
IGA SERPL-MCNC: 256 MG/DL (ref 87–352)
LABORATORY COMMENT REPORT: NORMAL
M PROTEIN SERPL ELPH-MCNC: NORMAL G/DL
PROT SERPL-MCNC: 7.1 G/DL (ref 6–8.5)
TTG IGA SER-ACNC: <2 U/ML (ref 0–3)

## 2025-02-12 DIAGNOSIS — R10.9 CHRONIC ABDOMINAL PAIN: ICD-10-CM

## 2025-02-12 DIAGNOSIS — G89.29 CHRONIC ABDOMINAL PAIN: ICD-10-CM

## 2025-02-12 NOTE — TELEPHONE ENCOUNTER
Rx Refill Note  Requested Prescriptions     Pending Prescriptions Disp Refills    traMADol (ULTRAM) 50 MG tablet 30 tablet 1     Sig: Take 1 tablet by mouth Every 8 (Eight) Hours As Needed for Moderate Pain.      Last office visit with prescribing clinician: 2/10/2025   Last telemedicine visit with prescribing clinician: Visit date not found   Next office visit with prescribing clinician: 3/3/2025                         Would you like a call back once the refill request has been completed: [] Yes [] No    If the office needs to give you a call back, can they leave a voicemail: [] Yes [] No    Felicia Shelby MA  02/12/25, 14:11 EST

## 2025-02-13 RX ORDER — TRAMADOL HYDROCHLORIDE 50 MG/1
50 TABLET ORAL EVERY 8 HOURS PRN
Qty: 30 TABLET | Refills: 1 | Status: SHIPPED | OUTPATIENT
Start: 2025-02-13

## 2025-02-24 ENCOUNTER — TELEPHONE (OUTPATIENT)
Dept: INTERNAL MEDICINE | Facility: CLINIC | Age: 70
End: 2025-02-24
Payer: MEDICARE

## 2025-02-24 RX ORDER — PROMETHAZINE HYDROCHLORIDE 25 MG/1
25 SUPPOSITORY RECTAL EVERY 6 HOURS PRN
Qty: 30 SUPPOSITORY | Refills: 1 | Status: SHIPPED | OUTPATIENT
Start: 2025-02-24

## 2025-02-24 NOTE — TELEPHONE ENCOUNTER
Last filled by another provider. See pt message - oral medication is not helping with nausea well.    Rx Refill Note  Requested Prescriptions     Pending Prescriptions Disp Refills    promethazine (PHENERGAN) 25 MG suppository 30 suppository 1     Sig: Insert 1 suppository into the rectum Every 6 (Six) Hours As Needed for Nausea or Vomiting.      Last office visit with prescribing clinician: 2/10/2025   Last telemedicine visit with prescribing clinician: Visit date not found   Next office visit with prescribing clinician: 3/3/2025                         Would you like a call back once the refill request has been completed: [] Yes [] No    If the office needs to give you a call back, can they leave a voicemail: [] Yes [] No    Felicia Shelby MA  02/24/25, 10:46 EST

## 2025-02-24 NOTE — TELEPHONE ENCOUNTER
I left her a message to give us a call we need to reschedule her 3/3 appointment.  Dr. Dubois will be out of the office all of March.

## 2025-03-03 ENCOUNTER — OFFICE VISIT (OUTPATIENT)
Dept: INTERNAL MEDICINE | Facility: CLINIC | Age: 70
End: 2025-03-03
Payer: MEDICARE

## 2025-03-03 VITALS
HEART RATE: 73 BPM | BODY MASS INDEX: 17.63 KG/M2 | DIASTOLIC BLOOD PRESSURE: 70 MMHG | HEIGHT: 60 IN | OXYGEN SATURATION: 100 % | SYSTOLIC BLOOD PRESSURE: 104 MMHG | WEIGHT: 89.8 LBS | TEMPERATURE: 97.5 F

## 2025-03-03 DIAGNOSIS — M81.0 AGE-RELATED OSTEOPOROSIS WITHOUT CURRENT PATHOLOGICAL FRACTURE: ICD-10-CM

## 2025-03-03 DIAGNOSIS — I10 ESSENTIAL HYPERTENSION: Primary | ICD-10-CM

## 2025-03-03 DIAGNOSIS — F41.8 DEPRESSION WITH ANXIETY: ICD-10-CM

## 2025-03-03 DIAGNOSIS — R11.0 CHRONIC NAUSEA: ICD-10-CM

## 2025-03-03 PROCEDURE — 3078F DIAST BP <80 MM HG: CPT | Performed by: FAMILY MEDICINE

## 2025-03-03 PROCEDURE — 1125F AMNT PAIN NOTED PAIN PRSNT: CPT | Performed by: FAMILY MEDICINE

## 2025-03-03 PROCEDURE — 1159F MED LIST DOCD IN RCRD: CPT | Performed by: FAMILY MEDICINE

## 2025-03-03 PROCEDURE — 99214 OFFICE O/P EST MOD 30 MIN: CPT | Performed by: FAMILY MEDICINE

## 2025-03-03 PROCEDURE — 3074F SYST BP LT 130 MM HG: CPT | Performed by: FAMILY MEDICINE

## 2025-03-03 PROCEDURE — 1160F RVW MEDS BY RX/DR IN RCRD: CPT | Performed by: FAMILY MEDICINE

## 2025-03-03 RX ORDER — METOCLOPRAMIDE 5 MG/1
5 TABLET ORAL
Qty: 120 TABLET | Refills: 5 | Status: SHIPPED | OUTPATIENT
Start: 2025-03-03

## 2025-03-03 RX ORDER — ALPRAZOLAM 0.5 MG
0.5 TABLET ORAL 3 TIMES DAILY PRN
COMMUNITY
Start: 2025-02-28

## 2025-03-03 NOTE — PROGRESS NOTES
Subjective   Martina Hardwick is a 69 y.o. female presenting today for follow up of   Chief Complaint   Patient presents with    Hypertension     3 week f/u    Depression     3 week f/u       Hypertension  This is a chronic problem. The current episode started more than 1 year ago. The problem has been worse since onset. The problem is resistant. Associated symptoms include anxiety, headaches, malaise/fatigue, palpitations and shortness of breath. Pertinent negatives include no blurred vision, chest pain, orthopnea or peripheral edema. There are no associated agents to hypertension. Risk factors for coronary artery disease include dyslipidemia, post-menopausal state, sedentary lifestyle and stress. Compliance problems include psychosocial issues.         HTN.  3 week f/u.  We increased the amlodipine from 2.5 mg to 5 mg daily and continued metoprolol XL 25 mg daily.  Home blood pressures are running 110s-140s/60s-80s.  She is tolerating the medications.  Anxiety.  She takes alprazolam.  She says the lorazepam was not effective.  She takes bupropion for depression.   She sees a psychiatrist.  Osteoporosis.  She saw BROOKS Bueno with [facility] osteoporosis [facility] and they are planning to start Tymlos.  Nausea.  This has worsened over the past few week.  She is having a hard time getting her Boost supplements down.  Takes omeprazole 40 mg BID, famotidine 40 mg BID, promethazine suppository.    Patient Active Problem List   Diagnosis    Migraines    Depression with anxiety    Allergic rhinitis    Primary insomnia    GERD (gastroesophageal reflux disease)    Essential hypertension    Routine health maintenance    Family history of colonic polyps    Psoriasis    Age-related osteoporosis without current pathological fracture    Adhesion of abdominal wall    Chronic abdominal pain    Hyponatremia    Generalized abdominal pain    Gastrointestinal hypomotility    Abnormal celiac antibody panel    Goodwin's esophagus  without dysplasia    Colonic inertia    Severe malnutrition       Current Outpatient Medications on File Prior to Visit   Medication Sig    ALPRAZolam (XANAX) 0.5 MG tablet Take 1 tablet by mouth 3 (Three) Times a Day As Needed. for anxiety    amLODIPine (NORVASC) 5 MG tablet Take 1 tablet by mouth Daily.    betamethasone valerate (VALISONE) 0.1 % cream Apply 1 Application topically to the appropriate area as directed 2 (Two) Times a Day.    buPROPion XL (WELLBUTRIN XL) 150 MG 24 hr tablet Take 1 tablet by mouth Daily. (Patient taking differently: Take 2 tablets by mouth Daily.)    Calcium-Vitamin D-Vitamin K (VIACTIV PO) Take  by mouth Daily.    famotidine (PEPCID) 40 MG tablet Take 1 tablet by mouth 2 (Two) Times a Day. With lunch and at bedtime    gabapentin (NEURONTIN) 300 MG capsule Take 1 capsule by mouth Daily.    Loratadine (CLARITIN PO) Take 10 mg by mouth Daily.    metoprolol succinate XL (TOPROL-XL) 25 MG 24 hr tablet Take 1 tablet by mouth Daily.    Multiple Vitamins-Minerals (MULTI-VITAMIN GUMMIES PO) Take 2 each by mouth Daily.    NON FORMULARY MELOXICAN/TOPIRAMATE/GABAPENTIN/KETAMINE/CLONIDINE/BACLOFEN  1/2 PUMPS TO THE AFFECTED AREA.    omeprazole (priLOSEC) 40 MG capsule Take 1 capsule by mouth 2 (Two) Times a Day Before Meals. (Patient taking differently: Take 1 capsule by mouth 2 (Two) Times a Day.)    polyethylene glycol (MIRALAX) 17 GM/SCOOP powder Take 17 g by mouth As Needed.    promethazine (PHENERGAN) 25 MG suppository Insert 1 suppository into the rectum Every 6 (Six) Hours As Needed for Nausea or Vomiting.    SUMAtriptan (IMITREX) 100 MG tablet Take 1 tablet by mouth 1 (One) Time As Needed for Migraine. Take one tablet at onset of headache. May repeat dose one time in 2 hours.    traMADol (ULTRAM) 50 MG tablet Take 1 tablet by mouth Every 8 (Eight) Hours As Needed for Moderate Pain.    vitamin B-12 (CYANOCOBALAMIN) 2500 MCG sublingual tablet tablet Place 5,000 mcg under the tongue 1 (One)  "Time Per Week. SATURDAY    Wheat Dextrin (BENEFIBER DRINK MIX PO) Take  by mouth Daily.    zolpidem (AMBIEN) 10 MG tablet TAKE ONE TABLET BY MOUTH ONCE NIGHTLY    [DISCONTINUED] LORazepam (Ativan) 1 MG tablet Take 1 tablet by mouth Every 8 (Eight) Hours As Needed for Anxiety.     No current facility-administered medications on file prior to visit.          The following portions of the patient's history were reviewed and updated as appropriate: allergies, current medications, past family history, past medical history, past social history, past surgical history and problem list.    Review of Systems   Constitutional:  Positive for malaise/fatigue.   Eyes:  Negative for blurred vision.   Respiratory:  Positive for shortness of breath.    Cardiovascular:  Positive for palpitations. Negative for chest pain and orthopnea.       Objective   Vitals:    03/03/25 1315   BP: 104/70   BP Location: Left arm   Patient Position: Sitting   Cuff Size: Adult   Pulse: 73   Temp: 97.5 °F (36.4 °C)   TempSrc: Infrared   SpO2: 100%   Weight: 40.7 kg (89 lb 12.8 oz)   Height: 152.4 cm (60\")       BP Readings from Last 3 Encounters:   03/03/25 104/70   02/10/25 152/82   02/01/25 142/71        Wt Readings from Last 3 Encounters:   03/03/25 40.7 kg (89 lb 12.8 oz)   02/10/25 40.8 kg (90 lb)   02/01/25 42.5 kg (93 lb 11.1 oz)        Body mass index is 17.54 kg/m².  Nursing notes and vitals reviewed.    Physical Exam  Vitals and nursing note reviewed.   Constitutional:       General: She is not in acute distress.     Appearance: She is ill-appearing (frail). She is not toxic-appearing or diaphoretic.   HENT:      Head: Normocephalic and atraumatic.      Mouth/Throat:      Mouth: Mucous membranes are moist.      Pharynx: Oropharyngeal exudate present.   Eyes:      General:         Right eye: No discharge.         Left eye: No discharge.   Cardiovascular:      Rate and Rhythm: Normal rate and regular rhythm.      Heart sounds: Normal heart " sounds.   Pulmonary:      Effort: Pulmonary effort is normal.      Breath sounds: Normal breath sounds.   Abdominal:      General: There is no distension.      Palpations: Abdomen is soft.   Musculoskeletal:      Cervical back: Neck supple. No rigidity.      Right lower leg: No edema.      Left lower leg: No edema.   Skin:     General: Skin is warm and dry.   Neurological:      General: No focal deficit present.      Mental Status: She is alert and oriented to person, place, and time.   Psychiatric:         Attention and Perception: Attention normal.         Mood and Affect: Mood is depressed.         Speech: Speech normal.         Behavior: Behavior normal.         Judgment: Judgment normal.         Recent Results (from the past 4 weeks)   Celiac Disease Panel    Collection Time: 02/10/25  2:36 PM    Specimen: Blood   Result Value Ref Range    Endomysial IgA Negative Negative    Tissue Transglutaminase IgA <2 0 - 3 U/mL    IgA 256 87 - 352 mg/dL   Protein Electrophoresis, Total    Collection Time: 02/10/25  2:36 PM    Specimen: Blood   Result Value Ref Range    Total Protein 7.1 6.0 - 8.5 g/dL    Albumin 3.7 2.9 - 4.4 g/dL    Alpha-1-Globulin 0.3 0.0 - 0.4 g/dL    Alpha-2-Globulin 0.9 0.4 - 1.0 g/dL    Beta Globulin 1.0 0.7 - 1.3 g/dL    Gamma Globulin 1.1 0.4 - 1.8 g/dL    M-Ismael Not Observed Not Observed g/dL    Globulin 3.4 2.2 - 3.9 g/dL    A/G Ratio 1.1 0.7 - 1.7    Please note Comment    PTH, Intact & Calcium    Collection Time: 02/10/25  2:36 PM    Specimen: Blood   Result Value Ref Range    PTH, Intact 41.8 15.0 - 65.0 pg/mL    Calcium 9.7 8.6 - 10.5 mg/dL   CBC Auto Differential    Collection Time: 02/10/25  2:36 PM    Specimen: Blood   Result Value Ref Range    WBC 4.96 3.40 - 10.80 10*3/mm3    RBC 4.30 3.77 - 5.28 10*6/mm3    Hemoglobin 13.6 12.0 - 15.9 g/dL    Hematocrit 39.8 34.0 - 46.6 %    MCV 92.6 79.0 - 97.0 fL    MCH 31.6 26.6 - 33.0 pg    MCHC 34.2 31.5 - 35.7 g/dL    RDW 11.8 (L) 12.3 - 15.4 %     RDW-SD 40.3 37.0 - 54.0 fl    MPV 10.3 6.0 - 12.0 fL    Platelets 197 140 - 450 10*3/mm3    Neutrophil % 72.5 42.7 - 76.0 %    Lymphocyte % 19.6 19.6 - 45.3 %    Monocyte % 7.7 5.0 - 12.0 %    Eosinophil % 0.0 (L) 0.3 - 6.2 %    Basophil % 0.0 0.0 - 1.5 %    Immature Grans % 0.2 0.0 - 0.5 %    Neutrophils, Absolute 3.60 1.70 - 7.00 10*3/mm3    Lymphocytes, Absolute 0.97 0.70 - 3.10 10*3/mm3    Monocytes, Absolute 0.38 0.10 - 0.90 10*3/mm3    Eosinophils, Absolute 0.00 0.00 - 0.40 10*3/mm3    Basophils, Absolute 0.00 0.00 - 0.20 10*3/mm3    Immature Grans, Absolute 0.01 0.00 - 0.05 10*3/mm3    nRBC 0.0 0.0 - 0.2 /100 WBC         Assessment & Plan   Diagnoses and all orders for this visit:    1. Essential hypertension (Primary)    2. Depression with anxiety    3. Age-related osteoporosis without current pathological fracture    4. Chronic nausea  -     metoclopramide (Reglan) 5 MG tablet; Take 1 tablet by mouth 4 (Four) Times a Day Before Meals & at Bedtime.  Dispense: 120 tablet; Refill: 5      HTN.  Improved.  Continue metoprolol XL 25 mg daily.  Continue amlodipine 5 mg daily.  Anticipatory guidance given.  Monitor home blood pressures.    Anxiety.  Uncontrolled.  She has upcoming appt with psych next week and I suggested she look at starting an SSRI in addition to the bupropion.     Osteoporosis.  Specialist note reviewed.  They are planning to start Tymlos.  Chronic nausea/GERD.  Trial of metoclopramide.        Medications, including side effects, were discussed with the patient. Patient verbalized understanding.  The plan of care was discussed. All questions were answered. Patient verbalized understanding.      Return in about 6 weeks (around 4/14/2025).

## 2025-03-11 ENCOUNTER — TRANSCRIBE ORDERS (OUTPATIENT)
Dept: ADMINISTRATIVE | Facility: HOSPITAL | Age: 70
End: 2025-03-11
Payer: MEDICARE

## 2025-03-11 ENCOUNTER — LAB (OUTPATIENT)
Dept: LAB | Facility: HOSPITAL | Age: 70
End: 2025-03-11
Payer: MEDICARE

## 2025-03-11 DIAGNOSIS — E87.1 HYPOSMOLALITY SYNDROME: ICD-10-CM

## 2025-03-11 DIAGNOSIS — E87.1 HYPOSMOLALITY SYNDROME: Primary | ICD-10-CM

## 2025-03-11 DIAGNOSIS — I10 ESSENTIAL HYPERTENSION, MALIGNANT: ICD-10-CM

## 2025-03-11 LAB
ALBUMIN SERPL-MCNC: 4.3 G/DL (ref 3.5–5.2)
ANION GAP SERPL CALCULATED.3IONS-SCNC: 12.5 MMOL/L (ref 5–15)
BUN SERPL-MCNC: 11 MG/DL (ref 8–23)
BUN/CREAT SERPL: 12.1 (ref 7–25)
CALCIUM SPEC-SCNC: 9.7 MG/DL (ref 8.6–10.5)
CHLORIDE SERPL-SCNC: 93 MMOL/L (ref 98–107)
CO2 SERPL-SCNC: 25.5 MMOL/L (ref 22–29)
CREAT SERPL-MCNC: 0.91 MG/DL (ref 0.57–1)
EGFRCR SERPLBLD CKD-EPI 2021: 68.4 ML/MIN/1.73
GLUCOSE SERPL-MCNC: 75 MG/DL (ref 65–99)
OSMOLALITY UR: 253 MOSM/KG
PHOSPHATE SERPL-MCNC: 3.5 MG/DL (ref 2.5–4.5)
POTASSIUM SERPL-SCNC: 4 MMOL/L (ref 3.5–5.2)
SODIUM SERPL-SCNC: 131 MMOL/L (ref 136–145)
SODIUM UR-SCNC: 25 MMOL/L

## 2025-03-11 PROCEDURE — 36415 COLL VENOUS BLD VENIPUNCTURE: CPT

## 2025-03-11 PROCEDURE — 80069 RENAL FUNCTION PANEL: CPT

## 2025-03-11 PROCEDURE — 83935 ASSAY OF URINE OSMOLALITY: CPT

## 2025-03-11 PROCEDURE — 84300 ASSAY OF URINE SODIUM: CPT

## 2025-03-26 ENCOUNTER — TELEPHONE (OUTPATIENT)
Dept: GASTROENTEROLOGY | Facility: CLINIC | Age: 70
End: 2025-03-26

## 2025-03-26 NOTE — TELEPHONE ENCOUNTER
Caller: Martina Hardwick    Relationship to patient: Self    Best call back number: [phone]    Patient is needing: PT IS HAVING EXTREME NAUSEA, VOMITTING AND ABDOMINAL PAIN FOR THE LAST 3 DAYS.   HUB BOOKED AN APPT ON APRIL 1ST, BUT SHE SAID SHE IS NOT SURE SHE CAN LAST THAT LONG.  PLEASE CALL TO DISCUSS.

## 2025-03-27 ENCOUNTER — OFFICE VISIT (OUTPATIENT)
Dept: GASTROENTEROLOGY | Facility: CLINIC | Age: 70
End: 2025-03-27
Payer: MEDICARE

## 2025-03-27 VITALS
DIASTOLIC BLOOD PRESSURE: 68 MMHG | HEIGHT: 60 IN | WEIGHT: 89 LBS | SYSTOLIC BLOOD PRESSURE: 100 MMHG | BODY MASS INDEX: 17.47 KG/M2

## 2025-03-27 DIAGNOSIS — K58.1 IRRITABLE BOWEL SYNDROME WITH CONSTIPATION: ICD-10-CM

## 2025-03-27 DIAGNOSIS — R11.0 NAUSEA: Primary | ICD-10-CM

## 2025-03-27 DIAGNOSIS — R10.84 GENERALIZED ABDOMINAL PAIN: ICD-10-CM

## 2025-03-27 RX ORDER — PAROXETINE 40 MG/1
40 TABLET, FILM COATED ORAL
COMMUNITY
Start: 2025-03-21

## 2025-03-27 RX ORDER — ONDANSETRON 4 MG/1
4 TABLET, ORALLY DISINTEGRATING ORAL EVERY 8 HOURS PRN
Qty: 60 TABLET | Refills: 1 | Status: SHIPPED | OUTPATIENT
Start: 2025-03-27

## 2025-03-27 RX ORDER — CHLORDIAZEPOXIDE HYDROCHLORIDE AND CLIDINIUM BROMIDE 5; 2.5 MG/1; MG/1
1 CAPSULE ORAL
Qty: 90 CAPSULE | Refills: 3 | Status: SHIPPED | OUTPATIENT
Start: 2025-03-27

## 2025-03-27 NOTE — PROGRESS NOTES
PATIENT INFORMATION  Martina Hardwick       - 1955    CHIEF COMPLAINT  Chief Complaint   Patient presents with    Nausea    Vomiting    Abdominal Pain       HISTORY OF PRESENT ILLNESS    Here today for IBS-C and GERD follow-up    Nausea and vomiting most of the month, barely eating, pain just set and has not relieved. Reglan did not help.     IBS-C: Per LOV: Having more abdominal pain than she was. Bowels are moving daily and tiny shauna or water, miralax every day. Not skipping any days with no BM. Felt constipated several days ago.  Gave samples of 72 mcg linzess and only took for a few day and worked at first, then seemed not to work. Cramping is still a big concern.      GERD: Trying to get PPI the best she can, but difficulty keeping anything down and more reflux.     Having difficulty managing antidepressants and adjusted recently. Reviewed to review psych about adjusting meds as feeling poorly controlled and would like something to help stimulate appetite. Still adjusting meds without good results, but continuing to work at it.     Weight stable since August. Due to start prolia and anxious about it, reviewed benefits of treatment and weight risks vs benefits. Encouraged to stick with treatment.  Pt remains very anxious about risks of side effects. Reviewed with patient that despite medicare status she can apply for patient assistance programs with drug [employer].     2023 EGD for weight loss positive for normal duodenum, chemical gastropathy, reflux with Barretts and candida esophagitis. Negative for celiac, HP, dysplasia.     Tramadol and gabapentin help with pain quit .        REVIEWED PERTINENT RESULTS/ LABS  Lab Results   Component Value Date    CASEREPORT  2023     Surgical Pathology Report                         Case: [id number]                                  Authorizing Provider:  Ranjeet Salnias,   Collected:           2023 09:12 PM                                  MD                                                                           Ordering Location:     [facility]  Received:            11/28/2023 07:05 AM                                 MAIN OR                                                                      Pathologist:           Margie Kenny MD                                                          Specimen:    Small Intestine, small bowel resection for permanent                                       FINALDX  11/27/2023     1. Small Bowel, Resection:   A. Grossly identified anastomosis with ulceration and necrosis.   B. Serositis and serosal adhesions, which extend to the surgical margins of resection.   B. Two benign lymph nodes.       Lab Results   Component Value Date    HGB 13.6 02/10/2025    MCV 92.6 02/10/2025     02/10/2025    ALT 20 02/01/2025    AST 28 02/01/2025    HGBA1C 5.10 12/23/2024    INR 1.10 12/06/2023    TRIG 124 12/23/2024    FERRITIN 30 08/24/2023      No results found.    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]     Goodwin's esophagus without dysplasia [K22.70]     Abnormal celiac antibody panel [R89.4]     Gastrointestinal hypomotility [K31.89]     Generalized abdominal pain [R10.84]     Chronic abdominal pain [R10.9, G89.29]     Hyponatremia [E87.1]     Adhesion of abdominal wall [K66.0]     Age-related osteoporosis without current pathological fracture [M81.0]     Psoriasis [L40.9]     Family history of colonic polyps [Z83.719]     Routine health maintenance [Z00.00]     Essential hypertension [I10]     GERD (gastroesophageal reflux disease) [K21.9]     Allergic rhinitis [J30.9]     Primary insomnia [F51.01]     Migraines [G43.909]     Depression with anxiety [F41.8]          PAST MEDICAL HISTORY  Past Medical History:   Diagnosis Date    Allergic 1994    Arthritis     Autoantibody titer  positive     Goodwin esophagus     Bloating     Cataract     BILAT    Cholelithiasis removed 2018    Chronic abdominal pain     Chronic constipation     Encounter for preventive health examination     Endometriosis     Gastritis     Gastrointestinal hypomotility     GERD (gastroesophageal reflux disease)     Headache, tension-type     History of transfusion December    Hypertension     Hyponatremia     Insomnia     Intestinal autonomic neuropathy     Irritable bowel syndrome     Migraine     Mixed anxiety and depressive disorder     Moderate malnutrition     Noninfectious gastroenteritis     OP (osteoporosis)     Osteopenia     Osteoporosis     PONV (postoperative nausea and vomiting) 08/17/2018    Postoperative wound infection January    Psoriasis     KNEES, ELBOWS BILAT AND SCALP    Seasonal allergies     Visceral hyperalgesia          SURGICAL HISTORY  Past Surgical History:   Procedure Laterality Date    ABDOMINAL SURGERY  1994 2018 2023    APPENDECTOMY      CHOLECYSTECTOMY N/A 08/17/2018    Procedure: CHOLECYSTECTOMY LAPAROSCOPIC converted to open procedure;  Surgeon: Hernan Hinds MD;  Location: [facility] OR;  Service: General    COLON RESECTION N/A 11/20/2023    Procedure: OPEN SUBTOTAL COLECTOMY AND ADESIOLYSIS;  Surgeon: Ranjeet Salinas MD;  Location: [facility] MAIN OR;  Service: General;  Laterality: N/A;    COLON SURGERY      STATES TOTAL OF 3 COLON SURGERY    COLONOSCOPY      COLONOSCOPY N/A 12/12/2018    Procedure: COLONOSCOPY;  Surgeon: Darien Ruff MD;  Location: [facility] OR;  Service: Gastroenterology    COLONOSCOPY N/A 10/13/2023    Procedure: COLONOSCOPY TO CECUM;  Surgeon: Ranjeet Salinas MD;  Location: [facility] ENDOSCOPY;  Service: General;  Laterality: N/A;  PREOP/ CHRONIC CONSTIPATION- POSTOP/ NORMAL    DIAGNOSTIC LAPAROSCOPY  1990    DILATATION AND CURETTAGE  1985    ENDOSCOPY N/A 05/13/2016    Procedure: ESOPHAGOGASTRODUODENOSCOPY ;  Surgeon: Darien Wolf  MD Speedy;  Location:  [facility] OR;  Service:     ENDOSCOPY N/A 05/01/2023    Procedure: ESOPHAGOGASTRODUODENOSCOPY WITH BIOPSY;  Surgeon: Darien Ruff MD;  Location:  [facility] OR;  Service: Gastroenterology;  Laterality: N/A;  Mild Thrush; Gastritis; Esophagitis- thrush and reflux; Biopsies- duodenal, gastric, esophagus    EXPLORATORY LAPAROTOMY N/A 11/27/2023    Procedure: exploratory laparotomy, small bowel resection;  Surgeon: Ranjeet Salinas MD;  Location[facility] MAIN OR;  Service: General;  Laterality: N/A;    HYSTERECTOMY      REVISION / TAKEDOWN COLOSTOMY      SMALL INTESTINE SURGERY      UPPER GASTROINTESTINAL ENDOSCOPY  2023         FAMILY HISTORY  Family History   Problem Relation Age of Onset    Hyperlipidemia Mother     Macular degeneration Mother     Hearing loss Mother     Arthritis Mother     Vision loss Mother     Hypertension Mother     Heart disease Father         Had 5 bypass surgery    Hyperlipidemia Father     Cancer Father         Prostate and bladder    Depression Father     Stroke Father     Hypertension Father     Colon polyps Father     Depression Sister     COPD Sister     Hyperlipidemia Sister     Arthritis Sister     Hypertension Sister     Irritable bowel syndrome Sister     Hyperlipidemia Brother     Depression Brother     Kidney disease Brother     Arthritis Brother     Hypertension Brother     Colon polyps Brother     Irritable bowel syndrome Brother     Ulcerative colitis Brother         no ulcers anymore    Breast cancer Maternal Aunt     Vision loss Maternal Aunt     Breast cancer Cousin     Breast cancer Cousin     Stroke Paternal Grandfather     Depression Sister     Depression Brother     Hyperlipidemia Brother     Vision loss Maternal Aunt     Colon cancer Neg Hx     Malig Hyperthermia Neg Hx          SOCIAL HISTORY  Social History     Occupational History    Not on file   Tobacco Use    Smoking status: Never     Passive exposure: Never    Smokeless  tobacco: Never   Vaping Use    Vaping status: Never Used   Substance and Sexual Activity    Alcohol use: Not Currently    Drug use: No    Sexual activity: Not Currently     Partners: Male     Birth control/protection: Post-menopausal, Hysterectomy         CURRENT MEDICATIONS    Current Outpatient Medications:     ALPRAZolam (XANAX) 0.5 MG tablet, Take 1 tablet by mouth 3 (Three) Times a Day As Needed. for anxiety, Disp: , Rfl:     amLODIPine (NORVASC) 5 MG tablet, Take 1 tablet by mouth Daily., Disp: 90 tablet, Rfl: 1    betamethasone valerate (VALISONE) 0.1 % cream, Apply 1 Application topically to the appropriate area as directed 2 (Two) Times a Day., Disp: 45 g, Rfl: 1    Calcium-Vitamin D-Vitamin K (VIACTIV PO), Take  by mouth Daily., Disp: , Rfl:     famotidine (PEPCID) 40 MG tablet, Take 1 tablet by mouth 2 (Two) Times a Day. With lunch and at bedtime, Disp: 180 tablet, Rfl: 3    gabapentin (NEURONTIN) 300 MG capsule, Take 1 capsule by mouth Daily. (Patient taking differently: Take 1 capsule by mouth As Needed.), Disp: 30 capsule, Rfl: 5    Loratadine (CLARITIN PO), Take 10 mg by mouth Daily., Disp: , Rfl:     metoprolol succinate XL (TOPROL-XL) 25 MG 24 hr tablet, Take 1 tablet by mouth Daily., Disp: 90 tablet, Rfl: 1    Multiple Vitamins-Minerals (MULTI-VITAMIN GUMMIES PO), Take 2 each by mouth Daily., Disp: , Rfl:     omeprazole (priLOSEC) 40 MG capsule, Take 1 capsule by mouth 2 (Two) Times a Day Before Meals. (Patient taking differently: Take 1 capsule by mouth 2 (Two) Times a Day.), Disp: 190 capsule, Rfl: 3    PARoxetine (PAXIL) 40 MG tablet, Take 1 tablet by mouth every night at bedtime., Disp: , Rfl:     polyethylene glycol (MIRALAX) 17 GM/SCOOP powder, Take 17 g by mouth As Needed., Disp: , Rfl:     promethazine (PHENERGAN) 25 MG suppository, Insert 1 suppository into the rectum Every 6 (Six) Hours As Needed for Nausea or Vomiting., Disp: 30 suppository, Rfl: 1    SUMAtriptan (IMITREX) 100 MG tablet,  "Take 1 tablet by mouth 1 (One) Time As Needed for Migraine. Take one tablet at onset of headache. May repeat dose one time in 2 hours., Disp: 27 tablet, Rfl: 3    traMADol (ULTRAM) 50 MG tablet, Take 1 tablet by mouth Every 8 (Eight) Hours As Needed for Moderate Pain., Disp: 30 tablet, Rfl: 1    vitamin B-12 (CYANOCOBALAMIN) 2500 MCG sublingual tablet tablet, Place 5,000 mcg under the tongue 1 (One) Time Per Week. SATURDAY, Disp: , Rfl:     Wheat Dextrin (BENEFIBER DRINK MIX PO), Take  by mouth Daily., Disp: , Rfl:     zolpidem (AMBIEN) 10 MG tablet, TAKE ONE TABLET BY MOUTH ONCE NIGHTLY, Disp: 90 tablet, Rfl: 1    clidinium-chlordiazePOXIDE (Librax) 5-2.5 MG per capsule, Take 1 capsule by mouth 4 (Four) Times a Day Before Meals & at Bedtime As Needed for Indigestion., Disp: 90 capsule, Rfl: 3    linaclotide (LINZESS) 145 MCG capsule capsule, Take 1 capsule by mouth Every Morning Before Breakfast., Disp: 30 capsule, Rfl: 11    ondansetron ODT (ZOFRAN-ODT) 4 MG disintegrating tablet, Place 1 tablet on the tongue Every 8 (Eight) Hours As Needed for Nausea or Vomiting., Disp: 60 tablet, Rfl: 1    ALLERGIES  Hydrocodone and Sulfa antibiotics    VITALS  Vitals:    03/27/25 1317   BP: 100/68   BP Location: Left arm   Patient Position: Sitting   Cuff Size: Adult   Weight: 40.4 kg (89 lb)   Height: 152.4 cm (60\")       PHYSICAL EXAM  Debilities/Disabilities Identified: None  Emotional Behavior: Appropriate  Wt Readings from Last 3 Encounters:   03/27/25 40.4 kg (89 lb)   03/03/25 40.7 kg (89 lb 12.8 oz)   02/10/25 40.8 kg (90 lb)     Ht Readings from Last 1 Encounters:   03/27/25 152.4 cm (60\")     Body mass index is 17.38 kg/m².  Physical Exam  Constitutional:       General: She is not in acute distress.     Appearance: Normal appearance. She is not ill-appearing.   HENT:      Head: Normocephalic and atraumatic.      Mouth/Throat:      Mouth: Mucous membranes are moist.      Pharynx: No posterior oropharyngeal erythema. "   Eyes:      General: No scleral icterus.  Cardiovascular:      Rate and Rhythm: Normal rate and regular rhythm.      Heart sounds: Normal heart sounds.   Pulmonary:      Effort: Pulmonary effort is normal.      Breath sounds: Normal breath sounds.   Abdominal:      General: Abdomen is flat. Bowel sounds are normal. There is no distension.      Palpations: Abdomen is soft. There is no mass.      Tenderness: There is no abdominal tenderness. There is no guarding or rebound. Negative signs include Collins's sign.      Hernia: No hernia is present.   Musculoskeletal:      Cervical back: Neck supple.   Skin:     General: Skin is warm.      Capillary Refill: Capillary refill takes less than 2 seconds.   Neurological:      General: No focal deficit present.      Mental Status: She is alert and oriented to person, place, and time.   Psychiatric:         Mood and Affect: Mood normal.         Behavior: Behavior normal.         Thought Content: Thought content normal.         Judgment: Judgment normal.       CLINICAL DATA REVIEWED   reviewed previous lab results and integrated with today's visit, reviewed notes from other physicians and/or last GI encounter, reviewed previous endoscopy results and available photos, reviewed surgical pathology results from previous biopsies      ASSESSMENT  Diagnoses and all orders for this visit:    Nausea  -     ondansetron ODT (ZOFRAN-ODT) 4 MG disintegrating tablet; Place 1 tablet on the tongue Every 8 (Eight) Hours As Needed for Nausea or Vomiting.  -     FL small bowel follow through; Future    Generalized abdominal pain  -     FL small bowel follow through; Future    Irritable bowel syndrome with constipation    Other orders  -     PARoxetine (PAXIL) 40 MG tablet; Take 1 tablet by mouth every night at bedtime.  -     clidinium-chlordiazePOXIDE (Librax) 5-2.5 MG per capsule; Take 1 capsule by mouth 4 (Four) Times a Day Before Meals & at Bedtime As Needed for Indigestion.  -      linaclotide (LINZESS) 145 MCG capsule capsule; Take 1 capsule by mouth Every Morning Before Breakfast.          PLAN    SBFT  Nausea: Rotate phenergan supp and SL zofran  Increase fluids with calories, take antiemetic twice a day before pills  IBS-C: Start linzess daily, try librax for adhesion pain    Return in about 3 months (around 6/27/2025).    I have discussed the above plan with the patient.  They verbalize understanding and are in agreement with the plan.  They have been advised to contact the office for any questions, concerns, or changes related to their health.

## 2025-03-31 DIAGNOSIS — F41.0 PANIC DISORDER (EPISODIC PAROXYSMAL ANXIETY): ICD-10-CM

## 2025-03-31 NOTE — TELEPHONE ENCOUNTER
Urine Toxicology Performed in Last 12 Months    No Benzodiazepines on Active Med List    Medication not refilled in past 28 days     Rx Refill Note  Requested Prescriptions     Pending Prescriptions Disp Refills    ALPRAZolam (XANAX) 0.5 MG tablet [Pharmacy Med Name: ALPRAZolam 0.5 MG Oral Tablet] 90 tablet 0     Sig: TAKE 1 TABLET BY MOUTH THREE TIMES DAILY AS NEEDED FOR ANXIETY      Last office visit with prescribing clinician: 3/3/2025   Last telemedicine visit with prescribing clinician: Visit date not found   Next office visit with prescribing clinician: 4/24/2025                         Would you like a call back once the refill request has been completed: [] Yes [] No    If the office needs to give you a call back, can they leave a voicemail: [] Yes [] No    Felicia Shelby MA  03/31/25, 14:45 EDT

## 2025-04-01 RX ORDER — ALPRAZOLAM 0.5 MG
0.5 TABLET ORAL 3 TIMES DAILY PRN
Qty: 90 TABLET | Refills: 0 | Status: SHIPPED | OUTPATIENT
Start: 2025-04-01

## 2025-04-16 RX ORDER — OMEPRAZOLE 40 MG/1
40 CAPSULE, DELAYED RELEASE ORAL
Qty: 190 CAPSULE | Refills: 3 | Status: SHIPPED | OUTPATIENT
Start: 2025-04-16

## 2025-04-22 ENCOUNTER — HOSPITAL ENCOUNTER (OUTPATIENT)
Dept: GENERAL RADIOLOGY | Facility: HOSPITAL | Age: 70
Discharge: HOME | End: 2025-04-22
Payer: MEDICARE

## 2025-04-22 DIAGNOSIS — R11.0 NAUSEA: ICD-10-CM

## 2025-04-22 DIAGNOSIS — R10.84 GENERALIZED ABDOMINAL PAIN: ICD-10-CM

## 2025-04-22 PROCEDURE — 74250 X-RAY XM SM INT 1CNTRST STD: CPT

## 2025-04-22 RX ADMIN — BARIUM SULFATE 367 ML: 960 POWDER, FOR SUSPENSION ORAL at 11:18

## 2025-04-24 ENCOUNTER — OFFICE VISIT (OUTPATIENT)
Dept: INTERNAL MEDICINE | Facility: CLINIC | Age: 70
End: 2025-04-24
Payer: MEDICARE

## 2025-04-24 VITALS
TEMPERATURE: 97.3 F | HEART RATE: 58 BPM | OXYGEN SATURATION: 100 % | BODY MASS INDEX: 17.83 KG/M2 | WEIGHT: 90.8 LBS | SYSTOLIC BLOOD PRESSURE: 100 MMHG | DIASTOLIC BLOOD PRESSURE: 50 MMHG | HEIGHT: 60 IN

## 2025-04-24 DIAGNOSIS — F41.8 DEPRESSION WITH ANXIETY: ICD-10-CM

## 2025-04-24 DIAGNOSIS — R11.0 CHRONIC NAUSEA: ICD-10-CM

## 2025-04-24 DIAGNOSIS — I10 ESSENTIAL HYPERTENSION: Primary | ICD-10-CM

## 2025-04-24 DIAGNOSIS — M81.0 AGE-RELATED OSTEOPOROSIS WITHOUT CURRENT PATHOLOGICAL FRACTURE: ICD-10-CM

## 2025-04-24 NOTE — PROGRESS NOTES
Subjective   Martina Hardwick is a 69 y.o. female presenting today for follow up of   Chief Complaint   Patient presents with    Nausea    Anxiety       Hypertension  This is a chronic problem. The current episode started more than 1 year ago. The problem has been worse since onset. The problem is resistant. Associated symptoms include anxiety, headaches, malaise/fatigue, palpitations and shortness of breath. Pertinent negatives include no blurred vision, chest pain, orthopnea or peripheral edema. There are no associated agents to hypertension. Risk factors for coronary artery disease include dyslipidemia, post-menopausal state, sedentary lifestyle and stress. Compliance problems include psychosocial issues.         HTN.  She takes amlodipine 5 mg daily and metoprolol XL 25 mg daily.  Home blood pressures are running 110s-140s/60s-80s.  She is tolerating the medications.  Depression with anxiety.  She takes alprazolam and is now on paroxetine X 1 month and is feeling a little better.  She sees a psychiatrist.  She is doing group and individual psychotherapy.  Osteoporosis.  She saw BROOKS Bueno with orthopedic osteoporosis specialists and they are planning to start Tymlos.  Nausea.  This has worsened over the past few week.  She is having a hard time getting her Boost supplements down.  Takes omeprazole 40 mg BID, famotidine 40 mg BID, promethazine suppository.    Patient Active Problem List   Diagnosis    Migraines    Depression with anxiety    Allergic rhinitis    Primary insomnia    GERD (gastroesophageal reflux disease)    Essential hypertension    Routine health maintenance    Family history of colonic polyps    Psoriasis    Age-related osteoporosis without current pathological fracture    Adhesion of abdominal wall    Chronic abdominal pain    Hyponatremia    Generalized abdominal pain    Gastrointestinal hypomotility    Abnormal celiac antibody panel    Goodwin's esophagus without dysplasia    Colonic  inertia    Severe malnutrition       Current Outpatient Medications on File Prior to Visit   Medication Sig    ALPRAZolam (XANAX) 0.5 MG tablet TAKE 1 TABLET BY MOUTH THREE TIMES DAILY AS NEEDED FOR ANXIETY    amLODIPine (NORVASC) 5 MG tablet Take 1 tablet by mouth Daily.    betamethasone valerate (VALISONE) 0.1 % cream Apply 1 Application topically to the appropriate area as directed 2 (Two) Times a Day.    Calcium-Vitamin D-Vitamin K (VIACTIV PO) Take  by mouth Daily.    famotidine (PEPCID) 40 MG tablet Take 1 tablet by mouth 2 (Two) Times a Day. With lunch and at bedtime    gabapentin (NEURONTIN) 300 MG capsule Take 1 capsule by mouth Daily. (Patient taking differently: Take 1 capsule by mouth As Needed.)    Loratadine (CLARITIN PO) Take 10 mg by mouth Daily.    metoprolol succinate XL (TOPROL-XL) 25 MG 24 hr tablet Take 1 tablet by mouth Daily.    Multiple Vitamins-Minerals (MULTI-VITAMIN GUMMIES PO) Take 2 each by mouth Daily.    omeprazole (priLOSEC) 40 MG capsule Take 1 capsule by mouth 2 (Two) Times a Day Before Meals.    ondansetron ODT (ZOFRAN-ODT) 4 MG disintegrating tablet Place 1 tablet on the tongue Every 8 (Eight) Hours As Needed for Nausea or Vomiting.    PARoxetine (PAXIL) 40 MG tablet Take 1 tablet by mouth every night at bedtime.    polyethylene glycol (MIRALAX) 17 GM/SCOOP powder Take 17 g by mouth As Needed.    promethazine (PHENERGAN) 25 MG suppository Insert 1 suppository into the rectum Every 6 (Six) Hours As Needed for Nausea or Vomiting.    SUMAtriptan (IMITREX) 100 MG tablet Take 1 tablet by mouth 1 (One) Time As Needed for Migraine. Take one tablet at onset of headache. May repeat dose one time in 2 hours.    traMADol (ULTRAM) 50 MG tablet Take 1 tablet by mouth Every 8 (Eight) Hours As Needed for Moderate Pain.    vitamin B-12 (CYANOCOBALAMIN) 2500 MCG sublingual tablet tablet Place 5,000 mcg under the tongue 1 (One) Time Per Week. SATURDAY    Wheat Dextrin (BENEFIBER DRINK MIX PO) Take   "by mouth Daily.    zolpidem (AMBIEN) 10 MG tablet TAKE ONE TABLET BY MOUTH ONCE NIGHTLY    clidinium-chlordiazePOXIDE (Librax) 5-2.5 MG per capsule Take 1 capsule by mouth 4 (Four) Times a Day Before Meals & at Bedtime As Needed for Indigestion.    linaclotide (LINZESS) 145 MCG capsule capsule Take 1 capsule by mouth Every Morning Before Breakfast.     No current facility-administered medications on file prior to visit.          The following portions of the patient's history were reviewed and updated as appropriate: allergies, current medications, past family history, past medical history, past social history, past surgical history and problem list.    Review of Systems   Constitutional:  Positive for malaise/fatigue.   Eyes:  Negative for blurred vision.   Respiratory:  Positive for shortness of breath.    Cardiovascular:  Positive for palpitations. Negative for chest pain and orthopnea.   Gastrointestinal:  Positive for nausea.       Objective   Vitals:    04/24/25 1306   BP: 100/50   Pulse: 58   Temp: 97.3 °F (36.3 °C)   SpO2: 100%   Weight: 41.2 kg (90 lb 12.8 oz)   Height: 152.4 cm (60\")       BP Readings from Last 3 Encounters:   04/24/25 100/50   03/27/25 100/68   03/03/25 104/70        Wt Readings from Last 3 Encounters:   04/24/25 41.2 kg (90 lb 12.8 oz)   03/27/25 40.4 kg (89 lb)   03/03/25 40.7 kg (89 lb 12.8 oz)        Body mass index is 17.73 kg/m².  Nursing notes and vitals reviewed.    Physical Exam  Vitals and nursing note reviewed.   Constitutional:       General: She is not in acute distress.     Appearance: She is ill-appearing (frail). She is not toxic-appearing or diaphoretic.   HENT:      Head: Normocephalic and atraumatic.      Mouth/Throat:      Mouth: Mucous membranes are moist.      Pharynx: Oropharyngeal exudate present.   Eyes:      General:         Right eye: No discharge.         Left eye: No discharge.   Cardiovascular:      Rate and Rhythm: Normal rate and regular rhythm.      Heart " "sounds: Normal heart sounds.   Pulmonary:      Effort: Pulmonary effort is normal.      Breath sounds: Normal breath sounds.   Abdominal:      General: There is no distension.      Palpations: Abdomen is soft.   Musculoskeletal:      Cervical back: Neck supple. No rigidity.      Right lower leg: No edema.      Left lower leg: No edema.   Skin:     General: Skin is warm and dry.   Neurological:      General: No focal deficit present.      Mental Status: She is alert and oriented to person, place, and time.   Psychiatric:         Attention and Perception: Attention normal.         Mood and Affect: Mood is depressed.         Speech: Speech normal.         Behavior: Behavior normal.         Judgment: Judgment normal.         No results found for this or any previous visit (from the past 4 weeks).        Assessment & Plan   Diagnoses and all orders for this visit:    1. Essential hypertension (Primary)    2. Depression with anxiety    3. Age-related osteoporosis without current pathological fracture    4. Chronic nausea      HTN.  Improved.  Continue metoprolol XL 25 mg daily.  Continue amlodipine 5 mg daily.  Anticipatory guidance given.  Monitor home blood pressures.    Anxiety.  Improved with paroxetine.  She sees psychiatry.  She asks me to take over the paroxetine prescription.  Osteoporosis.  Specialist note reviewed.  They are planning to start Tymlos.  Chronic nausea/GERD/ abdominal pain.  On antiemetics per GI.  I suggested she increase the gabapentin to daily and then BID if needed.  Currently she is taking it prn pain approximately every 2-3 days but it \"takes 2 hours to kick in.\"  She should keep the tramadol as a prn breakthrough pain.      Medications, including side effects, were discussed with the patient. Patient verbalized understanding.  The plan of care was discussed. All questions were answered. Patient verbalized understanding.      Return in about 3 months (around 7/24/2025).              "

## 2025-05-01 DIAGNOSIS — F51.01 PRIMARY INSOMNIA: ICD-10-CM

## 2025-05-01 RX ORDER — ZOLPIDEM TARTRATE 10 MG/1
10 TABLET ORAL NIGHTLY
Qty: 90 TABLET | Refills: 1 | Status: SHIPPED | OUTPATIENT
Start: 2025-05-01

## 2025-05-01 NOTE — TELEPHONE ENCOUNTER
Rx Refill Note  Requested Prescriptions     Pending Prescriptions Disp Refills    zolpidem (AMBIEN) 10 MG tablet 90 tablet 1     Sig: Take 1 tablet by mouth Every Night.      Last office visit with prescribing clinician: 4/24/2025   Last telemedicine visit with prescribing clinician: Visit date not found   Next office visit with prescribing clinician: 7/24/2025                         Would you like a call back once the refill request has been completed: [] Yes [] No    If the office needs to give you a call back, can they leave a voicemail: [] Yes [] No    Felicia Shelby MA  05/01/25, 12:42 EDT

## 2025-05-01 NOTE — TELEPHONE ENCOUNTER
Urine Toxicology Performed in Last 12 Months    No Benzodiazepines on Active Med List    Medication not refilled in past 28 days       Rx Refill Note  Requested Prescriptions     Pending Prescriptions Disp Refills    zolpidem (AMBIEN) 10 MG tablet 90 tablet 1     Sig: Take 1 tablet by mouth Every Night.      Last office visit with prescribing clinician: 4/24/2025   Last telemedicine visit with prescribing clinician: Visit date not found   Next office visit with prescribing clinician: 7/24/2025                         Would you like a call back once the refill request has been completed: [] Yes [] No    If the office needs to give you a call back, can they leave a voicemail: [] Yes [] No    Felicia Shelby MA  05/01/25, 14:08 EDT

## 2025-05-18 DIAGNOSIS — F41.0 PANIC DISORDER (EPISODIC PAROXYSMAL ANXIETY): ICD-10-CM

## 2025-05-19 ENCOUNTER — PATIENT MESSAGE (OUTPATIENT)
Dept: INTERNAL MEDICINE | Facility: CLINIC | Age: 70
End: 2025-05-19
Payer: MEDICARE

## 2025-05-19 DIAGNOSIS — L40.9 PSORIASIS: ICD-10-CM

## 2025-05-19 RX ORDER — ALPRAZOLAM 0.5 MG
0.5 TABLET ORAL 3 TIMES DAILY PRN
Qty: 90 TABLET | Refills: 1 | Status: SHIPPED | OUTPATIENT
Start: 2025-05-19

## 2025-05-19 NOTE — TELEPHONE ENCOUNTER
Patient comment: Hi Dr. Dubois, I need a new prescription fill for Alprazolam sent to [facility].  Thank you very much.  Martina Hardwick     Controlled Substance Med Protocol [id number]/18/2025 12:25 PM   Protocol Details Urine Toxicology Performed in Last 12 Months    No Benzodiazepines on Active Med List        Rx Refill Note  Requested Prescriptions     Pending Prescriptions Disp Refills    ALPRAZolam (XANAX) 0.5 MG tablet 90 tablet 0     Sig: Take 1 tablet by mouth 3 (Three) Times a Day As Needed. for anxiety      Last office visit with prescribing clinician: 4/24/2025   Last telemedicine visit with prescribing clinician: Visit date not found   Next office visit with prescribing clinician: 7/24/2025                         Would you like a call back once the refill request has been completed: [] Yes [] No    If the office needs to give you a call back, can they leave a voicemail: [] Yes [] No    Felicia Shelby MA  05/19/25, 13:47 EDT

## 2025-05-20 RX ORDER — BETAMETHASONE VALERATE 1.2 MG/G
1 CREAM TOPICAL 2 TIMES DAILY
Qty: 45 G | Refills: 1 | Status: SHIPPED | OUTPATIENT
Start: 2025-05-20

## 2025-05-20 NOTE — TELEPHONE ENCOUNTER
Rx Refill Note  Requested Prescriptions     Pending Prescriptions Disp Refills    betamethasone valerate (VALISONE) 0.1 % cream 45 g 1     Sig: Apply 1 Application topically to the appropriate area as directed 2 (Two) Times a Day.      Last office visit with prescribing clinician: 4/24/2025   Last telemedicine visit with prescribing clinician: Visit date not found   Next office visit with prescribing clinician: 7/24/2025                         Would you like a call back once the refill request has been completed: [] Yes [] No    If the office needs to give you a call back, can they leave a voicemail: [] Yes [] No    Felicia Shelby MA  05/20/25, 11:57 EDT

## 2025-05-26 NOTE — TELEPHONE ENCOUNTER
Caller: Martina Hardwick    Relationship: Self    Best call back number:[phone]       What was the call regarding: PATIENT IS CALLING ABOUT ZOLPIDEM REFILL     WANTS IT SENT TO [facility] [id number] - [place] - [address] - [phone]  - [phone] FX       PLEASE GIVE CALLBACK      Patient tolerated Tdap  injection. Advised to wait 15mins. VIS information received.

## 2025-06-02 ENCOUNTER — OFFICE VISIT (OUTPATIENT)
Dept: GASTROENTEROLOGY | Facility: CLINIC | Age: 70
End: 2025-06-02
Payer: MEDICARE

## 2025-06-02 VITALS
BODY MASS INDEX: 18.02 KG/M2 | HEIGHT: 60 IN | WEIGHT: 91.8 LBS | SYSTOLIC BLOOD PRESSURE: 120 MMHG | DIASTOLIC BLOOD PRESSURE: 80 MMHG

## 2025-06-02 DIAGNOSIS — K22.70 BARRETT'S ESOPHAGUS WITHOUT DYSPLASIA: Primary | ICD-10-CM

## 2025-06-02 DIAGNOSIS — K58.1 IRRITABLE BOWEL SYNDROME WITH CONSTIPATION: ICD-10-CM

## 2025-06-02 PROCEDURE — 1160F RVW MEDS BY RX/DR IN RCRD: CPT

## 2025-06-02 PROCEDURE — 3074F SYST BP LT 130 MM HG: CPT

## 2025-06-02 PROCEDURE — 99214 OFFICE O/P EST MOD 30 MIN: CPT

## 2025-06-02 PROCEDURE — 1159F MED LIST DOCD IN RCRD: CPT

## 2025-06-02 PROCEDURE — 3079F DIAST BP 80-89 MM HG: CPT

## 2025-06-02 NOTE — PROGRESS NOTES
PATIENT INFORMATION  Martina Hardwick       - 1955    CHIEF COMPLAINT  Chief Complaint   Patient presents with    Nausea    Abdominal Pain    Irritable Bowel Syndrome    Constipation       HISTORY OF PRESENT ILLNESS  Here today for IBS-C and GERD follow-up     Nausea and vomiting most of the month, barely eating, pain just set and has not relieved. Reglan did not help.     IBS-C: Bowels are daily, previously managed with miralax. No improvement in abdominal cramping with linzess. Miralax daily and bowels move several times and can be liquid bile with some burning. Pain improves after BM.  reports some slight improvement the last 6 mos.    Adhesion: Tramadol and gabapentin help with pain, but only taking when she has pain 3-4 times a week. Encouraged to take gabapentin daily, tramadol PRN. Reviewed trialing librax. No improvement librax     GERD: BID PPI and stomach is doing better and has not needed antinausea meds    Not good depression and anxiety wise, feeling hopeless at times due to pain. No longer seeing psych because did not see any improvement.     2023 EGD for weight loss positive for normal duodenum, chemical gastropathy, reflux with Barretts and candida esophagitis. Negative for celiac, HP, dysplasia.     25 SBFR unremarkable, s/p sub total colectomy        REVIEWED PERTINENT RESULTS/ LABS  Lab Results   Component Value Date    CASEREPORT  2023     Surgical Pathology Report                         Case: [id number]                                  Authorizing Provider:  Ranjeet Salinas,   Collected:           2023 09:12 PM                                 MD                                                                           Ordering Location:     [facility]  Received:            2023 07:05 AM                                 MAIN OR                                                                      Pathologist:           Margie Kenny  MD RENETTA                                                          Specimen:    Small Intestine, small bowel resection for permanent                                       FINALDX  11/27/2023     1. Small Bowel, Resection:   A. Grossly identified anastomosis with ulceration and necrosis.   B. Serositis and serosal adhesions, which extend to the surgical margins of resection.   B. Two benign lymph nodes.       Lab Results   Component Value Date    HGB 13.6 02/10/2025    MCV 92.6 02/10/2025     02/10/2025    ALT 20 02/01/2025    AST 28 02/01/2025    HGBA1C 5.10 12/23/2024    INR 1.10 12/06/2023    TRIG 124 12/23/2024    FERRITIN 30 08/24/2023      No results found.    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]     Goodwin's esophagus without dysplasia [K22.70]     Abnormal celiac antibody panel [R89.4]     Gastrointestinal hypomotility [K31.89]     Generalized abdominal pain [R10.84]     Chronic abdominal pain [R10.9, G89.29]     Hyponatremia [E87.1]     Adhesion of abdominal wall [K66.0]     Age-related osteoporosis without current pathological fracture [M81.0]     Psoriasis [L40.9]     Family history of colonic polyps [Z83.719]     Routine health maintenance [Z00.00]     Essential hypertension [I10]     GERD (gastroesophageal reflux disease) [K21.9]     Allergic rhinitis [J30.9]     Primary insomnia [F51.01]     Migraines [G43.909]     Depression with anxiety [F41.8]          PAST MEDICAL HISTORY  Past Medical History:   Diagnosis Date    Allergic 1994    Arthritis     Autoantibody titer positive     Goodwin esophagus     Bloating     Cataract     BILAT    Cholelithiasis removed 2018    Chronic abdominal pain     Chronic constipation     Encounter for preventive health examination     Endometriosis     Gastritis     Gastrointestinal hypomotility     GERD (gastroesophageal reflux disease)     Headache,  tension-type     History of transfusion December    Hypertension     Hyponatremia     Insomnia     Intestinal autonomic neuropathy     Irritable bowel syndrome     Migraine     Mixed anxiety and depressive disorder     Moderate malnutrition     Noninfectious gastroenteritis     OP (osteoporosis)     Osteopenia     Osteoporosis     PONV (postoperative nausea and vomiting) 08/17/2018    Postoperative wound infection January    Psoriasis     KNEES, ELBOWS BILAT AND SCALP    Seasonal allergies     Visceral hyperalgesia          SURGICAL HISTORY  Past Surgical History:   Procedure Laterality Date    ABDOMINAL SURGERY  1994 2018 2023    APPENDECTOMY      CHOLECYSTECTOMY N/A 08/17/2018    Procedure: CHOLECYSTECTOMY LAPAROSCOPIC converted to open procedure;  Surgeon: Hernan Hinds MD;  Location: [facility] OR;  Service: General    COLON RESECTION N/A 11/20/2023    Procedure: OPEN SUBTOTAL COLECTOMY AND ADESIOLYSIS;  Surgeon: Ranjeet Salinas MD;  Location: [facility] MAIN OR;  Service: General;  Laterality: N/A;    COLON SURGERY      STATES TOTAL OF 3 COLON SURGERY    COLONOSCOPY      COLONOSCOPY N/A 12/12/2018    Procedure: COLONOSCOPY;  Surgeon: Darien Ruff MD;  Location: [facility] OR;  Service: Gastroenterology    COLONOSCOPY N/A 10/13/2023    Procedure: COLONOSCOPY TO CECUM;  Surgeon: Ranjeet Salinas MD;  Location: [facility] ENDOSCOPY;  Service: General;  Laterality: N/A;  PREOP/ CHRONIC CONSTIPATION- POSTOP/ NORMAL    DIAGNOSTIC LAPAROSCOPY  1990    DILATATION AND CURETTAGE  1985    ENDOSCOPY N/A 05/13/2016    Procedure: ESOPHAGOGASTRODUODENOSCOPY ;  Surgeon: Darien Ruff MD;  Location: [facility] OR;  Service:     ENDOSCOPY N/A 05/01/2023    Procedure: ESOPHAGOGASTRODUODENOSCOPY WITH BIOPSY;  Surgeon: Darien Ruff MD;  Location: [facility] OR;  Service: Gastroenterology;  Laterality: N/A;  Mild Thrush; Gastritis; Esophagitis- thrush and reflux; Biopsies- duodenal, gastric,  esophagus    EXPLORATORY LAPAROTOMY N/A 11/27/2023    Procedure: exploratory laparotomy, small bowel resection;  Surgeon: Ranjeet Salinas MD;  Location: [facility] MAIN OR;  Service: General;  Laterality: N/A;    HYSTERECTOMY      REVISION / TAKEDOWN COLOSTOMY      SMALL INTESTINE SURGERY      UPPER GASTROINTESTINAL ENDOSCOPY  2023         FAMILY HISTORY  Family History   Problem Relation Age of Onset    Hyperlipidemia Mother     Macular degeneration Mother     Hearing loss Mother     Arthritis Mother     Vision loss Mother     Hypertension Mother     Heart disease Father         Had 5 bypass surgery    Hyperlipidemia Father     Cancer Father         Prostate and bladder    Depression Father     Stroke Father     Hypertension Father     Colon polyps Father     Depression Sister     COPD Sister     Hyperlipidemia Sister     Arthritis Sister     Hypertension Sister     Irritable bowel syndrome Sister     Hyperlipidemia Brother     Depression Brother     Kidney disease Brother     Arthritis Brother     Hypertension Brother     Colon polyps Brother     Irritable bowel syndrome Brother     Ulcerative colitis Brother         no ulcers anymore    Breast cancer Maternal Aunt     Vision loss Maternal Aunt     Breast cancer Cousin     Breast cancer Cousin     Stroke Paternal Grandfather     Depression Sister     Depression Brother     Hyperlipidemia Brother     Vision loss Maternal Aunt         Maternal aunt    Colon cancer Neg Hx     Malig Hyperthermia Neg Hx          SOCIAL HISTORY  Social History     Occupational History    Not on file   Tobacco Use    Smoking status: Never     Passive exposure: Never    Smokeless tobacco: Never   Vaping Use    Vaping status: Never Used   Substance and Sexual Activity    Alcohol use: Not Currently    Drug use: No    Sexual activity: Not Currently     Partners: Male     Birth control/protection: Post-menopausal, Hysterectomy         CURRENT MEDICATIONS    Current Outpatient  Medications:     ALPRAZolam (XANAX) 0.5 MG tablet, Take 1 tablet by mouth 3 (Three) Times a Day As Needed for Anxiety. for anxiety, Disp: 90 tablet, Rfl: 1    amLODIPine (NORVASC) 5 MG tablet, Take 1 tablet by mouth Daily., Disp: 90 tablet, Rfl: 1    betamethasone valerate (VALISONE) 0.1 % cream, Apply 1 Application topically to the appropriate area as directed 2 (Two) Times a Day., Disp: 45 g, Rfl: 1    Calcium-Vitamin D-Vitamin K (VIACTIV PO), Take  by mouth Daily., Disp: , Rfl:     famotidine (PEPCID) 40 MG tablet, Take 1 tablet by mouth 2 (Two) Times a Day. With lunch and at bedtime, Disp: 180 tablet, Rfl: 3    gabapentin (NEURONTIN) 300 MG capsule, Take 1 capsule by mouth Daily. (Patient taking differently: Take 1 capsule by mouth As Needed.), Disp: 30 capsule, Rfl: 5    Loratadine (CLARITIN PO), Take 10 mg by mouth Daily., Disp: , Rfl:     metoprolol succinate XL (TOPROL-XL) 25 MG 24 hr tablet, Take 1 tablet by mouth Daily., Disp: 90 tablet, Rfl: 1    Multiple Vitamins-Minerals (MULTI-VITAMIN GUMMIES PO), Take 2 each by mouth Daily., Disp: , Rfl:     omeprazole (priLOSEC) 40 MG capsule, Take 1 capsule by mouth 2 (Two) Times a Day Before Meals., Disp: 190 capsule, Rfl: 3    ondansetron ODT (ZOFRAN-ODT) 4 MG disintegrating tablet, Place 1 tablet on the tongue Every 8 (Eight) Hours As Needed for Nausea or Vomiting., Disp: 60 tablet, Rfl: 1    PARoxetine (PAXIL) 40 MG tablet, Take 1 tablet by mouth every night at bedtime., Disp: , Rfl:     polyethylene glycol (MIRALAX) 17 GM/SCOOP powder, Take 17 g by mouth As Needed., Disp: , Rfl:     promethazine (PHENERGAN) 25 MG suppository, Insert 1 suppository into the rectum Every 6 (Six) Hours As Needed for Nausea or Vomiting., Disp: 30 suppository, Rfl: 1    SUMAtriptan (IMITREX) 100 MG tablet, Take 1 tablet by mouth 1 (One) Time As Needed for Migraine. Take one tablet at onset of headache. May repeat dose one time in 2 hours., Disp: 27 tablet, Rfl: 3    traMADol (ULTRAM)  "50 MG tablet, Take 1 tablet by mouth Every 8 (Eight) Hours As Needed for Moderate Pain., Disp: 30 tablet, Rfl: 1    vitamin B-12 (CYANOCOBALAMIN) 2500 MCG sublingual tablet tablet, Place 5,000 mcg under the tongue 1 (One) Time Per Week. SATURDAY, Disp: , Rfl:     Wheat Dextrin (BENEFIBER DRINK MIX PO), Take  by mouth Daily., Disp: , Rfl:     zolpidem (AMBIEN) 10 MG tablet, Take 1 tablet by mouth Every Night., Disp: 90 tablet, Rfl: 1    ALLERGIES  Hydrocodone and Sulfa antibiotics    VITALS  Vitals:    06/02/25 1302   BP: 120/80   BP Location: Left arm   Patient Position: Sitting   Cuff Size: Pediatric   Weight: 41.6 kg (91 lb 12.8 oz)   Height: 152.4 cm (60\")       PHYSICAL EXAM  Debilities/Disabilities Identified: None  Emotional Behavior: Appropriate  Wt Readings from Last 3 Encounters:   06/02/25 41.6 kg (91 lb 12.8 oz)   04/24/25 41.2 kg (90 lb 12.8 oz)   03/27/25 40.4 kg (89 lb)     Ht Readings from Last 1 Encounters:   06/02/25 152.4 cm (60\")     Body mass index is 17.93 kg/m².  Physical Exam  Constitutional:       General: She is not in acute distress.     Appearance: Normal appearance. She is not ill-appearing.   HENT:      Head: Normocephalic and atraumatic.      Mouth/Throat:      Mouth: Mucous membranes are moist.      Pharynx: No posterior oropharyngeal erythema.   Eyes:      General: No scleral icterus.  Cardiovascular:      Rate and Rhythm: Normal rate and regular rhythm.      Heart sounds: Normal heart sounds.   Pulmonary:      Effort: Pulmonary effort is normal.      Breath sounds: Normal breath sounds.   Abdominal:      General: Abdomen is flat. Bowel sounds are normal. There is no distension.      Palpations: Abdomen is soft. There is no mass.      Tenderness: There is abdominal tenderness in the right lower quadrant. There is no guarding or rebound. Negative signs include Collins's sign.      Hernia: No hernia is present.   Musculoskeletal:      Cervical back: Neck supple.   Skin:     General: Skin " is warm.      Capillary Refill: Capillary refill takes less than 2 seconds.   Neurological:      General: No focal deficit present.      Mental Status: She is alert and oriented to person, place, and time.   Psychiatric:         Mood and Affect: Mood normal.         Behavior: Behavior normal.         Thought Content: Thought content normal.         Judgment: Judgment normal.         CLINICAL DATA REVIEWED   reviewed previous lab results and integrated with today's visit, reviewed notes from other physicians and/or last GI encounter, reviewed previous endoscopy results and available photos, reviewed surgical pathology results from previous biopsies    ASSESSMENT  Diagnoses and all orders for this visit:    Goodwin's esophagus without dysplasia    Irritable bowel syndrome with constipation          PLAN    Barretts: Continue BID PPI  Zofran and phenergan PRN for nausea  IBC-C: Continue miralax  Reviewed better management of pain, take gabapentin daily, use ultram PRN up to daily and try to prevent such severe episodes    Return in about 3 months (around 9/2/2025).    I have discussed the above plan with the patient.  They verbalize understanding and are in agreement with the plan.  They have been advised to contact the office for any questions, concerns, or changes related to their health.

## 2025-06-15 ENCOUNTER — PATIENT MESSAGE (OUTPATIENT)
Dept: INTERNAL MEDICINE | Facility: CLINIC | Age: 70
End: 2025-06-15
Payer: MEDICARE

## 2025-06-16 RX ORDER — PAROXETINE 40 MG/1
40 TABLET, FILM COATED ORAL
Qty: 90 TABLET | Refills: 1 | Status: SHIPPED | OUTPATIENT
Start: 2025-06-16

## 2025-06-23 ENCOUNTER — TELEPHONE (OUTPATIENT)
Dept: GASTROENTEROLOGY | Facility: CLINIC | Age: 70
End: 2025-06-23
Payer: MEDICARE

## 2025-06-23 RX ORDER — FAMOTIDINE 40 MG/1
40 TABLET, FILM COATED ORAL 2 TIMES DAILY
Qty: 180 TABLET | Refills: 3 | Status: SHIPPED | OUTPATIENT
Start: 2025-06-23

## 2025-07-15 ENCOUNTER — LAB (OUTPATIENT)
Dept: LAB | Facility: HOSPITAL | Age: 70
End: 2025-07-15
Payer: MEDICARE

## 2025-07-15 DIAGNOSIS — R10.9 CHRONIC ABDOMINAL PAIN: ICD-10-CM

## 2025-07-15 DIAGNOSIS — G89.29 CHRONIC ABDOMINAL PAIN: ICD-10-CM

## 2025-07-15 PROCEDURE — 82607 VITAMIN B-12: CPT | Performed by: FAMILY MEDICINE

## 2025-07-15 PROCEDURE — 85027 COMPLETE CBC AUTOMATED: CPT | Performed by: FAMILY MEDICINE

## 2025-07-15 PROCEDURE — 80053 COMPREHEN METABOLIC PANEL: CPT | Performed by: FAMILY MEDICINE

## 2025-07-15 PROCEDURE — 82306 VITAMIN D 25 HYDROXY: CPT | Performed by: FAMILY MEDICINE

## 2025-07-15 PROCEDURE — 84443 ASSAY THYROID STIM HORMONE: CPT | Performed by: FAMILY MEDICINE

## 2025-07-16 DIAGNOSIS — G89.29 CHRONIC ABDOMINAL PAIN: ICD-10-CM

## 2025-07-16 DIAGNOSIS — R10.9 CHRONIC ABDOMINAL PAIN: ICD-10-CM

## 2025-07-16 RX ORDER — GABAPENTIN 300 MG/1
300 CAPSULE ORAL DAILY
Qty: 30 CAPSULE | Refills: 5 | Status: SHIPPED | OUTPATIENT
Start: 2025-07-16

## 2025-07-16 RX ORDER — TRAMADOL HYDROCHLORIDE 50 MG/1
50 TABLET ORAL EVERY 8 HOURS PRN
Qty: 30 TABLET | Refills: 0 | Status: SHIPPED | OUTPATIENT
Start: 2025-07-16

## 2025-07-16 NOTE — TELEPHONE ENCOUNTER
Rx Refill Note  Requested Prescriptions     Pending Prescriptions Disp Refills    gabapentin (NEURONTIN) 300 MG capsule [Pharmacy Med Name: Gabapentin 300 MG Oral Capsule] 30 capsule 0     Sig: Take 1 capsule by mouth once daily      Last office visit with prescribing clinician: 4/24/2025   Last telemedicine visit with prescribing clinician: Visit date not found   Next office visit with prescribing clinician: 7/23/2025                         Would you like a call back once the refill request has been completed: [] Yes [] No    If the office needs to give you a call back, can they leave a voicemail: [] Yes [] No    Felicia Shelby MA  07/16/25, 13:01 EDT

## 2025-07-16 NOTE — TELEPHONE ENCOUNTER
Urine Toxicology Performed in Last 12 Months    No Benzodiazepines on Active Med List    Medication not refilled in past 28 days       Rx Refill Note  Requested Prescriptions     Pending Prescriptions Disp Refills    traMADol (ULTRAM) 50 MG tablet [Pharmacy Med Name: traMADol HCl 50 MG Oral Tablet] 30 tablet 0     Sig: TAKE 1 TABLET BY MOUTH EVERY 8 HOURS AS NEEDED FOR MODERATE PAIN      Last office visit with prescribing clinician: 4/24/2025   Last telemedicine visit with prescribing clinician: Visit date not found   Next office visit with prescribing clinician: 7/23/2025                         Would you like a call back once the refill request has been completed: [] Yes [] No    If the office needs to give you a call back, can they leave a voicemail: [] Yes [] No    Felicia Shelby MA  07/16/25, 09:12 EDT

## 2025-07-17 DIAGNOSIS — F51.01 PRIMARY INSOMNIA: ICD-10-CM

## 2025-07-17 RX ORDER — ZOLPIDEM TARTRATE 10 MG/1
10 TABLET ORAL NIGHTLY
Qty: 90 TABLET | Refills: 1 | Status: SHIPPED | OUTPATIENT
Start: 2025-07-17

## 2025-07-17 NOTE — TELEPHONE ENCOUNTER
Rx Refill Note  Requested Prescriptions     Pending Prescriptions Disp Refills    zolpidem (AMBIEN) 10 MG tablet 90 tablet 1     Sig: Take 1 tablet by mouth Every Night.      Last office visit with prescribing clinician: 4/24/2025   Last telemedicine visit with prescribing clinician: Visit date not found   Next office visit with prescribing clinician: 7/23/2025                         Would you like a call back once the refill request has been completed: [] Yes [] No    If the office needs to give you a call back, can they leave a voicemail: [] Yes [] No    Felicia Shelby MA  07/17/25, 08:36 EDT

## 2025-07-23 ENCOUNTER — OFFICE VISIT (OUTPATIENT)
Dept: INTERNAL MEDICINE | Facility: CLINIC | Age: 70
End: 2025-07-23
Payer: MEDICARE

## 2025-07-23 VITALS
OXYGEN SATURATION: 98 % | TEMPERATURE: 97.8 F | BODY MASS INDEX: 17.67 KG/M2 | SYSTOLIC BLOOD PRESSURE: 110 MMHG | DIASTOLIC BLOOD PRESSURE: 68 MMHG | HEIGHT: 60 IN | HEART RATE: 49 BPM | WEIGHT: 90 LBS

## 2025-07-23 DIAGNOSIS — F41.8 DEPRESSION WITH ANXIETY: ICD-10-CM

## 2025-07-23 DIAGNOSIS — M81.0 AGE-RELATED OSTEOPOROSIS WITHOUT CURRENT PATHOLOGICAL FRACTURE: ICD-10-CM

## 2025-07-23 DIAGNOSIS — I10 ESSENTIAL HYPERTENSION: Primary | ICD-10-CM

## 2025-07-23 DIAGNOSIS — R10.84 GENERALIZED ABDOMINAL PAIN: ICD-10-CM

## 2025-07-23 DIAGNOSIS — Z00.00 ROUTINE HEALTH MAINTENANCE: ICD-10-CM

## 2025-07-23 DIAGNOSIS — K21.9 GASTROESOPHAGEAL REFLUX DISEASE WITHOUT ESOPHAGITIS: ICD-10-CM

## 2025-07-23 DIAGNOSIS — R39.15 URINARY URGENCY: ICD-10-CM

## 2025-07-23 LAB
BILIRUB BLD-MCNC: NEGATIVE MG/DL
CLARITY, POC: CLEAR
COLOR UR: YELLOW
EXPIRATION DATE: ABNORMAL
GLUCOSE UR STRIP-MCNC: NEGATIVE MG/DL
KETONES UR QL: NEGATIVE
LEUKOCYTE EST, POC: NEGATIVE
Lab: ABNORMAL
NITRITE UR-MCNC: NEGATIVE MG/ML
PH UR: 5.5 [PH] (ref 5–8)
PROT UR STRIP-MCNC: ABNORMAL MG/DL
RBC # UR STRIP: ABNORMAL /UL
SP GR UR: 1.01 (ref 1–1.03)
UROBILINOGEN UR QL: ABNORMAL

## 2025-07-23 NOTE — PROGRESS NOTES
Subjective   Martina Hardwick is a 69 y.o. female presenting today for follow up of   Chief Complaint   Patient presents with    Hypertension     3 mo f/u    Anxiety       Hypertension  Chronicity:  Chronic  Onset:  More than 1 year ago  Progression since onset:  Worse  Condition status:  Resistant  Associated symptoms: anxiety, headaches, malaise/fatigue, palpitations and shortness of breath    Associated symptoms: no blurred vision, no chest pain, no orthopnea and no peripheral edema    Agents associated with hypertension:  No associated agents  CAD risks:  Dyslipidemia, post-menopausal state, sedentary lifestyle and stress  Compliance problems:  Psychosocial issues  Anxiety    Symptoms: malaise/fatigue, nausea, pounding in chest and shortness of breath      Symptoms: no chest pain    Abdominal Pain  Chronicity:  Chronic  Onset:  More than 1 year ago  Onset quality:  Gradual  Frequency:  Daily  Progression since onset:  Unchanged  Episode duration:  Hours  Pain location:  RLQ and RUQ  Pain - numeric:  8/10  Pain quality:  Aching, burning and a sensation of fullness  Radiates to:  RLQ, RUQ and back  Associated symptoms: anorexia, headaches, nausea and weight loss    Aggravated by:  Bowel movement and eating  Relieved by:  Bowel movements, palpation and recumbency       HTN.  She takes amlodipine 5 mg daily and metoprolol XL 25 mg daily.  She holds these sometimes when her BP is running lower.  Home blood pressures are running 110s-140s/60s-80s.  She is tolerating the medications.  Depression with anxiety.  She takes alprazolam and paroxetine.  She no longer sees a psychiatrist.  She is doing group and individual psychotherapy.  Osteoporosis.  She saw BROOKS Bueno with orthopedic osteoporosis specialists and they are planning to start Tymlos.  Nausea.  Takes omeprazole 40 mg BID, famotidine 40 mg BID, promethazine suppository.  Urinary urgency.  She is having some incontinence during the night.    Patient Active  Problem List   Diagnosis    Migraines    Depression with anxiety    Allergic rhinitis    Primary insomnia    GERD (gastroesophageal reflux disease)    Essential hypertension    Routine health maintenance    Family history of colonic polyps    Psoriasis    Age-related osteoporosis without current pathological fracture    Adhesion of abdominal wall    Chronic abdominal pain    Hyponatremia    Generalized abdominal pain    Gastrointestinal hypomotility    Abnormal celiac antibody panel    Goodwin's esophagus without dysplasia    Colonic inertia    Severe malnutrition       Current Outpatient Medications on File Prior to Visit   Medication Sig    ALPRAZolam (XANAX) 0.5 MG tablet Take 1 tablet by mouth 3 (Three) Times a Day As Needed for Anxiety. for anxiety    amLODIPine (NORVASC) 5 MG tablet Take 1 tablet by mouth Daily.    betamethasone valerate (VALISONE) 0.1 % cream Apply 1 Application topically to the appropriate area as directed 2 (Two) Times a Day.    Calcium-Vitamin D-Vitamin K (VIACTIV PO) Take  by mouth Daily.    famotidine (PEPCID) 40 MG tablet Take 1 tablet by mouth 2 (Two) Times a Day. With lunch and at bedtime    gabapentin (NEURONTIN) 300 MG capsule Take 1 capsule by mouth once daily    Loratadine (CLARITIN PO) Take 10 mg by mouth Daily.    metoprolol succinate XL (TOPROL-XL) 25 MG 24 hr tablet Take 1 tablet by mouth Daily.    Multiple Vitamins-Minerals (MULTI-VITAMIN GUMMIES PO) Take 2 each by mouth Daily.    omeprazole (priLOSEC) 40 MG capsule Take 1 capsule by mouth 2 (Two) Times a Day Before Meals.    ondansetron ODT (ZOFRAN-ODT) 4 MG disintegrating tablet Place 1 tablet on the tongue Every 8 (Eight) Hours As Needed for Nausea or Vomiting.    PARoxetine (PAXIL) 40 MG tablet Take 1 tablet by mouth every night at bedtime.    polyethylene glycol (MIRALAX) 17 GM/SCOOP powder Take 17 g by mouth As Needed.    promethazine (PHENERGAN) 25 MG suppository Insert 1 suppository into the rectum Every 6 (Six) Hours  "As Needed for Nausea or Vomiting.    SUMAtriptan (IMITREX) 100 MG tablet Take 1 tablet by mouth 1 (One) Time As Needed for Migraine. Take one tablet at onset of headache. May repeat dose one time in 2 hours.    traMADol (ULTRAM) 50 MG tablet TAKE 1 TABLET BY MOUTH EVERY 8 HOURS AS NEEDED FOR MODERATE PAIN    vitamin B-12 (CYANOCOBALAMIN) 2500 MCG sublingual tablet tablet Place 5,000 mcg under the tongue 1 (One) Time Per Week. SATURDAY    Wheat Dextrin (BENEFIBER DRINK MIX PO) Take  by mouth Daily.    zolpidem (AMBIEN) 10 MG tablet Take 1 tablet by mouth Every Night.     No current facility-administered medications on file prior to visit.          The following portions of the patient's history were reviewed and updated as appropriate: allergies, current medications, past family history, past medical history, past social history, past surgical history and problem list.    Review of Systems   Constitutional:  Positive for malaise/fatigue and unexpected weight loss.   Eyes:  Negative for blurred vision.   Respiratory:  Positive for shortness of breath.    Cardiovascular:  Positive for palpitations. Negative for chest pain and orthopnea.   Gastrointestinal:  Positive for abdominal pain, anorexia and nausea.       Objective   Vitals:    07/23/25 1146   BP: 110/68   BP Location: Left arm   Patient Position: Sitting   Cuff Size: Adult   Pulse: (!) 49   Temp: 97.8 °F (36.6 °C)   TempSrc: Infrared   SpO2: 98%   Weight: 40.8 kg (90 lb)   Height: 152.4 cm (60\")       BP Readings from Last 3 Encounters:   07/23/25 110/68   06/02/25 120/80   04/24/25 100/50        Wt Readings from Last 3 Encounters:   07/23/25 40.8 kg (90 lb)   06/02/25 41.6 kg (91 lb 12.8 oz)   04/24/25 41.2 kg (90 lb 12.8 oz)        Body mass index is 17.58 kg/m².  Nursing notes and vitals reviewed.    Physical Exam  Vitals and nursing note reviewed.   Constitutional:       General: She is not in acute distress.     Appearance: She is ill-appearing (frail). " She is not toxic-appearing or diaphoretic.   HENT:      Head: Normocephalic and atraumatic.      Mouth/Throat:      Mouth: Mucous membranes are moist.   Eyes:      General:         Right eye: No discharge.         Left eye: No discharge.   Cardiovascular:      Rate and Rhythm: Normal rate and regular rhythm.      Heart sounds: Normal heart sounds.   Pulmonary:      Effort: Pulmonary effort is normal.      Breath sounds: Normal breath sounds.   Abdominal:      General: There is no distension.      Palpations: Abdomen is soft.   Musculoskeletal:      Cervical back: Neck supple. No rigidity.      Right lower leg: No edema.      Left lower leg: No edema.   Skin:     General: Skin is warm and dry.   Neurological:      General: No focal deficit present.      Mental Status: She is alert and oriented to person, place, and time.   Psychiatric:         Attention and Perception: Attention normal.         Mood and Affect: Mood is depressed.         Speech: Speech normal.         Behavior: Behavior normal.         Judgment: Judgment normal.         Recent Results (from the past 4 weeks)   Comprehensive Metabolic Panel    Collection Time: 07/15/25 12:33 PM    Specimen: Blood   Result Value Ref Range    Glucose 96 65 - 99 mg/dL    BUN 13.0 8.0 - 23.0 mg/dL    Creatinine 0.96 0.57 - 1.00 mg/dL    Sodium 131 (L) 136 - 145 mmol/L    Potassium 4.1 3.5 - 5.2 mmol/L    Chloride 92 (L) 98 - 107 mmol/L    CO2 25.0 22.0 - 29.0 mmol/L    Calcium 9.6 8.6 - 10.5 mg/dL    Total Protein 7.3 6.0 - 8.5 g/dL    Albumin 4.5 3.5 - 5.2 g/dL    ALT (SGPT) 12 1 - 33 U/L    AST (SGOT) 26 1 - 32 U/L    Alkaline Phosphatase 111 39 - 117 U/L    Total Bilirubin 0.7 0.0 - 1.2 mg/dL    Globulin 2.8 gm/dL    A/G Ratio 1.6 g/dL    BUN/Creatinine Ratio 13.5 7.0 - 25.0    Anion Gap 14.0 5.0 - 15.0 mmol/L    eGFR 64.2 >60.0 mL/min/1.73   TSH    Collection Time: 07/15/25 12:33 PM    Specimen: Blood   Result Value Ref Range    TSH 1.420 0.270 - 4.200 uIU/mL    Vitamin B12    Collection Time: 07/15/25 12:33 PM    Specimen: Blood   Result Value Ref Range    Vitamin B-12 1,295 (H) 211 - 946 pg/mL   Vitamin D,25-Hydroxy    Collection Time: 07/15/25 12:33 PM    Specimen: Blood   Result Value Ref Range    25 Hydroxy, Vitamin D 57.9 30.0 - 100.0 ng/ml   CBC (No Diff)    Collection Time: 07/15/25 12:33 PM    Specimen: Blood   Result Value Ref Range    WBC 4.05 3.40 - 10.80 10*3/mm3    RBC 4.09 3.77 - 5.28 10*6/mm3    Hemoglobin 12.7 12.0 - 15.9 g/dL    Hematocrit 38.1 34.0 - 46.6 %    MCV 93.2 79.0 - 97.0 fL    MCH 31.1 26.6 - 33.0 pg    MCHC 33.3 31.5 - 35.7 g/dL    RDW 11.7 (L) 12.3 - 15.4 %    RDW-SD 40.1 37.0 - 54.0 fl    MPV 11.1 6.0 - 12.0 fL    Platelets 177 140 - 450 10*3/mm3           Assessment & Plan   Diagnoses and all orders for this visit:    1. Essential hypertension (Primary)    2. Depression with anxiety    3. Age-related osteoporosis without current pathological fracture    4. Generalized abdominal pain    5. Gastroesophageal reflux disease without esophagitis    6. Routine health maintenance    7. Urinary urgency  -     POCT urinalysis dipstick, automated  -     Urine Culture - Urine, Urine, Clean Catch      HTN.  Improved.  Continue metoprolol XL 25 mg daily.  Continue amlodipine 5 mg daily.  Anticipatory guidance given.  Monitor home blood pressures.    Anxiety.  Improved with paroxetine.  She sees psychiatry.  She asks me to take over the paroxetine prescription.  Osteoporosis.  Specialist note reviewed.  They were planning to start Tymlos, but she doesn't think it was covered by insurance.  She needs to f/u with her osteoporosis specialist.  Chronic nausea/GERD/ abdominal pain.  I suggested she increase the gabapentin to daily and then BID if needed.  She should keep the tramadol as a prn breakthrough pain.  Routine health maint.  Mammogram UTD.  Tdap, RSV, pneumococcal vaccines UTD.   Shingrix at pharmacy.  Urinary urgency.  Check UA and culture.  She  doesn't drink caffeine, alcohol, or juice.      Medications, including side effects, were discussed with the patient. Patient verbalized understanding.  The plan of care was discussed. All questions were answered. Patient verbalized understanding.      Return in about 3 months (around 10/23/2025) for Medicare Wellness.

## 2025-07-25 LAB
BACTERIA UR CULT: NORMAL
BACTERIA UR CULT: NORMAL

## 2025-07-31 DIAGNOSIS — R31.9 HEMATURIA, UNSPECIFIED TYPE: Primary | ICD-10-CM

## 2025-08-01 ENCOUNTER — LAB (OUTPATIENT)
Dept: LAB | Facility: HOSPITAL | Age: 70
End: 2025-08-01
Payer: MEDICARE

## 2025-08-01 PROCEDURE — 81001 URINALYSIS AUTO W/SCOPE: CPT | Performed by: FAMILY MEDICINE

## 2025-08-17 DIAGNOSIS — F41.0 PANIC DISORDER (EPISODIC PAROXYSMAL ANXIETY): ICD-10-CM

## 2025-08-17 DIAGNOSIS — R10.9 CHRONIC ABDOMINAL PAIN: ICD-10-CM

## 2025-08-17 DIAGNOSIS — G89.29 CHRONIC ABDOMINAL PAIN: ICD-10-CM

## 2025-08-19 RX ORDER — ALPRAZOLAM 0.5 MG
0.5 TABLET ORAL 3 TIMES DAILY PRN
Qty: 90 TABLET | Refills: 0 | Status: SHIPPED | OUTPATIENT
Start: 2025-08-19

## 2025-08-19 RX ORDER — TRAMADOL HYDROCHLORIDE 50 MG/1
50 TABLET ORAL EVERY 8 HOURS PRN
Qty: 30 TABLET | Refills: 0 | Status: SHIPPED | OUTPATIENT
Start: 2025-08-19

## (undated) DEVICE — SUT VIC 2/0 CT1 36IN UD VCP945H

## (undated) DEVICE — BW-412T DISP COMBO CLEANING BRUSH: Brand: SINGLE USE COMBINATION CLEANING BRUSH

## (undated) DEVICE — CANN O2 ETCO2 FITS ALL CONN CO2 SMPL A/ 7IN DISP LF

## (undated) DEVICE — CARTRDG GRSPR BABCOCK ATRAUM 5MM 38CM

## (undated) DEVICE — TP SILK DURAPORE 2 IN

## (undated) DEVICE — THE BITE BLOCK MAXI, LATEX FREE STRAP IS USED TO PROTECT THE ENDOSCOPE INSERTION TUBE FROM BEING BITTEN BY THE PATIENT.

## (undated) DEVICE — NDL VERRES PNEUMO 120MM PN120 LF

## (undated) DEVICE — MASK,FACE,FLUID RESIST,SHLD,EARLOOP: Brand: MEDLINE

## (undated) DEVICE — DRN WND HUBLSS FLUT FULL PERF SIL10MM

## (undated) DEVICE — SUT SILK 3/0 SH CR8 30IN C017D

## (undated) DEVICE — CLN CAUTRY TP 2X2 STRL

## (undated) DEVICE — SAFELINER SUCTION CANISTER 1000CC: Brand: DEROYAL

## (undated) DEVICE — TOWEL,OR,DSP,ST,BLUE,STD,4/PK,20PK/CS: Brand: MEDLINE

## (undated) DEVICE — GOWN ISOL W/THUMB UNIV BLU BX/15

## (undated) DEVICE — SOL NACL 0.9PCT 1000ML

## (undated) DEVICE — LAPAROSCOPIC TROCAR SLEEVE/SINGLE USE: Brand: KII® OPTICAL ACCESS SYSTEM

## (undated) DEVICE — TBG INSUFL W FLTR STRL

## (undated) DEVICE — SYS CLS CARTR/THOMSN SG 10/12 AND 15MM

## (undated) DEVICE — DRSNG WND BORDR/ADHS NONADHR/GZ LF 4X14IN STRL

## (undated) DEVICE — TROC ACC ABD KII FIOS BLADLES OPTI ADVFIX 5X100MM

## (undated) DEVICE — CANN TROC KII W SEAL ADV FIX 5X100MM

## (undated) DEVICE — SUT PDS 3/0 SH 27IN DYED Z316H

## (undated) DEVICE — HYBRID TUBING/CAP SET FOR OLYMPUS® SCOPES: Brand: ERBE

## (undated) DEVICE — SUT SILK 2/0 LIGAPAK LA55G

## (undated) DEVICE — KT ORCA ORCAPOD DISP STRL

## (undated) DEVICE — SUT MNCRYL 4/0 PS2 18 IN

## (undated) DEVICE — PK PROC MAJ 40

## (undated) DEVICE — SUT VIC 0 CT1 36IN J946H

## (undated) DEVICE — SYS VISABILITY LAP/SCPE LAPAROVUE CLN WARM 2/PRT 3TO12MM

## (undated) DEVICE — SUT PROLN 1 CTX 30IN 8455H

## (undated) DEVICE — SUT MNCRYL 3/0 PS2 18IN MCP497G

## (undated) DEVICE — VIAL FORMALIN CAP 10P 40ML

## (undated) DEVICE — Device

## (undated) DEVICE — SUT ETHIB 0/0 MO6 I8IN CX45D

## (undated) DEVICE — ADAPT CLN BIOGUARD AIR/H2O DISP

## (undated) DEVICE — PK LAP GEN 90

## (undated) DEVICE — Device: Brand: DEFENDO AIR/WATER/SUCTION AND BIOPSY VALVE

## (undated) DEVICE — DRP IOBAN ANTIMICRO 23X17IN

## (undated) DEVICE — GLV SURG PREMIERPRO ORTHO LTX PF SZ7 BRN

## (undated) DEVICE — PAD GRND E/S MEGADYNE MONOPLR 2/PLT W/CORD A/ DISP

## (undated) DEVICE — TRY CATH UROMETER SIL 16F

## (undated) DEVICE — JACKT LAB F/R KNIT CUFF/COLR XLG BLU

## (undated) DEVICE — IRRIGATOR BULB 60CC

## (undated) DEVICE — TRAP FLD MINIVAC MEGADYNE 100ML

## (undated) DEVICE — DRN WND RESVR BULB JP SIL 100ML

## (undated) DEVICE — GLV SURG SENSICARE PI MIC PF SZ8 LF STRL

## (undated) DEVICE — SUCTION CANISTER, 3000CC,SAFELINER: Brand: DEROYAL

## (undated) DEVICE — GLV SURG SENSICARE MICRO PF LF 7.5 STRL

## (undated) DEVICE — CVR HNDL LT SURG ACCSSRY BLU STRL

## (undated) DEVICE — TOWL STRL OR PREWSH COTN 17X27IN BLU DISP STRL PK/4

## (undated) DEVICE — SPNG GZ WOVN 4X4IN 12PLY 10/BX STRL

## (undated) DEVICE — SUT VIC 3/0 TIES 18IN J110T

## (undated) DEVICE — APPL CHLORAPREP W/TINT 26ML ORNG

## (undated) DEVICE — RESERVOIR,SUCTION,100CC,SILICONE: Brand: MEDLINE

## (undated) DEVICE — SUT PDS 0 CT1 36IN Z346H

## (undated) DEVICE — SUCTION CANISTER, 1000CC,SAFELINER: Brand: DEROYAL

## (undated) DEVICE — SUT PDS O CT1 CR/8 18IN Z740D

## (undated) DEVICE — DEV OPN LIGASURE CRV 180D 36MM 13.5CM  1P/U

## (undated) DEVICE — SYR LL 3CC

## (undated) DEVICE — SUT SILK 3/0 SH CR5 18IN C0135

## (undated) DEVICE — TBG PENCL TELESCP MEGADYNE SMOKE EVAC 10FT

## (undated) DEVICE — METZENBAUM SCISSOR TIP, DISPOSABLE: Brand: RENEW

## (undated) DEVICE — SUT VIC 0/0 UR6 27IN DYED J603H

## (undated) DEVICE — TUBING, SUCTION, 1/4" X 10', STRAIGHT: Brand: MEDLINE

## (undated) DEVICE — TROC ACC ABD KII FIOS BLADLES ADV FIX 12X100MM

## (undated) DEVICE — TROCAR: Brand: KII® SLEEVE

## (undated) DEVICE — FRCP BX RADJAW4 NDL 2.8 240CM LG OG BX40

## (undated) DEVICE — ENDOPATH XCEL BLUNT TIP TROCARS WITH SMOOTH SLEEVES: Brand: ENDOPATH XCEL

## (undated) DEVICE — ENDO. PORT CONNECTOR W/VALVE FOR OLYMPUS® SCOPES: Brand: ERBE

## (undated) DEVICE — SUT VIC 3/0 SH CR8 18IN VCP864D

## (undated) DEVICE — TUBING, SUCTION, 1/4" X 12', STRAIGHT: Brand: MEDLINE

## (undated) DEVICE — SUT SILK 2/0 SH 30IN K833H

## (undated) DEVICE — GLV SURG EUDERMIC PF LTX 8 STRL

## (undated) DEVICE — APPL CHLORAPREP HI/LITE 26ML ORNG

## (undated) DEVICE — SUT VIC 2/0 TIES 18IN J111T

## (undated) DEVICE — DRP LAP CHOL W/PCH LF 102X122X78IN STRL

## (undated) DEVICE — SENSR O2 OXIMAX FNGR A/ 18IN NONSTR